# Patient Record
Sex: FEMALE | Race: WHITE | NOT HISPANIC OR LATINO | Employment: UNEMPLOYED | URBAN - NONMETROPOLITAN AREA
[De-identification: names, ages, dates, MRNs, and addresses within clinical notes are randomized per-mention and may not be internally consistent; named-entity substitution may affect disease eponyms.]

---

## 2017-05-15 ENCOUNTER — TRANSCRIBE ORDERS (OUTPATIENT)
Dept: ADMINISTRATIVE | Facility: HOSPITAL | Age: 67
End: 2017-05-15

## 2017-05-15 ENCOUNTER — HOSPITAL ENCOUNTER (OUTPATIENT)
Dept: GENERAL RADIOLOGY | Facility: HOSPITAL | Age: 67
Discharge: HOME | End: 2017-05-15
Attending: INTERNAL MEDICINE | Admitting: INTERNAL MEDICINE

## 2017-05-15 DIAGNOSIS — M25.511 RIGHT SHOULDER PAIN, UNSPECIFIED CHRONICITY: ICD-10-CM

## 2017-05-15 DIAGNOSIS — M25.511 RIGHT SHOULDER PAIN, UNSPECIFIED CHRONICITY: Primary | ICD-10-CM

## 2017-05-15 PROCEDURE — 73030 X-RAY EXAM OF SHOULDER: CPT

## 2017-05-15 PROCEDURE — 73030 X-RAY EXAM OF SHOULDER: CPT | Performed by: RADIOLOGY

## 2017-08-26 ENCOUNTER — HOSPITAL ENCOUNTER (EMERGENCY)
Facility: HOSPITAL | Age: 67
Discharge: HOME | End: 2017-08-26
Attending: EMERGENCY MEDICINE | Admitting: EMERGENCY MEDICINE

## 2017-08-26 ENCOUNTER — APPOINTMENT (OUTPATIENT)
Dept: GENERAL RADIOLOGY | Facility: HOSPITAL | Age: 67
End: 2017-08-26

## 2017-08-26 VITALS
WEIGHT: 155 LBS | TEMPERATURE: 97.6 F | RESPIRATION RATE: 18 BRPM | DIASTOLIC BLOOD PRESSURE: 74 MMHG | HEART RATE: 60 BPM | SYSTOLIC BLOOD PRESSURE: 158 MMHG | OXYGEN SATURATION: 99 % | BODY MASS INDEX: 24.33 KG/M2 | HEIGHT: 67 IN

## 2017-08-26 DIAGNOSIS — S46.911A STRAIN OF UPPER ARM, RIGHT, INITIAL ENCOUNTER: ICD-10-CM

## 2017-08-26 DIAGNOSIS — S16.1XXA CERVICAL STRAIN, INITIAL ENCOUNTER: Primary | ICD-10-CM

## 2017-08-26 PROCEDURE — 73060 X-RAY EXAM OF HUMERUS: CPT

## 2017-08-26 PROCEDURE — 72050 X-RAY EXAM NECK SPINE 4/5VWS: CPT | Performed by: RADIOLOGY

## 2017-08-26 PROCEDURE — 73060 X-RAY EXAM OF HUMERUS: CPT | Performed by: RADIOLOGY

## 2017-08-26 PROCEDURE — 72050 X-RAY EXAM NECK SPINE 4/5VWS: CPT

## 2017-08-26 PROCEDURE — 99284 EMERGENCY DEPT VISIT MOD MDM: CPT

## 2017-08-26 RX ORDER — TRAMADOL HYDROCHLORIDE 50 MG/1
50 TABLET ORAL ONCE
Status: COMPLETED | OUTPATIENT
Start: 2017-08-26 | End: 2017-08-26

## 2017-08-26 RX ORDER — BISACODYL 10 MG
10 SUPPOSITORY, RECTAL RECTAL ONCE
Status: COMPLETED | OUTPATIENT
Start: 2017-08-26 | End: 2017-08-26

## 2017-08-26 RX ADMIN — BISACODYL 10 MG: 10 SUPPOSITORY RECTAL at 02:54

## 2017-08-26 RX ADMIN — TRAMADOL HYDROCHLORIDE 50 MG: 50 TABLET, COATED ORAL at 02:54

## 2017-08-26 NOTE — ED PROVIDER NOTES
Subjective   Patient is a 66 y.o. female presenting with fall.   History provided by:  Patient  Fall   Mechanism of injury: fall    Injury location:  Head/neck and shoulder/arm  Shoulder/arm injury location:  L upper arm  Incident location:  Home  Fall:     Fall occurred:  From a bed    Entrapped after fall: no    Associated symptoms: neck pain    Associated symptoms: no abdominal pain and no chest pain        Review of Systems   Constitutional: Negative.  Negative for fever.   HENT: Negative.    Respiratory: Negative.    Cardiovascular: Negative.  Negative for chest pain.   Gastrointestinal: Negative.  Negative for abdominal pain.   Endocrine: Negative.    Genitourinary: Negative.  Negative for dysuria.   Musculoskeletal: Positive for neck pain.        Pain right upper arm   Skin: Negative.    Neurological: Negative.    Psychiatric/Behavioral: Negative.    All other systems reviewed and are negative.      Past Medical History:   Diagnosis Date   • Bladder prolapse, female, acquired    • COPD (chronic obstructive pulmonary disease)    • Coronary artery disease    • Heart attack    • Hypertension        No Known Allergies    Past Surgical History:   Procedure Laterality Date   • CARDIAC SURGERY     • KNEE ARTHROSCOPY         History reviewed. No pertinent family history.    Social History     Social History   • Marital status:      Spouse name: N/A   • Number of children: N/A   • Years of education: N/A     Social History Main Topics   • Smoking status: Current Some Day Smoker     Packs/day: 1.00     Years: 25.00   • Smokeless tobacco: None      Comment: smoked since 11 ys old   • Alcohol use No   • Drug use: No   • Sexual activity: Not Asked     Other Topics Concern   • None     Social History Narrative           Objective   Physical Exam   Constitutional: She is oriented to person, place, and time. She appears well-developed and well-nourished. No distress.   HENT:   Head: Normocephalic and atraumatic.    Right Ear: External ear normal.   Left Ear: External ear normal.   Nose: Nose normal.   Eyes: Conjunctivae and EOM are normal. Pupils are equal, round, and reactive to light.   Neck: Normal range of motion. Neck supple. No JVD present. No tracheal deviation present.   Mild diffuse tenderness, right upper arm tender   Cardiovascular: Normal rate, regular rhythm and normal heart sounds.    No murmur heard.  Pulmonary/Chest: Effort normal and breath sounds normal. No respiratory distress. She has no wheezes.   Abdominal: Soft. Bowel sounds are normal. There is no tenderness.   Musculoskeletal: Normal range of motion. She exhibits no edema or deformity.   Neurological: She is alert and oriented to person, place, and time. No cranial nerve deficit.   Skin: Skin is warm and dry. No rash noted. She is not diaphoretic. No erythema. No pallor.   Psychiatric: She has a normal mood and affect. Her behavior is normal. Thought content normal.   Nursing note and vitals reviewed.      Procedures         ED Course  ED Course                  MDM    Final diagnoses:   Cervical strain, initial encounter   Strain of upper arm, right, initial encounter            Mino Haro MD  08/26/17 5024

## 2017-09-23 ENCOUNTER — HOSPITAL ENCOUNTER (EMERGENCY)
Facility: HOSPITAL | Age: 67
Discharge: HOME | End: 2017-09-23
Attending: EMERGENCY MEDICINE | Admitting: NURSE PRACTITIONER

## 2017-09-23 VITALS
RESPIRATION RATE: 18 BRPM | OXYGEN SATURATION: 95 % | TEMPERATURE: 97.6 F | WEIGHT: 147 LBS | SYSTOLIC BLOOD PRESSURE: 160 MMHG | BODY MASS INDEX: 23.63 KG/M2 | HEART RATE: 58 BPM | HEIGHT: 66 IN | DIASTOLIC BLOOD PRESSURE: 86 MMHG

## 2017-09-23 DIAGNOSIS — N81.4 UTERINE PROLAPSE: Primary | ICD-10-CM

## 2017-09-23 PROCEDURE — 99283 EMERGENCY DEPT VISIT LOW MDM: CPT

## 2017-09-26 ENCOUNTER — APPOINTMENT (OUTPATIENT)
Dept: GENERAL RADIOLOGY | Facility: HOSPITAL | Age: 67
End: 2017-09-26

## 2017-09-26 ENCOUNTER — HOSPITAL ENCOUNTER (EMERGENCY)
Facility: HOSPITAL | Age: 67
Discharge: HOME | End: 2017-09-26
Attending: FAMILY MEDICINE | Admitting: FAMILY MEDICINE

## 2017-09-26 VITALS
BODY MASS INDEX: 19.29 KG/M2 | HEIGHT: 66 IN | DIASTOLIC BLOOD PRESSURE: 81 MMHG | WEIGHT: 120 LBS | RESPIRATION RATE: 18 BRPM | OXYGEN SATURATION: 99 % | HEART RATE: 73 BPM | TEMPERATURE: 98.5 F | SYSTOLIC BLOOD PRESSURE: 100 MMHG

## 2017-09-26 DIAGNOSIS — R07.9 CHEST PAIN IN ADULT: Primary | ICD-10-CM

## 2017-09-26 DIAGNOSIS — N39.0 URINARY TRACT INFECTION, SITE UNSPECIFIED: ICD-10-CM

## 2017-09-26 LAB
ALBUMIN SERPL-MCNC: 3.7 G/DL (ref 3.4–4.8)
ALBUMIN/GLOB SERPL: 1.5 G/DL (ref 1.5–2.5)
ALP SERPL-CCNC: 103 U/L (ref 35–104)
ALT SERPL W P-5'-P-CCNC: 12 U/L (ref 10–36)
ANION GAP SERPL CALCULATED.3IONS-SCNC: 4.6 MMOL/L (ref 3.6–11.2)
AST SERPL-CCNC: 20 U/L (ref 10–30)
BACTERIA UR QL AUTO: ABNORMAL /HPF
BASOPHILS # BLD AUTO: 0.01 10*3/MM3 (ref 0–0.3)
BASOPHILS NFR BLD AUTO: 0.1 % (ref 0–2)
BILIRUB SERPL-MCNC: 0.6 MG/DL (ref 0.2–1.8)
BILIRUB UR QL STRIP: NEGATIVE
BNP SERPL-MCNC: 233 PG/ML (ref 0–100)
BUN BLD-MCNC: 24 MG/DL (ref 7–21)
BUN/CREAT SERPL: 18.8 (ref 7–25)
CALCIUM SPEC-SCNC: 9.3 MG/DL (ref 7.7–10)
CHLORIDE SERPL-SCNC: 106 MMOL/L (ref 99–112)
CLARITY UR: ABNORMAL
CO2 SERPL-SCNC: 29.4 MMOL/L (ref 24.3–31.9)
COLOR UR: YELLOW
CREAT BLD-MCNC: 1.28 MG/DL (ref 0.43–1.29)
CRP SERPL-MCNC: 23.17 MG/DL (ref 0–0.99)
DEPRECATED RDW RBC AUTO: 47.7 FL (ref 37–54)
EOSINOPHIL # BLD AUTO: 0.02 10*3/MM3 (ref 0–0.7)
EOSINOPHIL NFR BLD AUTO: 0.2 % (ref 0–7)
ERYTHROCYTE [DISTWIDTH] IN BLOOD BY AUTOMATED COUNT: 13.9 % (ref 11.5–14.5)
ERYTHROCYTE [SEDIMENTATION RATE] IN BLOOD: 18 MM/HR (ref 0–30)
GFR SERPL CREATININE-BSD FRML MDRD: 41 ML/MIN/1.73
GLOBULIN UR ELPH-MCNC: 2.4 GM/DL
GLUCOSE BLD-MCNC: 106 MG/DL (ref 70–110)
GLUCOSE UR STRIP-MCNC: NEGATIVE MG/DL
HCT VFR BLD AUTO: 40 % (ref 37–47)
HGB BLD-MCNC: 13.2 G/DL (ref 12–16)
HGB UR QL STRIP.AUTO: ABNORMAL
HYALINE CASTS UR QL AUTO: ABNORMAL /LPF
IMM GRANULOCYTES # BLD: 0.03 10*3/MM3 (ref 0–0.03)
IMM GRANULOCYTES NFR BLD: 0.3 % (ref 0–0.5)
KETONES UR QL STRIP: NEGATIVE
LEUKOCYTE ESTERASE UR QL STRIP.AUTO: ABNORMAL
LYMPHOCYTES # BLD AUTO: 0.71 10*3/MM3 (ref 1–3)
LYMPHOCYTES NFR BLD AUTO: 6.6 % (ref 16–46)
MCH RBC QN AUTO: 31 PG (ref 27–33)
MCHC RBC AUTO-ENTMCNC: 33 G/DL (ref 33–37)
MCV RBC AUTO: 93.9 FL (ref 80–94)
MONOCYTES # BLD AUTO: 0.41 10*3/MM3 (ref 0.1–0.9)
MONOCYTES NFR BLD AUTO: 3.8 % (ref 0–12)
NEUTROPHILS # BLD AUTO: 9.55 10*3/MM3 (ref 1.4–6.5)
NEUTROPHILS NFR BLD AUTO: 89 % (ref 40–75)
NITRITE UR QL STRIP: POSITIVE
OSMOLALITY SERPL CALC.SUM OF ELEC: 283.9 MOSM/KG (ref 273–305)
PH UR STRIP.AUTO: 5.5 [PH] (ref 5–8)
PLATELET # BLD AUTO: 132 10*3/MM3 (ref 130–400)
PMV BLD AUTO: 11.7 FL (ref 6–10)
POTASSIUM BLD-SCNC: 3.4 MMOL/L (ref 3.5–5.3)
PROT SERPL-MCNC: 6.1 G/DL (ref 6–8)
PROT UR QL STRIP: ABNORMAL
RBC # BLD AUTO: 4.26 10*6/MM3 (ref 4.2–5.4)
RBC # UR: ABNORMAL /HPF
REF LAB TEST METHOD: ABNORMAL
SODIUM BLD-SCNC: 140 MMOL/L (ref 135–153)
SP GR UR STRIP: 1.01 (ref 1–1.03)
SQUAMOUS #/AREA URNS HPF: ABNORMAL /HPF
TRANS CELLS #/AREA URNS HPF: ABNORMAL /HPF
TROPONIN I SERPL-MCNC: 0.02 NG/ML
TROPONIN I SERPL-MCNC: 0.02 NG/ML
UROBILINOGEN UR QL STRIP: ABNORMAL
WBC NRBC COR # BLD: 10.73 10*3/MM3 (ref 4.5–12.5)
WBC UR QL AUTO: ABNORMAL /HPF

## 2017-09-26 PROCEDURE — 87086 URINE CULTURE/COLONY COUNT: CPT | Performed by: FAMILY MEDICINE

## 2017-09-26 PROCEDURE — 86140 C-REACTIVE PROTEIN: CPT | Performed by: FAMILY MEDICINE

## 2017-09-26 PROCEDURE — 87077 CULTURE AEROBIC IDENTIFY: CPT | Performed by: FAMILY MEDICINE

## 2017-09-26 PROCEDURE — 85652 RBC SED RATE AUTOMATED: CPT | Performed by: FAMILY MEDICINE

## 2017-09-26 PROCEDURE — 96365 THER/PROPH/DIAG IV INF INIT: CPT

## 2017-09-26 PROCEDURE — 36415 COLL VENOUS BLD VENIPUNCTURE: CPT

## 2017-09-26 PROCEDURE — 93005 ELECTROCARDIOGRAM TRACING: CPT | Performed by: EMERGENCY MEDICINE

## 2017-09-26 PROCEDURE — 81001 URINALYSIS AUTO W/SCOPE: CPT | Performed by: FAMILY MEDICINE

## 2017-09-26 PROCEDURE — 73030 X-RAY EXAM OF SHOULDER: CPT | Performed by: RADIOLOGY

## 2017-09-26 PROCEDURE — 99284 EMERGENCY DEPT VISIT MOD MDM: CPT

## 2017-09-26 PROCEDURE — 85025 COMPLETE CBC W/AUTO DIFF WBC: CPT | Performed by: FAMILY MEDICINE

## 2017-09-26 PROCEDURE — 87186 SC STD MICRODIL/AGAR DIL: CPT | Performed by: FAMILY MEDICINE

## 2017-09-26 PROCEDURE — 80053 COMPREHEN METABOLIC PANEL: CPT | Performed by: FAMILY MEDICINE

## 2017-09-26 PROCEDURE — 73030 X-RAY EXAM OF SHOULDER: CPT

## 2017-09-26 PROCEDURE — 83880 ASSAY OF NATRIURETIC PEPTIDE: CPT | Performed by: FAMILY MEDICINE

## 2017-09-26 PROCEDURE — 71010 XR CHEST 1 VW: CPT | Performed by: RADIOLOGY

## 2017-09-26 PROCEDURE — 71010 HC CHEST PA OR AP: CPT

## 2017-09-26 PROCEDURE — 84484 ASSAY OF TROPONIN QUANT: CPT | Performed by: FAMILY MEDICINE

## 2017-09-26 PROCEDURE — P9612 CATHETERIZE FOR URINE SPEC: HCPCS

## 2017-09-26 PROCEDURE — 25010000002 CEFTRIAXONE: Performed by: FAMILY MEDICINE

## 2017-09-26 PROCEDURE — 93010 ELECTROCARDIOGRAM REPORT: CPT | Performed by: INTERNAL MEDICINE

## 2017-09-26 RX ORDER — NITROFURANTOIN 25; 75 MG/1; MG/1
100 CAPSULE ORAL 2 TIMES DAILY
Qty: 14 CAPSULE | Refills: 0 | Status: SHIPPED | OUTPATIENT
Start: 2017-09-26 | End: 2017-10-03

## 2017-09-26 RX ADMIN — CEFTRIAXONE 1 G: 1 INJECTION, POWDER, FOR SOLUTION INTRAMUSCULAR; INTRAVENOUS at 15:50

## 2017-09-27 ENCOUNTER — TRANSCRIBE ORDERS (OUTPATIENT)
Dept: ADMINISTRATIVE | Facility: HOSPITAL | Age: 67
End: 2017-09-27

## 2017-09-27 DIAGNOSIS — R07.9 CHEST PAIN, UNSPECIFIED: Primary | ICD-10-CM

## 2017-09-28 LAB
BACTERIA SPEC AEROBE CULT: ABNORMAL
BACTERIA SPEC AEROBE CULT: ABNORMAL

## 2017-10-29 ENCOUNTER — PREP FOR SURGERY (OUTPATIENT)
Dept: OTHER | Facility: HOSPITAL | Age: 67
End: 2017-10-29

## 2017-10-29 DIAGNOSIS — N81.10 VAGINAL PROLAPSE: Primary | ICD-10-CM

## 2017-10-29 DIAGNOSIS — F17.210 SMOKING GREATER THAN 20 PACK YEARS: ICD-10-CM

## 2017-10-29 DIAGNOSIS — I25.10 ATHEROSCLEROSIS OF NATIVE CORONARY ARTERY OF NATIVE HEART, ANGINA PRESENCE UNSPECIFIED: ICD-10-CM

## 2017-10-29 DIAGNOSIS — N81.3 COMPLETE UTERINE PROLAPSE: ICD-10-CM

## 2017-10-29 RX ORDER — SODIUM CHLORIDE 0.9 % (FLUSH) 0.9 %
1-10 SYRINGE (ML) INJECTION AS NEEDED
Status: CANCELLED | OUTPATIENT
Start: 2017-11-08

## 2017-10-30 ENCOUNTER — APPOINTMENT (OUTPATIENT)
Dept: PREADMISSION TESTING | Facility: HOSPITAL | Age: 67
End: 2017-10-30

## 2017-11-05 ENCOUNTER — PREP FOR SURGERY (OUTPATIENT)
Dept: OTHER | Facility: HOSPITAL | Age: 67
End: 2017-11-05

## 2017-11-07 ENCOUNTER — APPOINTMENT (OUTPATIENT)
Dept: PREADMISSION TESTING | Facility: HOSPITAL | Age: 67
End: 2017-11-07

## 2017-11-07 ENCOUNTER — HOSPITAL ENCOUNTER (OUTPATIENT)
Dept: GENERAL RADIOLOGY | Facility: HOSPITAL | Age: 67
Discharge: HOME | End: 2017-11-07
Admitting: PHYSICIAN ASSISTANT

## 2017-11-07 DIAGNOSIS — N81.3 COMPLETE UTERINE PROLAPSE: ICD-10-CM

## 2017-11-07 DIAGNOSIS — F17.210 SMOKING GREATER THAN 20 PACK YEARS: ICD-10-CM

## 2017-11-07 DIAGNOSIS — I25.10 ATHEROSCLEROSIS OF NATIVE CORONARY ARTERY OF NATIVE HEART, ANGINA PRESENCE UNSPECIFIED: ICD-10-CM

## 2017-11-07 DIAGNOSIS — N81.10 VAGINAL PROLAPSE: ICD-10-CM

## 2017-11-07 LAB
ABO GROUP BLD: NORMAL
ANION GAP SERPL CALCULATED.3IONS-SCNC: 4.9 MMOL/L (ref 3.6–11.2)
BASOPHILS # BLD AUTO: 0.01 10*3/MM3 (ref 0–0.3)
BASOPHILS NFR BLD AUTO: 0.1 % (ref 0–2)
BLD GP AB SCN SERPL QL: NEGATIVE
BUN BLD-MCNC: 13 MG/DL (ref 7–21)
BUN/CREAT SERPL: 15.1 (ref 7–25)
CALCIUM SPEC-SCNC: 9.3 MG/DL (ref 7.7–10)
CHLORIDE SERPL-SCNC: 106 MMOL/L (ref 99–112)
CO2 SERPL-SCNC: 30.1 MMOL/L (ref 24.3–31.9)
CREAT BLD-MCNC: 0.86 MG/DL (ref 0.43–1.29)
DEPRECATED RDW RBC AUTO: 49 FL (ref 37–54)
EOSINOPHIL # BLD AUTO: 0.07 10*3/MM3 (ref 0–0.7)
EOSINOPHIL NFR BLD AUTO: 0.7 % (ref 0–7)
ERYTHROCYTE [DISTWIDTH] IN BLOOD BY AUTOMATED COUNT: 14.4 % (ref 11.5–14.5)
GFR SERPL CREATININE-BSD FRML MDRD: 66 ML/MIN/1.73
GLUCOSE BLD-MCNC: 113 MG/DL (ref 70–110)
HCT VFR BLD AUTO: 40.4 % (ref 37–47)
HGB BLD-MCNC: 13 G/DL (ref 12–16)
IMM GRANULOCYTES # BLD: 0.03 10*3/MM3 (ref 0–0.03)
IMM GRANULOCYTES NFR BLD: 0.3 % (ref 0–0.5)
LYMPHOCYTES # BLD AUTO: 1.09 10*3/MM3 (ref 1–3)
LYMPHOCYTES NFR BLD AUTO: 11.3 % (ref 16–46)
MCH RBC QN AUTO: 29.9 PG (ref 27–33)
MCHC RBC AUTO-ENTMCNC: 32.2 G/DL (ref 33–37)
MCV RBC AUTO: 92.9 FL (ref 80–94)
MONOCYTES # BLD AUTO: 0.63 10*3/MM3 (ref 0.1–0.9)
MONOCYTES NFR BLD AUTO: 6.5 % (ref 0–12)
NEUTROPHILS # BLD AUTO: 7.84 10*3/MM3 (ref 1.4–6.5)
NEUTROPHILS NFR BLD AUTO: 81.1 % (ref 40–75)
OSMOLALITY SERPL CALC.SUM OF ELEC: 282.2 MOSM/KG (ref 273–305)
PLATELET # BLD AUTO: 165 10*3/MM3 (ref 130–400)
PMV BLD AUTO: 11.7 FL (ref 6–10)
POTASSIUM BLD-SCNC: 3.4 MMOL/L (ref 3.5–5.3)
RBC # BLD AUTO: 4.35 10*6/MM3 (ref 4.2–5.4)
RH BLD: POSITIVE
SODIUM BLD-SCNC: 141 MMOL/L (ref 135–153)
WBC NRBC COR # BLD: 9.67 10*3/MM3 (ref 4.5–12.5)

## 2017-11-07 PROCEDURE — 71020 XR CHEST 2 VW: CPT | Performed by: RADIOLOGY

## 2017-11-07 RX ORDER — LEVOTHYROXINE SODIUM 0.05 MG/1
50 TABLET ORAL DAILY
COMMUNITY
End: 2022-03-08

## 2017-11-07 RX ORDER — SIMVASTATIN 40 MG
40 TABLET ORAL DAILY
COMMUNITY
End: 2022-03-08

## 2017-11-07 RX ORDER — METOPROLOL SUCCINATE 25 MG/1
25 TABLET, EXTENDED RELEASE ORAL DAILY
COMMUNITY
End: 2022-03-08

## 2017-11-07 RX ORDER — LISINOPRIL 40 MG/1
40 TABLET ORAL DAILY
COMMUNITY
End: 2022-03-11 | Stop reason: HOSPADM

## 2017-11-07 RX ORDER — PANTOPRAZOLE SODIUM 20 MG/1
20 TABLET, DELAYED RELEASE ORAL 2 TIMES DAILY
COMMUNITY
End: 2022-03-08

## 2017-11-07 NOTE — DISCHARGE INSTRUCTIONS
TAKE the following medications the morning of surgery:  All heart or blood pressure medications    Please discontinue all blood thinners and anticoagulants (except aspirin) prior to surgery as per your surgeon and cardiologist instructions.  Aspirin may be continued up to the day prior to surgery.    HOLD all diabetic medications the morning of surgery as order by physician.    Please follow instructions on use of prep cloths provided by nurse. Return instruction sheet with stickers attached to pre-op nurse on day of surgery.    Arrival time for surgery on 11/8/2017 is 0830 am    General Instructions:  • Do NOT eat or drink after midnight 11/7/2017 which includes water, mints, or gum.  • You may brush your teeth. Dental appliances that are removable must be taken out day of surgery.  • Do NOT smoke, chew tobacco, or drink alcohol within 24 hours prior to surgery.  • Bring medications in original bottles, any inhalers and if applicable your C-PAP/BI-PAP machine  • Bring any papers given to you in the doctor’s office  • Wear clean, comfortable clothes and socks  • Do NOT wear contact lenses or make-up or dark nail polish.  Bring a case for your glasses if applicable.  • Bring crutches or walker if applicable  • Leave all other valuables and jewelry at home  • If you were given a blood bank armband, continue to wear it until discharged.    Preventing a Surgical Site Infection:  • Shower on the morning of surgery using a fresh bar of anti-bacterial soap (such as Dial) and clean washcloth.  Dry with a clean towel and dress in clean clothing.  • For 2 to 3 days before surgery, avoid shaving with a razor near where you will have surgery because the razor can irritate skin and make it easier to develop an infection.  Ask your surgeon if you will be receiving antibiotics prior to surgery.  • Make sure you, your family, and all healthcare providers clean their hands with soap and water or an alcohol-based hand   before caring for you or your wound.  • If at all possible, quit smoking as many days before surgery as you can.    Day of Surgery:  Upon arrival, a pre-op nurse and anesthesiologist will review your health history, obtain vital signs, and answer questions you may have.  The only belongings needed at this time will be your home medications and if applicable you C-PAP/BI-PAP machine.  If you are staying overnight, your family can leave the rest of your belongings in the car and bring them to your room later.  A pre-op nurse will start an IV and you may receive medication in preparation for surgery.  Due to patient privacy and limited space, only one member of your family will be able to accompany you in the pre-op area.  While you are in surgery your family should notify the waiting room  if they leave the waiting room area and provide a contact number.  Please be aware that surgery does come with discomfort.  We want to make every effort to control your discomfort so please discuss any uncontrolled symptoms with your nurse.  Your doctor will most likely have prescribed pain medications.  If you are going home after surgery you will receive individualized written care instructions before being discharged.  A responsible adult must drive you to and from the hospital on the day of surgery and stay with you for 24 hours.  If you are staying overnight following surgery, you will be transported to your hospital room following the recovery period.

## 2017-11-08 ENCOUNTER — HOSPITAL ENCOUNTER (INPATIENT)
Facility: HOSPITAL | Age: 67
LOS: 1 days | Discharge: HOME | End: 2017-11-09
Attending: OBSTETRICS & GYNECOLOGY | Admitting: OBSTETRICS & GYNECOLOGY

## 2017-11-08 ENCOUNTER — ANESTHESIA EVENT (OUTPATIENT)
Dept: PERIOP | Facility: HOSPITAL | Age: 67
End: 2017-11-08

## 2017-11-08 ENCOUNTER — ANESTHESIA (OUTPATIENT)
Dept: PERIOP | Facility: HOSPITAL | Age: 67
End: 2017-11-08

## 2017-11-08 DIAGNOSIS — R10.9 ABDOMINAL PAIN: ICD-10-CM

## 2017-11-08 PROBLEM — N81.3 UTEROVAGINAL PROLAPSE, COMPLETE: Status: ACTIVE | Noted: 2017-11-08

## 2017-11-08 PROCEDURE — 25010000002 CEFOXITIN PER 1 G: Performed by: OBSTETRICS & GYNECOLOGY

## 2017-11-08 PROCEDURE — L0172 CERV COL SR FOAM 2PC PRE OTS: HCPCS | Performed by: OBSTETRICS & GYNECOLOGY

## 2017-11-08 PROCEDURE — 25010000002 DEXAMETHASONE PER 1 MG: Performed by: NURSE ANESTHETIST, CERTIFIED REGISTERED

## 2017-11-08 PROCEDURE — 0JQC3ZZ REPAIR PELVIC REGION SUBCUTANEOUS TISSUE AND FASCIA, PERCUTANEOUS APPROACH: ICD-10-PCS | Performed by: OBSTETRICS & GYNECOLOGY

## 2017-11-08 PROCEDURE — 25010000002 KETOROLAC TROMETHAMINE PER 15 MG: Performed by: OBSTETRICS & GYNECOLOGY

## 2017-11-08 PROCEDURE — 0UQF3ZZ REPAIR CUL-DE-SAC, PERCUTANEOUS APPROACH: ICD-10-PCS | Performed by: OBSTETRICS & GYNECOLOGY

## 2017-11-08 PROCEDURE — 25010000002 MIDAZOLAM PER 1 MG: Performed by: NURSE ANESTHETIST, CERTIFIED REGISTERED

## 2017-11-08 PROCEDURE — 25010000002 ONDANSETRON PER 1 MG: Performed by: NURSE ANESTHETIST, CERTIFIED REGISTERED

## 2017-11-08 PROCEDURE — 25010000002 NEOSTIGMINE 10 MG/10ML SOLUTION: Performed by: NURSE ANESTHETIST, CERTIFIED REGISTERED

## 2017-11-08 PROCEDURE — 0USG7ZZ REPOSITION VAGINA, VIA NATURAL OR ARTIFICIAL OPENING: ICD-10-PCS | Performed by: OBSTETRICS & GYNECOLOGY

## 2017-11-08 PROCEDURE — 0TJB8ZZ INSPECTION OF BLADDER, VIA NATURAL OR ARTIFICIAL OPENING ENDOSCOPIC: ICD-10-PCS | Performed by: OBSTETRICS & GYNECOLOGY

## 2017-11-08 PROCEDURE — 0UT97ZZ RESECTION OF UTERUS, VIA NATURAL OR ARTIFICIAL OPENING: ICD-10-PCS | Performed by: OBSTETRICS & GYNECOLOGY

## 2017-11-08 PROCEDURE — 88307 TISSUE EXAM BY PATHOLOGIST: CPT | Performed by: OBSTETRICS & GYNECOLOGY

## 2017-11-08 PROCEDURE — 94799 UNLISTED PULMONARY SVC/PX: CPT

## 2017-11-08 PROCEDURE — 25010000002 ENOXAPARIN PER 10 MG: Performed by: OBSTETRICS & GYNECOLOGY

## 2017-11-08 PROCEDURE — 25010000002 FENTANYL CITRATE (PF) 100 MCG/2ML SOLUTION: Performed by: NURSE ANESTHETIST, CERTIFIED REGISTERED

## 2017-11-08 PROCEDURE — 25010000002 PROPOFOL 10 MG/ML EMULSION: Performed by: NURSE ANESTHETIST, CERTIFIED REGISTERED

## 2017-11-08 RX ORDER — SODIUM CHLORIDE 0.9 % (FLUSH) 0.9 %
1-10 SYRINGE (ML) INJECTION AS NEEDED
Status: DISCONTINUED | OUTPATIENT
Start: 2017-11-08 | End: 2017-11-08 | Stop reason: HOSPADM

## 2017-11-08 RX ORDER — IBUPROFEN 800 MG/1
800 TABLET ORAL 3 TIMES DAILY
Status: DISCONTINUED | OUTPATIENT
Start: 2017-11-08 | End: 2017-11-09 | Stop reason: HOSPADM

## 2017-11-08 RX ORDER — ONDANSETRON 4 MG/1
4 TABLET, FILM COATED ORAL EVERY 6 HOURS PRN
Status: DISCONTINUED | OUTPATIENT
Start: 2017-11-08 | End: 2017-11-09 | Stop reason: HOSPADM

## 2017-11-08 RX ORDER — NEOSTIGMINE METHYLSULFATE 1 MG/ML
INJECTION, SOLUTION INTRAVENOUS AS NEEDED
Status: DISCONTINUED | OUTPATIENT
Start: 2017-11-08 | End: 2017-11-08 | Stop reason: SURG

## 2017-11-08 RX ORDER — FENTANYL CITRATE 50 UG/ML
INJECTION, SOLUTION INTRAMUSCULAR; INTRAVENOUS AS NEEDED
Status: DISCONTINUED | OUTPATIENT
Start: 2017-11-08 | End: 2017-11-08 | Stop reason: SURG

## 2017-11-08 RX ORDER — ACETAMINOPHEN 325 MG/1
650 TABLET ORAL EVERY 4 HOURS PRN
Status: DISCONTINUED | OUTPATIENT
Start: 2017-11-08 | End: 2017-11-09 | Stop reason: HOSPADM

## 2017-11-08 RX ORDER — BISACODYL 10 MG
10 SUPPOSITORY, RECTAL RECTAL DAILY PRN
Status: DISCONTINUED | OUTPATIENT
Start: 2017-11-08 | End: 2017-11-09 | Stop reason: HOSPADM

## 2017-11-08 RX ORDER — LEVOTHYROXINE SODIUM 0.05 MG/1
50 TABLET ORAL
Status: DISCONTINUED | OUTPATIENT
Start: 2017-11-09 | End: 2017-11-09 | Stop reason: HOSPADM

## 2017-11-08 RX ORDER — LANOLIN 100 %
OINTMENT (GRAM) TOPICAL
Status: CANCELLED | OUTPATIENT
Start: 2017-11-08

## 2017-11-08 RX ORDER — ONDANSETRON 4 MG/1
4 TABLET, ORALLY DISINTEGRATING ORAL EVERY 6 HOURS PRN
Status: DISCONTINUED | OUTPATIENT
Start: 2017-11-08 | End: 2017-11-09 | Stop reason: HOSPADM

## 2017-11-08 RX ORDER — MIDAZOLAM HYDROCHLORIDE 1 MG/ML
INJECTION INTRAMUSCULAR; INTRAVENOUS AS NEEDED
Status: DISCONTINUED | OUTPATIENT
Start: 2017-11-08 | End: 2017-11-08 | Stop reason: SURG

## 2017-11-08 RX ORDER — METOPROLOL SUCCINATE 25 MG/1
12.5 TABLET, EXTENDED RELEASE ORAL DAILY
Status: DISCONTINUED | OUTPATIENT
Start: 2017-11-09 | End: 2017-11-09 | Stop reason: HOSPADM

## 2017-11-08 RX ORDER — ONDANSETRON 2 MG/ML
4 INJECTION INTRAMUSCULAR; INTRAVENOUS ONCE AS NEEDED
Status: DISCONTINUED | OUTPATIENT
Start: 2017-11-08 | End: 2017-11-08 | Stop reason: HOSPADM

## 2017-11-08 RX ORDER — LIDOCAINE HYDROCHLORIDE AND EPINEPHRINE 10; 10 MG/ML; UG/ML
INJECTION, SOLUTION INFILTRATION; PERINEURAL AS NEEDED
Status: DISCONTINUED | OUTPATIENT
Start: 2017-11-08 | End: 2017-11-08 | Stop reason: HOSPADM

## 2017-11-08 RX ORDER — MEPERIDINE HYDROCHLORIDE 25 MG/ML
12.5 INJECTION INTRAMUSCULAR; INTRAVENOUS; SUBCUTANEOUS
Status: DISCONTINUED | OUTPATIENT
Start: 2017-11-08 | End: 2017-11-08 | Stop reason: HOSPADM

## 2017-11-08 RX ORDER — BACLOFEN 10 MG/1
20 TABLET ORAL NIGHTLY
Status: DISCONTINUED | OUTPATIENT
Start: 2017-11-08 | End: 2017-11-09 | Stop reason: HOSPADM

## 2017-11-08 RX ORDER — DEXAMETHASONE SODIUM PHOSPHATE 10 MG/ML
INJECTION INTRAMUSCULAR; INTRAVENOUS AS NEEDED
Status: DISCONTINUED | OUTPATIENT
Start: 2017-11-08 | End: 2017-11-08 | Stop reason: SURG

## 2017-11-08 RX ORDER — ROCURONIUM BROMIDE 10 MG/ML
INJECTION, SOLUTION INTRAVENOUS AS NEEDED
Status: DISCONTINUED | OUTPATIENT
Start: 2017-11-08 | End: 2017-11-08 | Stop reason: SURG

## 2017-11-08 RX ORDER — GLYCOPYRROLATE 0.2 MG/ML
INJECTION INTRAMUSCULAR; INTRAVENOUS AS NEEDED
Status: DISCONTINUED | OUTPATIENT
Start: 2017-11-08 | End: 2017-11-08 | Stop reason: SURG

## 2017-11-08 RX ORDER — LISINOPRIL 10 MG/1
40 TABLET ORAL DAILY
Status: DISCONTINUED | OUTPATIENT
Start: 2017-11-09 | End: 2017-11-09 | Stop reason: HOSPADM

## 2017-11-08 RX ORDER — OXYCODONE HYDROCHLORIDE AND ACETAMINOPHEN 5; 325 MG/1; MG/1
1 TABLET ORAL ONCE AS NEEDED
Status: DISCONTINUED | OUTPATIENT
Start: 2017-11-08 | End: 2017-11-08 | Stop reason: HOSPADM

## 2017-11-08 RX ORDER — KETOROLAC TROMETHAMINE 30 MG/ML
15 INJECTION, SOLUTION INTRAMUSCULAR; INTRAVENOUS EVERY 6 HOURS PRN
Status: DISCONTINUED | OUTPATIENT
Start: 2017-11-08 | End: 2017-11-09 | Stop reason: HOSPADM

## 2017-11-08 RX ORDER — IPRATROPIUM BROMIDE AND ALBUTEROL SULFATE 2.5; .5 MG/3ML; MG/3ML
3 SOLUTION RESPIRATORY (INHALATION) ONCE AS NEEDED
Status: DISCONTINUED | OUTPATIENT
Start: 2017-11-08 | End: 2017-11-08 | Stop reason: HOSPADM

## 2017-11-08 RX ORDER — LIDOCAINE HYDROCHLORIDE 20 MG/ML
INJECTION, SOLUTION INFILTRATION; PERINEURAL AS NEEDED
Status: DISCONTINUED | OUTPATIENT
Start: 2017-11-08 | End: 2017-11-08 | Stop reason: SURG

## 2017-11-08 RX ORDER — MAGNESIUM HYDROXIDE 1200 MG/15ML
LIQUID ORAL AS NEEDED
Status: DISCONTINUED | OUTPATIENT
Start: 2017-11-08 | End: 2017-11-08 | Stop reason: HOSPADM

## 2017-11-08 RX ORDER — SODIUM CHLORIDE, SODIUM LACTATE, POTASSIUM CHLORIDE, CALCIUM CHLORIDE 600; 310; 30; 20 MG/100ML; MG/100ML; MG/100ML; MG/100ML
125 INJECTION, SOLUTION INTRAVENOUS CONTINUOUS
Status: DISCONTINUED | OUTPATIENT
Start: 2017-11-08 | End: 2017-11-08 | Stop reason: HOSPADM

## 2017-11-08 RX ORDER — METOCLOPRAMIDE 10 MG/1
10 TABLET ORAL ONCE
Status: DISCONTINUED | OUTPATIENT
Start: 2017-11-08 | End: 2017-11-09 | Stop reason: HOSPADM

## 2017-11-08 RX ORDER — ONDANSETRON 2 MG/ML
4 INJECTION INTRAMUSCULAR; INTRAVENOUS EVERY 6 HOURS PRN
Status: DISCONTINUED | OUTPATIENT
Start: 2017-11-08 | End: 2017-11-09 | Stop reason: HOSPADM

## 2017-11-08 RX ORDER — PANTOPRAZOLE SODIUM 40 MG/1
40 TABLET, DELAYED RELEASE ORAL
Status: DISCONTINUED | OUTPATIENT
Start: 2017-11-08 | End: 2017-11-09 | Stop reason: HOSPADM

## 2017-11-08 RX ORDER — HYDROXYZINE 50 MG/1
50 TABLET, FILM COATED ORAL EVERY 6 HOURS PRN
Status: DISCONTINUED | OUTPATIENT
Start: 2017-11-08 | End: 2017-11-09 | Stop reason: HOSPADM

## 2017-11-08 RX ORDER — FENTANYL CITRATE 50 UG/ML
50 INJECTION, SOLUTION INTRAMUSCULAR; INTRAVENOUS
Status: DISCONTINUED | OUTPATIENT
Start: 2017-11-08 | End: 2017-11-08 | Stop reason: HOSPADM

## 2017-11-08 RX ORDER — SODIUM CHLORIDE 9 MG/ML
INJECTION, SOLUTION INTRAVENOUS AS NEEDED
Status: DISCONTINUED | OUTPATIENT
Start: 2017-11-08 | End: 2017-11-08 | Stop reason: HOSPADM

## 2017-11-08 RX ORDER — PROPOFOL 10 MG/ML
VIAL (ML) INTRAVENOUS AS NEEDED
Status: DISCONTINUED | OUTPATIENT
Start: 2017-11-08 | End: 2017-11-08 | Stop reason: SURG

## 2017-11-08 RX ORDER — SODIUM CHLORIDE, SODIUM LACTATE, POTASSIUM CHLORIDE, CALCIUM CHLORIDE 600; 310; 30; 20 MG/100ML; MG/100ML; MG/100ML; MG/100ML
125 INJECTION, SOLUTION INTRAVENOUS CONTINUOUS
Status: DISCONTINUED | OUTPATIENT
Start: 2017-11-08 | End: 2017-11-09 | Stop reason: HOSPADM

## 2017-11-08 RX ORDER — DOCUSATE SODIUM 100 MG/1
100 CAPSULE, LIQUID FILLED ORAL DAILY
Status: DISCONTINUED | OUTPATIENT
Start: 2017-11-08 | End: 2017-11-09 | Stop reason: HOSPADM

## 2017-11-08 RX ORDER — ALLOPURINOL 300 MG/1
300 TABLET ORAL NIGHTLY
Status: DISCONTINUED | OUTPATIENT
Start: 2017-11-08 | End: 2017-11-09 | Stop reason: HOSPADM

## 2017-11-08 RX ORDER — ATORVASTATIN CALCIUM 20 MG/1
20 TABLET, FILM COATED ORAL DAILY
Status: DISCONTINUED | OUTPATIENT
Start: 2017-11-09 | End: 2017-11-09 | Stop reason: HOSPADM

## 2017-11-08 RX ORDER — SIMETHICONE 80 MG
80 TABLET,CHEWABLE ORAL 4 TIMES DAILY
Status: DISCONTINUED | OUTPATIENT
Start: 2017-11-08 | End: 2017-11-09 | Stop reason: HOSPADM

## 2017-11-08 RX ORDER — ONDANSETRON 2 MG/ML
INJECTION INTRAMUSCULAR; INTRAVENOUS AS NEEDED
Status: DISCONTINUED | OUTPATIENT
Start: 2017-11-08 | End: 2017-11-08 | Stop reason: SURG

## 2017-11-08 RX ORDER — HYDROCODONE BITARTRATE AND ACETAMINOPHEN 7.5; 325 MG/1; MG/1
1 TABLET ORAL EVERY 4 HOURS PRN
Status: DISCONTINUED | OUTPATIENT
Start: 2017-11-08 | End: 2017-11-09 | Stop reason: HOSPADM

## 2017-11-08 RX ADMIN — ROCURONIUM BROMIDE 30 MG: 10 INJECTION INTRAVENOUS at 09:59

## 2017-11-08 RX ADMIN — DEXAMETHASONE SODIUM PHOSPHATE 4 MG: 10 INJECTION INTRAMUSCULAR; INTRAVENOUS at 09:59

## 2017-11-08 RX ADMIN — LIDOCAINE HYDROCHLORIDE 60 MG: 20 INJECTION, SOLUTION INFILTRATION; PERINEURAL at 09:59

## 2017-11-08 RX ADMIN — CEFOXITIN 2 G: 1 INJECTION, POWDER, FOR SOLUTION INTRAVENOUS at 09:52

## 2017-11-08 RX ADMIN — ENOXAPARIN SODIUM 40 MG: 40 INJECTION SUBCUTANEOUS at 14:15

## 2017-11-08 RX ADMIN — SODIUM CHLORIDE, POTASSIUM CHLORIDE, SODIUM LACTATE AND CALCIUM CHLORIDE: 600; 310; 30; 20 INJECTION, SOLUTION INTRAVENOUS at 10:20

## 2017-11-08 RX ADMIN — SODIUM CHLORIDE, POTASSIUM CHLORIDE, SODIUM LACTATE AND CALCIUM CHLORIDE: 600; 310; 30; 20 INJECTION, SOLUTION INTRAVENOUS at 11:13

## 2017-11-08 RX ADMIN — FENTANYL CITRATE 50 MCG: 50 INJECTION INTRAMUSCULAR; INTRAVENOUS at 09:59

## 2017-11-08 RX ADMIN — FLUORESCEIN SODIUM 100 MG: 100 INJECTION INTRAVENOUS at 10:45

## 2017-11-08 RX ADMIN — NEOSTIGMINE METHYLSULFATE 3 MG: 1 INJECTION, SOLUTION INTRAVENOUS at 11:10

## 2017-11-08 RX ADMIN — IBUPROFEN 800 MG: 800 TABLET ORAL at 21:02

## 2017-11-08 RX ADMIN — ALLOPURINOL 300 MG: 300 TABLET ORAL at 21:02

## 2017-11-08 RX ADMIN — MIDAZOLAM HYDROCHLORIDE 2 MG: 1 INJECTION, SOLUTION INTRAMUSCULAR; INTRAVENOUS at 09:52

## 2017-11-08 RX ADMIN — SODIUM CHLORIDE, POTASSIUM CHLORIDE, SODIUM LACTATE AND CALCIUM CHLORIDE 125 ML/HR: 600; 310; 30; 20 INJECTION, SOLUTION INTRAVENOUS at 08:57

## 2017-11-08 RX ADMIN — HYDROCODONE BITARTRATE AND ACETAMINOPHEN 1 TABLET: 7.5; 325 TABLET ORAL at 18:50

## 2017-11-08 RX ADMIN — SIMETHICONE CHEW TAB 80 MG 80 MG: 80 TABLET ORAL at 17:08

## 2017-11-08 RX ADMIN — GLYCOPYRROLATE 0.4 MG: 0.2 INJECTION, SOLUTION INTRAMUSCULAR; INTRAVENOUS at 11:10

## 2017-11-08 RX ADMIN — PROPOFOL 100 MG: 10 INJECTION, EMULSION INTRAVENOUS at 09:59

## 2017-11-08 RX ADMIN — FENTANYL CITRATE 50 MCG: 50 INJECTION INTRAMUSCULAR; INTRAVENOUS at 11:25

## 2017-11-08 RX ADMIN — ONDANSETRON 4 MG: 2 INJECTION, SOLUTION INTRAMUSCULAR; INTRAVENOUS at 09:59

## 2017-11-08 RX ADMIN — PANTOPRAZOLE SODIUM 40 MG: 40 TABLET, DELAYED RELEASE ORAL at 17:08

## 2017-11-08 RX ADMIN — FENTANYL CITRATE 100 MCG: 50 INJECTION INTRAMUSCULAR; INTRAVENOUS at 09:52

## 2017-11-08 RX ADMIN — BACLOFEN 20 MG: 10 TABLET ORAL at 21:02

## 2017-11-08 RX ADMIN — KETOROLAC TROMETHAMINE 15 MG: 30 INJECTION, SOLUTION INTRAMUSCULAR; INTRAVENOUS at 14:14

## 2017-11-08 RX ADMIN — FENTANYL CITRATE 50 MCG: 50 INJECTION INTRAMUSCULAR; INTRAVENOUS at 10:14

## 2017-11-08 NOTE — H&P
Patient Care Team:  Bernadette Arevalo MD as PCP - General  Bernadette Arevalo MD as PCP - Family Medicine    Chief complaint complete uterovaginal prolapse    Subjective     Patient is a 66 y.o. female who presents for surgical repair of complete uterovaginal prolapse.  She is present with her daughter-in-law.  Apparently her daughter-in-law takes care of her as she does have some psychiatric history although she does have the capability to make her own decisions.  We discussed the risks and benefits of the surgery in detail.  We discussed the postoperative recovery as well.    Review of Systems   Pertinent items are noted in HPI    History  Past Medical History:   Diagnosis Date   • Arthritis    • Bladder prolapse, female, acquired    • COPD (chronic obstructive pulmonary disease)    • Coronary artery disease    • Disease of thyroid gland    • Frequency of urination    • Heart attack    • Hypertension    • Irregular heart beat      Past Surgical History:   Procedure Laterality Date   • CARDIAC SURGERY      open heart    • HERNIA REPAIR     • OTHER SURGICAL HISTORY      states she had fluid drained no surgery     History reviewed. No pertinent family history.  Social History   Substance Use Topics   • Smoking status: Current Some Day Smoker     Packs/day: 0.50     Years: 25.00   • Smokeless tobacco: Never Used      Comment: smoked since 11 ys old   • Alcohol use No     Prescriptions Prior to Admission   Medication Sig Dispense Refill Last Dose   • allopurinol (ZYLOPRIM) 300 MG tablet Take 300 mg by mouth Every Night.   11/7/2017 at Unknown time   • baclofen (LIORESAL) 20 MG tablet Take 20 mg by mouth Every Night.   11/7/2017 at Unknown time   • diclofenac (VOLTAREN) 1 % gel gel Apply 4 g topically 4 (Four) Times a Day As Needed (applies to knees and right arm).   11/8/2017 at 0744   • levothyroxine (SYNTHROID, LEVOTHROID) 50 MCG tablet Take 50 mcg by mouth Daily.   11/8/2017 at 0744   • lisinopril  (PRINIVIL,ZESTRIL) 40 MG tablet Take 40 mg by mouth Daily.   11/8/2017 at 0744   • magnesium oxide (MAGOX) 400 (241.3 Mg) MG tablet tablet Take 400 mg by mouth Daily.   11/8/2017 at 0744   • metoprolol succinate XL (TOPROL-XL) 25 MG 24 hr tablet Take 12.5 mg by mouth Daily.   11/8/2017 at 0744   • pantoprazole (PROTONIX) 20 MG EC tablet Take 20 mg by mouth 2 (Two) Times a Day.   11/8/2017 at 0744   • simvastatin (ZOCOR) 40 MG tablet Take 40 mg by mouth Daily.   11/8/2017 at 0744     Allergies:  Review of patient's allergies indicates no known allergies.    Objective     Vital Signs  Temp:  [97.9 °F (36.6 °C)] 97.9 °F (36.6 °C)  Heart Rate:  [59] 59  Resp:  [16] 16  BP: (134)/(70) 134/70    Physical Exam:      General Appearance:    Alert, cooperative, in no acute distress   Head:    Normocephalic, without obvious abnormality, atraumatic   Eyes:            Lids and lashes normal, conjunctivae and sclerae normal, no   icterus, no pallor, corneas clear, PERRLA   Ears:    Ears appear intact with no abnormalities noted   Throat:   No oral lesions, no thrush, oral mucosa moist   Neck:   No adenopathy, supple, trachea midline, no thyromegaly, no     carotid bruit, no JVD   Back:     No kyphosis present, no scoliosis present, no skin lesions,       erythema or scars, no tenderness to percussion or                   palpation,   range of motion normal   Lungs:     Clear to auscultation,respirations regular, even and                   unlabored    Heart:    Regular rhythm and normal rate, normal S1 and S2, no            murmur, no gallop, no rub, no click   Breast Exam:    Deferred   Abdomen:     Normal bowel sounds, no masses, no organomegaly, soft        non-tender, non-distended, no guarding, no rebound                 tenderness   Genitalia:    Deferred   Extremities:   Moves all extremities well, no edema, no cyanosis, no              redness   Pulses:   Pulses palpable and equal bilaterally   Skin:   No bleeding,  bruising or rash   Lymph nodes:   No palpable adenopathy   Neurologic:   Cranial nerves 2 - 12 grossly intact, sensation intact, DTR        present and equal bilaterally         Results Review:    I reviewed the patient's new clinical results.  Assessment/Plan   Complete uterovaginal prolapse-plan total vaginal hysterectomy, anterior and posterior vaginal repair, vaginal vault suspension, cystoscopy.  We have discussed risks and benefits in detail we discussed the risk of ureteral injury, injury to bowel bladder, infection and bleeding.        I discussed the patients findings and my recommendations with patient and family.     Karl Nicolas DO  11/08/17  9:03 AM

## 2017-11-08 NOTE — ANESTHESIA PREPROCEDURE EVALUATION
Anesthesia Evaluation     Patient summary reviewed and Nursing notes reviewed   no history of anesthetic complications:  NPO Solid Status: > 8 hours  NPO Liquid Status: > 8 hours     Airway   Mallampati: II  TM distance: >3 FB  Neck ROM: full  no difficulty expected  Dental - normal exam   (+) edentulous    Pulmonary - normal exam   (+) a smoker Current Smoked day of surgery, COPD,   (-) asthma  Cardiovascular - normal exam  Exercise tolerance: good (4-7 METS)    NYHA Classification: II  Patient on routine beta blocker and Beta blocker given within 24 hours of surgery    (+) hypertension, past MI (2009)  >12 months, CAD, dysrhythmias, angina, hyperlipidemia  (-) CHF      Neuro/Psych- negative ROS  (-) seizures, CVA  GI/Hepatic/Renal/Endo    (+)  GERD, hypothyroidism,   (-) liver disease, no renal disease, diabetes    Musculoskeletal     (+) radiculopathy Right upper extremity  (-) back pain, neck pain  Abdominal  - normal exam    Bowel sounds: normal.   Substance History - negative use     OB/GYN negative ob/gyn ROS         Other   (+) arthritis                                     Anesthesia Plan    ASA 3     general     intravenous induction   Anesthetic plan and risks discussed with patient.  Use of blood products discussed with patient  Consented to blood products.

## 2017-11-08 NOTE — PLAN OF CARE
Problem: Patient Care Overview (Adult)  Goal: Plan of Care Review  Outcome: Ongoing (interventions implemented as appropriate)    11/08/17 0839   Coping/Psychosocial Response Interventions   Plan Of Care Reviewed With patient   Patient Care Overview   Progress progress toward functional goals as expected

## 2017-11-08 NOTE — PLAN OF CARE
Problem: Patient Care Overview (Adult)  Goal: Discharge Needs Assessment  Outcome: Ongoing (interventions implemented as appropriate)    11/08/17 8471   Discharge Needs Assessment   Concerns To Be Addressed no discharge needs identified   Readmission Within The Last 30 Days no previous admission in last 30 days

## 2017-11-08 NOTE — OP NOTE
VAGINAL HYSTERECTOMY WITH ANTERIOR AND POSTERIOR VAGINAL REPAIR, VAGINAL VAULT SUSPENSION WITH VAGINAL APPROACH, CYSTOSCOPY  Procedure Note    Brenda Munroe  11/8/2017    Pre-op Diagnosis:   Complete uterovaginal prolapse    Post-op Diagnosis:     same    Procedure(s):  VAGINAL HYSTERECTOMY WITH ANTERIOR, POSTERIOR, AND ENTEROCELE VAGINAL REPAIR  VAGINAL VAULT SUSPENSION   CYSTOSCOPY    Surgeon(s):  Karl Nicolas DO    Anesthesia: Choice        Estimated Blood Loss: 100ml    Specimens:                  Order Name Source Comment Collection Info Order Time   TISSUE PATHOLOGY EXAM Uterus with Cervix  Collected By: Karl Nicolas DO 11/8/2017 11:23 AM         Procedure:   The patient was taken to the operating room after informed written informed consent was obtained.  She was prepped and draped in the usual sterile fashion in the dorsal lithotomy position after general anesthesia was obtained.  We first placed in speculums anteriorly and posteriorly and grasped the cervix on each of the midline using a toothed tenaculum.  We hydrodissected circumferentially around the cervix and then made a circumferential incision around the cervix.  We then dissected anteriorly and posteriorly posteriorly we entered the cul-de-sac sharply and placed a retractor.  We then dissected anteriorly to the point where we got into the anterior cul-de-sac.  Next we took bites on each side using the Lo clamps, we then cut with scissors and suture ligated with 0 Vicryl.  We held the sutures for manipulation later on.  Next using the LigaSure device we took serial bites on each side of the uterus doubly cauterizing each pedicle and then cutting using the LigaSure.  Once we got up to the fundus and the cornua we doubly cauterized it and delivered the uterus.    Next we placed the vaginal vault suspension stitches.  We grasped the vaginal cuff at 4 and 8:00 with Allis clamps.  We placed tension on them.  We then high into the  uterosacral ligament placed stitches of 0 Vicryl once sequentially higher than the other and tagged them for later on.    Next we brought the cystocele defect up between 2 Allis clamps in the midline we hydrodissected with lidocaine with epinephrine.  We made a midline incision and dissected the vaginal epithelium off of the bladder out to the pelvic brim.  Next using 3-0 Vicryl stitches we plicated the cystocele defect in the midline using 3 layers of stitches first 2 layers using 3-0 Vicryl and the last layer using a 2-0 Ethibond.  Once the cystocele defect was reduced we went ahead and placed the uterosacral vault suspension stitches through the new apex of the vagina.  We then trimmed the excess vaginal mucosa and closed the vaginal incision and vaginal cuff using interrupted 2-0 Vicryl stitches.  Next we tied down the vaginal vault suspension stitches elevating the vaginal vault into the hollow of the sacrum.    Next we performed a cystourethroscopy.  We had the anesthetist give fluorescing dye through the IV and both ureters were spilling dye bilaterally and the bladder appeared to be normal.    Next we performed a posterior repair we placed Allis clamps at the introitus posteriorly.  We excised a tomy-shaped wedge of vagina and perineal skin.  We then dissected the vaginal epithelium off of the pelvic fascia.  Next using 0 Vicryl stitches reapproximated the rectocele defect in the midline.  We then closed the vaginal incision using a 0 Vicryl stitch in a running locking fashion and built up the perineum to where it is a at a 90° angle to the vagina.  We then packed the vagina and left the catheter in place.    All sponge lap and needle counts were correct there were no complications with the procedure.  Findings: Complete procidentia    Complications: none    Grafts or Implants: TVT Mesh    Karl Nicolas,      Date: 11/8/2017  Time: 12:25 PM

## 2017-11-08 NOTE — ANESTHESIA POSTPROCEDURE EVALUATION
Patient: Brenda Munroe    Procedure Summary     Date Anesthesia Start Anesthesia Stop Room / Location    11/08/17 0952 1135  [facility] / [facility]       Procedure Diagnosis Surgeon Provider    VAGINAL HYSTERECTOMY WITH ANTERIOR, POSTERIOR, AND ENTEROCELE VAGINAL REPAIR (N/A Uterus); VAGINAL VAULT SUSPENSION  (N/A Uterus); CYSTOSCOPY (N/A Urethra) (n81.3) Karl Nicolas, DO Arron Cook MD          Anesthesia Type: general  Last vitals  BP   164/78 (11/08/17 1136)   Temp   97.7 °F (36.5 °C) (11/08/17 1136)   Pulse   66 (11/08/17 1136)   Resp   14 (11/08/17 1136)     SpO2   96 % (11/08/17 1136)     Post Anesthesia Care and Evaluation    Patient location during evaluation: bedside  Patient participation: complete - patient participated  Level of consciousness: awake and alert  Pain score: 1  Pain management: adequate  Airway patency: patent  Anesthetic complications: No anesthetic complications  PONV Status: none  Cardiovascular status: acceptable  Respiratory status: acceptable  Hydration status: acceptable

## 2017-11-08 NOTE — PLAN OF CARE
Problem: Patient Care Overview (Adult)  Goal: Plan of Care Review  Outcome: Ongoing (interventions implemented as appropriate)  Goal: Adult Individualization and Mutuality  Outcome: Ongoing (interventions implemented as appropriate)    Problem: Hysterectomy (Adult)  Goal: Signs and Symptoms of Listed Potential Problems Will be Absent or Manageable (Hysterectomy)  Outcome: Ongoing (interventions implemented as appropriate)

## 2017-11-08 NOTE — ANESTHESIA PROCEDURE NOTES
Airway  Urgency: elective    Date/Time: 11/8/2017 10:00 AM  Airway not difficult    General Information and Staff    Patient location during procedure: OR  Anesthesiologist: LUL PERKINS  CRNA: GIANCARLO MASON    Indications and Patient Condition  Indications for airway management: airway protection    Preoxygenated: yes  MILS maintained throughout  Mask difficulty assessment: 0 - not attempted    Final Airway Details  Final airway type: endotracheal airway      Successful airway: ETT  Cuffed: yes   Successful intubation technique: direct laryngoscopy  Facilitating devices/methods: intubating stylet  Endotracheal tube insertion site: oral  Blade: Derrick  Blade size: #3  ETT size: 7.0 mm  Cormack-Lehane Classification: grade I - full view of glottis  Placement verified by: chest auscultation, capnometry and palpation of cuff   Cuff volume (mL): 10  Measured from: lips  ETT to lips (cm): 21  Number of attempts at approach: 1    Additional Comments  Dentition and lips same as preop

## 2017-11-08 NOTE — PLAN OF CARE
Problem: Patient Care Overview (Adult)  Goal: Adult Individualization and Mutuality  Outcome: Ongoing (interventions implemented as appropriate)    Problem: Perioperative Period (Adult)  Goal: Signs and Symptoms of Listed Potential Problems Will be Absent or Manageable (Perioperative Period)  Outcome: Ongoing (interventions implemented as appropriate)

## 2017-11-08 NOTE — PROGRESS NOTES
"Case Management/Social Work    Patient Name:  Brenda Munroe  YOB: 1950  MRN: [id number]  Admit Date:  11/8/2017    SS received consult \"per order dr villar/pt lives alone/daughter in law states no one to take care of her when she goes home.\" SS spoke to pt's daughter in law, [place] who states pt cannot return home alone due to not having someone care for her. Pt's daughter in law requests short term rehab at discharge. SS contacted [facility] per Rosmery who states they have a bed available and are agreeable to review pt's information. SS faxed pt's information to be reviewed. SS will continue to follow and assist as needed with discharge planning.         Electronically signed by:  Kristal Knott  11/08/17 4:19 PM  "

## 2017-11-08 NOTE — DISCHARGE PLACEMENT REQUEST
"Alta Munroe (66 y.o. Female)     Date of Birth Social Security Number Address Home Phone MRN    1950  [id number] [facility] [address] [phone] [id number]    Alevism Marital Status          Holiness        Admission Date Admission Type Admitting Provider Attending Provider Department, Room/Bed    17 Elective Karl Nicolas, Karl Howell DO [facility], P248/2S    Discharge Date Discharge Disposition Discharge Destination                      Attending Provider: Karl Nicolas DO     Allergies:  No Known Allergies    Isolation:  None   Infection:  None   Code Status:  FULL    Ht:  66\" (167.6 cm)   Wt:  141 lb (64 kg)    Admission Cmt:  None   Principal Problem:   delivery delivered [O82]                 Active Insurance as of 2017     Primary Coverage     Payor Plan Insurance Group Employer/Plan Group    MEDICARE MEDICARE A & B      Payor Plan Address Payor Plan Phone Number Effective From Effective To    [address] [phone] [address]     [place] [address]       Subscriber Name Subscriber Birth Date Member ID       ALTA MUNROE 1950 [id number]           Secondary Coverage     Payor Plan Insurance Group Employer/Plan Group    [facility] [place] [facility] [place] [place]      Payor Plan Address Payor Plan Phone Number Effective From Effective To    [address] [address]  [address]     [place] [address]       Subscriber Name Subscriber Birth Date Member ID       ALTA MUNROE 1950 [id number]                 Emergency Contacts      (Rel.) Home Phone Work Phone Mobile Phone    Tammy Munroe (Relative) [phone] -- --    Edi Munroe (Son) -- -- [phone]            Emergency Contact Information     Name Relation Home Work Mobile    Tammy Munroe Relative [phone]      Edi Munroe Son   [phone]          Insurance Information                MEDICARE/MEDICARE A & B " Phone: [phone]    Subscriber: LudwigBrenda Subscriber#: [id number]    Group#:  Precert#:         [facility] [place] [place] Phone:     Subscriber: LudwigBrenda Subscriber#: [id number]    Group#:  Precert#:           Treatment Team     Provider Relationship Specialty Contact    Karl Nicolas DO Attending, Surgeon Obstetrics and Gynecology  [phone]    Margaret Dubon RN Registered Nurse --            Problem List           Codes Noted - Resolved       Hospital    Uterovaginal prolapse, complete ICD-10-CM: N81.3  ICD-9-CM: 618.3 2017 - Present    * (Principal) delivery delivered ICD-10-CM: O82  ICD-9-CM: 669.71 2017 - Present       Non-Hospital    Prolapsed uterus ICD-10-CM: N81.4  ICD-9-CM: 618.1 2016 - Present             History & Physical      H&P filed by Twin Ryder MD at 2017  9:23 AM      Scan on 2017 : [id number]  2017 (below)              Electronically signed by Interface, Scans Incoming at 2017  9:23 AM      Karl Nicolas DO at 2017  9:03 AM              Patient Care Team:  Bernadette Arevalo MD as PCP - General  Bernadette Arevalo MD as PCP - Family Medicine    Chief complaint complete uterovaginal prolapse    Subjective     Patient is a 66 y.o. female who presents for surgical repair of complete uterovaginal prolapse.  She is present with her daughter-in-law.  Apparently her daughter-in-law takes care of her as she does have some psychiatric history although she does have the capability to make her own decisions.  We discussed the risks and benefits of the surgery in detail.  We discussed the postoperative recovery as well.    Review of Systems   Pertinent items are noted in HPI    History  Past Medical History:   Diagnosis Date   • Arthritis    • Bladder prolapse, female, acquired    • COPD (chronic obstructive pulmonary disease)    • Coronary artery disease    • Disease of thyroid gland    •  Frequency of urination    • Heart attack    • Hypertension    • Irregular heart beat      Past Surgical History:   Procedure Laterality Date   • CARDIAC SURGERY      open heart    • HERNIA REPAIR     • OTHER SURGICAL HISTORY      states she had fluid drained no surgery     History reviewed. No pertinent family history.  Social History   Substance Use Topics   • Smoking status: Current Some Day Smoker     Packs/day: 0.50     Years: 25.00   • Smokeless tobacco: Never Used      Comment: smoked since 11 ys old   • Alcohol use No     Prescriptions Prior to Admission   Medication Sig Dispense Refill Last Dose   • allopurinol (ZYLOPRIM) 300 MG tablet Take 300 mg by mouth Every Night.   11/7/2017 at Unknown time   • baclofen (LIORESAL) 20 MG tablet Take 20 mg by mouth Every Night.   11/7/2017 at Unknown time   • diclofenac (VOLTAREN) 1 % gel gel Apply 4 g topically 4 (Four) Times a Day As Needed (applies to knees and right arm).   11/8/2017 at 0744   • levothyroxine (SYNTHROID, LEVOTHROID) 50 MCG tablet Take 50 mcg by mouth Daily.   11/8/2017 at 0744   • lisinopril (PRINIVIL,ZESTRIL) 40 MG tablet Take 40 mg by mouth Daily.   11/8/2017 at 0744   • magnesium oxide (MAGOX) 400 (241.3 Mg) MG tablet tablet Take 400 mg by mouth Daily.   11/8/2017 at 0744   • metoprolol succinate XL (TOPROL-XL) 25 MG 24 hr tablet Take 12.5 mg by mouth Daily.   11/8/2017 at 0744   • pantoprazole (PROTONIX) 20 MG EC tablet Take 20 mg by mouth 2 (Two) Times a Day.   11/8/2017 at 0744   • simvastatin (ZOCOR) 40 MG tablet Take 40 mg by mouth Daily.   11/8/2017 at 0744     Allergies:  Review of patient's allergies indicates no known allergies.    Objective     Vital Signs  Temp:  [97.9 °F (36.6 °C)] 97.9 °F (36.6 °C)  Heart Rate:  [59] 59  Resp:  [16] 16  BP: (134)/(70) 134/70    Physical Exam:      General Appearance:    Alert, cooperative, in no acute distress   Head:    Normocephalic, without obvious abnormality, atraumatic   Eyes:            Lids  and lashes normal, conjunctivae and sclerae normal, no   icterus, no pallor, corneas clear, PERRLA   Ears:    Ears appear intact with no abnormalities noted   Throat:   No oral lesions, no thrush, oral mucosa moist   Neck:   No adenopathy, supple, trachea midline, no thyromegaly, no     carotid bruit, no JVD   Back:     No kyphosis present, no scoliosis present, no skin lesions,       erythema or scars, no tenderness to percussion or                   palpation,   range of motion normal   Lungs:     Clear to auscultation,respirations regular, even and                   unlabored    Heart:    Regular rhythm and normal rate, normal S1 and S2, no            murmur, no gallop, no rub, no click   Breast Exam:    Deferred   Abdomen:     Normal bowel sounds, no masses, no organomegaly, soft        non-tender, non-distended, no guarding, no rebound                 tenderness   Genitalia:    Deferred   Extremities:   Moves all extremities well, no edema, no cyanosis, no              redness   Pulses:   Pulses palpable and equal bilaterally   Skin:   No bleeding, bruising or rash   Lymph nodes:   No palpable adenopathy   Neurologic:   Cranial nerves 2 - 12 grossly intact, sensation intact, DTR        present and equal bilaterally         Results Review:    I reviewed the patient's new clinical results.  Assessment/Plan   Complete uterovaginal prolapse-plan total vaginal hysterectomy, anterior and posterior vaginal repair, vaginal vault suspension, cystoscopy.  We have discussed risks and benefits in detail we discussed the risk of ureteral injury, injury to bowel bladder, infection and bleeding.        I discussed the patients findings and my recommendations with patient and family.     Karl Nicolas DO  11/08/17  9:03 AM         Electronically signed by Karl Nicolas DO at 11/8/2017  9:06 AM        Vital Signs (last 24 hours)       11/07 0700  -  11/08 0659 11/08 0700  -  11/08 1613   Most Recent    Temp  (°F)   97.7 -  98.6     98.1 (36.7)    Heart Rate   54 -  66     62    Resp   14 -  22     16    BP   130/76 -  164/78     161/86    SpO2 (%)   94 -  98     98          Intake & Output (last day)       11/07 0701 - 11/08 0700 11/08 0701 - 11/09 0700    P.O.  240    I.V. (mL/kg)  2300 (36)    Total Intake(mL/kg)  2540 (39.7)    Net   +2540              Hospital Medications (active)       Dose Frequency Start End    acetaminophen (TYLENOL) tablet 650 mg 650 mg Every 4 Hours PRN 11/8/2017     Sig - Route: Take 2 tablets by mouth Every 4 (Four) Hours As Needed for Mild Pain . - Oral    allopurinol (ZYLOPRIM) tablet 300 mg 300 mg Nightly 11/8/2017     Sig - Route: Take 1 tablet by mouth Every Night. - Oral    atorvastatin (LIPITOR) tablet 20 mg 20 mg Daily 11/9/2017     Sig - Route: Take 1 tablet by mouth Daily. - Oral    baclofen (LIORESAL) tablet 20 mg 20 mg Nightly 11/8/2017     Sig - Route: Take 2 tablets by mouth Every Night. - Oral    bisacodyl (DULCOLAX) suppository 10 mg 10 mg Daily PRN 11/8/2017     Sig - Route: Insert 1 suppository into the rectum Daily As Needed for Constipation. - Rectal    cefOXitin (MEFOXIN) 2 g in dextrose (D5W) 5 % 100 mL IVPB 2 g 60 Minutes Pre-Op 11/8/2017 11/8/2017    Sig - Route: Infuse 2 g into a venous catheter 60 Minutes Prior to Surgery. - Intravenous    diclofenac (VOLTAREN) 1 % gel 4 g 4 g 4 Times Daily PRN 11/8/2017     Sig - Route: Apply 4 g topically 4 (Four) Times a Day As Needed (applies to knees and right arm). - Topical    docusate sodium (COLACE) capsule 100 mg 100 mg Daily 11/8/2017     Sig - Route: Take 1 capsule by mouth Daily. - Oral    enoxaparin (LOVENOX) syringe 40 mg 40 mg Daily 11/8/2017     Sig - Route: Inject 0.4 mL under the skin Daily. - Subcutaneous    hydrOXYzine (ATARAX) tablet 50 mg 50 mg Every 6 Hours PRN 11/8/2017     Sig - Route: Take 1 tablet by mouth Every 6 (Six) Hours As Needed for Itching. - Oral    ibuprofen (ADVIL,MOTRIN) tablet 800 mg 800 mg 3  "Times Daily 11/8/2017     Sig - Route: Take 1 tablet by mouth 3 (Three) Times a Day. - Oral    ketorolac (TORADOL) injection 15 mg 15 mg Every 6 Hours PRN 11/8/2017 11/9/2017    Sig - Route: Infuse 15 mg into a venous catheter Every 6 (Six) Hours As Needed for Moderate Pain . - Intravenous    lactated ringers infusion 125 mL/hr Continuous 11/8/2017     Sig - Route: Infuse 125 mL/hr into a venous catheter Continuous. - Intravenous    levothyroxine (SYNTHROID, LEVOTHROID) tablet 50 mcg 50 mcg Every Early Morning 11/9/2017     Sig - Route: Take 1 tablet by mouth Every Morning. - Oral    lisinopril (PRINIVIL,ZESTRIL) tablet 40 mg 40 mg Daily 11/9/2017     Sig - Route: Take 4 tablets by mouth Daily. - Oral    magnesium oxide (MAGOX) tablet 400 mg 400 mg Daily 11/9/2017     Sig - Route: Take 1 tablet by mouth Daily. - Oral    metoclopramide (REGLAN) tablet 10 mg 10 mg Once 11/8/2017     Sig - Route: Take 1 tablet by mouth 1 (One) Time. - Oral    metoprolol succinate XL (TOPROL-XL) 24 hr tablet 12.5 mg 12.5 mg Daily 11/9/2017     Sig - Route: Take 0.5 tablets by mouth Daily. - Oral    ondansetron (ZOFRAN) injection 4 mg 4 mg Every 6 Hours PRN 11/8/2017     Sig - Route: Infuse 2 mL into a venous catheter Every 6 (Six) Hours As Needed for Nausea or Vomiting. - Intravenous    Linked Group 1:  \"Or\" Linked Group Details        ondansetron (ZOFRAN) tablet 4 mg 4 mg Every 6 Hours PRN 11/8/2017     Sig - Route: Take 1 tablet by mouth Every 6 (Six) Hours As Needed for Nausea or Vomiting. - Oral    Linked Group 1:  \"Or\" Linked Group Details        ondansetron ODT (ZOFRAN-ODT) disintegrating tablet 4 mg 4 mg Every 6 Hours PRN 11/8/2017     Sig - Route: Take 1 tablet by mouth Every 6 (Six) Hours As Needed for Nausea or Vomiting. - Oral    Linked Group 1:  \"Or\" Linked Group Details        pantoprazole (PROTONIX) EC tablet 40 mg 40 mg 2 Times Daily Before Meals 11/8/2017     Sig - Route: Take 1 tablet by mouth 2 (Two) Times a Day Before " Meals. - Oral    simethicone (MYLICON) chewable tablet 80 mg 80 mg 4 Times Daily 11/8/2017     Sig - Route: Chew 1 tablet 4 (Four) Times a Day. - Oral    cefoxitin (MEFOXIN) 2 g/100 mL 0.9% NS (MBP) (Discontinued) 2 g 60 Minutes Pre-Op 11/8/2017 11/8/2017    Sig - Route: Infuse 100 mL into a venous catheter 60 Minutes Prior to Surgery. - Intravenous    Reason for Discontinue: Formulary change    conjugated estrogens (PREMARIN) vaginal cream (Discontinued)  As Needed 11/8/2017 11/8/2017    Sig: As Needed.    Reason for Discontinue: Patient Discharge    dexamethasone (DECADRON) injection (Discontinued)  As Needed 11/8/2017 11/8/2017    Sig - Route: Infuse  into a venous catheter As Needed. - Intravenous    Reason for Discontinue: Anesthesia Stop    fentaNYL citrate (PF) (SUBLIMAZE) injection 50 mcg (Discontinued) 50 mcg Every 5 Minutes PRN 11/8/2017 11/8/2017    Sig - Route: Infuse 1 mL into a venous catheter Every 5 (Five) Minutes As Needed for Moderate Pain  or Severe Pain . - Intravenous    Reason for Discontinue: Patient Transfer    fentaNYL citrate (PF) (SUBLIMAZE) injection (Discontinued)  As Needed 11/8/2017 11/8/2017    Sig - Route: Infuse  into a venous catheter As Needed for Severe Pain . - Intravenous    Reason for Discontinue: Anesthesia Stop    fluorescein 10 % injection (Discontinued)  As Needed 11/8/2017 11/8/2017    Sig: As Needed.    Reason for Discontinue: Anesthesia Stop    glycopyrrolate (ROBINUL) injection (Discontinued)  As Needed 11/8/2017 11/8/2017    Sig - Route: Infuse  into a venous catheter As Needed for excess secretions. - Intravenous    Reason for Discontinue: Anesthesia Stop    ipratropium-albuterol (DUO-NEB) nebulizer solution 3 mL (Discontinued) 3 mL Once As Needed 11/8/2017 11/8/2017    Sig - Route: Take 3 mL by nebulization 1 (One) Time As Needed for Wheezing or Shortness of Air (bronchospasm). - Nebulization    Reason for Discontinue: Patient Transfer    lactated ringers infusion  (Discontinued) 125 mL/hr Continuous 11/8/2017 11/8/2017    Sig - Route: Infuse 125 mL/hr into a venous catheter Continuous. - Intravenous    Reason for Discontinue: Patient Transfer    lidocaine (XYLOCAINE) 2% injection (Discontinued)  As Needed 11/8/2017 11/8/2017    Sig: As Needed.    Reason for Discontinue: Anesthesia Stop    lidocaine-EPINEPHrine (XYLOCAINE W/EPI) 1 %-1:[id number] injection (Discontinued)  As Needed 11/8/2017 11/8/2017    Sig: As Needed.    Reason for Discontinue: Patient Discharge    meperidine (DEMEROL) injection 12.5 mg (Discontinued) 12.5 mg Every 5 Minutes PRN 11/8/2017 11/8/2017    Sig - Route: Infuse 0.5 mL into a venous catheter Every 5 (Five) Minutes As Needed for Shivering (May repeat x 1). - Intravenous    Reason for Discontinue: Patient Transfer    midazolam (VERSED) injection (Discontinued)  As Needed 11/8/2017 11/8/2017    Sig - Route: Infuse  into a venous catheter As Needed. - Intravenous    Reason for Discontinue: Anesthesia Stop    neostigmine injection (Discontinued)  As Needed 11/8/2017 11/8/2017    Sig - Route: Infuse  into a venous catheter As Needed. - Intravenous    Reason for Discontinue: Anesthesia Stop    ondansetron (ZOFRAN) injection 4 mg (Discontinued) 4 mg Once As Needed 11/8/2017 11/8/2017    Sig - Route: Infuse 2 mL into a venous catheter 1 (One) Time As Needed for Nausea or Vomiting (may repeat times one dose). - Intravenous    Reason for Discontinue: Patient Transfer    ondansetron (ZOFRAN) injection (Discontinued)  As Needed 11/8/2017 11/8/2017    Sig - Route: Infuse  into a venous catheter As Needed for Nausea or Vomiting. - Intravenous    Reason for Discontinue: Anesthesia Stop    oxyCODONE-acetaminophen (PERCOCET) 5-325 MG per tablet 1 tablet (Discontinued) 1 tablet Once As Needed 11/8/2017 11/8/2017    Sig - Route: Take 1 tablet by mouth 1 (One) Time As Needed (pain). - Oral    Reason for Discontinue: Patient Transfer    Propofol (DIPRIVAN) injection  (Discontinued)  As Needed 11/8/2017 11/8/2017    Sig - Route: Infuse  into a venous catheter As Needed. - Intravenous    Reason for Discontinue: Anesthesia Stop    rocuronium (ZEMURON) injection (Discontinued)  As Needed 11/8/2017 11/8/2017    Sig - Route: Infuse  into a venous catheter As Needed. - Intravenous    Reason for Discontinue: Anesthesia Stop    sodium chloride (NS) irrigation solution (Discontinued)  As Needed 11/8/2017 11/8/2017    Sig: As Needed.    Reason for Discontinue: Patient Discharge    sodium chloride 0.9 % flush 1-10 mL (Discontinued) 1-10 mL As Needed 11/8/2017 11/8/2017    Sig - Route: Infuse 1-10 mL into a venous catheter As Needed for Line Care. - Intravenous    Reason for Discontinue: Patient Transfer    sodium chloride 0.9 % flush 1-10 mL (Discontinued) 1-10 mL As Needed 11/8/2017 11/8/2017    Sig - Route: Infuse 1-10 mL into a venous catheter As Needed for Line Care. - Intravenous    Reason for Discontinue: Patient Transfer    sodium chloride 0.9 % solution (Discontinued)  As Needed 11/8/2017 11/8/2017    Sig: As Needed.    Reason for Discontinue: Patient Discharge            Lab Results (last 24 hours)     Procedure Component Value Units Date/Time    Tissue Pathology Exam - Tissue, Uterus with Cervix [[id number]] Collected:  11/08/17 1123    Specimen:  Tissue from Uterus with Cervix Updated:  11/08/17 1300        Imaging Results (last 24 hours)     ** No results found for the last 24 hours. **        ECG/EMG Results (last 24 hours)     ** No results found for the last 24 hours. **           Operative/Procedure Notes (last 24 hours) (Notes from 11/7/2017  4:13 PM through 11/8/2017  4:13 PM)      Karl Nicolas, DO at 11/8/2017 12:25 PM  Version 1 of 1         VAGINAL HYSTERECTOMY WITH ANTERIOR AND POSTERIOR VAGINAL REPAIR, VAGINAL VAULT SUSPENSION WITH VAGINAL APPROACH, CYSTOSCOPY  Procedure Note    Brenda Munroe  11/8/2017    Pre-op Diagnosis:   Complete uterovaginal  prolapse    Post-op Diagnosis:     same    Procedure(s):  VAGINAL HYSTERECTOMY WITH ANTERIOR, POSTERIOR, AND ENTEROCELE VAGINAL REPAIR  VAGINAL VAULT SUSPENSION   CYSTOSCOPY    Surgeon(s):  Karl Nicolas DO    Anesthesia: Choice        Estimated Blood Loss: 100ml    Specimens:                  Order Name Source Comment Collection Info Order Time   TISSUE PATHOLOGY EXAM Uterus with Cervix  Collected By: Karl Nicolas DO 11/8/2017 11:23 AM         Procedure:   The patient was taken to the operating room after informed written informed consent was obtained.  She was prepped and draped in the usual sterile fashion in the dorsal lithotomy position after general anesthesia was obtained.  We first placed in speculums anteriorly and posteriorly and grasped the cervix on each of the midline using a toothed tenaculum.  We hydrodissected circumferentially around the cervix and then made a circumferential incision around the cervix.  We then dissected anteriorly and posteriorly posteriorly we entered the cul-de-sac sharply and placed a retractor.  We then dissected anteriorly to the point where we got into the anterior cul-de-sac.  Next we took bites on each side using the Lo clamps, we then cut with scissors and suture ligated with 0 Vicryl.  We held the sutures for manipulation later on.  Next using the LigaSure device we took serial bites on each side of the uterus doubly cauterizing each pedicle and then cutting using the LigaSure.  Once we got up to the fundus and the cornua we doubly cauterized it and delivered the uterus.    Next we placed the vaginal vault suspension stitches.  We grasped the vaginal cuff at 4 and 8:00 with Allis clamps.  We placed tension on them.  We then high into the uterosacral ligament placed stitches of 0 Vicryl once sequentially higher than the other and tagged them for later on.    Next we brought the cystocele defect up between 2 Allis clamps in the midline we  hydrodissected with lidocaine with epinephrine.  We made a midline incision and dissected the vaginal epithelium off of the bladder out to the pelvic brim.  Next using 3-0 Vicryl stitches we plicated the cystocele defect in the midline using 3 layers of stitches first 2 layers using 3-0 Vicryl and the last layer using a 2-0 Ethibond.  Once the cystocele defect was reduced we went ahead and placed the uterosacral vault suspension stitches through the new apex of the vagina.  We then trimmed the excess vaginal mucosa and closed the vaginal incision and vaginal cuff using interrupted 2-0 Vicryl stitches.  Next we tied down the vaginal vault suspension stitches elevating the vaginal vault into the hollow of the sacrum.    Next we performed a cystourethroscopy.  We had the anesthetist give fluorescing dye through the IV and both ureters were spilling dye bilaterally and the bladder appeared to be normal.    Next we performed a posterior repair we placed Allis clamps at the introitus posteriorly.  We excised a tomy-shaped wedge of vagina and perineal skin.  We then dissected the vaginal epithelium off of the pelvic fascia.  Next using 0 Vicryl stitches reapproximated the rectocele defect in the midline.  We then closed the vaginal incision using a 0 Vicryl stitch in a running locking fashion and built up the perineum to where it is a at a 90° angle to the vagina.  We then packed the vagina and left the catheter in place.    All sponge lap and needle counts were correct there were no complications with the procedure.  Findings: Complete procidentia    Complications: none    Grafts or Implants: TVT Mesh    Karl Nicolas DO     Date: 11/8/2017  Time: 12:25 PM       Electronically signed by Karl Nicolas DO at 11/8/2017 12:30 PM        Physician Progress Notes (last 24 hours) (Notes from 11/7/2017  4:13 PM through 11/8/2017  4:13 PM)     No notes of this type exist for this encounter.        Physical  Therapy Notes (last 24 hours) (Notes from 11/7/2017  4:13 PM through 11/8/2017  4:13 PM)     No notes of this type exist for this encounter.

## 2017-11-09 VITALS
HEIGHT: 66 IN | OXYGEN SATURATION: 95 % | WEIGHT: 141 LBS | HEART RATE: 53 BPM | DIASTOLIC BLOOD PRESSURE: 78 MMHG | RESPIRATION RATE: 18 BRPM | TEMPERATURE: 98.1 F | BODY MASS INDEX: 22.66 KG/M2 | SYSTOLIC BLOOD PRESSURE: 150 MMHG

## 2017-11-09 LAB
BASOPHILS # BLD AUTO: 0 10*3/MM3 (ref 0–0.3)
BASOPHILS NFR BLD AUTO: 0 % (ref 0–2)
DEPRECATED RDW RBC AUTO: 45.8 FL (ref 37–54)
EOSINOPHIL # BLD AUTO: 0.01 10*3/MM3 (ref 0–0.7)
EOSINOPHIL NFR BLD AUTO: 0.1 % (ref 0–7)
ERYTHROCYTE [DISTWIDTH] IN BLOOD BY AUTOMATED COUNT: 14 % (ref 11.5–14.5)
HCT VFR BLD AUTO: 36.3 % (ref 37–47)
HGB BLD-MCNC: 11.7 G/DL (ref 12–16)
IMM GRANULOCYTES # BLD: 0.02 10*3/MM3 (ref 0–0.03)
IMM GRANULOCYTES NFR BLD: 0.2 % (ref 0–0.5)
LYMPHOCYTES # BLD AUTO: 1.24 10*3/MM3 (ref 1–3)
LYMPHOCYTES NFR BLD AUTO: 11.7 % (ref 16–46)
MCH RBC QN AUTO: 30.2 PG (ref 27–33)
MCHC RBC AUTO-ENTMCNC: 32.2 G/DL (ref 33–37)
MCV RBC AUTO: 93.8 FL (ref 80–94)
MONOCYTES # BLD AUTO: 0.54 10*3/MM3 (ref 0.1–0.9)
MONOCYTES NFR BLD AUTO: 5.1 % (ref 0–12)
NEUTROPHILS # BLD AUTO: 8.75 10*3/MM3 (ref 1.4–6.5)
NEUTROPHILS NFR BLD AUTO: 82.9 % (ref 40–75)
PLATELET # BLD AUTO: 154 10*3/MM3 (ref 130–400)
PMV BLD AUTO: 11.8 FL (ref 6–10)
RBC # BLD AUTO: 3.87 10*6/MM3 (ref 4.2–5.4)
WBC NRBC COR # BLD: 10.56 10*3/MM3 (ref 4.5–12.5)

## 2017-11-09 PROCEDURE — 25010000002 ENOXAPARIN PER 10 MG: Performed by: OBSTETRICS & GYNECOLOGY

## 2017-11-09 PROCEDURE — 85025 COMPLETE CBC W/AUTO DIFF WBC: CPT | Performed by: OBSTETRICS & GYNECOLOGY

## 2017-11-09 RX ORDER — DOCUSATE CALCIUM 240 MG
240 CAPSULE ORAL DAILY
Qty: 30 CAPSULE | Refills: 1 | Status: ON HOLD | OUTPATIENT
Start: 2017-11-09 | End: 2018-09-18

## 2017-11-09 RX ORDER — HYDROCODONE BITARTRATE AND ACETAMINOPHEN 5; 325 MG/1; MG/1
1 TABLET ORAL EVERY 6 HOURS PRN
Qty: 25 TABLET | Refills: 0 | Status: ON HOLD | OUTPATIENT
Start: 2017-11-09 | End: 2018-09-18

## 2017-11-09 RX ORDER — IBUPROFEN 600 MG/1
600 TABLET ORAL EVERY 6 HOURS PRN
Qty: 30 TABLET | Refills: 0 | Status: SHIPPED | OUTPATIENT
Start: 2017-11-09 | End: 2017-12-09

## 2017-11-09 RX ADMIN — SIMETHICONE CHEW TAB 80 MG 80 MG: 80 TABLET ORAL at 06:21

## 2017-11-09 RX ADMIN — METOPROLOL SUCCINATE 12.5 MG: 25 TABLET, FILM COATED, EXTENDED RELEASE ORAL at 08:27

## 2017-11-09 RX ADMIN — DOCUSATE SODIUM 100 MG: 100 CAPSULE, LIQUID FILLED ORAL at 08:26

## 2017-11-09 RX ADMIN — ONDANSETRON 4 MG: 4 TABLET, FILM COATED ORAL at 10:26

## 2017-11-09 RX ADMIN — SIMETHICONE CHEW TAB 80 MG 80 MG: 80 TABLET ORAL at 12:11

## 2017-11-09 RX ADMIN — LEVOTHYROXINE SODIUM 50 MCG: 0.05 TABLET ORAL at 06:20

## 2017-11-09 RX ADMIN — HYDROCODONE BITARTRATE AND ACETAMINOPHEN 1 TABLET: 7.5; 325 TABLET ORAL at 06:28

## 2017-11-09 RX ADMIN — IBUPROFEN 800 MG: 800 TABLET ORAL at 14:49

## 2017-11-09 RX ADMIN — ENOXAPARIN SODIUM 40 MG: 40 INJECTION SUBCUTANEOUS at 08:26

## 2017-11-09 RX ADMIN — ATORVASTATIN CALCIUM 20 MG: 20 TABLET, FILM COATED ORAL at 08:27

## 2017-11-09 RX ADMIN — MAGNESIUM GLUCONATE 500 MG ORAL TABLET 400 MG: 500 TABLET ORAL at 08:27

## 2017-11-09 RX ADMIN — PANTOPRAZOLE SODIUM 40 MG: 40 TABLET, DELAYED RELEASE ORAL at 07:48

## 2017-11-09 RX ADMIN — IBUPROFEN 800 MG: 800 TABLET ORAL at 06:20

## 2017-11-09 RX ADMIN — LISINOPRIL 40 MG: 10 TABLET ORAL at 08:27

## 2017-11-09 NOTE — PROGRESS NOTES
"Subjective    The patient has not complaints.  Tolerating oral intake.  Pain is controlled. +ambulation.  Catheter still in.              Objective     /78 (BP Location: Left arm, Patient Position: Lying)  Pulse 53  Temp 98.1 °F (36.7 °C) (Oral)   Resp 18  Ht 66\" (167.6 cm)  Wt 141 lb (64 kg)  SpO2 95%  BMI 22.76 kg/m2  General:  alert, appears stated age and cooperative   Abdomen: soft, bowel sounds active, non-tender   Incision:   healing well, no drainage, no erythema, no hernia, no seroma, no swelling, no dehiscence, incision well approximated         Assessment      {doing well:38190::\"Doing well postoperatively.\"     Plan     1. Continue any current medications. Remove catheter.    2. Patient apparently has no one to help her when she goes home.  I was promised by her daughter-in-law that she would take care of her postoperatively but apparently she is not going to.   is now involved and I guess she will go to a rehabilitation unit after about 3 days admission.  "

## 2017-11-09 NOTE — PLAN OF CARE
Problem: Patient Care Overview (Adult)  Goal: Plan of Care Review  Outcome: Ongoing (interventions implemented as appropriate)    11/09/17 0212   Coping/Psychosocial Response Interventions   Plan Of Care Reviewed With patient   Patient Care Overview   Progress progress toward functional goals as expected       Goal: Adult Individualization and Mutuality  Outcome: Ongoing (interventions implemented as appropriate)  Goal: Discharge Needs Assessment  Outcome: Ongoing (interventions implemented as appropriate)    Problem: Hysterectomy (Adult)  Goal: Signs and Symptoms of Listed Potential Problems Will be Absent or Manageable (Hysterectomy)  Outcome: Ongoing (interventions implemented as appropriate)

## 2017-11-09 NOTE — DISCHARGE SUMMARY
Date of Discharge:  11/9/2017    Discharge Diagnosis:   Complete uterovaginal prolapse    Problem List:  Uterovaginal prolapse    Presenting Problem/History of Present Illness  Uterovaginal prolapse, complete [N81.3]      Hospital Course  Patient is a 66 y.o. female presented with the above diagnosis and underwent elective surgery.  She did well.     Procedures Performed  Procedure(s):  VAGINAL HYSTERECTOMY WITH ANTERIOR, POSTERIOR, AND ENTEROCELE VAGINAL REPAIR  VAGINAL VAULT SUSPENSION   CYSTOSCOPY       Consults:   Consults     No orders found for last 30 day(s).          Pertinent Test Results:    Condition on Discharge: Stable  Vital Signs  Temp:  [97.1 °F (36.2 °C)-99.7 °F (37.6 °C)] 98.1 °F (36.7 °C)  Heart Rate:  [52-75] 53  Resp:  [16-18] 18  BP: (115-156)/(63-81) 150/78    Discharge Disposition  Home or Self Care    Discharge Medications   Brenda Munroe   Home Medication Instructions [id number]    Printed on:11/09/17 5087   Medication Information                      allopurinol (ZYLOPRIM) 300 MG tablet  Take 300 mg by mouth Every Night.             baclofen (LIORESAL) 20 MG tablet  Take 20 mg by mouth Every Night.             diclofenac (VOLTAREN) 1 % gel gel  Apply 4 g topically 4 (Four) Times a Day As Needed (applies to knees and right arm).             docusate calcium (SURFAK) 240 MG capsule  Take 1 capsule by mouth Daily.             HYDROcodone-acetaminophen (NORCO) 5-325 MG per tablet  Take 1 tablet by mouth Every 6 (Six) Hours As Needed for Moderate Pain .             ibuprofen (ADVIL,MOTRIN) 600 MG tablet  Take 1 tablet by mouth Every 6 (Six) Hours As Needed for Mild Pain  for up to 30 days.             levothyroxine (SYNTHROID, LEVOTHROID) 50 MCG tablet  Take 50 mcg by mouth Daily.             lisinopril (PRINIVIL,ZESTRIL) 40 MG tablet  Take 40 mg by mouth Daily.             magnesium oxide (MAGOX) 400 (241.3 Mg) MG tablet tablet  Take 400 mg by mouth Daily.             metoprolol  succinate XL (TOPROL-XL) 25 MG 24 hr tablet  Take 12.5 mg by mouth Daily.             pantoprazole (PROTONIX) 20 MG EC tablet  Take 20 mg by mouth 2 (Two) Times a Day.             simvastatin (ZOCOR) 40 MG tablet  Take 40 mg by mouth Daily.                 Discharge Diet       Activity at Discharge      Follow-up Appointments  No future appointments.        Time: Discharge 15 min

## 2017-11-10 LAB
LAB AP CASE REPORT: NORMAL
Lab: NORMAL
PATH REPORT.FINAL DX SPEC: NORMAL

## 2018-04-26 ENCOUNTER — TRANSCRIBE ORDERS (OUTPATIENT)
Dept: ADMINISTRATIVE | Facility: HOSPITAL | Age: 68
End: 2018-04-26

## 2018-04-26 DIAGNOSIS — Z72.0 TOBACCO ABUSE: Primary | ICD-10-CM

## 2018-05-03 ENCOUNTER — HOSPITAL ENCOUNTER (OUTPATIENT)
Dept: CT IMAGING | Facility: HOSPITAL | Age: 68
Discharge: HOME | End: 2018-05-03
Attending: INTERNAL MEDICINE | Admitting: INTERNAL MEDICINE

## 2018-05-03 DIAGNOSIS — Z72.0 TOBACCO ABUSE: ICD-10-CM

## 2018-05-03 PROCEDURE — 71250 CT THORAX DX C-: CPT

## 2018-05-03 PROCEDURE — 71250 CT THORAX DX C-: CPT | Performed by: RADIOLOGY

## 2018-07-16 ENCOUNTER — HOSPITAL ENCOUNTER (OUTPATIENT)
Dept: BONE DENSITY | Facility: HOSPITAL | Age: 68
Discharge: HOME | End: 2018-07-16
Admitting: INTERNAL MEDICINE

## 2018-07-16 DIAGNOSIS — M81.0 SENILE OSTEOPOROSIS: ICD-10-CM

## 2018-07-16 PROCEDURE — 77080 DXA BONE DENSITY AXIAL: CPT | Performed by: RADIOLOGY

## 2018-07-16 PROCEDURE — 77080 DXA BONE DENSITY AXIAL: CPT

## 2018-09-18 ENCOUNTER — HOSPITAL ENCOUNTER (INPATIENT)
Facility: HOSPITAL | Age: 68
LOS: 10 days | Discharge: HOME | End: 2018-09-28
Attending: PSYCHIATRY & NEUROLOGY | Admitting: PSYCHIATRY & NEUROLOGY

## 2018-09-18 ENCOUNTER — HOSPITAL ENCOUNTER (EMERGENCY)
Facility: HOSPITAL | Age: 68
Discharge: HOME | End: 2018-09-18
Attending: EMERGENCY MEDICINE | Admitting: EMERGENCY MEDICINE

## 2018-09-18 VITALS
HEIGHT: 66 IN | SYSTOLIC BLOOD PRESSURE: 174 MMHG | BODY MASS INDEX: 21.86 KG/M2 | HEART RATE: 64 BPM | TEMPERATURE: 98.1 F | RESPIRATION RATE: 18 BRPM | WEIGHT: 136 LBS | OXYGEN SATURATION: 97 % | DIASTOLIC BLOOD PRESSURE: 86 MMHG

## 2018-09-18 DIAGNOSIS — F29 PSYCHOSIS, UNSPECIFIED PSYCHOSIS TYPE (HCC): Primary | ICD-10-CM

## 2018-09-18 LAB
6-ACETYL MORPHINE: NEGATIVE
ALBUMIN SERPL-MCNC: 4.1 G/DL (ref 3.4–4.8)
ALBUMIN/GLOB SERPL: 1.8 G/DL (ref 1.5–2.5)
ALP SERPL-CCNC: 81 U/L (ref 35–104)
ALT SERPL W P-5'-P-CCNC: 11 U/L (ref 10–36)
AMPHET+METHAMPHET UR QL: NEGATIVE
ANION GAP SERPL CALCULATED.3IONS-SCNC: 3.2 MMOL/L (ref 3.6–11.2)
AST SERPL-CCNC: 17 U/L (ref 10–30)
BACTERIA UR QL AUTO: ABNORMAL /HPF
BARBITURATES UR QL SCN: NEGATIVE
BASOPHILS # BLD AUTO: 0.02 10*3/MM3 (ref 0–0.3)
BASOPHILS NFR BLD AUTO: 0.3 % (ref 0–2)
BENZODIAZ UR QL SCN: NEGATIVE
BILIRUB SERPL-MCNC: 0.3 MG/DL (ref 0.2–1.8)
BILIRUB UR QL STRIP: NEGATIVE
BUN BLD-MCNC: 16 MG/DL (ref 7–21)
BUN/CREAT SERPL: 17.8 (ref 7–25)
BUPRENORPHINE SERPL-MCNC: NEGATIVE NG/ML
CALCIUM SPEC-SCNC: 8.9 MG/DL (ref 7.7–10)
CANNABINOIDS SERPL QL: NEGATIVE
CHLORIDE SERPL-SCNC: 109 MMOL/L (ref 99–112)
CLARITY UR: ABNORMAL
CO2 SERPL-SCNC: 30.8 MMOL/L (ref 24.3–31.9)
COCAINE UR QL: NEGATIVE
COLOR UR: ABNORMAL
CREAT BLD-MCNC: 0.9 MG/DL (ref 0.43–1.29)
DEPRECATED RDW RBC AUTO: 46.2 FL (ref 37–54)
EOSINOPHIL # BLD AUTO: 0.21 10*3/MM3 (ref 0–0.7)
EOSINOPHIL NFR BLD AUTO: 3.2 % (ref 0–7)
ERYTHROCYTE [DISTWIDTH] IN BLOOD BY AUTOMATED COUNT: 13.9 % (ref 11.5–14.5)
ETHANOL BLD-MCNC: <10 MG/DL
ETHANOL UR QL: <0.01 %
GFR SERPL CREATININE-BSD FRML MDRD: 62 ML/MIN/1.73
GLOBULIN UR ELPH-MCNC: 2.3 GM/DL
GLUCOSE BLD-MCNC: 142 MG/DL (ref 70–110)
GLUCOSE UR STRIP-MCNC: NEGATIVE MG/DL
HCT VFR BLD AUTO: 42.6 % (ref 37–47)
HGB BLD-MCNC: 13.8 G/DL (ref 12–16)
HGB UR QL STRIP.AUTO: NEGATIVE
HYALINE CASTS UR QL AUTO: ABNORMAL /LPF
IMM GRANULOCYTES # BLD: 0.01 10*3/MM3 (ref 0–0.03)
IMM GRANULOCYTES NFR BLD: 0.2 % (ref 0–0.5)
KETONES UR QL STRIP: ABNORMAL
LEUKOCYTE ESTERASE UR QL STRIP.AUTO: ABNORMAL
LYMPHOCYTES # BLD AUTO: 1.67 10*3/MM3 (ref 1–3)
LYMPHOCYTES NFR BLD AUTO: 25.7 % (ref 16–46)
MCH RBC QN AUTO: 31 PG (ref 27–33)
MCHC RBC AUTO-ENTMCNC: 32.4 G/DL (ref 33–37)
MCV RBC AUTO: 95.7 FL (ref 80–94)
METHADONE UR QL SCN: NEGATIVE
MONOCYTES # BLD AUTO: 0.48 10*3/MM3 (ref 0.1–0.9)
MONOCYTES NFR BLD AUTO: 7.4 % (ref 0–12)
NEUTROPHILS # BLD AUTO: 4.11 10*3/MM3 (ref 1.4–6.5)
NEUTROPHILS NFR BLD AUTO: 63.2 % (ref 40–75)
NITRITE UR QL STRIP: NEGATIVE
OPIATES UR QL: NEGATIVE
OSMOLALITY SERPL CALC.SUM OF ELEC: 288.6 MOSM/KG (ref 273–305)
OXYCODONE UR QL SCN: NEGATIVE
PCP UR QL SCN: NEGATIVE
PH UR STRIP.AUTO: 6 [PH] (ref 5–8)
PLATELET # BLD AUTO: 156 10*3/MM3 (ref 130–400)
PMV BLD AUTO: 11.4 FL (ref 6–10)
POTASSIUM BLD-SCNC: 3.7 MMOL/L (ref 3.5–5.3)
PROT SERPL-MCNC: 6.4 G/DL (ref 6–8)
PROT UR QL STRIP: ABNORMAL
RBC # BLD AUTO: 4.45 10*6/MM3 (ref 4.2–5.4)
RBC # UR: ABNORMAL /HPF
REF LAB TEST METHOD: ABNORMAL
SODIUM BLD-SCNC: 143 MMOL/L (ref 135–153)
SP GR UR STRIP: 1.02 (ref 1–1.03)
SQUAMOUS #/AREA URNS HPF: ABNORMAL /HPF
UROBILINOGEN UR QL STRIP: ABNORMAL
WBC NRBC COR # BLD: 6.5 10*3/MM3 (ref 4.5–12.5)
WBC UR QL AUTO: ABNORMAL /HPF

## 2018-09-18 PROCEDURE — 80307 DRUG TEST PRSMV CHEM ANLYZR: CPT | Performed by: PHYSICIAN ASSISTANT

## 2018-09-18 PROCEDURE — 85025 COMPLETE CBC W/AUTO DIFF WBC: CPT | Performed by: PHYSICIAN ASSISTANT

## 2018-09-18 PROCEDURE — 99284 EMERGENCY DEPT VISIT MOD MDM: CPT

## 2018-09-18 PROCEDURE — 93005 ELECTROCARDIOGRAM TRACING: CPT | Performed by: PSYCHIATRY & NEUROLOGY

## 2018-09-18 PROCEDURE — 81001 URINALYSIS AUTO W/SCOPE: CPT | Performed by: PHYSICIAN ASSISTANT

## 2018-09-18 PROCEDURE — 80053 COMPREHEN METABOLIC PANEL: CPT | Performed by: PHYSICIAN ASSISTANT

## 2018-09-18 RX ORDER — BENZONATATE 100 MG/1
100 CAPSULE ORAL 3 TIMES DAILY PRN
Status: DISCONTINUED | OUTPATIENT
Start: 2018-09-18 | End: 2018-09-28 | Stop reason: HOSPADM

## 2018-09-18 RX ORDER — HYDROXYZINE 50 MG/1
50 TABLET, FILM COATED ORAL EVERY 6 HOURS PRN
Status: DISCONTINUED | OUTPATIENT
Start: 2018-09-18 | End: 2018-09-28 | Stop reason: HOSPADM

## 2018-09-18 RX ORDER — DOCUSATE CALCIUM 240 MG
240 CAPSULE ORAL NIGHTLY
COMMUNITY
End: 2022-03-08

## 2018-09-18 RX ORDER — CYANOCOBALAMIN 1000 UG/ML
1000 INJECTION, SOLUTION INTRAMUSCULAR; SUBCUTANEOUS
Status: CANCELLED | OUTPATIENT
Start: 2018-10-01

## 2018-09-18 RX ORDER — METOPROLOL SUCCINATE 25 MG/1
25 TABLET, EXTENDED RELEASE ORAL DAILY
Status: DISCONTINUED | OUTPATIENT
Start: 2018-09-19 | End: 2018-09-28 | Stop reason: HOSPADM

## 2018-09-18 RX ORDER — DONEPEZIL HYDROCHLORIDE 5 MG/1
5 TABLET, FILM COATED ORAL DAILY
Status: DISCONTINUED | OUTPATIENT
Start: 2018-09-19 | End: 2018-09-20

## 2018-09-18 RX ORDER — DABIGATRAN ETEXILATE 150 MG/1
150 CAPSULE ORAL 2 TIMES DAILY
Status: DISCONTINUED | OUTPATIENT
Start: 2018-09-18 | End: 2018-09-28 | Stop reason: HOSPADM

## 2018-09-18 RX ORDER — ASPIRIN 81 MG/1
81 TABLET ORAL DAILY
Status: DISCONTINUED | OUTPATIENT
Start: 2018-09-19 | End: 2018-09-28 | Stop reason: HOSPADM

## 2018-09-18 RX ORDER — LISINOPRIL 10 MG/1
40 TABLET ORAL 2 TIMES DAILY
Status: DISCONTINUED | OUTPATIENT
Start: 2018-09-18 | End: 2018-09-28 | Stop reason: HOSPADM

## 2018-09-18 RX ORDER — ECHINACEA PURPUREA EXTRACT 125 MG
2 TABLET ORAL AS NEEDED
Status: DISCONTINUED | OUTPATIENT
Start: 2018-09-18 | End: 2018-09-28 | Stop reason: HOSPADM

## 2018-09-18 RX ORDER — DABIGATRAN ETEXILATE 150 MG/1
150 CAPSULE ORAL 2 TIMES DAILY
COMMUNITY
End: 2022-03-08

## 2018-09-18 RX ORDER — DOCUSATE SODIUM 100 MG/1
100 CAPSULE, LIQUID FILLED ORAL NIGHTLY
Status: DISCONTINUED | OUTPATIENT
Start: 2018-09-18 | End: 2018-09-28 | Stop reason: HOSPADM

## 2018-09-18 RX ORDER — TRAZODONE HYDROCHLORIDE 50 MG/1
50 TABLET ORAL NIGHTLY PRN
Status: DISCONTINUED | OUTPATIENT
Start: 2018-09-18 | End: 2018-09-28 | Stop reason: HOSPADM

## 2018-09-18 RX ORDER — ISOSORBIDE MONONITRATE 60 MG/1
60 TABLET, EXTENDED RELEASE ORAL DAILY
Status: DISCONTINUED | OUTPATIENT
Start: 2018-09-19 | End: 2018-09-28 | Stop reason: HOSPADM

## 2018-09-18 RX ORDER — BENZTROPINE MESYLATE 1 MG/1
1 TABLET ORAL DAILY PRN
Status: DISCONTINUED | OUTPATIENT
Start: 2018-09-18 | End: 2018-09-28 | Stop reason: HOSPADM

## 2018-09-18 RX ORDER — ISOSORBIDE MONONITRATE 60 MG/1
60 TABLET, EXTENDED RELEASE ORAL DAILY
COMMUNITY
End: 2022-03-08

## 2018-09-18 RX ORDER — FAMOTIDINE 20 MG/1
20 TABLET, FILM COATED ORAL 2 TIMES DAILY PRN
Status: DISCONTINUED | OUTPATIENT
Start: 2018-09-18 | End: 2018-09-28 | Stop reason: HOSPADM

## 2018-09-18 RX ORDER — LEVOTHYROXINE SODIUM 0.05 MG/1
50 TABLET ORAL DAILY
Status: DISCONTINUED | OUTPATIENT
Start: 2018-09-19 | End: 2018-09-28 | Stop reason: HOSPADM

## 2018-09-18 RX ORDER — LISINOPRIL 10 MG/1
40 TABLET ORAL ONCE
Status: COMPLETED | OUTPATIENT
Start: 2018-09-18 | End: 2018-09-18

## 2018-09-18 RX ORDER — LOPERAMIDE HYDROCHLORIDE 2 MG/1
2 CAPSULE ORAL 4 TIMES DAILY PRN
Status: DISCONTINUED | OUTPATIENT
Start: 2018-09-18 | End: 2018-09-28 | Stop reason: HOSPADM

## 2018-09-18 RX ORDER — NICOTINE 21 MG/24HR
1 PATCH, TRANSDERMAL 24 HOURS TRANSDERMAL DAILY
Status: DISCONTINUED | OUTPATIENT
Start: 2018-09-19 | End: 2018-09-28 | Stop reason: HOSPADM

## 2018-09-18 RX ORDER — PANTOPRAZOLE SODIUM 40 MG/1
40 TABLET, DELAYED RELEASE ORAL DAILY
Status: DISCONTINUED | OUTPATIENT
Start: 2018-09-19 | End: 2018-09-28 | Stop reason: HOSPADM

## 2018-09-18 RX ORDER — BENZTROPINE MESYLATE 1 MG/ML
0.5 INJECTION INTRAMUSCULAR; INTRAVENOUS DAILY PRN
Status: DISCONTINUED | OUTPATIENT
Start: 2018-09-18 | End: 2018-09-28 | Stop reason: HOSPADM

## 2018-09-18 RX ORDER — ONDANSETRON 4 MG/1
4 TABLET, FILM COATED ORAL EVERY 6 HOURS PRN
Status: DISCONTINUED | OUTPATIENT
Start: 2018-09-18 | End: 2018-09-28 | Stop reason: HOSPADM

## 2018-09-18 RX ORDER — ALUMINA, MAGNESIA, AND SIMETHICONE 2400; 2400; 240 MG/30ML; MG/30ML; MG/30ML
15 SUSPENSION ORAL EVERY 6 HOURS PRN
Status: DISCONTINUED | OUTPATIENT
Start: 2018-09-18 | End: 2018-09-28 | Stop reason: HOSPADM

## 2018-09-18 RX ORDER — ATORVASTATIN CALCIUM 20 MG/1
20 TABLET, FILM COATED ORAL DAILY
Status: DISCONTINUED | OUTPATIENT
Start: 2018-09-19 | End: 2018-09-28 | Stop reason: HOSPADM

## 2018-09-18 RX ORDER — BACLOFEN 10 MG/1
20 TABLET ORAL NIGHTLY
Status: DISCONTINUED | OUTPATIENT
Start: 2018-09-18 | End: 2018-09-28 | Stop reason: HOSPADM

## 2018-09-18 RX ORDER — ASPIRIN 81 MG/1
81 TABLET ORAL DAILY
COMMUNITY
End: 2022-03-08

## 2018-09-18 RX ORDER — DONEPEZIL HYDROCHLORIDE 5 MG/1
5 TABLET, FILM COATED ORAL DAILY
COMMUNITY
End: 2022-03-08

## 2018-09-18 RX ORDER — IBUPROFEN 600 MG/1
600 TABLET ORAL EVERY 6 HOURS PRN
Status: DISCONTINUED | OUTPATIENT
Start: 2018-09-18 | End: 2018-09-28 | Stop reason: HOSPADM

## 2018-09-18 RX ADMIN — LISINOPRIL 40 MG: 10 TABLET ORAL at 22:56

## 2018-09-18 RX ADMIN — DABIGATRAN ETEXILATE MESYLATE 150 MG: 150 CAPSULE ORAL at 22:56

## 2018-09-18 RX ADMIN — BACLOFEN 20 MG: 10 TABLET ORAL at 22:56

## 2018-09-18 RX ADMIN — LISINOPRIL 40 MG: 10 TABLET ORAL at 19:20

## 2018-09-18 RX ADMIN — DOCUSATE SODIUM 100 MG: 100 CAPSULE ORAL at 22:56

## 2018-09-19 PROCEDURE — 99223 1ST HOSP IP/OBS HIGH 75: CPT | Performed by: PSYCHIATRY & NEUROLOGY

## 2018-09-19 RX ORDER — SERTRALINE HYDROCHLORIDE 25 MG/1
25 TABLET, FILM COATED ORAL DAILY
Status: DISCONTINUED | OUTPATIENT
Start: 2018-09-19 | End: 2018-09-28 | Stop reason: HOSPADM

## 2018-09-19 RX ADMIN — DOCUSATE SODIUM 100 MG: 100 CAPSULE ORAL at 20:51

## 2018-09-19 RX ADMIN — DONEPEZIL HYDROCHLORIDE 5 MG: 5 TABLET, FILM COATED ORAL at 08:21

## 2018-09-19 RX ADMIN — ASPIRIN 81 MG: 81 TABLET, DELAYED RELEASE ORAL at 08:21

## 2018-09-19 RX ADMIN — DABIGATRAN ETEXILATE MESYLATE 150 MG: 150 CAPSULE ORAL at 20:51

## 2018-09-19 RX ADMIN — MAGNESIUM GLUCONATE 500 MG ORAL TABLET 400 MG: 500 TABLET ORAL at 08:21

## 2018-09-19 RX ADMIN — PANTOPRAZOLE SODIUM 40 MG: 40 TABLET, DELAYED RELEASE ORAL at 08:21

## 2018-09-19 RX ADMIN — BACLOFEN 20 MG: 10 TABLET ORAL at 20:51

## 2018-09-19 RX ADMIN — METOPROLOL SUCCINATE 25 MG: 25 TABLET, FILM COATED, EXTENDED RELEASE ORAL at 08:21

## 2018-09-19 RX ADMIN — LISINOPRIL 40 MG: 10 TABLET ORAL at 08:20

## 2018-09-19 RX ADMIN — ISOSORBIDE MONONITRATE 60 MG: 60 TABLET, EXTENDED RELEASE ORAL at 08:21

## 2018-09-19 RX ADMIN — LEVOTHYROXINE SODIUM 50 MCG: 50 TABLET ORAL at 08:21

## 2018-09-19 RX ADMIN — ATORVASTATIN CALCIUM 20 MG: 20 TABLET, FILM COATED ORAL at 08:21

## 2018-09-19 RX ADMIN — SERTRALINE HYDROCHLORIDE 25 MG: 25 TABLET ORAL at 11:57

## 2018-09-19 RX ADMIN — DABIGATRAN ETEXILATE MESYLATE 150 MG: 150 CAPSULE ORAL at 08:21

## 2018-09-19 RX ADMIN — LISINOPRIL 40 MG: 10 TABLET ORAL at 20:51

## 2018-09-20 PROBLEM — I48.0 PAROXYSMAL ATRIAL FIBRILLATION: Status: ACTIVE | Noted: 2018-09-20

## 2018-09-20 PROBLEM — E07.9 DISEASE OF THYROID GLAND: Status: ACTIVE | Noted: 2018-09-20

## 2018-09-20 PROBLEM — I25.10 CORONARY ARTERY DISEASE: Status: ACTIVE | Noted: 2018-09-20

## 2018-09-20 PROBLEM — I49.9 IRREGULAR HEART BEAT: Status: ACTIVE | Noted: 2018-09-20

## 2018-09-20 PROBLEM — F33.3 SEVERE RECURRENT MAJOR DEPRESSION WITH PSYCHOTIC FEATURES (HCC): Status: ACTIVE | Noted: 2018-09-20

## 2018-09-20 PROBLEM — F33.2 SEVERE RECURRENT MAJOR DEPRESSION WITHOUT PSYCHOTIC FEATURES: Status: ACTIVE | Noted: 2018-09-20

## 2018-09-20 PROBLEM — M19.90 ARTHRITIS: Status: ACTIVE | Noted: 2018-09-20

## 2018-09-20 PROBLEM — N81.3 UTEROVAGINAL PROLAPSE, COMPLETE: Status: RESOLVED | Noted: 2017-11-08 | Resolved: 2018-09-20

## 2018-09-20 PROBLEM — J44.9 COPD (CHRONIC OBSTRUCTIVE PULMONARY DISEASE) (HCC): Status: ACTIVE | Noted: 2018-09-20

## 2018-09-20 PROBLEM — I10 HYPERTENSION: Status: ACTIVE | Noted: 2018-09-20

## 2018-09-20 PROCEDURE — 99232 SBSQ HOSP IP/OBS MODERATE 35: CPT | Performed by: PSYCHIATRY & NEUROLOGY

## 2018-09-20 RX ORDER — DONEPEZIL HYDROCHLORIDE 5 MG/1
10 TABLET, FILM COATED ORAL DAILY
Status: DISCONTINUED | OUTPATIENT
Start: 2018-09-21 | End: 2018-09-28 | Stop reason: HOSPADM

## 2018-09-20 RX ADMIN — LISINOPRIL 40 MG: 10 TABLET ORAL at 08:21

## 2018-09-20 RX ADMIN — DONEPEZIL HYDROCHLORIDE 5 MG: 5 TABLET, FILM COATED ORAL at 08:21

## 2018-09-20 RX ADMIN — PANTOPRAZOLE SODIUM 40 MG: 40 TABLET, DELAYED RELEASE ORAL at 08:21

## 2018-09-20 RX ADMIN — TRAZODONE HYDROCHLORIDE 50 MG: 50 TABLET ORAL at 20:55

## 2018-09-20 RX ADMIN — ISOSORBIDE MONONITRATE 60 MG: 60 TABLET, EXTENDED RELEASE ORAL at 08:21

## 2018-09-20 RX ADMIN — DABIGATRAN ETEXILATE MESYLATE 150 MG: 150 CAPSULE ORAL at 08:21

## 2018-09-20 RX ADMIN — DABIGATRAN ETEXILATE MESYLATE 150 MG: 150 CAPSULE ORAL at 20:01

## 2018-09-20 RX ADMIN — LEVOTHYROXINE SODIUM 50 MCG: 50 TABLET ORAL at 08:21

## 2018-09-20 RX ADMIN — MAGNESIUM GLUCONATE 500 MG ORAL TABLET 400 MG: 500 TABLET ORAL at 08:21

## 2018-09-20 RX ADMIN — DOCUSATE SODIUM 100 MG: 100 CAPSULE ORAL at 20:01

## 2018-09-20 RX ADMIN — ASPIRIN 81 MG: 81 TABLET, DELAYED RELEASE ORAL at 08:21

## 2018-09-20 RX ADMIN — ATORVASTATIN CALCIUM 20 MG: 20 TABLET, FILM COATED ORAL at 08:21

## 2018-09-20 RX ADMIN — BACLOFEN 20 MG: 10 TABLET ORAL at 20:01

## 2018-09-20 RX ADMIN — LISINOPRIL 40 MG: 10 TABLET ORAL at 20:01

## 2018-09-20 RX ADMIN — SERTRALINE HYDROCHLORIDE 25 MG: 25 TABLET ORAL at 08:21

## 2018-09-20 RX ADMIN — METOPROLOL SUCCINATE 25 MG: 25 TABLET, FILM COATED, EXTENDED RELEASE ORAL at 08:21

## 2018-09-21 PROCEDURE — 99232 SBSQ HOSP IP/OBS MODERATE 35: CPT | Performed by: PSYCHIATRY & NEUROLOGY

## 2018-09-21 RX ADMIN — SERTRALINE HYDROCHLORIDE 25 MG: 25 TABLET ORAL at 09:39

## 2018-09-21 RX ADMIN — ASPIRIN 81 MG: 81 TABLET, DELAYED RELEASE ORAL at 09:39

## 2018-09-21 RX ADMIN — DABIGATRAN ETEXILATE MESYLATE 150 MG: 150 CAPSULE ORAL at 20:51

## 2018-09-21 RX ADMIN — DABIGATRAN ETEXILATE MESYLATE 150 MG: 150 CAPSULE ORAL at 09:38

## 2018-09-21 RX ADMIN — BACLOFEN 20 MG: 10 TABLET ORAL at 20:51

## 2018-09-21 RX ADMIN — DONEPEZIL HYDROCHLORIDE 10 MG: 5 TABLET, FILM COATED ORAL at 09:39

## 2018-09-21 RX ADMIN — DOCUSATE SODIUM 100 MG: 100 CAPSULE ORAL at 20:51

## 2018-09-21 RX ADMIN — METOPROLOL SUCCINATE 25 MG: 25 TABLET, FILM COATED, EXTENDED RELEASE ORAL at 09:38

## 2018-09-21 RX ADMIN — ATORVASTATIN CALCIUM 20 MG: 20 TABLET, FILM COATED ORAL at 09:39

## 2018-09-21 RX ADMIN — LEVOTHYROXINE SODIUM 50 MCG: 50 TABLET ORAL at 09:39

## 2018-09-21 RX ADMIN — LISINOPRIL 40 MG: 10 TABLET ORAL at 20:51

## 2018-09-21 RX ADMIN — MAGNESIUM GLUCONATE 500 MG ORAL TABLET 400 MG: 500 TABLET ORAL at 09:39

## 2018-09-21 RX ADMIN — LISINOPRIL 40 MG: 10 TABLET ORAL at 09:39

## 2018-09-21 RX ADMIN — PANTOPRAZOLE SODIUM 40 MG: 40 TABLET, DELAYED RELEASE ORAL at 09:39

## 2018-09-21 RX ADMIN — ISOSORBIDE MONONITRATE 60 MG: 60 TABLET, EXTENDED RELEASE ORAL at 09:39

## 2018-09-22 PROCEDURE — 99232 SBSQ HOSP IP/OBS MODERATE 35: CPT | Performed by: PSYCHIATRY & NEUROLOGY

## 2018-09-22 RX ADMIN — DOCUSATE SODIUM 100 MG: 100 CAPSULE ORAL at 20:32

## 2018-09-22 RX ADMIN — ATORVASTATIN CALCIUM 20 MG: 20 TABLET, FILM COATED ORAL at 09:30

## 2018-09-22 RX ADMIN — LISINOPRIL 40 MG: 10 TABLET ORAL at 09:29

## 2018-09-22 RX ADMIN — MAGNESIUM GLUCONATE 500 MG ORAL TABLET 400 MG: 500 TABLET ORAL at 09:30

## 2018-09-22 RX ADMIN — DABIGATRAN ETEXILATE MESYLATE 150 MG: 150 CAPSULE ORAL at 09:30

## 2018-09-22 RX ADMIN — LEVOTHYROXINE SODIUM 50 MCG: 50 TABLET ORAL at 09:30

## 2018-09-22 RX ADMIN — BACLOFEN 20 MG: 10 TABLET ORAL at 20:32

## 2018-09-22 RX ADMIN — PANTOPRAZOLE SODIUM 40 MG: 40 TABLET, DELAYED RELEASE ORAL at 09:30

## 2018-09-22 RX ADMIN — DABIGATRAN ETEXILATE MESYLATE 150 MG: 150 CAPSULE ORAL at 20:32

## 2018-09-22 RX ADMIN — METOPROLOL SUCCINATE 25 MG: 25 TABLET, FILM COATED, EXTENDED RELEASE ORAL at 09:30

## 2018-09-22 RX ADMIN — SERTRALINE HYDROCHLORIDE 25 MG: 25 TABLET ORAL at 09:30

## 2018-09-22 RX ADMIN — ISOSORBIDE MONONITRATE 60 MG: 60 TABLET, EXTENDED RELEASE ORAL at 09:30

## 2018-09-22 RX ADMIN — ASPIRIN 81 MG: 81 TABLET, DELAYED RELEASE ORAL at 09:30

## 2018-09-22 RX ADMIN — DONEPEZIL HYDROCHLORIDE 10 MG: 5 TABLET, FILM COATED ORAL at 09:31

## 2018-09-22 RX ADMIN — LISINOPRIL 40 MG: 10 TABLET ORAL at 20:32

## 2018-09-23 RX ADMIN — DABIGATRAN ETEXILATE MESYLATE 150 MG: 150 CAPSULE ORAL at 20:08

## 2018-09-23 RX ADMIN — DABIGATRAN ETEXILATE MESYLATE 150 MG: 150 CAPSULE ORAL at 08:25

## 2018-09-23 RX ADMIN — METOPROLOL SUCCINATE 25 MG: 25 TABLET, FILM COATED, EXTENDED RELEASE ORAL at 08:26

## 2018-09-23 RX ADMIN — MAGNESIUM GLUCONATE 500 MG ORAL TABLET 400 MG: 500 TABLET ORAL at 08:26

## 2018-09-23 RX ADMIN — BACLOFEN 20 MG: 10 TABLET ORAL at 20:08

## 2018-09-23 RX ADMIN — ISOSORBIDE MONONITRATE 60 MG: 60 TABLET, EXTENDED RELEASE ORAL at 08:25

## 2018-09-23 RX ADMIN — ATORVASTATIN CALCIUM 20 MG: 20 TABLET, FILM COATED ORAL at 08:26

## 2018-09-23 RX ADMIN — ASPIRIN 81 MG: 81 TABLET, DELAYED RELEASE ORAL at 08:26

## 2018-09-23 RX ADMIN — LISINOPRIL 40 MG: 10 TABLET ORAL at 08:25

## 2018-09-23 RX ADMIN — PANTOPRAZOLE SODIUM 40 MG: 40 TABLET, DELAYED RELEASE ORAL at 08:25

## 2018-09-23 RX ADMIN — SERTRALINE HYDROCHLORIDE 25 MG: 25 TABLET ORAL at 08:25

## 2018-09-23 RX ADMIN — LEVOTHYROXINE SODIUM 50 MCG: 50 TABLET ORAL at 08:26

## 2018-09-23 RX ADMIN — LISINOPRIL 40 MG: 10 TABLET ORAL at 20:08

## 2018-09-23 RX ADMIN — DONEPEZIL HYDROCHLORIDE 10 MG: 5 TABLET, FILM COATED ORAL at 08:26

## 2018-09-23 RX ADMIN — DOCUSATE SODIUM 100 MG: 100 CAPSULE ORAL at 20:08

## 2018-09-24 PROCEDURE — 99232 SBSQ HOSP IP/OBS MODERATE 35: CPT | Performed by: PSYCHIATRY & NEUROLOGY

## 2018-09-24 RX ADMIN — LEVOTHYROXINE SODIUM 50 MCG: 50 TABLET ORAL at 08:21

## 2018-09-24 RX ADMIN — ASPIRIN 81 MG: 81 TABLET, DELAYED RELEASE ORAL at 08:21

## 2018-09-24 RX ADMIN — ISOSORBIDE MONONITRATE 60 MG: 60 TABLET, EXTENDED RELEASE ORAL at 08:21

## 2018-09-24 RX ADMIN — BACLOFEN 20 MG: 10 TABLET ORAL at 20:36

## 2018-09-24 RX ADMIN — DABIGATRAN ETEXILATE MESYLATE 150 MG: 150 CAPSULE ORAL at 20:36

## 2018-09-24 RX ADMIN — LISINOPRIL 40 MG: 10 TABLET ORAL at 08:21

## 2018-09-24 RX ADMIN — DONEPEZIL HYDROCHLORIDE 10 MG: 5 TABLET, FILM COATED ORAL at 08:21

## 2018-09-24 RX ADMIN — SERTRALINE HYDROCHLORIDE 25 MG: 25 TABLET ORAL at 08:21

## 2018-09-24 RX ADMIN — DOCUSATE SODIUM 100 MG: 100 CAPSULE ORAL at 20:36

## 2018-09-24 RX ADMIN — DABIGATRAN ETEXILATE MESYLATE 150 MG: 150 CAPSULE ORAL at 08:22

## 2018-09-24 RX ADMIN — METOPROLOL SUCCINATE 25 MG: 25 TABLET, FILM COATED, EXTENDED RELEASE ORAL at 08:21

## 2018-09-24 RX ADMIN — PANTOPRAZOLE SODIUM 40 MG: 40 TABLET, DELAYED RELEASE ORAL at 08:21

## 2018-09-24 RX ADMIN — ATORVASTATIN CALCIUM 20 MG: 20 TABLET, FILM COATED ORAL at 08:21

## 2018-09-24 RX ADMIN — MAGNESIUM GLUCONATE 500 MG ORAL TABLET 400 MG: 500 TABLET ORAL at 08:21

## 2018-09-24 RX ADMIN — LISINOPRIL 40 MG: 10 TABLET ORAL at 20:36

## 2018-09-25 PROCEDURE — 99232 SBSQ HOSP IP/OBS MODERATE 35: CPT | Performed by: PSYCHIATRY & NEUROLOGY

## 2018-09-25 RX ADMIN — LISINOPRIL 40 MG: 10 TABLET ORAL at 20:38

## 2018-09-25 RX ADMIN — PANTOPRAZOLE SODIUM 40 MG: 40 TABLET, DELAYED RELEASE ORAL at 08:06

## 2018-09-25 RX ADMIN — DONEPEZIL HYDROCHLORIDE 10 MG: 5 TABLET, FILM COATED ORAL at 08:06

## 2018-09-25 RX ADMIN — DOCUSATE SODIUM 100 MG: 100 CAPSULE ORAL at 20:38

## 2018-09-25 RX ADMIN — DABIGATRAN ETEXILATE MESYLATE 150 MG: 150 CAPSULE ORAL at 20:38

## 2018-09-25 RX ADMIN — MAGNESIUM GLUCONATE 500 MG ORAL TABLET 400 MG: 500 TABLET ORAL at 08:06

## 2018-09-25 RX ADMIN — ASPIRIN 81 MG: 81 TABLET, DELAYED RELEASE ORAL at 08:06

## 2018-09-25 RX ADMIN — ATORVASTATIN CALCIUM 20 MG: 20 TABLET, FILM COATED ORAL at 08:06

## 2018-09-25 RX ADMIN — LEVOTHYROXINE SODIUM 50 MCG: 50 TABLET ORAL at 08:06

## 2018-09-25 RX ADMIN — ISOSORBIDE MONONITRATE 60 MG: 60 TABLET, EXTENDED RELEASE ORAL at 08:06

## 2018-09-25 RX ADMIN — LISINOPRIL 40 MG: 10 TABLET ORAL at 08:06

## 2018-09-25 RX ADMIN — METOPROLOL SUCCINATE 25 MG: 25 TABLET, FILM COATED, EXTENDED RELEASE ORAL at 08:06

## 2018-09-25 RX ADMIN — DABIGATRAN ETEXILATE MESYLATE 150 MG: 150 CAPSULE ORAL at 08:06

## 2018-09-25 RX ADMIN — BACLOFEN 20 MG: 10 TABLET ORAL at 20:38

## 2018-09-25 RX ADMIN — HYDROXYZINE HYDROCHLORIDE 50 MG: 50 TABLET ORAL at 08:08

## 2018-09-25 RX ADMIN — SERTRALINE HYDROCHLORIDE 25 MG: 25 TABLET ORAL at 08:06

## 2018-09-26 PROCEDURE — 99232 SBSQ HOSP IP/OBS MODERATE 35: CPT | Performed by: PSYCHIATRY & NEUROLOGY

## 2018-09-26 RX ORDER — ZINC/PETROLATUM,WHITE
PASTE (GRAM) TOPICAL 3 TIMES DAILY PRN
Status: DISCONTINUED | OUTPATIENT
Start: 2018-09-26 | End: 2018-09-27

## 2018-09-26 RX ADMIN — DONEPEZIL HYDROCHLORIDE 10 MG: 5 TABLET, FILM COATED ORAL at 09:15

## 2018-09-26 RX ADMIN — DOCUSATE SODIUM 100 MG: 100 CAPSULE ORAL at 21:51

## 2018-09-26 RX ADMIN — MAGNESIUM GLUCONATE 500 MG ORAL TABLET 400 MG: 500 TABLET ORAL at 09:15

## 2018-09-26 RX ADMIN — ATORVASTATIN CALCIUM 20 MG: 20 TABLET, FILM COATED ORAL at 09:15

## 2018-09-26 RX ADMIN — SERTRALINE HYDROCHLORIDE 25 MG: 25 TABLET ORAL at 09:16

## 2018-09-26 RX ADMIN — METOPROLOL SUCCINATE 25 MG: 25 TABLET, FILM COATED, EXTENDED RELEASE ORAL at 09:15

## 2018-09-26 RX ADMIN — PANTOPRAZOLE SODIUM 40 MG: 40 TABLET, DELAYED RELEASE ORAL at 09:16

## 2018-09-26 RX ADMIN — LISINOPRIL 40 MG: 10 TABLET ORAL at 20:52

## 2018-09-26 RX ADMIN — HYDROXYZINE HYDROCHLORIDE 50 MG: 50 TABLET ORAL at 02:23

## 2018-09-26 RX ADMIN — LISINOPRIL 40 MG: 10 TABLET ORAL at 09:15

## 2018-09-26 RX ADMIN — ASPIRIN 81 MG: 81 TABLET, DELAYED RELEASE ORAL at 09:15

## 2018-09-26 RX ADMIN — DABIGATRAN ETEXILATE MESYLATE 150 MG: 150 CAPSULE ORAL at 20:52

## 2018-09-26 RX ADMIN — ISOSORBIDE MONONITRATE 60 MG: 60 TABLET, EXTENDED RELEASE ORAL at 09:16

## 2018-09-26 RX ADMIN — BACLOFEN 20 MG: 10 TABLET ORAL at 20:52

## 2018-09-26 RX ADMIN — LEVOTHYROXINE SODIUM 50 MCG: 50 TABLET ORAL at 09:15

## 2018-09-26 RX ADMIN — DABIGATRAN ETEXILATE MESYLATE 150 MG: 150 CAPSULE ORAL at 09:16

## 2018-09-27 PROCEDURE — 99232 SBSQ HOSP IP/OBS MODERATE 35: CPT | Performed by: PSYCHIATRY & NEUROLOGY

## 2018-09-27 RX ADMIN — DABIGATRAN ETEXILATE MESYLATE 150 MG: 150 CAPSULE ORAL at 21:41

## 2018-09-27 RX ADMIN — IBUPROFEN 600 MG: 600 TABLET ORAL at 08:58

## 2018-09-27 RX ADMIN — MUPIROCIN: 20 OINTMENT TOPICAL at 14:59

## 2018-09-27 RX ADMIN — DOCUSATE SODIUM 100 MG: 100 CAPSULE ORAL at 21:41

## 2018-09-27 RX ADMIN — PANTOPRAZOLE SODIUM 40 MG: 40 TABLET, DELAYED RELEASE ORAL at 08:55

## 2018-09-27 RX ADMIN — MAGNESIUM GLUCONATE 500 MG ORAL TABLET 400 MG: 500 TABLET ORAL at 08:54

## 2018-09-27 RX ADMIN — LISINOPRIL 40 MG: 10 TABLET ORAL at 21:41

## 2018-09-27 RX ADMIN — METOPROLOL SUCCINATE 25 MG: 25 TABLET, FILM COATED, EXTENDED RELEASE ORAL at 08:53

## 2018-09-27 RX ADMIN — BACLOFEN 20 MG: 10 TABLET ORAL at 21:41

## 2018-09-27 RX ADMIN — ISOSORBIDE MONONITRATE 60 MG: 60 TABLET, EXTENDED RELEASE ORAL at 08:55

## 2018-09-27 RX ADMIN — MUPIROCIN: 20 OINTMENT TOPICAL at 21:48

## 2018-09-27 RX ADMIN — DABIGATRAN ETEXILATE MESYLATE 150 MG: 150 CAPSULE ORAL at 08:53

## 2018-09-27 RX ADMIN — LEVOTHYROXINE SODIUM 50 MCG: 50 TABLET ORAL at 08:54

## 2018-09-27 RX ADMIN — ATORVASTATIN CALCIUM 20 MG: 20 TABLET, FILM COATED ORAL at 08:54

## 2018-09-27 RX ADMIN — LISINOPRIL 40 MG: 10 TABLET ORAL at 08:54

## 2018-09-27 RX ADMIN — SERTRALINE HYDROCHLORIDE 25 MG: 25 TABLET ORAL at 08:55

## 2018-09-27 RX ADMIN — DONEPEZIL HYDROCHLORIDE 10 MG: 5 TABLET, FILM COATED ORAL at 08:54

## 2018-09-27 RX ADMIN — ASPIRIN 81 MG: 81 TABLET, DELAYED RELEASE ORAL at 08:54

## 2018-09-27 RX ADMIN — HYDROXYZINE HYDROCHLORIDE 50 MG: 50 TABLET ORAL at 19:28

## 2018-09-28 VITALS
OXYGEN SATURATION: 96 % | DIASTOLIC BLOOD PRESSURE: 65 MMHG | BODY MASS INDEX: 21.95 KG/M2 | HEART RATE: 65 BPM | RESPIRATION RATE: 18 BRPM | TEMPERATURE: 98.2 F | SYSTOLIC BLOOD PRESSURE: 128 MMHG | WEIGHT: 136.02 LBS

## 2018-09-28 PROCEDURE — 99238 HOSP IP/OBS DSCHRG MGMT 30/<: CPT | Performed by: PSYCHIATRY & NEUROLOGY

## 2018-09-28 RX ORDER — SERTRALINE HYDROCHLORIDE 25 MG/1
25 TABLET, FILM COATED ORAL DAILY
Qty: 30 TABLET | Refills: 0 | Status: SHIPPED | OUTPATIENT
Start: 2018-09-29 | End: 2022-03-08

## 2018-09-28 RX ADMIN — METOPROLOL SUCCINATE 25 MG: 25 TABLET, FILM COATED, EXTENDED RELEASE ORAL at 08:55

## 2018-09-28 RX ADMIN — SERTRALINE HYDROCHLORIDE 25 MG: 25 TABLET ORAL at 08:55

## 2018-09-28 RX ADMIN — ISOSORBIDE MONONITRATE 60 MG: 60 TABLET, EXTENDED RELEASE ORAL at 08:56

## 2018-09-28 RX ADMIN — DONEPEZIL HYDROCHLORIDE 10 MG: 5 TABLET, FILM COATED ORAL at 08:56

## 2018-09-28 RX ADMIN — NICOTINE 1 PATCH: 21 PATCH TRANSDERMAL at 08:55

## 2018-09-28 RX ADMIN — PANTOPRAZOLE SODIUM 40 MG: 40 TABLET, DELAYED RELEASE ORAL at 08:55

## 2018-09-28 RX ADMIN — ASPIRIN 81 MG: 81 TABLET, DELAYED RELEASE ORAL at 08:58

## 2018-09-28 RX ADMIN — MAGNESIUM GLUCONATE 500 MG ORAL TABLET 400 MG: 500 TABLET ORAL at 08:55

## 2018-09-28 RX ADMIN — DABIGATRAN ETEXILATE MESYLATE 150 MG: 150 CAPSULE ORAL at 08:56

## 2018-09-28 RX ADMIN — LEVOTHYROXINE SODIUM 50 MCG: 50 TABLET ORAL at 08:56

## 2018-09-28 RX ADMIN — MUPIROCIN: 20 OINTMENT TOPICAL at 08:58

## 2018-09-28 RX ADMIN — ATORVASTATIN CALCIUM 20 MG: 20 TABLET, FILM COATED ORAL at 08:56

## 2018-09-28 RX ADMIN — LISINOPRIL 40 MG: 10 TABLET ORAL at 08:55

## 2019-02-22 ENCOUNTER — HOSPITAL ENCOUNTER (OUTPATIENT)
Dept: MAMMOGRAPHY | Facility: HOSPITAL | Age: 69
Discharge: HOME | End: 2019-02-22
Admitting: INTERNAL MEDICINE

## 2019-02-22 DIAGNOSIS — Z12.39 SCREENING BREAST EXAMINATION: ICD-10-CM

## 2019-02-22 PROCEDURE — 77063 BREAST TOMOSYNTHESIS BI: CPT

## 2019-02-22 PROCEDURE — 77067 SCR MAMMO BI INCL CAD: CPT

## 2019-02-22 PROCEDURE — 77063 BREAST TOMOSYNTHESIS BI: CPT | Performed by: RADIOLOGY

## 2019-02-22 PROCEDURE — 77067 SCR MAMMO BI INCL CAD: CPT | Performed by: RADIOLOGY

## 2019-02-28 ENCOUNTER — TRANSCRIBE ORDERS (OUTPATIENT)
Dept: ADMINISTRATIVE | Facility: HOSPITAL | Age: 69
End: 2019-02-28

## 2019-02-28 DIAGNOSIS — R09.89 ABNORMAL CHEST SOUNDS: Primary | ICD-10-CM

## 2019-03-22 ENCOUNTER — APPOINTMENT (OUTPATIENT)
Dept: MAMMOGRAPHY | Facility: HOSPITAL | Age: 69
End: 2019-03-22

## 2019-04-01 ENCOUNTER — HOSPITAL ENCOUNTER (OUTPATIENT)
Dept: CARDIOLOGY | Facility: HOSPITAL | Age: 69
Discharge: HOME | End: 2019-04-01
Admitting: INTERNAL MEDICINE

## 2019-04-01 DIAGNOSIS — R09.89 ABNORMAL CHEST SOUNDS: ICD-10-CM

## 2019-04-01 PROCEDURE — 93880 EXTRACRANIAL BILAT STUDY: CPT | Performed by: RADIOLOGY

## 2019-04-01 PROCEDURE — 93880 EXTRACRANIAL BILAT STUDY: CPT

## 2019-05-16 ENCOUNTER — TRANSCRIBE ORDERS (OUTPATIENT)
Dept: ADMINISTRATIVE | Facility: HOSPITAL | Age: 69
End: 2019-05-16

## 2019-05-16 DIAGNOSIS — Z87.891 PERSONAL HISTORY OF TOBACCO USE, PRESENTING HAZARDS TO HEALTH: Primary | ICD-10-CM

## 2019-06-19 ENCOUNTER — OFFICE VISIT (OUTPATIENT)
Dept: CARDIAC SURGERY | Facility: CLINIC | Age: 69
End: 2019-06-19

## 2019-06-19 VITALS
BODY MASS INDEX: 22.98 KG/M2 | HEIGHT: 66 IN | DIASTOLIC BLOOD PRESSURE: 110 MMHG | SYSTOLIC BLOOD PRESSURE: 160 MMHG | OXYGEN SATURATION: 95 % | HEART RATE: 58 BPM | WEIGHT: 143 LBS

## 2019-06-19 DIAGNOSIS — G89.29 CHRONIC INTRACTABLE HEADACHE, UNSPECIFIED HEADACHE TYPE: Primary | ICD-10-CM

## 2019-06-19 DIAGNOSIS — R51.9 CHRONIC INTRACTABLE HEADACHE, UNSPECIFIED HEADACHE TYPE: Primary | ICD-10-CM

## 2019-06-19 DIAGNOSIS — H53.9 VISION CHANGES: ICD-10-CM

## 2019-06-19 DIAGNOSIS — I65.23 CAROTID STENOSIS, ASYMPTOMATIC, BILATERAL: ICD-10-CM

## 2019-06-19 PROCEDURE — 99203 OFFICE O/P NEW LOW 30 MIN: CPT | Performed by: THORACIC SURGERY (CARDIOTHORACIC VASCULAR SURGERY)

## 2019-06-19 RX ORDER — AMLODIPINE BESYLATE 5 MG/1
TABLET ORAL DAILY
Refills: 4 | COMMUNITY
Start: 2019-05-24 | End: 2022-03-08

## 2019-06-19 RX ORDER — CHOLECALCIFEROL (VITAMIN D3) 1250 MCG
50000 CAPSULE ORAL
COMMUNITY

## 2019-06-19 RX ORDER — ALLOPURINOL 300 MG/1
TABLET ORAL DAILY
Refills: 2 | COMMUNITY
Start: 2019-06-05 | End: 2022-03-08

## 2019-06-19 RX ORDER — FERROUS SULFATE 325(65) MG
TABLET ORAL DAILY
Refills: 2 | COMMUNITY
Start: 2019-04-27 | End: 2022-03-08

## 2019-06-20 ENCOUNTER — HOSPITAL ENCOUNTER (OUTPATIENT)
Dept: CT IMAGING | Facility: HOSPITAL | Age: 69
Discharge: HOME | End: 2019-06-20
Admitting: INTERNAL MEDICINE

## 2019-06-20 DIAGNOSIS — Z87.891 PERSONAL HISTORY OF TOBACCO USE, PRESENTING HAZARDS TO HEALTH: ICD-10-CM

## 2019-06-20 PROCEDURE — G0297 LDCT FOR LUNG CA SCREEN: HCPCS | Performed by: RADIOLOGY

## 2019-06-20 PROCEDURE — G0297 LDCT FOR LUNG CA SCREEN: HCPCS

## 2019-07-03 DIAGNOSIS — H53.9 VISION CHANGES: ICD-10-CM

## 2019-07-03 DIAGNOSIS — R51.9 CHRONIC INTRACTABLE HEADACHE, UNSPECIFIED HEADACHE TYPE: Primary | ICD-10-CM

## 2019-07-03 DIAGNOSIS — G89.29 CHRONIC INTRACTABLE HEADACHE, UNSPECIFIED HEADACHE TYPE: Primary | ICD-10-CM

## 2019-07-15 ENCOUNTER — HOSPITAL ENCOUNTER (OUTPATIENT)
Dept: CT IMAGING | Facility: HOSPITAL | Age: 69
Discharge: HOME | End: 2019-07-15
Admitting: PHYSICIAN ASSISTANT

## 2019-07-15 ENCOUNTER — HOSPITAL ENCOUNTER (OUTPATIENT)
Dept: CT IMAGING | Facility: HOSPITAL | Age: 69
Discharge: HOME | End: 2019-07-15

## 2019-07-15 DIAGNOSIS — G89.29 CHRONIC INTRACTABLE HEADACHE, UNSPECIFIED HEADACHE TYPE: ICD-10-CM

## 2019-07-15 DIAGNOSIS — R51.9 CHRONIC INTRACTABLE HEADACHE, UNSPECIFIED HEADACHE TYPE: ICD-10-CM

## 2019-07-15 DIAGNOSIS — H53.9 VISION CHANGES: ICD-10-CM

## 2019-07-15 LAB — CREAT BLDA-MCNC: 0.7 MG/DL (ref 0.6–1.3)

## 2019-07-15 PROCEDURE — 0 IOVERSOL 68 % SOLUTION: Performed by: THORACIC SURGERY (CARDIOTHORACIC VASCULAR SURGERY)

## 2019-07-15 PROCEDURE — 70450 CT HEAD/BRAIN W/O DYE: CPT

## 2019-07-15 PROCEDURE — 82565 ASSAY OF CREATININE: CPT

## 2019-07-15 PROCEDURE — 70450 CT HEAD/BRAIN W/O DYE: CPT | Performed by: RADIOLOGY

## 2019-07-15 PROCEDURE — 70498 CT ANGIOGRAPHY NECK: CPT

## 2019-07-15 PROCEDURE — 70498 CT ANGIOGRAPHY NECK: CPT | Performed by: RADIOLOGY

## 2019-07-15 RX ADMIN — IOVERSOL 60 ML: 678 INJECTION INTRA-ARTERIAL; INTRAVENOUS at 14:59

## 2019-09-04 ENCOUNTER — OFFICE VISIT (OUTPATIENT)
Dept: CARDIAC SURGERY | Facility: CLINIC | Age: 69
End: 2019-09-04

## 2019-09-04 VITALS
HEIGHT: 66 IN | DIASTOLIC BLOOD PRESSURE: 96 MMHG | HEART RATE: 60 BPM | BODY MASS INDEX: 22.82 KG/M2 | SYSTOLIC BLOOD PRESSURE: 202 MMHG | WEIGHT: 142 LBS

## 2019-09-04 DIAGNOSIS — I65.23 CAROTID STENOSIS, ASYMPTOMATIC, BILATERAL: Primary | ICD-10-CM

## 2019-09-04 PROCEDURE — 99406 BEHAV CHNG SMOKING 3-10 MIN: CPT | Performed by: PHYSICIAN ASSISTANT

## 2019-09-04 PROCEDURE — 99213 OFFICE O/P EST LOW 20 MIN: CPT | Performed by: PHYSICIAN ASSISTANT

## 2019-09-04 RX ORDER — DONEPEZIL HYDROCHLORIDE 10 MG/1
10 TABLET, FILM COATED ORAL 2 TIMES DAILY
Refills: 2 | COMMUNITY
Start: 2019-08-09 | End: 2023-02-24 | Stop reason: HOSPADM

## 2019-09-04 NOTE — PROGRESS NOTES
09/04/2019  Patient Information  Brenda Munroe                                                                                          [address] [facility] [place] [address]   1950  'PCP/Referring Physician'  Bernadette Arevalo MD  [phone]  No ref. provider found    Chief Complaint   Patient presents with   • Follow-up     1 MO FU with CTA Carotids / Head for Headaches and Visual Changes       History of Present Illness:  Patient is a 68 year old  female with history of COPD, active tobacco abuse, psychosis, paroxysmal atrial fibrillation, headaches and carotid artery disease who presents to office for review and discussion of recent CT scan of the head and carotids. The patient was last seen on 6/19/19 and complains of continued headaches, which have dissipated in severity and timing but still occur as well as continued right arm weakness. She denies any dizziness, speech difficulty and visual changes. The patient continues to smoke approximately 1-2 cigarettes per day. She presents to office for review and discussion of recent CT scan of the head and carotids.     Patient Active Problem List   Diagnosis   • Psychosis (CMS/HCC)   • Severe recurrent major depression with psychotic features (CMS/HCC)   • COPD (chronic obstructive pulmonary disease) (CMS/HCC)   • Coronary artery disease   • Disease of thyroid gland   • Arthritis   • Hypertension   • Paroxysmal atrial fibrillation (CMS/HCC)   • Chronic headaches   • Vision changes   • Carotid stenosis, asymptomatic, bilateral     Past Medical History:   Diagnosis Date   • Anxiety    • Arthritis    • Bladder prolapse, female, acquired    • Carotid stenosis    • COPD (chronic obstructive pulmonary disease) (CMS/HCC)    • Coronary artery disease    • Dementia    • Depression    • Disease of thyroid gland    • Frequency of urination    • GERD (gastroesophageal reflux disease)    • Heart attack (CMS/HCC)    • Hyperlipidemia    • Hypertension    •  Irregular heart beat    • Peptic ulcer disease      Past Surgical History:   Procedure Laterality Date   • CARDIAC SURGERY      open heart  in 1999   • CYSTOSCOPY N/A 11/8/2017    Procedure: CYSTOSCOPY;  Surgeon: Karl Nicolas DO;  Location:  [facility] OR;  Service:    • OTHER SURGICAL HISTORY      states she had fluid drained no surgery   • VAGINAL HYSTERECTOMY W/ ANTERIOR AND POSTERIOR VAGINAL REPAIR N/A 11/8/2017    Procedure: VAGINAL HYSTERECTOMY WITH ANTERIOR, POSTERIOR, AND ENTEROCELE VAGINAL REPAIR;  Surgeon: Karl Nicolas DO;  Location:  [facility] OR;  Service:    • VAGINAL VAULT SUSPENSION N/A 11/8/2017    Procedure: VAGINAL VAULT SUSPENSION ;  Surgeon: Karl Nicolas DO;  Location: [facility];  Service:        Current Outpatient Medications:   •  allopurinol (ZYLOPRIM) 300 MG tablet, Daily., Disp: , Rfl: 2  •  amLODIPine (NORVASC) 5 MG tablet, Daily., Disp: , Rfl: 4  •  Cholecalciferol (VITAMIN D3) 48772 units capsule, Take  by mouth Every 7 (Seven) Days., Disp: , Rfl:   •  Cyanocobalamin (B-12) 1000 MCG/ML kit, Inject 1 mL as directed Every 30 (Thirty) Days. Prior to [facility] Admission, Patient was on:  Pt takes at beginning of every month., Disp: , Rfl:   •  dabigatran etexilate (PRADAXA) 150 MG capsu, Take 150 mg by mouth 2 (Two) Times a Day., Disp: , Rfl:   •  docusate calcium (SURFAK) 240 MG capsule, Take 240 mg by mouth Every Night., Disp: , Rfl:   •  donepezil (ARICEPT) 10 MG tablet, Daily., Disp: , Rfl: 2  •  ferrous sulfate 325 (65 FE) MG tablet, Daily., Disp: , Rfl: 2  •  isosorbide mononitrate (IMDUR) 60 MG 24 hr tablet, Take 60 mg by mouth Daily., Disp: , Rfl:   •  levothyroxine (SYNTHROID, LEVOTHROID) 50 MCG tablet, Take 50 mcg by mouth Daily., Disp: , Rfl:   •  lisinopril (PRINIVIL,ZESTRIL) 40 MG tablet, Take 40 mg by mouth 2 (Two) Times a Day., Disp: , Rfl:   •  magnesium oxide (MAGOX) 400 (241.3 Mg) MG tablet tablet, Take 400 mg by mouth Daily., Disp: , Rfl:   •   pantoprazole (PROTONIX) 20 MG EC tablet, Take 20 mg by mouth 2 (Two) Times a Day., Disp: , Rfl:   •  sertraline (ZOLOFT) 25 MG tablet, Take 1 tablet by mouth Daily., Disp: 30 tablet, Rfl: 0  •  simvastatin (ZOCOR) 40 MG tablet, Take 40 mg by mouth Daily., Disp: , Rfl:   •  aspirin 81 MG EC tablet, Take 81 mg by mouth Daily., Disp: , Rfl:   •  baclofen (LIORESAL) 20 MG tablet, Take 20 mg by mouth Every Night., Disp: , Rfl:   •  donepezil (ARICEPT) 5 MG tablet, Take 5 mg by mouth Daily., Disp: , Rfl:   •  metoprolol succinate XL (TOPROL-XL) 25 MG 24 hr tablet, Take 25 mg by mouth Daily., Disp: , Rfl:   •  mupirocin (BACTROBAN) 2 % ointment, Apply  topically to the appropriate area as directed Every 12 (Twelve) Hours., Disp: , Rfl:   No Known Allergies  Social History     Socioeconomic History   • Marital status:      Spouse name: Not on file   • Number of children: 3   • Years of education: Not on file   • Highest education level: Not on file   Occupational History   • Occupation: Housewife   Tobacco Use   • Smoking status: Current Every Day Smoker     Packs/day: 0.50     Years: 55.00     Pack years: 27.50     Types: Cigarettes   • Smokeless tobacco: Never Used   Substance and Sexual Activity   • Alcohol use: No   • Drug use: No   • Sexual activity: No     Comment: denies   Social History Narrative    Lives in [place].      Family History   Problem Relation Age of Onset   • Dementia Sister    • Heart attack Mother    • Tuberculosis Father    • Breast cancer Neg Hx      Review of Systems   Constitution: Negative for chills, fever, malaise/fatigue, night sweats and weight loss.   HENT: Positive for sore throat. Negative for hearing loss and odynophagia.    Eyes: Positive for visual disturbance.   Cardiovascular: Positive for chest pain, claudication, dyspnea on exertion, leg swelling and palpitations. Negative for orthopnea.   Respiratory: Positive for cough. Negative for hemoptysis and shortness of breath.   "  Endocrine: Negative for cold intolerance, heat intolerance, polydipsia, polyphagia and polyuria.   Hematologic/Lymphatic: Bruises/bleeds easily.   Skin: Negative for itching and rash.   Musculoskeletal: Positive for arthritis. Negative for joint pain, joint swelling and myalgias.   Gastrointestinal: Positive for abdominal pain and diarrhea. Negative for constipation, hematemesis, hematochezia, melena, nausea and vomiting.   Genitourinary: Negative for dysuria, frequency and hematuria.   Neurological: Positive for dizziness, headaches and light-headedness. Negative for focal weakness, numbness and seizures.   Psychiatric/Behavioral: Positive for depression. Negative for suicidal ideas. The patient is nervous/anxious.    All other systems reviewed and are negative.    Vitals:    19 1058   BP: (!) 202/96   BP Location: Left arm   Patient Position: Sitting   Pulse: 60   Weight: 64.4 kg (142 lb)   Height: 167.6 cm (66\")      Physical Exam:  Gen - NAD, pleasant, cooperative  CV - Regular rate and rhythm, no murmur gallop or rub  Pulm - Lungs clear to auscultation without wheeze or rhonchi   GI - Soft, normoactive bowel sounds, non-tender  Ext - Without edema   Carotids - Without carotid bruit bilaterally   Neuro - CN II - XII grossly intact, tongue midline, voice normal     Labs/Imagin/15/19 CT scan Head  Stable CT of the brain. A source of the patient's headache  is not demonstrated.     7/15/19 CT scan Carotids  Heavy atherosclerotic calcification is noted involving both  carotid systems. There is plaque at the origin of the left common  carotid artery from the aortic arch with a diameter reduction of 60%.  There is extensive plaque at the carotid bifurcations, the diameter  stenosis in the origin of the internal carotid artery is 73%.     Assessment/Plan:  Patient is a 68 year old  female with history of COPD, active tobacco abuse, psychosis, paroxysmal atrial fibrillation, headaches and carotid " artery disease who presents to office for review and discussion of recent CT scan of the head and carotids. The patient has been doing well since last being seen in office. She has no new or worsening neurofocal issues at this time. I have personally reviewed her recent CT scans and discussed the results with the patient, answering all questions to her satisfaction. I believe at this time the patient is a marginal surgical candidate due to her overall unimpressive velocities on her carotid duplex. I would like for the patient to follow up in 1 year with CDaria Jernigan for continued evaluation.  If the patient has any questions, concerns or acutely worsening symptoms during the interval she may call our office or present to the nearest emergency department immediately.  Of note, I spent 3 to 5 minutes with the patient discussing the importance of tobacco cessation, which she understood, and stated she would try to discontinue use.    Patient Active Problem List   Diagnosis   • Psychosis (CMS/HCC)   • Severe recurrent major depression with psychotic features (CMS/HCC)   • COPD (chronic obstructive pulmonary disease) (CMS/HCC)   • Coronary artery disease   • Disease of thyroid gland   • Arthritis   • Hypertension   • Paroxysmal atrial fibrillation (CMS/HCC)   • Chronic headaches   • Vision changes   • Carotid stenosis, asymptomatic, bilateral

## 2019-09-18 ENCOUNTER — LAB REQUISITION (OUTPATIENT)
Dept: LAB | Facility: HOSPITAL | Age: 69
End: 2019-09-18

## 2019-09-18 DIAGNOSIS — D51.0 VITAMIN B12 DEFICIENCY ANEMIA DUE TO INTRINSIC FACTOR DEFICIENCY: ICD-10-CM

## 2019-09-18 PROCEDURE — 82607 VITAMIN B-12: CPT | Performed by: INTERNAL MEDICINE

## 2019-09-19 LAB — VIT B12 BLD-MCNC: 385 PG/ML (ref 211–946)

## 2019-11-18 ENCOUNTER — APPOINTMENT (OUTPATIENT)
Dept: CT IMAGING | Facility: HOSPITAL | Age: 69
End: 2019-11-18

## 2019-11-18 ENCOUNTER — APPOINTMENT (OUTPATIENT)
Dept: GENERAL RADIOLOGY | Facility: HOSPITAL | Age: 69
End: 2019-11-18

## 2019-11-18 ENCOUNTER — HOSPITAL ENCOUNTER (EMERGENCY)
Facility: HOSPITAL | Age: 69
Discharge: HOME | End: 2019-11-18
Attending: FAMILY MEDICINE | Admitting: FAMILY MEDICINE

## 2019-11-18 VITALS
BODY MASS INDEX: 27.48 KG/M2 | WEIGHT: 140 LBS | SYSTOLIC BLOOD PRESSURE: 136 MMHG | OXYGEN SATURATION: 91 % | TEMPERATURE: 99.3 F | HEART RATE: 64 BPM | RESPIRATION RATE: 18 BRPM | HEIGHT: 60 IN | DIASTOLIC BLOOD PRESSURE: 80 MMHG

## 2019-11-18 DIAGNOSIS — R07.89 ATYPICAL CHEST PAIN: Primary | ICD-10-CM

## 2019-11-18 LAB
ALBUMIN SERPL-MCNC: 3.84 G/DL (ref 3.5–5.2)
ALBUMIN/GLOB SERPL: 1.5 G/DL
ALP SERPL-CCNC: 79 U/L (ref 39–117)
ALT SERPL W P-5'-P-CCNC: 11 U/L (ref 1–33)
ANION GAP SERPL CALCULATED.3IONS-SCNC: 11.7 MMOL/L (ref 5–15)
APTT PPP: 52.3 SECONDS (ref 23.8–36.1)
AST SERPL-CCNC: 15 U/L (ref 1–32)
BASOPHILS # BLD AUTO: 0.02 10*3/MM3 (ref 0–0.2)
BASOPHILS NFR BLD AUTO: 0.4 % (ref 0–1.5)
BILIRUB SERPL-MCNC: 0.2 MG/DL (ref 0.2–1.2)
BUN BLD-MCNC: 14 MG/DL (ref 8–23)
BUN/CREAT SERPL: 20 (ref 7–25)
CALCIUM SPEC-SCNC: 8.8 MG/DL (ref 8.6–10.5)
CHLORIDE SERPL-SCNC: 98 MMOL/L (ref 98–107)
CO2 SERPL-SCNC: 30.3 MMOL/L (ref 22–29)
CREAT BLD-MCNC: 0.7 MG/DL (ref 0.57–1)
CRP SERPL-MCNC: 0.2 MG/DL (ref 0–0.5)
D-LACTATE SERPL-SCNC: 1.3 MMOL/L (ref 0.5–2)
DEPRECATED RDW RBC AUTO: 48.3 FL (ref 37–54)
EOSINOPHIL # BLD AUTO: 0.06 10*3/MM3 (ref 0–0.4)
EOSINOPHIL NFR BLD AUTO: 1.3 % (ref 0.3–6.2)
ERYTHROCYTE [DISTWIDTH] IN BLOOD BY AUTOMATED COUNT: 13.8 % (ref 12.3–15.4)
GFR SERPL CREATININE-BSD FRML MDRD: 83 ML/MIN/1.73
GLOBULIN UR ELPH-MCNC: 2.6 GM/DL
GLUCOSE BLD-MCNC: 86 MG/DL (ref 65–99)
HCT VFR BLD AUTO: 42.1 % (ref 34–46.6)
HGB BLD-MCNC: 13.8 G/DL (ref 12–15.9)
IMM GRANULOCYTES # BLD AUTO: 0.01 10*3/MM3 (ref 0–0.05)
IMM GRANULOCYTES NFR BLD AUTO: 0.2 % (ref 0–0.5)
INR PPP: 1.22 (ref 0.9–1.1)
LYMPHOCYTES # BLD AUTO: 1.22 10*3/MM3 (ref 0.7–3.1)
LYMPHOCYTES NFR BLD AUTO: 27.2 % (ref 19.6–45.3)
MAGNESIUM SERPL-MCNC: 2 MG/DL (ref 1.6–2.4)
MCH RBC QN AUTO: 31.3 PG (ref 26.6–33)
MCHC RBC AUTO-ENTMCNC: 32.8 G/DL (ref 31.5–35.7)
MCV RBC AUTO: 95.5 FL (ref 79–97)
MONOCYTES # BLD AUTO: 0.3 10*3/MM3 (ref 0.1–0.9)
MONOCYTES NFR BLD AUTO: 6.7 % (ref 5–12)
NEUTROPHILS # BLD AUTO: 2.88 10*3/MM3 (ref 1.7–7)
NEUTROPHILS NFR BLD AUTO: 64.2 % (ref 42.7–76)
NRBC BLD AUTO-RTO: 0 /100 WBC (ref 0–0.2)
NT-PROBNP SERPL-MCNC: 556.4 PG/ML (ref 5–900)
PLATELET # BLD AUTO: 175 10*3/MM3 (ref 140–450)
PMV BLD AUTO: 10.1 FL (ref 6–12)
POTASSIUM BLD-SCNC: 3.3 MMOL/L (ref 3.5–5.2)
PROT SERPL-MCNC: 6.4 G/DL (ref 6–8.5)
PROTHROMBIN TIME: 16.1 SECONDS (ref 11–15.4)
RBC # BLD AUTO: 4.41 10*6/MM3 (ref 3.77–5.28)
SODIUM BLD-SCNC: 140 MMOL/L (ref 136–145)
TROPONIN T SERPL-MCNC: <0.01 NG/ML (ref 0–0.03)
TSH SERPL DL<=0.05 MIU/L-ACNC: 4.1 UIU/ML (ref 0.27–4.2)
WBC NRBC COR # BLD: 4.49 10*3/MM3 (ref 3.4–10.8)

## 2019-11-18 PROCEDURE — 85730 THROMBOPLASTIN TIME PARTIAL: CPT | Performed by: FAMILY MEDICINE

## 2019-11-18 PROCEDURE — 83880 ASSAY OF NATRIURETIC PEPTIDE: CPT | Performed by: FAMILY MEDICINE

## 2019-11-18 PROCEDURE — 70450 CT HEAD/BRAIN W/O DYE: CPT | Performed by: RADIOLOGY

## 2019-11-18 PROCEDURE — 71045 X-RAY EXAM CHEST 1 VIEW: CPT | Performed by: RADIOLOGY

## 2019-11-18 PROCEDURE — 84443 ASSAY THYROID STIM HORMONE: CPT | Performed by: FAMILY MEDICINE

## 2019-11-18 PROCEDURE — 93010 ELECTROCARDIOGRAM REPORT: CPT | Performed by: INTERNAL MEDICINE

## 2019-11-18 PROCEDURE — 85610 PROTHROMBIN TIME: CPT | Performed by: FAMILY MEDICINE

## 2019-11-18 PROCEDURE — 86140 C-REACTIVE PROTEIN: CPT | Performed by: FAMILY MEDICINE

## 2019-11-18 PROCEDURE — 85025 COMPLETE CBC W/AUTO DIFF WBC: CPT | Performed by: FAMILY MEDICINE

## 2019-11-18 PROCEDURE — 84484 ASSAY OF TROPONIN QUANT: CPT | Performed by: FAMILY MEDICINE

## 2019-11-18 PROCEDURE — 99285 EMERGENCY DEPT VISIT HI MDM: CPT

## 2019-11-18 PROCEDURE — 93005 ELECTROCARDIOGRAM TRACING: CPT | Performed by: EMERGENCY MEDICINE

## 2019-11-18 PROCEDURE — 80053 COMPREHEN METABOLIC PANEL: CPT | Performed by: FAMILY MEDICINE

## 2019-11-18 PROCEDURE — 71045 X-RAY EXAM CHEST 1 VIEW: CPT

## 2019-11-18 PROCEDURE — 83735 ASSAY OF MAGNESIUM: CPT | Performed by: FAMILY MEDICINE

## 2019-11-18 PROCEDURE — 36415 COLL VENOUS BLD VENIPUNCTURE: CPT

## 2019-11-18 PROCEDURE — 93005 ELECTROCARDIOGRAM TRACING: CPT | Performed by: FAMILY MEDICINE

## 2019-11-18 PROCEDURE — 87040 BLOOD CULTURE FOR BACTERIA: CPT | Performed by: FAMILY MEDICINE

## 2019-11-18 PROCEDURE — 83605 ASSAY OF LACTIC ACID: CPT | Performed by: FAMILY MEDICINE

## 2019-11-18 PROCEDURE — 70450 CT HEAD/BRAIN W/O DYE: CPT

## 2019-11-18 RX ORDER — SODIUM CHLORIDE 0.9 % (FLUSH) 0.9 %
10 SYRINGE (ML) INJECTION AS NEEDED
Status: DISCONTINUED | OUTPATIENT
Start: 2019-11-18 | End: 2019-11-19 | Stop reason: HOSPADM

## 2019-11-18 RX ORDER — ROSUVASTATIN CALCIUM 10 MG/1
10 TABLET, COATED ORAL DAILY
COMMUNITY
End: 2022-03-08

## 2019-11-18 NOTE — ED PROVIDER NOTES
Subjective   Patient is a 5-year-old female who presents the emergency department with multiple complaints including chest pain, shortness of breath and shaking chills.  All the symptoms started just prior to arrival.  The patient states that she was outside walking her little and the symptoms started.  She states that she suddenly began shivering, developed chest pain and states that she has had 2 heart attacks in the past and was very concerned.  She states that she feels like she cannot get a deep breath.  She denies any recent fever, chills, nausea or vomiting.  She does state that her right hand feels somewhat numb and is concerned that she is having a stroke.  She has not had any diaphoresis, and currently her only symptoms are chills.  She denies any sick contacts and states that she felt well up until a few minutes prior to arrival.  She has no additional complaints at this time        History provided by:  Patient   used: No    Chest Pain   Pain location:  Substernal area  Pain quality: aching    Pain radiates to:  Does not radiate  Pain severity:  Moderate  Onset quality:  Sudden  Progression:  Improving  Context: breathing    Relieved by:  Nothing  Worsened by:  Deep breathing, movement and exertion  Associated symptoms: cough, fatigue and shortness of breath    Associated symptoms: no abdominal pain, no back pain, no diaphoresis, no dizziness, no fever, no headache, no nausea, no palpitations and no vomiting        Review of Systems   Constitutional: Positive for chills and fatigue. Negative for activity change, appetite change, diaphoresis, fever and unexpected weight change.   HENT: Negative for congestion, postnasal drip, rhinorrhea, sinus pressure, sinus pain, sneezing and sore throat.    Eyes: Negative for photophobia, pain, discharge, redness and itching.   Respiratory: Positive for cough and shortness of breath. Negative for apnea, choking, chest tightness and stridor.     Cardiovascular: Positive for chest pain. Negative for palpitations and leg swelling.   Gastrointestinal: Negative for abdominal distention, abdominal pain, constipation, diarrhea, nausea and vomiting.   Endocrine: Negative for cold intolerance and heat intolerance.   Genitourinary: Negative for difficulty urinating and flank pain.   Musculoskeletal: Negative for arthralgias and back pain.   Skin: Negative for color change and pallor.   Neurological: Negative for dizziness, facial asymmetry, light-headedness and headaches.   Psychiatric/Behavioral: Negative for agitation, behavioral problems and confusion.       Past Medical History:   Diagnosis Date   • Anxiety    • Arthritis    • Bladder prolapse, female, acquired    • Carotid stenosis    • COPD (chronic obstructive pulmonary disease) (CMS/[facility])    • Coronary artery disease    • Dementia (CMS/[facility])    • Depression    • Disease of thyroid gland    • Frequency of urination    • GERD (gastroesophageal reflux disease)    • Heart attack (CMS/[facility])    • Hyperlipidemia    • Hypertension    • Irregular heart beat    • Peptic ulcer disease        No Known Allergies    Past Surgical History:   Procedure Laterality Date   • CARDIAC SURGERY      open heart  in 1999   • CYSTOSCOPY N/A 11/8/2017    Procedure: CYSTOSCOPY;  Surgeon: Karl Nicolas DO;  Location: [facility] OR;  Service:    • OTHER SURGICAL HISTORY      states she had fluid drained no surgery   • VAGINAL HYSTERECTOMY W/ ANTERIOR AND POSTERIOR VAGINAL REPAIR N/A 11/8/2017    Procedure: VAGINAL HYSTERECTOMY WITH ANTERIOR, POSTERIOR, AND ENTEROCELE VAGINAL REPAIR;  Surgeon: Karl Nicolas DO;  Location: [facility] OR;  Service:    • VAGINAL VAULT SUSPENSION N/A 11/8/2017    Procedure: VAGINAL VAULT SUSPENSION ;  Surgeon: Karl Nicolas DO;  Location: [facility] OR;  Service:        Family History   Problem Relation Age of Onset   • Dementia Sister    • Heart attack Mother    • Tuberculosis Father    •  Breast cancer Neg Hx        Social History     Socioeconomic History   • Marital status:      Spouse name: Not on file   • Number of children: 3   • Years of education: Not on file   • Highest education level: Not on file   Occupational History   • Occupation: Housewife   Tobacco Use   • Smoking status: Current Every Day Smoker     Packs/day: 0.50     Years: 55.00     Pack years: 27.50     Types: Cigarettes   • Smokeless tobacco: Never Used   Substance and Sexual Activity   • Alcohol use: No   • Drug use: No   • Sexual activity: No     Comment: denies   Social History Narrative    Lives in [place].            Objective   Physical Exam   Constitutional: She is oriented to person, place, and time. She appears well-developed and well-nourished.  Non-toxic appearance. She does not appear ill. No distress.   HENT:   Head: Normocephalic and atraumatic.   Eyes: EOM are normal. Pupils are equal, round, and reactive to light.   Neck: Normal range of motion. Neck supple. No hepatojugular reflux and no JVD present. No tracheal deviation present. No thyromegaly present.   Cardiovascular: Normal rate, regular rhythm, intact distal pulses and normal pulses.  No extrasystoles are present. PMI is not displaced. Exam reveals no gallop, no S3, no S4, no distant heart sounds and no friction rub.   No murmur heard.   No systolic murmur is present.   No diastolic murmur is present.  Pulmonary/Chest: Effort normal and breath sounds normal. No accessory muscle usage or stridor. No tachypnea. No respiratory distress. She has no decreased breath sounds. She has no wheezes. She has no rhonchi. She has no rales.   Musculoskeletal:        Right lower leg: Normal. She exhibits no tenderness and no edema.        Left lower leg: Normal. She exhibits no tenderness and no edema.   Lymphadenopathy:     She has no cervical adenopathy.   Neurological: She is alert and oriented to person, place, and time. She is not disoriented. No cranial nerve  deficit.   Skin: Skin is warm and dry. No rash noted. She is not diaphoretic. No pallor.   Psychiatric: Her behavior is normal. Her mood appears anxious. She is not agitated.   Nursing note and vitals reviewed.      Procedures           ED Course  ED Course as of Nov 18 2339   Mon Nov 18, 2019 2158 No change from previous EKG  [EG]      ED Course User Index  [EG] Radha Graves DO                HEART Score (for prediction of 6-week risk of major adverse cardiac event) reviewed and/or performed as part of the patient evaluation and treatment planning process.  The result associated with this review/performance is: 3       MDM  Number of Diagnoses or Management Options  Atypical chest pain: new and requires workup     Amount and/or Complexity of Data Reviewed  Clinical lab tests: ordered and reviewed  Tests in the radiology section of CPT®: ordered and reviewed  Tests in the medicine section of CPT®: ordered and reviewed  Independent visualization of images, tracings, or specimens: yes    Risk of Complications, Morbidity, and/or Mortality  Presenting problems: high  Diagnostic procedures: high  Management options: high    Critical Care  Total time providing critical care: < 30 minutes    Patient Progress  Patient progress: stable      Final diagnoses:   Atypical chest pain              Radha Graves DO  11/18/19 2300       Radha Graves DO  11/18/19 2336

## 2019-11-21 ENCOUNTER — TRANSCRIBE ORDERS (OUTPATIENT)
Dept: ADMINISTRATIVE | Facility: HOSPITAL | Age: 69
End: 2019-11-21

## 2019-11-21 DIAGNOSIS — R07.89 ATYPICAL CHEST PAIN: Primary | ICD-10-CM

## 2019-11-22 ENCOUNTER — HOSPITAL ENCOUNTER (OUTPATIENT)
Dept: NUCLEAR MEDICINE | Facility: HOSPITAL | Age: 69
Discharge: HOME | End: 2019-11-22

## 2019-11-22 ENCOUNTER — HOSPITAL ENCOUNTER (OUTPATIENT)
Dept: NUCLEAR MEDICINE | Facility: HOSPITAL | Age: 69
End: 2019-11-22

## 2019-11-22 ENCOUNTER — HOSPITAL ENCOUNTER (OUTPATIENT)
Dept: CARDIOLOGY | Facility: HOSPITAL | Age: 69
End: 2019-11-22

## 2019-11-22 DIAGNOSIS — R07.89 ATYPICAL CHEST PAIN: ICD-10-CM

## 2019-11-23 LAB
BACTERIA SPEC AEROBE CULT: NORMAL
BACTERIA SPEC AEROBE CULT: NORMAL

## 2020-03-27 ENCOUNTER — APPOINTMENT (OUTPATIENT)
Dept: GENERAL RADIOLOGY | Facility: HOSPITAL | Age: 70
End: 2020-03-27

## 2020-03-27 ENCOUNTER — APPOINTMENT (OUTPATIENT)
Dept: CT IMAGING | Facility: HOSPITAL | Age: 70
End: 2020-03-27

## 2020-03-27 ENCOUNTER — HOSPITAL ENCOUNTER (EMERGENCY)
Facility: HOSPITAL | Age: 70
Discharge: HOME | End: 2020-03-27
Attending: EMERGENCY MEDICINE | Admitting: FAMILY MEDICINE

## 2020-03-27 VITALS
TEMPERATURE: 97.8 F | HEIGHT: 66 IN | BODY MASS INDEX: 20.89 KG/M2 | DIASTOLIC BLOOD PRESSURE: 76 MMHG | WEIGHT: 130 LBS | RESPIRATION RATE: 18 BRPM | HEART RATE: 68 BPM | SYSTOLIC BLOOD PRESSURE: 137 MMHG | OXYGEN SATURATION: 99 %

## 2020-03-27 DIAGNOSIS — J20.6 ACUTE BRONCHITIS DUE TO RHINOVIRUS: Primary | ICD-10-CM

## 2020-03-27 LAB
A-A DO2: 44.6 MMHG (ref 0–300)
ALBUMIN SERPL-MCNC: 3.98 G/DL (ref 3.5–5.2)
ALBUMIN/GLOB SERPL: 1.6 G/DL
ALP SERPL-CCNC: 82 U/L (ref 39–117)
ALT SERPL W P-5'-P-CCNC: 12 U/L (ref 1–33)
ANION GAP SERPL CALCULATED.3IONS-SCNC: 11.1 MMOL/L (ref 5–15)
APTT PPP: 52.3 SECONDS (ref 23.8–36.1)
ARTERIAL PATENCY WRIST A: POSITIVE
AST SERPL-CCNC: 14 U/L (ref 1–32)
ATMOSPHERIC PRESS: 722 MMHG
B PERT DNA SPEC QL NAA+PROBE: NOT DETECTED
BASE EXCESS BLDA CALC-SCNC: 6.4 MMOL/L (ref 0–2)
BASOPHILS # BLD AUTO: 0.03 10*3/MM3 (ref 0–0.2)
BASOPHILS NFR BLD AUTO: 0.3 % (ref 0–1.5)
BDY SITE: ABNORMAL
BILIRUB SERPL-MCNC: 0.3 MG/DL (ref 0.2–1.2)
BODY TEMPERATURE: 0 C
BUN BLD-MCNC: 13 MG/DL (ref 8–23)
BUN/CREAT SERPL: 19.1 (ref 7–25)
C PNEUM DNA NPH QL NAA+NON-PROBE: NOT DETECTED
CALCIUM SPEC-SCNC: 8.9 MG/DL (ref 8.6–10.5)
CHLORIDE SERPL-SCNC: 103 MMOL/L (ref 98–107)
CO2 BLDA-SCNC: 35.4 MMOL/L (ref 22–33)
CO2 SERPL-SCNC: 29.9 MMOL/L (ref 22–29)
COHGB MFR BLD: 1.8 % (ref 0–5)
CREAT BLD-MCNC: 0.68 MG/DL (ref 0.57–1)
D-LACTATE SERPL-SCNC: 0.9 MMOL/L (ref 0.5–2)
DEPRECATED RDW RBC AUTO: 50.9 FL (ref 37–54)
EOSINOPHIL # BLD AUTO: 0.02 10*3/MM3 (ref 0–0.4)
EOSINOPHIL NFR BLD AUTO: 0.2 % (ref 0.3–6.2)
ERYTHROCYTE [DISTWIDTH] IN BLOOD BY AUTOMATED COUNT: 14.1 % (ref 12.3–15.4)
FLUAV H1 2009 PAND RNA NPH QL NAA+PROBE: NOT DETECTED
FLUAV H1 HA GENE NPH QL NAA+PROBE: NOT DETECTED
FLUAV H3 RNA NPH QL NAA+PROBE: NOT DETECTED
FLUAV SUBTYP SPEC NAA+PROBE: NOT DETECTED
FLUBV RNA ISLT QL NAA+PROBE: NOT DETECTED
GAS FLOW AIRWAY: 2 LPM
GFR SERPL CREATININE-BSD FRML MDRD: 85 ML/MIN/1.73
GLOBULIN UR ELPH-MCNC: 2.5 GM/DL
GLUCOSE BLD-MCNC: 130 MG/DL (ref 65–99)
HADV DNA SPEC NAA+PROBE: NOT DETECTED
HCO3 BLDA-SCNC: 33.6 MMOL/L (ref 20–26)
HCOV 229E RNA SPEC QL NAA+PROBE: NOT DETECTED
HCOV HKU1 RNA SPEC QL NAA+PROBE: NOT DETECTED
HCOV NL63 RNA SPEC QL NAA+PROBE: NOT DETECTED
HCOV OC43 RNA SPEC QL NAA+PROBE: NOT DETECTED
HCT VFR BLD AUTO: 44.9 % (ref 34–46.6)
HCT VFR BLD CALC: 45 % (ref 38–51)
HGB BLD-MCNC: 14.3 G/DL (ref 12–15.9)
HGB BLDA-MCNC: 14.7 G/DL (ref 13.5–17.5)
HMPV RNA NPH QL NAA+NON-PROBE: NOT DETECTED
HOROWITZ INDEX BLD+IHG-RTO: 28 %
HPIV1 RNA SPEC QL NAA+PROBE: NOT DETECTED
HPIV2 RNA SPEC QL NAA+PROBE: NOT DETECTED
HPIV3 RNA NPH QL NAA+PROBE: NOT DETECTED
HPIV4 P GENE NPH QL NAA+PROBE: NOT DETECTED
IMM GRANULOCYTES # BLD AUTO: 0.02 10*3/MM3 (ref 0–0.05)
IMM GRANULOCYTES NFR BLD AUTO: 0.2 % (ref 0–0.5)
INR PPP: 1.32 (ref 0.9–1.1)
LYMPHOCYTES # BLD AUTO: 1.05 10*3/MM3 (ref 0.7–3.1)
LYMPHOCYTES NFR BLD AUTO: 11.7 % (ref 19.6–45.3)
Lab: ABNORMAL
M PNEUMO IGG SER IA-ACNC: NOT DETECTED
MCH RBC QN AUTO: 31.2 PG (ref 26.6–33)
MCHC RBC AUTO-ENTMCNC: 31.8 G/DL (ref 31.5–35.7)
MCV RBC AUTO: 97.8 FL (ref 79–97)
METHGB BLD QL: 0.3 % (ref 0–3)
MODALITY: ABNORMAL
MONOCYTES # BLD AUTO: 0.55 10*3/MM3 (ref 0.1–0.9)
MONOCYTES NFR BLD AUTO: 6.1 % (ref 5–12)
NEUTROPHILS # BLD AUTO: 7.3 10*3/MM3 (ref 1.7–7)
NEUTROPHILS NFR BLD AUTO: 81.5 % (ref 42.7–76)
NOTE: ABNORMAL
NOTIFIED BY: ABNORMAL
NOTIFIED WHO: ABNORMAL
NRBC BLD AUTO-RTO: 0 /100 WBC (ref 0–0.2)
NT-PROBNP SERPL-MCNC: 517.8 PG/ML (ref 5–900)
OXYHGB MFR BLDV: 93.7 % (ref 94–99)
PCO2 BLDA: 58.1 MM HG (ref 35–45)
PCO2 TEMP ADJ BLD: ABNORMAL MM[HG]
PH BLDA: 7.37 PH UNITS (ref 7.35–7.45)
PH, TEMP CORRECTED: ABNORMAL
PLATELET # BLD AUTO: 189 10*3/MM3 (ref 140–450)
PMV BLD AUTO: 10.7 FL (ref 6–12)
PO2 BLDA: 79.6 MM HG (ref 83–108)
PO2 TEMP ADJ BLD: ABNORMAL MM[HG]
POTASSIUM BLD-SCNC: 3.5 MMOL/L (ref 3.5–5.2)
PROT SERPL-MCNC: 6.5 G/DL (ref 6–8.5)
PROTHROMBIN TIME: 17.1 SECONDS (ref 11–15.4)
RBC # BLD AUTO: 4.59 10*6/MM3 (ref 3.77–5.28)
RHINOVIRUS RNA SPEC NAA+PROBE: DETECTED
RSV RNA NPH QL NAA+NON-PROBE: NOT DETECTED
SAO2 % BLDCOA: 95.7 % (ref 94–99)
SODIUM BLD-SCNC: 144 MMOL/L (ref 136–145)
TROPONIN T SERPL-MCNC: <0.01 NG/ML (ref 0–0.03)
VENTILATOR MODE: ABNORMAL
WBC NRBC COR # BLD: 8.97 10*3/MM3 (ref 3.4–10.8)

## 2020-03-27 PROCEDURE — 36600 WITHDRAWAL OF ARTERIAL BLOOD: CPT

## 2020-03-27 PROCEDURE — 93005 ELECTROCARDIOGRAM TRACING: CPT | Performed by: EMERGENCY MEDICINE

## 2020-03-27 PROCEDURE — 82375 ASSAY CARBOXYHB QUANT: CPT

## 2020-03-27 PROCEDURE — 80053 COMPREHEN METABOLIC PANEL: CPT | Performed by: EMERGENCY MEDICINE

## 2020-03-27 PROCEDURE — 85730 THROMBOPLASTIN TIME PARTIAL: CPT | Performed by: EMERGENCY MEDICINE

## 2020-03-27 PROCEDURE — 96374 THER/PROPH/DIAG INJ IV PUSH: CPT

## 2020-03-27 PROCEDURE — 25010000002 METHYLPREDNISOLONE PER 125 MG: Performed by: EMERGENCY MEDICINE

## 2020-03-27 PROCEDURE — 85025 COMPLETE CBC W/AUTO DIFF WBC: CPT | Performed by: EMERGENCY MEDICINE

## 2020-03-27 PROCEDURE — 94799 UNLISTED PULMONARY SVC/PX: CPT

## 2020-03-27 PROCEDURE — 83880 ASSAY OF NATRIURETIC PEPTIDE: CPT | Performed by: EMERGENCY MEDICINE

## 2020-03-27 PROCEDURE — 71250 CT THORAX DX C-: CPT

## 2020-03-27 PROCEDURE — 71045 X-RAY EXAM CHEST 1 VIEW: CPT

## 2020-03-27 PROCEDURE — 93010 ELECTROCARDIOGRAM REPORT: CPT | Performed by: INTERNAL MEDICINE

## 2020-03-27 PROCEDURE — 82805 BLOOD GASES W/O2 SATURATION: CPT

## 2020-03-27 PROCEDURE — 83605 ASSAY OF LACTIC ACID: CPT | Performed by: EMERGENCY MEDICINE

## 2020-03-27 PROCEDURE — 0099U HC BIOFIRE FILMARRAY RESP PANEL 1: CPT | Performed by: EMERGENCY MEDICINE

## 2020-03-27 PROCEDURE — 83050 HGB METHEMOGLOBIN QUAN: CPT

## 2020-03-27 PROCEDURE — 87040 BLOOD CULTURE FOR BACTERIA: CPT | Performed by: EMERGENCY MEDICINE

## 2020-03-27 PROCEDURE — 71250 CT THORAX DX C-: CPT | Performed by: RADIOLOGY

## 2020-03-27 PROCEDURE — 94640 AIRWAY INHALATION TREATMENT: CPT

## 2020-03-27 PROCEDURE — 84484 ASSAY OF TROPONIN QUANT: CPT | Performed by: EMERGENCY MEDICINE

## 2020-03-27 PROCEDURE — 99285 EMERGENCY DEPT VISIT HI MDM: CPT

## 2020-03-27 PROCEDURE — 85610 PROTHROMBIN TIME: CPT | Performed by: EMERGENCY MEDICINE

## 2020-03-27 RX ORDER — IPRATROPIUM BROMIDE AND ALBUTEROL SULFATE 2.5; .5 MG/3ML; MG/3ML
3 SOLUTION RESPIRATORY (INHALATION) ONCE
Status: COMPLETED | OUTPATIENT
Start: 2020-03-27 | End: 2020-03-27

## 2020-03-27 RX ORDER — METHYLPREDNISOLONE 4 MG/1
TABLET ORAL
Qty: 21 EACH | Refills: 0 | Status: SHIPPED | OUTPATIENT
Start: 2020-03-27 | End: 2022-03-08

## 2020-03-27 RX ORDER — SODIUM CHLORIDE 0.9 % (FLUSH) 0.9 %
10 SYRINGE (ML) INJECTION AS NEEDED
Status: DISCONTINUED | OUTPATIENT
Start: 2020-03-27 | End: 2020-03-27 | Stop reason: HOSPADM

## 2020-03-27 RX ORDER — METHYLPREDNISOLONE SODIUM SUCCINATE 125 MG/2ML
125 INJECTION, POWDER, LYOPHILIZED, FOR SOLUTION INTRAMUSCULAR; INTRAVENOUS ONCE
Status: COMPLETED | OUTPATIENT
Start: 2020-03-27 | End: 2020-03-27

## 2020-03-27 RX ORDER — DOXYCYCLINE 100 MG/1
100 CAPSULE ORAL 2 TIMES DAILY
Qty: 20 CAPSULE | Refills: 0 | Status: SHIPPED | OUTPATIENT
Start: 2020-03-27 | End: 2022-03-08

## 2020-03-27 RX ADMIN — IPRATROPIUM BROMIDE AND ALBUTEROL SULFATE 3 ML: .5; 3 SOLUTION RESPIRATORY (INHALATION) at 07:36

## 2020-03-27 RX ADMIN — METHYLPREDNISOLONE SODIUM SUCCINATE 125 MG: 125 INJECTION, POWDER, FOR SOLUTION INTRAMUSCULAR; INTRAVENOUS at 03:39

## 2020-03-27 RX ADMIN — IPRATROPIUM BROMIDE AND ALBUTEROL SULFATE 3 ML: .5; 3 SOLUTION RESPIRATORY (INHALATION) at 04:15

## 2020-04-01 LAB
BACTERIA SPEC AEROBE CULT: NORMAL
BACTERIA SPEC AEROBE CULT: NORMAL

## 2020-10-03 ENCOUNTER — NURSE TRIAGE (OUTPATIENT)
Dept: CALL CENTER | Facility: HOSPITAL | Age: 70
End: 2020-10-03

## 2020-10-03 NOTE — TELEPHONE ENCOUNTER
"Had a varicose vein to burst last night, wanting to stop Pradaxia, suggest to continue until speak with provider.  Has hematoma on leg, suggested to try ice to decrease the swelling    Reason for Disposition  • Caller has medication question only, adult not sick, and triager answers question  • Caller has medication question, adult has minor symptoms, caller declines triage, AND triager answers question    Additional Information  • Negative: Drug overdose and triager unable to answer question  • Negative: Caller requesting information unrelated to medicine  • Negative: Caller requesting a prescription for Strep throat and has a positive culture result  • Negative: Rash while taking a medication or within 3 days of stopping it  • Negative: Immunization reaction suspected  • Negative: [1] Asthma and [2] having symptoms of asthma (cough, wheezing, etc.)  • Negative: [1] Influenza symptoms AND [2] anti-viral med prescription request, such as Tamiflu  • Negative: [1] Symptom of illness (e.g., headache, abdominal pain, earache, vomiting) AND [2] more than mild  • Negative: MORE THAN A DOUBLE DOSE of a prescription or over-the-counter (OTC) drug  • Negative: [1] DOUBLE DOSE (an extra dose or lesser amount) of over-the-counter (OTC) drug AND [2] any symptoms (e.g., dizziness, nausea, pain, sleepiness)  • Negative: [1] DOUBLE DOSE (an extra dose or lesser amount) of prescription drug AND [2] any symptoms (e.g., dizziness, nausea, pain, sleepiness)  • Negative: Took another person's prescription drug  • Negative: [1] DOUBLE DOSE (an extra dose or lesser amount) of prescription drug AND [2] NO symptoms (Exception: a double dose of antibiotics)  • Negative: Diabetes drug error or overdose (e.g., took wrong type of insulin or took extra dose)  • Negative: [1] Request for URGENT new prescription or refill of \"essential\" medication (i.e., likelihood of harm to patient if not taken) AND [2] triager unable to fill per unit policy  • " "Negative: [1] Prescription not at pharmacy AND [2] was prescribed by PCP recently  • Negative: [1] Pharmacy calling with prescription questions AND [2] triager unable to answer question  • Negative: [1] Caller has URGENT medication question about med that PCP or specialist prescribed AND [2] triager unable to answer question  • Negative: [1] Caller has NON-URGENT medication question about med that PCP prescribed AND [2] triager unable to answer question  • Negative: [1] Caller requesting a NON-URGENT new prescription or refill AND [2] triager unable to refill per unit policy  • Negative: [1] Caller has medication question about med not prescribed by PCP AND [2] triager unable to answer question (e.g., compatibility with other med, storage)  • Negative: Caller requesting a CONTROLLED substance prescription refill (e.g., narcotics, ADHD medicines)  • Negative: Caller wants to use a complementary or alternative medicine  • Negative: [1] Prescription prescribed recently is not at pharmacy AND [2] triager has access to patient's EMR AND [3] prescription is recorded in the EMR  • Negative: [1] DOUBLE DOSE (an extra dose or lesser amount) of over-the-counter (OTC) drug AND [2] NO symptoms  • Negative: [1] DOUBLE DOSE (an extra dose or lesser amount) of antibiotic drug AND [2] NO symptoms    Answer Assessment - Initial Assessment Questions  1.   NAME of MEDICATION: \"What medicine are you calling about?\"      Blood thinner  2.   QUESTION: \"What is your question?\"      Wanting to know if she should take luis medication  3.   PRESCRIBING HCP: \"Who prescribed it?\" Reason: if prescribed by specialist, call should be referred to that group.      na  4. SYMPTOMS: \"Do you have any symptoms?\"      none  5. SEVERITY: If symptoms are present, ask \"Are they mild, moderate or severe?\"      none  6.  PREGNANCY:  \"Is there any chance that you are pregnant?\" \"When was your last menstrual period?\"      no    Protocols used: MEDICATION " QUESTION CALL-ADULT-AH

## 2020-11-25 ENCOUNTER — TELEPHONE (OUTPATIENT)
Dept: CARDIAC SURGERY | Facility: CLINIC | Age: 70
End: 2020-11-25

## 2020-12-22 ENCOUNTER — TELEPHONE (OUTPATIENT)
Dept: CARDIAC SURGERY | Facility: CLINIC | Age: 70
End: 2020-12-22

## 2021-01-29 ENCOUNTER — TRANSCRIBE ORDERS (OUTPATIENT)
Dept: ADMINISTRATIVE | Facility: HOSPITAL | Age: 71
End: 2021-01-29

## 2021-01-29 ENCOUNTER — HOSPITAL ENCOUNTER (OUTPATIENT)
Dept: GENERAL RADIOLOGY | Facility: HOSPITAL | Age: 71
Discharge: HOME | End: 2021-01-29

## 2021-01-29 ENCOUNTER — LAB (OUTPATIENT)
Dept: LAB | Facility: HOSPITAL | Age: 71
End: 2021-01-29

## 2021-01-29 DIAGNOSIS — R06.02 SHORTNESS OF BREATH: Primary | ICD-10-CM

## 2021-01-29 DIAGNOSIS — Z20.828 EXPOSURE TO SARS-ASSOCIATED CORONAVIRUS: Primary | ICD-10-CM

## 2021-01-29 DIAGNOSIS — Z20.828 EXPOSURE TO SARS-ASSOCIATED CORONAVIRUS: ICD-10-CM

## 2021-01-29 DIAGNOSIS — R06.02 SHORTNESS OF BREATH: ICD-10-CM

## 2021-01-29 LAB
FLUAV RNA RESP QL NAA+PROBE: NOT DETECTED
FLUBV RNA RESP QL NAA+PROBE: NOT DETECTED
SARS-COV-2 RNA RESP QL NAA+PROBE: NOT DETECTED

## 2021-01-29 PROCEDURE — 71046 X-RAY EXAM CHEST 2 VIEWS: CPT

## 2021-01-29 PROCEDURE — 71046 X-RAY EXAM CHEST 2 VIEWS: CPT | Performed by: RADIOLOGY

## 2021-01-29 PROCEDURE — C9803 HOPD COVID-19 SPEC COLLECT: HCPCS

## 2021-01-29 PROCEDURE — 87636 SARSCOV2 & INF A&B AMP PRB: CPT

## 2021-02-01 ENCOUNTER — TRANSCRIBE ORDERS (OUTPATIENT)
Dept: ADMINISTRATIVE | Facility: HOSPITAL | Age: 71
End: 2021-02-01

## 2021-02-01 DIAGNOSIS — I65.29 OCCLUSION OF CAROTID ARTERY, UNSPECIFIED LATERALITY: ICD-10-CM

## 2021-02-01 DIAGNOSIS — R55 SYNCOPE AND COLLAPSE: Primary | ICD-10-CM

## 2021-02-10 ENCOUNTER — HOSPITAL ENCOUNTER (OUTPATIENT)
Dept: CARDIOLOGY | Facility: HOSPITAL | Age: 71
Discharge: HOME | End: 2021-02-10
Admitting: INTERNAL MEDICINE

## 2021-02-10 DIAGNOSIS — R55 SYNCOPE AND COLLAPSE: ICD-10-CM

## 2021-02-10 PROCEDURE — 93880 EXTRACRANIAL BILAT STUDY: CPT | Performed by: RADIOLOGY

## 2021-02-10 PROCEDURE — 93880 EXTRACRANIAL BILAT STUDY: CPT

## 2021-02-26 ENCOUNTER — APPOINTMENT (OUTPATIENT)
Dept: CT IMAGING | Facility: HOSPITAL | Age: 71
End: 2021-02-26

## 2021-02-26 ENCOUNTER — HOSPITAL ENCOUNTER (EMERGENCY)
Facility: HOSPITAL | Age: 71
Discharge: ANOTHER HOSPITAL | End: 2021-02-27
Attending: EMERGENCY MEDICINE | Admitting: EMERGENCY MEDICINE

## 2021-02-26 ENCOUNTER — APPOINTMENT (OUTPATIENT)
Dept: GENERAL RADIOLOGY | Facility: HOSPITAL | Age: 71
End: 2021-02-26

## 2021-02-26 VITALS
DIASTOLIC BLOOD PRESSURE: 77 MMHG | BODY MASS INDEX: 22.5 KG/M2 | RESPIRATION RATE: 18 BRPM | HEIGHT: 66 IN | WEIGHT: 140 LBS | HEART RATE: 56 BPM | OXYGEN SATURATION: 100 % | TEMPERATURE: 97.8 F | SYSTOLIC BLOOD PRESSURE: 161 MMHG

## 2021-02-26 DIAGNOSIS — S12.01XA CLOSED JEFFERSON FRACTURE, INITIAL ENCOUNTER (HCC): Primary | ICD-10-CM

## 2021-02-26 LAB
ALBUMIN SERPL-MCNC: 4.27 G/DL (ref 3.5–5.2)
ALBUMIN/GLOB SERPL: 1.6 G/DL
ALP SERPL-CCNC: 84 U/L (ref 39–117)
ALT SERPL W P-5'-P-CCNC: 13 U/L (ref 1–33)
ANION GAP SERPL CALCULATED.3IONS-SCNC: 10 MMOL/L (ref 5–15)
AST SERPL-CCNC: 20 U/L (ref 1–32)
BASOPHILS # BLD AUTO: 0.03 10*3/MM3 (ref 0–0.2)
BASOPHILS NFR BLD AUTO: 0.3 % (ref 0–1.5)
BILIRUB SERPL-MCNC: 0.3 MG/DL (ref 0–1.2)
BUN SERPL-MCNC: 18 MG/DL (ref 8–23)
BUN/CREAT SERPL: 22.2 (ref 7–25)
CALCIUM SPEC-SCNC: 9.5 MG/DL (ref 8.6–10.5)
CHLORIDE SERPL-SCNC: 102 MMOL/L (ref 98–107)
CK SERPL-CCNC: 119 U/L (ref 20–180)
CO2 SERPL-SCNC: 28 MMOL/L (ref 22–29)
CREAT SERPL-MCNC: 0.81 MG/DL (ref 0.57–1)
DEPRECATED RDW RBC AUTO: 49.5 FL (ref 37–54)
EOSINOPHIL # BLD AUTO: 0.04 10*3/MM3 (ref 0–0.4)
EOSINOPHIL NFR BLD AUTO: 0.5 % (ref 0.3–6.2)
ERYTHROCYTE [DISTWIDTH] IN BLOOD BY AUTOMATED COUNT: 13.9 % (ref 12.3–15.4)
GFR SERPL CREATININE-BSD FRML MDRD: 70 ML/MIN/1.73
GLOBULIN UR ELPH-MCNC: 2.6 GM/DL
GLUCOSE SERPL-MCNC: 103 MG/DL (ref 65–99)
HCT VFR BLD AUTO: 48.1 % (ref 34–46.6)
HGB BLD-MCNC: 15.3 G/DL (ref 12–15.9)
IMM GRANULOCYTES # BLD AUTO: 0.03 10*3/MM3 (ref 0–0.05)
IMM GRANULOCYTES NFR BLD AUTO: 0.3 % (ref 0–0.5)
INR PPP: 1.16 (ref 0.9–1.1)
LYMPHOCYTES # BLD AUTO: 1.24 10*3/MM3 (ref 0.7–3.1)
LYMPHOCYTES NFR BLD AUTO: 14.1 % (ref 19.6–45.3)
MCH RBC QN AUTO: 30.5 PG (ref 26.6–33)
MCHC RBC AUTO-ENTMCNC: 31.8 G/DL (ref 31.5–35.7)
MCV RBC AUTO: 96 FL (ref 79–97)
MONOCYTES # BLD AUTO: 0.55 10*3/MM3 (ref 0.1–0.9)
MONOCYTES NFR BLD AUTO: 6.3 % (ref 5–12)
MYOGLOBIN SERPL-MCNC: 239.9 NG/ML (ref 25–58)
NEUTROPHILS NFR BLD AUTO: 6.89 10*3/MM3 (ref 1.7–7)
NEUTROPHILS NFR BLD AUTO: 78.5 % (ref 42.7–76)
NRBC BLD AUTO-RTO: 0 /100 WBC (ref 0–0.2)
PLATELET # BLD AUTO: 162 10*3/MM3 (ref 140–450)
PMV BLD AUTO: 11.5 FL (ref 6–12)
POTASSIUM SERPL-SCNC: 4.2 MMOL/L (ref 3.5–5.2)
PROT SERPL-MCNC: 6.9 G/DL (ref 6–8.5)
PROTHROMBIN TIME: 14.7 SECONDS (ref 11.9–14.1)
RBC # BLD AUTO: 5.01 10*6/MM3 (ref 3.77–5.28)
SODIUM SERPL-SCNC: 140 MMOL/L (ref 136–145)
WBC # BLD AUTO: 8.78 10*3/MM3 (ref 3.4–10.8)

## 2021-02-26 PROCEDURE — 82550 ASSAY OF CK (CPK): CPT | Performed by: EMERGENCY MEDICINE

## 2021-02-26 PROCEDURE — 85025 COMPLETE CBC W/AUTO DIFF WBC: CPT | Performed by: EMERGENCY MEDICINE

## 2021-02-26 PROCEDURE — 96375 TX/PRO/DX INJ NEW DRUG ADDON: CPT

## 2021-02-26 PROCEDURE — 85610 PROTHROMBIN TIME: CPT | Performed by: EMERGENCY MEDICINE

## 2021-02-26 PROCEDURE — 99283 EMERGENCY DEPT VISIT LOW MDM: CPT

## 2021-02-26 PROCEDURE — 80053 COMPREHEN METABOLIC PANEL: CPT | Performed by: EMERGENCY MEDICINE

## 2021-02-26 PROCEDURE — 25010000002 HYDROMORPHONE 1 MG/ML SOLUTION: Performed by: EMERGENCY MEDICINE

## 2021-02-26 PROCEDURE — 25010000002 ONDANSETRON PER 1 MG: Performed by: EMERGENCY MEDICINE

## 2021-02-26 PROCEDURE — 70450 CT HEAD/BRAIN W/O DYE: CPT

## 2021-02-26 PROCEDURE — 83874 ASSAY OF MYOGLOBIN: CPT | Performed by: EMERGENCY MEDICINE

## 2021-02-26 PROCEDURE — 73030 X-RAY EXAM OF SHOULDER: CPT

## 2021-02-26 PROCEDURE — 96374 THER/PROPH/DIAG INJ IV PUSH: CPT

## 2021-02-26 PROCEDURE — 99284 EMERGENCY DEPT VISIT MOD MDM: CPT

## 2021-02-26 PROCEDURE — 72125 CT NECK SPINE W/O DYE: CPT

## 2021-02-26 RX ORDER — SODIUM CHLORIDE 0.9 % (FLUSH) 0.9 %
10 SYRINGE (ML) INJECTION AS NEEDED
Status: DISCONTINUED | OUTPATIENT
Start: 2021-02-26 | End: 2021-02-27 | Stop reason: HOSPADM

## 2021-02-26 RX ORDER — ONDANSETRON 2 MG/ML
4 INJECTION INTRAMUSCULAR; INTRAVENOUS ONCE
Status: COMPLETED | OUTPATIENT
Start: 2021-02-26 | End: 2021-02-26

## 2021-02-26 RX ADMIN — SODIUM CHLORIDE 1000 ML: 9 INJECTION, SOLUTION INTRAVENOUS at 23:11

## 2021-02-26 RX ADMIN — ONDANSETRON 4 MG: 2 INJECTION INTRAMUSCULAR; INTRAVENOUS at 23:31

## 2021-02-26 RX ADMIN — HYDROMORPHONE HYDROCHLORIDE 1 MG: 1 INJECTION, SOLUTION INTRAMUSCULAR; INTRAVENOUS; SUBCUTANEOUS at 23:31

## 2021-03-12 ENCOUNTER — LAB REQUISITION (OUTPATIENT)
Dept: LAB | Facility: HOSPITAL | Age: 71
End: 2021-03-12

## 2021-03-12 DIAGNOSIS — I10 ESSENTIAL (PRIMARY) HYPERTENSION: ICD-10-CM

## 2021-03-12 LAB
ANION GAP SERPL CALCULATED.3IONS-SCNC: 8.7 MMOL/L (ref 5–15)
BUN SERPL-MCNC: 14 MG/DL (ref 8–23)
BUN/CREAT SERPL: 19.4 (ref 7–25)
CALCIUM SPEC-SCNC: 9.5 MG/DL (ref 8.6–10.5)
CHLORIDE SERPL-SCNC: 103 MMOL/L (ref 98–107)
CO2 SERPL-SCNC: 31.3 MMOL/L (ref 22–29)
CREAT SERPL-MCNC: 0.72 MG/DL (ref 0.57–1)
GFR SERPL CREATININE-BSD FRML MDRD: 80 ML/MIN/1.73
GLUCOSE SERPL-MCNC: 147 MG/DL (ref 65–99)
NT-PROBNP SERPL-MCNC: 258.9 PG/ML (ref 0–900)
POTASSIUM SERPL-SCNC: 4 MMOL/L (ref 3.5–5.2)
SODIUM SERPL-SCNC: 143 MMOL/L (ref 136–145)

## 2021-03-12 PROCEDURE — 83880 ASSAY OF NATRIURETIC PEPTIDE: CPT | Performed by: INTERNAL MEDICINE

## 2021-03-12 PROCEDURE — 80048 BASIC METABOLIC PNL TOTAL CA: CPT | Performed by: INTERNAL MEDICINE

## 2021-03-25 ENCOUNTER — APPOINTMENT (OUTPATIENT)
Dept: GENERAL RADIOLOGY | Facility: HOSPITAL | Age: 71
End: 2021-03-25

## 2021-03-25 ENCOUNTER — HOSPITAL ENCOUNTER (EMERGENCY)
Facility: HOSPITAL | Age: 71
Discharge: NURSING, REHAB OR LONG-TERM CARE | End: 2021-03-25
Attending: EMERGENCY MEDICINE | Admitting: EMERGENCY MEDICINE

## 2021-03-25 VITALS
RESPIRATION RATE: 16 BRPM | OXYGEN SATURATION: 94 % | TEMPERATURE: 97.2 F | HEART RATE: 62 BPM | WEIGHT: 150 LBS | DIASTOLIC BLOOD PRESSURE: 80 MMHG | BODY MASS INDEX: 24.11 KG/M2 | HEIGHT: 66 IN | SYSTOLIC BLOOD PRESSURE: 153 MMHG

## 2021-03-25 DIAGNOSIS — T50.901A MEDICATION ADMINISTERED IN ERROR, ACCIDENTAL OR UNINTENTIONAL, INITIAL ENCOUNTER: Primary | ICD-10-CM

## 2021-03-25 LAB
6-ACETYL MORPHINE: NEGATIVE
ALBUMIN SERPL-MCNC: 3.61 G/DL (ref 3.5–5.2)
ALBUMIN/GLOB SERPL: 1.5 G/DL
ALP SERPL-CCNC: 71 U/L (ref 39–117)
ALT SERPL W P-5'-P-CCNC: 11 U/L (ref 1–33)
AMPHET+METHAMPHET UR QL: NEGATIVE
ANION GAP SERPL CALCULATED.3IONS-SCNC: 7.2 MMOL/L (ref 5–15)
APAP SERPL-MCNC: <5 MCG/ML (ref 0–30)
AST SERPL-CCNC: 14 U/L (ref 1–32)
BACTERIA UR QL AUTO: ABNORMAL /HPF
BARBITURATES UR QL SCN: POSITIVE
BASOPHILS # BLD AUTO: 0.01 10*3/MM3 (ref 0–0.2)
BASOPHILS NFR BLD AUTO: 0.2 % (ref 0–1.5)
BENZODIAZ UR QL SCN: NEGATIVE
BILIRUB SERPL-MCNC: 0.3 MG/DL (ref 0–1.2)
BILIRUB UR QL STRIP: NEGATIVE
BUN SERPL-MCNC: 11 MG/DL (ref 8–23)
BUN/CREAT SERPL: 14.5 (ref 7–25)
BUPRENORPHINE SERPL-MCNC: NEGATIVE NG/ML
CALCIUM SPEC-SCNC: 9.1 MG/DL (ref 8.6–10.5)
CANNABINOIDS SERPL QL: NEGATIVE
CHLORIDE SERPL-SCNC: 106 MMOL/L (ref 98–107)
CK SERPL-CCNC: 34 U/L (ref 20–180)
CLARITY UR: CLEAR
CO2 SERPL-SCNC: 30.8 MMOL/L (ref 22–29)
COCAINE UR QL: NEGATIVE
COLOR UR: YELLOW
CREAT SERPL-MCNC: 0.76 MG/DL (ref 0.57–1)
CRP SERPL-MCNC: 1.39 MG/DL (ref 0–0.5)
DEPRECATED RDW RBC AUTO: 48.1 FL (ref 37–54)
EOSINOPHIL # BLD AUTO: 0.15 10*3/MM3 (ref 0–0.4)
EOSINOPHIL NFR BLD AUTO: 2.9 % (ref 0.3–6.2)
ERYTHROCYTE [DISTWIDTH] IN BLOOD BY AUTOMATED COUNT: 13.5 % (ref 12.3–15.4)
ERYTHROCYTE [SEDIMENTATION RATE] IN BLOOD: 9 MM/HR (ref 0–30)
ETHANOL BLD-MCNC: <10 MG/DL (ref 0–10)
ETHANOL UR QL: <0.01 %
GFR SERPL CREATININE-BSD FRML MDRD: 75 ML/MIN/1.73
GLOBULIN UR ELPH-MCNC: 2.4 GM/DL
GLUCOSE SERPL-MCNC: 103 MG/DL (ref 65–99)
GLUCOSE UR STRIP-MCNC: NEGATIVE MG/DL
HCT VFR BLD AUTO: 42.6 % (ref 34–46.6)
HGB BLD-MCNC: 13.4 G/DL (ref 12–15.9)
HGB UR QL STRIP.AUTO: ABNORMAL
HYALINE CASTS UR QL AUTO: ABNORMAL /LPF
IMM GRANULOCYTES # BLD AUTO: 0.01 10*3/MM3 (ref 0–0.05)
IMM GRANULOCYTES NFR BLD AUTO: 0.2 % (ref 0–0.5)
KETONES UR QL STRIP: NEGATIVE
LEUKOCYTE ESTERASE UR QL STRIP.AUTO: NEGATIVE
LYMPHOCYTES # BLD AUTO: 1.06 10*3/MM3 (ref 0.7–3.1)
LYMPHOCYTES NFR BLD AUTO: 20.2 % (ref 19.6–45.3)
MAGNESIUM SERPL-MCNC: 2.1 MG/DL (ref 1.6–2.4)
MCH RBC QN AUTO: 30.1 PG (ref 26.6–33)
MCHC RBC AUTO-ENTMCNC: 31.5 G/DL (ref 31.5–35.7)
MCV RBC AUTO: 95.7 FL (ref 79–97)
METHADONE UR QL SCN: NEGATIVE
MONOCYTES # BLD AUTO: 0.36 10*3/MM3 (ref 0.1–0.9)
MONOCYTES NFR BLD AUTO: 6.9 % (ref 5–12)
NEUTROPHILS NFR BLD AUTO: 3.66 10*3/MM3 (ref 1.7–7)
NEUTROPHILS NFR BLD AUTO: 69.6 % (ref 42.7–76)
NITRITE UR QL STRIP: POSITIVE
NRBC BLD AUTO-RTO: 0 /100 WBC (ref 0–0.2)
NT-PROBNP SERPL-MCNC: 223.2 PG/ML (ref 0–900)
OPIATES UR QL: NEGATIVE
OXYCODONE UR QL SCN: NEGATIVE
PCP UR QL SCN: NEGATIVE
PH UR STRIP.AUTO: 6 [PH] (ref 5–8)
PLATELET # BLD AUTO: 175 10*3/MM3 (ref 140–450)
PMV BLD AUTO: 10.4 FL (ref 6–12)
POTASSIUM SERPL-SCNC: 3.6 MMOL/L (ref 3.5–5.2)
PROT SERPL-MCNC: 6 G/DL (ref 6–8.5)
PROT UR QL STRIP: NEGATIVE
QT INTERVAL: 456 MS
QTC INTERVAL: 466 MS
RBC # BLD AUTO: 4.45 10*6/MM3 (ref 3.77–5.28)
RBC # UR: ABNORMAL /HPF
REF LAB TEST METHOD: ABNORMAL
SALICYLATES SERPL-MCNC: 0.6 MG/DL
SODIUM SERPL-SCNC: 144 MMOL/L (ref 136–145)
SP GR UR STRIP: 1.02 (ref 1–1.03)
SQUAMOUS #/AREA URNS HPF: ABNORMAL /HPF
T4 FREE SERPL-MCNC: 1.59 NG/DL (ref 0.93–1.7)
TROPONIN T SERPL-MCNC: <0.01 NG/ML (ref 0–0.03)
TSH SERPL DL<=0.05 MIU/L-ACNC: 1.46 UIU/ML (ref 0.27–4.2)
UROBILINOGEN UR QL STRIP: ABNORMAL
WBC # BLD AUTO: 5.25 10*3/MM3 (ref 3.4–10.8)
WBC UR QL AUTO: ABNORMAL /HPF

## 2021-03-25 PROCEDURE — 80179 DRUG ASSAY SALICYLATE: CPT | Performed by: EMERGENCY MEDICINE

## 2021-03-25 PROCEDURE — 80307 DRUG TEST PRSMV CHEM ANLYZR: CPT | Performed by: EMERGENCY MEDICINE

## 2021-03-25 PROCEDURE — 71045 X-RAY EXAM CHEST 1 VIEW: CPT

## 2021-03-25 PROCEDURE — 85652 RBC SED RATE AUTOMATED: CPT | Performed by: EMERGENCY MEDICINE

## 2021-03-25 PROCEDURE — 84443 ASSAY THYROID STIM HORMONE: CPT | Performed by: EMERGENCY MEDICINE

## 2021-03-25 PROCEDURE — 93010 ELECTROCARDIOGRAM REPORT: CPT | Performed by: INTERNAL MEDICINE

## 2021-03-25 PROCEDURE — 84484 ASSAY OF TROPONIN QUANT: CPT | Performed by: EMERGENCY MEDICINE

## 2021-03-25 PROCEDURE — 84439 ASSAY OF FREE THYROXINE: CPT | Performed by: EMERGENCY MEDICINE

## 2021-03-25 PROCEDURE — 82550 ASSAY OF CK (CPK): CPT | Performed by: EMERGENCY MEDICINE

## 2021-03-25 PROCEDURE — 86140 C-REACTIVE PROTEIN: CPT | Performed by: EMERGENCY MEDICINE

## 2021-03-25 PROCEDURE — 83880 ASSAY OF NATRIURETIC PEPTIDE: CPT | Performed by: EMERGENCY MEDICINE

## 2021-03-25 PROCEDURE — 99283 EMERGENCY DEPT VISIT LOW MDM: CPT

## 2021-03-25 PROCEDURE — 82077 ASSAY SPEC XCP UR&BREATH IA: CPT | Performed by: EMERGENCY MEDICINE

## 2021-03-25 PROCEDURE — 80143 DRUG ASSAY ACETAMINOPHEN: CPT | Performed by: EMERGENCY MEDICINE

## 2021-03-25 PROCEDURE — 83735 ASSAY OF MAGNESIUM: CPT | Performed by: EMERGENCY MEDICINE

## 2021-03-25 PROCEDURE — 71045 X-RAY EXAM CHEST 1 VIEW: CPT | Performed by: RADIOLOGY

## 2021-03-25 PROCEDURE — 93005 ELECTROCARDIOGRAM TRACING: CPT | Performed by: EMERGENCY MEDICINE

## 2021-03-25 PROCEDURE — 85025 COMPLETE CBC W/AUTO DIFF WBC: CPT | Performed by: EMERGENCY MEDICINE

## 2021-03-25 PROCEDURE — 81001 URINALYSIS AUTO W/SCOPE: CPT | Performed by: EMERGENCY MEDICINE

## 2021-03-25 PROCEDURE — 80053 COMPREHEN METABOLIC PANEL: CPT | Performed by: EMERGENCY MEDICINE

## 2021-03-25 RX ORDER — SODIUM CHLORIDE 0.9 % (FLUSH) 0.9 %
10 SYRINGE (ML) INJECTION AS NEEDED
Status: DISCONTINUED | OUTPATIENT
Start: 2021-03-25 | End: 2021-03-25 | Stop reason: HOSPADM

## 2021-03-25 RX ORDER — SERTRALINE HYDROCHLORIDE 100 MG/1
100 TABLET, FILM COATED ORAL DAILY
COMMUNITY

## 2021-03-25 RX ORDER — BUSPIRONE HYDROCHLORIDE 10 MG/1
10 TABLET ORAL 3 TIMES DAILY
COMMUNITY
End: 2022-03-08

## 2021-03-25 RX ORDER — ACETAMINOPHEN 500 MG
500 TABLET ORAL EVERY 4 HOURS PRN
COMMUNITY

## 2021-03-25 RX ORDER — FUROSEMIDE 20 MG/1
20 TABLET ORAL 2 TIMES DAILY
COMMUNITY
End: 2022-03-08

## 2021-03-25 RX ORDER — ATORVASTATIN CALCIUM 20 MG/1
20 TABLET, FILM COATED ORAL DAILY
COMMUNITY
End: 2022-03-08

## 2021-03-25 RX ADMIN — SODIUM CHLORIDE 500 ML: 9 INJECTION, SOLUTION INTRAVENOUS at 09:42

## 2021-04-07 ENCOUNTER — OFFICE VISIT (OUTPATIENT)
Dept: CARDIAC SURGERY | Facility: CLINIC | Age: 71
End: 2021-04-07

## 2021-04-07 VITALS
HEIGHT: 66 IN | SYSTOLIC BLOOD PRESSURE: 150 MMHG | HEART RATE: 57 BPM | OXYGEN SATURATION: 97 % | TEMPERATURE: 97.3 F | DIASTOLIC BLOOD PRESSURE: 90 MMHG | BODY MASS INDEX: 21.86 KG/M2 | WEIGHT: 136 LBS

## 2021-04-07 DIAGNOSIS — I65.23 CAROTID STENOSIS, ASYMPTOMATIC, BILATERAL: Primary | ICD-10-CM

## 2021-04-07 PROCEDURE — 99213 OFFICE O/P EST LOW 20 MIN: CPT | Performed by: THORACIC SURGERY (CARDIOTHORACIC VASCULAR SURGERY)

## 2021-04-07 RX ORDER — BUSPIRONE HYDROCHLORIDE 15 MG/1
15 TABLET ORAL 3 TIMES DAILY
COMMUNITY
Start: 2021-02-01 | End: 2023-02-24 | Stop reason: HOSPADM

## 2021-04-07 NOTE — PROGRESS NOTES
04/07/2021  Patient Information  Brenda Munroe                                                                                          [address] [place] [address]   1950  'PCP/Referring Physician'  Bernadette Arevalo MD  [phone]  No ref. provider found    Chief Complaint   Patient presents with   • Follow-up     Former Patient Ref back by Dr. Bernadette Roth for Left Carotid Stenosis       History of Present Illness: 70-year-old  female with a history of hypertension, hyperlipidemia, coronary artery disease status post CABG (1999), dementia, COPD and active tobacco abuse who presents after a recent fall with known carotid artery stenosis.  The patient had 2 falls back on 2/27/2021 and 3/25/2021.  Her second fall, the patient tripped over her cat, lost her balance and fell backwards landing her head and neck in a door frame.  The patient had a short loss of consciousness and was found to have a hangman's fracture that prompted transfer to [facility].  She remains in a brace for this fracture and has not followed up with the trauma service.  The patient has occasional dizziness when standing quickly.  She denies any additional episodes of loss of consciousness other than her fall.  She does have occasional right blurry vision is associated with a cat scratch to her right eye.  She denies any amaurosis fugax, speaking difficulties or focal weakness.      Patient Active Problem List   Diagnosis   • Psychosis (CMS/HCC)   • Severe recurrent major depression with psychotic features (CMS/HCC)   • COPD (chronic obstructive pulmonary disease) (CMS/HCC)   • Coronary artery disease   • Disease of thyroid gland   • Arthritis   • Hypertension   • Paroxysmal atrial fibrillation (CMS/HCC)   • Chronic headaches   • Vision changes   • Carotid stenosis, asymptomatic, bilateral     Past Medical History:   Diagnosis Date   • Anxiety    • Arthritis    • Bladder prolapse, female, acquired    •  Carotid stenosis    • COPD (chronic obstructive pulmonary disease) (CMS/[facility])    • Coronary artery disease    • Dementia (CMS/[facility])    • Depression    • Disease of thyroid gland    • Frequency of urination    • GERD (gastroesophageal reflux disease)    • Heart attack (CMS/[facility])    • Hyperlipidemia    • Hypertension    • Irregular heart beat    • Peptic ulcer disease      Past Surgical History:   Procedure Laterality Date   • CARDIAC SURGERY      open heart  in 1999   • CYSTOSCOPY N/A 11/8/2017    Procedure: CYSTOSCOPY;  Surgeon: Karl Nicolas DO;  Location:  [facility];  Service:    • OTHER SURGICAL HISTORY      states she had fluid drained no surgery   • VAGINAL HYSTERECTOMY W/ ANTERIOR AND POSTERIOR VAGINAL REPAIR N/A 11/8/2017    Procedure: VAGINAL HYSTERECTOMY WITH ANTERIOR, POSTERIOR, AND ENTEROCELE VAGINAL REPAIR;  Surgeon: Karl Nicolas DO;  Location:  [facility];  Service:    • VAGINAL VAULT SUSPENSION N/A 11/8/2017    Procedure: VAGINAL VAULT SUSPENSION ;  Surgeon: Karl Nicolas DO;  Location:  [facility];  Service:        Current Outpatient Medications:   •  acetaminophen (TYLENOL) 500 MG tablet, Take 500 mg by mouth Every 6 (Six) Hours As Needed for Mild Pain ., Disp: , Rfl:   •  amLODIPine (NORVASC) 5 MG tablet, Daily., Disp: , Rfl: 4  •  apixaban (ELIQUIS) 5 MG tablet tablet, Take 5 mg by mouth 2 (Two) Times a Day., Disp: , Rfl:   •  atorvastatin (LIPITOR) 20 MG tablet, Take 20 mg by mouth Daily., Disp: , Rfl:   •  busPIRone (BUSPAR) 15 MG tablet, 2 (two) times a day., Disp: , Rfl:   •  Cholecalciferol (VITAMIN D3) 27611 units capsule, Take  by mouth Every 7 (Seven) Days., Disp: , Rfl:   •  donepezil (ARICEPT) 10 MG tablet, Daily., Disp: , Rfl: 2  •  levothyroxine (SYNTHROID, LEVOTHROID) 50 MCG tablet, Take 50 mcg by mouth Daily., Disp: , Rfl:   •  lisinopril (PRINIVIL,ZESTRIL) 40 MG tablet, Take 40 mg by mouth Daily., Disp: , Rfl:   •  SENNA PO, Take  by mouth 2 (two) times a day.,  Disp: , Rfl:   •  sertraline (ZOLOFT) 100 MG tablet, Take 100 mg by mouth Daily., Disp: , Rfl:   •  allopurinol (ZYLOPRIM) 300 MG tablet, Daily., Disp: , Rfl: 2  •  aspirin 81 MG EC tablet, Take 81 mg by mouth Daily., Disp: , Rfl:   •  baclofen (LIORESAL) 20 MG tablet, Take 20 mg by mouth Every Night., Disp: , Rfl:   •  busPIRone (BUSPAR) 10 MG tablet, Take 7.5 mg by mouth 3 (Three) Times a Day., Disp: , Rfl:   •  Cyanocobalamin (B-12) 1000 MCG/ML kit, Inject 1 mL as directed Every 30 (Thirty) Days. Prior to [facility] Admission, Patient was on:  Pt takes at beginning of every month., Disp: , Rfl:   •  dabigatran etexilate (PRADAXA) 150 MG capsu, Take 150 mg by mouth 2 (Two) Times a Day., Disp: , Rfl:   •  docusate calcium (SURFAK) 240 MG capsule, Take 240 mg by mouth Every Night., Disp: , Rfl:   •  donepezil (ARICEPT) 5 MG tablet, Take 5 mg by mouth Daily., Disp: , Rfl:   •  doxycycline (MONODOX) 100 MG capsule, Take 1 capsule by mouth 2 (Two) Times a Day., Disp: 20 capsule, Rfl: 0  •  ferrous sulfate 325 (65 FE) MG tablet, Daily., Disp: , Rfl: 2  •  furosemide (LASIX) 20 MG tablet, Take 20 mg by mouth 2 (Two) Times a Day., Disp: , Rfl:   •  isosorbide mononitrate (IMDUR) 60 MG 24 hr tablet, Take 60 mg by mouth Daily., Disp: , Rfl:   •  magnesium oxide (MAGOX) 400 (241.3 Mg) MG tablet tablet, Take 400 mg by mouth Daily., Disp: , Rfl:   •  methylPREDNISolone (MEDROL, SORIN,) 4 MG tablet, Take as directed on package instructions., Disp: 21 each, Rfl: 0  •  metoprolol succinate XL (TOPROL-XL) 25 MG 24 hr tablet, Take 25 mg by mouth Daily., Disp: , Rfl:   •  mupirocin (BACTROBAN) 2 % ointment, Apply  topically to the appropriate area as directed Every 12 (Twelve) Hours., Disp: , Rfl:   •  pantoprazole (PROTONIX) 20 MG EC tablet, Take 20 mg by mouth 2 (Two) Times a Day., Disp: , Rfl:   •  rosuvastatin (CRESTOR) 10 MG tablet, Take 10 mg by mouth Daily., Disp: , Rfl:   •  sertraline (ZOLOFT) 25 MG tablet, Take 1 tablet by  mouth Daily., Disp: 30 tablet, Rfl: 0  •  simvastatin (ZOCOR) 40 MG tablet, Take 40 mg by mouth Daily., Disp: , Rfl:   Allergies   Allergen Reactions   • Latex Other (See Comments)     ?     Social History     Socioeconomic History   • Marital status:      Spouse name: Not on file   • Number of children: 3   • Years of education: Not on file   • Highest education level: Not on file   Tobacco Use   • Smoking status: Current Every Day Smoker     Packs/day: 0.50     Years: 55.00     Pack years: 27.50     Types: Cigarettes   • Smokeless tobacco: Never Used   Substance and Sexual Activity   • Alcohol use: No   • Drug use: No   • Sexual activity: Never     Comment: denies     Family History   Problem Relation Age of Onset   • Dementia Sister    • Heart attack Mother    • Tuberculosis Father    • Breast cancer Neg Hx      Review of Systems   Constitutional: Positive for chills, fever, malaise/fatigue and weight loss. Negative for night sweats.   HENT: Negative for hearing loss, odynophagia and sore throat.    Cardiovascular: Positive for chest pain, dyspnea on exertion, leg swelling and palpitations. Negative for orthopnea.   Respiratory: Positive for cough, shortness of breath and wheezing. Negative for hemoptysis.    Endocrine: Negative for cold intolerance, heat intolerance, polydipsia, polyphagia and polyuria.   Hematologic/Lymphatic: Bruises/bleeds easily.   Skin: Negative for itching and rash.   Musculoskeletal: Positive for arthritis, back pain and joint pain. Negative for joint swelling and myalgias.   Gastrointestinal: Positive for abdominal pain. Negative for constipation, diarrhea, hematemesis, hematochezia, melena, nausea and vomiting.   Genitourinary: Negative for dysuria, frequency and hematuria.   Neurological: Negative for focal weakness, headaches, numbness and seizures.   Psychiatric/Behavioral: Positive for depression. Negative for suicidal ideas. The patient is nervous/anxious.    All other  "systems reviewed and are negative.    Vitals:    04/07/21 1239   BP: 150/90   BP Location: Left arm   Patient Position: Sitting   Pulse: 57   Temp: 97.3 °F (36.3 °C)   SpO2: 97%   Weight: 61.7 kg (136 lb)   Height: 167.6 cm (66\")      Physical Exam  Vitals reviewed.   Constitutional:       General: She is not in acute distress.     Appearance: She is well-developed. She is not diaphoretic.      Comments:  female who appears stated age and is present with a care provider from the nursing home.  She is present in a wheelchair and has a neck brace in place.   HENT:      Head: Normocephalic and atraumatic.   Eyes:      General: No scleral icterus.     Conjunctiva/sclera: Conjunctivae normal.   Neck:      Vascular: No JVD.      Trachea: No tracheal deviation.      Comments: Carotids not evaluated due to neck brace in place.  Cardiovascular:      Rate and Rhythm: Normal rate and regular rhythm.      Heart sounds: Normal heart sounds. No murmur heard.   No friction rub. No gallop.    Pulmonary:      Effort: Pulmonary effort is normal. No respiratory distress.      Breath sounds: Normal breath sounds. No wheezing or rales.   Abdominal:      General: There is no distension.      Palpations: Abdomen is soft. There is no mass.      Tenderness: There is no abdominal tenderness. There is no guarding or rebound.   Musculoskeletal:         General: Normal range of motion.      Cervical back: Neck supple.   Skin:     General: Skin is warm and dry.      Findings: No erythema or rash.   Neurological:      Mental Status: She is alert and oriented to person, place, and time.   Psychiatric:         Behavior: Behavior normal.         Thought Content: Thought content normal.         Judgment: Judgment normal.         The ROS, past medical history, surgical history, family history, social history and vitals were reviewed by myself and corrected as needed.      Labs/Imaging:  -Carotid duplex performed 2/10/2021, personally " reviewed, demonstrates 70 to 99% left internal carotid artery stenosis and 50 to 69% right internal carotid artery stenosis.  The peak systolic velocity of the left internal carotid artery is 314 cm/s ICA to CCA ratio of 5.3 and 201 cm/s on the right with ICA to CCA ratio 2.4.    Assessment/Plan:  70-year-old  female with a history of hypertension, hyperlipidemia, coronary artery disease status post CABG (1999), dementia, COPD and active tobacco abuse who presents after a recent fall with known carotid artery stenosis.  A CTA of the carotids will need to be obtained to better evaluate her anatomy and also evaluate her vertebral artery after her cervical fracture.  This study may have been performed at [facility] when she was transferred for her trauma, but these medical records are not available.  We will contact  to see if this study has already been performed.  She will need to follow-up with the trauma clinic at [facility] to clear her from the brace prior to any consideration for carotid artery surgery.  I will have the patient return to clinic in 1 month to discuss the results of her CT scan.      Patient Active Problem List   Diagnosis   • Psychosis (CMS/HCC)   • Severe recurrent major depression with psychotic features (CMS/HCC)   • COPD (chronic obstructive pulmonary disease) (CMS/HCC)   • Coronary artery disease   • Disease of thyroid gland   • Arthritis   • Hypertension   • Paroxysmal atrial fibrillation (CMS/HCC)   • Chronic headaches   • Vision changes   • Carotid stenosis, asymptomatic, bilateral

## 2021-05-13 ENCOUNTER — HOSPITAL ENCOUNTER (OUTPATIENT)
Dept: CT IMAGING | Facility: HOSPITAL | Age: 71
Discharge: HOME | End: 2021-05-13
Admitting: THORACIC SURGERY (CARDIOTHORACIC VASCULAR SURGERY)

## 2021-05-13 PROCEDURE — 70498 CT ANGIOGRAPHY NECK: CPT

## 2021-05-13 PROCEDURE — 70498 CT ANGIOGRAPHY NECK: CPT | Performed by: RADIOLOGY

## 2021-05-13 PROCEDURE — 0 IOPAMIDOL PER 1 ML: Performed by: THORACIC SURGERY (CARDIOTHORACIC VASCULAR SURGERY)

## 2021-05-13 PROCEDURE — 82565 ASSAY OF CREATININE: CPT

## 2021-05-13 RX ADMIN — IOPAMIDOL 100 ML: 755 INJECTION, SOLUTION INTRAVENOUS at 10:38

## 2021-05-25 LAB — CREAT BLDA-MCNC: 0.9 MG/DL (ref 0.6–1.3)

## 2021-07-15 ENCOUNTER — TELEPHONE (OUTPATIENT)
Dept: CARDIAC SURGERY | Facility: CLINIC | Age: 71
End: 2021-07-15

## 2021-08-11 ENCOUNTER — TRANSCRIBE ORDERS (OUTPATIENT)
Dept: ADMINISTRATIVE | Facility: HOSPITAL | Age: 71
End: 2021-08-11

## 2021-08-11 DIAGNOSIS — R13.10 PROBLEMS WITH SWALLOWING AND MASTICATION: Primary | ICD-10-CM

## 2021-08-12 ENCOUNTER — TRANSCRIBE ORDERS (OUTPATIENT)
Dept: ADMINISTRATIVE | Facility: HOSPITAL | Age: 71
End: 2021-08-12

## 2021-08-12 DIAGNOSIS — R13.10 PROBLEMS WITH SWALLOWING AND MASTICATION: Primary | ICD-10-CM

## 2021-08-18 ENCOUNTER — APPOINTMENT (OUTPATIENT)
Dept: CT IMAGING | Facility: HOSPITAL | Age: 71
End: 2021-08-18

## 2021-08-20 ENCOUNTER — APPOINTMENT (OUTPATIENT)
Dept: CT IMAGING | Facility: HOSPITAL | Age: 71
End: 2021-08-20

## 2021-08-20 ENCOUNTER — HOSPITAL ENCOUNTER (OUTPATIENT)
Dept: GENERAL RADIOLOGY | Facility: HOSPITAL | Age: 71
End: 2021-08-20

## 2021-09-02 ENCOUNTER — HOSPITAL ENCOUNTER (OUTPATIENT)
Dept: GENERAL RADIOLOGY | Facility: HOSPITAL | Age: 71
Discharge: HOME | End: 2021-09-02

## 2021-09-02 ENCOUNTER — TRANSCRIBE ORDERS (OUTPATIENT)
Dept: OTHER | Facility: OTHER | Age: 71
End: 2021-09-02

## 2021-09-02 ENCOUNTER — HOSPITAL ENCOUNTER (OUTPATIENT)
Dept: CT IMAGING | Facility: HOSPITAL | Age: 71
Discharge: HOME | End: 2021-09-02

## 2021-09-02 DIAGNOSIS — R13.10 PROBLEMS WITH SWALLOWING AND MASTICATION: ICD-10-CM

## 2021-09-02 DIAGNOSIS — R13.10 DYSPHAGIA, UNSPECIFIED TYPE: Primary | ICD-10-CM

## 2021-09-02 DIAGNOSIS — R13.10 DYSPHAGIA, UNSPECIFIED TYPE: ICD-10-CM

## 2021-09-02 PROCEDURE — 70490 CT SOFT TISSUE NECK W/O DYE: CPT

## 2021-09-02 PROCEDURE — 70490 CT SOFT TISSUE NECK W/O DYE: CPT | Performed by: RADIOLOGY

## 2021-09-02 PROCEDURE — 74230 X-RAY XM SWLNG FUNCJ C+: CPT | Performed by: RADIOLOGY

## 2021-09-02 PROCEDURE — 74230 X-RAY XM SWLNG FUNCJ C+: CPT

## 2021-09-02 PROCEDURE — 92611 MOTION FLUOROSCOPY/SWALLOW: CPT

## 2021-09-02 NOTE — MBS/VFSS/FEES
Outpatient - Speech Language Pathology   Swallow Initial Evaluation [facility]   MODIFIED BARIUM SWALLOW STUDY     Patient Name: Brenda Munroe  : 1950  MRN: [id number]  Today's Date: 2021           Admit Date: 2021    Brenda Munroe  presents to the radiology suite this am from her current skilled nursing facility residence to participate in an instrumental MBS to assess safety/efficacy of swallowing fnx, determine safest/least restrictive diet.  She reports difficulty w/ swallowing solids primarily.  She states her neck is very sore and swollen in the area of the lymph nodes.  She reports no difficulty w/ po medications, but does state that she swallows them w/ ice cream and that the cold temperature seems to help.  She is accompanied by a staff member from her current skilled nursing facility.    No recent chest xray available for review    Pt is observed on room air w/o complications.     Risks and benefits of the procedure are explained w/ verbalizing understanding/agreement to participate.     Pt is positioned upright and centered in a chair to accept multiple po presentations of solid cracker, puree, honey thick, nectar thick, and thin liquids via spoon, cup and straw, along w/ whole placebo pill in puree. Pt is able to self feed and does so across this evaluation.     All views are from the lateral plane.     Facial/oral structures are symmetrical upon observation w/o lingual deviation upon protrusion. Oral mucosa are moist, pink and clean. Secretions are clear, thin and well controlled. OROM/ROSE MARIE is wfl to imitate oral postures. Gag is not assessed. Volitional cough is adequate in intensity, clear in quality, nonproductive. Vocal quality is adequate in intensity, clear in quality w/ intelligible speech. Pt is a/a and cooperative to particpate.  Pt is oriented to person, place, and time, follows simple directives, and participates in simple conversational exchanges.     Upon po presentations,  adequate bolus anticipation w/ good labial seal for bolus clearance via spoon bowl, cup rim stability and suction via straw.  Bolus formation, manipulation, and control are wfl w/ rotary mastication pattern. A-p transit is timely w/o oral residue. Tongue base retraction and linguavelar seal are slightly weak w/ premature spillage to the valleculae. No laryngeal penetration or aspiration is evidenced before the swallow.     Pharyngeal swallow is timely w/ adequate hyolaryngeal elevation and epiglottic inversion. Pharyngeal contraction is generally weak w/o significant residue. No laryngeal penetration or aspiration evidenced during or after the swallow.     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  Bolus is not impeded.  Motility is wfl w/o retrograde flow noted.                  Visit Dx:     ICD-10-CM ICD-9-CM   1. Dysphagia, unspecified type  R13.10 787.20     Patient Active Problem List   Diagnosis   • Psychosis (CMS/HCC)   • Severe recurrent major depression with psychotic features (CMS/HCC)   • COPD (chronic obstructive pulmonary disease) (CMS/HCC)   • Coronary artery disease   • Disease of thyroid gland   • Arthritis   • Hypertension   • Paroxysmal atrial fibrillation (CMS/HCC)   • Chronic headaches   • Vision changes   • Carotid stenosis, asymptomatic, bilateral     Past Medical History:   Diagnosis Date   • Anxiety    • Arthritis    • Bladder prolapse, female, acquired    • Carotid stenosis    • COPD (chronic obstructive pulmonary disease) (CMS/HCC)    • Coronary artery disease    • Dementia (CMS/HCC)    • Depression    • Disease of thyroid gland    • Frequency of urination    • GERD (gastroesophageal reflux disease)    • Heart attack (CMS/HCC)    • Hyperlipidemia    • Hypertension    • Irregular heart beat    • Peptic ulcer disease      Past Surgical History:   Procedure Laterality Date   • CARDIAC SURGERY      open heart  in 1999    • CYSTOSCOPY N/A 11/8/2017    Procedure: CYSTOSCOPY;  Surgeon: Karl Nicolas DO;  Location:  [facility];  Service:    • OTHER SURGICAL HISTORY      states she had fluid drained no surgery   • VAGINAL HYSTERECTOMY W/ ANTERIOR AND POSTERIOR VAGINAL REPAIR N/A 11/8/2017    Procedure: VAGINAL HYSTERECTOMY WITH ANTERIOR, POSTERIOR, AND ENTEROCELE VAGINAL REPAIR;  Surgeon: Karl Nicolas DO;  Location:  [facility];  Service:    • VAGINAL VAULT SUSPENSION N/A 11/8/2017    Procedure: VAGINAL VAULT SUSPENSION ;  Surgeon: Karl Nicolas DO;  Location:  [facility];  Service:        IMPRESSION:  Pt presents w/wfl oropharyngeal swallow w/o laryngeal penetration or aspiration across this evaluation. She is noted w/ appearance concerning for mild cricopharyngeal bar at approximately the level of C4, but bolus is not impeded or delayed.        SLP Recommendation and Plan       Recommendations:   1. Regular consistency diet, thin liquids.   2. Meds whole in puree/thins.   3. Diony precautions.   4. Oral care protocol.     No further SLP f/u warranted at this time.     D/w pt results and recommendations w/ verbal understanding and agreement.     Thank you for allowing me to participate in the care of your patient-  Juliana Bang M.S., CCC-SLP                 EDUCATION  The patient has been educated in the following areas:   Dysphagia (Swallowing Impairment).              Time Calculation:       Therapy Charges for Today     Code Description Service Date Service Provider Modifiers Qty    01609081747 HC ST MOTION FLUORO EVAL SWALLOW 8 9/2/2021 Juliana Bang MS, CFY-SLP GN 1               Juliana Bang MS, JAYME-SLP  9/2/2021

## 2021-09-03 ENCOUNTER — APPOINTMENT (OUTPATIENT)
Dept: GENERAL RADIOLOGY | Facility: HOSPITAL | Age: 71
End: 2021-09-03

## 2021-11-11 ENCOUNTER — OFFICE VISIT (OUTPATIENT)
Dept: GASTROENTEROLOGY | Facility: CLINIC | Age: 71
End: 2021-11-11

## 2021-11-11 VITALS
HEART RATE: 51 BPM | WEIGHT: 160 LBS | DIASTOLIC BLOOD PRESSURE: 69 MMHG | HEIGHT: 66 IN | SYSTOLIC BLOOD PRESSURE: 131 MMHG | BODY MASS INDEX: 25.71 KG/M2

## 2021-11-11 DIAGNOSIS — R13.19 ESOPHAGEAL DYSPHAGIA: Primary | ICD-10-CM

## 2021-11-11 PROCEDURE — 99204 OFFICE O/P NEW MOD 45 MIN: CPT | Performed by: INTERNAL MEDICINE
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
## 2021-11-16 ENCOUNTER — TRANSCRIBE ORDERS (OUTPATIENT)
Dept: LAB | Facility: HOSPITAL | Age: 71
End: 2021-11-16

## 2021-11-16 DIAGNOSIS — Z01.818 OTHER SPECIFIED PRE-OPERATIVE EXAMINATION: Primary | ICD-10-CM

## 2021-11-19 ENCOUNTER — HOSPITAL ENCOUNTER (OUTPATIENT)
Dept: GENERAL RADIOLOGY | Facility: HOSPITAL | Age: 71
Discharge: HOME | End: 2021-11-19
Admitting: INTERNAL MEDICINE

## 2021-11-19 DIAGNOSIS — R13.19 ESOPHAGEAL DYSPHAGIA: ICD-10-CM

## 2021-11-19 PROCEDURE — 74220 X-RAY XM ESOPHAGUS 1CNTRST: CPT

## 2021-11-19 PROCEDURE — 74220 X-RAY XM ESOPHAGUS 1CNTRST: CPT | Performed by: RADIOLOGY

## 2021-11-23 NOTE — PROGRESS NOTES
Your recent esophagram revealed use tertiary contractions of the esophagus resulting in esophageal dysmotility.  There is reflux and a small hiatal hernia.  There was question of a focal posterior extrinsic mass which on previous CT scan looks like a prominent cricopharyngeal bar.  We will discuss findings at your next office visit.

## 2021-12-10 ENCOUNTER — TRANSCRIBE ORDERS (OUTPATIENT)
Dept: OTHER | Facility: OTHER | Age: 71
End: 2021-12-10

## 2021-12-10 DIAGNOSIS — R93.3 ABNORMAL ESOPHAGRAM: Primary | ICD-10-CM

## 2021-12-21 ENCOUNTER — HOSPITAL ENCOUNTER (OUTPATIENT)
Dept: CT IMAGING | Facility: HOSPITAL | Age: 71
Discharge: HOME | End: 2021-12-21
Admitting: INTERNAL MEDICINE

## 2021-12-21 DIAGNOSIS — R93.3 ABNORMAL ESOPHAGRAM: ICD-10-CM

## 2021-12-21 PROCEDURE — 70490 CT SOFT TISSUE NECK W/O DYE: CPT

## 2021-12-21 PROCEDURE — 70490 CT SOFT TISSUE NECK W/O DYE: CPT | Performed by: RADIOLOGY

## 2022-01-06 ENCOUNTER — OFFICE VISIT (OUTPATIENT)
Dept: GASTROENTEROLOGY | Facility: CLINIC | Age: 72
End: 2022-01-06

## 2022-01-06 VITALS
BODY MASS INDEX: 25.39 KG/M2 | WEIGHT: 158 LBS | HEIGHT: 66 IN | DIASTOLIC BLOOD PRESSURE: 55 MMHG | HEART RATE: 53 BPM | SYSTOLIC BLOOD PRESSURE: 89 MMHG

## 2022-01-06 DIAGNOSIS — R13.19 ESOPHAGEAL DYSPHAGIA: Primary | ICD-10-CM

## 2022-01-06 PROCEDURE — 99213 OFFICE O/P EST LOW 20 MIN: CPT | Performed by: INTERNAL MEDICINE
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
## 2022-02-11 ENCOUNTER — LAB REQUISITION (OUTPATIENT)
Dept: LAB | Facility: HOSPITAL | Age: 72
End: 2022-02-11

## 2022-02-11 DIAGNOSIS — N39.0 URINARY TRACT INFECTION, SITE NOT SPECIFIED: ICD-10-CM

## 2022-02-11 LAB
BILIRUB UR QL STRIP: NEGATIVE
CLARITY UR: ABNORMAL
COLOR UR: ABNORMAL
GLUCOSE UR STRIP-MCNC: NEGATIVE MG/DL
HGB UR QL STRIP.AUTO: NEGATIVE
KETONES UR QL STRIP: ABNORMAL
LEUKOCYTE ESTERASE UR QL STRIP.AUTO: NEGATIVE
NITRITE UR QL STRIP: NEGATIVE
PH UR STRIP.AUTO: 5.5 [PH] (ref 5–8)
PROT UR QL STRIP: NEGATIVE
SP GR UR STRIP: 1.03 (ref 1–1.03)
UROBILINOGEN UR QL STRIP: ABNORMAL

## 2022-02-11 PROCEDURE — 81003 URINALYSIS AUTO W/O SCOPE: CPT | Performed by: INTERNAL MEDICINE

## 2022-03-03 ENCOUNTER — OFFICE VISIT (OUTPATIENT)
Dept: GASTROENTEROLOGY | Facility: CLINIC | Age: 72
End: 2022-03-03

## 2022-03-03 VITALS
HEART RATE: 45 BPM | OXYGEN SATURATION: 92 % | HEIGHT: 66 IN | SYSTOLIC BLOOD PRESSURE: 130 MMHG | WEIGHT: 158 LBS | BODY MASS INDEX: 25.39 KG/M2 | DIASTOLIC BLOOD PRESSURE: 70 MMHG

## 2022-03-03 DIAGNOSIS — R13.19 ESOPHAGEAL DYSPHAGIA: Primary | ICD-10-CM

## 2022-03-03 PROCEDURE — 99213 OFFICE O/P EST LOW 20 MIN: CPT | Performed by: INTERNAL MEDICINE

## 2022-03-03 NOTE — PROGRESS NOTES
Subjective   Brenda Munroe is a 71 y.o. female who presents to the office today as a follow up appointment regarding Abdominal Pain and Nausea      History of Present Illness:  The patient presents for follow up dysphagia.  She suffers from dementia and has issues communicating her symptoms.  She talks about extraneous subjects not related to her difficulty swallowing.  It is difficult for her to focus on describing what happens when she swallows.  The patient cannot recall what she ate for dinner last night or what she ate for breakfast this morning.  She does not seem to have lost any weight.  She does not complain of difficulty swallowing.  She does talk about eating snacks between meals but I am not sure if she is actually doing this or not due to her dementia.  A recent upper GI series did reveal diffuse tertiary contractions of the esophagus and reflux.  The patient did have laryngeal penetration of fluid but no aspiration.  She had a modified barium swallow which showed that she had a mild cricopharyngeal bar that did not impede the bolus of food or liquid.  It was recommended that she maintain a regular diet with thin liquids.      Review of Systems:  Review of Systems   Constitutional: Negative.    HENT: Positive for sore throat and trouble swallowing.    Eyes: Negative.    Respiratory: Positive for chest tightness.    Cardiovascular: Positive for chest pain.   Gastrointestinal: Positive for anal bleeding and blood in stool. Negative for abdominal distention, abdominal pain, constipation, diarrhea, nausea, rectal pain and vomiting.   Endocrine: Negative.    Genitourinary: Negative for difficulty urinating.   Musculoskeletal: Positive for back pain and neck pain.   Allergic/Immunologic: Negative for environmental allergies and food allergies.   Neurological: Positive for headaches. Negative for dizziness.   Hematological: Does not bruise/bleed easily.   Psychiatric/Behavioral: Positive for sleep disturbance.        Past Medical History:  Past Medical History:   Diagnosis Date   • Anxiety    • Arthritis    • Bladder prolapse, female, acquired    • Carotid stenosis    • COPD (chronic obstructive pulmonary disease) ([facility])    • Coronary artery disease    • Dementia ([facility])    • Depression    • Disease of thyroid gland    • Frequency of urination    • GERD (gastroesophageal reflux disease)    • Heart attack ([facility])    • Hyperlipidemia    • Hypertension    • Irregular heart beat    • Peptic ulcer disease        Past Surgical History:  Past Surgical History:   Procedure Laterality Date   • CARDIAC SURGERY      open heart  in 1999   • CYSTOSCOPY N/A 11/8/2017    Procedure: CYSTOSCOPY;  Surgeon: Karl Nicolas DO;  Location: [facility];  Service:    • OTHER SURGICAL HISTORY      states she had fluid drained no surgery   • VAGINAL HYSTERECTOMY W/ ANTERIOR AND POSTERIOR VAGINAL REPAIR N/A 11/8/2017    Procedure: VAGINAL HYSTERECTOMY WITH ANTERIOR, POSTERIOR, AND ENTEROCELE VAGINAL REPAIR;  Surgeon: Karl Nicolas DO;  Location: [facility] OR;  Service:    • VAGINAL VAULT SUSPENSION N/A 11/8/2017    Procedure: VAGINAL VAULT SUSPENSION ;  Surgeon: Karl Nicolas DO;  Location: [facility];  Service:        Family History:  Family History   Problem Relation Age of Onset   • Dementia Sister    • Heart attack Mother    • Tuberculosis Father    • Breast cancer Neg Hx        Social History:  Social History     Socioeconomic History   • Marital status:    • Number of children: 3   Tobacco Use   • Smoking status: Current Every Day Smoker     Packs/day: 0.50     Years: 55.00     Pack years: 27.50     Types: Cigarettes   • Smokeless tobacco: Never Used   Substance and Sexual Activity   • Alcohol use: No   • Drug use: No   • Sexual activity: Never     Comment: denies       Current Medication List:    Current Outpatient Medications:   •  acetaminophen (TYLENOL) 500 MG tablet, Take 500 mg by mouth Every 6 (Six) Hours As Needed  for Mild Pain ., Disp: , Rfl:   •  allopurinol (ZYLOPRIM) 300 MG tablet, Daily., Disp: , Rfl: 2  •  amLODIPine (NORVASC) 5 MG tablet, Daily., Disp: , Rfl: 4  •  apixaban (ELIQUIS) 5 MG tablet tablet, Take 5 mg by mouth 2 (Two) Times a Day., Disp: , Rfl:   •  aspirin 81 MG EC tablet, Take 81 mg by mouth Daily., Disp: , Rfl:   •  atorvastatin (LIPITOR) 20 MG tablet, Take 20 mg by mouth Daily., Disp: , Rfl:   •  baclofen (LIORESAL) 20 MG tablet, Take 20 mg by mouth Every Night., Disp: , Rfl:   •  busPIRone (BUSPAR) 10 MG tablet, Take 7.5 mg by mouth 3 (Three) Times a Day., Disp: , Rfl:   •  busPIRone (BUSPAR) 15 MG tablet, 2 (two) times a day., Disp: , Rfl:   •  Cholecalciferol (VITAMIN D3) 97641 units capsule, Take  by mouth Every 7 (Seven) Days., Disp: , Rfl:   •  Cyanocobalamin (B-12) 1000 MCG/ML kit, Inject 1 mL as directed Every 30 (Thirty) Days. Prior to [facility] Admission, Patient was on:  Pt takes at beginning of every month., Disp: , Rfl:   •  dabigatran etexilate (PRADAXA) 150 MG capsu, Take 150 mg by mouth 2 (Two) Times a Day., Disp: , Rfl:   •  docusate calcium (SURFAK) 240 MG capsule, Take 240 mg by mouth Every Night., Disp: , Rfl:   •  donepezil (ARICEPT) 10 MG tablet, Daily., Disp: , Rfl: 2  •  donepezil (ARICEPT) 5 MG tablet, Take 5 mg by mouth Daily., Disp: , Rfl:   •  doxycycline (MONODOX) 100 MG capsule, Take 1 capsule by mouth 2 (Two) Times a Day., Disp: 20 capsule, Rfl: 0  •  ferrous sulfate 325 (65 FE) MG tablet, Daily., Disp: , Rfl: 2  •  furosemide (LASIX) 20 MG tablet, Take 20 mg by mouth 2 (Two) Times a Day., Disp: , Rfl:   •  isosorbide mononitrate (IMDUR) 60 MG 24 hr tablet, Take 60 mg by mouth Daily., Disp: , Rfl:   •  levothyroxine (SYNTHROID, LEVOTHROID) 50 MCG tablet, Take 50 mcg by mouth Daily., Disp: , Rfl:   •  lisinopril (PRINIVIL,ZESTRIL) 40 MG tablet, Take 40 mg by mouth Daily., Disp: , Rfl:   •  magnesium oxide (MAGOX) 400 (241.3 Mg) MG tablet tablet, Take 400 mg by mouth Daily.,  "Disp: , Rfl:   •  methylPREDNISolone (MEDROL, SORIN,) 4 MG tablet, Take as directed on package instructions., Disp: 21 each, Rfl: 0  •  metoprolol succinate XL (TOPROL-XL) 25 MG 24 hr tablet, Take 25 mg by mouth Daily., Disp: , Rfl:   •  mupirocin (BACTROBAN) 2 % ointment, Apply  topically to the appropriate area as directed Every 12 (Twelve) Hours., Disp: , Rfl:   •  pantoprazole (PROTONIX) 20 MG EC tablet, Take 20 mg by mouth 2 (Two) Times a Day., Disp: , Rfl:   •  rosuvastatin (CRESTOR) 10 MG tablet, Take 10 mg by mouth Daily., Disp: , Rfl:   •  SENNA PO, Take  by mouth 2 (two) times a day., Disp: , Rfl:   •  sertraline (ZOLOFT) 100 MG tablet, Take 100 mg by mouth Daily., Disp: , Rfl:   •  sertraline (ZOLOFT) 25 MG tablet, Take 1 tablet by mouth Daily., Disp: 30 tablet, Rfl: 0  •  simvastatin (ZOCOR) 40 MG tablet, Take 40 mg by mouth Daily., Disp: , Rfl:     Allergies:   Latex    Vitals:  /70   Pulse (!) 45   Ht 167.6 cm (66\")   Wt 71.7 kg (158 lb)   SpO2 92%   BMI 25.50 kg/m²     Physical Exam:  Physical Exam  Constitutional:       Appearance: She is normal weight.   HENT:      Head: Normocephalic and atraumatic.      Nose: Nose normal. No congestion or rhinorrhea.   Eyes:      General: No scleral icterus.     Extraocular Movements: Extraocular movements intact.      Conjunctiva/sclera: Conjunctivae normal.      Pupils: Pupils are equal, round, and reactive to light.   Cardiovascular:      Rate and Rhythm: Normal rate and regular rhythm.      Pulses: Normal pulses.      Heart sounds: Normal heart sounds.   Pulmonary:      Effort: Pulmonary effort is normal.      Breath sounds: Normal breath sounds.   Abdominal:      General: Abdomen is flat. Bowel sounds are normal. There is no distension.      Palpations: Abdomen is soft. There is no shifting dullness, fluid wave, hepatomegaly, splenomegaly, mass or pulsatile mass.      Tenderness: There is no abdominal tenderness. There is no guarding or rebound.      " Hernia: No hernia is present.   Musculoskeletal:         General: No swelling or tenderness.      Cervical back: Normal range of motion and neck supple.   Skin:     General: Skin is warm and dry.      Coloration: Skin is not jaundiced.   Neurological:      General: No focal deficit present.      Mental Status: She is alert and oriented to person, place, and time.   Psychiatric:         Mood and Affect: Mood normal.         Behavior: Behavior normal.         Results Review:  Lab Results:   Lab Requisition on 02/11/2022   Component Date Value Ref Range Status   • Color, UA 02/11/2022 Dark Yellow* Yellow, Straw Final   • Appearance, UA 02/11/2022 Cloudy* Clear Final   • pH, UA 02/11/2022 5.5  5.0 - 8.0 Final   • Specific Gravity, UA 02/11/2022 1.029  1.005 - 1.030 Final   • Glucose, UA 02/11/2022 Negative  Negative Final   • Ketones, UA 02/11/2022 Trace* Negative Final   • Bilirubin, UA 02/11/2022 Negative  Negative Final   • Blood, UA 02/11/2022 Negative  Negative Final   • Protein, UA 02/11/2022 Negative  Negative Final   • Leuk Esterase, UA 02/11/2022 Negative  Negative Final   • Nitrite, UA 02/11/2022 Negative  Negative Final   • Urobilinogen, UA 02/11/2022 1.0 E.U./dL  0.2 - 1.0 E.U./dL Final       Assessment/Plan     Visit Diagnoses:    ICD-10-CM ICD-9-CM   1. Esophageal dysphagia  R13.19 787.29       Plan:  The patient seems to be doing well weight wise.  I do not have actual weights but she does not appear to have lost any weight.  I am going to ask the nursing home to send recent weights and to perform a 3-day calorie count.  She has had a modified barium swallow which revealed that she is able to eat a regular diet with thin liquids.  I will release her from my care if her weights are stable and she is eating sufficient calories.  She will follow-up in 8 weeks.          MEDS ORDERED DURING VISIT:  No orders of the defined types were placed in this encounter.      Return in about 8 weeks (around  4/28/2022).             This document has been electronically signed by Florencia Willett MD   March 3, 2022 12:22 EST      Part of this note may be an electronic transcription/translation of spoken language to printed text using the Dragon Dictation System.

## 2022-03-08 ENCOUNTER — APPOINTMENT (OUTPATIENT)
Dept: GENERAL RADIOLOGY | Facility: HOSPITAL | Age: 72
End: 2022-03-08

## 2022-03-08 ENCOUNTER — HOSPITAL ENCOUNTER (INPATIENT)
Facility: HOSPITAL | Age: 72
LOS: 3 days | Discharge: NURSING, REHAB OR LONG-TERM CARE | End: 2022-03-11
Attending: STUDENT IN AN ORGANIZED HEALTH CARE EDUCATION/TRAINING PROGRAM | Admitting: INTERNAL MEDICINE

## 2022-03-08 ENCOUNTER — APPOINTMENT (OUTPATIENT)
Dept: CT IMAGING | Facility: HOSPITAL | Age: 72
End: 2022-03-08

## 2022-03-08 DIAGNOSIS — R00.1 BRADYCARDIA, SINUS: Primary | ICD-10-CM

## 2022-03-08 LAB
ALBUMIN SERPL-MCNC: 3.8 G/DL (ref 3.5–5.2)
ALBUMIN/GLOB SERPL: 1.7 G/DL
ALP SERPL-CCNC: 66 U/L (ref 39–117)
ALT SERPL W P-5'-P-CCNC: 10 U/L (ref 1–33)
AMPHET+METHAMPHET UR QL: NEGATIVE
AMPHETAMINES UR QL: NEGATIVE
ANION GAP SERPL CALCULATED.3IONS-SCNC: 7.7 MMOL/L (ref 5–15)
AST SERPL-CCNC: 14 U/L (ref 1–32)
BARBITURATES UR QL SCN: NEGATIVE
BASOPHILS # BLD AUTO: 0.01 10*3/MM3 (ref 0–0.2)
BASOPHILS NFR BLD AUTO: 0.2 % (ref 0–1.5)
BENZODIAZ UR QL SCN: NEGATIVE
BILIRUB SERPL-MCNC: 0.3 MG/DL (ref 0–1.2)
BILIRUB UR QL STRIP: NEGATIVE
BUN SERPL-MCNC: 14 MG/DL (ref 8–23)
BUN/CREAT SERPL: 17.7 (ref 7–25)
BUPRENORPHINE SERPL-MCNC: NEGATIVE NG/ML
CALCIUM SPEC-SCNC: 8.9 MG/DL (ref 8.6–10.5)
CANNABINOIDS SERPL QL: NEGATIVE
CHLORIDE SERPL-SCNC: 106 MMOL/L (ref 98–107)
CLARITY UR: CLEAR
CO2 SERPL-SCNC: 28.3 MMOL/L (ref 22–29)
COCAINE UR QL: NEGATIVE
COLOR UR: YELLOW
CREAT SERPL-MCNC: 0.79 MG/DL (ref 0.57–1)
CRP SERPL-MCNC: <0.3 MG/DL (ref 0–0.5)
D-LACTATE SERPL-SCNC: 1.6 MMOL/L (ref 0.5–2)
DEPRECATED RDW RBC AUTO: 47.5 FL (ref 37–54)
EGFRCR SERPLBLD CKD-EPI 2021: 79.1 ML/MIN/1.73
EOSINOPHIL # BLD AUTO: 0.05 10*3/MM3 (ref 0–0.4)
EOSINOPHIL NFR BLD AUTO: 0.8 % (ref 0.3–6.2)
ERYTHROCYTE [DISTWIDTH] IN BLOOD BY AUTOMATED COUNT: 13.9 % (ref 12.3–15.4)
ETHANOL BLD-MCNC: <10 MG/DL (ref 0–10)
ETHANOL UR QL: <0.01 %
FLUAV RNA RESP QL NAA+PROBE: NOT DETECTED
FLUBV RNA RESP QL NAA+PROBE: NOT DETECTED
GLOBULIN UR ELPH-MCNC: 2.3 GM/DL
GLUCOSE SERPL-MCNC: 163 MG/DL (ref 65–99)
GLUCOSE UR STRIP-MCNC: NEGATIVE MG/DL
HCT VFR BLD AUTO: 39.4 % (ref 34–46.6)
HGB BLD-MCNC: 12.5 G/DL (ref 12–15.9)
HGB UR QL STRIP.AUTO: NEGATIVE
HOLD SPECIMEN: NORMAL
HOLD SPECIMEN: NORMAL
IMM GRANULOCYTES # BLD AUTO: 0.03 10*3/MM3 (ref 0–0.05)
IMM GRANULOCYTES NFR BLD AUTO: 0.5 % (ref 0–0.5)
KETONES UR QL STRIP: NEGATIVE
LEUKOCYTE ESTERASE UR QL STRIP.AUTO: NEGATIVE
LYMPHOCYTES # BLD AUTO: 1.03 10*3/MM3 (ref 0.7–3.1)
LYMPHOCYTES NFR BLD AUTO: 16.2 % (ref 19.6–45.3)
MAGNESIUM SERPL-MCNC: 1.9 MG/DL (ref 1.6–2.4)
MCH RBC QN AUTO: 29.7 PG (ref 26.6–33)
MCHC RBC AUTO-ENTMCNC: 31.7 G/DL (ref 31.5–35.7)
MCV RBC AUTO: 93.6 FL (ref 79–97)
METHADONE UR QL SCN: NEGATIVE
MONOCYTES # BLD AUTO: 0.3 10*3/MM3 (ref 0.1–0.9)
MONOCYTES NFR BLD AUTO: 4.7 % (ref 5–12)
NEUTROPHILS NFR BLD AUTO: 4.95 10*3/MM3 (ref 1.7–7)
NEUTROPHILS NFR BLD AUTO: 77.6 % (ref 42.7–76)
NITRITE UR QL STRIP: NEGATIVE
NRBC BLD AUTO-RTO: 0 /100 WBC (ref 0–0.2)
NT-PROBNP SERPL-MCNC: 339.6 PG/ML (ref 0–900)
OPIATES UR QL: NEGATIVE
OXYCODONE UR QL SCN: NEGATIVE
PCP UR QL SCN: NEGATIVE
PH UR STRIP.AUTO: 7 [PH] (ref 5–8)
PLATELET # BLD AUTO: 140 10*3/MM3 (ref 140–450)
PMV BLD AUTO: 10.9 FL (ref 6–12)
POTASSIUM SERPL-SCNC: 3.7 MMOL/L (ref 3.5–5.2)
PROPOXYPH UR QL: NEGATIVE
PROT SERPL-MCNC: 6.1 G/DL (ref 6–8.5)
PROT UR QL STRIP: NEGATIVE
RBC # BLD AUTO: 4.21 10*6/MM3 (ref 3.77–5.28)
SARS-COV-2 RNA RESP QL NAA+PROBE: NOT DETECTED
SODIUM SERPL-SCNC: 142 MMOL/L (ref 136–145)
SP GR UR STRIP: 1.02 (ref 1–1.03)
TRICYCLICS UR QL SCN: NEGATIVE
TROPONIN T SERPL-MCNC: <0.01 NG/ML (ref 0–0.03)
TSH SERPL DL<=0.05 MIU/L-ACNC: 1.57 UIU/ML (ref 0.27–4.2)
TSH SERPL DL<=0.05 MIU/L-ACNC: 1.61 UIU/ML (ref 0.27–4.2)
UROBILINOGEN UR QL STRIP: NORMAL
WBC NRBC COR # BLD: 6.37 10*3/MM3 (ref 3.4–10.8)
WHOLE BLOOD HOLD SPECIMEN: NORMAL
WHOLE BLOOD HOLD SPECIMEN: NORMAL

## 2022-03-08 PROCEDURE — 84443 ASSAY THYROID STIM HORMONE: CPT

## 2022-03-08 PROCEDURE — 71045 X-RAY EXAM CHEST 1 VIEW: CPT

## 2022-03-08 PROCEDURE — 25010000002 HEPARIN (PORCINE) PER 1000 UNITS: Performed by: INTERNAL MEDICINE

## 2022-03-08 PROCEDURE — 80306 DRUG TEST PRSMV INSTRMNT: CPT | Performed by: PHYSICIAN ASSISTANT

## 2022-03-08 PROCEDURE — 81003 URINALYSIS AUTO W/O SCOPE: CPT | Performed by: PHYSICIAN ASSISTANT

## 2022-03-08 PROCEDURE — 86140 C-REACTIVE PROTEIN: CPT | Performed by: PHYSICIAN ASSISTANT

## 2022-03-08 PROCEDURE — 83605 ASSAY OF LACTIC ACID: CPT | Performed by: PHYSICIAN ASSISTANT

## 2022-03-08 PROCEDURE — 84443 ASSAY THYROID STIM HORMONE: CPT | Performed by: INTERNAL MEDICINE

## 2022-03-08 PROCEDURE — 87636 SARSCOV2 & INF A&B AMP PRB: CPT | Performed by: PHYSICIAN ASSISTANT

## 2022-03-08 PROCEDURE — 82077 ASSAY SPEC XCP UR&BREATH IA: CPT | Performed by: PHYSICIAN ASSISTANT

## 2022-03-08 PROCEDURE — 71045 X-RAY EXAM CHEST 1 VIEW: CPT | Performed by: RADIOLOGY

## 2022-03-08 PROCEDURE — 85025 COMPLETE CBC W/AUTO DIFF WBC: CPT | Performed by: PHYSICIAN ASSISTANT

## 2022-03-08 PROCEDURE — 83735 ASSAY OF MAGNESIUM: CPT | Performed by: PHYSICIAN ASSISTANT

## 2022-03-08 PROCEDURE — 80053 COMPREHEN METABOLIC PANEL: CPT | Performed by: PHYSICIAN ASSISTANT

## 2022-03-08 PROCEDURE — 83880 ASSAY OF NATRIURETIC PEPTIDE: CPT | Performed by: PHYSICIAN ASSISTANT

## 2022-03-08 PROCEDURE — 99284 EMERGENCY DEPT VISIT MOD MDM: CPT

## 2022-03-08 PROCEDURE — 84484 ASSAY OF TROPONIN QUANT: CPT | Performed by: PHYSICIAN ASSISTANT

## 2022-03-08 PROCEDURE — 93010 ELECTROCARDIOGRAM REPORT: CPT | Performed by: INTERNAL MEDICINE

## 2022-03-08 PROCEDURE — 36415 COLL VENOUS BLD VENIPUNCTURE: CPT

## 2022-03-08 PROCEDURE — 99285 EMERGENCY DEPT VISIT HI MDM: CPT

## 2022-03-08 PROCEDURE — 93005 ELECTROCARDIOGRAM TRACING: CPT | Performed by: STUDENT IN AN ORGANIZED HEALTH CARE EDUCATION/TRAINING PROGRAM

## 2022-03-08 PROCEDURE — 99223 1ST HOSP IP/OBS HIGH 75: CPT | Performed by: INTERNAL MEDICINE

## 2022-03-08 PROCEDURE — 87040 BLOOD CULTURE FOR BACTERIA: CPT | Performed by: PHYSICIAN ASSISTANT

## 2022-03-08 PROCEDURE — 70450 CT HEAD/BRAIN W/O DYE: CPT

## 2022-03-08 PROCEDURE — 70450 CT HEAD/BRAIN W/O DYE: CPT | Performed by: RADIOLOGY

## 2022-03-08 RX ORDER — AMLODIPINE BESYLATE 10 MG/1
10 TABLET ORAL DAILY
COMMUNITY

## 2022-03-08 RX ORDER — SODIUM CHLORIDE 0.9 % (FLUSH) 0.9 %
10 SYRINGE (ML) INJECTION AS NEEDED
Status: DISCONTINUED | OUTPATIENT
Start: 2022-03-08 | End: 2022-03-11 | Stop reason: HOSPADM

## 2022-03-08 RX ORDER — SENNA PLUS 8.6 MG/1
1 TABLET ORAL DAILY PRN
COMMUNITY

## 2022-03-08 RX ORDER — HEPARIN SODIUM 5000 [USP'U]/ML
5000 INJECTION, SOLUTION INTRAVENOUS; SUBCUTANEOUS EVERY 8 HOURS SCHEDULED
Status: DISCONTINUED | OUTPATIENT
Start: 2022-03-08 | End: 2022-03-11 | Stop reason: HOSPADM

## 2022-03-08 RX ORDER — NEBIVOLOL 10 MG/1
10 TABLET ORAL DAILY
Status: CANCELLED | OUTPATIENT
Start: 2022-03-08

## 2022-03-08 RX ORDER — NEBIVOLOL 10 MG/1
10 TABLET ORAL DAILY
COMMUNITY
End: 2022-03-11 | Stop reason: HOSPADM

## 2022-03-08 RX ORDER — GLYCOPYRROLATE 0.2 MG/ML
0.4 INJECTION INTRAMUSCULAR; INTRAVENOUS ONCE
Status: COMPLETED | OUTPATIENT
Start: 2022-03-08 | End: 2022-03-08

## 2022-03-08 RX ORDER — ROSUVASTATIN CALCIUM 20 MG/1
40 TABLET, COATED ORAL DAILY
Status: CANCELLED | OUTPATIENT
Start: 2022-03-08

## 2022-03-08 RX ORDER — LEVOTHYROXINE SODIUM 0.07 MG/1
75 TABLET ORAL DAILY
COMMUNITY

## 2022-03-08 RX ORDER — DONEPEZIL HYDROCHLORIDE 5 MG/1
10 TABLET, FILM COATED ORAL DAILY
Status: CANCELLED | OUTPATIENT
Start: 2022-03-08

## 2022-03-08 RX ORDER — LEVOTHYROXINE SODIUM 0.07 MG/1
75 TABLET ORAL DAILY
Status: CANCELLED | OUTPATIENT
Start: 2022-03-08

## 2022-03-08 RX ORDER — SODIUM CHLORIDE 0.9 % (FLUSH) 0.9 %
10 SYRINGE (ML) INJECTION EVERY 12 HOURS SCHEDULED
Status: DISCONTINUED | OUTPATIENT
Start: 2022-03-08 | End: 2022-03-11 | Stop reason: HOSPADM

## 2022-03-08 RX ORDER — CLONIDINE HYDROCHLORIDE 0.1 MG/1
0.1 TABLET ORAL AS NEEDED
Status: CANCELLED | OUTPATIENT
Start: 2022-03-08

## 2022-03-08 RX ORDER — TROLAMINE SALICYLATE 10 G/100G
1 CREAM TOPICAL 2 TIMES DAILY PRN
Status: CANCELLED | OUTPATIENT
Start: 2022-03-08

## 2022-03-08 RX ORDER — IPRATROPIUM BROMIDE AND ALBUTEROL SULFATE 2.5; .5 MG/3ML; MG/3ML
3 SOLUTION RESPIRATORY (INHALATION)
Status: CANCELLED | OUTPATIENT
Start: 2022-03-08

## 2022-03-08 RX ORDER — ROSUVASTATIN CALCIUM 20 MG/1
40 TABLET, COATED ORAL DAILY
Status: DISCONTINUED | OUTPATIENT
Start: 2022-03-08 | End: 2022-03-11 | Stop reason: HOSPADM

## 2022-03-08 RX ORDER — MEMANTINE HYDROCHLORIDE 10 MG/1
10 TABLET ORAL 2 TIMES DAILY
COMMUNITY
End: 2023-02-24 | Stop reason: HOSPADM

## 2022-03-08 RX ORDER — TROLAMINE SALICYLATE 10 G/100G
1 CREAM TOPICAL 2 TIMES DAILY PRN
Status: ON HOLD | COMMUNITY
End: 2023-02-18

## 2022-03-08 RX ORDER — DONEPEZIL HYDROCHLORIDE 5 MG/1
10 TABLET, FILM COATED ORAL DAILY
Status: DISCONTINUED | OUTPATIENT
Start: 2022-03-08 | End: 2022-03-11 | Stop reason: HOSPADM

## 2022-03-08 RX ORDER — MEMANTINE HYDROCHLORIDE 10 MG/1
10 TABLET ORAL EVERY 12 HOURS SCHEDULED
Status: DISCONTINUED | OUTPATIENT
Start: 2022-03-08 | End: 2022-03-11 | Stop reason: HOSPADM

## 2022-03-08 RX ORDER — CLONIDINE HYDROCHLORIDE 0.1 MG/1
0.1 TABLET ORAL AS NEEDED
COMMUNITY
End: 2022-03-11 | Stop reason: HOSPADM

## 2022-03-08 RX ORDER — BACLOFEN 10 MG/1
5 TABLET ORAL 2 TIMES DAILY
Status: CANCELLED | OUTPATIENT
Start: 2022-03-08

## 2022-03-08 RX ORDER — ACETAMINOPHEN 500 MG
500 TABLET ORAL EVERY 4 HOURS PRN
Status: CANCELLED | OUTPATIENT
Start: 2022-03-08

## 2022-03-08 RX ORDER — HEPARIN SODIUM 5000 [USP'U]/ML
5000 INJECTION, SOLUTION INTRAVENOUS; SUBCUTANEOUS EVERY 8 HOURS SCHEDULED
Status: DISCONTINUED | OUTPATIENT
Start: 2022-03-08 | End: 2022-03-08

## 2022-03-08 RX ORDER — CHOLECALCIFEROL (VITAMIN D3) 1250 MCG
50000 CAPSULE ORAL
Status: DISCONTINUED | OUTPATIENT
Start: 2022-03-08 | End: 2022-03-11 | Stop reason: HOSPADM

## 2022-03-08 RX ORDER — ROSUVASTATIN CALCIUM 40 MG/1
40 TABLET, COATED ORAL DAILY
COMMUNITY

## 2022-03-08 RX ORDER — MEMANTINE HYDROCHLORIDE 10 MG/1
10 TABLET ORAL 2 TIMES DAILY
Status: CANCELLED | OUTPATIENT
Start: 2022-03-08

## 2022-03-08 RX ORDER — LISINOPRIL 10 MG/1
40 TABLET ORAL DAILY
Status: CANCELLED | OUTPATIENT
Start: 2022-03-08

## 2022-03-08 RX ORDER — SENNA PLUS 8.6 MG/1
2 TABLET ORAL 2 TIMES DAILY
Status: CANCELLED | OUTPATIENT
Start: 2022-03-08

## 2022-03-08 RX ORDER — NITROGLYCERIN 0.4 MG/1
0.4 TABLET SUBLINGUAL
Status: DISCONTINUED | OUTPATIENT
Start: 2022-03-08 | End: 2022-03-11 | Stop reason: HOSPADM

## 2022-03-08 RX ORDER — AMLODIPINE BESYLATE 5 MG/1
10 TABLET ORAL DAILY
Status: CANCELLED | OUTPATIENT
Start: 2022-03-08

## 2022-03-08 RX ORDER — LIDOCAINE 4 G/G
1 PATCH TOPICAL DAILY
COMMUNITY
End: 2023-01-17

## 2022-03-08 RX ORDER — SENNA PLUS 8.6 MG/1
2 TABLET ORAL 2 TIMES DAILY
Status: DISCONTINUED | OUTPATIENT
Start: 2022-03-08 | End: 2022-03-11 | Stop reason: HOSPADM

## 2022-03-08 RX ORDER — ATROPINE SULFATE 0.4 MG/ML
0.4 AMPUL (ML) INJECTION ONCE AS NEEDED
Status: DISCONTINUED | OUTPATIENT
Start: 2022-03-08 | End: 2022-03-11 | Stop reason: HOSPADM

## 2022-03-08 RX ORDER — LEVOTHYROXINE SODIUM 0.07 MG/1
75 TABLET ORAL DAILY
Status: DISCONTINUED | OUTPATIENT
Start: 2022-03-08 | End: 2022-03-11 | Stop reason: HOSPADM

## 2022-03-08 RX ORDER — CHOLECALCIFEROL (VITAMIN D3) 1250 MCG
50000 CAPSULE ORAL
Status: CANCELLED | OUTPATIENT
Start: 2022-03-11

## 2022-03-08 RX ADMIN — GLYCOPYRROLATE 0.4 MG: 0.2 INJECTION INTRAMUSCULAR; INTRAVENOUS at 14:01

## 2022-03-08 RX ADMIN — Medication 10 ML: at 20:11

## 2022-03-08 RX ADMIN — DONEPEZIL HYDROCHLORIDE 10 MG: 5 TABLET, FILM COATED ORAL at 20:11

## 2022-03-08 RX ADMIN — MEMANTINE HYDROCHLORIDE 10 MG: 10 TABLET, FILM COATED ORAL at 20:11

## 2022-03-08 RX ADMIN — SERTRALINE 100 MG: 50 TABLET, FILM COATED ORAL at 20:11

## 2022-03-08 RX ADMIN — ROSUVASTATIN CALCIUM 40 MG: 20 TABLET, FILM COATED ORAL at 20:10

## 2022-03-08 RX ADMIN — GLYCOPYRROLATE 0.4 MG: 0.2 INJECTION INTRAMUSCULAR; INTRAVENOUS at 12:00

## 2022-03-08 RX ADMIN — OFLOXACIN 50000 UNITS: 300 TABLET, COATED ORAL at 21:35

## 2022-03-08 RX ADMIN — LEVOTHYROXINE SODIUM 75 MCG: 0.07 TABLET ORAL at 20:11

## 2022-03-08 RX ADMIN — HEPARIN SODIUM 5000 UNITS: 5000 INJECTION INTRAVENOUS; SUBCUTANEOUS at 21:35

## 2022-03-08 NOTE — PLAN OF CARE
Goal Outcome Evaluation:   Pt welcomed and greeted by PCU staff upon admission from the ED. Patient bradycardic at this time, other vital signs stable at this time. Patient oriented at this time. Patient resting in bed as of now, bed in low position, wheels locked, bed alarm set. Will continue to monitor at this time - 7am to 7pm.
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
## 2022-03-08 NOTE — ED NOTES
Patient refused covid swab. 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     Jr Karl Nicolas, PCT  03/08/22 1138
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
## 2022-03-09 LAB
ANION GAP SERPL CALCULATED.3IONS-SCNC: 8.5 MMOL/L (ref 5–15)
BASOPHILS # BLD AUTO: 0.02 10*3/MM3 (ref 0–0.2)
BASOPHILS NFR BLD AUTO: 0.3 % (ref 0–1.5)
BUN SERPL-MCNC: 15 MG/DL (ref 8–23)
BUN/CREAT SERPL: 19 (ref 7–25)
CALCIUM SPEC-SCNC: 8.8 MG/DL (ref 8.6–10.5)
CHLORIDE SERPL-SCNC: 105 MMOL/L (ref 98–107)
CO2 SERPL-SCNC: 25.5 MMOL/L (ref 22–29)
CREAT SERPL-MCNC: 0.79 MG/DL (ref 0.57–1)
DEPRECATED RDW RBC AUTO: 48 FL (ref 37–54)
EGFRCR SERPLBLD CKD-EPI 2021: 79.1 ML/MIN/1.73
EOSINOPHIL # BLD AUTO: 0.06 10*3/MM3 (ref 0–0.4)
EOSINOPHIL NFR BLD AUTO: 0.9 % (ref 0.3–6.2)
ERYTHROCYTE [DISTWIDTH] IN BLOOD BY AUTOMATED COUNT: 13.7 % (ref 12.3–15.4)
GLUCOSE SERPL-MCNC: 104 MG/DL (ref 65–99)
HCT VFR BLD AUTO: 41 % (ref 34–46.6)
HGB BLD-MCNC: 12.9 G/DL (ref 12–15.9)
IMM GRANULOCYTES # BLD AUTO: 0.02 10*3/MM3 (ref 0–0.05)
IMM GRANULOCYTES NFR BLD AUTO: 0.3 % (ref 0–0.5)
LYMPHOCYTES # BLD AUTO: 1.37 10*3/MM3 (ref 0.7–3.1)
LYMPHOCYTES NFR BLD AUTO: 19.9 % (ref 19.6–45.3)
MAGNESIUM SERPL-MCNC: 1.8 MG/DL (ref 1.6–2.4)
MCH RBC QN AUTO: 29.7 PG (ref 26.6–33)
MCHC RBC AUTO-ENTMCNC: 31.5 G/DL (ref 31.5–35.7)
MCV RBC AUTO: 94.5 FL (ref 79–97)
MONOCYTES # BLD AUTO: 0.45 10*3/MM3 (ref 0.1–0.9)
MONOCYTES NFR BLD AUTO: 6.6 % (ref 5–12)
NEUTROPHILS NFR BLD AUTO: 4.95 10*3/MM3 (ref 1.7–7)
NEUTROPHILS NFR BLD AUTO: 72 % (ref 42.7–76)
NRBC BLD AUTO-RTO: 0 /100 WBC (ref 0–0.2)
PLATELET # BLD AUTO: 146 10*3/MM3 (ref 140–450)
PMV BLD AUTO: 11.8 FL (ref 6–12)
POTASSIUM SERPL-SCNC: 3.8 MMOL/L (ref 3.5–5.2)
QT INTERVAL: 502 MS
QTC INTERVAL: 484 MS
RBC # BLD AUTO: 4.34 10*6/MM3 (ref 3.77–5.28)
SODIUM SERPL-SCNC: 139 MMOL/L (ref 136–145)
WBC NRBC COR # BLD: 6.87 10*3/MM3 (ref 3.4–10.8)

## 2022-03-09 PROCEDURE — 94799 UNLISTED PULMONARY SVC/PX: CPT

## 2022-03-09 PROCEDURE — 83735 ASSAY OF MAGNESIUM: CPT | Performed by: INTERNAL MEDICINE

## 2022-03-09 PROCEDURE — 99222 1ST HOSP IP/OBS MODERATE 55: CPT | Performed by: PHYSICIAN ASSISTANT

## 2022-03-09 PROCEDURE — 25010000002 HEPARIN (PORCINE) PER 1000 UNITS: Performed by: INTERNAL MEDICINE

## 2022-03-09 PROCEDURE — 85025 COMPLETE CBC W/AUTO DIFF WBC: CPT | Performed by: INTERNAL MEDICINE

## 2022-03-09 PROCEDURE — 80048 BASIC METABOLIC PNL TOTAL CA: CPT | Performed by: INTERNAL MEDICINE

## 2022-03-09 PROCEDURE — 99232 SBSQ HOSP IP/OBS MODERATE 35: CPT | Performed by: INTERNAL MEDICINE

## 2022-03-09 PROCEDURE — 92610 EVALUATE SWALLOWING FUNCTION: CPT

## 2022-03-09 RX ORDER — ASPIRIN 81 MG/1
81 TABLET ORAL DAILY
Status: DISCONTINUED | OUTPATIENT
Start: 2022-03-09 | End: 2022-03-11 | Stop reason: HOSPADM

## 2022-03-09 RX ADMIN — Medication 10 ML: at 08:09

## 2022-03-09 RX ADMIN — LEVOTHYROXINE SODIUM 75 MCG: 0.07 TABLET ORAL at 08:09

## 2022-03-09 RX ADMIN — SENNOSIDES 2 TABLET: 8.6 TABLET, COATED ORAL at 08:09

## 2022-03-09 RX ADMIN — MEMANTINE HYDROCHLORIDE 10 MG: 10 TABLET, FILM COATED ORAL at 08:09

## 2022-03-09 RX ADMIN — MEMANTINE HYDROCHLORIDE 10 MG: 10 TABLET, FILM COATED ORAL at 20:38

## 2022-03-09 RX ADMIN — ROSUVASTATIN CALCIUM 40 MG: 20 TABLET, FILM COATED ORAL at 08:09

## 2022-03-09 RX ADMIN — HEPARIN SODIUM 5000 UNITS: 5000 INJECTION INTRAVENOUS; SUBCUTANEOUS at 15:10

## 2022-03-09 RX ADMIN — HEPARIN SODIUM 5000 UNITS: 5000 INJECTION INTRAVENOUS; SUBCUTANEOUS at 05:23

## 2022-03-09 RX ADMIN — HEPARIN SODIUM 5000 UNITS: 5000 INJECTION INTRAVENOUS; SUBCUTANEOUS at 20:41

## 2022-03-09 RX ADMIN — ASPIRIN 81 MG: 81 TABLET, COATED ORAL at 10:55

## 2022-03-09 RX ADMIN — DONEPEZIL HYDROCHLORIDE 10 MG: 5 TABLET, FILM COATED ORAL at 08:09

## 2022-03-09 RX ADMIN — SERTRALINE 100 MG: 50 TABLET, FILM COATED ORAL at 08:09

## 2022-03-09 RX ADMIN — Medication 10 ML: at 20:38

## 2022-03-09 NOTE — THERAPY EVALUATION
Acute Care - Speech Language Pathology   Swallow Initial Evaluation [facility]   CLINICAL DYSPHAGIA ASSESSMENT     Patient Name: Brenda Munroe  : 1950  MRN: [id number]  Today's Date: 3/9/2022             Admit Date: 3/8/2022    Brenda Munroe  is seen at bedside this this am on PCU  to assess safety/efficacy of swallowing fnx, determine safest/least restrictive diet tolerance.     Ms. Munroe presented to [facility] ED from local SNF 2/2 changes in mental status with increased agitation. She presented with bradycardia. Per chest xray,  Cardiomegaly noted. She was admitted for further evaluation and treatment.     PMH significant for psychosis, recurrent depression, COPD, CAD, thyroid disease, arthritis, HTN, atrial fibrillation, carotid stenosis.     Ms. Munroe is familiar to SLP from previous outpatient MBS on 21. She presented without laryngeal penetration or aspiration with WFL oropharyngeal swallow. She was recommended least restrictive regular consistency po diet, thin liquids.     She is referred today to rule out dysphagia.     Social History     Socioeconomic History   • Marital status:    • Number of children: 3   Tobacco Use   • Smoking status: Current Every Day Smoker     Packs/day: 0.50     Years: 55.00     Pack years: 27.50     Types: Cigarettes   • Smokeless tobacco: Never Used   Substance and Sexual Activity   • Alcohol use: No   • Drug use: No   • Sexual activity: Never     Comment: denies      Imaging:   XR Chest, 1 View     EXAM DATE:    3/8/2022 11:30 AM     CLINICAL HISTORY:    cough; AMS     TECHNIQUE:    Frontal view of the chest.     COMPARISON:    2021     FINDINGS:    LUNGS:  Coarsened interstitial markings noted throughout the lungs.    PLEURAL SPACE:  Questionable tiny bilateral pleural effusions.  No  pneumothorax.    HEART:  Cardiomegaly again noted.  Coronary artery bypass graft  (CABG).    MEDIASTINUM:  Unremarkable.    BONES/JOINTS:  Median sternotomy.      IMPRESSION:  1.  Cardiomegaly again noted.  2.  Questionable tiny bilateral pleural effusions.     This report was finalized on 3/8/2022 11:53 AM by Dr. Danilo Beltrán MD.     Labs:  03/09/22 0209  Basic Metabolic Panel   Collected: 03/09/22 0056  Final result  Specimen: Blood    Glucose 104 High  mg/dL CO2 25.5 mmol/L   BUN 15 mg/dL Calcium 8.8 mg/dL   Creatinine 0.79 mg/dL BUN/Creatinine Ratio 19.0   Sodium 139 mmol/L Anion Gap 8.5 mmol/L   Potassium 3.8 mmol/L eGFR 79.1 mL/min/1.73    Chloride 105 mmol/L            03/09/22 0209  Magnesium   Collected: 03/09/22 0056  Final result  Specimen: Blood    Magnesium 1.8 mg/dL            03/09/22 0144  CBC Auto Differential   Collected: 03/09/22 0056  Final result  Specimen: Blood    WBC 6.87 10*3/mm3 Lymphocyte % 19.9 %   RBC 4.34 10*6/mm3 Monocyte % 6.6 %   Hemoglobin 12.9 g/dL Eosinophil % 0.9 %   Hematocrit 41.0 % Basophil % 0.3 %   MCV 94.5 fL Immature Grans % 0.3 %   MCH 29.7 pg Neutrophils, Absolute 4.95 10*3/mm3   MCHC 31.5 g/dL Lymphocytes, Absolute 1.37 10*3/mm3   RDW 13.7 % Monocytes, Absolute 0.45 10*3/mm3   RDW-SD 48.0 fl Eosinophils, Absolute 0.06 10*3/mm3   MPV 11.8 fL Basophils, Absolute 0.02 10*3/mm3   Platelets 146 10*3/mm3 Immature Grans, Absolute 0.02 10*3/mm3   Neutrophil % 72.0 % nRBC 0.0 /100 WBC        Diet Orders (active) (From admission, onward)     Start     Ordered    03/08/22 1752  Diet Regular  Diet Effective Now         03/08/22 1751              She is positioned upright and centered in bed to accept multiple po presentations of ice chips, solid cracker, puree, and thin liquids via spoon, cup and straw.  She is able to self provide. She is noted with lunch tray at bedside with approximately 50% intake. She denies any odynophagia. RN is present upon my entry to report good acceptance of oral meds whole earlier today.      Facial/oral structures are symmetrical upon observation. Lingual protrusion reveals no deviation. Oral mucosa are  moist, pink, and clean. Secretions are clear, thin, and well controlled. OROM/ROSE MARIE is wfl to imitate oral postures. Gag is not assessed. Volitional cough is intact w/ adequate  intensity, clear in quality, non-productive. Voice is adequate in intensity, clear in quality w/ intelligible speech. She is awake and alert, interactive, pleasantly confused. She does follow simple directives.     Upon po presentations, adequate bolus anticipation and acceptance w/ good labial seal for bolus clearance via spoon bowl, cup rim stability and suction via straw. Bolus formation, manipulation and control are wfl w/ rotary mastication pattern. A-p transit is timely w/o oral residue. No overt s/s aspiration before the swallow.     Pharyngeal swallow is timely w/ adequate hyolaryngeal elevation per palpation. No overt s/s aspiration evidenced across this evaluation. No silent aspiration suspected. She denies odynophagia.    Visit Dx:     ICD-10-CM ICD-9-CM   1. Bradycardia, sinus  R00.1 427.89     Patient Active Problem List   Diagnosis   • Psychosis ([facility])   • Severe recurrent major depression with psychotic features ([facility])   • COPD (chronic obstructive pulmonary disease) ([facility])   • Coronary artery disease   • Disease of thyroid gland   • Arthritis   • Hypertension   • Paroxysmal atrial fibrillation ([facility])   • Chronic headaches   • Vision changes   • Carotid stenosis, asymptomatic, bilateral   • Bradycardia, sinus     Past Medical History:   Diagnosis Date   • Anxiety    • Arthritis    • Bladder prolapse, female, acquired    • Carotid stenosis    • COPD (chronic obstructive pulmonary disease) ([facility])    • Coronary artery disease    • Dementia ([facility])    • Depression    • Disease of thyroid gland    • Frequency of urination    • GERD (gastroesophageal reflux disease)    • Heart attack ([facility])    • Hyperlipidemia    • Hypertension    • Irregular heart beat    • Peptic ulcer disease      Past Surgical History:   Procedure Laterality Date   •  CARDIAC SURGERY      open heart  in 1999   • CYSTOSCOPY N/A 11/8/2017    Procedure: CYSTOSCOPY;  Surgeon: Karl Nicolas DO;  Location:  [facility] OR;  Service:    • OTHER SURGICAL HISTORY      states she had fluid drained no surgery   • VAGINAL HYSTERECTOMY W/ ANTERIOR AND POSTERIOR VAGINAL REPAIR N/A 11/8/2017    Procedure: VAGINAL HYSTERECTOMY WITH ANTERIOR, POSTERIOR, AND ENTEROCELE VAGINAL REPAIR;  Surgeon: Karl Nicolas DO;  Location:  [facility] OR;  Service:    • VAGINAL VAULT SUSPENSION N/A 11/8/2017    Procedure: VAGINAL VAULT SUSPENSION ;  Surgeon: Karl Nicolas DO;  Location:  [facility] OR;  Service:      EDUCATION  The patient has been educated in the following areas:   Dysphagia (Swallowing Impairment) Oral Care/Hydration.     Impression: Ms. Munroe presents w/ wfl oropharyngeal swallow w/o s/s aspiration.No s/s indicative of silent aspiration.  No odynophagia reported.      She is felt to most benefit from continued least restrictive regular consistency po diet, thin liquids. Medications whole.     SLP Recommendation and Plan    1. Continue least restrictive regular consistency po diet, thin liquids.    2. Medications whole in puree/thins.Single pill presentations only, please.    3. Upright and centered for all po intake  4. YANETH precautions.  5. Oral care protocol.  No further formal SLP f/u warranted at this time.    D/w Ms. Munroe results and recommendations w/ verbal agreement.    D/w RN results and recommendations w/ verbal agreement.    Thank you for allowing me to participate in the care of your patient-  Stefany Pinedo M.S., CCC/SLP                                                                                           Time Calculation:       Therapy Charges for Today     Code Description Service Date Service Provider Modifiers Qty    [id number]  ST EVAL ORAL PHARYNG SWALLOW 4 3/9/2022 Stefany Pinedo, MS CCC-SLP GN 1               Stefany Pinedo MS  CCC-SLP  3/9/2022

## 2022-03-09 NOTE — CONSULTS
Consults  Date of Admit: 3/8/2022  Date of Consult: 03/09/22  Provider, No Known  Brenda Munroe  1950  Consulting Physician: Dr. Vane MD    Cardiology consultation    Reason for consultation: bradycardia  Assessment:  1. Sinus bradycardia with PVCs rate ranging between 35 to 40 bpm  2. Carotid artery disease with bilateral 90% stenosis in proximal ICAs  3. Dementia      Recommendations:  1. Since patient was on Bystolic prior to arrival would like to monitor patient for at least couple days to see if bradycardia improves.  She currently is stable however is bedbound and has dementia so unreliable historian.  2. I will start aspirin 81 mg given patient's advanced carotid artery disease as well as history of CAD status post CABG.        History of Present Illness    Subjective     Chief Complaint   Patient presents with   • Altered Mental Status       Brenda Munroe is a 71 y.o. female with past medical history significant for CAD S/P CABG in 1999, essential hypertension, dyslipidemia, COPD with active tobacco abuse. She also has PAD with severe carotid artery disease with 90% stenosis bilaterally in the proximal ICAs.  Patient was brought to the emergency room by nursing staff when she had change in her mental status.  Upon evaluation in the emergency room she was found to be bradycardic and was admitted for further evaluation.  Cardiology was consulted for evaluation for possible pacemaker placement.  Upon arrival med review did reveal that she has been taking Bystolic prior to her arrival here this has since been held however still is bradycardic some.  However she is relatively asymptomatic.  She does still seem confused she is aware of place and time however she believes she was in [facility] and was unable to tell me who the president of [place] is.  She does deny any syncope, near syncope, dizziness, or lightheadedness.  Nursing staff reports that she has been stable however she  has been bedbound and is not gotten up out of the bed while she has been here.  It does seem she was found to have severe carotid artery stenosis and has not followed up since.             Past Medical History:   Diagnosis Date   • Anxiety    • Arthritis    • Bladder prolapse, female, acquired    • Carotid stenosis    • COPD (chronic obstructive pulmonary disease) ([facility])    • Coronary artery disease    • Dementia ([facility])    • Depression    • Disease of thyroid gland    • Frequency of urination    • GERD (gastroesophageal reflux disease)    • Heart attack ([facility])    • Hyperlipidemia    • Hypertension    • Irregular heart beat    • Peptic ulcer disease      Past Surgical History:   Procedure Laterality Date   • CARDIAC SURGERY      open heart  in 1999   • CYSTOSCOPY N/A 11/8/2017    Procedure: CYSTOSCOPY;  Surgeon: Karl Nicolas DO;  Location: [facility] OR;  Service:    • OTHER SURGICAL HISTORY      states she had fluid drained no surgery   • VAGINAL HYSTERECTOMY W/ ANTERIOR AND POSTERIOR VAGINAL REPAIR N/A 11/8/2017    Procedure: VAGINAL HYSTERECTOMY WITH ANTERIOR, POSTERIOR, AND ENTEROCELE VAGINAL REPAIR;  Surgeon: Karl Nicolas DO;  Location: [facility] OR;  Service:    • VAGINAL VAULT SUSPENSION N/A 11/8/2017    Procedure: VAGINAL VAULT SUSPENSION ;  Surgeon: Karl Nicolas DO;  Location: [facility] OR;  Service:      Family History   Problem Relation Age of Onset   • Dementia Sister    • Heart attack Mother    • Tuberculosis Father    • Breast cancer Neg Hx      Social History     Tobacco Use   • Smoking status: Current Every Day Smoker     Packs/day: 0.50     Years: 55.00     Pack years: 27.50     Types: Cigarettes   • Smokeless tobacco: Never Used   Substance Use Topics   • Alcohol use: No   • Drug use: No     Medications Prior to Admission   Medication Sig Dispense Refill Last Dose   • amLODIPine (NORVASC) 10 MG tablet Take 10 mg by mouth Daily.   Past Week at Unknown time   • apixaban (ELIQUIS) 5 MG  tablet tablet Take 5 mg by mouth 2 (Two) Times a Day.   Past Week at Unknown time   • baclofen (LIORESAL) 10 MG tablet Take 5 mg by mouth 2 (Two) Times a Day. Indications: Muscle Spasticity   Past Week at Unknown time   • busPIRone (BUSPAR) 15 MG tablet Take 15 mg by mouth 3 (Three) Times a Day.   Past Week at Unknown time   • donepezil (ARICEPT) 10 MG tablet Take 10 mg by mouth Daily.  2 Past Week at Unknown time   • levothyroxine (SYNTHROID, LEVOTHROID) 75 MCG tablet Take 75 mcg by mouth Daily.   Past Week at Unknown time   • Lidocaine 4 % patch Apply 1 patch topically Daily. Prior to [facility] Admission, Patient was on:  left shoulder   Past Week at Unknown time   • lisinopril (PRINIVIL,ZESTRIL) 40 MG tablet Take 40 mg by mouth Daily.   Past Week at Unknown time   • memantine (NAMENDA) 10 MG tablet Take 10 mg by mouth 2 (Two) Times a Day.   Past Week at Unknown time   • nebivolol (BYSTOLIC) 10 MG tablet Take 10 mg by mouth Daily.   Past Week at Unknown time   • rosuvastatin (CRESTOR) 40 MG tablet Take 40 mg by mouth Daily.   Past Week at Unknown time   • senna (SENOKOT) 8.6 MG tablet Take 2 tablets by mouth 2 (Two) Times a Day.   Past Week at Unknown time   • sertraline (ZOLOFT) 100 MG tablet Take 100 mg by mouth Daily.   Past Week at Unknown time   • umeclidinium-vilanterol (Anoro Ellipta) 62.5-25 MCG/INH aerosol powder  inhaler Inhale 1 puff Daily.   Past Week at Unknown time   • acetaminophen (TYLENOL) 500 MG tablet Take 500 mg by mouth Every 4 (Four) Hours As Needed for Mild Pain .   Unknown at Unknown time   • Cholecalciferol (VITAMIN D3) 49658 units capsule Take 50,000 Units by mouth Every 7 (Seven) Days.   3/4/2022   • cloNIDine (CATAPRES) 0.1 MG tablet Take 0.1 mg by mouth As Needed for High Blood Pressure. Prior to [facility] Admission, Patient was on:  if blood pressure greater than 150/90   Unknown at Unknown time   • trolamine salicylate (ASPERCREME) 10 % cream Apply 1 application topically to the  appropriate area as directed 2 (Two) Times a Day As Needed for Muscle / Joint Pain. Prior to [facility] Admission, Patient was on:  bilateral hands   Unknown at Unknown time     Allergies:  Latex      Current Facility-Administered Medications:   •  atropine injection 0.4 mg, 0.4 mg, Intravenous, Once PRN, Sarahi John DO  •  donepezil (ARICEPT) tablet 10 mg, 10 mg, Oral, Daily, Ryan, Maggi, APRN, 10 mg at 03/09/22 0809  •  heparin (porcine) 5000 UNIT/ML injection 5,000 Units, 5,000 Units, Subcutaneous, Q8H, Efren Dunham MD, 5,000 Units at 03/09/22 0523  •  levothyroxine (SYNTHROID, LEVOTHROID) tablet 75 mcg, 75 mcg, Oral, Daily, Ryan, Maggi, APRN, 75 mcg at 03/09/22 0809  •  memantine (NAMENDA) tablet 10 mg, 10 mg, Oral, Q12H, Ryan, Maggi, APRN, 10 mg at 03/09/22 0809  •  nitroglycerin (NITROSTAT) SL tablet 0.4 mg, 0.4 mg, Sublingual, Q5 Min PRN, Efren Dunham MD  •  rosuvastatin (CRESTOR) tablet 40 mg, 40 mg, Oral, Daily, Ryan, Maggi, APRN, 40 mg at 03/09/22 0809  •  senna (SENOKOT) tablet 2 tablet, 2 tablet, Oral, BID, Ryan, Maggi, APRN, 2 tablet at 03/09/22 0809  •  sertraline (ZOLOFT) tablet 100 mg, 100 mg, Oral, Daily, Ryan, Maggi, APRN, 100 mg at 03/09/22 0809  •  sodium chloride 0.9 % flush 10 mL, 10 mL, Intravenous, PRN, Maximiliano Sharif DO  •  [COMPLETED] Insert peripheral IV, , , Once **AND** sodium chloride 0.9 % flush 10 mL, 10 mL, Intravenous, PRN, Ayaka Cruz PA  •  sodium chloride 0.9 % flush 10 mL, 10 mL, Intravenous, Q12H, Efren Dunham MD, 10 mL at 03/09/22 0809  •  sodium chloride 0.9 % flush 10 mL, 10 mL, Intravenous, PRN, Efren Dunham MD  •  Vitamin D3 50,000 Units, 50,000 Units, Oral, Q7 Days, Maggi Davis APRN, 50,000 Units at 03/08/22 2133    Review of Systems   Unable to perform ROS: Dementia         Objective      Vital Signs  Temp:  [97.9 °F (36.6 °C)-99 °F (37.2 °C)] 99 °F (37.2 °C)  Heart Rate:  [38-57] 42  Resp:  [12-46] 19  BP:  ()/(56-93) 137/92  Body mass index is 26.3 kg/m².  No intake or output data in the 24 hours ending 03/09/22 0923    Physical Exam  Constitutional:       Appearance: Normal appearance.   HENT:      Head: Normocephalic and atraumatic.      Mouth/Throat:      Mouth: Mucous membranes are moist.   Eyes:      Pupils: Pupils are equal, round, and reactive to light.   Cardiovascular:      Rate and Rhythm: Regular rhythm. Bradycardia present.      Comments: Patient did have PVCs on auscultation  Pulmonary:      Effort: Pulmonary effort is normal.      Breath sounds: Normal breath sounds.   Abdominal:      General: Abdomen is flat.      Palpations: Abdomen is soft.   Musculoskeletal:         General: No swelling or tenderness.   Skin:     Capillary Refill: Capillary refill takes less than 2 seconds.   Neurological:      General: No focal deficit present.      Mental Status: She is alert.      Comments: Patient aware of person and time but unaware of place nor who the  is   Psychiatric:      Comments: Demented           Results Review:   I reviewed the patient's new clinical results.  Results from last 7 days   Lab Units 03/08/22  1111   TROPONIN T ng/mL <0.010     Results from last 7 days   Lab Units 03/09/22  0056 03/08/22  1130   WBC 10*3/mm3 6.87 6.37   HEMOGLOBIN g/dL 12.9 12.5   PLATELETS 10*3/mm3 146 140     Results from last 7 days   Lab Units 03/09/22  0056 03/08/22  1130   SODIUM mmol/L 139 142   POTASSIUM mmol/L 3.8 3.7   CHLORIDE mmol/L 105 106   CO2 mmol/L 25.5 28.3   BUN mg/dL 15 14   CREATININE mg/dL 0.79 0.79   CALCIUM mg/dL 8.8 8.9   GLUCOSE mg/dL 104* 163*   ALT (SGPT) U/L  --  10   AST (SGOT) U/L  --  14     Lab Results   Component Value Date    INR 1.16 (H) 02/26/2021    INR 1.32 (H) 03/27/2020    INR 1.22 (H) 11/18/2019    INR 0.96 08/04/2015     Lab Results   Component Value Date    MG 1.8 03/09/2022    MG 1.9 03/08/2022    MG 2.1 03/25/2021     Lab Results   Component  Value Date    TSH 1.610 03/08/2022    CHLPL CANCELED 10/09/2015    TRIG CANCELED 10/09/2015    HDL CANCELED 10/09/2015    LDL CANCELED 10/09/2015    LDL CANCELED 10/09/2015    LDL CANCELED 10/09/2015      Lab Results   Component Value Date    .0 (H) 09/26/2017       EKG:     Imaging Results (Last 72 Hours)     Procedure Component Value Units Date/Time    CT Head Without Contrast [[id number]] Collected: 03/08/22 1345     Updated: 03/08/22 1350    Narrative:      EXAM:    CT Head Without Intravenous Contrast     EXAM DATE:    3/8/2022 12:46 PM     CLINICAL HISTORY:    confusion; AMS     TECHNIQUE:    Axial computed tomography images of the head/brain without intravenous  contrast.  Sagittal and coronal reformatted images were created and  reviewed.  This CT exam was performed using one or more of the following  dose reduction techniques:  automated exposure control, adjustment of  the mA and/or kV according to patient size, and/or use of iterative  reconstruction technique.     COMPARISON:    No relevant prior studies available.     FINDINGS:    BRAIN:  Areas of decreased attenuation in the deep cerebral white  matter are consistent with small vessel ischemic/degenerative changes.   No hemorrhage.    VENTRICLES:  Unremarkable.  No ventriculomegaly.    BONES/JOINTS:  Unremarkable.  No acute fracture.    SOFT TISSUES:  Unremarkable.    VASCULATURE:  Intracranial ICA calcifications noted.    SINUSES:  Unremarkable as visualized.  No acute sinusitis.    MASTOID AIR CELLS:  Unremarkable as visualized.  No mastoid effusion.       Impression:        Small vessel ischemic/degenerative changes.     This report was finalized on 3/8/2022 1:48 PM by Dr. Danilo Beltrán MD.       XR Chest 1 View [[id number]] Collected: 03/08/22 1153     Updated: 03/08/22 1203    Narrative:      EXAM:    XR Chest, 1 View     EXAM DATE:    3/8/2022 11:30 AM     CLINICAL HISTORY:    cough; AMS     TECHNIQUE:    Frontal view of the chest.      COMPARISON:    03/25/2021     FINDINGS:    LUNGS:  Coarsened interstitial markings noted throughout the lungs.    PLEURAL SPACE:  Questionable tiny bilateral pleural effusions.  No  pneumothorax.    HEART:  Cardiomegaly again noted.  Coronary artery bypass graft  (CABG).    MEDIASTINUM:  Unremarkable.    BONES/JOINTS:  Median sternotomy.       Impression:      1.  Cardiomegaly again noted.  2.  Questionable tiny bilateral pleural effusions.     This report was finalized on 3/8/2022 11:53 AM by Dr. Danilo Beltrán MD.              Thank you very much for asking us to be involved in this patient's care.  We will follow along with you.    Biju Crystal PA-C, I discussed the case and plan of care of with Dr. Cifuentes who reviewed, examined, and agreed with plan.    03/09/22  09:23 EST    Electronically signed by Biju Crystal PA-C, 03/09/22, 9:23 AM EST.

## 2022-03-09 NOTE — CASE MANAGEMENT/SOCIAL WORK
Discharge Planning Assessment   Ozzy     Patient Name: Brenda Munroe  MRN: [id number]  Today's Date: 3/9/2022    Admit Date: 3/8/2022     Discharge Needs Assessment     Row Name 03/09/22 0951       Living Environment    People in Home facility resident    Current Living Arrangements extended care facility    Able to Return to Prior Arrangements yes       Resource/Environmental Concerns    Resource/Environmental Concerns none       Transition Planning    Patient/Family Anticipates Transition to long-term care facility    Patient/Family Anticipated Services at Transition skilled nursing    Transportation Anticipated health plan transportation       Discharge Needs Assessment    Equipment Currently Used at Home walker, rolling;wheelchair    Concerns to be Addressed no discharge needs identified    Anticipated Changes Related to Illness none    Equipment Needed After Discharge none    Outpatient/Agency/Support Group Needs skilled nursing facility    Discharge Facility/Level of Care Needs nursing facility, skilled               Discharge Plan     Row Name 03/09/22 0951       Plan    Plan Pt is a resident at [facility]. Per Lauren at [facility], pt has a 30 day bed hold upon admission, to be contacted at discharge. SS to follow and assist.    Patient/Family in Agreement with Plan yes              Continued Care and Services - Admitted Since 3/8/2022     Destination     Service Provider Request Status Selected Services Address Phone Fax Patient Preferred    [facility]  Accepted N/A [address] [place] [address] [phone] --               Demographic Summary     Row Name 03/09/22 0951       General Information    Referral Source physician    Reason for Consult --  patient from SNF              TEJINDER Khan

## 2022-03-09 NOTE — PLAN OF CARE
Goal Outcome Evaluation:  Plan of Care Reviewed With: patient        Progress: no change  Outcome Evaluation: Patient remains alert but disoriented to place and time. RA. Remains bradycardic, 35-50 this shift. Patient with no complaints at this time, resting with call light in reach.

## 2022-03-09 NOTE — PLAN OF CARE
Goal Outcome Evaluation:              Outcome Evaluation: Pt a &o x4 with confusion.  Pt would have moments of not knowing where she was and poly ortega being president.  She misael also have momens of auditory hallucinations.  She would hear and see dogs outside her window.  Pt HR ranged from 34-50 throughout the night.  RA.  Skin tear noted to BERNABE Andrews in place. No complaints at this time.  Call light in reach with bed low, locked, and alarmed. Will conitnue to monitor 7p-7a.

## 2022-03-09 NOTE — PROGRESS NOTES
[facility] HOSPITALIST PROGRESS NOTE     Patient Identification:  Name:  Brenda Munroe  Age:  71 y.o.  Sex:  female  :  1950  MRN:  [id number]  Visit Number:  [id number]  ROOM: [place] [address]     Primary Care Provider:  Bernadette Arevalo MD    Length of stay in inpatient status:  1    Subjective     Chief Compliant:    Chief Complaint   Patient presents with   • Altered Mental Status       History of Presenting Illness:    Patient denies any new complaints. Son supportive bedside. All questions answered. Bradycardia down to upper 30's last night per report but quickly improved back to 40's. Did not require any of the PRN atropine.     ROS:  Otherwise 10 point ROS negative other than documented above in HPI.     Objective     Current Hospital Meds:aspirin, 81 mg, Oral, Daily  donepezil, 10 mg, Oral, Daily  heparin (porcine), 5,000 Units, Subcutaneous, Q8H  levothyroxine, 75 mcg, Oral, Daily  memantine, 10 mg, Oral, Q12H  rosuvastatin, 40 mg, Oral, Daily  senna, 2 tablet, Oral, BID  sertraline, 100 mg, Oral, Daily  sodium chloride, 10 mL, Intravenous, Q12H  Vitamin D3, 50,000 Units, Oral, Q7 Days         Current Antimicrobial Therapy:  Anti-Infectives (From admission, onward)    None        Current Diuretic Therapy:  Diuretics (From admission, onward)    None        ----------------------------------------------------------------------------------------------------------------------  Vital Signs:  Temp:  [97.9 °F (36.6 °C)-99 °F (37.2 °C)] 98.8 °F (37.1 °C)  Heart Rate:  [38-57] 42  Resp:  [12-46] 19  BP: ()/(56-93) 137/92  SpO2:  [92 %-98 %] 93 %  on   ;   Device (Oxygen Therapy): room air  Body mass index is 26.3 kg/m².    Wt Readings from Last 3 Encounters:   22 73.9 kg (162 lb 14.7 oz)   22 71.7 kg (158 lb)   22 71.7 kg (158 lb)     Intake & Output (last 3 days)        0701   0700  0701   0700  0701   0700  0701  03/10 0700    P.O.     240    Total Intake(mL/kg)    240 (3.2)    Net    +240                Diet Regular  ----------------------------------------------------------------------------------------------------------------------  Physical exam:  Constitutional:  Well-developed and well-nourished.  No respiratory distress.      HENT:  Head:  Normocephalic and atraumatic.  Mouth:  Moist mucous membranes.    Eyes:  Conjunctivae and EOM are normal. No scleral icterus.    Neck:  Neck supple.  No JVD present.    Cardiovascular:  Normal rate, regular rhythm and normal heart sounds with no murmur.  Pulmonary/Chest:  No respiratory distress, no wheezes, no crackles, with normal breath sounds and good air movement.  Abdominal:  Soft.  Bowel sounds are normal.  No distension and no tenderness.   Musculoskeletal:  No edema, no tenderness, and no deformity.  No red or swollen joints anywhere.    Neurological:  Alert and oriented to person, place, and time.  No cranial nerve deficit.  No tongue deviation.  No facial droop.  No slurred speech.   Skin:  Skin is warm and dry. No rash noted. No pallor.   Peripheral vascular:  Pulses in all 4 extremities with no clubbing, no cyanosis, no edema.  ----------------------------------------------------------------------------------------------------------------------  Tele:    ----------------------------------------------------------------------------------------------------------------------  Results from last 7 days   Lab Units 03/09/22  0056 03/08/22  1130 03/08/22  1111   CRP mg/dL  --   --  <0.30   LACTATE mmol/L  --  1.6  --    WBC 10*3/mm3 6.87 6.37  --    HEMOGLOBIN g/dL 12.9 12.5  --    HEMATOCRIT % 41.0 39.4  --    MCV fL 94.5 93.6  --    MCHC g/dL 31.5 31.7  --    PLATELETS 10*3/mm3 146 140  --          Results from last 7 days   Lab Units 03/09/22  0056 03/08/22  1130 03/08/22  1111   SODIUM mmol/L 139 142  --    POTASSIUM mmol/L 3.8 3.7  --    MAGNESIUM mg/dL 1.8  --  1.9   CHLORIDE mmol/L 105 106  --     CO2 mmol/L 25.5 28.3  --    BUN mg/dL 15 14  --    CREATININE mg/dL 0.79 0.79  --    CALCIUM mg/dL 8.8 8.9  --    GLUCOSE mg/dL 104* 163*  --    ALBUMIN g/dL  --  3.80  --    BILIRUBIN mg/dL  --  0.3  --    ALK PHOS U/L  --  66  --    AST (SGOT) U/L  --  14  --    ALT (SGPT) U/L  --  10  --    Estimated Creatinine Clearance: 64.4 mL/min (by C-G formula based on SCr of 0.79 mg/dL).  No results found for: AMMONIA  Results from last 7 days   Lab Units 03/08/22  1111   TROPONIN T ng/mL <0.010     Results from last 7 days   Lab Units 03/08/22  1111   PROBNP pg/mL 339.6         No results found for: HGBA1C, POCGLU  Lab Results   Component Value Date    TSH 1.610 03/08/2022    FREET4 1.59 03/25/2021     No results found for: PREGTESTUR, PREGSERUM, HCG, HCGQUANT  Pain Management Panel     Pain Management Panel Latest Ref Rng & Units 3/8/2022 3/25/2021    AMPHETAMINES SCREEN, URINE Negative Negative Negative    BARBITURATES SCREEN Negative Negative Positive(A)    BENZODIAZEPINE SCREEN, URINE Negative Negative Negative    BUPRENORPHINEUR Negative Negative Negative    COCAINE SCREEN, URINE Negative Negative Negative    METHADONE SCREEN, URINE Negative Negative Negative    METHAMPHETAMINEUR Negative Negative -        Brief Urine Lab Results  (Last result in the past 365 days)      Color   Clarity   Blood   Leuk Est   Nitrite   Protein   CREAT   Urine HCG        03/08/22 1325 Yellow   Clear   Negative   Negative   Negative   Negative               No results found for: BLOODCX  No results found for: URINECX  No results found for: WOUNDCX  No results found for: STOOLCX  No results found for: RESPCX  No results found for: AFBCX  Results from last 7 days   Lab Units 03/08/22  1130 03/08/22  1111   LACTATE mmol/L 1.6  --    CRP mg/dL  --  <0.30       I have personally looked at the labs and they are summarized  above.  ----------------------------------------------------------------------------------------------------------------------  Detailed radiology reports for the last 24 hours:    Imaging Results (Last 24 Hours)     Procedure Component Value Units Date/Time    CT Head Without Contrast [[id number]] Collected: 03/08/22 1345     Updated: 03/08/22 1350    Narrative:      EXAM:    CT Head Without Intravenous Contrast     EXAM DATE:    3/8/2022 12:46 PM     CLINICAL HISTORY:    confusion; AMS     TECHNIQUE:    Axial computed tomography images of the head/brain without intravenous  contrast.  Sagittal and coronal reformatted images were created and  reviewed.  This CT exam was performed using one or more of the following  dose reduction techniques:  automated exposure control, adjustment of  the mA and/or kV according to patient size, and/or use of iterative  reconstruction technique.     COMPARISON:    No relevant prior studies available.     FINDINGS:    BRAIN:  Areas of decreased attenuation in the deep cerebral white  matter are consistent with small vessel ischemic/degenerative changes.   No hemorrhage.    VENTRICLES:  Unremarkable.  No ventriculomegaly.    BONES/JOINTS:  Unremarkable.  No acute fracture.    SOFT TISSUES:  Unremarkable.    VASCULATURE:  Intracranial ICA calcifications noted.    SINUSES:  Unremarkable as visualized.  No acute sinusitis.    MASTOID AIR CELLS:  Unremarkable as visualized.  No mastoid effusion.       Impression:        Small vessel ischemic/degenerative changes.     This report was finalized on 3/8/2022 1:48 PM by Dr. Danilo Beltrán MD.       XR Chest 1 View [[id number]] Collected: 03/08/22 1153     Updated: 03/08/22 1203    Narrative:      EXAM:    XR Chest, 1 View     EXAM DATE:    3/8/2022 11:30 AM     CLINICAL HISTORY:    cough; AMS     TECHNIQUE:    Frontal view of the chest.     COMPARISON:    03/25/2021     FINDINGS:    LUNGS:  Coarsened interstitial markings noted throughout the  lungs.    PLEURAL SPACE:  Questionable tiny bilateral pleural effusions.  No  pneumothorax.    HEART:  Cardiomegaly again noted.  Coronary artery bypass graft  (CABG).    MEDIASTINUM:  Unremarkable.    BONES/JOINTS:  Median sternotomy.       Impression:      1.  Cardiomegaly again noted.  2.  Questionable tiny bilateral pleural effusions.     This report was finalized on 3/8/2022 11:53 AM by Dr. Danilo Beltrán MD.           Assessment & Plan    #Marked sinus bradycardia   - Required 2 doses of glycopyrrolate in ED. Has PRN atropine ordered but has not required any since admission.   - Hold home bystolic and monitor    - Cardiology consulted. Appreciate recs.       #Hx of pAfib    - In sinus rythem    - Holding home bystolic as above.    - In case pacemaker is needed will hold home apixiban and do subQ heparin. Bridging not needed for afib.        #HTN   - Hold all AV sami blockade as above. Restart other home regimen as needed.        #Hypothyroidism    - TSH wnl, continue home regimen for now.        #Dementia w/ behavioral disturbance    - Supportive care and continue home regimen        #CAD   - Continue home regimen        #Significant carotid artery stenosis    - On carotid US in 4/21. Does not appear she followed up after neurosurgery evaluation for C2 hangman's fracture which Dr. Brooks recommended first.    - No syncope episodes. Will defer to outpatient vascular surgery evaluation.    - Cardiology consulted. Started ASA 81 mg daily.       F: PO   E: Replace as needed    N: Cardiac        Code status: Full        Dispo: Admit to PCU for close cardiac monitoring. Pending improvement in bradycardia. Has bed hold at [facility] and rehab.           VTE Prophylaxis:   Mechanical Order History:     None      Pharmalogical Order History:      Ordered     Dose Route Frequency Stop    03/08/22 4950  heparin (porcine) 5000 UNIT/ML injection 5,000 Units  Status:  Discontinued         5,000 Units SC Every 8 Hours  Scheduled 03/08/22 1757 03/08/22 1829  heparin (porcine) 5000 UNIT/ML injection 5,000 Units         5,000 Units SC Every 8 Hours Scheduled --    03/08/22 1757  apixaban (ELIQUIS) tablet 5 mg  Status:  Discontinued         5 mg PO Every 12 Hours Scheduled 03/08/22 1829           Efren Dunham MD  [facility]  03/09/22  10:25 EST

## 2022-03-10 LAB
ANION GAP SERPL CALCULATED.3IONS-SCNC: 11.2 MMOL/L (ref 5–15)
BASOPHILS # BLD AUTO: 0.02 10*3/MM3 (ref 0–0.2)
BASOPHILS NFR BLD AUTO: 0.4 % (ref 0–1.5)
BUN SERPL-MCNC: 12 MG/DL (ref 8–23)
BUN/CREAT SERPL: 16 (ref 7–25)
CALCIUM SPEC-SCNC: 8.8 MG/DL (ref 8.6–10.5)
CHLORIDE SERPL-SCNC: 104 MMOL/L (ref 98–107)
CO2 SERPL-SCNC: 24.8 MMOL/L (ref 22–29)
CREAT SERPL-MCNC: 0.75 MG/DL (ref 0.57–1)
DEPRECATED RDW RBC AUTO: 46.3 FL (ref 37–54)
EGFRCR SERPLBLD CKD-EPI 2021: 84.2 ML/MIN/1.73
EOSINOPHIL # BLD AUTO: 0.05 10*3/MM3 (ref 0–0.4)
EOSINOPHIL NFR BLD AUTO: 1 % (ref 0.3–6.2)
ERYTHROCYTE [DISTWIDTH] IN BLOOD BY AUTOMATED COUNT: 13.7 % (ref 12.3–15.4)
GLUCOSE SERPL-MCNC: 88 MG/DL (ref 65–99)
HCT VFR BLD AUTO: 41.1 % (ref 34–46.6)
HGB BLD-MCNC: 13.2 G/DL (ref 12–15.9)
IMM GRANULOCYTES # BLD AUTO: 0.02 10*3/MM3 (ref 0–0.05)
IMM GRANULOCYTES NFR BLD AUTO: 0.4 % (ref 0–0.5)
LYMPHOCYTES # BLD AUTO: 1.4 10*3/MM3 (ref 0.7–3.1)
LYMPHOCYTES NFR BLD AUTO: 27.8 % (ref 19.6–45.3)
MAGNESIUM SERPL-MCNC: 1.7 MG/DL (ref 1.6–2.4)
MCH RBC QN AUTO: 29.7 PG (ref 26.6–33)
MCHC RBC AUTO-ENTMCNC: 32.1 G/DL (ref 31.5–35.7)
MCV RBC AUTO: 92.4 FL (ref 79–97)
MONOCYTES # BLD AUTO: 0.38 10*3/MM3 (ref 0.1–0.9)
MONOCYTES NFR BLD AUTO: 7.6 % (ref 5–12)
NEUTROPHILS NFR BLD AUTO: 3.16 10*3/MM3 (ref 1.7–7)
NEUTROPHILS NFR BLD AUTO: 62.8 % (ref 42.7–76)
NRBC BLD AUTO-RTO: 0 /100 WBC (ref 0–0.2)
PLATELET # BLD AUTO: 135 10*3/MM3 (ref 140–450)
PMV BLD AUTO: 11.5 FL (ref 6–12)
POTASSIUM SERPL-SCNC: 3.9 MMOL/L (ref 3.5–5.2)
QT INTERVAL: 496 MS
QTC INTERVAL: 443 MS
RBC # BLD AUTO: 4.45 10*6/MM3 (ref 3.77–5.28)
SODIUM SERPL-SCNC: 140 MMOL/L (ref 136–145)
WBC NRBC COR # BLD: 5.03 10*3/MM3 (ref 3.4–10.8)

## 2022-03-10 PROCEDURE — 99232 SBSQ HOSP IP/OBS MODERATE 35: CPT | Performed by: INTERNAL MEDICINE

## 2022-03-10 PROCEDURE — 80048 BASIC METABOLIC PNL TOTAL CA: CPT | Performed by: INTERNAL MEDICINE

## 2022-03-10 PROCEDURE — 93005 ELECTROCARDIOGRAM TRACING: CPT | Performed by: INTERNAL MEDICINE

## 2022-03-10 PROCEDURE — 85025 COMPLETE CBC W/AUTO DIFF WBC: CPT | Performed by: INTERNAL MEDICINE

## 2022-03-10 PROCEDURE — 99232 SBSQ HOSP IP/OBS MODERATE 35: CPT | Performed by: PHYSICIAN ASSISTANT

## 2022-03-10 PROCEDURE — 83735 ASSAY OF MAGNESIUM: CPT | Performed by: INTERNAL MEDICINE

## 2022-03-10 PROCEDURE — 25010000002 HEPARIN (PORCINE) PER 1000 UNITS: Performed by: INTERNAL MEDICINE

## 2022-03-10 PROCEDURE — 93010 ELECTROCARDIOGRAM REPORT: CPT | Performed by: INTERNAL MEDICINE

## 2022-03-10 RX ORDER — HYDROXYZINE HYDROCHLORIDE 25 MG/1
25 TABLET, FILM COATED ORAL ONCE
Status: COMPLETED | OUTPATIENT
Start: 2022-03-10 | End: 2022-03-10

## 2022-03-10 RX ADMIN — MEMANTINE HYDROCHLORIDE 10 MG: 10 TABLET, FILM COATED ORAL at 08:31

## 2022-03-10 RX ADMIN — Medication 10 ML: at 21:00

## 2022-03-10 RX ADMIN — SERTRALINE 100 MG: 50 TABLET, FILM COATED ORAL at 08:31

## 2022-03-10 RX ADMIN — ASPIRIN 81 MG: 81 TABLET, COATED ORAL at 08:31

## 2022-03-10 RX ADMIN — HEPARIN SODIUM 5000 UNITS: 5000 INJECTION INTRAVENOUS; SUBCUTANEOUS at 05:32

## 2022-03-10 RX ADMIN — HYDROXYZINE HYDROCHLORIDE 25 MG: 25 TABLET ORAL at 12:20

## 2022-03-10 RX ADMIN — MEMANTINE HYDROCHLORIDE 10 MG: 10 TABLET, FILM COATED ORAL at 20:59

## 2022-03-10 RX ADMIN — ROSUVASTATIN CALCIUM 40 MG: 20 TABLET, FILM COATED ORAL at 08:31

## 2022-03-10 RX ADMIN — LEVOTHYROXINE SODIUM 75 MCG: 0.07 TABLET ORAL at 08:31

## 2022-03-10 RX ADMIN — HEPARIN SODIUM 5000 UNITS: 5000 INJECTION INTRAVENOUS; SUBCUTANEOUS at 23:20

## 2022-03-10 RX ADMIN — DONEPEZIL HYDROCHLORIDE 10 MG: 5 TABLET, FILM COATED ORAL at 08:31

## 2022-03-10 RX ADMIN — Medication 10 ML: at 08:31

## 2022-03-10 NOTE — PLAN OF CARE
Goal Outcome Evaluation:  Plan of Care Reviewed With: patient        Progress: improving  Outcome Evaluation: Patient remains alert with intermittent confusion. RA. Patient anxious and frustrated this shift. PRN medication given. Nicotine gum ordered per provider as patient was educated that she cant leave the floor to smoke. HR improving, 40-60s. Patient resting at this time with call light in reach.

## 2022-03-10 NOTE — PLAN OF CARE
Goal Outcome Evaluation:              Outcome Evaluation: Pt a&o x4 with intermittent confusion. RA. HR continues to run in the 40's.  No complaints at this time. Call light in reach with bed low, locked, and alarmed. Will continue to monitor 7p-7a.

## 2022-03-10 NOTE — PROGRESS NOTES
LOS: 2 days     Name: Brenda Munroe  Age/Sex: 71 y.o. female  :  1950        PCP: Bernadette Arevalo MD    Active Problems:    Bradycardia, sinus    Following for: bradycardia    Telemetry Monitoring: sinus rhythm rate of 52 bpm while in the room tele has rate as low as 39 while she was sleeping    Interval history: she refused to speak with us today, nursing staff tells me she has been more confused today. However, no symptoms concerning from her bradycardia. No syncope, near syncope, or reported dizziness. She has been bed bound though.     Subjective     ROS    Vital Signs  Vitals:    03/10/22 0702 03/10/22 0800 03/10/22 0902 03/10/22 1200   BP: 161/85  (!) 133/105    Pulse: (!) 39  52    Resp:   22    Temp:  98.1 °F (36.7 °C)  98 °F (36.7 °C)   TempSrc:  Oral  Oral   SpO2: 96%  96%    Weight:       Height:         Body mass index is 26.17 kg/m².      Intake/Output Summary (Last 24 hours) at 3/10/2022 1239  Last data filed at 3/10/2022 0822  Gross per 24 hour   Intake 310 ml   Output --   Net 310 ml       Constitutional:       General: Not in acute distress.     Appearance: Healthy appearance. Well-developed and not in distress. Not diaphoretic.   Eyes:      Conjunctiva/sclera: Conjunctivae normal.      Pupils: Pupils are equal, round, and reactive to light.   HENT:      Head: Normocephalic and atraumatic.   Neck:      Vascular: No carotid bruit or JVD.   Pulmonary:      Effort: Pulmonary effort is normal. No respiratory distress.      Breath sounds: Normal breath sounds.   Cardiovascular:      Bradycardia present. Regular rhythm.   Edema:     Peripheral edema absent.   Skin:     General: Skin is cool.   Neurological:      Mental Status: Alert and oriented to person, place, and time.      Comments: Did not want to speak with us. She seems confused and she thinks that she has only been here for 4 hours and doesn't realize she has been here for the last day.          Results Review:     Results from  last 7 days   Lab Units 03/10/22  0727 03/09/22  0056 03/08/22  1130   WBC 10*3/mm3 5.03 6.87 6.37   HEMOGLOBIN g/dL 13.2 12.9 12.5   PLATELETS 10*3/mm3 135* 146 140     Results from last 7 days   Lab Units 03/10/22  0727 03/09/22  0056 03/08/22  1130   SODIUM mmol/L 140 139 142   POTASSIUM mmol/L 3.9 3.8 3.7   CHLORIDE mmol/L 104 105 106   CO2 mmol/L 24.8 25.5 28.3   BUN mg/dL 12 15 14   CREATININE mg/dL 0.75 0.79 0.79   CALCIUM mg/dL 8.8 8.8 8.9   GLUCOSE mg/dL 88 104* 163*   ALT (SGPT) U/L  --   --  10   AST (SGOT) U/L  --   --  14     Results from last 7 days   Lab Units 03/08/22  1111   TROPONIN T ng/mL <0.010             I reviewed the patient's new clinical results.  I reviewed the patient's new imaging results and agree with the interpretation.  I personally viewed and interpreted the patient's EKG/Telemetry data    Medication Review:   aspirin, 81 mg, Oral, Daily  donepezil, 10 mg, Oral, Daily  heparin (porcine), 5,000 Units, Subcutaneous, Q8H  levothyroxine, 75 mcg, Oral, Daily  memantine, 10 mg, Oral, Q12H  rosuvastatin, 40 mg, Oral, Daily  senna, 2 tablet, Oral, BID  sertraline, 100 mg, Oral, Daily  sodium chloride, 10 mL, Intravenous, Q12H  Vitamin D3, 50,000 Units, Oral, Q7 Days           Assessment:  1. Sinus bradycardia with PVCs   2. Carotid artery disease with bilateral 90% stenosis in proximal ICAs  3. Dementia      Recommendations:  1. Patient still bradycardic but does seem improving she is around the 50 bpm range with a few short epsiodes in the upper 30s while asleep. Will continue to monitor for now to see if she continues to improve by holding her home bystolic. Per nursing staff she is asymptomatic but has been bed bound. If she develops significant symptoms can definitely consider PPM but at this time is not needed. Will need home holter monitor when discharged.     I discussed the patients findings and my recommendations with patient and family    Biju Crystal PA-C, I discussed the case  and plan of care of with Dr. Cifuentes who reviewed, examined, and agreed with plan.   Electronically signed by Biju Crystal PA-C, 03/10/22, 12:39 PM EST.   03/10/22  12:39 EST

## 2022-03-10 NOTE — PROGRESS NOTES
[facility] HOSPITALIST PROGRESS NOTE     Patient Identification:  Name:  Brenda Munroe  Age:  71 y.o.  Sex:  female  :  1950  MRN:  [id number]  Visit Number:  [id number]  ROOM: [place] [address]     Primary Care Provider:  Bernadette Arevalo MD    Length of stay in inpatient status:  2    Subjective     Chief Compliant:    Chief Complaint   Patient presents with   • Altered Mental Status       History of Presenting Illness:    Patient denies any new complaints. Is hopeful to get out of the hospital soon. Did not require any PRN atropine overnight.  No family bedside.     ROS:  Otherwise 10 point ROS negative other than documented above in HPI.     Objective     Current Hospital Meds:aspirin, 81 mg, Oral, Daily  donepezil, 10 mg, Oral, Daily  heparin (porcine), 5,000 Units, Subcutaneous, Q8H  levothyroxine, 75 mcg, Oral, Daily  memantine, 10 mg, Oral, Q12H  rosuvastatin, 40 mg, Oral, Daily  senna, 2 tablet, Oral, BID  sertraline, 100 mg, Oral, Daily  sodium chloride, 10 mL, Intravenous, Q12H  Vitamin D3, 50,000 Units, Oral, Q7 Days         Current Antimicrobial Therapy:  Anti-Infectives (From admission, onward)    None        Current Diuretic Therapy:  Diuretics (From admission, onward)    None        ----------------------------------------------------------------------------------------------------------------------  Vital Signs:  Temp:  [98 °F (36.7 °C)-99.1 °F (37.3 °C)] 98.1 °F (36.7 °C)  Heart Rate:  [37-52] 52  Resp:  [22] 22  BP: (108-161)/() 133/105  SpO2:  [89 %-98 %] 96 %  on   ;   Device (Oxygen Therapy): room air  Body mass index is 26.17 kg/m².    Wt Readings from Last 3 Encounters:   03/10/22 73.5 kg (162 lb 1.8 oz)   22 71.7 kg (158 lb)   22 71.7 kg (158 lb)     Intake & Output (last 3 days)        0701   0700  0701   07 0701  03/10 0700 03/10 0701   0700    P.O.   580 210    Total Intake(mL/kg)   580 (7.9) 210 (2.9)    Net   +580 +210             Urine Unmeasured Occurrence   1 x     Stool Unmeasured Occurrence    1 x        Diet Regular  ----------------------------------------------------------------------------------------------------------------------  Physical exam:  Constitutional:  Well-developed and well-nourished.  No respiratory distress.      HENT:  Head:  Normocephalic and atraumatic.  Mouth:  Moist mucous membranes.    Eyes:  Conjunctivae and EOM are normal. No scleral icterus.    Neck:  Neck supple.  No JVD present.    Cardiovascular:  Normal rate, regular rhythm and normal heart sounds with no murmur.  Pulmonary/Chest:  No respiratory distress, no wheezes, no crackles, with normal breath sounds and good air movement.  Abdominal:  Soft.  Bowel sounds are normal.  No distension and no tenderness.   Musculoskeletal:  No edema, no tenderness, and no deformity.  No red or swollen joints anywhere.    Neurological:  Alert and oriented to person, place, and time.  No cranial nerve deficit.  No tongue deviation.  No facial droop.  No slurred speech.   Skin:  Skin is warm and dry. No rash noted. No pallor.   Peripheral vascular:  Pulses in all 4 extremities with no clubbing, no cyanosis, no edema.  ----------------------------------------------------------------------------------------------------------------------  Tele:    ----------------------------------------------------------------------------------------------------------------------  Results from last 7 days   Lab Units 03/10/22  0727 03/09/22  0056 03/08/22  1130 03/08/22  1111   CRP mg/dL  --   --   --  <0.30   LACTATE mmol/L  --   --  1.6  --    WBC 10*3/mm3 5.03 6.87 6.37  --    HEMOGLOBIN g/dL 13.2 12.9 12.5  --    HEMATOCRIT % 41.1 41.0 39.4  --    MCV fL 92.4 94.5 93.6  --    MCHC g/dL 32.1 31.5 31.7  --    PLATELETS 10*3/mm3 135* 146 140  --          Results from last 7 days   Lab Units 03/10/22  0727 03/09/22  0056 03/08/22  1130 03/08/22  1111   SODIUM mmol/L 140 139 142   --    POTASSIUM mmol/L 3.9 3.8 3.7  --    MAGNESIUM mg/dL 1.7 1.8  --  1.9   CHLORIDE mmol/L 104 105 106  --    CO2 mmol/L 24.8 25.5 28.3  --    BUN mg/dL 12 15 14  --    CREATININE mg/dL 0.75 0.79 0.79  --    CALCIUM mg/dL 8.8 8.8 8.9  --    GLUCOSE mg/dL 88 104* 163*  --    ALBUMIN g/dL  --   --  3.80  --    BILIRUBIN mg/dL  --   --  0.3  --    ALK PHOS U/L  --   --  66  --    AST (SGOT) U/L  --   --  14  --    ALT (SGPT) U/L  --   --  10  --    Estimated Creatinine Clearance: 64.3 mL/min (by C-G formula based on SCr of 0.75 mg/dL).  No results found for: AMMONIA  Results from last 7 days   Lab Units 03/08/22  1111   TROPONIN T ng/mL <0.010     Results from last 7 days   Lab Units 03/08/22  1111   PROBNP pg/mL 339.6         No results found for: HGBA1C, POCGLU  Lab Results   Component Value Date    TSH 1.610 03/08/2022    FREET4 1.59 03/25/2021     No results found for: PREGTESTUR, PREGSERUM, HCG, HCGQUANT  Pain Management Panel     Pain Management Panel Latest Ref Rng & Units 3/8/2022 3/25/2021    AMPHETAMINES SCREEN, URINE Negative Negative Negative    BARBITURATES SCREEN Negative Negative Positive(A)    BENZODIAZEPINE SCREEN, URINE Negative Negative Negative    BUPRENORPHINEUR Negative Negative Negative    COCAINE SCREEN, URINE Negative Negative Negative    METHADONE SCREEN, URINE Negative Negative Negative    METHAMPHETAMINEUR Negative Negative -        Brief Urine Lab Results  (Last result in the past 365 days)      Color   Clarity   Blood   Leuk Est   Nitrite   Protein   CREAT   Urine HCG        03/08/22 1325 Yellow   Clear   Negative   Negative   Negative   Negative               Blood Culture   Date Value Ref Range Status   03/08/2022 No growth at 24 hours  Preliminary   03/08/2022 No growth at 24 hours  Preliminary     No results found for: URINECX  No results found for: WOUNDCX  No results found for: STOOLCX  No results found for: RESPCX  No results found for: AFBCX  Results from last 7 days   Lab Units  03/08/22  1130 03/08/22  1111   LACTATE mmol/L 1.6  --    CRP mg/dL  --  <0.30       I have personally looked at the labs and they are summarized above.  ----------------------------------------------------------------------------------------------------------------------  Detailed radiology reports for the last 24 hours:    Imaging Results (Last 24 Hours)     ** No results found for the last 24 hours. **        Assessment & Plan    #Marked sinus bradycardia   - Required 2 doses of glycopyrrolate in ED. Has PRN atropine ordered but has not required any since admission.   - Hold home bystolic and monitor    - HR overall improved but still dipped down to 39 a couple of times overnight. Still appears to be sinus rhythm.   - Cardiology consulted. Appreciate recs.       #Hx of pAfib    - In sinus rythem    - Holding home bystolic as above.    - In case pacemaker is needed will hold home apixiban and do subQ heparin. Bridging not needed for afib.        #HTN   - Hold all AV sami blockade as above. Restart other home regimen as needed.        #Hypothyroidism    - TSH wnl, continue home regimen for now.        #Dementia w/ behavioral disturbance    - Supportive care and continue home regimen        #CAD   - Continue home regimen        #Significant carotid artery stenosis    - On carotid US in 4/21. Does not appear she followed up after neurosurgery evaluation for C2 hangman's fracture which Dr. Brooks recommended first.    - No syncope episodes. Will defer to outpatient vascular surgery evaluation.    - Cardiology consulted. Started ASA 81 mg daily.       F: PO   E: Replace as needed    N: Cardiac        Code status: Full        Dispo: Admit to PCU for close cardiac monitoring. Pending improvement in bradycardia. Has bed hold at [facility] and rehab.       VTE Prophylaxis:   Mechanical Order History:     None      Pharmalogical Order History:      Ordered     Dose Route Frequency Stop    03/08/22 1751  heparin  (porcine) 5000 UNIT/ML injection 5,000 Units  Status:  Discontinued         5,000 Units SC Every 8 Hours Scheduled 03/08/22 1757    03/08/22 1829  heparin (porcine) 5000 UNIT/ML injection 5,000 Units         5,000 Units SC Every 8 Hours Scheduled --    03/08/22 1757  apixaban (ELIQUIS) tablet 5 mg  Status:  Discontinued         5 mg PO Every 12 Hours Scheduled 03/08/22 1829                Efren Dunham MD  [facility]  03/10/22  10:55 EST

## 2022-03-11 ENCOUNTER — APPOINTMENT (OUTPATIENT)
Dept: RESPIRATORY THERAPY | Facility: HOSPITAL | Age: 72
End: 2022-03-11

## 2022-03-11 VITALS
RESPIRATION RATE: 24 BRPM | SYSTOLIC BLOOD PRESSURE: 121 MMHG | OXYGEN SATURATION: 97 % | TEMPERATURE: 97.4 F | BODY MASS INDEX: 25.31 KG/M2 | WEIGHT: 157.5 LBS | HEART RATE: 47 BPM | HEIGHT: 66 IN | DIASTOLIC BLOOD PRESSURE: 63 MMHG

## 2022-03-11 LAB
ALBUMIN SERPL-MCNC: 3.7 G/DL (ref 3.5–5.2)
ALBUMIN/GLOB SERPL: 1.5 G/DL
ALP SERPL-CCNC: 66 U/L (ref 39–117)
ALT SERPL W P-5'-P-CCNC: 12 U/L (ref 1–33)
ANION GAP SERPL CALCULATED.3IONS-SCNC: 11.2 MMOL/L (ref 5–15)
AST SERPL-CCNC: 18 U/L (ref 1–32)
BASOPHILS # BLD AUTO: 0.02 10*3/MM3 (ref 0–0.2)
BASOPHILS NFR BLD AUTO: 0.4 % (ref 0–1.5)
BILIRUB SERPL-MCNC: 0.2 MG/DL (ref 0–1.2)
BUN SERPL-MCNC: 18 MG/DL (ref 8–23)
BUN/CREAT SERPL: 19.8 (ref 7–25)
CALCIUM SPEC-SCNC: 9.2 MG/DL (ref 8.6–10.5)
CHLORIDE SERPL-SCNC: 106 MMOL/L (ref 98–107)
CO2 SERPL-SCNC: 24.8 MMOL/L (ref 22–29)
CREAT SERPL-MCNC: 0.91 MG/DL (ref 0.57–1)
DEPRECATED RDW RBC AUTO: 46.7 FL (ref 37–54)
EGFRCR SERPLBLD CKD-EPI 2021: 66.8 ML/MIN/1.73
EOSINOPHIL # BLD AUTO: 0.06 10*3/MM3 (ref 0–0.4)
EOSINOPHIL NFR BLD AUTO: 1.1 % (ref 0.3–6.2)
ERYTHROCYTE [DISTWIDTH] IN BLOOD BY AUTOMATED COUNT: 13.7 % (ref 12.3–15.4)
GLOBULIN UR ELPH-MCNC: 2.5 GM/DL
GLUCOSE SERPL-MCNC: 96 MG/DL (ref 65–99)
HCT VFR BLD AUTO: 40.4 % (ref 34–46.6)
HGB BLD-MCNC: 13.2 G/DL (ref 12–15.9)
IMM GRANULOCYTES # BLD AUTO: 0.02 10*3/MM3 (ref 0–0.05)
IMM GRANULOCYTES NFR BLD AUTO: 0.4 % (ref 0–0.5)
LYMPHOCYTES # BLD AUTO: 1.46 10*3/MM3 (ref 0.7–3.1)
LYMPHOCYTES NFR BLD AUTO: 26 % (ref 19.6–45.3)
MCH RBC QN AUTO: 30.2 PG (ref 26.6–33)
MCHC RBC AUTO-ENTMCNC: 32.7 G/DL (ref 31.5–35.7)
MCV RBC AUTO: 92.4 FL (ref 79–97)
MONOCYTES # BLD AUTO: 0.38 10*3/MM3 (ref 0.1–0.9)
MONOCYTES NFR BLD AUTO: 6.8 % (ref 5–12)
NEUTROPHILS NFR BLD AUTO: 3.68 10*3/MM3 (ref 1.7–7)
NEUTROPHILS NFR BLD AUTO: 65.3 % (ref 42.7–76)
NRBC BLD AUTO-RTO: 0 /100 WBC (ref 0–0.2)
PLATELET # BLD AUTO: 131 10*3/MM3 (ref 140–450)
PMV BLD AUTO: 11.5 FL (ref 6–12)
POTASSIUM SERPL-SCNC: 3.9 MMOL/L (ref 3.5–5.2)
PROT SERPL-MCNC: 6.2 G/DL (ref 6–8.5)
RBC # BLD AUTO: 4.37 10*6/MM3 (ref 3.77–5.28)
SODIUM SERPL-SCNC: 142 MMOL/L (ref 136–145)
WBC NRBC COR # BLD: 5.62 10*3/MM3 (ref 3.4–10.8)

## 2022-03-11 PROCEDURE — 99239 HOSP IP/OBS DSCHRG MGMT >30: CPT | Performed by: INTERNAL MEDICINE

## 2022-03-11 PROCEDURE — 25010000002 HEPARIN (PORCINE) PER 1000 UNITS: Performed by: INTERNAL MEDICINE

## 2022-03-11 PROCEDURE — 93225 XTRNL ECG REC<48 HRS REC: CPT

## 2022-03-11 PROCEDURE — 80053 COMPREHEN METABOLIC PANEL: CPT | Performed by: INTERNAL MEDICINE

## 2022-03-11 PROCEDURE — 85025 COMPLETE CBC W/AUTO DIFF WBC: CPT | Performed by: INTERNAL MEDICINE

## 2022-03-11 PROCEDURE — 93226 XTRNL ECG REC<48 HR SCAN A/R: CPT

## 2022-03-11 RX ORDER — ASPIRIN 81 MG/1
81 TABLET ORAL DAILY
Qty: 30 TABLET | Refills: 0 | Status: SHIPPED | OUTPATIENT
Start: 2022-03-12 | End: 2022-04-11

## 2022-03-11 RX ADMIN — DONEPEZIL HYDROCHLORIDE 10 MG: 5 TABLET, FILM COATED ORAL at 08:28

## 2022-03-11 RX ADMIN — ASPIRIN 81 MG: 81 TABLET, COATED ORAL at 08:28

## 2022-03-11 RX ADMIN — HEPARIN SODIUM 5000 UNITS: 5000 INJECTION INTRAVENOUS; SUBCUTANEOUS at 05:54

## 2022-03-11 RX ADMIN — ROSUVASTATIN CALCIUM 40 MG: 20 TABLET, FILM COATED ORAL at 08:28

## 2022-03-11 RX ADMIN — Medication 10 ML: at 08:29

## 2022-03-11 RX ADMIN — LEVOTHYROXINE SODIUM 75 MCG: 0.07 TABLET ORAL at 08:28

## 2022-03-11 RX ADMIN — MEMANTINE HYDROCHLORIDE 10 MG: 10 TABLET, FILM COATED ORAL at 08:28

## 2022-03-11 RX ADMIN — SERTRALINE 100 MG: 50 TABLET, FILM COATED ORAL at 08:28

## 2022-03-11 NOTE — DISCHARGE SUMMARY
[facility] HOSPITALISTS DISCHARGE SUMMARY    Patient Identification:  Name:  Brenda Munroe  Age:  71 y.o.  Sex:  female  :  1950  MRN:  [id number]  Visit Number:  [id number]    Date of Admission: 3/8/2022  Date of Discharge:  3/11/2022    PCP: Bernadette Arevalo MD    DISCHARGE DIAGNOSIS  #Marked sinus bradycardia   #Hx of pAfib    #HTN   #Hypothyroidism    #Dementia w/ behavioral disturbance    #CAD   #Significant carotid artery stenosis      CONSULTS   Cardiology     PROCEDURES PERFORMED  None     HOSPITAL COURSE  Patient is a 71 y.o. female presented on 3/8 to [facility] with reports of agitation per nursing facility.  Please see the admitting history and physical for further details.      Ms. Munroe is our 70 yo F with hx hypertension, dementia, hypothyroidism, GERD, COPD, CAD, carotid stenosis, and dysphagia presents with reported chief complaint of confusion and agitation. Patient does not recall this and is a poor historian. She is easily confused but oriented on my exam. Patient found to be bradycardic in the ED down the 30's requiring two doses of IV robinul. Cardiology was consulted by ED provider and they recommended admission for at least observation. Patient with dementia with fluctuations of mental status that has not changed while inpatient. Patient with history of afib but due to the possibility of a pacemaker her apixiban was held on admission.   Patient with significant LOI on US done last year. Appears she did not follow-up after [facility] visit like Dr. Brooks recommended. No syncope. Will have her f/u as outpatient. Patient's heart rate has improved with no recurrent need for atropine or other meds. Lowest HR overnight has been 43. Patient did have episode of pSVT. Discussed with cardiology and they are agreeable to discharge back to nursing home with Holter monitor. Patient has not required the lisinopril or clonidine while inpatient. Will have patient f/u with PCP,  her cardiologist Dr. Culp, and Dr. Brooks.       VITAL SIGNS:  Temp:  [97.4 °F (36.3 °C)-98.6 °F (37 °C)] 97.4 °F (36.3 °C)  Heart Rate:  [40-71] 51  Resp:  [12-25] 15  BP: ()/() 130/80  SpO2:  [92 %-99 %] 97 %  on  Flow (L/min):  [2] 2;   Device (Oxygen Therapy): room air    Body mass index is 25.42 kg/m².  Wt Readings from Last 3 Encounters:   03/11/22 71.4 kg (157 lb 8 oz)   03/03/22 71.7 kg (158 lb)   01/06/22 71.7 kg (158 lb)       PHYSICAL EXAM:  Constitutional:  Well-developed and well-nourished.  No respiratory distress.      HENT:  Head:  Normocephalic and atraumatic.  Mouth:  Moist mucous membranes.    Eyes:  Conjunctivae and EOM are normal.  Pupils are equal, round, and reactive to light.  No scleral icterus.    Cardiovascular:  Normal rate, regular rhythm and normal heart sounds with no murmur.  Pulmonary/Chest:  No respiratory distress, no wheezes, no crackles, with normal breath sounds and good air movement.  Abdominal:  Soft.  Bowel sounds are normal.  No distension and no tenderness.   Musculoskeletal:  No edema, no tenderness, and no deformity.  No red or swollen joints anywhere.    Neurological:  Alert and oriented to person, place, and time.  No gross neurological deficit.   Skin:  Skin is warm and dry. No rash noted. No pallor.   Peripheral vascular:  Strong pulses in all 4 extremities with no clubbing, no cyanosis, no edema.    DISCHARGE DISPOSITION   Stable    DISCHARGE MEDICATIONS:     Discharge Medications      New Medications      Instructions Start Date   aspirin 81 MG EC tablet   81 mg, Oral, Daily   Start Date: March 12, 2022        Continue These Medications      Instructions Start Date   acetaminophen 500 MG tablet  Commonly known as: TYLENOL   500 mg, Oral, Every 4 Hours PRN      amLODIPine 10 MG tablet  Commonly known as: NORVASC   10 mg, Oral, Daily      Anoro Ellipta 62.5-25 MCG/INH aerosol powder  inhaler  Generic drug: umeclidinium-vilanterol   1 puff, Inhalation,  Daily - RT      apixaban 5 MG tablet tablet  Commonly known as: ELIQUIS   5 mg, Oral, 2 Times Daily      baclofen 10 MG tablet  Commonly known as: LIORESAL   5 mg, Oral, 2 Times Daily      busPIRone 15 MG tablet  Commonly known as: BUSPAR   15 mg, Oral, 3 Times Daily      donepezil 10 MG tablet  Commonly known as: ARICEPT   10 mg, Oral, Daily      levothyroxine 75 MCG tablet  Commonly known as: SYNTHROID, LEVOTHROID   75 mcg, Oral, Daily      Lidocaine 4 % patch   1 patch, Apply externally, Daily, Prior to [facility] Admission, Patient was on:  left shoulder      memantine 10 MG tablet  Commonly known as: NAMENDA   10 mg, Oral, 2 Times Daily      rosuvastatin 40 MG tablet  Commonly known as: CRESTOR   40 mg, Oral, Daily      senna 8.6 MG tablet  Commonly known as: SENOKOT   2 tablets, Oral, 2 Times Daily      sertraline 100 MG tablet  Commonly known as: ZOLOFT   100 mg, Oral, Daily      trolamine salicylate 10 % cream  Commonly known as: ASPERCREME   1 application, Topical, 2 Times Daily PRN, Prior to [facility] Admission, Patient was on:  bilateral hands      Vitamin D3 1.25 MG (04023 UT) capsule   50,000 Units, Oral, Every 7 Days         Stop These Medications    cloNIDine 0.1 MG tablet  Commonly known as: CATAPRES     lisinopril 40 MG tablet  Commonly known as: PRINIVIL,ZESTRIL     nebivolol 10 MG tablet  Commonly known as: BYSTOLIC               Follow-up Information     Bernadette Arevalo MD Follow up in 1 week(s).    Specialty: Geriatric Medicine  Contact information:  [address]  [id number]  [facility] [address]  [phone]             Soni Culp MD Follow up in 1 week(s).    Specialty: Cardiology  Contact information:  [address]  [address]  [facility] [id number] [phone]             Jose Brooks MD Follow up in 1 month(s).    Specialties: Cardiothoracic Surgery, Cardiology  Why: F/U severe LOI  Contact information:  [address]  [address]  [facility] [id number] [phone]                           TEST  RESULTS PENDING AT DISCHARGE  Pending Labs     Order Current Status    Blood Culture - Blood, Arm, Left Preliminary result    Blood Culture - Blood, Arm, Right Preliminary result           CODE STATUS  Code Status and Medical Interventions:   Ordered at: 03/08/22 1829     Code Status (Patient has no pulse and is not breathing):    CPR (Attempt to Resuscitate)     Medical Interventions (Patient has pulse or is breathing):    Full Support       Efren Dunham MD  [place] [facility]  03/11/22  09:31 EST    Please note that this discharge summary required more than 30 minutes to complete.

## 2022-03-11 NOTE — DISCHARGE PLACEMENT REQUEST
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 g/dL      BUN/Creatinine Ratio 19.8     Anion Gap 11.2 mmol/L      eGFR 66.8 mL/min/1.73      Comment: National Kidney Foundation and American Society of Nephrology (ASN) Task Force recommended calculation based on the Chronic Kidney Disease Epidemiology Collaboration (CKD-EPI) equation refit without adjustment for race.       Narrative:      GFR Normal >60  Chronic Kidney Disease <60  Kidney Failure <15      CBC & Differential [[id number]]  (Abnormal) Collected: 03/11/22 0038    Specimen: Blood Updated: 03/11/22 0147    Narrative:      The following orders were created for panel order CBC & Differential.  Procedure                               Abnormality         Status                     ---------                               -----------         ------                     CBC Auto Differential[[id number]]        Abnormal            Final result               Scan Slide[[id number]]                                                                     Please view results for these tests on the individual orders.    CBC Auto Differential [[id number]]  (Abnormal) Collected: 03/11/22 0038    Specimen: Blood Updated: 03/11/22 0147     WBC 5.62 10*3/mm3      RBC 4.37 10*6/mm3      Hemoglobin 13.2 g/dL      Hematocrit 40.4 %      MCV 92.4 fL      MCH 30.2 pg      MCHC 32.7 g/dL      RDW 13.7 %      RDW-SD 46.7 fl      MPV 11.5 fL      Platelets 131 10*3/mm3      Neutrophil % 65.3 %      Lymphocyte % 26.0 %      Monocyte % 6.8 %      Eosinophil % 1.1 %      Basophil % 0.4 %      Immature Grans % 0.4 %      Neutrophils, Absolute 3.68 10*3/mm3      Lymphocytes, Absolute 1.46 10*3/mm3      Monocytes, Absolute 0.38 10*3/mm3      Eosinophils, Absolute 0.06 10*3/mm3      Basophils, Absolute 0.02 10*3/mm3      Immature Grans, Absolute 0.02 10*3/mm3      nRBC 0.0 /100 WBC     Blood Culture - Blood, Arm, Right [[id number]]  (Normal) Collected: 03/08/22 1130    Specimen: Blood from Arm, Right Updated: 03/10/22 1147     Blood Culture No growth at 2 days    Blood Culture - Blood, Arm, Left [[id number]]  (Normal) Collected: 03/08/22 1130    Specimen: Blood from Arm, Left Updated: 03/10/22 1146     Blood Culture No growth at 2 days        Orders (last 24 hrs)      Start     Ordered    03/12/22 0000  aspirin 81 MG EC tablet  Daily         03/11/22 0852    03/11/22 0851  Discharge patient  Once         03/11/22 0852    03/11/22 0848  Holter Monitor - 48 Hour  Once         03/11/22 0847    03/11/22 0600  CBC & Differential  Morning Draw         03/10/22 1057    03/11/22 0600  Comprehensive Metabolic Panel  Morning Draw         03/10/22 1057    03/11/22 0600  CBC Auto Differential  PROCEDURE ONCE         03/10/22 2205    03/11/22 0145  Scan Slide  Once,   Status:  Canceled         03/11/22 0144    03/10/22 1309  nicotine polacrilex (NICORETTE) gum 2 mg  Every 1 Hour PRN         03/10/22 1309    03/10/22 1300  hydrOXYzine (ATARAX) tablet 25 mg  Once         03/10/22 1212  "   03/09/22 1100  aspirin EC tablet 81 mg  Daily         03/09/22 0949    03/08/22 2200  heparin (porcine) 5000 UNIT/ML injection 5,000 Units  Every 8 Hours Scheduled         03/08/22 1829    03/08/22 2100  sodium chloride 0.9 % flush 10 mL  Every 12 Hours Scheduled         03/08/22 1751 03/08/22 2100  memantine (NAMENDA) tablet 10 mg  Every 12 Hours Scheduled         03/08/22 1757 03/08/22 2100  senna (SENOKOT) tablet 2 tablet  2 Times Daily         03/08/22 1757    03/08/22 2000  Vital Signs  Every 4 Hours       03/08/22 1751 03/08/22 1951  atropine injection 0.4 mg  Once As Needed         03/08/22 1952 03/08/22 1900  Vitamin D3 50,000 Units  Every 7 Days         03/08/22 1757 03/08/22 1900  donepezil (ARICEPT) tablet 10 mg  Daily         03/08/22 1757    03/08/22 1900  levothyroxine (SYNTHROID, LEVOTHROID) tablet 75 mcg  Daily         03/08/22 1757    03/08/22 1900  rosuvastatin (CRESTOR) tablet 40 mg  Daily         03/08/22 1757    03/08/22 1900  sertraline (ZOLOFT) tablet 100 mg  Daily         03/08/22 1757    03/08/22 1752  Intake & Output  Every Shift       03/08/22 1751 03/08/22 1751  sodium chloride 0.9 % flush 10 mL  As Needed         03/08/22 1751 03/08/22 1751  nitroglycerin (NITROSTAT) SL tablet 0.4 mg  Every 5 Minutes PRN         03/08/22 1751    03/08/22 1116  sodium chloride 0.9 % flush 10 mL  As Needed        \"And\" Linked Group Details    03/08/22 1117    03/08/22 1109  sodium chloride 0.9 % flush 10 mL  As Needed         03/08/22 1109    Unscheduled  Telemetry - Pulse Oximetry  Continuous PRN      Comments: If Patient Develops Unresponsiveness, Acute Dyspnea, Cyanosis or Suspected Hypoxemia Start Continuous Pulse Ox Monitoring, Apply Oxygen & Notify Provider    03/08/22 1751    Unscheduled  Oxygen Therapy- Nasal Cannula; Titrate for SPO2: 90% - 95%  Continuous PRN      Comments: If Patient Develops Unresponsiveness, Acute Dyspnea, Cyanosis or Suspected Hypoxemia Start " Continuous Pulse Ox Monitoring, Apply Oxygen & Notify Provider    03/08/22 1751    Unscheduled  ECG 12 Lead  As Needed      Comments: Nurse to Release if Patient Expericences Acute Chest Pain or Dysrhythmias    03/08/22 1751    Unscheduled  Potassium  As Needed      Comments: For Ventricular Arrhythmias      03/08/22 1751    Unscheduled  Magnesium  As Needed      Comments: For Ventricular Arrhythmias      03/08/22 1751    Unscheduled  Troponin  As Needed      Comments: For Chest Pain      03/08/22 1751    Unscheduled  Blood Gas, Arterial -With Co-Ox Panel: Yes  As Needed      Comments: Per O2 PolicyNotify Physician      03/08/22 1751    --  amLODIPine (NORVASC) 10 MG tablet  Daily         03/08/22 1613    --  umeclidinium-vilanterol (Anoro Ellipta) 62.5-25 MCG/INH aerosol powder  inhaler  Daily - RT         03/08/22 1613    --  Lidocaine 4 % patch  Daily         03/08/22 1613    --  levothyroxine (SYNTHROID, LEVOTHROID) 75 MCG tablet  Daily         03/08/22 1614    --  nebivolol (BYSTOLIC) 10 MG tablet  Daily         03/08/22 1614    --  rosuvastatin (CRESTOR) 40 MG tablet  Daily         03/08/22 1615    --  memantine (NAMENDA) 10 MG tablet  2 Times Daily         03/08/22 1619    --  senna (SENOKOT) 8.6 MG tablet  2 Times Daily         03/08/22 1619    --  trolamine salicylate (ASPERCREME) 10 % cream  2 Times Daily PRN         03/08/22 1619    --  cloNIDine (CATAPRES) 0.1 MG tablet  As Needed         03/08/22 1619    --  SCANNED - TELEMETRY           03/08/22 0000    --  SCANNED - TELEMETRY           03/08/22 0000    --  SCANNED - TELEMETRY           03/08/22 0000    --  SCANNED - TELEMETRY           03/08/22 0000    --  SCANNED - TELEMETRY           03/08/22 0000    --  SCANNED - TELEMETRY           03/08/22 0000    --  SCANNED - TELEMETRY           03/08/22 0000    --  SCANNED - TELEMETRY           03/08/22 0000    --  SCANNED - TELEMETRY           03/08/22 0000    --  SCANNED - TELEMETRY           03/08/22 0000     --  SCANNED - TELEMETRY           22 0000                Operative/Procedure Notes (last 24 hours)  Notes from 03/10/22 0957 through 22 0957   No notes of this type exist for this encounter.            Physician Progress Notes (last 24 hours)      Biju Crystal PA-C at 03/10/22 1239             LOS: 2 days     Name: Brenda Munroe  Age/Sex: 71 y.o. female  :  1950        PCP: Bernadette Arevalo MD    Active Problems:    Bradycardia, sinus    Following for: bradycardia    Telemetry Monitoring: sinus rhythm rate of 52 bpm while in the room tele has rate as low as 39 while she was sleeping    Interval history: she refused to speak with us today, nursing staff tells me she has been more confused today. However, no symptoms concerning from her bradycardia. No syncope, near syncope, or reported dizziness. She has been bed bound though.     Subjective     ROS    Vital Signs  Vitals:    03/10/22 0702 03/10/22 0800 03/10/22 0902 03/10/22 1200   BP: 161/85  (!) 133/105    Pulse: (!) 39  52    Resp:   22    Temp:  98.1 °F (36.7 °C)  98 °F (36.7 °C)   TempSrc:  Oral  Oral   SpO2: 96%  96%    Weight:       Height:         Body mass index is 26.17 kg/m².      Intake/Output Summary (Last 24 hours) at 3/10/2022 1239  Last data filed at 3/10/2022 0822  Gross per 24 hour   Intake 310 ml   Output --   Net 310 ml       Constitutional:       General: Not in acute distress.     Appearance: Healthy appearance. Well-developed and not in distress. Not diaphoretic.   Eyes:      Conjunctiva/sclera: Conjunctivae normal.      Pupils: Pupils are equal, round, and reactive to light.   HENT:      Head: Normocephalic and atraumatic.   Neck:      Vascular: No carotid bruit or JVD.   Pulmonary:      Effort: Pulmonary effort is normal. No respiratory distress.      Breath sounds: Normal breath sounds.   Cardiovascular:      Bradycardia present. Regular rhythm.   Edema:     Peripheral edema absent.   Skin:     General: Skin  is cool.   Neurological:      Mental Status: Alert and oriented to person, place, and time.      Comments: Did not want to speak with us. She seems confused and she thinks that she has only been here for 4 hours and doesn't realize she has been here for the last day.          Results Review:     Results from last 7 days   Lab Units 03/10/22  0727 03/09/22  0056 03/08/22  1130   WBC 10*3/mm3 5.03 6.87 6.37   HEMOGLOBIN g/dL 13.2 12.9 12.5   PLATELETS 10*3/mm3 135* 146 140     Results from last 7 days   Lab Units 03/10/22  0727 03/09/22  0056 03/08/22  1130   SODIUM mmol/L 140 139 142   POTASSIUM mmol/L 3.9 3.8 3.7   CHLORIDE mmol/L 104 105 106   CO2 mmol/L 24.8 25.5 28.3   BUN mg/dL 12 15 14   CREATININE mg/dL 0.75 0.79 0.79   CALCIUM mg/dL 8.8 8.8 8.9   GLUCOSE mg/dL 88 104* 163*   ALT (SGPT) U/L  --   --  10   AST (SGOT) U/L  --   --  14     Results from last 7 days   Lab Units 03/08/22  1111   TROPONIN T ng/mL <0.010             I reviewed the patient's new clinical results.  I reviewed the patient's new imaging results and agree with the interpretation.  I personally viewed and interpreted the patient's EKG/Telemetry data    Medication Review:   aspirin, 81 mg, Oral, Daily  donepezil, 10 mg, Oral, Daily  heparin (porcine), 5,000 Units, Subcutaneous, Q8H  levothyroxine, 75 mcg, Oral, Daily  memantine, 10 mg, Oral, Q12H  rosuvastatin, 40 mg, Oral, Daily  senna, 2 tablet, Oral, BID  sertraline, 100 mg, Oral, Daily  sodium chloride, 10 mL, Intravenous, Q12H  Vitamin D3, 50,000 Units, Oral, Q7 Days           Assessment:  1. Sinus bradycardia with PVCs   2. Carotid artery disease with bilateral 90% stenosis in proximal ICAs  3. Dementia      Recommendations:  1. Patient still bradycardic but does seem improving she is around the 50 bpm range with a few short epsiodes in the upper 30s while asleep. Will continue to monitor for now to see if she continues to improve by holding her home bystolic. Per nursing staff she is  asymptomatic but has been bed bound. If she develops significant symptoms can definitely consider PPM but at this time is not needed. Will need home holter monitor when discharged.     I discussed the patients findings and my recommendations with patient and family    Biju Crystal PA-C, I discussed the case and plan of care of with Dr. Cifuentes who reviewed, examined, and agreed with plan.   Electronically signed by Biju Crystal PA-C, 03/10/22, 12:39 PM EST.   03/10/22  12:39 EST      Electronically signed by Biju Crystal PA-C at 03/10/22 1243     Efren Dunham MD at 03/10/22 1055              [facility] HOSPITALIST PROGRESS NOTE     Patient Identification:  Name:  Brenda Munroe  Age:  71 y.o.  Sex:  female  :  1950  MRN:  [id number]  Visit Number:  [id number]  ROOM: [place] [address]     Primary Care Provider:  Bernadette Arevalo MD    Length of stay in inpatient status:  2    Subjective     Chief Compliant:    Chief Complaint   Patient presents with   • Altered Mental Status       History of Presenting Illness:    Patient denies any new complaints. Is hopeful to get out of the hospital soon. Did not require any PRN atropine overnight.  No family bedside.     ROS:  Otherwise 10 point ROS negative other than documented above in HPI.     Objective     Current Hospital Meds:aspirin, 81 mg, Oral, Daily  donepezil, 10 mg, Oral, Daily  heparin (porcine), 5,000 Units, Subcutaneous, Q8H  levothyroxine, 75 mcg, Oral, Daily  memantine, 10 mg, Oral, Q12H  rosuvastatin, 40 mg, Oral, Daily  senna, 2 tablet, Oral, BID  sertraline, 100 mg, Oral, Daily  sodium chloride, 10 mL, Intravenous, Q12H  Vitamin D3, 50,000 Units, Oral, Q7 Days         Current Antimicrobial Therapy:  Anti-Infectives (From admission, onward)    None        Current Diuretic Therapy:  Diuretics (From admission, onward)    None         ----------------------------------------------------------------------------------------------------------------------  Vital Signs:  Temp:  [98 °F (36.7 °C)-99.1 °F (37.3 °C)] 98.1 °F (36.7 °C)  Heart Rate:  [37-52] 52  Resp:  [22] 22  BP: (108-161)/() 133/105  SpO2:  [89 %-98 %] 96 %  on   ;   Device (Oxygen Therapy): room air  Body mass index is 26.17 kg/m².    Wt Readings from Last 3 Encounters:   03/10/22 73.5 kg (162 lb 1.8 oz)   03/03/22 71.7 kg (158 lb)   01/06/22 71.7 kg (158 lb)     Intake & Output (last 3 days)       03/07 0701 03/08 0700 03/08 0701  03/09 0700 03/09 0701  03/10 0700 03/10 0701 03/11 0700    P.O.   580 210    Total Intake(mL/kg)   580 (7.9) 210 (2.9)    Net   +580 +210            Urine Unmeasured Occurrence   1 x     Stool Unmeasured Occurrence    1 x        Diet Regular  ----------------------------------------------------------------------------------------------------------------------  Physical exam:  Constitutional:  Well-developed and well-nourished.  No respiratory distress.      HENT:  Head:  Normocephalic and atraumatic.  Mouth:  Moist mucous membranes.    Eyes:  Conjunctivae and EOM are normal. No scleral icterus.    Neck:  Neck supple.  No JVD present.    Cardiovascular:  Normal rate, regular rhythm and normal heart sounds with no murmur.  Pulmonary/Chest:  No respiratory distress, no wheezes, no crackles, with normal breath sounds and good air movement.  Abdominal:  Soft.  Bowel sounds are normal.  No distension and no tenderness.   Musculoskeletal:  No edema, no tenderness, and no deformity.  No red or swollen joints anywhere.    Neurological:  Alert and oriented to person, place, and time.  No cranial nerve deficit.  No tongue deviation.  No facial droop.  No slurred speech.   Skin:  Skin is warm and dry. No rash noted. No pallor.   Peripheral vascular:  Pulses in all 4 extremities with no clubbing, no cyanosis, no  edema.  ----------------------------------------------------------------------------------------------------------------------  Tele:    ----------------------------------------------------------------------------------------------------------------------  Results from last 7 days   Lab Units 03/10/22  0727 03/09/22  0056 03/08/22  1130 03/08/22  1111   CRP mg/dL  --   --   --  <0.30   LACTATE mmol/L  --   --  1.6  --    WBC 10*3/mm3 5.03 6.87 6.37  --    HEMOGLOBIN g/dL 13.2 12.9 12.5  --    HEMATOCRIT % 41.1 41.0 39.4  --    MCV fL 92.4 94.5 93.6  --    MCHC g/dL 32.1 31.5 31.7  --    PLATELETS 10*3/mm3 135* 146 140  --          Results from last 7 days   Lab Units 03/10/22  0727 03/09/22  0056 03/08/22  1130 03/08/22  1111   SODIUM mmol/L 140 139 142  --    POTASSIUM mmol/L 3.9 3.8 3.7  --    MAGNESIUM mg/dL 1.7 1.8  --  1.9   CHLORIDE mmol/L 104 105 106  --    CO2 mmol/L 24.8 25.5 28.3  --    BUN mg/dL 12 15 14  --    CREATININE mg/dL 0.75 0.79 0.79  --    CALCIUM mg/dL 8.8 8.8 8.9  --    GLUCOSE mg/dL 88 104* 163*  --    ALBUMIN g/dL  --   --  3.80  --    BILIRUBIN mg/dL  --   --  0.3  --    ALK PHOS U/L  --   --  66  --    AST (SGOT) U/L  --   --  14  --    ALT (SGPT) U/L  --   --  10  --    Estimated Creatinine Clearance: 64.3 mL/min (by C-G formula based on SCr of 0.75 mg/dL).  No results found for: AMMONIA  Results from last 7 days   Lab Units 03/08/22  1111   TROPONIN T ng/mL <0.010     Results from last 7 days   Lab Units 03/08/22  1111   PROBNP pg/mL 339.6         No results found for: HGBA1C, POCGLU  Lab Results   Component Value Date    TSH 1.610 03/08/2022    FREET4 1.59 03/25/2021     No results found for: PREGTESTUR, PREGSERUM, HCG, HCGQUANT  Pain Management Panel     Pain Management Panel Latest Ref Rng & Units 3/8/2022 3/25/2021    AMPHETAMINES SCREEN, URINE Negative Negative Negative    BARBITURATES SCREEN Negative Negative Positive(A)    BENZODIAZEPINE SCREEN, URINE Negative Negative Negative     BUPRENORPHINEUR Negative Negative Negative    COCAINE SCREEN, URINE Negative Negative Negative    METHADONE SCREEN, URINE Negative Negative Negative    METHAMPHETAMINEUR Negative Negative -        Brief Urine Lab Results  (Last result in the past 365 days)      Color   Clarity   Blood   Leuk Est   Nitrite   Protein   CREAT   Urine HCG        03/08/22 1325 Yellow   Clear   Negative   Negative   Negative   Negative               Blood Culture   Date Value Ref Range Status   03/08/2022 No growth at 24 hours  Preliminary   03/08/2022 No growth at 24 hours  Preliminary     No results found for: URINECX  No results found for: WOUNDCX  No results found for: STOOLCX  No results found for: RESPCX  No results found for: AFBCX  Results from last 7 days   Lab Units 03/08/22  1130 03/08/22  1111   LACTATE mmol/L 1.6  --    CRP mg/dL  --  <0.30       I have personally looked at the labs and they are summarized above.  ----------------------------------------------------------------------------------------------------------------------  Detailed radiology reports for the last 24 hours:    Imaging Results (Last 24 Hours)     ** No results found for the last 24 hours. **        Assessment & Plan    #Marked sinus bradycardia   - Required 2 doses of glycopyrrolate in ED. Has PRN atropine ordered but has not required any since admission.   - Hold home bystolic and monitor    - HR overall improved but still dipped down to 39 a couple of times overnight. Still appears to be sinus rhythm.   - Cardiology consulted. Appreciate recs.       #Hx of pAfib    - In sinus rythem    - Holding home bystolic as above.    - In case pacemaker is needed will hold home apixiban and do subQ heparin. Bridging not needed for afib.        #HTN   - Hold all AV sami blockade as above. Restart other home regimen as needed.        #Hypothyroidism    - TSH wnl, continue home regimen for now.        #Dementia w/ behavioral disturbance    - Supportive care and  continue home regimen        #CAD   - Continue home regimen        #Significant carotid artery stenosis    - On carotid US in 4/21. Does not appear she followed up after neurosurgery evaluation for C2 hangman's fracture which Dr. Brooks recommended first.    - No syncope episodes. Will defer to outpatient vascular surgery evaluation.    - Cardiology consulted. Started ASA 81 mg daily.       F: PO   E: Replace as needed    N: Cardiac        Code status: Full        Dispo: Admit to PCU for close cardiac monitoring. Pending improvement in bradycardia. Has bed hold at [facility] and rehab.       VTE Prophylaxis:   Mechanical Order History:     None      Pharmalogical Order History:      Ordered     Dose Route Frequency Stop    03/08/22 1751  heparin (porcine) 5000 UNIT/ML injection 5,000 Units  Status:  Discontinued         5,000 Units SC Every 8 Hours Scheduled 03/08/22 1757 03/08/22 1829  heparin (porcine) 5000 UNIT/ML injection 5,000 Units         5,000 Units SC Every 8 Hours Scheduled --    03/08/22 1757  apixaban (ELIQUIS) tablet 5 mg  Status:  Discontinued         5 mg PO Every 12 Hours Scheduled 03/08/22 1829                Efren Dunham MD  [facility]  03/10/22  10:55 EST    Electronically signed by Efren Dunham MD at 03/10/22 1056       Consult Notes (last 24 hours)  Notes from 03/10/22 0957 through 03/11/22 0957   No notes of this type exist for this encounter.         Physical Therapy Notes (last 24 hours)  Notes from 03/10/22 0958 through 03/11/22 0958   No notes exist for this encounter.         Occupational Therapy Notes (last 24 hours)  Notes from 03/10/22 0958 through 03/11/22 0958   No notes exist for this encounter.         Speech Language Pathology Notes (last 24 hours)  Notes from 03/10/22 0958 through 03/11/22 0958   No notes exist for this encounter.         ADL Documentation (last day)     Date/Time Transferring Toileting Bathing Dressing Eating Communication  Swallowing    03/11/22 0815 0 - independent 0 - independent 2 - assistive person 2 - assistive person 0 - independent 0 - understands/communicates without difficulty 0 - swallows foods/liquids without difficulty    03/11/22 0215 0 - independent 0 - independent 2 - assistive person 2 - assistive person 0 - independent 0 - understands/communicates without difficulty 0 - swallows foods/liquids without difficulty    03/10/22 2015 0 - independent 0 - independent 2 - assistive person 2 - assistive person 0 - independent 0 - understands/communicates without difficulty 0 - swallows foods/liquids without difficulty    03/10/22 1415 0 - independent 0 - independent 2 - assistive person 2 - assistive person 0 - independent 0 - understands/communicates without difficulty 0 - swallows foods/liquids without difficulty    03/10/22 0815 0 - independent 0 - independent 2 - assistive person 2 - assistive person 0 - independent 0 - understands/communicates without difficulty 0 - swallows foods/liquids without difficulty    03/10/22 0200 0 - independent 0 - independent 2 - assistive person 2 - assistive person 0 - independent 0 - understands/communicates without difficulty 0 - swallows foods/liquids without difficulty              After Visit Summary    Brenda Munroe  MRN: [id number]    Icon Date   3/8/2022 - 3/11/2022   Icon Location   [facility] PROGRESS CARE     Icon Checklist header      Your Next Steps      Icon do   Do     Icon Checkbox     these medications from [facility] [place]. - [place], [place] - [address] [place] [address] [phone] [facility] [phone] FX  1 aspirin      Icon Location       [place] 2 Follow Up 11:15 AM   Florencia Willett MD  [facility] GASTROENTEROLOGY & UROLOGY   [address]   [facility] [address] [phone]   Please bring any related outside labs/imaging on a disc. If insurance has changed, please bring updated information.       [facility] Sign-Up    Send  "messages to your doctor, view your test results, renew your prescriptions, schedule appointments, and more.     Go to [email or web address], click \"Sign Up Now\", and enter your personal activation code: [email or web address]. Activation code expires 4/2/2022.      After Visit Summary    Instructions     Icon medication changes this visit             Your medications have changed    Icon medications to start taking   START taking:   aspirin   Start taking on: March 12, 2022      Icon medications to stop taking   STOP taking:   cloNIDine 0.1 MG tablet (CATAPRES)      lisinopril 40 MG tablet (PRINIVIL,ZESTRIL)      nebivolol 10 MG tablet (BYSTOLIC)       Review your updated medication list below.    Your Next Steps  Icon do   Do  Icon Location   Go        COVID-19 Vaccination Information  Why Get Vaccinated?  Building defenses against COVID-19 is a team effort, and you are a pérez part of that team. Getting the COVID-19 vaccine adds one more layer of protection for you, your coworkers, and family. Here are ways you can build people’s confidence in the COVID-19 vaccines in your community and at home.     · Get vaccinated and enroll in the v[employer]safe text messaging program to help CDC monitor vaccine safety.   · Tell others why you are getting vaccinated and encourage them to get vaccinated. Share your success story.    · Learn how to have conversations about COVID-19 vaccine with coworkers, family, and friends.  · https://www.cdc.gov/coronavirus/2019-ncov/vaccines/index.html     How do I schedule an appointment for a vaccine?  https://www.vaccines.gov/ helps you find locations that carry COVID-19 vaccines and their contact information. Because every location handles appointments differently, you will need to schedule your appointment directly with the location you choose.          If you have any questions about your recovery, please call the [facility] Nurse Call Center at [phone]. A " registered nurse is available 24 hours a day 7 days a week to assist you.   If you have any COVID-19 related questions, please call [phone].                            What's Next    What's Next          Follow up with Bernadette Arevalo MD in 1 week(s) [address]   [address]   [facility] [address]  [phone]          Follow up with Soni Culp MD in 1 week(s) [address]   [address]   [facility] [id number] [phone]          Follow up with Jose Brooks MD in 1 month(s)  F/U severe LOI [address]   [address]   [facility] [address]  [phone]    Follow Up with Florencia Willett MD   11:15 AM   Please bring any related outside labs/imaging on a disc. If insurance has changed, please bring updated information. [facility]  [address] [facility]. [address]   [facility] [address] [phone]             Your Allergies  Date Reviewed: 3/8/2022  Your Allergies   Allergen Reactions   Latex Other (See Comments)   ?         Patient Belongings Returned      Document Return of Belongings Flowsheet    Were the patient bedside belongings sent home? --   Medications Retrieved from Pharmacy & Sent Home --   Belongings Sent to Safe --   Belongings sent with: --   Belongings Retrieved from Security & Sent Home --           MyChart Signup    [facility] [employer] allows you to send messages to your doctor, view your test results, renew your prescriptions, schedule appointments, and more. To sign up, go to [employer] and click on the Sign Up Now link in the New User? box. Enter your [employer] Activation Code exactly as it appears below along with the last four digits of your Social Security Number and your Date of Birth () to complete the sign-up process. If you do not sign up before the expiration date, you must request a new code.     [employer] Activation Code: [email or web address]  Expires: 2022 10:49 AM     If you have  questions, you can email [email or web address] or call [phone] to talk to our [facility] staff. Remember, [facility] is NOT to be used for urgent needs. For medical emergencies, dial 911.       Medication List    Medication List     Morning Afternoon Evening Bedtime As Needed    acetaminophen 500 MG tablet  Commonly known as: TYLENOL  Take 500 mg by mouth Every 4 (Four) Hours As Needed for Mild Pain .         amLODIPine 10 MG tablet  Commonly known as: NORVASC  Take 10 mg by mouth Daily.         Anoro Ellipta 62.5-25 MCG/INH aerosol powder  inhaler  Inhale 1 puff Daily.  Generic drug: umeclidinium-vilanterol         apixaban 5 MG tablet tablet  Commonly known as: ELIQUIS  Take 5 mg by mouth 2 (Two) Times a Day.        Icon medications to start taking   aspirin 81 MG EC tablet  Start taking on: March 12, 2022  Take 1 tablet by mouth Daily for 30 days.  Last time this was given: 81 mg on March 11, 2022  8:28 AM         baclofen 10 MG tablet  Commonly known as: LIORESAL  Take 5 mg by mouth 2 (Two) Times a Day. Indications: Muscle Spasticity  For: Muscle Spasticity         busPIRone 15 MG tablet  Commonly known as: BUSPAR  Take 15 mg by mouth 3 (Three) Times a Day.         donepezil 10 MG tablet  Commonly known as: ARICEPT  Take 10 mg by mouth Daily.  Last time this was given: 10 mg on March 11, 2022  8:28 AM         levothyroxine 75 MCG tablet  Commonly known as: SYNTHROID, LEVOTHROID  Take 75 mcg by mouth Daily.  Last time this was given: 75 mcg on March 11, 2022  8:28 AM         Lidocaine 4 % patch  Apply 1 patch topically Daily. Prior to [facility] [place] [place] [facility] Admission, Patient was on: left shoulder         memantine 10 MG tablet  Commonly known as: NAMENDA  Take 10 mg by mouth 2 (Two) Times a Day.  Last time this was given: 10 mg on March 11, 2022  8:28 AM         rosuvastatin 40 MG tablet  Commonly known as: CRESTOR  Take 40 mg by mouth Daily.  Last time this was given: 40 mg on March 11, 2022  8:28 AM         senna 8.6  MG tablet  Commonly known as: SENOKOT  Take 2 tablets by mouth 2 (Two) Times a Day.  Last time this was given: 2 tablets on March 9, 2022  8:09 AM         sertraline 100 MG tablet  Commonly known as: ZOLOFT  Take 100 mg by mouth Daily.  Last time this was given: 100 mg on March 11, 2022  8:28 AM         trolamine salicylate 10 % cream  Commonly known as: ASPERCREME  Apply 1 application topically to the appropriate area as directed 2 (Two) Times a Day As Needed for Muscle / Joint Pain. Prior to [facility] Admission, Patient was on: bilateral hands         Vitamin D3 1.25 MG (74991 UT) capsule  Take 50,000 Units by mouth Every 7 (Seven) Days.  Last time this was given: 50,000 Units on March 8, 2022  9:35 PM            Where to  your medications     Icon medication  information                     these medications at [facility]. - [place], [place] - [address] [place] [address] - [phone] [place] [phone] FX     aspirin    Address: [address] [place] [address]   Phone: [phone]           Always carry an updated list of your medications with you. If there is an emergency, a responder can quickly see what medications you are taking. Take this paperwork with you the next time you see your health care provider.          MyChart Sign-Up      Instructions    Please take medications as prescribed. Please go to follow-up appointments as recommended. Please seek medical attention if you have dizziness, syncope, chest pain, shortness of breath.      Please continue social distancing and isolation efforts as recommended by CDC and [facility] to help prevent spread of COVID-19.      Additional Instructions    Click to add instructions                  Opioid Resource    If you or someone you know needs information on substance abuse, please visit   https://www.findhelpnowky.org/ for listings of facilities and resources across [place].    Stroke Symptoms    · Call 911 or have someone take you to the  Emergency Department if you have any of the following:  · Sudden numbness or weakness of your face, arm or leg especially on one side of the body  · Sudden confusion, difficulty speaking or trouble understanding   · Changes in your vision or loss of sight in one eye  · Sudden severe headache with no known cause  · Sudden dizziness, trouble walking, loss of balance or coordination     It is important to seek emergency care right away if you have further stroke symptoms. If you get emergency help quickly, the powerful clot-dissolving medicines can reduce the disabilities caused by a stroke.      For more information:  American Stroke Association  5-543-9-STROKE  www.strokeassociation.org    Smoking Cessation    IF YOU SMOKE OR USE TOBACCO PLEASE READ THE FOLLOWING:  Why is smoking bad for me?  Smoking increases the risk of heart disease, lung disease, vascular disease, stroke, and cancer. If you smoke, STOP!     For more information:  Quit Now [place]  1-800-QUIT-NOW  https://[place].YesmaillogOneShift.org/en-US/    Suicidal Feelings    If you feel like life is too tough and are thinking of suicide or injuring yourself, get help right away!  · Call 911  · Call a suicide hotline to speak to a counselor. 0-659-089-TALK or 5-992-AVTTJSL       Patient Experience    Thank you for choosing [facility]. You may receive a survey following your visit. Please take a moment to share what went well, where we need improvement, and which staff members deserve recognition. We value your input.         ? ?                YOU ARE THE MOST IMPORTANT FACTOR IN YOUR RECOVERY.      Follow all instructions carefully.      I have reviewed my discharge instructions with my nurse, including the following information, if applicable:                 Information about my illness and diagnosis              Follow up appointments (including lab draws)              Wound Care              Equipment Needs              Medications (new and continuing)  along with side effects              Preventative information such as vaccines and smoking cessations              Diet              Pain              I know when to contact my Doctor's office or seek emergency care        I want my nurse to describe the side effects of my medications: YES NO   If the answer is no, I understand the side effects of my medications: YES NO   My nurse described the side effects of my medications in a way that I could understand: YES NO   I have taken my personal belongings and my own medications with me at discharge: YES NO      I have received this information and my questions have been answered. I have discussed any concerns I see with this plan with the nurse or physician. I understand these instructions.     Signature of Patient or Responsible Person: _____________________________________     Date: _________________  Time: __________________     Signature of Healthcare Provider: _______________________________________  Date: _________________  Time: __________________        Discharge Summary      Efren Dunham MD at 22 0840              [facility] HOSPITALISTS DISCHARGE SUMMARY    Patient Identification:  Name:  Brenda Munroe  Age:  71 y.o.  Sex:  female  :  1950  MRN:  [id number]  Visit Number:  [id number]    Date of Admission: 3/8/2022  Date of Discharge:  3/11/2022    PCP: Bernadette Arevalo MD    DISCHARGE DIAGNOSIS  #Marked sinus bradycardia   #Hx of pAfib    #HTN   #Hypothyroidism    #Dementia w/ behavioral disturbance    #CAD   #Significant carotid artery stenosis      CONSULTS   Cardiology     PROCEDURES PERFORMED  None     HOSPITAL COURSE  Patient is a 71 y.o. female presented on 3/8 to [facility] with reports of agitation per nursing facility.  Please see the admitting history and physical for further details.      Ms. Munroe is our 70 yo F with hx hypertension, dementia, hypothyroidism, GERD, COPD, CAD, carotid stenosis, and  dysphagia presents with reported chief complaint of confusion and agitation. Patient does not recall this and is a poor historian. She is easily confused but oriented on my exam. Patient found to be bradycardic in the ED down the 30's requiring two doses of IV robinul. Cardiology was consulted by ED provider and they recommended admission for at least observation. Patient with dementia with fluctuations of mental status that has not changed while inpatient. Patient with history of afib but due to the possibility of a pacemaker her apixiban was held on admission.   Patient with significant LOI on US done last year. Appears she did not follow-up after [facility] visit like Dr. Brooks recommended. No syncope. Will have her f/u as outpatient. Patient's heart rate has improved with no recurrent need for atropine or other meds. Lowest HR overnight has been 43. Patient did have episode of pSVT. Discussed with cardiology and they are agreeable to discharge back to nursing home with Holter monitor. Patient has not required the lisinopril or clonidine while inpatient. Will have patient f/u with PCP, her cardiologist Dr. Culp, and Dr. Brooks.       VITAL SIGNS:  Temp:  [97.4 °F (36.3 °C)-98.6 °F (37 °C)] 97.4 °F (36.3 °C)  Heart Rate:  [40-71] 51  Resp:  [12-25] 15  BP: ()/() 130/80  SpO2:  [92 %-99 %] 97 %  on  Flow (L/min):  [2] 2;   Device (Oxygen Therapy): room air    Body mass index is 25.42 kg/m².  Wt Readings from Last 3 Encounters:   03/11/22 71.4 kg (157 lb 8 oz)   03/03/22 71.7 kg (158 lb)   01/06/22 71.7 kg (158 lb)       PHYSICAL EXAM:  Constitutional:  Well-developed and well-nourished.  No respiratory distress.      HENT:  Head:  Normocephalic and atraumatic.  Mouth:  Moist mucous membranes.    Eyes:  Conjunctivae and EOM are normal.  Pupils are equal, round, and reactive to light.  No scleral icterus.    Cardiovascular:  Normal rate, regular rhythm and normal heart sounds with no murmur.  Pulmonary/Chest:   No respiratory distress, no wheezes, no crackles, with normal breath sounds and good air movement.  Abdominal:  Soft.  Bowel sounds are normal.  No distension and no tenderness.   Musculoskeletal:  No edema, no tenderness, and no deformity.  No red or swollen joints anywhere.    Neurological:  Alert and oriented to person, place, and time.  No gross neurological deficit.   Skin:  Skin is warm and dry. No rash noted. No pallor.   Peripheral vascular:  Strong pulses in all 4 extremities with no clubbing, no cyanosis, no edema.    DISCHARGE DISPOSITION   Stable    DISCHARGE MEDICATIONS:     Discharge Medications      New Medications      Instructions Start Date   aspirin 81 MG EC tablet   81 mg, Oral, Daily   Start Date: March 12, 2022        Continue These Medications      Instructions Start Date   acetaminophen 500 MG tablet  Commonly known as: TYLENOL   500 mg, Oral, Every 4 Hours PRN      amLODIPine 10 MG tablet  Commonly known as: NORVASC   10 mg, Oral, Daily      Anoro Ellipta 62.5-25 MCG/INH aerosol powder  inhaler  Generic drug: umeclidinium-vilanterol   1 puff, Inhalation, Daily - RT      apixaban 5 MG tablet tablet  Commonly known as: ELIQUIS   5 mg, Oral, 2 Times Daily      baclofen 10 MG tablet  Commonly known as: LIORESAL   5 mg, Oral, 2 Times Daily      busPIRone 15 MG tablet  Commonly known as: BUSPAR   15 mg, Oral, 3 Times Daily      donepezil 10 MG tablet  Commonly known as: ARICEPT   10 mg, Oral, Daily      levothyroxine 75 MCG tablet  Commonly known as: SYNTHROID, LEVOTHROID   75 mcg, Oral, Daily      Lidocaine 4 % patch   1 patch, Apply externally, Daily, Prior to [facility] [place] [place] [facility] Admission, Patient was on:  left shoulder      memantine 10 MG tablet  Commonly known as: NAMENDA   10 mg, Oral, 2 Times Daily      rosuvastatin 40 MG tablet  Commonly known as: CRESTOR   40 mg, Oral, Daily      senna 8.6 MG tablet  Commonly known as: SENOKOT   2 tablets, Oral, 2 Times Daily      sertraline 100 MG tablet  Commonly  known as: ZOLOFT   100 mg, Oral, Daily      trolamine salicylate 10 % cream  Commonly known as: ASPERCREME   1 application, Topical, 2 Times Daily PRN, Prior to [facility] Admission, Patient was on:  bilateral hands      Vitamin D3 1.25 MG (77950 UT) capsule   50,000 Units, Oral, Every 7 Days         Stop These Medications    cloNIDine 0.1 MG tablet  Commonly known as: CATAPRES     lisinopril 40 MG tablet  Commonly known as: PRINIVIL,ZESTRIL     nebivolol 10 MG tablet  Commonly known as: BYSTOLIC               Follow-up Information     Bernadette Arevalo MD Follow up in 1 week(s).    Specialty: Geriatric Medicine  Contact information:  [address]  [address]  [place] [place] [address]  [phone]             Soni Culp MD Follow up in 1 week(s).    Specialty: Cardiology  Contact information:  [address] [facility]  [address]  [facility] [address]  [phone]             Jose Brooks MD Follow up in 1 month(s).    Specialties: Cardiothoracic Surgery, Cardiology  Why: F/U severe LOI  Contact information:  [address]  [address]  [place] [address]  [phone]                          TEST  RESULTS PENDING AT DISCHARGE  Pending Labs     Order Current Status    Blood Culture - Blood, Arm, Left Preliminary result    Blood Culture - Blood, Arm, Right Preliminary result           CODE STATUS  Code Status and Medical Interventions:   Ordered at: 03/08/22 1829     Code Status (Patient has no pulse and is not breathing):    CPR (Attempt to Resuscitate)     Medical Interventions (Patient has pulse or is breathing):    Full Support       Efren Dunham MD  [facility]  03/11/22  09:31 EST    Please note that this discharge summary required more than 30 minutes to complete.      Electronically signed by Efren Dunham MD at 03/11/22 0932       Discharge Order (From admission, onward)     Start     Ordered    03/11/22 0851  Discharge patient  Once        Expected Discharge Date: 03/11/22    Expected Discharge  Time: Morning    Discharge Disposition: Skilled Nursing Facility (DC - External)    Physician of Record for Attribution - Please select from Treatment Team: SALAS CHAVEZ [[id number]]    Review needed by CMO to determine Physician of Record: No       Question Answer Comment   Physician of Record for Attribution - Please select from Treatment Team SALAS CHAVEZ    Review needed by CMO to determine Physician of Record No        03/11/22 0852

## 2022-03-11 NOTE — PLAN OF CARE
Problem: Adult Inpatient Plan of Care  Goal: Plan of Care Review  Outcome: Ongoing, Progressing  Goal: Patient-Specific Goal (Individualized)  Outcome: Ongoing, Progressing  Goal: Absence of Hospital-Acquired Illness or Injury  Outcome: Ongoing, Progressing  Intervention: Identify and Manage Fall Risk  Recent Flowsheet Documentation  Taken 3/11/2022 0102 by Monalisa Lock RN  Safety Promotion/Fall Prevention:   activity supervised   assistive device/personal items within reach   clutter free environment maintained   fall prevention program maintained   nonskid shoes/slippers when out of bed   room organization consistent   safety round/check completed  Taken 3/10/2022 2302 by Monalisa Lock RN  Safety Promotion/Fall Prevention:   activity supervised   assistive device/personal items within reach   clutter free environment maintained   fall prevention program maintained   nonskid shoes/slippers when out of bed   room organization consistent   safety round/check completed  Taken 3/10/2022 2102 by Monalisa Lock RN  Safety Promotion/Fall Prevention:   activity supervised   assistive device/personal items within reach   clutter free environment maintained   fall prevention program maintained   nonskid shoes/slippers when out of bed   room organization consistent   safety round/check completed  Taken 3/10/2022 2015 by Monalisa Lock RN  Safety Promotion/Fall Prevention:   activity supervised   assistive device/personal items within reach   clutter free environment maintained   fall prevention program maintained   muscle strengthening facilitated   nonskid shoes/slippers when out of bed   room organization consistent   safety round/check completed  Taken 3/10/2022 1902 by Monalisa Lock RN  Safety Promotion/Fall Prevention:   activity supervised   assistive device/personal items within reach   clutter free environment maintained   fall prevention program maintained   nonskid shoes/slippers when out of bed   room  organization consistent   safety round/check completed  Intervention: Prevent Skin Injury  Recent Flowsheet Documentation  Taken 3/11/2022 0102 by Monalisa Lock RN  Body Position: position changed independently  Taken 3/10/2022 2302 by Monalisa Lock RN  Body Position: position changed independently  Taken 3/10/2022 2102 by Monalisa Lock RN  Body Position: position changed independently  Skin Protection: adhesive use limited  Taken 3/10/2022 2015 by Monalisa Lock RN  Body Position: position changed independently  Skin Protection: adhesive use limited  Taken 3/10/2022 1902 by Monalisa Lock RN  Body Position: position changed independently  Intervention: Prevent and Manage VTE (Venous Thromboembolism) Risk  Recent Flowsheet Documentation  Taken 3/11/2022 0102 by Monalisa Lock RN  Activity Management: activity adjusted per tolerance  Taken 3/10/2022 2302 by Monalisa Lock RN  Activity Management: activity adjusted per tolerance  Taken 3/10/2022 2102 by Monalsia Lock RN  Activity Management: activity adjusted per tolerance  Taken 3/10/2022 2015 by Monalisa Lock RN  Activity Management: activity adjusted per tolerance  VTE Prevention/Management: bleeding risk factor(s) identified  Range of Motion: active ROM (range of motion) encouraged  Taken 3/10/2022 1902 by Monalisa Lock RN  Activity Management: activity adjusted per tolerance  Intervention: Prevent Infection  Recent Flowsheet Documentation  Taken 3/11/2022 0102 by Monalisa oLck RN  Infection Prevention:   rest/sleep promoted   single patient room provided  Taken 3/10/2022 2302 by Monalisa Lock RN  Infection Prevention:   rest/sleep promoted   single patient room provided  Taken 3/10/2022 2200 by Monalisa Lock RN  Infection Prevention:   rest/sleep promoted   single patient room provided  Taken 3/10/2022 2102 by Monalisa Lock RN  Infection Prevention:   rest/sleep promoted   single patient room provided  Taken 3/10/2022 2015 by Monalisa Lock  RN  Infection Prevention: rest/sleep promoted  Taken 3/10/2022 1902 by Monalisa Lock RN  Infection Prevention:   rest/sleep promoted   single patient room provided  Goal: Optimal Comfort and Wellbeing  Outcome: Ongoing, Progressing  Intervention: Provide Person-Centered Care  Recent Flowsheet Documentation  Taken 3/10/2022 2015 by Monalisa Lock RN  Trust Relationship/Rapport:   care explained   choices provided   emotional support provided   empathic listening provided   questions answered   questions encouraged   reassurance provided   thoughts/feelings acknowledged  Goal: Readiness for Transition of Care  Outcome: Ongoing, Progressing     Problem: Fall Injury Risk  Goal: Absence of Fall and Fall-Related Injury  Outcome: Ongoing, Progressing  Intervention: Identify and Manage Contributors  Recent Flowsheet Documentation  Taken 3/11/2022 0102 by Monalisa Lock RN  Medication Review/Management: medications reviewed  Taken 3/10/2022 2302 by Monalisa Lock RN  Medication Review/Management: medications reviewed  Taken 3/10/2022 2200 by Monalisa Lock RN  Medication Review/Management: medications reviewed  Taken 3/10/2022 2015 by Monalisa Lock RN  Medication Review/Management: medications reviewed  Taken 3/10/2022 1902 by Monalisa Lock RN  Medication Review/Management: medications reviewed  Intervention: Promote Injury-Free Environment  Recent Flowsheet Documentation  Taken 3/11/2022 0102 by Monalisa Lock RN  Safety Promotion/Fall Prevention:   activity supervised   assistive device/personal items within reach   clutter free environment maintained   fall prevention program maintained   nonskid shoes/slippers when out of bed   room organization consistent   safety round/check completed  Taken 3/10/2022 2302 by Monalisa Lock RN  Safety Promotion/Fall Prevention:   activity supervised   assistive device/personal items within reach   clutter free environment maintained   fall prevention program maintained   nonskid  shoes/slippers when out of bed   room organization consistent   safety round/check completed  Taken 3/10/2022 2102 by Monalisa Lock RN  Safety Promotion/Fall Prevention:   activity supervised   assistive device/personal items within reach   clutter free environment maintained   fall prevention program maintained   nonskid shoes/slippers when out of bed   room organization consistent   safety round/check completed  Taken 3/10/2022 2015 by Monalisa Lokc RN  Safety Promotion/Fall Prevention:   activity supervised   assistive device/personal items within reach   clutter free environment maintained   fall prevention program maintained   muscle strengthening facilitated   nonskid shoes/slippers when out of bed   room organization consistent   safety round/check completed  Taken 3/10/2022 1902 by Monalisa Lock RN  Safety Promotion/Fall Prevention:   activity supervised   assistive device/personal items within reach   clutter free environment maintained   fall prevention program maintained   nonskid shoes/slippers when out of bed   room organization consistent   safety round/check completed     Problem: Heart Failure Comorbidity  Goal: Maintenance of Heart Failure Symptom Control  Outcome: Ongoing, Progressing  Intervention: Maintain Heart Failure-Management  Recent Flowsheet Documentation  Taken 3/11/2022 0102 by Monalisa Lock RN  Medication Review/Management: medications reviewed  Taken 3/10/2022 2302 by Monalisa Lock RN  Medication Review/Management: medications reviewed  Taken 3/10/2022 2200 by Monalisa Lock RN  Medication Review/Management: medications reviewed  Taken 3/10/2022 2015 by Monalisa Lock RN  Medication Review/Management: medications reviewed  Taken 3/10/2022 1902 by Moanlisa Lock RN  Medication Review/Management: medications reviewed     Problem: Skin Injury Risk Increased  Goal: Skin Health and Integrity  Outcome: Ongoing, Progressing  Intervention: Promote and Optimize Oral Intake  Recent  Flowsheet Documentation  Taken 3/10/2022 2015 by Monalisa Lock RN  Oral Nutrition Promotion: rest periods promoted  Intervention: Optimize Skin Protection  Recent Flowsheet Documentation  Taken 3/11/2022 0102 by Monalisa Lock RN  Head of Bed (HOB) Positioning: HOB elevated  Taken 3/10/2022 2302 by Monalisa Lock RN  Head of Bed (HOB) Positioning: HOB at 30-45 degrees  Taken 3/10/2022 2102 by Monalisa Lock RN  Head of Bed (HOB) Positioning: HOB elevated  Skin Protection: adhesive use limited  Taken 3/10/2022 2015 by Monalisa Lock RN  Pressure Reduction Techniques: frequent weight shift encouraged  Head of Bed (HOB) Positioning: HOB elevated  Pressure Reduction Devices: positioning supports utilized  Skin Protection: adhesive use limited  Taken 3/10/2022 1902 by Monalisa Lock RN  Head of Bed (HOB) Positioning: HOB at 30-45 degrees   Goal Outcome Evaluation:

## 2022-03-11 NOTE — CASE MANAGEMENT/SOCIAL WORK
Discharge Planning Assessment   [place]     Patient Name: Brenda Munroe  MRN: [id number]  Today's Date: 3/11/2022    Admit Date: 3/8/2022       Discharge Plan     Row Name 03/11/22 1007       Plan    Final Discharge Disposition Code 03 - skilled nursing facility (SNF)    Final Note Pt to be [facility] on this date.   notified [place] per Vandana  of pt discharge and faxed pt information to [phone].              Continued Care and Services - Admitted Since 3/8/2022     Destination     Service Provider Request Status Selected Services Address Phone Fax Patient Preferred    [facility]  Accepted N/A [address] [place] [address] [phone] --            Lorin Panchal, MUNAW

## 2022-03-11 NOTE — DISCHARGE INSTRUCTIONS
Please take medications as prescribed. Please go to follow-up appointments as recommended. Please seek medical attention if you have dizziness, syncope, chest pain, shortness of breath.     Please continue social distancing and isolation efforts as recommended by CDC and [facility] to help prevent spread of COVID-19.

## 2022-03-12 ENCOUNTER — APPOINTMENT (OUTPATIENT)
Dept: GENERAL RADIOLOGY | Facility: HOSPITAL | Age: 72
End: 2022-03-12

## 2022-03-12 ENCOUNTER — HOSPITAL ENCOUNTER (EMERGENCY)
Facility: HOSPITAL | Age: 72
Discharge: NURSING, REHAB OR LONG-TERM CARE | End: 2022-03-12
Attending: FAMILY MEDICINE | Admitting: FAMILY MEDICINE

## 2022-03-12 VITALS
TEMPERATURE: 98 F | DIASTOLIC BLOOD PRESSURE: 77 MMHG | RESPIRATION RATE: 16 BRPM | HEIGHT: 66 IN | WEIGHT: 161.6 LBS | BODY MASS INDEX: 25.97 KG/M2 | SYSTOLIC BLOOD PRESSURE: 154 MMHG | HEART RATE: 58 BPM | OXYGEN SATURATION: 96 %

## 2022-03-12 DIAGNOSIS — R07.9 CHEST PAIN, UNSPECIFIED TYPE: Primary | ICD-10-CM

## 2022-03-12 LAB
ALBUMIN SERPL-MCNC: 3.64 G/DL (ref 3.5–5.2)
ALBUMIN/GLOB SERPL: 1.4 G/DL
ALP SERPL-CCNC: 74 U/L (ref 39–117)
ALT SERPL W P-5'-P-CCNC: 17 U/L (ref 1–33)
ANION GAP SERPL CALCULATED.3IONS-SCNC: 10.3 MMOL/L (ref 5–15)
AST SERPL-CCNC: 21 U/L (ref 1–32)
BASOPHILS # BLD AUTO: 0.01 10*3/MM3 (ref 0–0.2)
BASOPHILS NFR BLD AUTO: 0.2 % (ref 0–1.5)
BILIRUB SERPL-MCNC: 0.2 MG/DL (ref 0–1.2)
BUN SERPL-MCNC: 18 MG/DL (ref 8–23)
BUN/CREAT SERPL: 20.2 (ref 7–25)
CALCIUM SPEC-SCNC: 8.9 MG/DL (ref 8.6–10.5)
CHLORIDE SERPL-SCNC: 105 MMOL/L (ref 98–107)
CO2 SERPL-SCNC: 26.7 MMOL/L (ref 22–29)
CREAT SERPL-MCNC: 0.89 MG/DL (ref 0.57–1)
DEPRECATED RDW RBC AUTO: 47.1 FL (ref 37–54)
EGFRCR SERPLBLD CKD-EPI 2021: 68.6 ML/MIN/1.73
EOSINOPHIL # BLD AUTO: 0.07 10*3/MM3 (ref 0–0.4)
EOSINOPHIL NFR BLD AUTO: 1.1 % (ref 0.3–6.2)
ERYTHROCYTE [DISTWIDTH] IN BLOOD BY AUTOMATED COUNT: 13.9 % (ref 12.3–15.4)
FLUAV RNA RESP QL NAA+PROBE: NOT DETECTED
FLUBV RNA RESP QL NAA+PROBE: NOT DETECTED
GLOBULIN UR ELPH-MCNC: 2.6 GM/DL
GLUCOSE SERPL-MCNC: 203 MG/DL (ref 65–99)
HCT VFR BLD AUTO: 39.2 % (ref 34–46.6)
HGB BLD-MCNC: 12.7 G/DL (ref 12–15.9)
HOLD SPECIMEN: NORMAL
HOLD SPECIMEN: NORMAL
IMM GRANULOCYTES # BLD AUTO: 0.01 10*3/MM3 (ref 0–0.05)
IMM GRANULOCYTES NFR BLD AUTO: 0.2 % (ref 0–0.5)
INR PPP: 0.97 (ref 0.9–1.1)
LIPASE SERPL-CCNC: 32 U/L (ref 13–60)
LYMPHOCYTES # BLD AUTO: 0.96 10*3/MM3 (ref 0.7–3.1)
LYMPHOCYTES NFR BLD AUTO: 15.3 % (ref 19.6–45.3)
MCH RBC QN AUTO: 29.9 PG (ref 26.6–33)
MCHC RBC AUTO-ENTMCNC: 32.4 G/DL (ref 31.5–35.7)
MCV RBC AUTO: 92.2 FL (ref 79–97)
MONOCYTES # BLD AUTO: 0.35 10*3/MM3 (ref 0.1–0.9)
MONOCYTES NFR BLD AUTO: 5.6 % (ref 5–12)
NEUTROPHILS NFR BLD AUTO: 4.89 10*3/MM3 (ref 1.7–7)
NEUTROPHILS NFR BLD AUTO: 77.6 % (ref 42.7–76)
NRBC BLD AUTO-RTO: 0 /100 WBC (ref 0–0.2)
NT-PROBNP SERPL-MCNC: 289.6 PG/ML (ref 0–900)
PLATELET # BLD AUTO: 155 10*3/MM3 (ref 140–450)
PMV BLD AUTO: 11.3 FL (ref 6–12)
POTASSIUM SERPL-SCNC: 3.9 MMOL/L (ref 3.5–5.2)
PROT SERPL-MCNC: 6.2 G/DL (ref 6–8.5)
PROTHROMBIN TIME: 13.3 SECONDS (ref 12.8–14.5)
QT INTERVAL: 422 MS
QT INTERVAL: 482 MS
QTC INTERVAL: 422 MS
QTC INTERVAL: 465 MS
RBC # BLD AUTO: 4.25 10*6/MM3 (ref 3.77–5.28)
SARS-COV-2 RNA RESP QL NAA+PROBE: NOT DETECTED
SODIUM SERPL-SCNC: 142 MMOL/L (ref 136–145)
TROPONIN T SERPL-MCNC: <0.01 NG/ML (ref 0–0.03)
TROPONIN T SERPL-MCNC: <0.01 NG/ML (ref 0–0.03)
WBC NRBC COR # BLD: 6.29 10*3/MM3 (ref 3.4–10.8)
WHOLE BLOOD HOLD SPECIMEN: NORMAL
WHOLE BLOOD HOLD SPECIMEN: NORMAL

## 2022-03-12 PROCEDURE — 71045 X-RAY EXAM CHEST 1 VIEW: CPT

## 2022-03-12 PROCEDURE — 83880 ASSAY OF NATRIURETIC PEPTIDE: CPT | Performed by: FAMILY MEDICINE

## 2022-03-12 PROCEDURE — 93005 ELECTROCARDIOGRAM TRACING: CPT | Performed by: FAMILY MEDICINE

## 2022-03-12 PROCEDURE — 84484 ASSAY OF TROPONIN QUANT: CPT | Performed by: FAMILY MEDICINE

## 2022-03-12 PROCEDURE — 36415 COLL VENOUS BLD VENIPUNCTURE: CPT

## 2022-03-12 PROCEDURE — 83690 ASSAY OF LIPASE: CPT | Performed by: FAMILY MEDICINE

## 2022-03-12 PROCEDURE — 87636 SARSCOV2 & INF A&B AMP PRB: CPT | Performed by: FAMILY MEDICINE

## 2022-03-12 PROCEDURE — 85610 PROTHROMBIN TIME: CPT | Performed by: FAMILY MEDICINE

## 2022-03-12 PROCEDURE — 99284 EMERGENCY DEPT VISIT MOD MDM: CPT

## 2022-03-12 PROCEDURE — 85025 COMPLETE CBC W/AUTO DIFF WBC: CPT | Performed by: FAMILY MEDICINE

## 2022-03-12 PROCEDURE — 80053 COMPREHEN METABOLIC PANEL: CPT | Performed by: FAMILY MEDICINE

## 2022-03-12 RX ORDER — ASPIRIN 81 MG/1
324 TABLET, CHEWABLE ORAL ONCE
Status: COMPLETED | OUTPATIENT
Start: 2022-03-12 | End: 2022-03-12

## 2022-03-12 RX ORDER — SODIUM CHLORIDE 0.9 % (FLUSH) 0.9 %
10 SYRINGE (ML) INJECTION AS NEEDED
Status: DISCONTINUED | OUTPATIENT
Start: 2022-03-12 | End: 2022-03-12 | Stop reason: HOSPADM

## 2022-03-12 RX ADMIN — ASPIRIN 324 MG: 81 TABLET, CHEWABLE ORAL at 14:23

## 2022-03-12 NOTE — ED PROVIDER NOTES
Subjective   71-year-old female with history of atrial fibrillation on chronic anticoagulation Eliquis, COPD coronary disease, Alzheimer's dementia with fluctuations in mentation recently mid to the hospital for bradycardia discharges today presents the emergency room complaints of chest pain.  Patient reports she was sitting in a wheelchair she started having chest discomfort she describes as a heavy feeling she denies radiation of symptoms.  She states she felt short of breath with nauseated.  Patient was given 1 nitroglycerin that resolved her pain from a 7 out of 10 to a 0 out of 10.  Patient denies pain at this time.  She is currently wearing a Holter.      Chest Pain  Pain location:  Substernal area  Pain quality comment:  Heavy  Pain radiates to:  Does not radiate  Pain severity:  Moderate  Progression:  Resolved  Chronicity:  New  Relieved by:  Nitroglycerin  Worsened by:  Nothing  Associated symptoms: nausea and shortness of breath    Associated symptoms: no abdominal pain, no anxiety, no fever, no palpitations and no vomiting    Risk factors: coronary artery disease, high cholesterol and hypertension        Review of Systems   Constitutional: Negative for fever.   Respiratory: Positive for shortness of breath.    Cardiovascular: Positive for chest pain. Negative for palpitations.   Gastrointestinal: Positive for nausea. Negative for abdominal pain and vomiting.   All other systems reviewed and are negative.      Past Medical History:   Diagnosis Date   • Anxiety    • Arthritis    • Bladder prolapse, female, acquired    • Carotid stenosis    • COPD (chronic obstructive pulmonary disease) (HCC)    • Coronary artery disease    • Dementia (HCC)    • Depression    • Disease of thyroid gland    • Frequency of urination    • GERD (gastroesophageal reflux disease)    • Heart attack (HCC)    • Hyperlipidemia    • Hypertension    • Irregular heart beat    • Peptic ulcer disease        Allergies   Allergen Reactions    • Latex Other (See Comments)     ?       Past Surgical History:   Procedure Laterality Date   • CARDIAC SURGERY      open heart  in 1999   • CYSTOSCOPY N/A 11/8/2017    Procedure: CYSTOSCOPY;  Surgeon: Karl Nicolas DO;  Location:  [facility];  Service:    • OTHER SURGICAL HISTORY      states she had fluid drained no surgery   • VAGINAL HYSTERECTOMY W/ ANTERIOR AND POSTERIOR VAGINAL REPAIR N/A 11/8/2017    Procedure: VAGINAL HYSTERECTOMY WITH ANTERIOR, POSTERIOR, AND ENTEROCELE VAGINAL REPAIR;  Surgeon: Karl Nicolas DO;  Location:  [facility];  Service:    • VAGINAL VAULT SUSPENSION N/A 11/8/2017    Procedure: VAGINAL VAULT SUSPENSION ;  Surgeon: Karl Nicolas DO;  Location:  [facility];  Service:        Family History   Problem Relation Age of Onset   • Dementia Sister    • Heart attack Mother    • Tuberculosis Father    • Breast cancer Neg Hx        Social History     Socioeconomic History   • Marital status:    • Number of children: 3   Tobacco Use   • Smoking status: Current Every Day Smoker     Packs/day: 0.50     Years: 55.00     Pack years: 27.50     Types: Cigarettes   • Smokeless tobacco: Never Used   Substance and Sexual Activity   • Alcohol use: No   • Drug use: No   • Sexual activity: Never     Comment: denies           Objective   Physical Exam  Vitals and nursing note reviewed.   Constitutional:       Comments: Chronically ill-appearing.  Nontoxic appearing.   HENT:      Head: Normocephalic and atraumatic.      Comments: Moist mucous membranes.  Clear oropharynx.  Eyes:      Extraocular Movements: Extraocular movements intact.      Pupils: Pupils are equal, round, and reactive to light.   Neck:      Vascular: No JVD.   Cardiovascular:      Rate and Rhythm: Rhythm irregular.      Pulses:           Radial pulses are 2+ on the right side and 2+ on the left side.      Heart sounds: No murmur heard.  Pulmonary:      Comments: No rhonchi's rales or wheezes no accessory muscle  use.  Abdominal:      General: Bowel sounds are normal.      Palpations: Abdomen is soft.      Comments: Nontender normoactive bowel sounds no guarding rigidity   Musculoskeletal:      Cervical back: Neck supple.      Right lower leg: No edema.      Left lower leg: No edema.   Skin:     General: Skin is warm and dry.   Neurological:      Mental Status: She is alert.      Comments: Patient's answers questions appropriately she is awake alert cranial nerves II to XII grossly intact.   Psychiatric:         Mood and Affect: Mood normal.         Procedures           ED Course  ED Course as of 03/12/22 1750   Sat Mar 12, 2022   1427 Normal sinus rhythm ventricular at 60   right bundle branch block no ST elevation. [BB]   1428 EKG similar to EKG from 3/10/2022. [BB]   1444 Given patient recently with chest pain will have Holter interrogated. [BB]   1455 CBC is unremarkable [BB]   1516 Patient CMP slightly elevated glucose of 203 patient's coags unremarkable.  Lipase unremarkable troponin negative x1 set.  BNP unremarkable [BB]   1517 Chest x-ray is unremarkable. [BB]   1517 Patient resting in room currently.  Have attempted to have Holter interrogated but per report Holter must be sent off for interrogation. [BB]   1546 XR Chest 1 View    Result Date: 3/12/2022  Cardiomegaly with mild prominence of the bronchovascular interstitium. No obvious change from prior exam of 3/8/2022. Signer Name: Bull Baum MD  Signed: 3/12/2022 3:38 PM  Work[facility]  [facility]     [BB]   1557 Patient continues to remain chest pain free. Have contacted hospitalist awaiting callback [BB]   1616 Have spoken to Dr. Billings with hospitalist regarding patient's case he states he will review the chart and contact back. [BB]   1629 Patient currently being assessed by Dr. Billings [BB]   1723 Patient evaluated by Dr. Billings who feels after speaking to Dr. Patterson with cardiology that if  patient's repeat troponin is unremarkable and she can be discharged with outpatient follow-up.  Patient continues remain asymptomatic. [BB]   1728 Patient with 2 sets of cardiac enzymes returned negative.  Her previous discussion Dr. Billings patient be discharged back to nursing home with outpatient follow-up. [BB]   1728 Will order outpatient stress test.  Results to be sent to Dr. Patterson. [BB]   1750 Repeat EKG shows atrial fibrillation controlled ventricular response ventricular rate 56 right bundle branch block.  No ST elevation [BB]      ED Course User Index  [BB] Genaro Benjamin MD                                                 [facility]    Final diagnoses:   Chest pain, unspecified type       ED Disposition  ED Disposition     ED Disposition   Discharge    Condition   Stable    Comment   --             Morro Patterson MD  [address] [place] [address]  [phone]    In 2 days           Medication List      No changes were made to your prescriptions during this visit.          Genaro Benjamin MD  03/12/22 3365

## 2022-03-12 NOTE — PROGRESS NOTES
Discussed with Dr. Benjamin regarding patient's chest pain.  Patient is 71 with advanced dementia, does have coronary artery disease and known stenosis and recently admitted for bradycardia and PVST.  I saw and evaluated the patient at bedside who noted she did have some chest pain earlier today around 1 PM when she was trying to smoke a cigarette.  That was as much history as I can get from her as was difficult to keep her on topic.  EKG noted T wave inversions in V1 through V3 essentially the same as prior EKG on 3/10.  She is in no acute distress, does not chest pain.  Troponins negative.  I called and discussed with on-call cardiology, Dr. Patterson, who agreed and recommended if second troponin was negative, patient can be discharged with outpatient follow-up for eventual stress test and cardiology evaluation.  I tried to call patient's relative and son Edi on the chart, but was unsuccessful.  If second troponin is negative, recommend discharge back to nursing facility with outpatient cardiology follow-up.

## 2022-03-12 NOTE — ED NOTES
Discharge instructions reviewed with JOSE Shannon at [facility], patient instructed to return to ED if symptoms worsen or if any new problems arise. RN verbalizes understanding of discharge instructions, patient transported out of ED on stretcher with [facility]. No acute distress noted.

## 2022-03-13 LAB
BACTERIA SPEC AEROBE CULT: NORMAL
BACTERIA SPEC AEROBE CULT: NORMAL

## 2022-03-14 ENCOUNTER — HOSPITAL ENCOUNTER (EMERGENCY)
Facility: HOSPITAL | Age: 72
Discharge: NURSING, REHAB OR LONG-TERM CARE | End: 2022-03-14
Attending: STUDENT IN AN ORGANIZED HEALTH CARE EDUCATION/TRAINING PROGRAM | Admitting: STUDENT IN AN ORGANIZED HEALTH CARE EDUCATION/TRAINING PROGRAM

## 2022-03-14 VITALS
OXYGEN SATURATION: 99 % | RESPIRATION RATE: 18 BRPM | DIASTOLIC BLOOD PRESSURE: 77 MMHG | HEART RATE: 60 BPM | SYSTOLIC BLOOD PRESSURE: 132 MMHG | TEMPERATURE: 98 F | WEIGHT: 161.6 LBS | BODY MASS INDEX: 25.97 KG/M2 | HEIGHT: 66 IN

## 2022-03-14 DIAGNOSIS — Z00.00 NORMAL PHYSICAL EXAM: Primary | ICD-10-CM

## 2022-03-14 DIAGNOSIS — F03.90 DEMENTIA WITHOUT BEHAVIORAL DISTURBANCE, UNSPECIFIED DEMENTIA TYPE: ICD-10-CM

## 2022-03-14 LAB
ALBUMIN SERPL-MCNC: 4.04 G/DL (ref 3.5–5.2)
ALBUMIN/GLOB SERPL: 1.6 G/DL
ALP SERPL-CCNC: 67 U/L (ref 39–117)
ALT SERPL W P-5'-P-CCNC: 17 U/L (ref 1–33)
AMPHET+METHAMPHET UR QL: NEGATIVE
AMPHETAMINES UR QL: NEGATIVE
ANION GAP SERPL CALCULATED.3IONS-SCNC: 8.8 MMOL/L (ref 5–15)
AST SERPL-CCNC: 21 U/L (ref 1–32)
BACTERIA UR QL AUTO: ABNORMAL /HPF
BARBITURATES UR QL SCN: NEGATIVE
BASOPHILS # BLD AUTO: 0.03 10*3/MM3 (ref 0–0.2)
BASOPHILS NFR BLD AUTO: 0.5 % (ref 0–1.5)
BENZODIAZ UR QL SCN: NEGATIVE
BILIRUB SERPL-MCNC: 0.3 MG/DL (ref 0–1.2)
BILIRUB UR QL STRIP: NEGATIVE
BUN SERPL-MCNC: 12 MG/DL (ref 8–23)
BUN/CREAT SERPL: 16.7 (ref 7–25)
BUPRENORPHINE SERPL-MCNC: NEGATIVE NG/ML
CALCIUM SPEC-SCNC: 9.4 MG/DL (ref 8.6–10.5)
CANNABINOIDS SERPL QL: NEGATIVE
CHLORIDE SERPL-SCNC: 103 MMOL/L (ref 98–107)
CLARITY UR: CLEAR
CO2 SERPL-SCNC: 29.2 MMOL/L (ref 22–29)
COCAINE UR QL: NEGATIVE
COLOR UR: YELLOW
CREAT SERPL-MCNC: 0.72 MG/DL (ref 0.57–1)
DEPRECATED RDW RBC AUTO: 47.2 FL (ref 37–54)
EGFRCR SERPLBLD CKD-EPI 2021: 88.4 ML/MIN/1.73
EOSINOPHIL # BLD AUTO: 0.08 10*3/MM3 (ref 0–0.4)
EOSINOPHIL NFR BLD AUTO: 1.3 % (ref 0.3–6.2)
ERYTHROCYTE [DISTWIDTH] IN BLOOD BY AUTOMATED COUNT: 14 % (ref 12.3–15.4)
ETHANOL BLD-MCNC: <10 MG/DL (ref 0–10)
ETHANOL UR QL: <0.01 %
FLUAV RNA RESP QL NAA+PROBE: NOT DETECTED
FLUBV RNA RESP QL NAA+PROBE: NOT DETECTED
GLOBULIN UR ELPH-MCNC: 2.5 GM/DL
GLUCOSE SERPL-MCNC: 97 MG/DL (ref 65–99)
GLUCOSE UR STRIP-MCNC: NEGATIVE MG/DL
HCT VFR BLD AUTO: 40.4 % (ref 34–46.6)
HGB BLD-MCNC: 13 G/DL (ref 12–15.9)
HGB UR QL STRIP.AUTO: ABNORMAL
HYALINE CASTS UR QL AUTO: ABNORMAL /LPF
IMM GRANULOCYTES # BLD AUTO: 0.02 10*3/MM3 (ref 0–0.05)
IMM GRANULOCYTES NFR BLD AUTO: 0.3 % (ref 0–0.5)
KETONES UR QL STRIP: NEGATIVE
LEUKOCYTE ESTERASE UR QL STRIP.AUTO: NEGATIVE
LYMPHOCYTES # BLD AUTO: 1.08 10*3/MM3 (ref 0.7–3.1)
LYMPHOCYTES NFR BLD AUTO: 18 % (ref 19.6–45.3)
MCH RBC QN AUTO: 29.7 PG (ref 26.6–33)
MCHC RBC AUTO-ENTMCNC: 32.2 G/DL (ref 31.5–35.7)
MCV RBC AUTO: 92.2 FL (ref 79–97)
METHADONE UR QL SCN: NEGATIVE
MONOCYTES # BLD AUTO: 0.45 10*3/MM3 (ref 0.1–0.9)
MONOCYTES NFR BLD AUTO: 7.5 % (ref 5–12)
NEUTROPHILS NFR BLD AUTO: 4.34 10*3/MM3 (ref 1.7–7)
NEUTROPHILS NFR BLD AUTO: 72.4 % (ref 42.7–76)
NITRITE UR QL STRIP: NEGATIVE
NRBC BLD AUTO-RTO: 0 /100 WBC (ref 0–0.2)
OPIATES UR QL: NEGATIVE
OXYCODONE UR QL SCN: NEGATIVE
PCP UR QL SCN: NEGATIVE
PH UR STRIP.AUTO: 7 [PH] (ref 5–8)
PLATELET # BLD AUTO: 152 10*3/MM3 (ref 140–450)
PMV BLD AUTO: 11 FL (ref 6–12)
POTASSIUM SERPL-SCNC: 3.7 MMOL/L (ref 3.5–5.2)
PROPOXYPH UR QL: NEGATIVE
PROT SERPL-MCNC: 6.5 G/DL (ref 6–8.5)
PROT UR QL STRIP: ABNORMAL
RBC # BLD AUTO: 4.38 10*6/MM3 (ref 3.77–5.28)
RBC # UR STRIP: ABNORMAL /HPF
REF LAB TEST METHOD: ABNORMAL
SARS-COV-2 RNA RESP QL NAA+PROBE: NOT DETECTED
SODIUM SERPL-SCNC: 141 MMOL/L (ref 136–145)
SP GR UR STRIP: 1.02 (ref 1–1.03)
SQUAMOUS #/AREA URNS HPF: ABNORMAL /HPF
TRICYCLICS UR QL SCN: NEGATIVE
UROBILINOGEN UR QL STRIP: ABNORMAL
WBC # UR STRIP: ABNORMAL /HPF
WBC NRBC COR # BLD: 6 10*3/MM3 (ref 3.4–10.8)

## 2022-03-14 PROCEDURE — 81001 URINALYSIS AUTO W/SCOPE: CPT | Performed by: NURSE PRACTITIONER

## 2022-03-14 PROCEDURE — 82077 ASSAY SPEC XCP UR&BREATH IA: CPT | Performed by: NURSE PRACTITIONER

## 2022-03-14 PROCEDURE — 85025 COMPLETE CBC W/AUTO DIFF WBC: CPT | Performed by: NURSE PRACTITIONER

## 2022-03-14 PROCEDURE — 36415 COLL VENOUS BLD VENIPUNCTURE: CPT

## 2022-03-14 PROCEDURE — 99285 EMERGENCY DEPT VISIT HI MDM: CPT

## 2022-03-14 PROCEDURE — 80053 COMPREHEN METABOLIC PANEL: CPT | Performed by: NURSE PRACTITIONER

## 2022-03-14 PROCEDURE — 87636 SARSCOV2 & INF A&B AMP PRB: CPT | Performed by: NURSE PRACTITIONER

## 2022-03-14 PROCEDURE — 80306 DRUG TEST PRSMV INSTRMNT: CPT | Performed by: NURSE PRACTITIONER

## 2022-03-14 NOTE — ED NOTES
Called [facility]. [facility] form is faxed. They said all there trucks are out on a run right now but they will send the call out and will get here as soon as they can.
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
## 2022-03-14 NOTE — ED PROVIDER NOTES
Subjective     Mental Health Problem  Presenting symptoms comment:  Patient was sent nursing home due to threats of harming self per report by nursing staff. Patient denies any complaints to me   Degree of incapacity (severity):  Mild  Onset quality:  Unable to specify  Timing:  Unable to specify  Progression:  Unchanged  Chronicity:  Recurrent  Context comment:  Patient is a nursing home resident with some history of dementia  Treatment compliance:  Unable to specify  Relieved by:  Nothing  Worsened by:  Nothing  Ineffective treatments:  None tried  Associated symptoms: no abdominal pain, no anhedonia, no appetite change, no chest pain, no decreased need for sleep, no feelings of worthlessness, no insomnia, no irritability and no poor judgment    Risk factors comment:  Elderly      Review of Systems   Constitutional: Negative.  Negative for appetite change and irritability.   HENT: Negative.    Eyes: Negative.    Respiratory: Negative.    Cardiovascular: Negative.  Negative for chest pain.   Gastrointestinal: Negative.  Negative for abdominal pain.   Endocrine: Negative.    Genitourinary: Negative.    Musculoskeletal: Negative.    Skin: Negative.    Allergic/Immunologic: Negative.    Neurological: Negative.    Hematological: Negative.    Psychiatric/Behavioral: Negative.  The patient does not have insomnia.        Past Medical History:   Diagnosis Date   • Anxiety    • Arthritis    • Bladder prolapse, female, acquired    • Carotid stenosis    • COPD (chronic obstructive pulmonary disease) ([facility])    • Coronary artery disease    • Dementia ([facility])    • Depression    • Disease of thyroid gland    • Frequency of urination    • GERD (gastroesophageal reflux disease)    • Heart attack ([facility])    • Hyperlipidemia    • Hypertension    • Irregular heart beat    • Peptic ulcer disease        Allergies   Allergen Reactions   • Latex Other (See Comments)     ?       Past Surgical History:   Procedure Laterality Date   • CARDIAC  SURGERY      open heart  in 1999   • CYSTOSCOPY N/A 11/8/2017    Procedure: CYSTOSCOPY;  Surgeon: Karl Nicolas DO;  Location: [facility] OR;  Service:    • OTHER SURGICAL HISTORY      states she had fluid drained no surgery   • VAGINAL HYSTERECTOMY W/ ANTERIOR AND POSTERIOR VAGINAL REPAIR N/A 11/8/2017    Procedure: VAGINAL HYSTERECTOMY WITH ANTERIOR, POSTERIOR, AND ENTEROCELE VAGINAL REPAIR;  Surgeon: Karl Nicolas DO;  Location: [facility] OR;  Service:    • VAGINAL VAULT SUSPENSION N/A 11/8/2017    Procedure: VAGINAL VAULT SUSPENSION ;  Surgeon: Karl Nicolas DO;  Location: [facility] OR;  Service:        Family History   Problem Relation Age of Onset   • Dementia Sister    • Heart attack Mother    • Tuberculosis Father    • Breast cancer Neg Hx        Social History     Socioeconomic History   • Marital status:    • Number of children: 3   Tobacco Use   • Smoking status: Current Every Day Smoker     Packs/day: 0.50     Years: 55.00     Pack years: 27.50     Types: Cigarettes   • Smokeless tobacco: Never Used   Substance and Sexual Activity   • Alcohol use: No   • Drug use: No   • Sexual activity: Never     Comment: denies           Objective   Physical Exam  Vitals and nursing note reviewed.   Constitutional:       Appearance: She is well-developed.   HENT:      Head: Normocephalic.      Right Ear: External ear normal.      Left Ear: External ear normal.   Eyes:      Conjunctiva/sclera: Conjunctivae normal.      Pupils: Pupils are equal, round, and reactive to light.   Cardiovascular:      Rate and Rhythm: Normal rate and regular rhythm.      Heart sounds: Normal heart sounds.   Pulmonary:      Effort: Pulmonary effort is normal.      Breath sounds: Normal breath sounds.   Abdominal:      General: Bowel sounds are normal.      Palpations: Abdomen is soft.   Musculoskeletal:         General: Normal range of motion.      Cervical back: Normal range of motion and neck supple.   Skin:      General: Skin is warm and dry.      Capillary Refill: Capillary refill takes less than 2 seconds.   Neurological:      Mental Status: She is alert and oriented to person, place, and time.   Psychiatric:         Behavior: Behavior normal.         Thought Content: Thought content normal.         Procedures           ED Course  ED Course as of 03/16/22 1717   Mon Mar 14, 2022   1142 Patient medically clear and stable for psych evaluation   [REYNALDO]   1404 Intake nurse Radha spoke with Dr. Dorman who advised that the patient can be discharged back to nursing home. [REYNALDO]      ED Course User Index  [REYNALDO] Ezio Jade, APRN                                                 [facility]    Final diagnoses:   Normal physical exam   Dementia without behavioral disturbance, unspecified dementia type (HCC)       ED Disposition  ED Disposition     ED Disposition   Discharge    Condition   Stable    Comment   --             Bernadette Arevalo MD  [address] [place] [address]  [id number]  [facility] [address] [phone]    Schedule an appointment as soon as possible for a visit   For further evaluation         Medication List      No changes were made to your prescriptions during this visit.          Ezio Jade, APRN  03/16/22 9105

## 2022-03-14 NOTE — NURSING NOTE
Spoke with Dr. Juan, discussed assessment and labs, new orders to discharge the patient back to the nursing home at this time, pt is alert and oriented X 4and adamantly denies SI/HI/AVH, also adamantly denies any intentional self harm today while at the nursing home.  [id number]

## 2022-03-14 NOTE — NURSING NOTE
Spoke to Jessica the nurse taking care of the patient at [facility], states the patient was told she had to go to isolation for 14 days due to her room mate testing pos for Covid 19.  States the patient began scratching at her arms, and voiced to her to wanted to hurt herself.  The patient adamantly denies any intentional self harm, states she keeps her nails polished and clean, and would not mess up her manicure, states she has been on blood thinners her whole life and would know that she would not kill herself via a scratch on her arm.  The nurse states the patient lies, and hides items in her room. States the patient wanted a pity party because she didn't want to go to isolation and voiced she was missing her grandchildren. States the patient is non-ambulatory and is wheelchair bound. Also states she is unaware of any previous SI attempts or psychiatric hx.  I made the nurse aware of the patient's orientation and she too agreed the patient is oriented at baseline.    58.96

## 2022-03-15 ENCOUNTER — TRANSCRIBE ORDERS (OUTPATIENT)
Dept: ADMINISTRATIVE | Facility: HOSPITAL | Age: 72
End: 2022-03-15

## 2022-03-15 DIAGNOSIS — R07.9 CHEST PAIN, UNSPECIFIED TYPE: Primary | ICD-10-CM

## 2022-03-17 ENCOUNTER — HOSPITAL ENCOUNTER (OUTPATIENT)
Dept: CARDIOLOGY | Facility: HOSPITAL | Age: 72
Discharge: HOME | End: 2022-03-17

## 2022-03-17 ENCOUNTER — HOSPITAL ENCOUNTER (OUTPATIENT)
Dept: NUCLEAR MEDICINE | Facility: HOSPITAL | Age: 72
Discharge: HOME | End: 2022-03-17

## 2022-03-17 DIAGNOSIS — R07.9 CHEST PAIN, UNSPECIFIED TYPE: ICD-10-CM

## 2022-03-17 PROCEDURE — A9500 TC99M SESTAMIBI: HCPCS | Performed by: INTERNAL MEDICINE

## 2022-03-17 PROCEDURE — 25010000002 REGADENOSON 0.4 MG/5ML SOLUTION: Performed by: STUDENT IN AN ORGANIZED HEALTH CARE EDUCATION/TRAINING PROGRAM

## 2022-03-17 PROCEDURE — 78452 HT MUSCLE IMAGE SPECT MULT: CPT

## 2022-03-17 PROCEDURE — 0 TECHNETIUM SESTAMIBI: Performed by: INTERNAL MEDICINE

## 2022-03-17 PROCEDURE — 93017 CV STRESS TEST TRACING ONLY: CPT

## 2022-03-17 RX ADMIN — TECHNETIUM TC 99M SESTAMIBI 1 DOSE: 1 INJECTION INTRAVENOUS at 08:35

## 2022-03-17 RX ADMIN — REGADENOSON 0.4 MG: 0.08 INJECTION, SOLUTION INTRAVENOUS at 09:50

## 2022-03-17 RX ADMIN — TECHNETIUM TC 99M SESTAMIBI 1 DOSE: 1 INJECTION INTRAVENOUS at 09:51

## 2022-03-24 LAB
BH CV NUCLEAR PRIOR STUDY: 3
BH CV REST NUCLEAR ISOTOPE DOSE: 10.4 MCI
BH CV STRESS BP STAGE 1: NORMAL
BH CV STRESS BP STAGE 2: NORMAL
BH CV STRESS COMMENTS STAGE 1: NORMAL
BH CV STRESS COMMENTS STAGE 2: NORMAL
BH CV STRESS DOSE REGADENOSON STAGE 1: 0.4
BH CV STRESS DURATION MIN STAGE 1: 0
BH CV STRESS DURATION MIN STAGE 2: 4
BH CV STRESS DURATION SEC STAGE 1: 10
BH CV STRESS DURATION SEC STAGE 2: 0
BH CV STRESS HR STAGE 1: 79
BH CV STRESS HR STAGE 2: 72
BH CV STRESS NUCLEAR ISOTOPE DOSE: 30.2 MCI
BH CV STRESS PROTOCOL 1: NORMAL
BH CV STRESS RECOVERY BP: NORMAL MMHG
BH CV STRESS RECOVERY HR: 72 BPM
BH CV STRESS STAGE 1: 1
BH CV STRESS STAGE 2: 2
MAXIMAL PREDICTED HEART RATE: 147 BPM
PERCENT MAX PREDICTED HR: 53.74 %
STRESS BASELINE BP: NORMAL MMHG
STRESS BASELINE HR: 56 BPM
STRESS PERCENT HR: 63 %
STRESS POST PEAK BP: NORMAL MMHG
STRESS POST PEAK HR: 79 BPM
STRESS TARGET HR: 125 BPM

## 2022-04-20 ENCOUNTER — OFFICE VISIT (OUTPATIENT)
Dept: CARDIAC SURGERY | Facility: CLINIC | Age: 72
End: 2022-04-20

## 2022-04-20 VITALS
SYSTOLIC BLOOD PRESSURE: 131 MMHG | OXYGEN SATURATION: 90 % | WEIGHT: 165 LBS | BODY MASS INDEX: 26.52 KG/M2 | HEART RATE: 60 BPM | DIASTOLIC BLOOD PRESSURE: 63 MMHG | HEIGHT: 66 IN

## 2022-04-20 DIAGNOSIS — I65.23 CAROTID STENOSIS, ASYMPTOMATIC, BILATERAL: Primary | ICD-10-CM

## 2022-04-20 PROCEDURE — 99213 OFFICE O/P EST LOW 20 MIN: CPT | Performed by: NURSE PRACTITIONER

## 2022-04-20 NOTE — PROGRESS NOTES
"     [facility] Cardiothoracic Surgery Office Follow Up Note     Date of Encounter: 2022     Name: Brenda Munroe  : 1950     Referred By: No ref. provider found  PCP: Bernadette Arevalo MD    Chief Complaint:    Chief Complaint   Patient presents with   • Carotid Artery Disease     Follow up for carotid stenosis.       Subjective      History of Present Illness:    Brenda Munroe is a 71 y.o. female current smoker, with history of HTN, HLD on statin therapy, CAD s/p CABG (), dementia, COPD, and carotid artery stenosis.  Patient was last seen in clinic by Dr. Brooks 2021 after 2 falls in which she suffered a hangman's fracture that was treated at [facility].  At that time, patient was still in a cervical brace and Dr. Brooks recommended CTA carotids and clearance from [facility] trauma service before undergoing carotid artery surgery.  Patient was lost to follow-up for over a year and presents today for follow-up.  Patient has had multiple recent ED visits/hospitalizations for chest pain with plans for outpatient stress test and behavioral issues, confusion/agitation.  Patient is a poor historian complicated by her dementia.  Pt denies hx of TIA or CVA since last visit.  She reports right-sided weakness over the last 2 to 3 months but suggest this is related to an incident she had at her nursing home, \"the tray incident in the lunchroom\".  Patient would not elaborate.  Patient has been compliant with ASA/Eliquis therapy.  Her blood pressure is well controlled today.    Review of Systems:  Review of Systems   Constitutional: Positive for diaphoresis and malaise/fatigue. Negative for chills, decreased appetite, fever, night sweats and weight loss.   HENT: Positive for congestion. Negative for hoarse voice, sore throat and stridor.    Cardiovascular: Positive for chest pain, dyspnea on exertion and leg swelling. Negative for claudication, irregular heartbeat, near-syncope, orthopnea, palpitations, " paroxysmal nocturnal dyspnea and syncope.   Respiratory: Positive for cough. Negative for hemoptysis, shortness of breath, sleep disturbances due to breathing, snoring, sputum production and wheezing.    Hematologic/Lymphatic: Negative for adenopathy and bleeding problem. Bruises/bleeds easily.   Skin: Negative for color change, itching, poor wound healing and rash.   Musculoskeletal: Positive for back pain and joint pain. Negative for arthritis, falls and muscle weakness.   Gastrointestinal: Positive for nausea. Negative for abdominal pain, anorexia, constipation, diarrhea, hematochezia, melena and vomiting.   Neurological: Positive for headaches and loss of balance. Negative for difficulty with concentration, disturbances in coordination, dizziness, numbness, seizures, vertigo and weakness.   Psychiatric/Behavioral: Positive for depression. Negative for altered mental status, memory loss and substance abuse. The patient does not have insomnia and is not nervous/anxious.    Allergic/Immunologic: Negative.  Negative for persistent infections.       I have reviewed the following portions of the patient's history: allergies, current medications, past family history, past medical history, past social history, past surgical history, problem list and ROS and confirm it's accurate.    Allergies:  Allergies   Allergen Reactions   • Latex Other (See Comments)     ?       Medications:      Current Outpatient Medications:   •  acetaminophen (TYLENOL) 500 MG tablet, Take 500 mg by mouth Every 4 (Four) Hours As Needed for Mild Pain ., Disp: , Rfl:   •  amLODIPine (NORVASC) 10 MG tablet, Take 10 mg by mouth Daily., Disp: , Rfl:   •  apixaban (ELIQUIS) 5 MG tablet tablet, Take 5 mg by mouth 2 (Two) Times a Day., Disp: , Rfl:   •  baclofen (LIORESAL) 10 MG tablet, Take 5 mg by mouth 2 (Two) Times a Day. Indications: Muscle Spasticity, Disp: , Rfl:   •  busPIRone (BUSPAR) 15 MG tablet, Take 15 mg by mouth 3 (Three) Times a Day.,  Disp: , Rfl:   •  Cholecalciferol (VITAMIN D3) 95305 units capsule, Take 50,000 Units by mouth Every 7 (Seven) Days., Disp: , Rfl:   •  donepezil (ARICEPT) 10 MG tablet, Take 10 mg by mouth Daily., Disp: , Rfl: 2  •  levothyroxine (SYNTHROID, LEVOTHROID) 75 MCG tablet, Take 75 mcg by mouth Daily., Disp: , Rfl:   •  Lidocaine 4 % patch, Apply 1 patch topically Daily. Prior to [facility], Patient was on:  left shoulder, Disp: , Rfl:   •  memantine (NAMENDA) 10 MG tablet, Take 10 mg by mouth 2 (Two) Times a Day., Disp: , Rfl:   •  rosuvastatin (CRESTOR) 40 MG tablet, Take 40 mg by mouth Daily., Disp: , Rfl:   •  senna (SENOKOT) 8.6 MG tablet, Take 2 tablets by mouth 2 (Two) Times a Day., Disp: , Rfl:   •  sertraline (ZOLOFT) 100 MG tablet, Take 100 mg by mouth Daily., Disp: , Rfl:   •  trolamine salicylate (ASPERCREME) 10 % cream, Apply 1 application topically to the appropriate area as directed 2 (Two) Times a Day As Needed for Muscle / Joint Pain. Prior to [facility] Admission, Patient was on:  bilateral hands, Disp: , Rfl:   •  umeclidinium-vilanterol (Anoro Ellipta) 62.5-25 MCG/INH aerosol powder  inhaler, Inhale 1 puff Daily., Disp: , Rfl:     History:   Past Medical History:   Diagnosis Date   • Anxiety    • Arthritis    • Bladder prolapse, female, acquired    • Carotid stenosis    • COPD (chronic obstructive pulmonary disease) (HCC)    • Coronary artery disease    • Dementia (HCC)    • Depression    • Disease of thyroid gland    • Frequency of urination    • GERD (gastroesophageal reflux disease)    • Heart attack (HCC)    • Hyperlipidemia    • Hypertension    • Irregular heart beat    • Peptic ulcer disease        Past Surgical History:   Procedure Laterality Date   • CARDIAC SURGERY      open heart  in 1999   • CYSTOSCOPY N/A 11/8/2017    Procedure: CYSTOSCOPY;  Surgeon: Karl Nicolas DO;  Location: [facility];  Service:    • OTHER SURGICAL HISTORY      states she had fluid drained no surgery   •  "VAGINAL HYSTERECTOMY W/ ANTERIOR AND POSTERIOR VAGINAL REPAIR N/A 11/8/2017    Procedure: VAGINAL HYSTERECTOMY WITH ANTERIOR, POSTERIOR, AND ENTEROCELE VAGINAL REPAIR;  Surgeon: Karl Nicolas DO;  Location: [facility] OR;  Service:    • VAGINAL VAULT SUSPENSION N/A 11/8/2017    Procedure: VAGINAL VAULT SUSPENSION ;  Surgeon: Karl Nicolas DO;  Location: [facility] OR;  Service:        Social History     Socioeconomic History   • Marital status:    • Number of children: 3   Tobacco Use   • Smoking status: Current Every Day Smoker     Packs/day: 0.50     Years: 55.00     Pack years: 27.50     Types: Cigarettes   • Smokeless tobacco: Never Used   Substance and Sexual Activity   • Alcohol use: No   • Drug use: No   • Sexual activity: Never     Comment: denies        Family History   Problem Relation Age of Onset   • Dementia Sister    • Heart attack Mother    • Tuberculosis Father    • Breast cancer Neg Hx        Objective     Physical Exam:  Vitals:    04/20/22 1344   BP: 131/63   BP Location: Left arm   Patient Position: Sitting   Pulse: 60   SpO2: 90%   Weight: 74.8 kg (165 lb)   Height: 167.6 cm (66\")      Body mass index is 26.63 kg/m².    Physical Exam  Vitals and nursing note reviewed.   Constitutional:       Appearance: Normal appearance. She is well-developed.   HENT:      Head: Normocephalic and atraumatic.   Eyes:      Pupils: Pupils are equal, round, and reactive to light.   Neck:      Vascular: No carotid bruit.   Cardiovascular:      Rate and Rhythm: Normal rate and regular rhythm.      Pulses: Normal pulses.           Carotid pulses are on the right side with bruit.     Heart sounds: S1 normal and S2 normal. Murmur heard.   Pulmonary:      Effort: Pulmonary effort is normal.      Breath sounds: Normal breath sounds.   Abdominal:      Palpations: Abdomen is soft.   Musculoskeletal:         General: No swelling.      Cervical back: Neck supple.      Right lower leg: No edema.      Left " lower leg: No edema.   Skin:     General: Skin is warm and dry.      Capillary Refill: Capillary refill takes less than 2 seconds.      Findings: No bruising.   Neurological:      General: No focal deficit present.      Mental Status: She is alert and oriented to person, place, and time. Mental status is at baseline.      GCS: GCS eye subscore is 4. GCS verbal subscore is 5. GCS motor subscore is 6.      Motor: Motor function is intact.      Coordination: Coordination is intact.      Gait: Gait is intact.   Psychiatric:         Mood and Affect: Mood normal.         Speech: Speech normal.         Behavior: Behavior normal. Behavior is cooperative.         Cognition and Memory: Cognition normal.         Imaging/Labs:    No radiology results for the last 30 days.     Stress test with myocardial perfusion 1 day  Result date 3/17/2022:  · Findings consistent with a normal ECG stress test.  · Myocardial perfusion imaging study showed a small sized severe reversible ischemia in the mid lateral and inferolateral segments..  · Left ventricular ejection fraction is hyperdynamic (Calculated EF > 70%). .  · Normal LV cavity size. Normal LV wall motion noted.  · Impressions are consistent with an intermediate risk study.      CTA carotids 5/13/2021:  IMPRESSION:  1.  Short segment high-grade stenosis right and left proximal ICAs. No  occlusion. Approximately 90% and do not appear significantly changed  from 2019 exam.  2.  Severe plaque noted bilaterally.  3.  Moderate beam segment stenosis proximal left subclavian artery with  penetrating ulcer or focal dissection. No aneurysm.  4.  Moderate to advanced plaque both carotid systems but no additional  segments of hemodynamically significant stenosis or occlusion.  5.  Other nonacute/incidental findings described above.    Assessment / Plan      Assessment / Plan:  Diagnoses and all orders for this visit:    1. Carotid stenosis, asymptomatic, bilateral (Primary)       1.   "Bilateral carotid artery stenosis followed by Dr. Brooks: Patient denies any vision changes but does report transient right-sided weakness from a \"incident\" that she will not elaborate on.  On physical exam, she has equal BUE  strength but refused to lift or move her RLE (related to pain).  Based on this, I am unsure that she has right-sided weakness specifically.  She is a poor historian complicated by her dementia.  She reports her POA is her son who was not present for visit.  She has not had a CTA carotid image since May 2021, which at that time showed 90% right and left proximal ICA stenosis.  Patient has undergone stress test with cardiology which was unremarkable and findings consistent with intermediate risk study.  Due to long period of time between patient's last imaging, we will repeat CTA carotids at this time for comparison and review imaging with Dr. Brooks for recommendations for surgical intervention if appropriate.  Educated patient and staff present for visit on S/S of stroke and when to report to ER/contact our office.    Patient Education:  The BE FAST acronym helps you remember the signs and symptoms of a stroke: Balance loss, Eyesight loss, Facial dropping, Arm weakness, Speech difficulty, and Time to call 911    Follow Up:   Return pending, for F/u with imaging.   Or sooner for any further concerns or worsening sign and symptoms. If unable to reach us in the office please dial 911 or go to the nearest emergency department.      Lashay MITCHELL  [facility] Cardiothoracic Surgery    Time Spent: I spent 20 minutes caring for Brenda on this date of service. This time includes time spent by me in the following activities: preparing for the visit, reviewing tests, obtaining and/or reviewing a separately obtained history, performing a medically appropriate examination and/or evaluation, counseling and educating the patient/family/caregiver, ordering medications, tests, or procedures, " documenting information in the medical record, independently interpreting results and communicating that information with the patient/family/caregiver and care coordination.

## 2022-05-06 DIAGNOSIS — R42 DIZZINESS: Primary | ICD-10-CM

## 2022-05-11 DIAGNOSIS — R42 DIZZINESS: Primary | ICD-10-CM

## 2022-06-02 ENCOUNTER — OFFICE VISIT (OUTPATIENT)
Dept: GASTROENTEROLOGY | Facility: CLINIC | Age: 72
End: 2022-06-02

## 2022-06-02 VITALS
HEART RATE: 54 BPM | OXYGEN SATURATION: 98 % | HEIGHT: 66 IN | WEIGHT: 165 LBS | DIASTOLIC BLOOD PRESSURE: 75 MMHG | BODY MASS INDEX: 26.52 KG/M2 | SYSTOLIC BLOOD PRESSURE: 144 MMHG

## 2022-06-02 DIAGNOSIS — R13.19 ESOPHAGEAL DYSPHAGIA: Primary | ICD-10-CM

## 2022-06-02 PROCEDURE — 99213 OFFICE O/P EST LOW 20 MIN: CPT | Performed by: INTERNAL MEDICINE

## 2022-06-02 RX ORDER — METOPROLOL SUCCINATE 25 MG/1
12.5 TABLET, EXTENDED RELEASE ORAL DAILY
COMMUNITY
Start: 2022-06-01

## 2022-06-02 RX ORDER — LAMOTRIGINE 25 MG/1
100 TABLET ORAL
Status: ON HOLD | COMMUNITY
Start: 2022-05-12 | End: 2023-02-18

## 2022-06-02 RX ORDER — OLANZAPINE 2.5 MG/1
TABLET ORAL
COMMUNITY
Start: 2022-05-13 | End: 2023-01-17

## 2022-06-02 RX ORDER — ISOSORBIDE MONONITRATE 30 MG/1
90 TABLET, EXTENDED RELEASE ORAL DAILY
COMMUNITY
Start: 2022-05-04

## 2022-06-02 NOTE — PROGRESS NOTES
Subjective   Brenda Munroe is a 71 y.o. female who presents to the office today as a follow up appointment regarding ESOPHAFEAL DYSPHAGIA       History of Present Illness:  The patient presents for follow up dysphagia.  She suffers from dementia and has issues communicating her symptoms.  She tells me she just doesn't like the food at the care center.  She talks about extraneous subjects not related to her difficulty swallowing.  She confabulates and has trouble focusing on the issue at hand. She eats about 50-75% of each meal per nursing staff.  Her weights were as follows:  5/11 167.6, 5/18 168.4 and on 5/25 she weighted 172 lbs.,A recent upper GI series did reveal diffuse tertiary contractions of the esophagus and reflux.  The patient did have laryngeal penetration of fluid but no aspiration.  She had a modified barium swallow which showed that she had a mild cricopharyngeal bar that did not impede the bolus of food or liquid.  It was recommended that she maintain a regular diet with thin liquids.      Review of Systems:  Review of Systems   Constitutional: Negative.    HENT: Positive for sore throat and trouble swallowing.    Eyes: Negative.    Respiratory: Positive for chest tightness.    Cardiovascular: Positive for chest pain.   Gastrointestinal: Positive for anal bleeding and blood in stool. Negative for abdominal distention, abdominal pain, constipation, diarrhea, nausea, rectal pain and vomiting.   Endocrine: Negative.    Genitourinary: Negative for difficulty urinating.   Musculoskeletal: Positive for back pain and neck pain.   Allergic/Immunologic: Negative for environmental allergies and food allergies.   Neurological: Positive for headaches. Negative for dizziness.   Hematological: Does not bruise/bleed easily.   Psychiatric/Behavioral: Positive for sleep disturbance.       Past Medical History:  Past Medical History:   Diagnosis Date   • Anxiety    • Arthritis    • Bladder prolapse, female, acquired     • Carotid stenosis    • COPD (chronic obstructive pulmonary disease) ([facility]ly [facility])    • Coronary artery disease    • Dementia (Formerly [facility])    • Depression    • Disease of thyroid gland    • Frequency of urination    • GERD (gastroesophageal reflux disease)    • Heart attack (Formerly [facility])    • Hyperlipidemia    • Hypertension    • Irregular heart beat    • Peptic ulcer disease        Past Surgical History:  Past Surgical History:   Procedure Laterality Date   • CARDIAC SURGERY      open heart  in 1999   • CYSTOSCOPY N/A 11/8/2017    Procedure: CYSTOSCOPY;  Surgeon: Karl Nicolas DO;  Location: [facility] OR;  Service:    • OTHER SURGICAL HISTORY      states she had fluid drained no surgery   • VAGINAL HYSTERECTOMY W/ ANTERIOR AND POSTERIOR VAGINAL REPAIR N/A 11/8/2017    Procedure: VAGINAL HYSTERECTOMY WITH ANTERIOR, POSTERIOR, AND ENTEROCELE VAGINAL REPAIR;  Surgeon: Karl Nicolas DO;  Location: [facility] OR;  Service:    • VAGINAL VAULT SUSPENSION N/A 11/8/2017    Procedure: VAGINAL VAULT SUSPENSION ;  Surgeon: Karl Nicolas DO;  Location: [facility] OR;  Service:        Family History:  Family History   Problem Relation Age of Onset   • Dementia Sister    • Heart attack Mother    • Tuberculosis Father    • Breast cancer Neg Hx        Social History:  Social History     Socioeconomic History   • Marital status:    • Number of children: 3   Tobacco Use   • Smoking status: Current Every Day Smoker     Packs/day: 0.50     Years: 55.00     Pack years: 27.50     Types: Cigarettes   • Smokeless tobacco: Never Used   Substance and Sexual Activity   • Alcohol use: No   • Drug use: No   • Sexual activity: Never     Comment: denies       Current Medication List:    Current Outpatient Medications:   •  acetaminophen (TYLENOL) 500 MG tablet, Take 500 mg by mouth Every 4 (Four) Hours As Needed for Mild Pain ., Disp: , Rfl:   •  amLODIPine (NORVASC) 10 MG tablet, Take 10 mg by mouth Daily., Disp: , Rfl:   •  apixaban (ELIQUIS)  "5 MG tablet tablet, Take 5 mg by mouth 2 (Two) Times a Day., Disp: , Rfl:   •  baclofen (LIORESAL) 10 MG tablet, Take 5 mg by mouth 2 (Two) Times a Day. Indications: Muscle Spasticity, Disp: , Rfl:   •  busPIRone (BUSPAR) 15 MG tablet, Take 15 mg by mouth 3 (Three) Times a Day., Disp: , Rfl:   •  Cholecalciferol (VITAMIN D3) 57357 units capsule, Take 50,000 Units by mouth Every 7 (Seven) Days., Disp: , Rfl:   •  donepezil (ARICEPT) 10 MG tablet, Take 10 mg by mouth Daily., Disp: , Rfl: 2  •  isosorbide mononitrate (IMDUR) 30 MG 24 hr tablet, , Disp: , Rfl:   •  lamoTRIgine (LaMICtal) 25 MG tablet, , Disp: , Rfl:   •  levothyroxine (SYNTHROID, LEVOTHROID) 75 MCG tablet, Take 75 mcg by mouth Daily., Disp: , Rfl:   •  Lidocaine 4 % patch, Apply 1 patch topically Daily. Prior to [facility] Admission, Patient was on:  left shoulder, Disp: , Rfl:   •  memantine (NAMENDA) 10 MG tablet, Take 10 mg by mouth 2 (Two) Times a Day., Disp: , Rfl:   •  metoprolol succinate XL (TOPROL-XL) 25 MG 24 hr tablet, , Disp: , Rfl:   •  OLANZapine (zyPREXA) 2.5 MG tablet, , Disp: , Rfl:   •  rosuvastatin (CRESTOR) 40 MG tablet, Take 40 mg by mouth Daily., Disp: , Rfl:   •  senna (SENOKOT) 8.6 MG tablet, Take 2 tablets by mouth 2 (Two) Times a Day., Disp: , Rfl:   •  sertraline (ZOLOFT) 100 MG tablet, Take 100 mg by mouth Daily., Disp: , Rfl:   •  Trelegy Ellipta 100-62.5-25 MCG/INH inhaler, , Disp: , Rfl:   •  trolamine salicylate (ASPERCREME) 10 % cream, Apply 1 application topically to the appropriate area as directed 2 (Two) Times a Day As Needed for Muscle / Joint Pain. Prior to [facility] Admission, Patient was on:  bilateral hands, Disp: , Rfl:     Allergies:   Latex    Vitals:  /75 (BP Location: Left arm, Patient Position: Sitting, Cuff Size: Adult)   Pulse 54   Ht 167.6 cm (65.98\")   Wt 74.8 kg (165 lb)   SpO2 98%   BMI 26.64 kg/m²     Physical Exam:  Physical Exam  Constitutional:       Appearance: She is normal weight. "   HENT:      Head: Normocephalic and atraumatic.      Nose: Nose normal. No congestion or rhinorrhea.   Eyes:      General: No scleral icterus.     Extraocular Movements: Extraocular movements intact.      Conjunctiva/sclera: Conjunctivae normal.      Pupils: Pupils are equal, round, and reactive to light.   Cardiovascular:      Rate and Rhythm: Normal rate and regular rhythm.      Pulses: Normal pulses.      Heart sounds: Normal heart sounds.   Pulmonary:      Effort: Pulmonary effort is normal.      Breath sounds: Normal breath sounds.   Abdominal:      General: Abdomen is flat. Bowel sounds are normal. There is no distension.      Palpations: Abdomen is soft. There is no shifting dullness, fluid wave, hepatomegaly, splenomegaly, mass or pulsatile mass.      Tenderness: There is no abdominal tenderness. There is no guarding or rebound.      Hernia: No hernia is present.   Musculoskeletal:         General: No swelling or tenderness.      Cervical back: Normal range of motion and neck supple.      Comments: Patient is in wheelchair   Skin:     General: Skin is warm and dry.      Coloration: Skin is not jaundiced.   Neurological:      General: No focal deficit present.      Mental Status: She is alert and oriented to person, place, and time.   Psychiatric:         Mood and Affect: Mood normal.         Behavior: Behavior normal.         Results Review:  Lab Results:   No visits with results within 1 Month(s) from this visit.   Latest known visit with results is:   Hospital Outpatient Visit on 03/17/2022   Component Date Value Ref Range Status   • Target HR (85%) 03/17/2022 125  bpm Final   • Max. Pred. HR (100%) 03/17/2022 147  bpm Final   • Nuclear Prior Study 03/17/2022 3   Final   •  CV REST NUCLEAR ISOTOPE DOSE 03/17/2022 10.4  mCi Final   •  CV STRESS NUCLEAR ISOTOPE DOSE 03/17/2022 30.2  mCi Final   •  CV STRESS PROTOCOL 1 03/17/2022 Pharmacologic   Final   • Stage 1 03/17/2022 1   Final   • HR Stage 1  03/17/2022 79   Final   • BP Stage 1 03/17/2022 202/97   Final   • Duration Min Stage 1 03/17/2022 0   Final   • Duration Sec Stage 1 03/17/2022 10   Final   • Stress Dose Regadenoson Stage 1 03/17/2022 0.4   Final   • Stress Comments Stage 1 03/17/2022 10 sec bolus injection   Final   • Stage 2 03/17/2022 2   Final   • HR Stage 2 03/17/2022 72   Final   • BP Stage 2 03/17/2022 170/87   Final   • Duration Min Stage 2 03/17/2022 4   Final   • Duration Sec Stage 2 03/17/2022 0   Final   • Stress Comments Stage 2 03/17/2022 recovery   Final   • Baseline HR 03/17/2022 56  bpm Final   • Baseline BP 03/17/2022 163/90  mmHg Final   • Peak HR 03/17/2022 79  bpm Final   • Percent Max Pred HR 03/17/2022 53.74  % Final   • Percent Target HR 03/17/2022 63  % Final   • Peak BP 03/17/2022 202/97  mmHg Final   • Recovery HR 03/17/2022 72  bpm Final   • Recovery BP 03/17/2022 170/87  mmHg Final       Assessment & Plan     Visit Diagnoses:    ICD-10-CM ICD-9-CM   1. Esophageal dysphagia  R13.19 787.29       Plan:    The patient has been eating well and has been gaining weight.  She does not appear to have difficulty swallowing during meals per nursing report.  I will sign off for now and have her follow-up as needed.      MEDS ORDERED DURING VISIT:  No orders of the defined types were placed in this encounter.      Return if symptoms worsen or fail to improve.             This document has been electronically signed by Florencia Willett MD   June 2, 2022 20:02 EDT      Part of this note may be an electronic transcription/translation of spoken language to printed text using the Dragon Dictation System.

## 2022-06-07 ENCOUNTER — APPOINTMENT (OUTPATIENT)
Dept: CT IMAGING | Facility: HOSPITAL | Age: 72
End: 2022-06-07

## 2022-06-13 ENCOUNTER — TELEPHONE (OUTPATIENT)
Dept: CARDIAC SURGERY | Facility: CLINIC | Age: 72
End: 2022-06-13

## 2022-07-20 ENCOUNTER — APPOINTMENT (OUTPATIENT)
Dept: CT IMAGING | Facility: HOSPITAL | Age: 72
End: 2022-07-20

## 2022-08-22 ENCOUNTER — HOSPITAL ENCOUNTER (OUTPATIENT)
Dept: CT IMAGING | Facility: HOSPITAL | Age: 72
Discharge: HOME | End: 2022-08-22
Admitting: NURSE PRACTITIONER

## 2022-08-22 DIAGNOSIS — R42 DIZZINESS: ICD-10-CM

## 2022-08-22 LAB — CREAT BLDA-MCNC: 1.1 MG/DL (ref 0.6–1.3)

## 2022-08-22 PROCEDURE — 70498 CT ANGIOGRAPHY NECK: CPT

## 2022-08-22 PROCEDURE — 0 IOPAMIDOL PER 1 ML: Performed by: NURSE PRACTITIONER

## 2022-08-22 PROCEDURE — 70498 CT ANGIOGRAPHY NECK: CPT | Performed by: RADIOLOGY

## 2022-08-22 PROCEDURE — 82565 ASSAY OF CREATININE: CPT

## 2022-08-22 RX ADMIN — IOPAMIDOL 88 ML: 755 INJECTION, SOLUTION INTRAVENOUS at 15:32

## 2022-08-26 ENCOUNTER — TELEPHONE (OUTPATIENT)
Dept: CARDIAC SURGERY | Facility: CLINIC | Age: 72
End: 2022-08-26

## 2022-12-15 ENCOUNTER — OFFICE VISIT (OUTPATIENT)
Dept: CARDIAC SURGERY | Facility: CLINIC | Age: 72
End: 2022-12-15

## 2022-12-15 VITALS
BODY MASS INDEX: 27.8 KG/M2 | HEART RATE: 58 BPM | TEMPERATURE: 97.1 F | DIASTOLIC BLOOD PRESSURE: 60 MMHG | HEIGHT: 66 IN | SYSTOLIC BLOOD PRESSURE: 110 MMHG | WEIGHT: 173 LBS

## 2022-12-15 DIAGNOSIS — I65.23 CAROTID STENOSIS, ASYMPTOMATIC, BILATERAL: Primary | ICD-10-CM

## 2022-12-15 PROCEDURE — 99214 OFFICE O/P EST MOD 30 MIN: CPT | Performed by: NURSE PRACTITIONER

## 2022-12-15 RX ORDER — ASPIRIN 81 MG/1
81 TABLET ORAL DAILY
COMMUNITY
End: 2023-02-24 | Stop reason: HOSPADM

## 2022-12-15 RX ORDER — LISINOPRIL 20 MG/1
20 TABLET ORAL DAILY
COMMUNITY
Start: 2022-12-10 | End: 2023-02-24 | Stop reason: HOSPADM

## 2022-12-15 NOTE — PROGRESS NOTES
[facility] Cardiothoracic Surgery Office Follow Up Note     Date of Encounter: 12/15/2022     Name: Brenda Munroe  : 1950     Referred By: No ref. provider found  PCP: Bernadette Arevalo MD    Chief Complaint:    Chief Complaint   Patient presents with   • Carotid Artery Disease     Follow-up with results from CTA carotids        Subjective      History of Present Illness:   Brenda Munroe is a 71 y.o. female current smoker, with history of HTN, HLD on statin therapy, CAD s/p CABG (), dementia, COPD, and carotid artery stenosis. Patient has been followed by Dr. Brooks for LOI since 2019.  Patient was last seen in clinic by Lashay MITCHELL 22 for a one year review CTA carotid 21. @ that time the CTA scan noted unchanged 90% bilateral ICA stenosis.  Patient had been lost to follow-up for over a year.  Updated imaging was requested and complete @ [facility].  She wishes to review the results of this study.      Patient's caregiver today states she has experienced no recent injuries, ER visits, hospital admissions, or new neurological symptoms.  Patient is a poor historian complicated by her chronic dementia.  She lives in a skilled care facility.  Patient's son, POA, is not in attendance today.  Pt denies hx of TIA or CVA since last visit.  Per medication record, patient has been compliant with ASA/Eliquis therapy.  Her blood pressure is well controlled today.  Patient also continues to smoke.      Review of Systems:  Review of Systems   Constitutional: Negative for chills, decreased appetite, diaphoresis, fever, malaise/fatigue, night sweats, weight gain and weight loss.   HENT: Negative for hoarse voice.    Eyes: Negative for blurred vision, double vision and visual disturbance.   Cardiovascular: Positive for chest pain. Negative for claudication, dyspnea on exertion, irregular heartbeat, leg swelling, near-syncope, orthopnea, palpitations, paroxysmal nocturnal  dyspnea and syncope.   Respiratory: Negative for cough, hemoptysis, shortness of breath, sputum production and wheezing.    Hematologic/Lymphatic: Negative for adenopathy and bleeding problem. Does not bruise/bleed easily.   Skin: Negative for color change, nail changes, poor wound healing and rash.   Musculoskeletal: Negative for back pain, falls and muscle cramps.   Gastrointestinal: Negative for abdominal pain, dysphagia and heartburn.   Genitourinary: Negative for flank pain.   Neurological: Negative for brief paralysis, disturbances in coordination, dizziness, focal weakness, headaches, light-headedness, loss of balance, numbness, paresthesias, sensory change, vertigo and weakness.   Psychiatric/Behavioral: Negative for depression and suicidal ideas. The patient is not nervous/anxious.    Allergic/Immunologic: Negative for persistent infections.       I have reviewed the following portions of the patient's history: allergies, current medications, past medical history, past social history, past surgical history, problem list and ROS and confirm it's accurate.    Allergies:  Allergies   Allergen Reactions   • Latex Other (See Comments)     ?       Medications:      Current Outpatient Medications:   •  acetaminophen (TYLENOL) 500 MG tablet, Take 500 mg by mouth Every 4 (Four) Hours As Needed for Mild Pain ., Disp: , Rfl:   •  amLODIPine (NORVASC) 10 MG tablet, Take 10 mg by mouth Daily., Disp: , Rfl:   •  apixaban (ELIQUIS) 5 MG tablet tablet, Take 5 mg by mouth 2 (Two) Times a Day., Disp: , Rfl:   •  aspirin 81 MG EC tablet, Take 81 mg by mouth Daily., Disp: , Rfl:   •  baclofen (LIORESAL) 10 MG tablet, Take 5 mg by mouth 2 (Two) Times a Day. Indications: Muscle Spasticity, Disp: , Rfl:   •  busPIRone (BUSPAR) 15 MG tablet, Take 15 mg by mouth 3 (Three) Times a Day., Disp: , Rfl:   •  Cholecalciferol (VITAMIN D3) 35694 units capsule, Take 50,000 Units by mouth Every 7 (Seven) Days., Disp: , Rfl:   •  donepezil  (ARICEPT) 10 MG tablet, Take 10 mg by mouth Daily., Disp: , Rfl: 2  •  isosorbide mononitrate (IMDUR) 30 MG 24 hr tablet, , Disp: , Rfl:   •  lamoTRIgine (LaMICtal) 25 MG tablet, , Disp: , Rfl:   •  levothyroxine (SYNTHROID, LEVOTHROID) 75 MCG tablet, Take 75 mcg by mouth Daily., Disp: , Rfl:   •  Lidocaine 4 % patch, Apply 1 patch topically Daily. Prior to [facility] Admission, Patient was on:  left shoulder, Disp: , Rfl:   •  lisinopril (PRINIVIL,ZESTRIL) 20 MG tablet, , Disp: , Rfl:   •  memantine (NAMENDA) 10 MG tablet, Take 10 mg by mouth 2 (Two) Times a Day., Disp: , Rfl:   •  metoprolol succinate XL (TOPROL-XL) 25 MG 24 hr tablet, , Disp: , Rfl:   •  rosuvastatin (CRESTOR) 40 MG tablet, Take 40 mg by mouth Daily., Disp: , Rfl:   •  senna (SENOKOT) 8.6 MG tablet, Take 2 tablets by mouth 2 (Two) Times a Day., Disp: , Rfl:   •  sertraline (ZOLOFT) 100 MG tablet, Take 100 mg by mouth Daily., Disp: , Rfl:   •  Trelegy Ellipta 100-62.5-25 MCG/INH inhaler, , Disp: , Rfl:   •  trolamine salicylate (ASPERCREME) 10 % cream, Apply 1 application topically to the appropriate area as directed 2 (Two) Times a Day As Needed for Muscle / Joint Pain. Prior to [facility] Admission, Patient was on:  bilateral hands, Disp: , Rfl:   •  OLANZapine (zyPREXA) 2.5 MG tablet, , Disp: , Rfl:     History:   Past Medical History:   Diagnosis Date   • Anxiety    • Arthritis    • Bladder prolapse, female, acquired    • Carotid stenosis    • COPD (chronic obstructive pulmonary disease) (HCC)    • Coronary artery disease    • Dementia (HCC)    • Depression    • Disease of thyroid gland    • Frequency of urination    • GERD (gastroesophageal reflux disease)    • Heart attack (HCC)    • Hyperlipidemia    • Hypertension    • Irregular heart beat    • Peptic ulcer disease        Past Surgical History:   Procedure Laterality Date   • CARDIAC SURGERY      open heart  in 1999   • CYSTOSCOPY N/A 11/8/2017    Procedure: CYSTOSCOPY;  Surgeon: Karl Johnson  "DO Fidencio;  Location: [facility] OR;  Service:    • OTHER SURGICAL HISTORY      states she had fluid drained no surgery   • VAGINAL HYSTERECTOMY W/ ANTERIOR AND POSTERIOR VAGINAL REPAIR N/A 11/8/2017    Procedure: VAGINAL HYSTERECTOMY WITH ANTERIOR, POSTERIOR, AND ENTEROCELE VAGINAL REPAIR;  Surgeon: Karl Nicolas DO;  Location: [facility] OR;  Service:    • VAGINAL VAULT SUSPENSION N/A 11/8/2017    Procedure: VAGINAL VAULT SUSPENSION ;  Surgeon: Karl Nicolas DO;  Location: [facility] OR;  Service:        Social History     Socioeconomic History   • Marital status:    • Number of children: 3   Tobacco Use   • Smoking status: Every Day     Packs/day: 0.50     Years: 55.00     Pack years: 27.50     Types: Cigarettes   • Smokeless tobacco: Never   Vaping Use   • Vaping Use: Never used   Substance and Sexual Activity   • Alcohol use: No   • Drug use: No   • Sexual activity: Never     Comment: denies        Family History   Problem Relation Age of Onset   • Dementia Sister    • Heart attack Mother    • Tuberculosis Father    • Breast cancer Neg Hx        Objective   Physical Exam:  Vitals:    12/15/22 1250 12/15/22 1258   BP: 120/70 110/60   BP Location: Left arm Right arm   Patient Position: Sitting Sitting   Pulse: 58    Temp: 97.1 °F (36.2 °C)    Weight: 78.5 kg (173 lb)    Height: 167.6 cm (65.98\")       Body mass index is 27.94 kg/m².    Physical Exam  Vitals reviewed.   Constitutional:       General: She is not in acute distress.     Appearance: She is not ill-appearing or toxic-appearing.      Comments: Seated in wheelchair   HENT:      Head: Normocephalic and atraumatic.   Eyes:      Conjunctiva/sclera: Conjunctivae normal.      Pupils: Pupils are equal, round, and reactive to light.   Neck:      Thyroid: No thyromegaly.      Vascular: No carotid bruit.   Cardiovascular:      Rate and Rhythm: Regular rhythm. Bradycardia present.      Pulses:           Dorsalis pedis pulses are 1+ on the right side " and 1+ on the left side.        Posterior tibial pulses are 1+ on the right side and 1+ on the left side.      Heart sounds: S1 normal and S2 normal.   Pulmonary:      Effort: Pulmonary effort is normal. No respiratory distress.      Breath sounds: Normal breath sounds.   Musculoskeletal:      Cervical back: Neck supple.      Right lower leg: No edema.      Left lower leg: No edema.   Skin:     General: Skin is warm and dry.      Capillary Refill: Capillary refill takes less than 2 seconds.   Neurological:      General: No focal deficit present.      Mental Status: She is alert. Mental status is at baseline.      Comments: Poor short-term recall consistent with known dementia.  No focal deficits   Psychiatric:         Mood and Affect: Mood normal.         Imaging/Labs:  8/22/2022 CTA Carotid  COMPARISON:  05/13/2021 Personally reviewed.  Agree with radiology interpretation.     1.  Severe bilateral ICA calcific plaque.  2.  Short segment moderate to high-grade stenosis of the right and left  proximal ICA not significant change from previous examination.  3.  Moderate stenosis again noted of the left proximal subclavian  artery.   This report was finalized on 8/22/2022 3:44 PM by Dr. Danilo Beltrán MD.     Stress test with myocardial perfusion 1 day  Result date 3/17/2022:  • Findings consistent with a normal ECG stress test.  • Myocardial perfusion imaging study showed a small sized severe reversible ischemia in the mid lateral and inferolateral segments..  • Left ventricular ejection fraction is hyperdynamic (Calculated EF > 70%). .  • Normal LV cavity size. Normal LV wall motion noted.  • Impressions are consistent with an intermediate risk study.     CTA carotids 5/13/2021:  IMPRESSION:  1.  Short segment high-grade stenosis right and left proximal ICAs. No  occlusion. Approximately 90% and do not appear significantly changed  from 2019 exam.  2.  Severe plaque noted bilaterally.  3.  Moderate beam segment  stenosis proximal left subclavian artery with  penetrating ulcer or focal dissection. No aneurysm.  4.  Moderate to advanced plaque both carotid systems but no additional  segments of hemodynamically significant stenosis or occlusion.  5.  Other nonacute/incidental findings described above.      Assessment / Plan      Assessment / Plan:  1. Carotid stenosis, asymptomatic, bilateral (Primary)  - 71 y.o. female current smoker, with history of HTN, HLD on statin therapy, CAD s/p CABG (1999), dementia, COPD, and carotid artery stenosis.   - Evaluated in clinic by Dr. Brooks April 2021 after 2 falls in which she suffered a hangman's fracture that was treated at [facility].  At that time, patient was still in a cervical brace and Dr. Brooks recommended CTA carotids and clearance from [facility] trauma service before undergoing carotid artery surgery.    - Patient was lost to follow-up for over a year and reestablished with CT surgery clinic 4/2022  -  Patient presents today for 6-month follow-up and review of updated CTA imaging  -  Patient is a poor historian complicated by her dementia.  Pt denies hx of TIA or CVA since last visit.   - Patient has been compliant with ASA/Eliquis therapy.    - Blood pressure is well controlled today  - CTA report as noted above indicates bilateral internal carotid stenosis stable at 90%. The carotid disease has been this level since initial evaluation 2019  - Patient unable to make decision for or against surgery due to dementia  - Patient requests I call and discussed with her son/ POA  - We will review CTA findings with Dr. Brooks and make further recommendations to be communicated with patient's family        Patient Education: Report any neurological symptoms promptly or call 911.  FAST: Face drooping, Arm weakness, Speech changes, Time to get help      Follow Up:   Return after discussion w/ son regarding whether or not to proceed with CEA.   Or sooner for any further concerns or worsening  sign and symptoms. If unable to reach us in the office please dial 911 or go to the nearest emergency department.      PAULA Chavez  [facility] Cardiothoracic Surgery    Time Spent: I spent 34 minutes caring for Brenda on this date of service. This time includes time spent by me in the following activities: preparing for the visit, reviewing tests, obtaining and/or reviewing a separately obtained history, performing a medically appropriate examination and/or evaluation, counseling and educating the patient/family/caregiver, ordering medications, tests, or procedures, referring and communicating with other health care professionals, documenting information in the medical record, independently interpreting results and communicating that information with the patient/family/caregiver and care coordination.

## 2022-12-21 ENCOUNTER — TELEPHONE (OUTPATIENT)
Dept: CARDIAC SURGERY | Facility: CLINIC | Age: 72
End: 2022-12-21

## 2022-12-21 NOTE — TELEPHONE ENCOUNTER
Telephone follow-up with patient's son, Edi Munroe regarding carotid stenosis intervention:     Spoke with Mr. Munroe today regarding recent visit in CT surgery clinic with his mother, Brenda.  As noted in clinic, she has bilateral internal carotid artery stenosis 90%.  This was documented in recent CTA carotid artery scan performed at [facility] 8/22/2022.  Since patient's last visit with Dr. Brooks April 2021, she has recovered from a fall and vertebral fracture.  Discussed w/ son the carotid endarterectomy procedure in detail, including risks associated with the procedure: Stroke, death, MI, infection, paresthesias, vascular injury, nerve injury.  Discussed Dr. Brooks would plan to do the procedures 6 to 8 weeks apart, provided there were no complications from the initial surgical intervention.  Discussed the preadmission testing arrangements and that he would need to be present for that visit in order to sign consent forms and receive instructions.  Discussed the procedure would entail general anesthesia and at least overnight monitoring with potential discharge on postop day 1 or 2, depending on her progress.    Mr. Jimenez verbalized understanding of the procedure as well as the risk associated and on behalf of his mother he is agreeable to proceed in January 2023.    Will discuss in detail with Dr. Brooks for final approval and scheduling.    Unique MITCHELL

## 2023-01-17 ENCOUNTER — OFFICE VISIT (OUTPATIENT)
Dept: CARDIAC SURGERY | Facility: CLINIC | Age: 73
End: 2023-01-17
Payer: MEDICARE

## 2023-01-17 VITALS
SYSTOLIC BLOOD PRESSURE: 116 MMHG | WEIGHT: 173 LBS | HEIGHT: 66 IN | HEART RATE: 56 BPM | BODY MASS INDEX: 27.8 KG/M2 | TEMPERATURE: 97.1 F | OXYGEN SATURATION: 93 % | DIASTOLIC BLOOD PRESSURE: 80 MMHG

## 2023-01-17 DIAGNOSIS — I25.10 CORONARY ARTERY DISEASE INVOLVING NATIVE CORONARY ARTERY OF NATIVE HEART WITHOUT ANGINA PECTORIS: ICD-10-CM

## 2023-01-17 DIAGNOSIS — I65.23 CAROTID STENOSIS, ASYMPTOMATIC, BILATERAL: Primary | ICD-10-CM

## 2023-01-17 PROCEDURE — 99215 OFFICE O/P EST HI 40 MIN: CPT | Performed by: NURSE PRACTITIONER

## 2023-01-17 RX ORDER — NITROGLYCERIN 0.4 MG/1
0.4 TABLET SUBLINGUAL
COMMUNITY

## 2023-01-17 NOTE — PROGRESS NOTES
[facility] Cardiothoracic Surgery Office Follow Up Note     Date of Encounter: 2023     Name: Brenda Munroe  : 1950     Referred By: No ref. provider found  PCP: Bernadette Arevalo MD    Chief Complaint:    Chief Complaint   Patient presents with   • Carotid Artery Disease     Follow-up to discuss surgery.        Subjective      History of Present Illness:  Brenda Munroe is a 72 y.o. female current smoker, with history of HTN, HLD on statin therapy, CAD s/p CABG (), dementia, COPD, and carotid artery stenosis. Patient has been followed by Dr. Brooks for LOI since 2019.  Patient was last seen in clinic 12/15/22 to review updated CTA carotid study requested . This study demonstrated persistent severe bilateral internal carotid artery calcific plaque with short segment moderate to high-grade stenosis of the right and left proximal internal carotid arteries.  No significant change from previous exam.  Moderate stenosis of left proximal subclavian artery.        Patient's son did not accompany her at prior visit.  However, he is here today with her to discuss treatment plan.  Patient is a poor historian complicated by her chronic dementia.  Ms. Munroe lives in a skilled care facility.  Pt denies hx of TIA or CVA since last visit.  Per medication record, patient has been compliant with ASA/Eliquis therapy.  Her blood pressure is well controlled today.  Patient  continues to smoke.      Review of Systems:  Review of Systems   Constitutional: Positive for night sweats. Negative for chills, decreased appetite, diaphoresis, fever, malaise/fatigue, weight gain and weight loss.   HENT: Negative for hoarse voice.    Eyes: Negative for blurred vision, double vision and visual disturbance.   Cardiovascular: Positive for chest pain, dyspnea on exertion and leg swelling. Negative for claudication, irregular heartbeat, near-syncope, orthopnea, palpitations, paroxysmal nocturnal dyspnea and  syncope.   Respiratory: Positive for cough, shortness of breath and wheezing. Negative for hemoptysis and sputum production.    Hematologic/Lymphatic: Negative for adenopathy and bleeding problem. Does not bruise/bleed easily.   Skin: Negative for color change, nail changes, poor wound healing and rash.   Musculoskeletal: Negative for back pain, falls and muscle cramps.        Right arm pain      Gastrointestinal: Negative for abdominal pain, dysphagia and heartburn.   Genitourinary: Negative for flank pain.   Neurological: Positive for loss of balance. Negative for brief paralysis, disturbances in coordination, dizziness, focal weakness, headaches, light-headedness, numbness, paresthesias, sensory change, vertigo and weakness.   Psychiatric/Behavioral: Negative for depression and suicidal ideas. The patient is not nervous/anxious.    Allergic/Immunologic: Negative for persistent infections.       I have reviewed the following portions of the patient's history: allergies, current medications, past medical history, past social history, past surgical history, problem list and ROS and confirm it's accurate.    Allergies:  No Known Allergies    Medications:      Current Outpatient Medications:   •  acetaminophen (TYLENOL) 500 MG tablet, Take 500 mg by mouth Every 4 (Four) Hours As Needed for Mild Pain ., Disp: , Rfl:   •  amLODIPine (NORVASC) 10 MG tablet, Take 10 mg by mouth Daily., Disp: , Rfl:   •  apixaban (ELIQUIS) 5 MG tablet tablet, Take 5 mg by mouth 2 (Two) Times a Day., Disp: , Rfl:   •  aspirin 81 MG EC tablet, Take 81 mg by mouth Daily., Disp: , Rfl:   •  baclofen (LIORESAL) 10 MG tablet, Take 5 mg by mouth 2 (Two) Times a Day. Indications: Muscle Spasticity, Disp: , Rfl:   •  busPIRone (BUSPAR) 15 MG tablet, Take 15 mg by mouth 3 (Three) Times a Day., Disp: , Rfl:   •  Cholecalciferol (VITAMIN D3) 32060 units capsule, Take 50,000 Units by mouth Every 7 (Seven) Days., Disp: , Rfl:   •  donepezil (ARICEPT) 10  MG tablet, Take 10 mg by mouth Daily., Disp: , Rfl: 2  •  isosorbide mononitrate (IMDUR) 30 MG 24 hr tablet, , Disp: , Rfl:   •  lamoTRIgine (LaMICtal) 25 MG tablet, 100 mg., Disp: , Rfl:   •  levothyroxine (SYNTHROID, LEVOTHROID) 75 MCG tablet, Take 75 mcg by mouth Daily., Disp: , Rfl:   •  lisinopril (PRINIVIL,ZESTRIL) 20 MG tablet, , Disp: , Rfl:   •  memantine (NAMENDA) 10 MG tablet, Take 10 mg by mouth 2 (Two) Times a Day., Disp: , Rfl:   •  metoprolol succinate XL (TOPROL-XL) 25 MG 24 hr tablet, , Disp: , Rfl:   •  nitroglycerin (NITROSTAT) 0.4 MG SL tablet, Place 0.4 mg under the tongue., Disp: , Rfl:   •  rosuvastatin (CRESTOR) 40 MG tablet, Take 40 mg by mouth Daily., Disp: , Rfl:   •  senna (SENOKOT) 8.6 MG tablet, Take 2 tablets by mouth 2 (Two) Times a Day., Disp: , Rfl:   •  sertraline (ZOLOFT) 100 MG tablet, Take 100 mg by mouth Daily., Disp: , Rfl:   •  Trelegy Ellipta 100-62.5-25 MCG/INH inhaler, , Disp: , Rfl:   •  trolamine salicylate (ASPERCREME) 10 % cream, Apply 1 application topically to the appropriate area as directed 2 (Two) Times a Day As Needed for Muscle / Joint Pain. Prior to [facility] Admission, Patient was on:  bilateral hands, Disp: , Rfl:     History:   Past Medical History:   Diagnosis Date   • Anxiety    • Arthritis    • Bladder prolapse, female, acquired    • Carotid stenosis    • COPD (chronic obstructive pulmonary disease) (HCC)    • Coronary artery disease    • Dementia (HCC)    • Depression    • Disease of thyroid gland    • Frequency of urination    • GERD (gastroesophageal reflux disease)    • Heart attack (HCC)    • Hyperlipidemia    • Hypertension    • Irregular heart beat    • Peptic ulcer disease        Past Surgical History:   Procedure Laterality Date   • CARDIAC SURGERY      open heart  in 1999   • CYSTOSCOPY N/A 11/8/2017    Procedure: CYSTOSCOPY;  Surgeon: Karl Nicolas DO;  Location: [facility];  Service:    • OTHER SURGICAL HISTORY      states she had fluid  "drained no surgery   • VAGINAL HYSTERECTOMY W/ ANTERIOR AND POSTERIOR VAGINAL REPAIR N/A 11/8/2017    Procedure: VAGINAL HYSTERECTOMY WITH ANTERIOR, POSTERIOR, AND ENTEROCELE VAGINAL REPAIR;  Surgeon: Karl Nicolas DO;  Location: [facility] OR;  Service:    • VAGINAL VAULT SUSPENSION N/A 11/8/2017    Procedure: VAGINAL VAULT SUSPENSION ;  Surgeon: Karl Nicolas DO;  Location: [facility] OR;  Service:        Social History     Socioeconomic History   • Marital status:    • Number of children: 3   Tobacco Use   • Smoking status: Every Day     Packs/day: 0.50     Years: 55.00     Pack years: 27.50     Types: Cigarettes   • Smokeless tobacco: Never   Vaping Use   • Vaping Use: Never used   Substance and Sexual Activity   • Alcohol use: No   • Drug use: No   • Sexual activity: Never     Comment: denies        Family History   Problem Relation Age of Onset   • Dementia Sister    • Heart attack Mother    • Tuberculosis Father    • Breast cancer Neg Hx        Objective     Physical Exam:  Vitals:    01/17/23 1433 01/17/23 1436 01/17/23 1437   BP: 122/76 116/80    BP Location: Right arm Left arm    Patient Position: Sitting Sitting    Pulse: 56     Temp: 97.1 °F (36.2 °C)     SpO2: 91%  93%   Weight: 78.5 kg (173 lb)     Height: 167.6 cm (65.98\")        Body mass index is 27.94 kg/m².    Physical Exam  Vitals reviewed.   Constitutional:       Comments: Seated in WC   HENT:      Head: Normocephalic and atraumatic.   Eyes:      General: Lids are normal.      Conjunctiva/sclera: Conjunctivae normal.   Cardiovascular:      Rate and Rhythm: Regular rhythm. Bradycardia present.   Pulmonary:      Effort: Pulmonary effort is normal.   Musculoskeletal:      Cervical back: Neck supple.   Skin:     General: Skin is warm and dry.      Capillary Refill: Capillary refill takes less than 2 seconds.   Neurological:      General: No focal deficit present.      Mental Status: She is alert.      Comments: Oriented to person, " place and situation.  Poor short term memory consistent w/ prior encounters   Psychiatric:         Attention and Perception: Attention normal.         Mood and Affect: Mood normal.         Speech: Speech normal.         Behavior: Behavior is cooperative.         Imaging/Labs:  8/22/2022 CTA Carotid  COMPARISON:  05/13/2021 Personally reviewed.  Agree with radiology interpretation.   1.  Severe bilateral ICA calcific plaque.  2.  Short segment moderate to high-grade stenosis of the right and left  proximal ICA not significant change from previous examination.  3.  Moderate stenosis again noted of the left proximal subclavian  artery.   This report was finalized on 8/22/2022 3:44 PM by Dr. Danilo Beltrán MD.     Stress test with myocardial perfusion 1 day  Result date 3/17/2022:  • Findings consistent with a normal ECG stress test.  • Myocardial perfusion imaging study showed a small sized severe reversible ischemia in the mid lateral and inferolateral segments..  • Left ventricular ejection fraction is hyperdynamic (Calculated EF > 70%). .  • Normal LV cavity size. Normal LV wall motion noted.  • Impressions are consistent with an intermediate risk study.     CTA carotids 5/13/2021:  IMPRESSION:  1.  Short segment high-grade stenosis right and left proximal ICAs. No  occlusion. Approximately 90% and do not appear significantly changed  from 2019 exam.  2.  Severe plaque noted bilaterally.  3.  Moderate beam segment stenosis proximal left subclavian artery with  penetrating ulcer or focal dissection. No aneurysm.  4.  Moderate to advanced plaque both carotid systems but no additional  segments of hemodynamically significant stenosis or occlusion.  5.  Other nonacute/incidental findings described above.      Assessment / Plan      Assessment / Plan:  1. Carotid stenosis, asymptomatic, bilateral (Primary)  - 72 y.o. female current smoker, with history of HTN, HLD on statin therapy, CAD s/p CABG (1999), dementia, COPD,  and carotid artery stenosis. Patient has been followed by Dr. Brooks for LOI since 6/19/2019.    - Patient returns to clinic today w/ son to discuss potential CEA.    - CTA carotid study 8/22/22 demonstrated persistent severe bilateral internal carotid artery calcific plaque with short segment moderate to high-grade stenosis of the right and left proximal internal carotid arteries.  No significant change from previous exam.  Moderate stenosis of left proximal subclavian artery.    - Medical therapy currently includes apixaban, statin, aspirin  - Dr. Brooks discussed risks related w/ CEA including death, MI, stroke, infection, worsened dementia, worsened functional status, bleeding, nerve injury.    - Patient/ son/ family will discuss pro's and con's  - Procedure  will contact son or daughter in law in 2-3 days for decision  - Per Dr. Brooks, if patient/family decide to proceed with surgery, will need cardiac clearance before scheduling  - clinic follow up post op if decision is made to proceed    2. CAD s/p CABG 1999  - Will need cardiac clearance should she decide to proceed w/ CEA      Patient Education: Risks of surgery were discussed with the patient/son including: bleeding, infection, blood clots, loss of limb, kidney damage, stroke, heart attack, or death.  Patient understands risks and will decide w/ family input by the end of this week.          Follow Up:   Return in about 8 weeks (around 3/14/2023) for post op CEA if proceeds w/ surgery.   Or sooner for any further concerns or worsening sign and symptoms. If unable to reach us in the office please dial 911 or go to the nearest emergency department.      PAULA Chavez  [facility] Cardiothoracic Surgery    Time Spent: I spent 36 minutes caring for Brenda on this date of service. This time includes time spent by me in the following activities: preparing for the visit, reviewing tests, obtaining and/or reviewing a separately obtained history,  performing a medically appropriate examination and/or evaluation, counseling and educating the patient/family/caregiver, ordering medications, tests, or procedures, referring and communicating with other health care professionals, documenting information in the medical record, independently interpreting results and communicating that information with the patient/family/caregiver and care coordination.

## 2023-01-24 ENCOUNTER — TELEPHONE (OUTPATIENT)
Dept: CARDIAC SURGERY | Facility: CLINIC | Age: 73
End: 2023-01-24
Payer: MEDICARE

## 2023-01-24 NOTE — TELEPHONE ENCOUNTER
S/w pt she stated that she is wanting to hold off on her L CEA procedure with Dr. Brooks at this time and will contact our office when ready to schedule.

## 2023-02-18 ENCOUNTER — APPOINTMENT (OUTPATIENT)
Dept: CT IMAGING | Facility: HOSPITAL | Age: 73
DRG: 280 | End: 2023-02-18
Payer: MEDICARE

## 2023-02-18 ENCOUNTER — APPOINTMENT (OUTPATIENT)
Dept: NUCLEAR MEDICINE | Facility: HOSPITAL | Age: 73
DRG: 280 | End: 2023-02-18
Payer: MEDICARE

## 2023-02-18 ENCOUNTER — HOSPITAL ENCOUNTER (INPATIENT)
Facility: HOSPITAL | Age: 73
LOS: 6 days | Discharge: NURSING, REHAB OR LONG-TERM CARE | DRG: 280 | End: 2023-02-24
Attending: STUDENT IN AN ORGANIZED HEALTH CARE EDUCATION/TRAINING PROGRAM | Admitting: INTERNAL MEDICINE
Payer: MEDICARE

## 2023-02-18 ENCOUNTER — APPOINTMENT (OUTPATIENT)
Dept: GENERAL RADIOLOGY | Facility: HOSPITAL | Age: 73
DRG: 280 | End: 2023-02-18
Payer: MEDICARE

## 2023-02-18 ENCOUNTER — APPOINTMENT (OUTPATIENT)
Dept: CARDIOLOGY | Facility: HOSPITAL | Age: 73
DRG: 280 | End: 2023-02-18
Payer: MEDICARE

## 2023-02-18 DIAGNOSIS — N17.9 AKI (ACUTE KIDNEY INJURY): ICD-10-CM

## 2023-02-18 DIAGNOSIS — I21.4 NSTEMI (NON-ST ELEVATED MYOCARDIAL INFARCTION): ICD-10-CM

## 2023-02-18 DIAGNOSIS — J96.01 ACUTE RESPIRATORY FAILURE WITH HYPOXIA AND HYPERCAPNIA: Primary | ICD-10-CM

## 2023-02-18 DIAGNOSIS — J96.02 ACUTE RESPIRATORY FAILURE WITH HYPOXIA AND HYPERCAPNIA: Primary | ICD-10-CM

## 2023-02-18 LAB
A-A DO2: 187 MMHG (ref 0–300)
A-A DO2: 215.4 MMHG (ref 0–300)
A-A DO2: 37 MMHG (ref 0–300)
ABO GROUP BLD: NORMAL
ALBUMIN SERPL-MCNC: 4 G/DL (ref 3.5–5.2)
ALBUMIN SERPL-MCNC: 4 G/DL (ref 3.5–5.2)
ALBUMIN/GLOB SERPL: 1.4 G/DL
ALBUMIN/GLOB SERPL: 1.4 G/DL
ALP SERPL-CCNC: 62 U/L (ref 39–117)
ALP SERPL-CCNC: 66 U/L (ref 39–117)
ALT SERPL W P-5'-P-CCNC: 16 U/L (ref 1–33)
ALT SERPL W P-5'-P-CCNC: 18 U/L (ref 1–33)
ANION GAP SERPL CALCULATED.3IONS-SCNC: 10 MMOL/L (ref 5–15)
ANION GAP SERPL CALCULATED.3IONS-SCNC: 10.5 MMOL/L (ref 5–15)
APTT PPP: 33.1 SECONDS (ref 26.5–34.5)
APTT PPP: 96 SECONDS (ref 26.5–34.5)
ARTERIAL PATENCY WRIST A: ABNORMAL
ARTERIAL PATENCY WRIST A: ABNORMAL
ARTERIAL PATENCY WRIST A: POSITIVE
AST SERPL-CCNC: 30 U/L (ref 1–32)
AST SERPL-CCNC: 54 U/L (ref 1–32)
ATMOSPHERIC PRESS: 734 MMHG
ATMOSPHERIC PRESS: 738 MMHG
ATMOSPHERIC PRESS: 739 MMHG
BASE EXCESS BLDA CALC-SCNC: -1.9 MMOL/L (ref 0–2)
BASE EXCESS BLDA CALC-SCNC: 1.7 MMOL/L (ref 0–2)
BASE EXCESS BLDA CALC-SCNC: 2.5 MMOL/L (ref 0–2)
BASOPHILS # BLD AUTO: 0.02 10*3/MM3 (ref 0–0.2)
BASOPHILS NFR BLD AUTO: 0.3 % (ref 0–1.5)
BDY SITE: ABNORMAL
BILIRUB SERPL-MCNC: 0.2 MG/DL (ref 0–1.2)
BILIRUB SERPL-MCNC: 0.2 MG/DL (ref 0–1.2)
BLD GP AB SCN SERPL QL: NEGATIVE
BODY TEMPERATURE: 0 C
BUN SERPL-MCNC: 21 MG/DL (ref 8–23)
BUN SERPL-MCNC: 28 MG/DL (ref 8–23)
BUN/CREAT SERPL: 12.4 (ref 7–25)
BUN/CREAT SERPL: 20.1 (ref 7–25)
CA-I SERPL ISE-MCNC: 1.22 MMOL/L (ref 1.12–1.32)
CALCIUM SPEC-SCNC: 8.8 MG/DL (ref 8.6–10.5)
CALCIUM SPEC-SCNC: 8.9 MG/DL (ref 8.6–10.5)
CHLORIDE SERPL-SCNC: 101 MMOL/L (ref 98–107)
CHLORIDE SERPL-SCNC: 102 MMOL/L (ref 98–107)
CO2 BLDA-SCNC: 26.5 MMOL/L (ref 22–33)
CO2 BLDA-SCNC: 31 MMOL/L (ref 22–33)
CO2 BLDA-SCNC: 31.9 MMOL/L (ref 22–33)
CO2 SERPL-SCNC: 27.5 MMOL/L (ref 22–29)
CO2 SERPL-SCNC: 28 MMOL/L (ref 22–29)
COHGB MFR BLD: 1.3 % (ref 0–5)
COHGB MFR BLD: 1.7 % (ref 0–5)
COHGB MFR BLD: 2.1 % (ref 0–5)
CREAT SERPL-MCNC: 1.39 MG/DL (ref 0.57–1)
CREAT SERPL-MCNC: 1.69 MG/DL (ref 0.57–1)
CRP SERPL-MCNC: 0.52 MG/DL (ref 0–0.5)
D-LACTATE SERPL-SCNC: 1.4 MMOL/L (ref 0.5–2)
D-LACTATE SERPL-SCNC: 1.6 MMOL/L (ref 0.5–2)
DEPRECATED RDW RBC AUTO: 55 FL (ref 37–54)
DEPRECATED RDW RBC AUTO: 56.4 FL (ref 37–54)
EGFRCR SERPLBLD CKD-EPI 2021: 31.6 ML/MIN/1.73
EGFRCR SERPLBLD CKD-EPI 2021: 39.9 ML/MIN/1.73
EOSINOPHIL # BLD AUTO: 0.01 10*3/MM3 (ref 0–0.4)
EOSINOPHIL NFR BLD AUTO: 0.2 % (ref 0.3–6.2)
EPAP: 10
EPAP: 10
ERYTHROCYTE [DISTWIDTH] IN BLOOD BY AUTOMATED COUNT: 16.8 % (ref 12.3–15.4)
ERYTHROCYTE [DISTWIDTH] IN BLOOD BY AUTOMATED COUNT: 16.9 % (ref 12.3–15.4)
FLUAV RNA RESP QL NAA+PROBE: NOT DETECTED
FLUBV RNA RESP QL NAA+PROBE: NOT DETECTED
GEN 5 2HR TROPONIN T REFLEX: 384 NG/L
GLOBULIN UR ELPH-MCNC: 2.8 GM/DL
GLOBULIN UR ELPH-MCNC: 2.8 GM/DL
GLUCOSE BLDC GLUCOMTR-MCNC: 154 MG/DL (ref 70–130)
GLUCOSE BLDC GLUCOMTR-MCNC: 183 MG/DL (ref 70–130)
GLUCOSE SERPL-MCNC: 141 MG/DL (ref 65–99)
GLUCOSE SERPL-MCNC: 149 MG/DL (ref 65–99)
HBA1C MFR BLD: 6.1 % (ref 4.8–5.6)
HCO3 BLDA-SCNC: 24.9 MMOL/L (ref 20–26)
HCO3 BLDA-SCNC: 29.2 MMOL/L (ref 20–26)
HCO3 BLDA-SCNC: 30.1 MMOL/L (ref 20–26)
HCT VFR BLD AUTO: 41.3 % (ref 34–46.6)
HCT VFR BLD AUTO: 41.5 % (ref 34–46.6)
HCT VFR BLD AUTO: 44.8 % (ref 34–46.6)
HCT VFR BLD CALC: 35.7 % (ref 38–51)
HCT VFR BLD CALC: 37.2 % (ref 38–51)
HCT VFR BLD CALC: 37.5 % (ref 38–51)
HGB BLD-MCNC: 12.2 G/DL (ref 12–15.9)
HGB BLD-MCNC: 12.4 G/DL (ref 12–15.9)
HGB BLD-MCNC: 12.9 G/DL (ref 12–15.9)
HGB BLDA-MCNC: 11.6 G/DL (ref 13.5–17.5)
HGB BLDA-MCNC: 12.2 G/DL (ref 13.5–17.5)
HGB BLDA-MCNC: 12.2 G/DL (ref 13.5–17.5)
HOLD SPECIMEN: NORMAL
HOLD SPECIMEN: NORMAL
IMM GRANULOCYTES # BLD AUTO: 0.05 10*3/MM3 (ref 0–0.05)
IMM GRANULOCYTES NFR BLD AUTO: 0.8 % (ref 0–0.5)
INHALED O2 CONCENTRATION: 21 %
INHALED O2 CONCENTRATION: 50 %
INHALED O2 CONCENTRATION: 50 %
INR PPP: 1.16 (ref 0.9–1.1)
IPAP: 20
IPAP: 24
LYMPHOCYTES # BLD AUTO: 0.82 10*3/MM3 (ref 0.7–3.1)
LYMPHOCYTES NFR BLD AUTO: 12.4 % (ref 19.6–45.3)
Lab: ABNORMAL
MAGNESIUM SERPL-MCNC: 2.1 MG/DL (ref 1.6–2.4)
MCH RBC QN AUTO: 26.8 PG (ref 26.6–33)
MCH RBC QN AUTO: 27 PG (ref 26.6–33)
MCHC RBC AUTO-ENTMCNC: 29.4 G/DL (ref 31.5–35.7)
MCHC RBC AUTO-ENTMCNC: 30 G/DL (ref 31.5–35.7)
MCV RBC AUTO: 89.8 FL (ref 79–97)
MCV RBC AUTO: 91.2 FL (ref 79–97)
METHGB BLD QL: 0.2 % (ref 0–3)
METHGB BLD QL: 0.2 % (ref 0–3)
METHGB BLD QL: 0.4 % (ref 0–3)
MODALITY: ABNORMAL
MONOCYTES # BLD AUTO: 0.5 10*3/MM3 (ref 0.1–0.9)
MONOCYTES NFR BLD AUTO: 7.6 % (ref 5–12)
NEUTROPHILS NFR BLD AUTO: 5.22 10*3/MM3 (ref 1.7–7)
NEUTROPHILS NFR BLD AUTO: 78.7 % (ref 42.7–76)
NOTE: ABNORMAL
NOTIFIED BY: ABNORMAL
NOTIFIED WHO: ABNORMAL
NRBC BLD AUTO-RTO: 0 /100 WBC (ref 0–0.2)
NT-PROBNP SERPL-MCNC: 2515 PG/ML (ref 0–900)
OXYHGB MFR BLDV: 69.5 % (ref 94–99)
OXYHGB MFR BLDV: 89.4 % (ref 94–99)
OXYHGB MFR BLDV: 96 % (ref 94–99)
PCO2 BLDA: 50.5 MM HG (ref 35–45)
PCO2 BLDA: 59 MM HG (ref 35–45)
PCO2 BLDA: 60.1 MM HG (ref 35–45)
PCO2 TEMP ADJ BLD: ABNORMAL MM[HG]
PH BLDA: 7.3 PH UNITS (ref 7.35–7.45)
PH BLDA: 7.3 PH UNITS (ref 7.35–7.45)
PH BLDA: 7.31 PH UNITS (ref 7.35–7.45)
PH, TEMP CORRECTED: ABNORMAL
PLATELET # BLD AUTO: 161 10*3/MM3 (ref 140–450)
PLATELET # BLD AUTO: 201 10*3/MM3 (ref 140–450)
PMV BLD AUTO: 11.1 FL (ref 6–12)
PMV BLD AUTO: 11.1 FL (ref 6–12)
PO2 BLDA: 102 MM HG (ref 83–108)
PO2 BLDA: 40.4 MM HG (ref 83–108)
PO2 BLDA: 66.2 MM HG (ref 83–108)
PO2 TEMP ADJ BLD: ABNORMAL MM[HG]
POTASSIUM SERPL-SCNC: 4.8 MMOL/L (ref 3.5–5.2)
POTASSIUM SERPL-SCNC: 5.2 MMOL/L (ref 3.5–5.2)
PROCALCITONIN SERPL-MCNC: 0.11 NG/ML (ref 0–0.25)
PROT SERPL-MCNC: 6.8 G/DL (ref 6–8.5)
PROT SERPL-MCNC: 6.8 G/DL (ref 6–8.5)
PROTHROMBIN TIME: 15.1 SECONDS (ref 12.1–14.7)
QT INTERVAL: 400 MS
QTC INTERVAL: 452 MS
RBC # BLD AUTO: 4.55 10*6/MM3 (ref 3.77–5.28)
RBC # BLD AUTO: 4.6 10*6/MM3 (ref 3.77–5.28)
RH BLD: POSITIVE
SAO2 % BLDCOA: 71.1 % (ref 94–99)
SAO2 % BLDCOA: 91.1 % (ref 94–99)
SAO2 % BLDCOA: 97.7 % (ref 94–99)
SARS-COV-2 RNA RESP QL NAA+PROBE: NOT DETECTED
SET MECH RESP RATE: 20
SET MECH RESP RATE: 20
SODIUM SERPL-SCNC: 139 MMOL/L (ref 136–145)
SODIUM SERPL-SCNC: 140 MMOL/L (ref 136–145)
T&S EXPIRATION DATE: NORMAL
TROPONIN T DELTA: 124 NG/L
TROPONIN T SERPL HS-MCNC: 260 NG/L
VENTILATOR MODE: ABNORMAL
WBC NRBC COR # BLD: 4.88 10*3/MM3 (ref 3.4–10.8)
WBC NRBC COR # BLD: 6.62 10*3/MM3 (ref 3.4–10.8)
WHOLE BLOOD HOLD COAG: NORMAL
WHOLE BLOOD HOLD SPECIMEN: NORMAL

## 2023-02-18 PROCEDURE — 94799 UNLISTED PULMONARY SVC/PX: CPT

## 2023-02-18 PROCEDURE — 84145 PROCALCITONIN (PCT): CPT | Performed by: STUDENT IN AN ORGANIZED HEALTH CARE EDUCATION/TRAINING PROGRAM

## 2023-02-18 PROCEDURE — 25010000002 AZITHROMYCIN PER 500 MG: Performed by: STUDENT IN AN ORGANIZED HEALTH CARE EDUCATION/TRAINING PROGRAM

## 2023-02-18 PROCEDURE — 63710000001 INSULIN ASPART PER 5 UNITS: Performed by: INTERNAL MEDICINE

## 2023-02-18 PROCEDURE — 71250 CT THORAX DX C-: CPT

## 2023-02-18 PROCEDURE — 83605 ASSAY OF LACTIC ACID: CPT | Performed by: INTERNAL MEDICINE

## 2023-02-18 PROCEDURE — 93005 ELECTROCARDIOGRAM TRACING: CPT | Performed by: STUDENT IN AN ORGANIZED HEALTH CARE EDUCATION/TRAINING PROGRAM

## 2023-02-18 PROCEDURE — 86850 RBC ANTIBODY SCREEN: CPT | Performed by: STUDENT IN AN ORGANIZED HEALTH CARE EDUCATION/TRAINING PROGRAM

## 2023-02-18 PROCEDURE — 86901 BLOOD TYPING SEROLOGIC RH(D): CPT | Performed by: STUDENT IN AN ORGANIZED HEALTH CARE EDUCATION/TRAINING PROGRAM

## 2023-02-18 PROCEDURE — 86140 C-REACTIVE PROTEIN: CPT | Performed by: STUDENT IN AN ORGANIZED HEALTH CARE EDUCATION/TRAINING PROGRAM

## 2023-02-18 PROCEDURE — 78580 LUNG PERFUSION IMAGING: CPT | Performed by: RADIOLOGY

## 2023-02-18 PROCEDURE — 25010000002 HEPARIN (PORCINE) PER 1000 UNITS: Performed by: STUDENT IN AN ORGANIZED HEALTH CARE EDUCATION/TRAINING PROGRAM

## 2023-02-18 PROCEDURE — 86900 BLOOD TYPING SEROLOGIC ABO: CPT | Performed by: STUDENT IN AN ORGANIZED HEALTH CARE EDUCATION/TRAINING PROGRAM

## 2023-02-18 PROCEDURE — 36600 WITHDRAWAL OF ARTERIAL BLOOD: CPT

## 2023-02-18 PROCEDURE — 25010000002 LORAZEPAM PER 2 MG: Performed by: INTERNAL MEDICINE

## 2023-02-18 PROCEDURE — 25010000002 CEFEPIME PER 500 MG: Performed by: STUDENT IN AN ORGANIZED HEALTH CARE EDUCATION/TRAINING PROGRAM

## 2023-02-18 PROCEDURE — 82962 GLUCOSE BLOOD TEST: CPT

## 2023-02-18 PROCEDURE — 70450 CT HEAD/BRAIN W/O DYE: CPT

## 2023-02-18 PROCEDURE — 85610 PROTHROMBIN TIME: CPT | Performed by: STUDENT IN AN ORGANIZED HEALTH CARE EDUCATION/TRAINING PROGRAM

## 2023-02-18 PROCEDURE — 82805 BLOOD GASES W/O2 SATURATION: CPT

## 2023-02-18 PROCEDURE — 83050 HGB METHEMOGLOBIN QUAN: CPT

## 2023-02-18 PROCEDURE — 82375 ASSAY CARBOXYHB QUANT: CPT

## 2023-02-18 PROCEDURE — 99223 1ST HOSP IP/OBS HIGH 75: CPT

## 2023-02-18 PROCEDURE — 80053 COMPREHEN METABOLIC PANEL: CPT | Performed by: INTERNAL MEDICINE

## 2023-02-18 PROCEDURE — 85025 COMPLETE CBC W/AUTO DIFF WBC: CPT | Performed by: STUDENT IN AN ORGANIZED HEALTH CARE EDUCATION/TRAINING PROGRAM

## 2023-02-18 PROCEDURE — 99285 EMERGENCY DEPT VISIT HI MDM: CPT

## 2023-02-18 PROCEDURE — 85014 HEMATOCRIT: CPT | Performed by: STUDENT IN AN ORGANIZED HEALTH CARE EDUCATION/TRAINING PROGRAM

## 2023-02-18 PROCEDURE — 80053 COMPREHEN METABOLIC PANEL: CPT | Performed by: STUDENT IN AN ORGANIZED HEALTH CARE EDUCATION/TRAINING PROGRAM

## 2023-02-18 PROCEDURE — 85730 THROMBOPLASTIN TIME PARTIAL: CPT | Performed by: STUDENT IN AN ORGANIZED HEALTH CARE EDUCATION/TRAINING PROGRAM

## 2023-02-18 PROCEDURE — 83880 ASSAY OF NATRIURETIC PEPTIDE: CPT | Performed by: STUDENT IN AN ORGANIZED HEALTH CARE EDUCATION/TRAINING PROGRAM

## 2023-02-18 PROCEDURE — 74176 CT ABD & PELVIS W/O CONTRAST: CPT | Performed by: RADIOLOGY

## 2023-02-18 PROCEDURE — 71250 CT THORAX DX C-: CPT | Performed by: RADIOLOGY

## 2023-02-18 PROCEDURE — 82330 ASSAY OF CALCIUM: CPT | Performed by: STUDENT IN AN ORGANIZED HEALTH CARE EDUCATION/TRAINING PROGRAM

## 2023-02-18 PROCEDURE — 85018 HEMOGLOBIN: CPT | Performed by: STUDENT IN AN ORGANIZED HEALTH CARE EDUCATION/TRAINING PROGRAM

## 2023-02-18 PROCEDURE — 83036 HEMOGLOBIN GLYCOSYLATED A1C: CPT | Performed by: INTERNAL MEDICINE

## 2023-02-18 PROCEDURE — 84484 ASSAY OF TROPONIN QUANT: CPT | Performed by: STUDENT IN AN ORGANIZED HEALTH CARE EDUCATION/TRAINING PROGRAM

## 2023-02-18 PROCEDURE — 36415 COLL VENOUS BLD VENIPUNCTURE: CPT

## 2023-02-18 PROCEDURE — 93306 TTE W/DOPPLER COMPLETE: CPT

## 2023-02-18 PROCEDURE — 83605 ASSAY OF LACTIC ACID: CPT | Performed by: STUDENT IN AN ORGANIZED HEALTH CARE EDUCATION/TRAINING PROGRAM

## 2023-02-18 PROCEDURE — 25010000002 HEPARIN (PORCINE) 25000-0.45 UT/250ML-% SOLUTION: Performed by: STUDENT IN AN ORGANIZED HEALTH CARE EDUCATION/TRAINING PROGRAM

## 2023-02-18 PROCEDURE — 87040 BLOOD CULTURE FOR BACTERIA: CPT | Performed by: STUDENT IN AN ORGANIZED HEALTH CARE EDUCATION/TRAINING PROGRAM

## 2023-02-18 PROCEDURE — 70450 CT HEAD/BRAIN W/O DYE: CPT | Performed by: RADIOLOGY

## 2023-02-18 PROCEDURE — 94640 AIRWAY INHALATION TREATMENT: CPT

## 2023-02-18 PROCEDURE — 85027 COMPLETE CBC AUTOMATED: CPT | Performed by: INTERNAL MEDICINE

## 2023-02-18 PROCEDURE — 94761 N-INVAS EAR/PLS OXIMETRY MLT: CPT

## 2023-02-18 PROCEDURE — 71045 X-RAY EXAM CHEST 1 VIEW: CPT

## 2023-02-18 PROCEDURE — A9540 TC99M MAA: HCPCS | Performed by: STUDENT IN AN ORGANIZED HEALTH CARE EDUCATION/TRAINING PROGRAM

## 2023-02-18 PROCEDURE — 71045 X-RAY EXAM CHEST 1 VIEW: CPT | Performed by: RADIOLOGY

## 2023-02-18 PROCEDURE — 87636 SARSCOV2 & INF A&B AMP PRB: CPT | Performed by: STUDENT IN AN ORGANIZED HEALTH CARE EDUCATION/TRAINING PROGRAM

## 2023-02-18 PROCEDURE — 25010000002 METHYLPREDNISOLONE PER 125 MG: Performed by: STUDENT IN AN ORGANIZED HEALTH CARE EDUCATION/TRAINING PROGRAM

## 2023-02-18 PROCEDURE — 74176 CT ABD & PELVIS W/O CONTRAST: CPT

## 2023-02-18 PROCEDURE — 94660 CPAP INITIATION&MGMT: CPT

## 2023-02-18 PROCEDURE — 0 TECHNETIUM ALBUMIN AGGREGATED: Performed by: STUDENT IN AN ORGANIZED HEALTH CARE EDUCATION/TRAINING PROGRAM

## 2023-02-18 PROCEDURE — 83735 ASSAY OF MAGNESIUM: CPT | Performed by: STUDENT IN AN ORGANIZED HEALTH CARE EDUCATION/TRAINING PROGRAM

## 2023-02-18 PROCEDURE — 78580 LUNG PERFUSION IMAGING: CPT

## 2023-02-18 RX ORDER — LAMOTRIGINE 25 MG/1
25 TABLET ORAL NIGHTLY
COMMUNITY

## 2023-02-18 RX ORDER — NITROGLYCERIN 0.4 MG/1
0.4 TABLET SUBLINGUAL
Status: CANCELLED | OUTPATIENT
Start: 2023-02-18

## 2023-02-18 RX ORDER — SODIUM CHLORIDE 0.9 % (FLUSH) 0.9 %
10 SYRINGE (ML) INJECTION EVERY 12 HOURS SCHEDULED
Status: DISCONTINUED | OUTPATIENT
Start: 2023-02-18 | End: 2023-02-24 | Stop reason: HOSPADM

## 2023-02-18 RX ORDER — CLOPIDOGREL BISULFATE 75 MG/1
75 TABLET ORAL DAILY
Status: DISCONTINUED | OUTPATIENT
Start: 2023-02-18 | End: 2023-02-24 | Stop reason: HOSPADM

## 2023-02-18 RX ORDER — NITROGLYCERIN 0.4 MG/1
0.4 TABLET SUBLINGUAL
Status: DISCONTINUED | OUTPATIENT
Start: 2023-02-18 | End: 2023-02-24 | Stop reason: HOSPADM

## 2023-02-18 RX ORDER — LAMOTRIGINE 100 MG/1
100 TABLET ORAL DAILY
Status: CANCELLED | OUTPATIENT
Start: 2023-02-18

## 2023-02-18 RX ORDER — METHYLPREDNISOLONE SODIUM SUCCINATE 125 MG/2ML
125 INJECTION, POWDER, LYOPHILIZED, FOR SOLUTION INTRAMUSCULAR; INTRAVENOUS ONCE
Status: COMPLETED | OUTPATIENT
Start: 2023-02-18 | End: 2023-02-18

## 2023-02-18 RX ORDER — DONEPEZIL HYDROCHLORIDE 5 MG/1
10 TABLET, FILM COATED ORAL NIGHTLY
Status: DISCONTINUED | OUTPATIENT
Start: 2023-02-18 | End: 2023-02-24 | Stop reason: HOSPADM

## 2023-02-18 RX ORDER — IPRATROPIUM BROMIDE AND ALBUTEROL SULFATE 2.5; .5 MG/3ML; MG/3ML
3 SOLUTION RESPIRATORY (INHALATION)
Status: COMPLETED | OUTPATIENT
Start: 2023-02-18 | End: 2023-02-18

## 2023-02-18 RX ORDER — MAGNESIUM SULFATE HEPTAHYDRATE 40 MG/ML
2 INJECTION, SOLUTION INTRAVENOUS AS NEEDED
Status: DISCONTINUED | OUTPATIENT
Start: 2023-02-18 | End: 2023-02-24 | Stop reason: HOSPADM

## 2023-02-18 RX ORDER — ROSUVASTATIN CALCIUM 20 MG/1
40 TABLET, COATED ORAL DAILY
Status: DISCONTINUED | OUTPATIENT
Start: 2023-02-18 | End: 2023-02-24 | Stop reason: HOSPADM

## 2023-02-18 RX ORDER — ASPIRIN 81 MG/1
81 TABLET ORAL DAILY
Status: CANCELLED | OUTPATIENT
Start: 2023-02-18

## 2023-02-18 RX ORDER — CHOLECALCIFEROL (VITAMIN D3) 1250 MCG
50000 CAPSULE ORAL
Status: DISCONTINUED | OUTPATIENT
Start: 2023-02-20 | End: 2023-02-24 | Stop reason: HOSPADM

## 2023-02-18 RX ORDER — LEVOTHYROXINE SODIUM 0.07 MG/1
75 TABLET ORAL DAILY
Status: DISCONTINUED | OUTPATIENT
Start: 2023-02-18 | End: 2023-02-24 | Stop reason: HOSPADM

## 2023-02-18 RX ORDER — HEPARIN SODIUM 5000 [USP'U]/ML
60 INJECTION, SOLUTION INTRAVENOUS; SUBCUTANEOUS AS NEEDED
Status: DISCONTINUED | OUTPATIENT
Start: 2023-02-18 | End: 2023-02-24 | Stop reason: HOSPADM

## 2023-02-18 RX ORDER — ASPIRIN 81 MG/1
81 TABLET ORAL DAILY
Status: DISCONTINUED | OUTPATIENT
Start: 2023-02-19 | End: 2023-02-22

## 2023-02-18 RX ORDER — DONEPEZIL HYDROCHLORIDE 5 MG/1
10 TABLET, FILM COATED ORAL 2 TIMES DAILY
Status: CANCELLED | OUTPATIENT
Start: 2023-02-18

## 2023-02-18 RX ORDER — LORAZEPAM 2 MG/ML
0.5 INJECTION INTRAMUSCULAR ONCE
Status: COMPLETED | OUTPATIENT
Start: 2023-02-18 | End: 2023-02-18

## 2023-02-18 RX ORDER — LAMOTRIGINE 100 MG/1
100 TABLET ORAL DAILY
Status: DISCONTINUED | OUTPATIENT
Start: 2023-02-19 | End: 2023-02-24 | Stop reason: HOSPADM

## 2023-02-18 RX ORDER — SENNA PLUS 8.6 MG/1
1 TABLET ORAL DAILY PRN
Status: DISCONTINUED | OUTPATIENT
Start: 2023-02-18 | End: 2023-02-24 | Stop reason: HOSPADM

## 2023-02-18 RX ORDER — SODIUM CHLORIDE 0.9 % (FLUSH) 0.9 %
10 SYRINGE (ML) INJECTION AS NEEDED
Status: DISCONTINUED | OUTPATIENT
Start: 2023-02-18 | End: 2023-02-24 | Stop reason: HOSPADM

## 2023-02-18 RX ORDER — IPRATROPIUM BROMIDE AND ALBUTEROL SULFATE 2.5; .5 MG/3ML; MG/3ML
3 SOLUTION RESPIRATORY (INHALATION) EVERY 6 HOURS PRN
Status: DISCONTINUED | OUTPATIENT
Start: 2023-02-18 | End: 2023-02-24 | Stop reason: HOSPADM

## 2023-02-18 RX ORDER — LISINOPRIL 10 MG/1
20 TABLET ORAL DAILY
Status: CANCELLED | OUTPATIENT
Start: 2023-02-18

## 2023-02-18 RX ORDER — AMLODIPINE BESYLATE 10 MG/1
10 TABLET ORAL DAILY
Status: CANCELLED | OUTPATIENT
Start: 2023-02-18

## 2023-02-18 RX ORDER — CHOLECALCIFEROL (VITAMIN D3) 1250 MCG
50000 CAPSULE ORAL
Status: CANCELLED | OUTPATIENT
Start: 2023-02-18

## 2023-02-18 RX ORDER — INSULIN ASPART 100 [IU]/ML
0-7 INJECTION, SOLUTION INTRAVENOUS; SUBCUTANEOUS
Status: DISCONTINUED | OUTPATIENT
Start: 2023-02-18 | End: 2023-02-24 | Stop reason: HOSPADM

## 2023-02-18 RX ORDER — CLOPIDOGREL BISULFATE 75 MG/1
300 TABLET ORAL ONCE
Status: DISCONTINUED | OUTPATIENT
Start: 2023-02-18 | End: 2023-02-18

## 2023-02-18 RX ORDER — SODIUM CHLORIDE 9 MG/ML
40 INJECTION, SOLUTION INTRAVENOUS AS NEEDED
Status: DISCONTINUED | OUTPATIENT
Start: 2023-02-18 | End: 2023-02-24 | Stop reason: HOSPADM

## 2023-02-18 RX ORDER — AMLODIPINE BESYLATE 10 MG/1
10 TABLET ORAL DAILY
Status: DISCONTINUED | OUTPATIENT
Start: 2023-02-18 | End: 2023-02-21

## 2023-02-18 RX ORDER — BACLOFEN 10 MG/1
5 TABLET ORAL 2 TIMES DAILY
Status: DISCONTINUED | OUTPATIENT
Start: 2023-02-18 | End: 2023-02-24 | Stop reason: HOSPADM

## 2023-02-18 RX ORDER — HEPARIN SODIUM 5000 [USP'U]/ML
30 INJECTION, SOLUTION INTRAVENOUS; SUBCUTANEOUS AS NEEDED
Status: DISCONTINUED | OUTPATIENT
Start: 2023-02-18 | End: 2023-02-24 | Stop reason: HOSPADM

## 2023-02-18 RX ORDER — MEMANTINE HYDROCHLORIDE 10 MG/1
10 TABLET ORAL 2 TIMES DAILY
Status: CANCELLED | OUTPATIENT
Start: 2023-02-18

## 2023-02-18 RX ORDER — BACLOFEN 10 MG/1
5 TABLET ORAL 2 TIMES DAILY
Status: CANCELLED | OUTPATIENT
Start: 2023-02-18

## 2023-02-18 RX ORDER — ROSUVASTATIN CALCIUM 20 MG/1
40 TABLET, COATED ORAL DAILY
Status: CANCELLED | OUTPATIENT
Start: 2023-02-18

## 2023-02-18 RX ORDER — MAGNESIUM SULFATE HEPTAHYDRATE 40 MG/ML
4 INJECTION, SOLUTION INTRAVENOUS AS NEEDED
Status: DISCONTINUED | OUTPATIENT
Start: 2023-02-18 | End: 2023-02-24 | Stop reason: HOSPADM

## 2023-02-18 RX ORDER — HEPARIN SODIUM 5000 [USP'U]/ML
60 INJECTION, SOLUTION INTRAVENOUS; SUBCUTANEOUS ONCE
Status: COMPLETED | OUTPATIENT
Start: 2023-02-18 | End: 2023-02-18

## 2023-02-18 RX ORDER — METOPROLOL SUCCINATE 25 MG/1
12.5 TABLET, EXTENDED RELEASE ORAL DAILY
Status: CANCELLED | OUTPATIENT
Start: 2023-02-18

## 2023-02-18 RX ORDER — LAMOTRIGINE 100 MG/1
25 TABLET ORAL NIGHTLY
Status: CANCELLED | OUTPATIENT
Start: 2023-02-18

## 2023-02-18 RX ORDER — LAMOTRIGINE 100 MG/1
100 TABLET ORAL DAILY
COMMUNITY

## 2023-02-18 RX ORDER — DEXTROSE MONOHYDRATE 25 G/50ML
25 INJECTION, SOLUTION INTRAVENOUS
Status: DISCONTINUED | OUTPATIENT
Start: 2023-02-18 | End: 2023-02-24 | Stop reason: HOSPADM

## 2023-02-18 RX ORDER — ASPIRIN 81 MG/1
324 TABLET, CHEWABLE ORAL ONCE
Status: COMPLETED | OUTPATIENT
Start: 2023-02-18 | End: 2023-02-18

## 2023-02-18 RX ORDER — HEPARIN SODIUM 10000 [USP'U]/100ML
12 INJECTION, SOLUTION INTRAVENOUS
Status: DISCONTINUED | OUTPATIENT
Start: 2023-02-18 | End: 2023-02-21

## 2023-02-18 RX ORDER — ACETAMINOPHEN 500 MG
500 TABLET ORAL EVERY 4 HOURS PRN
Status: CANCELLED | OUTPATIENT
Start: 2023-02-18

## 2023-02-18 RX ORDER — LAMOTRIGINE 100 MG/1
25 TABLET ORAL NIGHTLY
Status: DISCONTINUED | OUTPATIENT
Start: 2023-02-18 | End: 2023-02-24 | Stop reason: HOSPADM

## 2023-02-18 RX ORDER — IPRATROPIUM BROMIDE AND ALBUTEROL SULFATE 2.5; .5 MG/3ML; MG/3ML
3 SOLUTION RESPIRATORY (INHALATION) EVERY 6 HOURS PRN
COMMUNITY

## 2023-02-18 RX ORDER — ACETAMINOPHEN 500 MG
500 TABLET ORAL EVERY 4 HOURS PRN
Status: DISCONTINUED | OUTPATIENT
Start: 2023-02-18 | End: 2023-02-24 | Stop reason: HOSPADM

## 2023-02-18 RX ORDER — POTASSIUM CHLORIDE 7.45 MG/ML
10 INJECTION INTRAVENOUS
Status: DISCONTINUED | OUTPATIENT
Start: 2023-02-18 | End: 2023-02-24 | Stop reason: HOSPADM

## 2023-02-18 RX ORDER — NICOTINE POLACRILEX 4 MG
15 LOZENGE BUCCAL
Status: DISCONTINUED | OUTPATIENT
Start: 2023-02-18 | End: 2023-02-24 | Stop reason: HOSPADM

## 2023-02-18 RX ORDER — METOPROLOL SUCCINATE 25 MG/1
12.5 TABLET, EXTENDED RELEASE ORAL DAILY
Status: DISCONTINUED | OUTPATIENT
Start: 2023-02-18 | End: 2023-02-24 | Stop reason: HOSPADM

## 2023-02-18 RX ORDER — BUDESONIDE AND FORMOTEROL FUMARATE DIHYDRATE 160; 4.5 UG/1; UG/1
2 AEROSOL RESPIRATORY (INHALATION)
Status: DISCONTINUED | OUTPATIENT
Start: 2023-02-18 | End: 2023-02-24 | Stop reason: HOSPADM

## 2023-02-18 RX ORDER — MEMANTINE HYDROCHLORIDE 5 MG/1
5 TABLET ORAL EVERY 12 HOURS SCHEDULED
Status: DISCONTINUED | OUTPATIENT
Start: 2023-02-18 | End: 2023-02-24 | Stop reason: HOSPADM

## 2023-02-18 RX ORDER — SENNA PLUS 8.6 MG/1
1 TABLET ORAL DAILY PRN
Status: CANCELLED | OUTPATIENT
Start: 2023-02-18

## 2023-02-18 RX ORDER — LEVOTHYROXINE SODIUM 0.07 MG/1
75 TABLET ORAL DAILY
Status: CANCELLED | OUTPATIENT
Start: 2023-02-18

## 2023-02-18 RX ADMIN — IPRATROPIUM BROMIDE AND ALBUTEROL SULFATE 3 ML: 2.5; .5 SOLUTION RESPIRATORY (INHALATION) at 12:12

## 2023-02-18 RX ADMIN — BUDESONIDE AND FORMOTEROL FUMARATE DIHYDRATE 2 PUFF: 160; 4.5 AEROSOL RESPIRATORY (INHALATION) at 22:38

## 2023-02-18 RX ADMIN — HEPARIN SODIUM 4400 UNITS: 5000 INJECTION, SOLUTION INTRAVENOUS; SUBCUTANEOUS at 15:18

## 2023-02-18 RX ADMIN — IPRATROPIUM BROMIDE AND ALBUTEROL SULFATE 3 ML: 2.5; .5 SOLUTION RESPIRATORY (INHALATION) at 22:38

## 2023-02-18 RX ADMIN — CEFEPIME 2 G: 2 INJECTION, POWDER, FOR SOLUTION INTRAVENOUS at 12:31

## 2023-02-18 RX ADMIN — AZITHROMYCIN MONOHYDRATE 500 MG: 500 INJECTION, POWDER, LYOPHILIZED, FOR SOLUTION INTRAVENOUS at 15:16

## 2023-02-18 RX ADMIN — METHYLPREDNISOLONE SODIUM SUCCINATE 125 MG: 125 INJECTION, POWDER, FOR SOLUTION INTRAMUSCULAR; INTRAVENOUS at 12:31

## 2023-02-18 RX ADMIN — LORAZEPAM 0.5 MG: 2 INJECTION INTRAMUSCULAR; INTRAVENOUS at 17:50

## 2023-02-18 RX ADMIN — ASPIRIN 324 MG: 81 TABLET, CHEWABLE ORAL at 12:32

## 2023-02-18 RX ADMIN — INSULIN ASPART 2 UNITS: 100 INJECTION, SOLUTION INTRAVENOUS; SUBCUTANEOUS at 18:34

## 2023-02-18 RX ADMIN — HEPARIN SODIUM 12 UNITS/KG/HR: 10000 INJECTION, SOLUTION INTRAVENOUS at 15:19

## 2023-02-18 RX ADMIN — KIT FOR THE PREPARATION OF TECHNETIUM TC 99M ALBUMIN AGGREGATED 1 DOSE: 2.5 INJECTION, POWDER, FOR SOLUTION INTRAVENOUS at 13:05

## 2023-02-18 RX ADMIN — CLOPIDOGREL BISULFATE 75 MG: 75 TABLET, FILM COATED ORAL at 15:17

## 2023-02-18 RX ADMIN — IPRATROPIUM BROMIDE AND ALBUTEROL SULFATE 3 ML: 2.5; .5 SOLUTION RESPIRATORY (INHALATION) at 12:04

## 2023-02-18 RX ADMIN — Medication 10 ML: at 22:44

## 2023-02-18 RX ADMIN — IPRATROPIUM BROMIDE AND ALBUTEROL SULFATE 3 ML: 2.5; .5 SOLUTION RESPIRATORY (INHALATION) at 11:51

## 2023-02-18 RX ADMIN — SODIUM CHLORIDE, POTASSIUM CHLORIDE, SODIUM LACTATE AND CALCIUM CHLORIDE 1000 ML: 600; 310; 30; 20 INJECTION, SOLUTION INTRAVENOUS at 10:36

## 2023-02-19 LAB
A-A DO2: 121.5 MMHG (ref 0–300)
A-A DO2: 186.3 MMHG (ref 0–300)
ALBUMIN SERPL-MCNC: 3.5 G/DL (ref 3.5–5.2)
ALBUMIN/GLOB SERPL: 1.5 G/DL
ALP SERPL-CCNC: 53 U/L (ref 39–117)
ALT SERPL W P-5'-P-CCNC: 15 U/L (ref 1–33)
AMPHET+METHAMPHET UR QL: NEGATIVE
AMPHETAMINES UR QL: NEGATIVE
ANION GAP SERPL CALCULATED.3IONS-SCNC: 11.1 MMOL/L (ref 5–15)
APTT PPP: 48.8 SECONDS (ref 26.5–34.5)
APTT PPP: 63.7 SECONDS (ref 26.5–34.5)
APTT PPP: 66.2 SECONDS (ref 26.5–34.5)
ARTERIAL PATENCY WRIST A: POSITIVE
ARTERIAL PATENCY WRIST A: POSITIVE
AST SERPL-CCNC: 47 U/L (ref 1–32)
ATMOSPHERIC PRESS: 726 MMHG
ATMOSPHERIC PRESS: 732 MMHG
BACTERIA UR QL AUTO: ABNORMAL /HPF
BARBITURATES UR QL SCN: NEGATIVE
BASE EXCESS BLDA CALC-SCNC: 4.2 MMOL/L (ref 0–2)
BASE EXCESS BLDA CALC-SCNC: 4.5 MMOL/L (ref 0–2)
BDY SITE: ABNORMAL
BDY SITE: ABNORMAL
BENZODIAZ UR QL SCN: POSITIVE
BH CV ECHO MEAS - ACS: 1.6 CM
BH CV ECHO MEAS - AO MAX PG: 6.2 MMHG
BH CV ECHO MEAS - AO MEAN PG: 3 MMHG
BH CV ECHO MEAS - AO ROOT DIAM: 2.9 CM
BH CV ECHO MEAS - AO V2 MAX: 124 CM/SEC
BH CV ECHO MEAS - AO V2 VTI: 25.7 CM
BH CV ECHO MEAS - EDV(CUBED): 55.3 ML
BH CV ECHO MEAS - EDV(MOD-SP4): 48.6 ML
BH CV ECHO MEAS - EF(MOD-SP4): 62.1 %
BH CV ECHO MEAS - ESV(CUBED): 19.5 ML
BH CV ECHO MEAS - ESV(MOD-SP4): 18.4 ML
BH CV ECHO MEAS - FS: 29.4 %
BH CV ECHO MEAS - IVS/LVPW: 1.2 CM
BH CV ECHO MEAS - IVSD: 1.49 CM
BH CV ECHO MEAS - LA DIMENSION: 2.8 CM
BH CV ECHO MEAS - LAT PEAK E' VEL: 5.8 CM/SEC
BH CV ECHO MEAS - LV DIASTOLIC VOL/BSA (35-75): 26.6 CM2
BH CV ECHO MEAS - LV MASS(C)D: 188 GRAMS
BH CV ECHO MEAS - LV SYSTOLIC VOL/BSA (12-30): 10.1 CM2
BH CV ECHO MEAS - LVIDD: 3.8 CM
BH CV ECHO MEAS - LVIDS: 2.7 CM
BH CV ECHO MEAS - LVOT AREA: 2.8 CM2
BH CV ECHO MEAS - LVOT DIAM: 1.9 CM
BH CV ECHO MEAS - LVPWD: 1.24 CM
BH CV ECHO MEAS - MED PEAK E' VEL: 4.8 CM/SEC
BH CV ECHO MEAS - MV A MAX VEL: 91 CM/SEC
BH CV ECHO MEAS - MV E MAX VEL: 73.6 CM/SEC
BH CV ECHO MEAS - MV E/A: 0.81
BH CV ECHO MEAS - PA ACC TIME: 0.13 SEC
BH CV ECHO MEAS - PA PR(ACCEL): 20.5 MMHG
BH CV ECHO MEAS - RAP SYSTOLE: 10 MMHG
BH CV ECHO MEAS - RVSP: 35 MMHG
BH CV ECHO MEAS - SI(MOD-SP4): 16.5 ML/M2
BH CV ECHO MEAS - SV(MOD-SP4): 30.2 ML
BH CV ECHO MEAS - TAPSE (>1.6): 2.04 CM
BH CV ECHO MEAS - TR MAX PG: 25 MMHG
BH CV ECHO MEAS - TR MAX VEL: 250 CM/SEC
BH CV ECHO MEASUREMENTS AVERAGE E/E' RATIO: 13.89
BILIRUB SERPL-MCNC: <0.2 MG/DL (ref 0–1.2)
BILIRUB UR QL STRIP: NEGATIVE
BODY TEMPERATURE: 0 C
BODY TEMPERATURE: 0 C
BUN SERPL-MCNC: 30 MG/DL (ref 8–23)
BUN/CREAT SERPL: 25.9 (ref 7–25)
BUPRENORPHINE SERPL-MCNC: NEGATIVE NG/ML
CALCIUM SPEC-SCNC: 8.8 MG/DL (ref 8.6–10.5)
CANNABINOIDS SERPL QL: NEGATIVE
CHLORIDE SERPL-SCNC: 104 MMOL/L (ref 98–107)
CLARITY UR: ABNORMAL
CO2 BLDA-SCNC: 32.7 MMOL/L (ref 22–33)
CO2 BLDA-SCNC: 32.8 MMOL/L (ref 22–33)
CO2 SERPL-SCNC: 25.9 MMOL/L (ref 22–29)
COCAINE UR QL: NEGATIVE
COD CRY URNS QL: ABNORMAL /HPF
COHGB MFR BLD: 1.2 % (ref 0–5)
COHGB MFR BLD: 1.4 % (ref 0–5)
COLOR UR: YELLOW
CREAT SERPL-MCNC: 1.16 MG/DL (ref 0.57–1)
DEPRECATED RDW RBC AUTO: 54.9 FL (ref 37–54)
EGFRCR SERPLBLD CKD-EPI 2021: 49.6 ML/MIN/1.73
ERYTHROCYTE [DISTWIDTH] IN BLOOD BY AUTOMATED COUNT: 16.6 % (ref 12.3–15.4)
GAS FLOW AIRWAY: 4 LPM
GAS FLOW AIRWAY: 6 LPM
GLOBULIN UR ELPH-MCNC: 2.4 GM/DL
GLUCOSE BLDC GLUCOMTR-MCNC: 101 MG/DL (ref 70–130)
GLUCOSE BLDC GLUCOMTR-MCNC: 113 MG/DL (ref 70–130)
GLUCOSE BLDC GLUCOMTR-MCNC: 122 MG/DL (ref 70–130)
GLUCOSE SERPL-MCNC: 141 MG/DL (ref 65–99)
GLUCOSE UR STRIP-MCNC: NEGATIVE MG/DL
HCO3 BLDA-SCNC: 31 MMOL/L (ref 20–26)
HCO3 BLDA-SCNC: 31.1 MMOL/L (ref 20–26)
HCT VFR BLD AUTO: 36.8 % (ref 34–46.6)
HCT VFR BLD CALC: 34.4 % (ref 38–51)
HCT VFR BLD CALC: 34.6 % (ref 38–51)
HGB BLD-MCNC: 11 G/DL (ref 12–15.9)
HGB BLDA-MCNC: 11.2 G/DL (ref 13.5–17.5)
HGB BLDA-MCNC: 11.3 G/DL (ref 13.5–17.5)
HGB UR QL STRIP.AUTO: NEGATIVE
HYALINE CASTS UR QL AUTO: ABNORMAL /LPF
INHALED O2 CONCENTRATION: 36 %
INHALED O2 CONCENTRATION: 44 %
KETONES UR QL STRIP: ABNORMAL
LEUKOCYTE ESTERASE UR QL STRIP.AUTO: NEGATIVE
Lab: ABNORMAL
MAXIMAL PREDICTED HEART RATE: 146 BPM
MCH RBC QN AUTO: 27 PG (ref 26.6–33)
MCHC RBC AUTO-ENTMCNC: 29.9 G/DL (ref 31.5–35.7)
MCV RBC AUTO: 90.4 FL (ref 79–97)
METHADONE UR QL SCN: NEGATIVE
METHGB BLD QL: 0.3 % (ref 0–3)
METHGB BLD QL: 0.5 % (ref 0–3)
MODALITY: ABNORMAL
MODALITY: ABNORMAL
NITRITE UR QL STRIP: NEGATIVE
NOTE: ABNORMAL
NOTE: ABNORMAL
NOTIFIED BY: ABNORMAL
NOTIFIED WHO: ABNORMAL
OPIATES UR QL: NEGATIVE
OXYCODONE UR QL SCN: NEGATIVE
OXYHGB MFR BLDV: 83.4 % (ref 94–99)
OXYHGB MFR BLDV: 89.5 % (ref 94–99)
PCO2 BLDA: 55.1 MM HG (ref 35–45)
PCO2 BLDA: 56.2 MM HG (ref 35–45)
PCO2 TEMP ADJ BLD: ABNORMAL MM[HG]
PCO2 TEMP ADJ BLD: ABNORMAL MM[HG]
PCP UR QL SCN: NEGATIVE
PH BLDA: 7.35 PH UNITS (ref 7.35–7.45)
PH BLDA: 7.36 PH UNITS (ref 7.35–7.45)
PH UR STRIP.AUTO: 5.5 [PH] (ref 5–8)
PH, TEMP CORRECTED: ABNORMAL
PH, TEMP CORRECTED: ABNORMAL
PLATELET # BLD AUTO: 140 10*3/MM3 (ref 140–450)
PMV BLD AUTO: 10.7 FL (ref 6–12)
PO2 BLDA: 52.2 MM HG (ref 83–108)
PO2 BLDA: 63.1 MM HG (ref 83–108)
PO2 TEMP ADJ BLD: ABNORMAL MM[HG]
PO2 TEMP ADJ BLD: ABNORMAL MM[HG]
POTASSIUM SERPL-SCNC: 4.3 MMOL/L (ref 3.5–5.2)
PROPOXYPH UR QL: NEGATIVE
PROT SERPL-MCNC: 5.9 G/DL (ref 6–8.5)
PROT UR QL STRIP: ABNORMAL
RBC # BLD AUTO: 4.07 10*6/MM3 (ref 3.77–5.28)
RBC # UR STRIP: ABNORMAL /HPF
REF LAB TEST METHOD: ABNORMAL
SAO2 % BLDCOA: 85 % (ref 94–99)
SAO2 % BLDCOA: 90.9 % (ref 94–99)
SODIUM SERPL-SCNC: 141 MMOL/L (ref 136–145)
SP GR UR STRIP: >=1.03 (ref 1–1.03)
SQUAMOUS #/AREA URNS HPF: ABNORMAL /HPF
STRESS TARGET HR: 124 BPM
TRICYCLICS UR QL SCN: NEGATIVE
TROPONIN T SERPL HS-MCNC: 586 NG/L
UROBILINOGEN UR QL STRIP: ABNORMAL
VENTILATOR MODE: ABNORMAL
VENTILATOR MODE: ABNORMAL
WBC # UR STRIP: ABNORMAL /HPF
WBC NRBC COR # BLD: 4.96 10*3/MM3 (ref 3.4–10.8)

## 2023-02-19 PROCEDURE — 84484 ASSAY OF TROPONIN QUANT: CPT | Performed by: INTERNAL MEDICINE

## 2023-02-19 PROCEDURE — 80306 DRUG TEST PRSMV INSTRMNT: CPT | Performed by: INTERNAL MEDICINE

## 2023-02-19 PROCEDURE — 94799 UNLISTED PULMONARY SVC/PX: CPT

## 2023-02-19 PROCEDURE — 82375 ASSAY CARBOXYHB QUANT: CPT

## 2023-02-19 PROCEDURE — 25010000002 HEPARIN (PORCINE) PER 1000 UNITS: Performed by: INTERNAL MEDICINE

## 2023-02-19 PROCEDURE — 85730 THROMBOPLASTIN TIME PARTIAL: CPT | Performed by: INTERNAL MEDICINE

## 2023-02-19 PROCEDURE — 36600 WITHDRAWAL OF ARTERIAL BLOOD: CPT

## 2023-02-19 PROCEDURE — 80053 COMPREHEN METABOLIC PANEL: CPT | Performed by: INTERNAL MEDICINE

## 2023-02-19 PROCEDURE — 25010000002 ZIPRASIDONE MESYLATE PER 10 MG: Performed by: HOSPITALIST

## 2023-02-19 PROCEDURE — 82570 ASSAY OF URINE CREATININE: CPT | Performed by: INTERNAL MEDICINE

## 2023-02-19 PROCEDURE — 85027 COMPLETE CBC AUTOMATED: CPT | Performed by: INTERNAL MEDICINE

## 2023-02-19 PROCEDURE — 82962 GLUCOSE BLOOD TEST: CPT

## 2023-02-19 PROCEDURE — 94761 N-INVAS EAR/PLS OXIMETRY MLT: CPT

## 2023-02-19 PROCEDURE — 84156 ASSAY OF PROTEIN URINE: CPT | Performed by: INTERNAL MEDICINE

## 2023-02-19 PROCEDURE — 94660 CPAP INITIATION&MGMT: CPT

## 2023-02-19 PROCEDURE — 99222 1ST HOSP IP/OBS MODERATE 55: CPT | Performed by: INTERNAL MEDICINE

## 2023-02-19 PROCEDURE — 83050 HGB METHEMOGLOBIN QUAN: CPT

## 2023-02-19 PROCEDURE — 81001 URINALYSIS AUTO W/SCOPE: CPT | Performed by: INTERNAL MEDICINE

## 2023-02-19 PROCEDURE — 99233 SBSQ HOSP IP/OBS HIGH 50: CPT | Performed by: INTERNAL MEDICINE

## 2023-02-19 PROCEDURE — 82805 BLOOD GASES W/O2 SATURATION: CPT

## 2023-02-19 RX ORDER — TROLAMINE SALICYLATE 10 G/100G
1 CREAM TOPICAL AS NEEDED
Status: DISCONTINUED | OUTPATIENT
Start: 2023-02-19 | End: 2023-02-24 | Stop reason: HOSPADM

## 2023-02-19 RX ADMIN — HEPARIN SODIUM 2200 UNITS: 5000 INJECTION, SOLUTION INTRAVENOUS; SUBCUTANEOUS at 13:04

## 2023-02-19 RX ADMIN — IPRATROPIUM BROMIDE AND ALBUTEROL SULFATE 3 ML: 2.5; .5 SOLUTION RESPIRATORY (INHALATION) at 20:15

## 2023-02-19 RX ADMIN — LEVOTHYROXINE SODIUM 75 MCG: 0.07 TABLET ORAL at 08:37

## 2023-02-19 RX ADMIN — ZIPRASIDONE MESYLATE 10 MG: 20 INJECTION, POWDER, LYOPHILIZED, FOR SOLUTION INTRAMUSCULAR at 22:58

## 2023-02-19 RX ADMIN — ISOSORBIDE MONONITRATE 90 MG: 30 TABLET, EXTENDED RELEASE ORAL at 08:36

## 2023-02-19 RX ADMIN — CLOPIDOGREL BISULFATE 75 MG: 75 TABLET, FILM COATED ORAL at 08:58

## 2023-02-19 RX ADMIN — METOPROLOL SUCCINATE 12.5 MG: 25 TABLET, EXTENDED RELEASE ORAL at 08:36

## 2023-02-19 RX ADMIN — ASPIRIN 81 MG: 81 TABLET, COATED ORAL at 08:35

## 2023-02-19 RX ADMIN — IPRATROPIUM BROMIDE AND ALBUTEROL SULFATE 3 ML: 2.5; .5 SOLUTION RESPIRATORY (INHALATION) at 07:48

## 2023-02-19 RX ADMIN — AMLODIPINE BESYLATE 10 MG: 10 TABLET ORAL at 08:37

## 2023-02-19 RX ADMIN — Medication 10 ML: at 09:01

## 2023-02-19 RX ADMIN — SERTRALINE 100 MG: 50 TABLET, FILM COATED ORAL at 08:35

## 2023-02-19 RX ADMIN — LAMOTRIGINE 100 MG: 100 TABLET ORAL at 08:37

## 2023-02-19 RX ADMIN — ZIPRASIDONE MESYLATE 10 MG: 20 INJECTION, POWDER, LYOPHILIZED, FOR SOLUTION INTRAMUSCULAR at 03:07

## 2023-02-19 RX ADMIN — BACLOFEN 5 MG: 10 TABLET ORAL at 08:36

## 2023-02-19 RX ADMIN — BUSPIRONE HYDROCHLORIDE 15 MG: 10 TABLET ORAL at 08:35

## 2023-02-19 RX ADMIN — BUDESONIDE AND FORMOTEROL FUMARATE DIHYDRATE 2 PUFF: 160; 4.5 AEROSOL RESPIRATORY (INHALATION) at 18:21

## 2023-02-19 RX ADMIN — MEMANTINE HYDROCHLORIDE 5 MG: 5 TABLET, FILM COATED ORAL at 08:36

## 2023-02-19 RX ADMIN — ROSUVASTATIN CALCIUM 40 MG: 20 TABLET, FILM COATED ORAL at 08:36

## 2023-02-20 ENCOUNTER — APPOINTMENT (OUTPATIENT)
Dept: GENERAL RADIOLOGY | Facility: HOSPITAL | Age: 73
DRG: 280 | End: 2023-02-20
Payer: MEDICARE

## 2023-02-20 LAB
A-A DO2: 192.6 MMHG (ref 0–300)
ALBUMIN SERPL-MCNC: 3.4 G/DL (ref 3.5–5.2)
ALBUMIN/GLOB SERPL: 1.4 G/DL
ALP SERPL-CCNC: 50 U/L (ref 39–117)
ALT SERPL W P-5'-P-CCNC: 21 U/L (ref 1–33)
ANION GAP SERPL CALCULATED.3IONS-SCNC: 9.4 MMOL/L (ref 5–15)
APTT PPP: 55 SECONDS (ref 26.5–34.5)
APTT PPP: 56.3 SECONDS (ref 26.5–34.5)
APTT PPP: 69.2 SECONDS (ref 26.5–34.5)
ARTERIAL PATENCY WRIST A: POSITIVE
AST SERPL-CCNC: 42 U/L (ref 1–32)
ATMOSPHERIC PRESS: 724 MMHG
BASE EXCESS BLDA CALC-SCNC: 5.4 MMOL/L (ref 0–2)
BDY SITE: ABNORMAL
BILIRUB SERPL-MCNC: 0.2 MG/DL (ref 0–1.2)
BODY TEMPERATURE: 0 C
BUN SERPL-MCNC: 33 MG/DL (ref 8–23)
BUN/CREAT SERPL: 33.3 (ref 7–25)
CALCIUM SPEC-SCNC: 8.7 MG/DL (ref 8.6–10.5)
CHLORIDE SERPL-SCNC: 104 MMOL/L (ref 98–107)
CO2 BLDA-SCNC: 33.6 MMOL/L (ref 22–33)
CO2 SERPL-SCNC: 28.6 MMOL/L (ref 22–29)
COHGB MFR BLD: 1.1 % (ref 0–5)
CREAT SERPL-MCNC: 0.99 MG/DL (ref 0.57–1)
CREAT UR-MCNC: 222.8 MG/DL
D-LACTATE SERPL-SCNC: 1.6 MMOL/L (ref 0.5–2)
DEPRECATED RDW RBC AUTO: 55.9 FL (ref 37–54)
EGFRCR SERPLBLD CKD-EPI 2021: 60 ML/MIN/1.73
EPAP: 10
ERYTHROCYTE [DISTWIDTH] IN BLOOD BY AUTOMATED COUNT: 17 % (ref 12.3–15.4)
GLOBULIN UR ELPH-MCNC: 2.4 GM/DL
GLUCOSE BLDC GLUCOMTR-MCNC: 104 MG/DL (ref 70–130)
GLUCOSE BLDC GLUCOMTR-MCNC: 152 MG/DL (ref 70–130)
GLUCOSE BLDC GLUCOMTR-MCNC: 235 MG/DL (ref 70–130)
GLUCOSE BLDC GLUCOMTR-MCNC: 97 MG/DL (ref 70–130)
GLUCOSE SERPL-MCNC: 100 MG/DL (ref 65–99)
HCO3 BLDA-SCNC: 31.9 MMOL/L (ref 20–26)
HCT VFR BLD AUTO: 33.9 % (ref 34–46.6)
HCT VFR BLD CALC: 33.1 % (ref 38–51)
HGB BLD-MCNC: 10.2 G/DL (ref 12–15.9)
HGB BLDA-MCNC: 10.8 G/DL (ref 13.5–17.5)
INHALED O2 CONCENTRATION: 50 %
IPAP: 24
Lab: ABNORMAL
MCH RBC QN AUTO: 27.3 PG (ref 26.6–33)
MCHC RBC AUTO-ENTMCNC: 30.1 G/DL (ref 31.5–35.7)
MCV RBC AUTO: 90.9 FL (ref 79–97)
METHGB BLD QL: 0.4 % (ref 0–3)
MODALITY: ABNORMAL
NOTE: ABNORMAL
OXYHGB MFR BLDV: 95.2 % (ref 94–99)
PCO2 BLDA: 55.4 MM HG (ref 35–45)
PCO2 TEMP ADJ BLD: ABNORMAL MM[HG]
PH BLDA: 7.37 PH UNITS (ref 7.35–7.45)
PH, TEMP CORRECTED: ABNORMAL
PLATELET # BLD AUTO: 146 10*3/MM3 (ref 140–450)
PMV BLD AUTO: 11 FL (ref 6–12)
PO2 BLDA: 86.2 MM HG (ref 83–108)
PO2 TEMP ADJ BLD: ABNORMAL MM[HG]
POTASSIUM SERPL-SCNC: 4.1 MMOL/L (ref 3.5–5.2)
PROT ?TM UR-MCNC: 31.4 MG/DL
PROT SERPL-MCNC: 5.8 G/DL (ref 6–8.5)
PROT/CREAT UR: 140.9 MG/G CREA (ref 0–200)
RBC # BLD AUTO: 3.73 10*6/MM3 (ref 3.77–5.28)
SAO2 % BLDCOA: 96.7 % (ref 94–99)
SET MECH RESP RATE: 20
SODIUM SERPL-SCNC: 142 MMOL/L (ref 136–145)
VENTILATOR MODE: ABNORMAL
WBC NRBC COR # BLD: 8.23 10*3/MM3 (ref 3.4–10.8)

## 2023-02-20 PROCEDURE — 82805 BLOOD GASES W/O2 SATURATION: CPT

## 2023-02-20 PROCEDURE — 80053 COMPREHEN METABOLIC PANEL: CPT | Performed by: INTERNAL MEDICINE

## 2023-02-20 PROCEDURE — 63710000001 INSULIN ASPART PER 5 UNITS: Performed by: INTERNAL MEDICINE

## 2023-02-20 PROCEDURE — 71045 X-RAY EXAM CHEST 1 VIEW: CPT

## 2023-02-20 PROCEDURE — 94761 N-INVAS EAR/PLS OXIMETRY MLT: CPT

## 2023-02-20 PROCEDURE — 87150 DNA/RNA AMPLIFIED PROBE: CPT | Performed by: INTERNAL MEDICINE

## 2023-02-20 PROCEDURE — 36600 WITHDRAWAL OF ARTERIAL BLOOD: CPT

## 2023-02-20 PROCEDURE — 25010000002 METHYLPREDNISOLONE PER 40 MG: Performed by: INTERNAL MEDICINE

## 2023-02-20 PROCEDURE — 71045 X-RAY EXAM CHEST 1 VIEW: CPT | Performed by: RADIOLOGY

## 2023-02-20 PROCEDURE — 94799 UNLISTED PULMONARY SVC/PX: CPT

## 2023-02-20 PROCEDURE — 94660 CPAP INITIATION&MGMT: CPT

## 2023-02-20 PROCEDURE — 85730 THROMBOPLASTIN TIME PARTIAL: CPT | Performed by: INTERNAL MEDICINE

## 2023-02-20 PROCEDURE — 83050 HGB METHEMOGLOBIN QUAN: CPT

## 2023-02-20 PROCEDURE — 87147 CULTURE TYPE IMMUNOLOGIC: CPT | Performed by: INTERNAL MEDICINE

## 2023-02-20 PROCEDURE — 25010000002 ONDANSETRON PER 1 MG: Performed by: INTERNAL MEDICINE

## 2023-02-20 PROCEDURE — 85730 THROMBOPLASTIN TIME PARTIAL: CPT | Performed by: HOSPITALIST

## 2023-02-20 PROCEDURE — 82375 ASSAY CARBOXYHB QUANT: CPT

## 2023-02-20 PROCEDURE — 25010000002 HEPARIN (PORCINE) PER 1000 UNITS: Performed by: INTERNAL MEDICINE

## 2023-02-20 PROCEDURE — 87040 BLOOD CULTURE FOR BACTERIA: CPT | Performed by: INTERNAL MEDICINE

## 2023-02-20 PROCEDURE — 82962 GLUCOSE BLOOD TEST: CPT

## 2023-02-20 PROCEDURE — 25010000002 HEPARIN (PORCINE) 25000-0.45 UT/250ML-% SOLUTION: Performed by: INTERNAL MEDICINE

## 2023-02-20 PROCEDURE — 83605 ASSAY OF LACTIC ACID: CPT | Performed by: INTERNAL MEDICINE

## 2023-02-20 PROCEDURE — 99233 SBSQ HOSP IP/OBS HIGH 50: CPT | Performed by: INTERNAL MEDICINE

## 2023-02-20 PROCEDURE — 85027 COMPLETE CBC AUTOMATED: CPT | Performed by: INTERNAL MEDICINE

## 2023-02-20 RX ORDER — ACETYLCYSTEINE 200 MG/ML
3 SOLUTION ORAL; RESPIRATORY (INHALATION)
Status: DISCONTINUED | OUTPATIENT
Start: 2023-02-20 | End: 2023-02-24 | Stop reason: HOSPADM

## 2023-02-20 RX ORDER — METHYLPREDNISOLONE SODIUM SUCCINATE 40 MG/ML
20 INJECTION, POWDER, LYOPHILIZED, FOR SOLUTION INTRAMUSCULAR; INTRAVENOUS
Status: COMPLETED | OUTPATIENT
Start: 2023-02-20 | End: 2023-02-24

## 2023-02-20 RX ORDER — ONDANSETRON 2 MG/ML
4 INJECTION INTRAMUSCULAR; INTRAVENOUS EVERY 8 HOURS PRN
Status: DISCONTINUED | OUTPATIENT
Start: 2023-02-20 | End: 2023-02-24 | Stop reason: HOSPADM

## 2023-02-20 RX ADMIN — DOXYCYCLINE 100 MG: 100 INJECTION, POWDER, LYOPHILIZED, FOR SOLUTION INTRAVENOUS at 11:35

## 2023-02-20 RX ADMIN — BACLOFEN 5 MG: 10 TABLET ORAL at 20:15

## 2023-02-20 RX ADMIN — DOXYCYCLINE 100 MG: 100 INJECTION, POWDER, LYOPHILIZED, FOR SOLUTION INTRAVENOUS at 23:57

## 2023-02-20 RX ADMIN — METHYLPREDNISOLONE SODIUM SUCCINATE 20 MG: 40 INJECTION, POWDER, FOR SOLUTION INTRAMUSCULAR; INTRAVENOUS at 11:35

## 2023-02-20 RX ADMIN — DONEPEZIL HYDROCHLORIDE 10 MG: 5 TABLET, FILM COATED ORAL at 20:15

## 2023-02-20 RX ADMIN — LAMOTRIGINE 25 MG: 100 TABLET ORAL at 20:15

## 2023-02-20 RX ADMIN — Medication 10 ML: at 08:31

## 2023-02-20 RX ADMIN — ONDANSETRON 4 MG: 2 INJECTION INTRAMUSCULAR; INTRAVENOUS at 15:16

## 2023-02-20 RX ADMIN — ACETYLCYSTEINE 3 ML: 200 INHALANT RESPIRATORY (INHALATION) at 12:45

## 2023-02-20 RX ADMIN — IPRATROPIUM BROMIDE AND ALBUTEROL SULFATE 3 ML: 2.5; .5 SOLUTION RESPIRATORY (INHALATION) at 18:40

## 2023-02-20 RX ADMIN — Medication 10 ML: at 20:16

## 2023-02-20 RX ADMIN — ACETYLCYSTEINE 3 ML: 200 INHALANT RESPIRATORY (INHALATION) at 18:40

## 2023-02-20 RX ADMIN — BUDESONIDE AND FORMOTEROL FUMARATE DIHYDRATE 2 PUFF: 160; 4.5 AEROSOL RESPIRATORY (INHALATION) at 08:34

## 2023-02-20 RX ADMIN — HEPARIN SODIUM 2200 UNITS: 5000 INJECTION, SOLUTION INTRAVENOUS; SUBCUTANEOUS at 00:59

## 2023-02-20 RX ADMIN — IPRATROPIUM BROMIDE AND ALBUTEROL SULFATE 3 ML: 2.5; .5 SOLUTION RESPIRATORY (INHALATION) at 12:46

## 2023-02-20 RX ADMIN — HEPARIN SODIUM 14 UNITS/KG/HR: 10000 INJECTION, SOLUTION INTRAVENOUS at 00:59

## 2023-02-20 RX ADMIN — INSULIN ASPART 3 UNITS: 100 INJECTION, SOLUTION INTRAVENOUS; SUBCUTANEOUS at 17:35

## 2023-02-20 RX ADMIN — IPRATROPIUM BROMIDE AND ALBUTEROL SULFATE 3 ML: 2.5; .5 SOLUTION RESPIRATORY (INHALATION) at 06:21

## 2023-02-20 RX ADMIN — ACETAMINOPHEN 500 MG: 500 TABLET ORAL at 17:34

## 2023-02-20 RX ADMIN — MEMANTINE HYDROCHLORIDE 5 MG: 5 TABLET, FILM COATED ORAL at 20:15

## 2023-02-20 RX ADMIN — BUSPIRONE HYDROCHLORIDE 15 MG: 10 TABLET ORAL at 20:15

## 2023-02-20 RX ADMIN — INSULIN ASPART 2 UNITS: 100 INJECTION, SOLUTION INTRAVENOUS; SUBCUTANEOUS at 20:19

## 2023-02-20 RX ADMIN — BUDESONIDE AND FORMOTEROL FUMARATE DIHYDRATE 2 PUFF: 160; 4.5 AEROSOL RESPIRATORY (INHALATION) at 18:40

## 2023-02-20 RX ADMIN — Medication 10 ML: at 01:00

## 2023-02-21 PROBLEM — I21.4 NSTEMI (NON-ST ELEVATED MYOCARDIAL INFARCTION): Status: ACTIVE | Noted: 2023-02-18

## 2023-02-21 LAB
APTT PPP: 52.2 SECONDS (ref 26.5–34.5)
APTT PPP: 61.7 SECONDS (ref 26.5–34.5)
BACTERIA BLD CULT: ABNORMAL
CK MB SERPL-CCNC: 6.07 NG/ML
GEN 5 2HR TROPONIN T REFLEX: 1020 NG/L
GLUCOSE BLDC GLUCOMTR-MCNC: 102 MG/DL (ref 70–130)
GLUCOSE BLDC GLUCOMTR-MCNC: 120 MG/DL (ref 70–130)
GLUCOSE BLDC GLUCOMTR-MCNC: 137 MG/DL (ref 70–130)
GLUCOSE BLDC GLUCOMTR-MCNC: 139 MG/DL (ref 70–130)
TROPONIN T DELTA: 47 NG/L
TROPONIN T SERPL HS-MCNC: 973 NG/L

## 2023-02-21 PROCEDURE — 94799 UNLISTED PULMONARY SVC/PX: CPT

## 2023-02-21 PROCEDURE — 93459 L HRT ART/GRFT ANGIO: CPT | Performed by: INTERNAL MEDICINE

## 2023-02-21 PROCEDURE — 82962 GLUCOSE BLOOD TEST: CPT

## 2023-02-21 PROCEDURE — 84484 ASSAY OF TROPONIN QUANT: CPT | Performed by: INTERNAL MEDICINE

## 2023-02-21 PROCEDURE — 85730 THROMBOPLASTIN TIME PARTIAL: CPT | Performed by: INTERNAL MEDICINE

## 2023-02-21 PROCEDURE — C1894 INTRO/SHEATH, NON-LASER: HCPCS | Performed by: INTERNAL MEDICINE

## 2023-02-21 PROCEDURE — 25010000002 HEPARIN (PORCINE) PER 1000 UNITS: Performed by: INTERNAL MEDICINE

## 2023-02-21 PROCEDURE — 94660 CPAP INITIATION&MGMT: CPT

## 2023-02-21 PROCEDURE — 25010000002 MIDAZOLAM PER 1 MG: Performed by: INTERNAL MEDICINE

## 2023-02-21 PROCEDURE — 4A023N7 MEASUREMENT OF CARDIAC SAMPLING AND PRESSURE, LEFT HEART, PERCUTANEOUS APPROACH: ICD-10-PCS | Performed by: INTERNAL MEDICINE

## 2023-02-21 PROCEDURE — 25010000002 ENOXAPARIN PER 10 MG: Performed by: INTERNAL MEDICINE

## 2023-02-21 PROCEDURE — 82553 CREATINE MB FRACTION: CPT | Performed by: INTERNAL MEDICINE

## 2023-02-21 PROCEDURE — 99233 SBSQ HOSP IP/OBS HIGH 50: CPT | Performed by: INTERNAL MEDICINE

## 2023-02-21 PROCEDURE — 25010000002 HEPARIN (PORCINE) 25000-0.45 UT/250ML-% SOLUTION: Performed by: INTERNAL MEDICINE

## 2023-02-21 PROCEDURE — 25010000002 METHYLPREDNISOLONE PER 40 MG: Performed by: INTERNAL MEDICINE

## 2023-02-21 PROCEDURE — C1769 GUIDE WIRE: HCPCS | Performed by: INTERNAL MEDICINE

## 2023-02-21 PROCEDURE — B2111ZZ FLUOROSCOPY OF MULTIPLE CORONARY ARTERIES USING LOW OSMOLAR CONTRAST: ICD-10-PCS | Performed by: INTERNAL MEDICINE

## 2023-02-21 PROCEDURE — 94761 N-INVAS EAR/PLS OXIMETRY MLT: CPT

## 2023-02-21 PROCEDURE — 25010000002 FENTANYL CITRATE (PF) 50 MCG/ML SOLUTION: Performed by: INTERNAL MEDICINE

## 2023-02-21 PROCEDURE — B2131ZZ FLUOROSCOPY OF MULTIPLE CORONARY ARTERY BYPASS GRAFTS USING LOW OSMOLAR CONTRAST: ICD-10-PCS | Performed by: INTERNAL MEDICINE

## 2023-02-21 PROCEDURE — 0 IOPAMIDOL PER 1 ML: Performed by: INTERNAL MEDICINE

## 2023-02-21 RX ORDER — LIDOCAINE HYDROCHLORIDE 20 MG/ML
INJECTION, SOLUTION INFILTRATION; PERINEURAL
Status: DISCONTINUED | OUTPATIENT
Start: 2023-02-21 | End: 2023-02-21 | Stop reason: HOSPADM

## 2023-02-21 RX ORDER — AMLODIPINE BESYLATE 10 MG/1
10 TABLET ORAL DAILY
Status: DISCONTINUED | OUTPATIENT
Start: 2023-02-21 | End: 2023-02-24 | Stop reason: HOSPADM

## 2023-02-21 RX ORDER — MIDAZOLAM HYDROCHLORIDE 1 MG/ML
INJECTION INTRAMUSCULAR; INTRAVENOUS
Status: DISCONTINUED | OUTPATIENT
Start: 2023-02-21 | End: 2023-02-21 | Stop reason: HOSPADM

## 2023-02-21 RX ORDER — SODIUM CHLORIDE 9 MG/ML
100 INJECTION, SOLUTION INTRAVENOUS CONTINUOUS
Status: ACTIVE | OUTPATIENT
Start: 2023-02-21 | End: 2023-02-21

## 2023-02-21 RX ORDER — SODIUM CHLORIDE 9 MG/ML
INJECTION, SOLUTION INTRAVENOUS
Status: COMPLETED | OUTPATIENT
Start: 2023-02-21 | End: 2023-02-21

## 2023-02-21 RX ORDER — ENOXAPARIN SODIUM 100 MG/ML
40 INJECTION SUBCUTANEOUS NIGHTLY
Status: DISCONTINUED | OUTPATIENT
Start: 2023-02-21 | End: 2023-02-22

## 2023-02-21 RX ORDER — FENTANYL CITRATE 50 UG/ML
INJECTION, SOLUTION INTRAMUSCULAR; INTRAVENOUS
Status: DISCONTINUED | OUTPATIENT
Start: 2023-02-21 | End: 2023-02-21 | Stop reason: HOSPADM

## 2023-02-21 RX ADMIN — LEVOTHYROXINE SODIUM 75 MCG: 0.07 TABLET ORAL at 09:40

## 2023-02-21 RX ADMIN — DOXYCYCLINE 100 MG: 100 INJECTION, POWDER, LYOPHILIZED, FOR SOLUTION INTRAVENOUS at 23:31

## 2023-02-21 RX ADMIN — IPRATROPIUM BROMIDE AND ALBUTEROL SULFATE 3 ML: 2.5; .5 SOLUTION RESPIRATORY (INHALATION) at 19:06

## 2023-02-21 RX ADMIN — LAMOTRIGINE 100 MG: 100 TABLET ORAL at 09:40

## 2023-02-21 RX ADMIN — ISOSORBIDE MONONITRATE 90 MG: 30 TABLET, EXTENDED RELEASE ORAL at 05:32

## 2023-02-21 RX ADMIN — BACLOFEN 5 MG: 10 TABLET ORAL at 20:51

## 2023-02-21 RX ADMIN — SODIUM CHLORIDE 100 ML/HR: 9 INJECTION, SOLUTION INTRAVENOUS at 15:42

## 2023-02-21 RX ADMIN — HEPARIN SODIUM 2200 UNITS: 5000 INJECTION, SOLUTION INTRAVENOUS; SUBCUTANEOUS at 01:53

## 2023-02-21 RX ADMIN — AMLODIPINE BESYLATE 10 MG: 10 TABLET ORAL at 05:32

## 2023-02-21 RX ADMIN — BUSPIRONE HYDROCHLORIDE 15 MG: 10 TABLET ORAL at 09:42

## 2023-02-21 RX ADMIN — Medication 10 ML: at 09:43

## 2023-02-21 RX ADMIN — ACETYLCYSTEINE 3 ML: 200 INHALANT RESPIRATORY (INHALATION) at 06:15

## 2023-02-21 RX ADMIN — IPRATROPIUM BROMIDE AND ALBUTEROL SULFATE 3 ML: 2.5; .5 SOLUTION RESPIRATORY (INHALATION) at 06:15

## 2023-02-21 RX ADMIN — LAMOTRIGINE 25 MG: 100 TABLET ORAL at 20:51

## 2023-02-21 RX ADMIN — ROSUVASTATIN CALCIUM 40 MG: 20 TABLET, FILM COATED ORAL at 09:41

## 2023-02-21 RX ADMIN — ACETYLCYSTEINE 3 ML: 200 INHALANT RESPIRATORY (INHALATION) at 19:06

## 2023-02-21 RX ADMIN — SERTRALINE 100 MG: 50 TABLET, FILM COATED ORAL at 09:41

## 2023-02-21 RX ADMIN — DOXYCYCLINE 100 MG: 100 INJECTION, POWDER, LYOPHILIZED, FOR SOLUTION INTRAVENOUS at 11:19

## 2023-02-21 RX ADMIN — BACLOFEN 5 MG: 10 TABLET ORAL at 09:41

## 2023-02-21 RX ADMIN — METOPROLOL SUCCINATE 12.5 MG: 25 TABLET, EXTENDED RELEASE ORAL at 09:40

## 2023-02-21 RX ADMIN — BUSPIRONE HYDROCHLORIDE 15 MG: 10 TABLET ORAL at 20:51

## 2023-02-21 RX ADMIN — BUDESONIDE AND FORMOTEROL FUMARATE DIHYDRATE 2 PUFF: 160; 4.5 AEROSOL RESPIRATORY (INHALATION) at 06:15

## 2023-02-21 RX ADMIN — METHYLPREDNISOLONE SODIUM SUCCINATE 20 MG: 40 INJECTION, POWDER, FOR SOLUTION INTRAMUSCULAR; INTRAVENOUS at 09:40

## 2023-02-21 RX ADMIN — DONEPEZIL HYDROCHLORIDE 10 MG: 5 TABLET, FILM COATED ORAL at 20:51

## 2023-02-21 RX ADMIN — ENOXAPARIN SODIUM 40 MG: 40 INJECTION SUBCUTANEOUS at 20:52

## 2023-02-21 RX ADMIN — CLOPIDOGREL BISULFATE 75 MG: 75 TABLET, FILM COATED ORAL at 09:42

## 2023-02-21 RX ADMIN — Medication 10 ML: at 20:52

## 2023-02-21 RX ADMIN — ASPIRIN 81 MG: 81 TABLET, COATED ORAL at 09:41

## 2023-02-21 RX ADMIN — MEMANTINE HYDROCHLORIDE 5 MG: 5 TABLET, FILM COATED ORAL at 20:51

## 2023-02-21 RX ADMIN — MEMANTINE HYDROCHLORIDE 5 MG: 5 TABLET, FILM COATED ORAL at 09:41

## 2023-02-21 RX ADMIN — BUDESONIDE AND FORMOTEROL FUMARATE DIHYDRATE 2 PUFF: 160; 4.5 AEROSOL RESPIRATORY (INHALATION) at 19:06

## 2023-02-21 RX ADMIN — HEPARIN SODIUM 16 UNITS/KG/HR: 10000 INJECTION, SOLUTION INTRAVENOUS at 01:54

## 2023-02-22 LAB
ANION GAP SERPL CALCULATED.3IONS-SCNC: 5.4 MMOL/L (ref 5–15)
BACTERIA SPEC AEROBE CULT: ABNORMAL
BASOPHILS # BLD AUTO: 0.01 10*3/MM3 (ref 0–0.2)
BASOPHILS NFR BLD AUTO: 0.2 % (ref 0–1.5)
BUN SERPL-MCNC: 15 MG/DL (ref 8–23)
BUN/CREAT SERPL: 22.7 (ref 7–25)
CALCIUM SPEC-SCNC: 8.9 MG/DL (ref 8.6–10.5)
CHLORIDE SERPL-SCNC: 105 MMOL/L (ref 98–107)
CO2 SERPL-SCNC: 31.6 MMOL/L (ref 22–29)
CREAT SERPL-MCNC: 0.66 MG/DL (ref 0.57–1)
DEPRECATED RDW RBC AUTO: 55.1 FL (ref 37–54)
EGFRCR SERPLBLD CKD-EPI 2021: 92.2 ML/MIN/1.73
EOSINOPHIL # BLD AUTO: 0 10*3/MM3 (ref 0–0.4)
EOSINOPHIL NFR BLD AUTO: 0 % (ref 0.3–6.2)
ERYTHROCYTE [DISTWIDTH] IN BLOOD BY AUTOMATED COUNT: 16.4 % (ref 12.3–15.4)
GLUCOSE BLDC GLUCOMTR-MCNC: 120 MG/DL (ref 70–130)
GLUCOSE BLDC GLUCOMTR-MCNC: 125 MG/DL (ref 70–130)
GLUCOSE BLDC GLUCOMTR-MCNC: 130 MG/DL (ref 70–130)
GLUCOSE BLDC GLUCOMTR-MCNC: 90 MG/DL (ref 70–130)
GLUCOSE SERPL-MCNC: 108 MG/DL (ref 65–99)
GRAM STN SPEC: ABNORMAL
HCT VFR BLD AUTO: 34 % (ref 34–46.6)
HGB BLD-MCNC: 10.1 G/DL (ref 12–15.9)
IMM GRANULOCYTES # BLD AUTO: 0.09 10*3/MM3 (ref 0–0.05)
IMM GRANULOCYTES NFR BLD AUTO: 1.6 % (ref 0–0.5)
ISOLATED FROM: ABNORMAL
LYMPHOCYTES # BLD AUTO: 0.99 10*3/MM3 (ref 0.7–3.1)
LYMPHOCYTES NFR BLD AUTO: 17.4 % (ref 19.6–45.3)
MCH RBC QN AUTO: 27.1 PG (ref 26.6–33)
MCHC RBC AUTO-ENTMCNC: 29.7 G/DL (ref 31.5–35.7)
MCV RBC AUTO: 91.2 FL (ref 79–97)
MONOCYTES # BLD AUTO: 0.5 10*3/MM3 (ref 0.1–0.9)
MONOCYTES NFR BLD AUTO: 8.8 % (ref 5–12)
NEUTROPHILS NFR BLD AUTO: 4.09 10*3/MM3 (ref 1.7–7)
NEUTROPHILS NFR BLD AUTO: 72 % (ref 42.7–76)
NRBC BLD AUTO-RTO: 0 /100 WBC (ref 0–0.2)
PLATELET # BLD AUTO: 159 10*3/MM3 (ref 140–450)
PMV BLD AUTO: 10.6 FL (ref 6–12)
POTASSIUM SERPL-SCNC: 4.1 MMOL/L (ref 3.5–5.2)
RBC # BLD AUTO: 3.73 10*6/MM3 (ref 3.77–5.28)
SODIUM SERPL-SCNC: 142 MMOL/L (ref 136–145)
WBC NRBC COR # BLD: 5.68 10*3/MM3 (ref 3.4–10.8)

## 2023-02-22 PROCEDURE — 25010000002 METHYLPREDNISOLONE PER 40 MG: Performed by: INTERNAL MEDICINE

## 2023-02-22 PROCEDURE — 94799 UNLISTED PULMONARY SVC/PX: CPT

## 2023-02-22 PROCEDURE — 85025 COMPLETE CBC W/AUTO DIFF WBC: CPT | Performed by: INTERNAL MEDICINE

## 2023-02-22 PROCEDURE — 82962 GLUCOSE BLOOD TEST: CPT

## 2023-02-22 PROCEDURE — 80048 BASIC METABOLIC PNL TOTAL CA: CPT | Performed by: INTERNAL MEDICINE

## 2023-02-22 PROCEDURE — 94761 N-INVAS EAR/PLS OXIMETRY MLT: CPT

## 2023-02-22 PROCEDURE — 99232 SBSQ HOSP IP/OBS MODERATE 35: CPT | Performed by: INTERNAL MEDICINE

## 2023-02-22 RX ADMIN — SERTRALINE 100 MG: 50 TABLET, FILM COATED ORAL at 08:31

## 2023-02-22 RX ADMIN — Medication 10 ML: at 20:31

## 2023-02-22 RX ADMIN — LEVOTHYROXINE SODIUM 75 MCG: 0.07 TABLET ORAL at 08:30

## 2023-02-22 RX ADMIN — ISOSORBIDE MONONITRATE 90 MG: 30 TABLET, EXTENDED RELEASE ORAL at 08:29

## 2023-02-22 RX ADMIN — LAMOTRIGINE 25 MG: 100 TABLET ORAL at 20:32

## 2023-02-22 RX ADMIN — BUSPIRONE HYDROCHLORIDE 15 MG: 10 TABLET ORAL at 08:31

## 2023-02-22 RX ADMIN — BACLOFEN 5 MG: 10 TABLET ORAL at 08:31

## 2023-02-22 RX ADMIN — PHENOL 1 SPRAY: 1.4 SPRAY ORAL at 16:17

## 2023-02-22 RX ADMIN — LAMOTRIGINE 100 MG: 100 TABLET ORAL at 08:30

## 2023-02-22 RX ADMIN — DOXYCYCLINE 100 MG: 100 INJECTION, POWDER, LYOPHILIZED, FOR SOLUTION INTRAVENOUS at 10:34

## 2023-02-22 RX ADMIN — Medication 10 ML: at 08:33

## 2023-02-22 RX ADMIN — APIXABAN 5 MG: 5 TABLET, FILM COATED ORAL at 10:34

## 2023-02-22 RX ADMIN — IPRATROPIUM BROMIDE AND ALBUTEROL SULFATE 3 ML: 2.5; .5 SOLUTION RESPIRATORY (INHALATION) at 18:33

## 2023-02-22 RX ADMIN — BUDESONIDE AND FORMOTEROL FUMARATE DIHYDRATE 2 PUFF: 160; 4.5 AEROSOL RESPIRATORY (INHALATION) at 06:22

## 2023-02-22 RX ADMIN — BACLOFEN 5 MG: 10 TABLET ORAL at 20:31

## 2023-02-22 RX ADMIN — DONEPEZIL HYDROCHLORIDE 10 MG: 5 TABLET, FILM COATED ORAL at 20:32

## 2023-02-22 RX ADMIN — BUDESONIDE AND FORMOTEROL FUMARATE DIHYDRATE 2 PUFF: 160; 4.5 AEROSOL RESPIRATORY (INHALATION) at 18:33

## 2023-02-22 RX ADMIN — DOXYCYCLINE 100 MG: 100 INJECTION, POWDER, LYOPHILIZED, FOR SOLUTION INTRAVENOUS at 22:19

## 2023-02-22 RX ADMIN — METOPROLOL SUCCINATE 12.5 MG: 25 TABLET, EXTENDED RELEASE ORAL at 08:31

## 2023-02-22 RX ADMIN — BUSPIRONE HYDROCHLORIDE 15 MG: 10 TABLET ORAL at 20:31

## 2023-02-22 RX ADMIN — CLOPIDOGREL BISULFATE 75 MG: 75 TABLET, FILM COATED ORAL at 08:29

## 2023-02-22 RX ADMIN — ACETAMINOPHEN 500 MG: 500 TABLET ORAL at 17:38

## 2023-02-22 RX ADMIN — ACETYLCYSTEINE 3 ML: 200 INHALANT RESPIRATORY (INHALATION) at 18:33

## 2023-02-22 RX ADMIN — METHYLPREDNISOLONE SODIUM SUCCINATE 20 MG: 40 INJECTION, POWDER, FOR SOLUTION INTRAMUSCULAR; INTRAVENOUS at 08:34

## 2023-02-22 RX ADMIN — APIXABAN 5 MG: 5 TABLET, FILM COATED ORAL at 20:32

## 2023-02-22 RX ADMIN — ASPIRIN 81 MG: 81 TABLET, COATED ORAL at 08:32

## 2023-02-22 RX ADMIN — ROSUVASTATIN CALCIUM 40 MG: 20 TABLET, FILM COATED ORAL at 08:31

## 2023-02-22 RX ADMIN — MEMANTINE HYDROCHLORIDE 5 MG: 5 TABLET, FILM COATED ORAL at 20:31

## 2023-02-22 RX ADMIN — MEMANTINE HYDROCHLORIDE 5 MG: 5 TABLET, FILM COATED ORAL at 08:31

## 2023-02-22 RX ADMIN — AMLODIPINE BESYLATE 10 MG: 10 TABLET ORAL at 08:32

## 2023-02-23 LAB
ANION GAP SERPL CALCULATED.3IONS-SCNC: 6.3 MMOL/L (ref 5–15)
BACTERIA SPEC AEROBE CULT: NORMAL
BACTERIA SPEC AEROBE CULT: NORMAL
BASOPHILS # BLD AUTO: 0.02 10*3/MM3 (ref 0–0.2)
BASOPHILS NFR BLD AUTO: 0.3 % (ref 0–1.5)
BUN SERPL-MCNC: 13 MG/DL (ref 8–23)
BUN/CREAT SERPL: 20.3 (ref 7–25)
CALCIUM SPEC-SCNC: 9.4 MG/DL (ref 8.6–10.5)
CHLORIDE SERPL-SCNC: 105 MMOL/L (ref 98–107)
CO2 SERPL-SCNC: 31.7 MMOL/L (ref 22–29)
CREAT SERPL-MCNC: 0.64 MG/DL (ref 0.57–1)
DEPRECATED RDW RBC AUTO: 53 FL (ref 37–54)
EGFRCR SERPLBLD CKD-EPI 2021: 92.9 ML/MIN/1.73
EOSINOPHIL # BLD AUTO: 0.01 10*3/MM3 (ref 0–0.4)
EOSINOPHIL NFR BLD AUTO: 0.2 % (ref 0.3–6.2)
ERYTHROCYTE [DISTWIDTH] IN BLOOD BY AUTOMATED COUNT: 16 % (ref 12.3–15.4)
GLUCOSE BLDC GLUCOMTR-MCNC: 103 MG/DL (ref 70–130)
GLUCOSE BLDC GLUCOMTR-MCNC: 143 MG/DL (ref 70–130)
GLUCOSE BLDC GLUCOMTR-MCNC: 167 MG/DL (ref 70–130)
GLUCOSE BLDC GLUCOMTR-MCNC: 187 MG/DL (ref 70–130)
GLUCOSE BLDC GLUCOMTR-MCNC: 92 MG/DL (ref 70–130)
GLUCOSE SERPL-MCNC: 90 MG/DL (ref 65–99)
HCT VFR BLD AUTO: 34.6 % (ref 34–46.6)
HGB BLD-MCNC: 10.3 G/DL (ref 12–15.9)
IMM GRANULOCYTES # BLD AUTO: 0.18 10*3/MM3 (ref 0–0.05)
IMM GRANULOCYTES NFR BLD AUTO: 2.9 % (ref 0–0.5)
LYMPHOCYTES # BLD AUTO: 1.26 10*3/MM3 (ref 0.7–3.1)
LYMPHOCYTES NFR BLD AUTO: 20.5 % (ref 19.6–45.3)
MCH RBC QN AUTO: 27.2 PG (ref 26.6–33)
MCHC RBC AUTO-ENTMCNC: 29.8 G/DL (ref 31.5–35.7)
MCV RBC AUTO: 91.3 FL (ref 79–97)
MONOCYTES # BLD AUTO: 0.4 10*3/MM3 (ref 0.1–0.9)
MONOCYTES NFR BLD AUTO: 6.5 % (ref 5–12)
NEUTROPHILS NFR BLD AUTO: 4.27 10*3/MM3 (ref 1.7–7)
NEUTROPHILS NFR BLD AUTO: 69.6 % (ref 42.7–76)
NRBC BLD AUTO-RTO: 0 /100 WBC (ref 0–0.2)
PLATELET # BLD AUTO: 146 10*3/MM3 (ref 140–450)
PMV BLD AUTO: 10.4 FL (ref 6–12)
POTASSIUM SERPL-SCNC: 3.9 MMOL/L (ref 3.5–5.2)
RBC # BLD AUTO: 3.79 10*6/MM3 (ref 3.77–5.28)
SODIUM SERPL-SCNC: 143 MMOL/L (ref 136–145)
WBC NRBC COR # BLD: 6.14 10*3/MM3 (ref 3.4–10.8)

## 2023-02-23 PROCEDURE — 63710000001 INSULIN ASPART PER 5 UNITS: Performed by: INTERNAL MEDICINE

## 2023-02-23 PROCEDURE — 85025 COMPLETE CBC W/AUTO DIFF WBC: CPT | Performed by: INTERNAL MEDICINE

## 2023-02-23 PROCEDURE — 94664 DEMO&/EVAL PT USE INHALER: CPT

## 2023-02-23 PROCEDURE — 94761 N-INVAS EAR/PLS OXIMETRY MLT: CPT

## 2023-02-23 PROCEDURE — 82962 GLUCOSE BLOOD TEST: CPT

## 2023-02-23 PROCEDURE — 25010000002 METHYLPREDNISOLONE PER 40 MG: Performed by: INTERNAL MEDICINE

## 2023-02-23 PROCEDURE — 99232 SBSQ HOSP IP/OBS MODERATE 35: CPT | Performed by: INTERNAL MEDICINE

## 2023-02-23 PROCEDURE — 80048 BASIC METABOLIC PNL TOTAL CA: CPT | Performed by: INTERNAL MEDICINE

## 2023-02-23 PROCEDURE — 94799 UNLISTED PULMONARY SVC/PX: CPT

## 2023-02-23 RX ORDER — LISINOPRIL 10 MG/1
10 TABLET ORAL
Status: DISCONTINUED | OUTPATIENT
Start: 2023-02-23 | End: 2023-02-24 | Stop reason: HOSPADM

## 2023-02-23 RX ADMIN — BUDESONIDE AND FORMOTEROL FUMARATE DIHYDRATE 2 PUFF: 160; 4.5 AEROSOL RESPIRATORY (INHALATION) at 06:28

## 2023-02-23 RX ADMIN — DOXYCYCLINE 100 MG: 100 INJECTION, POWDER, LYOPHILIZED, FOR SOLUTION INTRAVENOUS at 23:16

## 2023-02-23 RX ADMIN — MEMANTINE HYDROCHLORIDE 5 MG: 5 TABLET, FILM COATED ORAL at 08:42

## 2023-02-23 RX ADMIN — BACLOFEN 5 MG: 10 TABLET ORAL at 08:43

## 2023-02-23 RX ADMIN — INSULIN ASPART 2 UNITS: 100 INJECTION, SOLUTION INTRAVENOUS; SUBCUTANEOUS at 23:16

## 2023-02-23 RX ADMIN — DOXYCYCLINE 100 MG: 100 INJECTION, POWDER, LYOPHILIZED, FOR SOLUTION INTRAVENOUS at 11:40

## 2023-02-23 RX ADMIN — ISOSORBIDE MONONITRATE 90 MG: 30 TABLET, EXTENDED RELEASE ORAL at 08:43

## 2023-02-23 RX ADMIN — METOPROLOL SUCCINATE 12.5 MG: 25 TABLET, EXTENDED RELEASE ORAL at 08:42

## 2023-02-23 RX ADMIN — LAMOTRIGINE 100 MG: 100 TABLET ORAL at 08:44

## 2023-02-23 RX ADMIN — BUDESONIDE AND FORMOTEROL FUMARATE DIHYDRATE 2 PUFF: 160; 4.5 AEROSOL RESPIRATORY (INHALATION) at 18:28

## 2023-02-23 RX ADMIN — MEMANTINE HYDROCHLORIDE 5 MG: 5 TABLET, FILM COATED ORAL at 23:16

## 2023-02-23 RX ADMIN — SERTRALINE 100 MG: 50 TABLET, FILM COATED ORAL at 08:43

## 2023-02-23 RX ADMIN — BUSPIRONE HYDROCHLORIDE 15 MG: 10 TABLET ORAL at 23:15

## 2023-02-23 RX ADMIN — LISINOPRIL 10 MG: 10 TABLET ORAL at 08:56

## 2023-02-23 RX ADMIN — Medication 10 ML: at 08:45

## 2023-02-23 RX ADMIN — METHYLPREDNISOLONE SODIUM SUCCINATE 20 MG: 40 INJECTION, POWDER, FOR SOLUTION INTRAMUSCULAR; INTRAVENOUS at 08:41

## 2023-02-23 RX ADMIN — Medication 10 ML: at 23:45

## 2023-02-23 RX ADMIN — DONEPEZIL HYDROCHLORIDE 10 MG: 5 TABLET, FILM COATED ORAL at 23:15

## 2023-02-23 RX ADMIN — APIXABAN 5 MG: 5 TABLET, FILM COATED ORAL at 23:15

## 2023-02-23 RX ADMIN — CLOPIDOGREL BISULFATE 75 MG: 75 TABLET, FILM COATED ORAL at 08:42

## 2023-02-23 RX ADMIN — ROSUVASTATIN CALCIUM 40 MG: 20 TABLET, FILM COATED ORAL at 08:44

## 2023-02-23 RX ADMIN — INSULIN ASPART 2 UNITS: 100 INJECTION, SOLUTION INTRAVENOUS; SUBCUTANEOUS at 18:26

## 2023-02-23 RX ADMIN — LEVOTHYROXINE SODIUM 75 MCG: 0.07 TABLET ORAL at 08:44

## 2023-02-23 RX ADMIN — APIXABAN 5 MG: 5 TABLET, FILM COATED ORAL at 08:45

## 2023-02-23 RX ADMIN — LAMOTRIGINE 25 MG: 100 TABLET ORAL at 23:16

## 2023-02-23 RX ADMIN — BUSPIRONE HYDROCHLORIDE 15 MG: 10 TABLET ORAL at 08:44

## 2023-02-23 RX ADMIN — BACLOFEN 5 MG: 10 TABLET ORAL at 23:15

## 2023-02-23 RX ADMIN — AMLODIPINE BESYLATE 10 MG: 10 TABLET ORAL at 08:44

## 2023-02-24 VITALS
WEIGHT: 170.6 LBS | RESPIRATION RATE: 18 BRPM | SYSTOLIC BLOOD PRESSURE: 114 MMHG | HEART RATE: 72 BPM | HEIGHT: 66 IN | BODY MASS INDEX: 27.42 KG/M2 | OXYGEN SATURATION: 97 % | TEMPERATURE: 98.3 F | DIASTOLIC BLOOD PRESSURE: 62 MMHG

## 2023-02-24 LAB
ANION GAP SERPL CALCULATED.3IONS-SCNC: 7.1 MMOL/L (ref 5–15)
BASOPHILS # BLD AUTO: 0.04 10*3/MM3 (ref 0–0.2)
BASOPHILS NFR BLD AUTO: 0.5 % (ref 0–1.5)
BUN SERPL-MCNC: 21 MG/DL (ref 8–23)
BUN/CREAT SERPL: 25.9 (ref 7–25)
CALCIUM SPEC-SCNC: 9.5 MG/DL (ref 8.6–10.5)
CHLORIDE SERPL-SCNC: 100 MMOL/L (ref 98–107)
CO2 SERPL-SCNC: 35.9 MMOL/L (ref 22–29)
CREAT SERPL-MCNC: 0.81 MG/DL (ref 0.57–1)
DEPRECATED RDW RBC AUTO: 50 FL (ref 37–54)
EGFRCR SERPLBLD CKD-EPI 2021: 76.3 ML/MIN/1.73
EOSINOPHIL # BLD AUTO: 0.05 10*3/MM3 (ref 0–0.4)
EOSINOPHIL NFR BLD AUTO: 0.6 % (ref 0.3–6.2)
ERYTHROCYTE [DISTWIDTH] IN BLOOD BY AUTOMATED COUNT: 15.8 % (ref 12.3–15.4)
GLUCOSE BLDC GLUCOMTR-MCNC: 124 MG/DL (ref 70–130)
GLUCOSE BLDC GLUCOMTR-MCNC: 147 MG/DL (ref 70–130)
GLUCOSE BLDC GLUCOMTR-MCNC: 161 MG/DL (ref 70–130)
GLUCOSE BLDC GLUCOMTR-MCNC: 92 MG/DL (ref 70–130)
GLUCOSE SERPL-MCNC: 66 MG/DL (ref 65–99)
HCT VFR BLD AUTO: 37.6 % (ref 34–46.6)
HGB BLD-MCNC: 11.4 G/DL (ref 12–15.9)
IMM GRANULOCYTES # BLD AUTO: 0.24 10*3/MM3 (ref 0–0.05)
IMM GRANULOCYTES NFR BLD AUTO: 3 % (ref 0–0.5)
LYMPHOCYTES # BLD AUTO: 1.66 10*3/MM3 (ref 0.7–3.1)
LYMPHOCYTES NFR BLD AUTO: 20.5 % (ref 19.6–45.3)
MCH RBC QN AUTO: 26.5 PG (ref 26.6–33)
MCHC RBC AUTO-ENTMCNC: 30.3 G/DL (ref 31.5–35.7)
MCV RBC AUTO: 87.2 FL (ref 79–97)
MONOCYTES # BLD AUTO: 0.54 10*3/MM3 (ref 0.1–0.9)
MONOCYTES NFR BLD AUTO: 6.7 % (ref 5–12)
NEUTROPHILS NFR BLD AUTO: 5.57 10*3/MM3 (ref 1.7–7)
NEUTROPHILS NFR BLD AUTO: 68.7 % (ref 42.7–76)
NRBC BLD AUTO-RTO: 0 /100 WBC (ref 0–0.2)
PLATELET # BLD AUTO: 171 10*3/MM3 (ref 140–450)
PMV BLD AUTO: 10.9 FL (ref 6–12)
POTASSIUM SERPL-SCNC: 3.7 MMOL/L (ref 3.5–5.2)
RBC # BLD AUTO: 4.31 10*6/MM3 (ref 3.77–5.28)
SODIUM SERPL-SCNC: 143 MMOL/L (ref 136–145)
WBC NRBC COR # BLD: 8.1 10*3/MM3 (ref 3.4–10.8)

## 2023-02-24 PROCEDURE — 80048 BASIC METABOLIC PNL TOTAL CA: CPT | Performed by: INTERNAL MEDICINE

## 2023-02-24 PROCEDURE — 94799 UNLISTED PULMONARY SVC/PX: CPT

## 2023-02-24 PROCEDURE — 85025 COMPLETE CBC W/AUTO DIFF WBC: CPT | Performed by: INTERNAL MEDICINE

## 2023-02-24 PROCEDURE — 94761 N-INVAS EAR/PLS OXIMETRY MLT: CPT

## 2023-02-24 PROCEDURE — 25010000002 METHYLPREDNISOLONE PER 40 MG: Performed by: INTERNAL MEDICINE

## 2023-02-24 PROCEDURE — 82962 GLUCOSE BLOOD TEST: CPT

## 2023-02-24 PROCEDURE — 63710000001 INSULIN ASPART PER 5 UNITS: Performed by: INTERNAL MEDICINE

## 2023-02-24 PROCEDURE — 99239 HOSP IP/OBS DSCHRG MGMT >30: CPT | Performed by: INTERNAL MEDICINE

## 2023-02-24 RX ORDER — DONEPEZIL HYDROCHLORIDE 10 MG/1
10 TABLET, FILM COATED ORAL NIGHTLY
Start: 2023-02-24

## 2023-02-24 RX ORDER — TROLAMINE SALICYLATE 10 G/100G
1 CREAM TOPICAL AS NEEDED
Start: 2023-02-24

## 2023-02-24 RX ORDER — INSULIN LISPRO 100 [IU]/ML
0-7 INJECTION, SOLUTION INTRAVENOUS; SUBCUTANEOUS
Refills: 12
Start: 2023-02-24

## 2023-02-24 RX ORDER — ACETYLCYSTEINE 200 MG/ML
3 SOLUTION ORAL; RESPIRATORY (INHALATION) 2 TIMES DAILY PRN
Start: 2023-02-24

## 2023-02-24 RX ORDER — MEMANTINE HYDROCHLORIDE 5 MG/1
5 TABLET ORAL EVERY 12 HOURS SCHEDULED
Start: 2023-02-24

## 2023-02-24 RX ORDER — BUSPIRONE HYDROCHLORIDE 15 MG/1
15 TABLET ORAL EVERY 12 HOURS SCHEDULED
Start: 2023-02-24

## 2023-02-24 RX ORDER — CLOPIDOGREL BISULFATE 75 MG/1
75 TABLET ORAL DAILY
Qty: 30 TABLET
Start: 2023-02-25

## 2023-02-24 RX ORDER — LISINOPRIL 10 MG/1
10 TABLET ORAL
Start: 2023-02-25

## 2023-02-24 RX ORDER — DOXYCYCLINE HYCLATE 100 MG/1
100 CAPSULE ORAL 2 TIMES DAILY
Start: 2023-02-24 | End: 2023-02-27

## 2023-02-24 RX ORDER — GUAIFENESIN 200 MG/1
400 TABLET ORAL EVERY 4 HOURS PRN
Start: 2023-02-24

## 2023-02-24 RX ADMIN — ISOSORBIDE MONONITRATE 90 MG: 30 TABLET, EXTENDED RELEASE ORAL at 08:38

## 2023-02-24 RX ADMIN — Medication 10 ML: at 08:42

## 2023-02-24 RX ADMIN — METOPROLOL SUCCINATE 12.5 MG: 25 TABLET, EXTENDED RELEASE ORAL at 08:39

## 2023-02-24 RX ADMIN — CLOPIDOGREL BISULFATE 75 MG: 75 TABLET, FILM COATED ORAL at 08:39

## 2023-02-24 RX ADMIN — SERTRALINE 100 MG: 50 TABLET, FILM COATED ORAL at 08:38

## 2023-02-24 RX ADMIN — LAMOTRIGINE 100 MG: 100 TABLET ORAL at 08:38

## 2023-02-24 RX ADMIN — AMLODIPINE BESYLATE 10 MG: 10 TABLET ORAL at 08:39

## 2023-02-24 RX ADMIN — BUDESONIDE AND FORMOTEROL FUMARATE DIHYDRATE 2 PUFF: 160; 4.5 AEROSOL RESPIRATORY (INHALATION) at 08:08

## 2023-02-24 RX ADMIN — LEVOTHYROXINE SODIUM 75 MCG: 0.07 TABLET ORAL at 08:36

## 2023-02-24 RX ADMIN — MEMANTINE HYDROCHLORIDE 5 MG: 5 TABLET, FILM COATED ORAL at 08:38

## 2023-02-24 RX ADMIN — APIXABAN 5 MG: 5 TABLET, FILM COATED ORAL at 08:38

## 2023-02-24 RX ADMIN — METHYLPREDNISOLONE SODIUM SUCCINATE 20 MG: 40 INJECTION, POWDER, FOR SOLUTION INTRAMUSCULAR; INTRAVENOUS at 08:33

## 2023-02-24 RX ADMIN — BUDESONIDE AND FORMOTEROL FUMARATE DIHYDRATE 2 PUFF: 160; 4.5 AEROSOL RESPIRATORY (INHALATION) at 18:34

## 2023-02-24 RX ADMIN — INSULIN ASPART 2 UNITS: 100 INJECTION, SOLUTION INTRAVENOUS; SUBCUTANEOUS at 13:23

## 2023-02-24 RX ADMIN — BACLOFEN 5 MG: 10 TABLET ORAL at 08:39

## 2023-02-24 RX ADMIN — LISINOPRIL 10 MG: 10 TABLET ORAL at 08:33

## 2023-02-24 RX ADMIN — BUSPIRONE HYDROCHLORIDE 15 MG: 10 TABLET ORAL at 08:33

## 2023-02-24 RX ADMIN — ROSUVASTATIN CALCIUM 40 MG: 20 TABLET, FILM COATED ORAL at 08:33

## 2023-02-24 RX ADMIN — DOXYCYCLINE 100 MG: 100 INJECTION, POWDER, LYOPHILIZED, FOR SOLUTION INTRAVENOUS at 13:24

## 2023-02-25 LAB — BACTERIA SPEC AEROBE CULT: NORMAL

## 2023-09-17 ENCOUNTER — APPOINTMENT (OUTPATIENT)
Dept: CT IMAGING | Facility: HOSPITAL | Age: 73
End: 2023-09-17
Payer: MEDICARE

## 2023-09-17 ENCOUNTER — APPOINTMENT (OUTPATIENT)
Dept: GENERAL RADIOLOGY | Facility: HOSPITAL | Age: 73
End: 2023-09-17
Payer: MEDICARE

## 2023-09-17 ENCOUNTER — HOSPITAL ENCOUNTER (EMERGENCY)
Facility: HOSPITAL | Age: 73
Discharge: NURSING, REHAB OR LONG-TERM CARE | End: 2023-09-17
Attending: FAMILY MEDICINE | Admitting: FAMILY MEDICINE
Payer: MEDICARE

## 2023-09-17 VITALS
OXYGEN SATURATION: 91 % | RESPIRATION RATE: 16 BRPM | BODY MASS INDEX: 27.32 KG/M2 | HEIGHT: 66 IN | SYSTOLIC BLOOD PRESSURE: 165 MMHG | WEIGHT: 170 LBS | HEART RATE: 48 BPM | TEMPERATURE: 98.1 F | DIASTOLIC BLOOD PRESSURE: 96 MMHG

## 2023-09-17 DIAGNOSIS — I10 PRIMARY HYPERTENSION: ICD-10-CM

## 2023-09-17 DIAGNOSIS — G51.0 BELL'S PALSY: Primary | ICD-10-CM

## 2023-09-17 LAB
ABO GROUP BLD: NORMAL
ALBUMIN SERPL-MCNC: 4.4 G/DL (ref 3.5–5.2)
ALBUMIN/GLOB SERPL: 1.8 G/DL
ALP SERPL-CCNC: 84 U/L (ref 39–117)
ALT SERPL W P-5'-P-CCNC: 10 U/L (ref 1–33)
ANION GAP SERPL CALCULATED.3IONS-SCNC: 7.8 MMOL/L (ref 5–15)
ANISOCYTOSIS BLD QL: NORMAL
APTT PPP: 32.9 SECONDS (ref 26.5–34.5)
AST SERPL-CCNC: 14 U/L (ref 1–32)
BASOPHILS # BLD AUTO: 0.02 10*3/MM3 (ref 0–0.2)
BASOPHILS NFR BLD AUTO: 0.4 % (ref 0–1.5)
BILIRUB SERPL-MCNC: 0.2 MG/DL (ref 0–1.2)
BLD GP AB SCN SERPL QL: NEGATIVE
BUN SERPL-MCNC: 14 MG/DL (ref 8–23)
BUN/CREAT SERPL: 16.3 (ref 7–25)
CALCIUM SPEC-SCNC: 9.6 MG/DL (ref 8.6–10.5)
CHLORIDE SERPL-SCNC: 104 MMOL/L (ref 98–107)
CO2 SERPL-SCNC: 32.2 MMOL/L (ref 22–29)
CREAT BLDA-MCNC: 0.9 MG/DL (ref 0.6–1.3)
CREAT SERPL-MCNC: 0.86 MG/DL (ref 0.57–1)
DEPRECATED RDW RBC AUTO: 48.5 FL (ref 37–54)
EGFRCR SERPLBLD CKD-EPI 2021: 71 ML/MIN/1.73
EOSINOPHIL # BLD AUTO: 0.16 10*3/MM3 (ref 0–0.4)
EOSINOPHIL NFR BLD AUTO: 3.2 % (ref 0.3–6.2)
ERYTHROCYTE [DISTWIDTH] IN BLOOD BY AUTOMATED COUNT: 15.2 % (ref 12.3–15.4)
GLOBULIN UR ELPH-MCNC: 2.4 GM/DL
GLUCOSE BLDC GLUCOMTR-MCNC: 99 MG/DL (ref 70–130)
GLUCOSE SERPL-MCNC: 98 MG/DL (ref 65–99)
HCT VFR BLD AUTO: 38 % (ref 34–46.6)
HGB BLD-MCNC: 10.7 G/DL (ref 12–15.9)
HOLD SPECIMEN: NORMAL
HOLD SPECIMEN: NORMAL
HYPOCHROMIA BLD QL: NORMAL
IMM GRANULOCYTES # BLD AUTO: 0.02 10*3/MM3 (ref 0–0.05)
IMM GRANULOCYTES NFR BLD AUTO: 0.4 % (ref 0–0.5)
INR PPP: 1.04 (ref 0.9–1.1)
LYMPHOCYTES # BLD AUTO: 1.06 10*3/MM3 (ref 0.7–3.1)
LYMPHOCYTES NFR BLD AUTO: 21.3 % (ref 19.6–45.3)
MCH RBC QN AUTO: 24.2 PG (ref 26.6–33)
MCHC RBC AUTO-ENTMCNC: 28.2 G/DL (ref 31.5–35.7)
MCV RBC AUTO: 86 FL (ref 79–97)
MONOCYTES # BLD AUTO: 0.45 10*3/MM3 (ref 0.1–0.9)
MONOCYTES NFR BLD AUTO: 9 % (ref 5–12)
NEUTROPHILS NFR BLD AUTO: 3.27 10*3/MM3 (ref 1.7–7)
NEUTROPHILS NFR BLD AUTO: 65.7 % (ref 42.7–76)
NRBC BLD AUTO-RTO: 0 /100 WBC (ref 0–0.2)
PLATELET # BLD AUTO: 184 10*3/MM3 (ref 140–450)
PMV BLD AUTO: 11.1 FL (ref 6–12)
POTASSIUM SERPL-SCNC: 4 MMOL/L (ref 3.5–5.2)
PROT SERPL-MCNC: 6.8 G/DL (ref 6–8.5)
PROTHROMBIN TIME: 14.1 SECONDS (ref 12.1–14.7)
QT INTERVAL: 498 MS
QTC INTERVAL: 444 MS
RBC # BLD AUTO: 4.42 10*6/MM3 (ref 3.77–5.28)
RH BLD: POSITIVE
SMALL PLATELETS BLD QL SMEAR: ADEQUATE
SODIUM SERPL-SCNC: 144 MMOL/L (ref 136–145)
T&S EXPIRATION DATE: NORMAL
TROPONIN T SERPL HS-MCNC: 25 NG/L
WBC NRBC COR # BLD: 4.98 10*3/MM3 (ref 3.4–10.8)
WHOLE BLOOD HOLD COAG: NORMAL
WHOLE BLOOD HOLD SPECIMEN: NORMAL

## 2023-09-17 PROCEDURE — 99285 EMERGENCY DEPT VISIT HI MDM: CPT

## 2023-09-17 PROCEDURE — 0042T HC CT CEREBRAL PERFUSION W/WO CONTRAST: CPT

## 2023-09-17 PROCEDURE — 70498 CT ANGIOGRAPHY NECK: CPT

## 2023-09-17 PROCEDURE — 84484 ASSAY OF TROPONIN QUANT: CPT | Performed by: FAMILY MEDICINE

## 2023-09-17 PROCEDURE — 85025 COMPLETE CBC W/AUTO DIFF WBC: CPT | Performed by: FAMILY MEDICINE

## 2023-09-17 PROCEDURE — 86850 RBC ANTIBODY SCREEN: CPT | Performed by: FAMILY MEDICINE

## 2023-09-17 PROCEDURE — 99204 OFFICE O/P NEW MOD 45 MIN: CPT | Performed by: STUDENT IN AN ORGANIZED HEALTH CARE EDUCATION/TRAINING PROGRAM

## 2023-09-17 PROCEDURE — 82948 REAGENT STRIP/BLOOD GLUCOSE: CPT

## 2023-09-17 PROCEDURE — 70450 CT HEAD/BRAIN W/O DYE: CPT

## 2023-09-17 PROCEDURE — 71045 X-RAY EXAM CHEST 1 VIEW: CPT

## 2023-09-17 PROCEDURE — 70496 CT ANGIOGRAPHY HEAD: CPT

## 2023-09-17 PROCEDURE — 85610 PROTHROMBIN TIME: CPT | Performed by: FAMILY MEDICINE

## 2023-09-17 PROCEDURE — 82565 ASSAY OF CREATININE: CPT

## 2023-09-17 PROCEDURE — 80053 COMPREHEN METABOLIC PANEL: CPT | Performed by: FAMILY MEDICINE

## 2023-09-17 PROCEDURE — 93005 ELECTROCARDIOGRAM TRACING: CPT | Performed by: FAMILY MEDICINE

## 2023-09-17 PROCEDURE — 25510000001 IOPAMIDOL PER 1 ML: Performed by: FAMILY MEDICINE

## 2023-09-17 PROCEDURE — 85730 THROMBOPLASTIN TIME PARTIAL: CPT | Performed by: FAMILY MEDICINE

## 2023-09-17 PROCEDURE — 36415 COLL VENOUS BLD VENIPUNCTURE: CPT

## 2023-09-17 PROCEDURE — 86900 BLOOD TYPING SEROLOGIC ABO: CPT | Performed by: FAMILY MEDICINE

## 2023-09-17 PROCEDURE — 85007 BL SMEAR W/DIFF WBC COUNT: CPT | Performed by: FAMILY MEDICINE

## 2023-09-17 PROCEDURE — 86901 BLOOD TYPING SEROLOGIC RH(D): CPT | Performed by: FAMILY MEDICINE

## 2023-09-17 RX ORDER — SODIUM CHLORIDE 0.9 % (FLUSH) 0.9 %
10 SYRINGE (ML) INJECTION AS NEEDED
Status: DISCONTINUED | OUTPATIENT
Start: 2023-09-17 | End: 2023-09-17 | Stop reason: HOSPADM

## 2023-09-17 RX ORDER — DEXTRAN 70 AND HYPROMELLOSE 2910 1; 3 MG/ML; MG/ML
1 SOLUTION/ DROPS OPHTHALMIC
Qty: 30 ML | Refills: 0 | Status: SHIPPED | OUTPATIENT
Start: 2023-09-17

## 2023-09-17 RX ORDER — PANTOPRAZOLE SODIUM 40 MG/1
40 TABLET, DELAYED RELEASE ORAL DAILY
Qty: 10 TABLET | Refills: 0 | Status: SHIPPED | OUTPATIENT
Start: 2023-09-17

## 2023-09-17 RX ORDER — PREDNISONE 20 MG/1
20 TABLET ORAL DAILY
Qty: 5 TABLET | Refills: 0 | Status: SHIPPED | OUTPATIENT
Start: 2023-09-17

## 2023-09-17 RX ADMIN — IOPAMIDOL 130 ML: 755 INJECTION, SOLUTION INTRAVENOUS at 18:40

## 2023-09-17 NOTE — CONSULTS
[facility]   Teleneurology Note    Patient Name: Brenda Munroe  : 1950  MRN: [id number]  Primary Care Physician: Bernadette Arevalo MD  Referring Site: [place]  Location of Neurologist: [facility]    Subjective   Teleneurology Initial Data     Arrival Date Telestroke Site: 23 Arrival Time Telestroke Site:    Neurologist Evaluation Date: 23 Neurologist Evaluation Time:    Date Last Known Well: 23 Time Last Known Well: 1300     History     Chief Complaint: left facial droop.  HPI: 72 year old female woke up with symptoms of left facial droop.  No weakness numbness speech difficulty.    Stroke Risk Factors/ Pertinent Data     Stroke risk factors: none  Anticoagulants prior to arrival: apixaban (Eliquis)  Antiplatelets prior to arrival: not applicable  Statins prior to arrival: not applicable     Scoring Scales     Pre-Stroke Modified Vienna Scale: 4 - Moderately severe disability.  Unable to walk without assistance, and unable to attend to own bodily needs without assistance.Blanca Coma Scale  Best Eye Response: Spontaneous  Best Verbal Response: Oriented  Best Motor Response: Follows commands  Blanca Coma Scale Score: 15Intracerebral Hemmorhage (ICH) Score  Blanca Coma Score: 13-15  Age>=80: no     NIH Stroke Scale     NIHSS Performed Date: 23 NIHSS Performed Time:    Interval: baseline  1a. Level of Consciousness: 0-->Alert, keenly responsive  1b. LOC Questions: 0-->Answers both questions correctly  1c. LOC Commands: 0-->Performs both tasks correctly  2. Best Gaze: 0-->Normal  3. Visual: 0-->No visual loss  4. Facial Palsy: 1-->Minor paralysis (flattened nasolabial fold, asymmetry on smiling)  5a. Motor Arm, Left: 0-->No drift, limb holds 90 (or 45) degrees for full 10 secs  5b. Motor Arm, Right: 0-->No drift, limb holds 90 (or 45) degrees for full 10 secs  6a. Motor Leg, Left: 0-->No drift, leg holds 30 degree position for full 5 secs  6b. Motor Leg, Right: 0-->No  drift, leg holds 30 degree position for full 5 secs  7. Limb Ataxia: 0-->Absent  8. Sensory: 0-->Normal, no sensory loss  9. Best Language: 0-->No aphasia, normal  10. Dysarthria: 0-->Normal  11. Extinction and Inattention (formerly Neglect): 0-->No abnormality  Total (NIH Stroke Scale): 1       Exam     Exam performed with the help of support staff from the referring site  Neurological Exam  Left facial droop-lower motor neuron type  Result Review    Results          Personal review of CNS imaging:(Official report by radiologist pending)  Imaging  CT Imaging Review: CT Imaging reviewed, NO acute infarct/ hemorrhage seen  CTA Imaging Review: CTA Imaging reviewed, NO large vessel occlusion or severe stenosis seen  CT Perfusion Review: CT perfusion reviewed, NORMAL    Thrombolytic   Thrombolytics: not applicable     Assessment & Plan   Assessment/ Plan     Assessment:  Idiopathic left facial nerve palsy   -Conservative management/supportive therapy.  -Evaluate for any inner ear infection.  -Resume home dose of Eliquis.      Disposition     Disposition: The patient will remain at the referring institution for further evaluation and management    Medical Decision Making  Medical Data Reviewed: Data reviewed including: clinical labs, radiology and/or medical tests, Independent review of CNS images    Nick BROWN MD, saw the patient on 09/17/23 at 1932 for an initial in-patient or emergency room telememedicine face to face consult using interactive technology for  . The location of the patient was [place]. I was located at [place].    I have proceeded with this evaluation at the request of the referring practitioner as it is felt to be an emergency setting and no appropriate specialist is available to perform this evaluation. The originating hospital has reported that this is the correct patient and has obtained consent from the patient/surrogate to perform this telemedicine evaluation(including obtaining  history, performing examination and reviewing data provided by the patient an/or originating site of care provider)    I have introduced myself to the patient, provided my credentials, disclosed my location, and determined that, based on review of the patient's chart and discussion with the patient's primary team, telemedicine via a HIPAA compliant, real-time, face-to-face two-way, interactive audio and video platform is an appropriate and effective means of providing the service.    The patient/surrogate has a right to refuse this evaluation as they have been explained risks including potential loss of confidentiality, benefits, alternatives, and the potential need for subsequent face-to-face care. In this evaluation, we will be providing recommendations only.  The ultimate decision to follow or not to follow these recommendations will be left to the bedside treating/requesting practitioner.    The patient/surrogate has been notified that other healthcare professionals including technical person may be involved in this A/V evaluation.  All laws concerning confidentiality and patient access to medical records and copies of medical records apply to telemedicine.  The patient/surrogate has received the originating site's Health Notice of Privacy Practices.    Nick Gregory MD

## 2023-09-17 NOTE — ED NOTES
Per JOSE Mora at [facility], Conjunctivitis started yesterday in left eye and the family just now noticed the facial droop at 2:00 pm today.

## 2023-09-17 NOTE — ED PROVIDER NOTES
Subjective   History of Present Illness  72-year-old female with a history of COPD coronary disease dementia presents to the emergency room with complaints of left facial droop.  Per nursing home patient has had a left facial droop for the past week however per patient's family conveyed to EMS patient was noted to have left facial droop that began today.  Patient denies any symptoms other than having of left facial droop.  She denies chest pain shortness of breath nausea vomiting fever chills.  She denies weakness or numbness.  She denies headache.    Neurologic Problem  The patient's primary symptoms include focal weakness. This is a new problem. The current episode started today. Pertinent negatives include no abdominal pain, confusion, dizziness, fatigue, fever, headaches, neck pain, palpitations or shortness of breath.     Review of Systems   Constitutional:  Negative for fatigue and fever.   Respiratory:  Negative for shortness of breath.    Cardiovascular:  Negative for palpitations.   Gastrointestinal:  Negative for abdominal pain.   Musculoskeletal:  Negative for neck pain.   Neurological:  Positive for focal weakness. Negative for dizziness and headaches.   Psychiatric/Behavioral:  Negative for confusion.    All other systems reviewed and are negative.    Past Medical History:   Diagnosis Date    Anxiety     Arthritis     Bladder prolapse, female, acquired     Carotid stenosis     COPD (chronic obstructive pulmonary disease)     Coronary artery disease     Dementia     Depression     Disease of thyroid gland     Frequency of urination     GERD (gastroesophageal reflux disease)     Heart attack     Hyperlipidemia     Hypertension     Irregular heart beat     Peptic ulcer disease        No Known Allergies    Past Surgical History:   Procedure Laterality Date    CARDIAC CATHETERIZATION N/A 2/21/2023    Procedure: Left Heart Cath;  Surgeon: Tab Cifuentes MD;  Location: [facility] INVASIVE  LOCATION;  Service: Cardiology;  Laterality: N/A;    CARDIAC SURGERY      open heart  in 1999    CYSTOSCOPY N/A 11/8/2017    Procedure: CYSTOSCOPY;  Surgeon: Karl Nicolas DO;  Location:  [facility] OR;  Service:     OTHER SURGICAL HISTORY      states she had fluid drained no surgery    VAGINAL HYSTERECTOMY W/ ANTERIOR AND POSTERIOR VAGINAL REPAIR N/A 11/8/2017    Procedure: VAGINAL HYSTERECTOMY WITH ANTERIOR, POSTERIOR, AND ENTEROCELE VAGINAL REPAIR;  Surgeon: Karl Nicolas DO;  Location:  [facility] OR;  Service:     VAGINAL VAULT SUSPENSION N/A 11/8/2017    Procedure: VAGINAL VAULT SUSPENSION ;  Surgeon: Karl Nicolas DO;  Location:  [facility] OR;  Service:        Family History   Problem Relation Age of Onset    Dementia Sister     Heart attack Mother     Tuberculosis Father     Breast cancer Neg Hx        Social History     Socioeconomic History    Marital status:     Number of children: 3   Tobacco Use    Smoking status: Every Day     Packs/day: 0.50     Years: 55.00     Pack years: 27.50     Types: Cigarettes    Smokeless tobacco: Never   Vaping Use    Vaping Use: Never used   Substance and Sexual Activity    Alcohol use: No    Drug use: No    Sexual activity: Never     Comment: denies           Objective   Physical Exam  Vitals and nursing note reviewed.   Constitutional:       Appearance: She is not ill-appearing.   HENT:      Head: Normocephalic and atraumatic.      Mouth/Throat:      Mouth: Mucous membranes are moist.   Eyes:      Extraocular Movements: Extraocular movements intact.      Pupils: Pupils are equal, round, and reactive to light.   Neck:      Vascular: No carotid bruit.   Cardiovascular:      Rate and Rhythm: Regular rhythm. Bradycardia present.      Heart sounds: No murmur heard.  Pulmonary:      Effort: Pulmonary effort is normal.      Breath sounds: Normal breath sounds.      Comments: No rhonchi's rales or wheezes no accessory muscle use.  Abdominal:      General:  Bowel sounds are normal.      Palpations: Abdomen is soft.      Tenderness: There is no abdominal tenderness. There is no guarding.   Musculoskeletal:      Cervical back: Neck supple.      Right lower leg: No edema.      Left lower leg: No edema.   Skin:     General: Skin is warm and dry.   Neurological:      Mental Status: She is alert and oriented to person, place, and time.      Sensory: No sensory deficit.      Comments: Patient with left facial droop with forehead involvement.  No pronator drift no lower limb drift normal finger-nose normal heel shin normal speech no proximal receptive aphasia.  Patient able to identify common objects.   Psychiatric:         Mood and Affect: Mood normal.       Procedures           ED Course  ED Course as of 09/22/23 0721   Sun Sep 17, 2023   1839 Patient accucheck 99.  Patient had left facial droop appears to be reflective of a Bell's palsy.  CT scan of head without contrast unremarkable.  Have spoken to Dr. Gregory with neurology who states he will see patient in consultation. [BB]   1847 EKG sinus bradycardia ventricular rate 58 NM interval 216 first-degree block right bundle branch block QTc 444 no ST elevation [BB]   1900 Patient troponin 25. [BB]   1944 Patient evaluated by neurology Dr. Gregory he feels issues are related to Bell's palsy.  Patient to be placed on artificial eyedrops, and prednisone Patient to follow up with PC [BB]      ED Course User Index  [BB] Genaro Benjamin MD                                           Medical Decision Making  Problems Addressed:  Bell's palsy: complicated acute illness or injury  Primary hypertension: complicated acute illness or injury    Amount and/or Complexity of Data Reviewed  Labs: ordered.  Radiology: ordered.  ECG/medicine tests: ordered.    Risk  OTC drugs.  Prescription drug management.        Final diagnoses:   Bell's palsy   Primary hypertension       ED Disposition  ED Disposition       ED Disposition   Discharge     Condition   Stable    Comment   --               Bernadette Arevalo MD  [address]  [facility] [address]  [phone]    In 1 day           Medication List        New Prescriptions      Lubricating Tears Eye Drops 0.1-0.3 % solution  Generic drug: Dextran 70-Hypromellose  Apply 1 drop to eye(s) as directed by provider Every 2 (Two) Hours As Needed (dry eyes).     pantoprazole 40 MG EC tablet  Commonly known as: PROTONIX  Take 1 tablet by mouth Daily.     predniSONE 20 MG tablet  Commonly known as: DELTASONE  Take 1 tablet by mouth Daily.               Where to Get Your Medications        These medications were sent to [facility] - [place] - [address] - [phone]  - [phone]   [address] [place] [address] [place] [address]      Phone: [phone]   Lubricating Tears Eye Drops 0.1-0.3 % solution  pantoprazole 40 MG EC tablet  predniSONE 20 MG tablet            Genaro Benjamin MD  09/22/23 8520

## 2023-12-06 ENCOUNTER — CONSULT (OUTPATIENT)
Dept: ONCOLOGY | Facility: CLINIC | Age: 73
End: 2023-12-06
Payer: MEDICARE

## 2023-12-06 ENCOUNTER — LAB (OUTPATIENT)
Dept: ONCOLOGY | Facility: CLINIC | Age: 73
End: 2023-12-06
Payer: MEDICARE

## 2023-12-06 VITALS
TEMPERATURE: 97.1 F | OXYGEN SATURATION: 90 % | SYSTOLIC BLOOD PRESSURE: 116 MMHG | DIASTOLIC BLOOD PRESSURE: 63 MMHG | BODY MASS INDEX: 27.44 KG/M2 | RESPIRATION RATE: 18 BRPM | HEART RATE: 83 BPM | HEIGHT: 66 IN

## 2023-12-06 DIAGNOSIS — D64.9 NORMOCYTIC ANEMIA: Primary | ICD-10-CM

## 2023-12-06 DIAGNOSIS — E07.9 DISEASE OF THYROID GLAND: Primary | ICD-10-CM

## 2023-12-06 DIAGNOSIS — D50.9 IRON DEFICIENCY ANEMIA, UNSPECIFIED IRON DEFICIENCY ANEMIA TYPE: ICD-10-CM

## 2023-12-06 LAB
ALBUMIN SERPL-MCNC: 4.2 G/DL (ref 3.5–5.2)
ALBUMIN/GLOB SERPL: 1.9 G/DL
ALP SERPL-CCNC: 73 U/L (ref 39–117)
ALT SERPL W P-5'-P-CCNC: 10 U/L (ref 1–33)
ANION GAP SERPL CALCULATED.3IONS-SCNC: 5.1 MMOL/L (ref 5–15)
ANISOCYTOSIS BLD QL: NORMAL
AST SERPL-CCNC: 16 U/L (ref 1–32)
BASOPHILS # BLD AUTO: 0.02 10*3/MM3 (ref 0–0.2)
BASOPHILS NFR BLD AUTO: 0.4 % (ref 0–1.5)
BILIRUB SERPL-MCNC: 0.2 MG/DL (ref 0–1.2)
BUN SERPL-MCNC: 13 MG/DL (ref 8–23)
BUN/CREAT SERPL: 14.3 (ref 7–25)
CALCIUM SPEC-SCNC: 8.7 MG/DL (ref 8.6–10.5)
CHLORIDE SERPL-SCNC: 101 MMOL/L (ref 98–107)
CO2 SERPL-SCNC: 33.9 MMOL/L (ref 22–29)
CREAT SERPL-MCNC: 0.91 MG/DL (ref 0.57–1)
CRP SERPL-MCNC: <0.3 MG/DL (ref 0–0.5)
DACRYOCYTES BLD QL SMEAR: NORMAL
DEPRECATED RDW RBC AUTO: 49.7 FL (ref 37–54)
EGFRCR SERPLBLD CKD-EPI 2021: 66.3 ML/MIN/1.73
EOSINOPHIL # BLD AUTO: 0.11 10*3/MM3 (ref 0–0.4)
EOSINOPHIL NFR BLD AUTO: 2.4 % (ref 0.3–6.2)
ERYTHROCYTE [DISTWIDTH] IN BLOOD BY AUTOMATED COUNT: 16.6 % (ref 12.3–15.4)
ERYTHROCYTE [SEDIMENTATION RATE] IN BLOOD: 2 MM/HR (ref 0–30)
FERRITIN SERPL-MCNC: 10.32 NG/ML (ref 13–150)
GLOBULIN UR ELPH-MCNC: 2.2 GM/DL
GLUCOSE SERPL-MCNC: 138 MG/DL (ref 65–99)
HCT VFR BLD AUTO: 31.2 % (ref 34–46.6)
HGB BLD-MCNC: 8.8 G/DL (ref 12–15.9)
HYPOCHROMIA BLD QL: NORMAL
IMM GRANULOCYTES # BLD AUTO: 0.02 10*3/MM3 (ref 0–0.05)
IMM GRANULOCYTES NFR BLD AUTO: 0.4 % (ref 0–0.5)
IRON 24H UR-MRATE: 21 MCG/DL (ref 37–145)
IRON SATN MFR SERPL: 4 % (ref 20–50)
LDH SERPL-CCNC: 203 U/L (ref 135–214)
LYMPHOCYTES # BLD AUTO: 0.8 10*3/MM3 (ref 0.7–3.1)
LYMPHOCYTES NFR BLD AUTO: 17.2 % (ref 19.6–45.3)
MCH RBC QN AUTO: 23.5 PG (ref 26.6–33)
MCHC RBC AUTO-ENTMCNC: 28.2 G/DL (ref 31.5–35.7)
MCV RBC AUTO: 83.2 FL (ref 79–97)
MONOCYTES # BLD AUTO: 0.28 10*3/MM3 (ref 0.1–0.9)
MONOCYTES NFR BLD AUTO: 6 % (ref 5–12)
NEUTROPHILS NFR BLD AUTO: 3.42 10*3/MM3 (ref 1.7–7)
NEUTROPHILS NFR BLD AUTO: 73.6 % (ref 42.7–76)
NRBC BLD AUTO-RTO: 0 /100 WBC (ref 0–0.2)
OVALOCYTES BLD QL SMEAR: NORMAL
PLAT MORPH BLD: NORMAL
PLATELET # BLD AUTO: 177 10*3/MM3 (ref 140–450)
PMV BLD AUTO: 11.5 FL (ref 6–12)
POTASSIUM SERPL-SCNC: 4.2 MMOL/L (ref 3.5–5.2)
PROT SERPL-MCNC: 6.4 G/DL (ref 6–8.5)
RBC # BLD AUTO: 3.75 10*6/MM3 (ref 3.77–5.28)
RETICS # AUTO: 0.05 10*6/MM3 (ref 0.02–0.13)
RETICS/RBC NFR AUTO: 1.46 % (ref 0.7–1.9)
SODIUM SERPL-SCNC: 140 MMOL/L (ref 136–145)
TIBC SERPL-MCNC: 514 MCG/DL (ref 298–536)
TRANSFERRIN SERPL-MCNC: 345 MG/DL (ref 200–360)
WBC NRBC COR # BLD AUTO: 4.65 10*3/MM3 (ref 3.4–10.8)

## 2023-12-06 PROCEDURE — 85007 BL SMEAR W/DIFF WBC COUNT: CPT | Performed by: NURSE PRACTITIONER

## 2023-12-06 PROCEDURE — 85025 COMPLETE CBC W/AUTO DIFF WBC: CPT | Performed by: NURSE PRACTITIONER

## 2023-12-06 PROCEDURE — 82607 VITAMIN B-12: CPT | Performed by: NURSE PRACTITIONER

## 2023-12-06 PROCEDURE — 85652 RBC SED RATE AUTOMATED: CPT | Performed by: NURSE PRACTITIONER

## 2023-12-06 PROCEDURE — 80053 COMPREHEN METABOLIC PANEL: CPT | Performed by: NURSE PRACTITIONER

## 2023-12-06 PROCEDURE — 83540 ASSAY OF IRON: CPT | Performed by: NURSE PRACTITIONER

## 2023-12-06 PROCEDURE — 83010 ASSAY OF HAPTOGLOBIN QUANT: CPT | Performed by: NURSE PRACTITIONER

## 2023-12-06 PROCEDURE — 86140 C-REACTIVE PROTEIN: CPT | Performed by: NURSE PRACTITIONER

## 2023-12-06 PROCEDURE — 84466 ASSAY OF TRANSFERRIN: CPT | Performed by: NURSE PRACTITIONER

## 2023-12-06 PROCEDURE — 85045 AUTOMATED RETICULOCYTE COUNT: CPT | Performed by: NURSE PRACTITIONER

## 2023-12-06 PROCEDURE — 82746 ASSAY OF FOLIC ACID SERUM: CPT | Performed by: NURSE PRACTITIONER

## 2023-12-06 PROCEDURE — 83615 LACTATE (LD) (LDH) ENZYME: CPT | Performed by: NURSE PRACTITIONER

## 2023-12-06 PROCEDURE — 82728 ASSAY OF FERRITIN: CPT | Performed by: NURSE PRACTITIONER

## 2023-12-06 RX ORDER — FERROUS SULFATE 325(65) MG
325 TABLET ORAL 2 TIMES DAILY
Qty: 60 TABLET | Refills: 5 | Status: SHIPPED | OUTPATIENT
Start: 2023-12-06

## 2023-12-06 NOTE — PROGRESS NOTES
DATE OF CONSULTATION:  12/6/2023    REASON FOR REFERRAL: Normocytic Anemia    REFERRING PHYSICIAN:  Bernadette Arevalo MD    CHIEF COMPLAINT:  Fatigue        HISTORY OF PRESENT ILLNESS:   Brenda Munroe is a very pleasant 72 y.o. female who is being seen today at the request of Bernadette Arevalo MD for evaluation and treatment of normocytic anemia. She is accompanied today by an aide that is not able to provide any history. Ms. Munroe is a very poor historian. She is a NH resident at [place]. She reports that she follows with Dr. Arevalo with lab testing. She reports that she was only recently aware of the above issue. Previous available CBCs were reviewed and patient has had normocytic anemia with Hg ranging 10.1-11.4 since at least February 2023. Prior to February H/H was within the normal range. She denies any obvious blood loss from any source. She has never had EGD/colonoscopy. She is not sure if she has ever required blood transfusion or not. She does not take oral iron that she is aware of. She does take Plavix daily and Eliquis BID. She reports chronic fatigue but denies any worsening. She has baseline shortness of breath on exertion. Denies any chest pain or dizziness. She denies any other specific complaints at this time.    PAST MEDICAL HISTORY:  Past Medical History:   Diagnosis Date    Anxiety     Arthritis     Bladder prolapse, female, acquired     Carotid stenosis     COPD (chronic obstructive pulmonary disease)     Coronary artery disease     Dementia     Depression     Disease of thyroid gland     Frequency of urination     GERD (gastroesophageal reflux disease)     Heart attack     Hyperlipidemia     Hypertension     Irregular heart beat     Peptic ulcer disease        PAST SURGICAL HISTORY:  Past Surgical History:   Procedure Laterality Date    CARDIAC CATHETERIZATION N/A 2/21/2023    Procedure: Left Heart Cath;  Surgeon: Tab Cifuentes MD;  Location: [facility] CATH INVASIVE LOCATION;   Service: Cardiology;  Laterality: N/A;    CARDIAC SURGERY      open heart  in 1999    CYSTOSCOPY N/A 11/8/2017    Procedure: CYSTOSCOPY;  Surgeon: Karl Nicolas DO;  Location: [facility] OR;  Service:     OTHER SURGICAL HISTORY      states she had fluid drained no surgery    VAGINAL HYSTERECTOMY W/ ANTERIOR AND POSTERIOR VAGINAL REPAIR N/A 11/8/2017    Procedure: VAGINAL HYSTERECTOMY WITH ANTERIOR, POSTERIOR, AND ENTEROCELE VAGINAL REPAIR;  Surgeon: Karl Nicolas DO;  Location: [facility] OR;  Service:     VAGINAL VAULT SUSPENSION N/A 11/8/2017    Procedure: VAGINAL VAULT SUSPENSION ;  Surgeon: Karl Nicolas DO;  Location: [facility] OR;  Service:        FAMILY HISTORY:  Family History   Problem Relation Age of Onset    Dementia Sister     Heart attack Mother     Tuberculosis Father     Breast cancer Neg Hx        SOCIAL HISTORY:  Social History     Socioeconomic History    Marital status:     Number of children: 3   Tobacco Use    Smoking status: Every Day     Packs/day: 0.50     Years: 55.00     Additional pack years: 0.00     Total pack years: 27.50     Types: Cigarettes    Smokeless tobacco: Never   Vaping Use    Vaping Use: Never used   Substance and Sexual Activity    Alcohol use: No    Drug use: No    Sexual activity: Never     Comment: denies              MEDICATIONS:  The current medication list was reviewed in the EMR    Current Outpatient Medications:     acetaminophen (TYLENOL) 500 MG tablet, Take 1 tablet by mouth Every 4 (Four) Hours As Needed for Mild Pain., Disp: , Rfl:     amLODIPine (NORVASC) 10 MG tablet, Take 1 tablet by mouth Daily., Disp: , Rfl:     apixaban (ELIQUIS) 5 MG tablet tablet, Take 1 tablet by mouth 2 (Two) Times a Day., Disp: , Rfl:     baclofen (LIORESAL) 10 MG tablet, Take 0.5 tablets by mouth 2 (Two) Times a Day., Disp: , Rfl:     busPIRone (BUSPAR) 15 MG tablet, Take 1 tablet by mouth Every 12 (Twelve) Hours., Disp: , Rfl:     Cholecalciferol (VITAMIN D3)  75942 units capsule, Take 1 capsule by mouth Every 7 (Seven) Days., Disp: , Rfl:     clopidogrel (PLAVIX) 75 MG tablet, Take 1 tablet by mouth Daily., Disp: 30 tablet, Rfl:     Dextran 70-Hypromellose (Lubricating Tears Eye Drops) 0.1-0.3 % solution, Apply 1 drop to eye(s) as directed by provider Every 2 (Two) Hours As Needed (dry eyes)., Disp: 30 mL, Rfl: 0    donepezil (ARICEPT) 10 MG tablet, Take 1 tablet by mouth Every Night., Disp: , Rfl:     guaiFENesin 200 MG tablet, Take 2 tablets by mouth Every 4 (Four) Hours As Needed for Congestion., Disp: , Rfl:     Insulin Lispro (HumaLOG) 100 UNIT/ML injection, Inject 0-7 Units under the skin into the appropriate area as directed 3 (Three) Times a Day Before Meals., Disp: , Rfl: 12    isosorbide mononitrate (IMDUR) 30 MG 24 hr tablet, Take 3 tablets by mouth Daily., Disp: , Rfl:     lamoTRIgine (LaMICtal) 100 MG tablet, Take 1 tablet by mouth Daily., Disp: , Rfl:     lamoTRIgine (LaMICtal) 25 MG tablet, Take 1 tablet by mouth Every Night., Disp: , Rfl:     levothyroxine (SYNTHROID, LEVOTHROID) 75 MCG tablet, Take 1 tablet by mouth Daily., Disp: , Rfl:     lisinopril (PRINIVIL,ZESTRIL) 10 MG tablet, Take 1 tablet by mouth Daily., Disp: , Rfl:     memantine (NAMENDA) 5 MG tablet, Take 1 tablet by mouth Every 12 (Twelve) Hours., Disp: , Rfl:     metoprolol succinate XL (TOPROL-XL) 25 MG 24 hr tablet, Take 0.5 tablets by mouth Daily., Disp: , Rfl:     nitroglycerin (NITROSTAT) 0.4 MG SL tablet, Place 1 tablet under the tongue Every 5 (Five) Minutes As Needed., Disp: , Rfl:     pantoprazole (PROTONIX) 40 MG EC tablet, Take 1 tablet by mouth Daily., Disp: 10 tablet, Rfl: 0    rosuvastatin (CRESTOR) 40 MG tablet, Take 1 tablet by mouth Daily., Disp: , Rfl:     senna (SENOKOT) 8.6 MG tablet, Take 1 tablet by mouth Daily As Needed for Constipation., Disp: , Rfl:     sertraline (ZOLOFT) 100 MG tablet, Take 1 tablet by mouth Daily., Disp: , Rfl:     Karina Correa 100-62.5-25  "MCG/INH inhaler, Inhale 1 puff Daily., Disp: , Rfl:     trolamine salicylate (ASPERCREME) 10 % cream, Apply 1 application topically to the appropriate area as directed As Needed for Muscle / Joint Pain., Disp: , Rfl:     acetylcysteine (MUCOMYST) 20 % nebulizer solution, Take 3 mL by nebulization 2 (Two) Times a Day As Needed (Secretions/Chest Congestion). (Patient not taking: Reported on 12/6/2023), Disp: , Rfl:     ipratropium-albuterol (DUO-NEB) 0.5-2.5 mg/3 ml nebulizer, Take 3 mL by nebulization Every 6 (Six) Hours As Needed for Wheezing. (Patient not taking: Reported on 12/6/2023), Disp: , Rfl:     phenol (CHLORASEPTIC) 1.4 % liquid liquid, Apply 1 spray to the mouth or throat Every 2 (Two) Hours As Needed (sore throat). (Patient not taking: Reported on 12/6/2023), Disp: , Rfl:     predniSONE (DELTASONE) 20 MG tablet, Take 1 tablet by mouth Daily. (Patient not taking: Reported on 12/6/2023), Disp: 5 tablet, Rfl: 0    ALLERGIES:  No Known Allergies      REVIEW OF SYSTEMS:    A comprehensive 14 point review of systems was performed.  Significant findings as mentioned above.  All other systems reviewed and are negative.        Physical Exam   Vital Signs: /63   Pulse 83   Temp 97.1 °F (36.2 °C) (Temporal)   Resp 18   Ht 167.6 cm (66\")   SpO2 90%   BMI 27.44 kg/m²      ECOG score: 0   General: Well developed, well nourished, alert and oriented x 3, in no acute distress.   Head: ATNC   Eyes: PERRL, No evidence of conjunctivitis.   Nose: No nasal discharge.   Mouth: Oral mucosal membranes moist. No oral ulceration or hemorrhages.   Neck: Neck supple. No thyromegaly. No JVD.   Lungs: Clear in all fields to A&P without rales, rhonchi or wheezing.   Heart: S1, S2. Regular rate and rhythm. No murmurs, rubs, or gallops.   Abdomen: Soft. Bowel sounds are normoactive. Nontender with palpation.  Extremities: No cyanosis or edema. Peripheral pulses palpable and equal bilaterally.   Integumentary: No rash, sores, " erythema or nodules. No blistering, bruising, or dry skin.   Hem/Lymph Nodes: No palpable cervical, submandibular, supraclavicular, axillary  lymphadenopathy noted. No petechiae, purpura or ecchymosis noted.   Neurologic: Grossly non-focal exam    Pain Score:  Pain Score    12/06/23 1321   PainSc:   7   PainLoc: Generalized       PHQ-Score Total:  PHQ-9 Total Score: 0       PATHOLOGY:        ENDOSCOPY:        IMAGING:         RECENT LABS:  Lab Results   Component Value Date    WBC 4.98 09/17/2023    HGB 10.7 (L) 09/17/2023    HCT 38.0 09/17/2023    MCV 86.0 09/17/2023    RDW 15.2 09/17/2023     09/17/2023    NEUTRORELPCT 65.7 09/17/2023    LYMPHORELPCT 21.3 09/17/2023    MONORELPCT 9.0 09/17/2023    EOSRELPCT 3.2 09/17/2023    BASORELPCT 0.4 09/17/2023    NEUTROABS 3.27 09/17/2023    LYMPHSABS 1.06 09/17/2023       Lab Results   Component Value Date     09/17/2023    K 4.0 09/17/2023    CO2 32.2 (H) 09/17/2023     09/17/2023    BUN 14 09/17/2023    CREATININE 0.86 09/17/2023    EGFRIFNONA 75 03/25/2021    EGFRIFAFRI  09/24/2016      Comment:      <15 Indicative of kidney failure.    GLUCOSE 98 09/17/2023    CALCIUM 9.6 09/17/2023    ALKPHOS 84 09/17/2023    AST 14 09/17/2023    ALT 10 09/17/2023    BILITOT 0.2 09/17/2023    ALBUMIN 4.4 09/17/2023    PROTEINTOT 6.8 09/17/2023    MG 2.1 02/18/2023    PHOS 3.3 08/04/2015             ASSESSMENT & PLAN:  Brenda Munroe is a very pleasant 72 y.o. female with    1. Normocytic Anemia  2. CLEMENTINE  - Ms. Munroe is a very poor historian. She is a NH resident at [place].   - Previous available CBCs were reviewed and patient has had normocytic anemia with Hg ranging 10.1-11.4 since at least February 2023. Prior to February H/H was within the normal range.   - She denies any obvious blood loss from any source. She has never had EGD/colonoscopy. She is not sure if she has ever required blood transfusion or not. She does not take oral iron that she is aware of. She  does take Plavix daily and Eliquis BID.  - Obtained repeat CBC today which showed Hg 8.8 which has significantly dropped. Iron panel and Ferritin are most consistent with CLEMENTINE. Therefore, will start Ferrous Sulfate 325 mg BID (RX provided today).   - Also obtained additional labs today to further evaluate including CMP, PBS, B12, Folate, Retic, LDH, Haptoglobin, CRP and ESR which are pending.  - Will follow up in 6 weeks with repeat CBC, Iron panel and Ferritin. In the meantime, will follow up with above pending labwork.  - Would recommend referral to GI for possible EGD/colonoscopy but given patient's dementia not sure that she would be an appropriate candidate. There are no family present with patient today to have this discussion.   - Plavix and Eliquis could also likely be causing oozing of blood. May consider follow up with cardiology to determine if both medications are warranted at this time.       The patient was in agreement with the plan and all questions were answered to her satisfaction.     Thank you so much for allowing us to participate in the care of Brenda Munroe . Please do not hesitate to contact us with any questions or concerns.       ACO / JERI/Other  Quality measures  -  Brenda Munroe received 2023 flu vaccine.  -  Brenda Munroe reports a pain score of 7.  Given her pain assessment as noted, treatment options were discussed and the following options were decided upon as a follow-up plan to address the patient's pain: continuation of current treatment plan for pain.  -  Current outpatient and discharge medications have been reconciled for the patient.  Reviewed by: PAULA Castañeda      I spent 45 minutes caring for Brenda on this date of service. This time includes time spent by me in the following activities: preparing for the visit, reviewing tests, obtaining and/or reviewing a separately obtained history, performing a medically appropriate examination and/or evaluation, counseling and  educating the patient/family/caregiver, ordering medications, tests, or procedures, documenting information in the medical record, independently interpreting results and communicating that information with the patient/family/caregiver, and care coordination.                     Electronically Signed by: PAULA Rendon , December 6, 2023 13:28 EST           Electronically Signed by: PAULA Rendon , December 6, 2023 13:28 EST       CC:   MD Mickey Guillen Maria C., MD

## 2023-12-06 NOTE — PROGRESS NOTES
Venipuncture Blood Specimen Collection  Venipuncture performed in left arm by Antwan Miguel MA with good hemostasis. Patient tolerated the procedure well without complications.   12/06/23   Antwan Miguel MA

## 2023-12-07 LAB
FOLATE SERPL-MCNC: 4.68 NG/ML (ref 4.78–24.2)
HAPTOGLOB SERPL-MCNC: 112 MG/DL (ref 30–200)
REF LAB TEST METHOD: NORMAL
VIT B12 BLD-MCNC: 187 PG/ML (ref 211–946)

## 2023-12-07 RX ORDER — FOLIC ACID 1 MG/1
1 TABLET ORAL DAILY
Qty: 30 TABLET | Refills: 5 | Status: SHIPPED | OUTPATIENT
Start: 2023-12-07

## 2023-12-27 ENCOUNTER — APPOINTMENT (OUTPATIENT)
Dept: CT IMAGING | Facility: HOSPITAL | Age: 73
End: 2023-12-27
Payer: MEDICARE

## 2023-12-27 ENCOUNTER — HOSPITAL ENCOUNTER (EMERGENCY)
Facility: HOSPITAL | Age: 73
Discharge: HOME | End: 2023-12-27
Attending: STUDENT IN AN ORGANIZED HEALTH CARE EDUCATION/TRAINING PROGRAM | Admitting: STUDENT IN AN ORGANIZED HEALTH CARE EDUCATION/TRAINING PROGRAM
Payer: MEDICARE

## 2023-12-27 ENCOUNTER — APPOINTMENT (OUTPATIENT)
Dept: GENERAL RADIOLOGY | Facility: HOSPITAL | Age: 73
End: 2023-12-27
Payer: MEDICARE

## 2023-12-27 VITALS
TEMPERATURE: 97.9 F | HEART RATE: 50 BPM | BODY MASS INDEX: 31.89 KG/M2 | WEIGHT: 180 LBS | DIASTOLIC BLOOD PRESSURE: 54 MMHG | HEIGHT: 63 IN | RESPIRATION RATE: 16 BRPM | OXYGEN SATURATION: 98 % | SYSTOLIC BLOOD PRESSURE: 126 MMHG

## 2023-12-27 DIAGNOSIS — J44.1 COPD WITH ACUTE EXACERBATION: ICD-10-CM

## 2023-12-27 DIAGNOSIS — R06.89 HYPERCAPNIA: Primary | ICD-10-CM

## 2023-12-27 LAB
A-A DO2: 21.1 MMHG (ref 0–300)
ALBUMIN SERPL-MCNC: 3.8 G/DL (ref 3.5–5.2)
ALBUMIN/GLOB SERPL: 2 G/DL
ALP SERPL-CCNC: 65 U/L (ref 39–117)
ALT SERPL W P-5'-P-CCNC: 8 U/L (ref 1–33)
AMPHET+METHAMPHET UR QL: NEGATIVE
AMPHETAMINES UR QL: NEGATIVE
ANION GAP SERPL CALCULATED.3IONS-SCNC: 4.4 MMOL/L (ref 5–15)
ANISOCYTOSIS BLD QL: NORMAL
ARTERIAL PATENCY WRIST A: ABNORMAL
AST SERPL-CCNC: 13 U/L (ref 1–32)
ATMOSPHERIC PRESS: 725 MMHG
BARBITURATES UR QL SCN: NEGATIVE
BASE EXCESS BLDA CALC-SCNC: 9.7 MMOL/L (ref 0–2)
BASOPHILS # BLD AUTO: 0.03 10*3/MM3 (ref 0–0.2)
BASOPHILS NFR BLD AUTO: 0.7 % (ref 0–1.5)
BDY SITE: ABNORMAL
BENZODIAZ UR QL SCN: NEGATIVE
BILIRUB SERPL-MCNC: 0.2 MG/DL (ref 0–1.2)
BILIRUB UR QL STRIP: NEGATIVE
BUN SERPL-MCNC: 11 MG/DL (ref 8–23)
BUN/CREAT SERPL: 12.5 (ref 7–25)
BUPRENORPHINE SERPL-MCNC: NEGATIVE NG/ML
CALCIUM SPEC-SCNC: 9 MG/DL (ref 8.6–10.5)
CANNABINOIDS SERPL QL: NEGATIVE
CHLORIDE SERPL-SCNC: 101 MMOL/L (ref 98–107)
CLARITY UR: CLEAR
CO2 BLDA-SCNC: 40.8 MMOL/L (ref 22–33)
CO2 SERPL-SCNC: 39.6 MMOL/L (ref 22–29)
COCAINE UR QL: NEGATIVE
COHGB MFR BLD: 2 % (ref 0–5)
COLOR UR: YELLOW
CREAT SERPL-MCNC: 0.88 MG/DL (ref 0.57–1)
DEPRECATED RDW RBC AUTO: 80.7 FL (ref 37–54)
EGFRCR SERPLBLD CKD-EPI 2021: 68.6 ML/MIN/1.73
EOSINOPHIL # BLD AUTO: 0.17 10*3/MM3 (ref 0–0.4)
EOSINOPHIL NFR BLD AUTO: 3.8 % (ref 0.3–6.2)
ERYTHROCYTE [DISTWIDTH] IN BLOOD BY AUTOMATED COUNT: 23.7 % (ref 12.3–15.4)
ETHANOL BLD-MCNC: <10 MG/DL (ref 0–10)
ETHANOL UR QL: <0.01 %
FENTANYL UR-MCNC: NEGATIVE NG/ML
GAS FLOW AIRWAY: 3 LPM
GLOBULIN UR ELPH-MCNC: 1.9 GM/DL
GLUCOSE SERPL-MCNC: 118 MG/DL (ref 65–99)
GLUCOSE UR STRIP-MCNC: NEGATIVE MG/DL
HCO3 BLDA-SCNC: 38.4 MMOL/L (ref 20–26)
HCT VFR BLD AUTO: 37.6 % (ref 34–46.6)
HCT VFR BLD CALC: 31.5 % (ref 38–51)
HGB BLD-MCNC: 10.2 G/DL (ref 12–15.9)
HGB BLDA-MCNC: 10.3 G/DL (ref 13.5–17.5)
HGB UR QL STRIP.AUTO: NEGATIVE
HOLD SPECIMEN: NORMAL
HOLD SPECIMEN: NORMAL
HYPOCHROMIA BLD QL: NORMAL
IMM GRANULOCYTES # BLD AUTO: 0.03 10*3/MM3 (ref 0–0.05)
IMM GRANULOCYTES NFR BLD AUTO: 0.7 % (ref 0–0.5)
INHALED O2 CONCENTRATION: 32 %
KETONES UR QL STRIP: NEGATIVE
LEUKOCYTE ESTERASE UR QL STRIP.AUTO: NEGATIVE
LYMPHOCYTES # BLD AUTO: 0.72 10*3/MM3 (ref 0.7–3.1)
LYMPHOCYTES NFR BLD AUTO: 15.9 % (ref 19.6–45.3)
Lab: ABNORMAL
Lab: ABNORMAL
MAGNESIUM SERPL-MCNC: 2.1 MG/DL (ref 1.6–2.4)
MCH RBC QN AUTO: 25.8 PG (ref 26.6–33)
MCHC RBC AUTO-ENTMCNC: 27.1 G/DL (ref 31.5–35.7)
MCV RBC AUTO: 95.2 FL (ref 79–97)
METHADONE UR QL SCN: NEGATIVE
METHGB BLD QL: 0.5 % (ref 0–3)
MODALITY: ABNORMAL
MONOCYTES # BLD AUTO: 0.34 10*3/MM3 (ref 0.1–0.9)
MONOCYTES NFR BLD AUTO: 7.5 % (ref 5–12)
NEUTROPHILS NFR BLD AUTO: 3.24 10*3/MM3 (ref 1.7–7)
NEUTROPHILS NFR BLD AUTO: 71.4 % (ref 42.7–76)
NITRITE UR QL STRIP: NEGATIVE
NOTIFIED BY: ABNORMAL
NOTIFIED WHO: ABNORMAL
NRBC BLD AUTO-RTO: 0 /100 WBC (ref 0–0.2)
OPIATES UR QL: NEGATIVE
OVALOCYTES BLD QL SMEAR: NORMAL
OXYCODONE UR QL SCN: NEGATIVE
OXYHGB MFR BLDV: 95.8 % (ref 94–99)
PCO2 BLDA: 78.1 MM HG (ref 35–45)
PCO2 TEMP ADJ BLD: ABNORMAL MM[HG]
PCP UR QL SCN: NEGATIVE
PH BLDA: 7.3 PH UNITS (ref 7.35–7.45)
PH UR STRIP.AUTO: 5.5 [PH] (ref 5–8)
PH, TEMP CORRECTED: ABNORMAL
PLAT MORPH BLD: NORMAL
PLATELET # BLD AUTO: 138 10*3/MM3 (ref 140–450)
PMV BLD AUTO: 10.7 FL (ref 6–12)
PO2 BLDA: 109 MM HG (ref 83–108)
PO2 TEMP ADJ BLD: ABNORMAL MM[HG]
POIKILOCYTOSIS BLD QL SMEAR: NORMAL
POTASSIUM SERPL-SCNC: 4.5 MMOL/L (ref 3.5–5.2)
PROT SERPL-MCNC: 5.7 G/DL (ref 6–8.5)
PROT UR QL STRIP: NEGATIVE
RBC # BLD AUTO: 3.95 10*6/MM3 (ref 3.77–5.28)
SAO2 % BLDCOA: 98.3 % (ref 94–99)
SODIUM SERPL-SCNC: 145 MMOL/L (ref 136–145)
SP GR UR STRIP: 1.02 (ref 1–1.03)
TRICYCLICS UR QL SCN: NEGATIVE
UROBILINOGEN UR QL STRIP: NORMAL
VENTILATOR MODE: ABNORMAL
WBC NRBC COR # BLD AUTO: 4.53 10*3/MM3 (ref 3.4–10.8)
WHOLE BLOOD HOLD COAG: NORMAL
WHOLE BLOOD HOLD SPECIMEN: NORMAL

## 2023-12-27 PROCEDURE — 82375 ASSAY CARBOXYHB QUANT: CPT

## 2023-12-27 PROCEDURE — 80307 DRUG TEST PRSMV CHEM ANLYZR: CPT | Performed by: STUDENT IN AN ORGANIZED HEALTH CARE EDUCATION/TRAINING PROGRAM

## 2023-12-27 PROCEDURE — 70450 CT HEAD/BRAIN W/O DYE: CPT | Performed by: RADIOLOGY

## 2023-12-27 PROCEDURE — 81003 URINALYSIS AUTO W/O SCOPE: CPT | Performed by: STUDENT IN AN ORGANIZED HEALTH CARE EDUCATION/TRAINING PROGRAM

## 2023-12-27 PROCEDURE — 80053 COMPREHEN METABOLIC PANEL: CPT | Performed by: STUDENT IN AN ORGANIZED HEALTH CARE EDUCATION/TRAINING PROGRAM

## 2023-12-27 PROCEDURE — 85007 BL SMEAR W/DIFF WBC COUNT: CPT | Performed by: STUDENT IN AN ORGANIZED HEALTH CARE EDUCATION/TRAINING PROGRAM

## 2023-12-27 PROCEDURE — 25810000003 SODIUM CHLORIDE 0.9 % SOLUTION: Performed by: PHYSICIAN ASSISTANT

## 2023-12-27 PROCEDURE — 36600 WITHDRAWAL OF ARTERIAL BLOOD: CPT

## 2023-12-27 PROCEDURE — 71045 X-RAY EXAM CHEST 1 VIEW: CPT | Performed by: RADIOLOGY

## 2023-12-27 PROCEDURE — 71045 X-RAY EXAM CHEST 1 VIEW: CPT

## 2023-12-27 PROCEDURE — 83735 ASSAY OF MAGNESIUM: CPT | Performed by: STUDENT IN AN ORGANIZED HEALTH CARE EDUCATION/TRAINING PROGRAM

## 2023-12-27 PROCEDURE — 25010000002 METHYLPREDNISOLONE PER 125 MG: Performed by: PHYSICIAN ASSISTANT

## 2023-12-27 PROCEDURE — 96374 THER/PROPH/DIAG INJ IV PUSH: CPT

## 2023-12-27 PROCEDURE — 83050 HGB METHEMOGLOBIN QUAN: CPT

## 2023-12-27 PROCEDURE — 94799 UNLISTED PULMONARY SVC/PX: CPT

## 2023-12-27 PROCEDURE — 70450 CT HEAD/BRAIN W/O DYE: CPT

## 2023-12-27 PROCEDURE — 82077 ASSAY SPEC XCP UR&BREATH IA: CPT | Performed by: STUDENT IN AN ORGANIZED HEALTH CARE EDUCATION/TRAINING PROGRAM

## 2023-12-27 PROCEDURE — 82805 BLOOD GASES W/O2 SATURATION: CPT

## 2023-12-27 PROCEDURE — 85025 COMPLETE CBC W/AUTO DIFF WBC: CPT | Performed by: STUDENT IN AN ORGANIZED HEALTH CARE EDUCATION/TRAINING PROGRAM

## 2023-12-27 PROCEDURE — 99285 EMERGENCY DEPT VISIT HI MDM: CPT

## 2023-12-27 RX ORDER — METHYLPREDNISOLONE SODIUM SUCCINATE 125 MG/2ML
80 INJECTION, POWDER, LYOPHILIZED, FOR SOLUTION INTRAMUSCULAR; INTRAVENOUS ONCE
Status: COMPLETED | OUTPATIENT
Start: 2023-12-27 | End: 2023-12-27

## 2023-12-27 RX ORDER — SODIUM CHLORIDE 0.9 % (FLUSH) 0.9 %
10 SYRINGE (ML) INJECTION AS NEEDED
Status: DISCONTINUED | OUTPATIENT
Start: 2023-12-27 | End: 2023-12-27 | Stop reason: HOSPADM

## 2023-12-27 RX ADMIN — METHYLPREDNISOLONE SODIUM SUCCINATE 80 MG: 125 INJECTION, POWDER, FOR SOLUTION INTRAMUSCULAR; INTRAVENOUS at 12:47

## 2023-12-27 RX ADMIN — SODIUM CHLORIDE 1000 ML: 9 INJECTION, SOLUTION INTRAVENOUS at 10:07

## 2023-12-27 NOTE — ED NOTES
Per H[facility] Johnson Pt to be discharged back to the NH with O2 at 2L NC, due to ABG results on 3L NC, report called to Pt primary RN Jane at [facility] with verbalized understanding.

## 2023-12-27 NOTE — NURSING NOTE
Pt to Intake. Denies SI or HI or AVH. Patient denies any SI and states that she is just tired. Per EMS they reported alterned mental status decline and when they went in the room she was hitting herself and tied the oxygen tubing around her neck. Patient states I was just tired and wanted to sleep. Denies wanting to harm self. ER lead nurse made aware. Pt to be moved to ED4 for medical.

## 2023-12-27 NOTE — ED PROVIDER NOTES
Subjective   History of Present Illness  73-year-old female who presents to the emergency department chief complaint altered mental status.  Patient has history of COPD, dementia, CAD, hyperlipidemia, hypertension.  Patient has had disorientation and behavioral changes.  Initial complaint from nursing home was patient tried to commit suicide with wrapping cord of her oxygen around her neck.    History provided by:  Patient   used: No    Altered Mental Status  Presenting symptoms: behavior changes, confusion, disorientation and unresponsiveness    Severity:  Moderate  Most recent episode:  2 days ago  Episode history:  Single  Duration:  2 minutes  Timing:  Intermittent  Progression:  Worsening  Chronicity:  New  Context: not dementia, not drug use, not head injury, not nursing home resident, not recent change in medication and not recent infection    Associated symptoms: normal movement, no bladder incontinence, no difficulty breathing, no fever, no hallucinations, no headaches, no light-headedness, no nausea, no rash, no seizures, no slurred speech, no suicidal behavior and no visual change        Review of Systems   Constitutional: Negative.  Negative for chills, diaphoresis and fever.   Eyes: Negative.  Negative for pain and itching.   Respiratory: Negative.  Negative for choking and chest tightness.    Cardiovascular: Negative.  Negative for chest pain and leg swelling.   Gastrointestinal: Negative.  Negative for anal bleeding, blood in stool, constipation, diarrhea and nausea.   Endocrine: Negative.  Negative for heat intolerance, polydipsia and polyphagia.   Genitourinary: Negative.  Negative for bladder incontinence, enuresis, flank pain, frequency and hematuria.   Musculoskeletal: Negative.  Negative for back pain, gait problem, joint swelling and myalgias.   Skin: Negative.  Negative for pallor, rash and wound.   Neurological:  Negative for seizures, light-headedness and headaches.    Psychiatric/Behavioral:  Positive for confusion. Negative for behavioral problems and hallucinations.    All other systems reviewed and are negative.      Past Medical History:   Diagnosis Date    Anxiety     Arthritis     Bell's palsy     Bladder prolapse, female, acquired     Carotid stenosis     COPD (chronic obstructive pulmonary disease)     COPD (chronic obstructive pulmonary disease)     Coronary artery disease     Dementia     Dementia     Depression     Difficulty walking     Disease of thyroid gland     Frequency of urination     GERD (gastroesophageal reflux disease)     Heart attack     Hyperlipidemia     Hypertension     Irregular heart beat     Peptic ulcer disease        No Known Allergies    Past Surgical History:   Procedure Laterality Date    CARDIAC CATHETERIZATION N/A 2/21/2023    Procedure: Left Heart Cath;  Surgeon: Tab Cifuentes MD;  Location: [facility] CATH INVASIVE LOCATION;  Service: Cardiology;  Laterality: N/A;    CARDIAC SURGERY      open heart  in 1999    CYSTOSCOPY N/A 11/8/2017    Procedure: CYSTOSCOPY;  Surgeon: Karl Nicolas DO;  Location: [facility] OR;  Service:     OTHER SURGICAL HISTORY      states she had fluid drained no surgery    VAGINAL HYSTERECTOMY W/ ANTERIOR AND POSTERIOR VAGINAL REPAIR N/A 11/8/2017    Procedure: VAGINAL HYSTERECTOMY WITH ANTERIOR, POSTERIOR, AND ENTEROCELE VAGINAL REPAIR;  Surgeon: Karl Nicolas DO;  Location: [facility] OR;  Service:     VAGINAL VAULT SUSPENSION N/A 11/8/2017    Procedure: VAGINAL VAULT SUSPENSION ;  Surgeon: Karl Nicolas DO;  Location: [facility] OR;  Service:        Family History   Problem Relation Age of Onset    Dementia Sister     Heart attack Mother     Tuberculosis Father     Breast cancer Neg Hx        Social History     Socioeconomic History    Marital status:     Number of children: 3   Tobacco Use    Smoking status: Every Day     Packs/day: 0.50     Years: 55.00     Additional pack  years: 0.00     Total pack years: 27.50     Types: Cigarettes    Smokeless tobacco: Never   Vaping Use    Vaping Use: Never used   Substance and Sexual Activity    Alcohol use: No    Drug use: No    Sexual activity: Never     Comment: denies           Objective   Physical Exam  Vitals and nursing note reviewed.   Constitutional:       General: She is not in acute distress.     Appearance: Normal appearance. She is normal weight. She is not ill-appearing or toxic-appearing.   HENT:      Head: Normocephalic and atraumatic.      Right Ear: Tympanic membrane, ear canal and external ear normal. There is no impacted cerumen.      Left Ear: Tympanic membrane, ear canal and external ear normal. There is no impacted cerumen.      Nose: Nose normal. No congestion or rhinorrhea.      Mouth/Throat:      Mouth: Mucous membranes are moist.      Pharynx: Oropharynx is clear. No oropharyngeal exudate or posterior oropharyngeal erythema.   Eyes:      General: No scleral icterus.        Right eye: No discharge.         Left eye: No discharge.      Extraocular Movements: Extraocular movements intact.      Conjunctiva/sclera: Conjunctivae normal.      Pupils: Pupils are equal, round, and reactive to light.   Cardiovascular:      Rate and Rhythm: Normal rate and regular rhythm.      Pulses: Normal pulses.      Heart sounds: Normal heart sounds. No murmur heard.     No gallop.   Pulmonary:      Effort: Pulmonary effort is normal. No respiratory distress.      Breath sounds: Normal breath sounds. No stridor. No wheezing, rhonchi or rales.   Chest:      Chest wall: No tenderness.   Abdominal:      General: Abdomen is flat. Bowel sounds are normal. There is no distension.      Palpations: Abdomen is soft. There is no mass.      Tenderness: There is no abdominal tenderness. There is no right CVA tenderness, guarding or rebound.      Hernia: No hernia is present.   Musculoskeletal:         General: No swelling, tenderness, deformity or signs  of injury. Normal range of motion.      Cervical back: Normal range of motion and neck supple. No rigidity or tenderness.      Right lower leg: No edema.      Left lower leg: No edema.   Lymphadenopathy:      Cervical: No cervical adenopathy.   Skin:     General: Skin is warm and dry.      Capillary Refill: Capillary refill takes less than 2 seconds.      Coloration: Skin is not jaundiced or pale.      Findings: No bruising, erythema or lesion.   Neurological:      Mental Status: She is alert. Mental status is at baseline. She is disoriented.      Cranial Nerves: No cranial nerve deficit.      Sensory: No sensory deficit.      Motor: No weakness.      Coordination: Coordination normal.   Psychiatric:         Mood and Affect: Mood normal.         Behavior: Behavior normal.         Thought Content: Thought content normal.         Judgment: Judgment normal.         Procedures           ED Course  ED Course as of 12/27/23 1245   Wed Dec 27, 2023   1112 IMPRESSION:     1. No evidence of an acute ischemic event on today's study  2. There is atrophy and chronic microangiopathic change very similar to  the prior study   []   1112 IMPRESSION:  Trace left effusion      []   1112 Intake has seen and evaluated patient.  []      ED Course User Index  [] Maikel Mcnulty PA-C                                             Medical Decision Making  Problems Addressed:  COPD with acute exacerbation: complicated acute illness or injury  Hypercapnia: complicated acute illness or injury    Amount and/or Complexity of Data Reviewed  Labs: ordered.  Radiology: ordered.    Risk  Prescription drug management.        Final diagnoses:   Hypercapnia   COPD with acute exacerbation       ED Disposition  ED Disposition       ED Disposition   Discharge    Condition   Stable    Comment   --               Bernadette Arevalo MD  [address]  [place] [address] [phone]    Call in 1 day           Medication List      No changes were  made to your prescriptions during this visit.            Maikel Mcnulty PA-C  12/27/23 3718

## 2023-12-27 NOTE — NURSING NOTE
Patient sent by [facility] for a psych evaluation. Per the note from the [facility] they reported they noted increase in confusion with her. Upon entering her room she was hitting herself, and had oxygen tubing tied around her neck and was trying to choke herself. And the patient refused her pills stating she was not taking them because they had bugs in them. And that the devil watches her thru the window.     Upon arrival to the ER the patient is not voicing any self harm. Is calm and pleasant with staff. She denies any current SI HI or AVH. She states I dont know why I am here, I just wanted to sleep and be left alone. Patient placed in medical bed and work up completed. No significant findings per ER provider.

## 2023-12-27 NOTE — NURSING NOTE
Called and spoke to Dr. Hampton via phone. Intake information provided. Instructed that if she is not showing any agitation at this time and is not voicing any SI to let her go back. Sounds like she was having a stress reaction and is not longer displaying any concerning behavior. Instructed that they can have their mental health provider to follow up. Ok to DC and return to [facility].

## 2023-12-27 NOTE — ED NOTES
[facility] arrived at this time report given at bedside. Pt A&OX4, RR even and unlabored, Pt changed from gown back into pajamas and shoes placed back on Pt. Pt being discharged with vitals WNL and tolearting O2 well at 2L NC.

## 2023-12-27 NOTE — ED NOTES
Report received from chad Schwartz RN. Pt cleared by chad for medical evaluation. Pt denies SI, states that she does not know why she is here or what happened.

## 2023-12-28 ENCOUNTER — TRANSCRIBE ORDERS (OUTPATIENT)
Dept: ADMINISTRATIVE | Facility: HOSPITAL | Age: 73
End: 2023-12-28
Payer: MEDICARE

## 2023-12-28 DIAGNOSIS — Z72.0 TOBACCO USER: Primary | ICD-10-CM

## 2023-12-28 DIAGNOSIS — F17.210 NICOTINE DEPENDENCE, CIGARETTES, UNCOMPLICATED: ICD-10-CM

## 2023-12-31 ENCOUNTER — LAB REQUISITION (OUTPATIENT)
Dept: LAB | Facility: HOSPITAL | Age: 73
DRG: 189 | End: 2023-12-31
Payer: MEDICARE

## 2023-12-31 DIAGNOSIS — M62.81 MUSCLE WEAKNESS (GENERALIZED): ICD-10-CM

## 2023-12-31 LAB
BILIRUB UR QL STRIP: NEGATIVE
CLARITY UR: CLEAR
COLOR UR: ABNORMAL
GLUCOSE UR STRIP-MCNC: NEGATIVE MG/DL
HGB UR QL STRIP.AUTO: NEGATIVE
KETONES UR QL STRIP: NEGATIVE
LEUKOCYTE ESTERASE UR QL STRIP.AUTO: NEGATIVE
NITRITE UR QL STRIP: NEGATIVE
PH UR STRIP.AUTO: 5.5 [PH] (ref 5–8)
PROT UR QL STRIP: NEGATIVE
SP GR UR STRIP: 1.02 (ref 1–1.03)
UROBILINOGEN UR QL STRIP: ABNORMAL

## 2023-12-31 PROCEDURE — 81003 URINALYSIS AUTO W/O SCOPE: CPT | Performed by: INTERNAL MEDICINE

## 2024-01-01 ENCOUNTER — HOSPITAL ENCOUNTER (INPATIENT)
Facility: HOSPITAL | Age: 74
LOS: 10 days | Discharge: NURSING, REHAB OR LONG-TERM CARE | DRG: 189 | End: 2024-01-11
Attending: EMERGENCY MEDICINE | Admitting: STUDENT IN AN ORGANIZED HEALTH CARE EDUCATION/TRAINING PROGRAM
Payer: MEDICARE

## 2024-01-01 ENCOUNTER — APPOINTMENT (OUTPATIENT)
Dept: CT IMAGING | Facility: HOSPITAL | Age: 74
DRG: 189 | End: 2024-01-01
Payer: MEDICARE

## 2024-01-01 ENCOUNTER — APPOINTMENT (OUTPATIENT)
Dept: GENERAL RADIOLOGY | Facility: HOSPITAL | Age: 74
DRG: 189 | End: 2024-01-01
Payer: MEDICARE

## 2024-01-01 DIAGNOSIS — J96.22 ACUTE ON CHRONIC RESPIRATORY FAILURE WITH HYPERCAPNIA: Primary | ICD-10-CM

## 2024-01-01 PROBLEM — J96.02 ACUTE HYPERCAPNIC RESPIRATORY FAILURE: Status: ACTIVE | Noted: 2024-01-01

## 2024-01-01 LAB
A-A DO2: 21.1 MMHG (ref 0–300)
A-A DO2: 52.2 MMHG (ref 0–300)
A-A DO2: 55.2 MMHG (ref 0–300)
ALBUMIN SERPL-MCNC: 3.8 G/DL (ref 3.5–5.2)
ALBUMIN/GLOB SERPL: 2.1 G/DL
ALP SERPL-CCNC: 56 U/L (ref 39–117)
ALT SERPL W P-5'-P-CCNC: 8 U/L (ref 1–33)
ANION GAP SERPL CALCULATED.3IONS-SCNC: 2.7 MMOL/L (ref 5–15)
ANISOCYTOSIS BLD QL: NORMAL
ARTERIAL PATENCY WRIST A: ABNORMAL
ARTERIAL PATENCY WRIST A: ABNORMAL
ARTERIAL PATENCY WRIST A: POSITIVE
AST SERPL-CCNC: 11 U/L (ref 1–32)
ATMOSPHERIC PRESS: 731 MMHG
ATMOSPHERIC PRESS: 732 MMHG
ATMOSPHERIC PRESS: 732 MMHG
BASE EXCESS BLDA CALC-SCNC: 14.9 MMOL/L (ref 0–2)
BASE EXCESS BLDA CALC-SCNC: 15.2 MMOL/L (ref 0–2)
BASE EXCESS BLDA CALC-SCNC: 17 MMOL/L (ref 0–2)
BASOPHILS # BLD AUTO: 0.01 10*3/MM3 (ref 0–0.2)
BASOPHILS NFR BLD AUTO: 0.2 % (ref 0–1.5)
BDY SITE: ABNORMAL
BILIRUB SERPL-MCNC: 0.3 MG/DL (ref 0–1.2)
BILIRUB UR QL STRIP: NEGATIVE
BUN SERPL-MCNC: 10 MG/DL (ref 8–23)
BUN/CREAT SERPL: 14.7 (ref 7–25)
CALCIUM SPEC-SCNC: 8.9 MG/DL (ref 8.6–10.5)
CHLORIDE SERPL-SCNC: 96 MMOL/L (ref 98–107)
CHOLEST SERPL-MCNC: 119 MG/DL (ref 0–200)
CLARITY UR: ABNORMAL
CO2 BLDA-SCNC: 43.6 MMOL/L (ref 22–33)
CO2 BLDA-SCNC: 46.3 MMOL/L (ref 22–33)
CO2 BLDA-SCNC: 46.9 MMOL/L (ref 22–33)
CO2 SERPL-SCNC: 44.3 MMOL/L (ref 22–29)
COHGB MFR BLD: 2.2 % (ref 0–5)
COHGB MFR BLD: 2.3 % (ref 0–5)
COHGB MFR BLD: 2.6 % (ref 0–5)
COLOR UR: YELLOW
CREAT SERPL-MCNC: 0.68 MG/DL (ref 0.57–1)
CRP SERPL-MCNC: <0.3 MG/DL (ref 0–0.5)
DACRYOCYTES BLD QL SMEAR: NORMAL
DEPRECATED RDW RBC AUTO: 76.9 FL (ref 37–54)
EGFRCR SERPLBLD CKD-EPI 2021: 91 ML/MIN/1.73
EOSINOPHIL # BLD AUTO: 0.09 10*3/MM3 (ref 0–0.4)
EOSINOPHIL NFR BLD AUTO: 2.2 % (ref 0.3–6.2)
EPAP: 6
EPAP: 6
ERYTHROCYTE [DISTWIDTH] IN BLOOD BY AUTOMATED COUNT: 22.4 % (ref 12.3–15.4)
FLUAV RNA RESP QL NAA+PROBE: NOT DETECTED
FLUBV RNA RESP QL NAA+PROBE: NOT DETECTED
GAS FLOW AIRWAY: 2 LPM
GEN 5 2HR TROPONIN T REFLEX: 23 NG/L
GLOBULIN UR ELPH-MCNC: 1.8 GM/DL
GLUCOSE BLDC GLUCOMTR-MCNC: 103 MG/DL (ref 70–130)
GLUCOSE BLDC GLUCOMTR-MCNC: 134 MG/DL (ref 70–130)
GLUCOSE BLDC GLUCOMTR-MCNC: 137 MG/DL (ref 70–130)
GLUCOSE SERPL-MCNC: 103 MG/DL (ref 65–99)
GLUCOSE UR STRIP-MCNC: NEGATIVE MG/DL
HBA1C MFR BLD: 5.2 % (ref 4.8–5.6)
HCO3 BLDA-SCNC: 41.7 MMOL/L (ref 20–26)
HCO3 BLDA-SCNC: 44.2 MMOL/L (ref 20–26)
HCO3 BLDA-SCNC: 44.3 MMOL/L (ref 20–26)
HCT VFR BLD AUTO: 36.1 % (ref 34–46.6)
HCT VFR BLD CALC: 32.2 % (ref 38–51)
HCT VFR BLD CALC: 32.3 % (ref 38–51)
HCT VFR BLD CALC: 33.5 % (ref 38–51)
HDLC SERPL-MCNC: 59 MG/DL (ref 40–60)
HGB BLD-MCNC: 10.1 G/DL (ref 12–15.9)
HGB BLDA-MCNC: 10.5 G/DL (ref 13.5–17.5)
HGB BLDA-MCNC: 10.5 G/DL (ref 13.5–17.5)
HGB BLDA-MCNC: 10.9 G/DL (ref 13.5–17.5)
HGB UR QL STRIP.AUTO: NEGATIVE
HOLD SPECIMEN: NORMAL
HOLD SPECIMEN: NORMAL
HYPOCHROMIA BLD QL: NORMAL
IMM GRANULOCYTES # BLD AUTO: 0.04 10*3/MM3 (ref 0–0.05)
IMM GRANULOCYTES NFR BLD AUTO: 1 % (ref 0–0.5)
INHALED O2 CONCENTRATION: 28 %
IPAP: 18
IPAP: 18
KETONES UR QL STRIP: NEGATIVE
LDLC SERPL CALC-MCNC: 46 MG/DL (ref 0–100)
LDLC/HDLC SERPL: 0.78 {RATIO}
LEUKOCYTE ESTERASE UR QL STRIP.AUTO: NEGATIVE
LYMPHOCYTES # BLD AUTO: 0.64 10*3/MM3 (ref 0.7–3.1)
LYMPHOCYTES NFR BLD AUTO: 15.6 % (ref 19.6–45.3)
Lab: ABNORMAL
MCH RBC QN AUTO: 26.8 PG (ref 26.6–33)
MCHC RBC AUTO-ENTMCNC: 28 G/DL (ref 31.5–35.7)
MCV RBC AUTO: 95.8 FL (ref 79–97)
METHGB BLD QL: 0.2 % (ref 0–3)
METHGB BLD QL: 0.2 % (ref 0–3)
METHGB BLD QL: <-0.1 % (ref 0–3)
MODALITY: ABNORMAL
MONOCYTES # BLD AUTO: 0.31 10*3/MM3 (ref 0.1–0.9)
MONOCYTES NFR BLD AUTO: 7.5 % (ref 5–12)
NEUTROPHILS NFR BLD AUTO: 3.02 10*3/MM3 (ref 1.7–7)
NEUTROPHILS NFR BLD AUTO: 73.5 % (ref 42.7–76)
NITRITE UR QL STRIP: NEGATIVE
NOTIFIED BY: ABNORMAL
NOTIFIED BY: ABNORMAL
NOTIFIED WHO: ABNORMAL
NOTIFIED WHO: ABNORMAL
NRBC BLD AUTO-RTO: 0 /100 WBC (ref 0–0.2)
OVALOCYTES BLD QL SMEAR: NORMAL
OXYHGB MFR BLDV: 91.2 % (ref 94–99)
OXYHGB MFR BLDV: 92.6 % (ref 94–99)
OXYHGB MFR BLDV: 93 % (ref 94–99)
PCO2 BLDA: 63.1 MM HG (ref 35–45)
PCO2 BLDA: 67.6 MM HG (ref 35–45)
PCO2 BLDA: 84.5 MM HG (ref 35–45)
PCO2 TEMP ADJ BLD: ABNORMAL MM[HG]
PH BLDA: 7.33 PH UNITS (ref 7.35–7.45)
PH BLDA: 7.42 PH UNITS (ref 7.35–7.45)
PH BLDA: 7.43 PH UNITS (ref 7.35–7.45)
PH UR STRIP.AUTO: >=9 [PH] (ref 5–8)
PH, TEMP CORRECTED: ABNORMAL
PLAT MORPH BLD: NORMAL
PLATELET # BLD AUTO: 135 10*3/MM3 (ref 140–450)
PMV BLD AUTO: 10.8 FL (ref 6–12)
PO2 BLDA: 60.9 MM HG (ref 83–108)
PO2 BLDA: 69.2 MM HG (ref 83–108)
PO2 BLDA: 76.2 MM HG (ref 83–108)
PO2 TEMP ADJ BLD: ABNORMAL MM[HG]
POTASSIUM SERPL-SCNC: 4.3 MMOL/L (ref 3.5–5.2)
PROT SERPL-MCNC: 5.6 G/DL (ref 6–8.5)
PROT UR QL STRIP: ABNORMAL
QT INTERVAL: 418 MS
QT INTERVAL: 440 MS
QTC INTERVAL: 427 MS
QTC INTERVAL: 446 MS
RBC # BLD AUTO: 3.77 10*6/MM3 (ref 3.77–5.28)
SAO2 % BLDCOA: 92.8 % (ref 94–99)
SAO2 % BLDCOA: 95.3 % (ref 94–99)
SAO2 % BLDCOA: 95.4 % (ref 94–99)
SARS-COV-2 RNA RESP QL NAA+PROBE: NOT DETECTED
SET MECH RESP RATE: 18
SET MECH RESP RATE: 18
SODIUM SERPL-SCNC: 143 MMOL/L (ref 136–145)
SP GR UR STRIP: 1.02 (ref 1–1.03)
TRIGL SERPL-MCNC: 69 MG/DL (ref 0–150)
TROPONIN T DELTA: -3 NG/L
TROPONIN T SERPL HS-MCNC: 26 NG/L
TSH SERPL DL<=0.05 MIU/L-ACNC: 3.02 UIU/ML (ref 0.27–4.2)
UROBILINOGEN UR QL STRIP: ABNORMAL
VENTILATOR MODE: ABNORMAL
VLDLC SERPL-MCNC: 14 MG/DL (ref 5–40)
WBC NRBC COR # BLD AUTO: 4.11 10*3/MM3 (ref 3.4–10.8)
WHOLE BLOOD HOLD COAG: NORMAL
WHOLE BLOOD HOLD SPECIMEN: NORMAL

## 2024-01-01 PROCEDURE — 25010000002 METHYLPREDNISOLONE PER 40 MG

## 2024-01-01 PROCEDURE — 25010000002 METHYLPREDNISOLONE PER 125 MG: Performed by: PHYSICIAN ASSISTANT

## 2024-01-01 PROCEDURE — 93010 ELECTROCARDIOGRAM REPORT: CPT | Performed by: INTERNAL MEDICINE

## 2024-01-01 PROCEDURE — 94799 UNLISTED PULMONARY SVC/PX: CPT

## 2024-01-01 PROCEDURE — 94660 CPAP INITIATION&MGMT: CPT

## 2024-01-01 PROCEDURE — 70450 CT HEAD/BRAIN W/O DYE: CPT

## 2024-01-01 PROCEDURE — 94640 AIRWAY INHALATION TREATMENT: CPT

## 2024-01-01 PROCEDURE — 82375 ASSAY CARBOXYHB QUANT: CPT

## 2024-01-01 PROCEDURE — 85025 COMPLETE CBC W/AUTO DIFF WBC: CPT | Performed by: PHYSICIAN ASSISTANT

## 2024-01-01 PROCEDURE — 84484 ASSAY OF TROPONIN QUANT: CPT | Performed by: PHYSICIAN ASSISTANT

## 2024-01-01 PROCEDURE — 99223 1ST HOSP IP/OBS HIGH 75: CPT

## 2024-01-01 PROCEDURE — 71045 X-RAY EXAM CHEST 1 VIEW: CPT

## 2024-01-01 PROCEDURE — 85007 BL SMEAR W/DIFF WBC COUNT: CPT | Performed by: PHYSICIAN ASSISTANT

## 2024-01-01 PROCEDURE — 25010000002 ZIPRASIDONE MESYLATE PER 10 MG: Performed by: STUDENT IN AN ORGANIZED HEALTH CARE EDUCATION/TRAINING PROGRAM

## 2024-01-01 PROCEDURE — 25010000002 LORAZEPAM PER 2 MG: Performed by: PHYSICIAN ASSISTANT

## 2024-01-01 PROCEDURE — 84443 ASSAY THYROID STIM HORMONE: CPT | Performed by: PHYSICIAN ASSISTANT

## 2024-01-01 PROCEDURE — 93005 ELECTROCARDIOGRAM TRACING: CPT | Performed by: STUDENT IN AN ORGANIZED HEALTH CARE EDUCATION/TRAINING PROGRAM

## 2024-01-01 PROCEDURE — 82805 BLOOD GASES W/O2 SATURATION: CPT

## 2024-01-01 PROCEDURE — 82948 REAGENT STRIP/BLOOD GLUCOSE: CPT

## 2024-01-01 PROCEDURE — 81003 URINALYSIS AUTO W/O SCOPE: CPT | Performed by: PHYSICIAN ASSISTANT

## 2024-01-01 PROCEDURE — 86140 C-REACTIVE PROTEIN: CPT | Performed by: PHYSICIAN ASSISTANT

## 2024-01-01 PROCEDURE — 94664 DEMO&/EVAL PT USE INHALER: CPT

## 2024-01-01 PROCEDURE — 83050 HGB METHEMOGLOBIN QUAN: CPT

## 2024-01-01 PROCEDURE — 80053 COMPREHEN METABOLIC PANEL: CPT | Performed by: PHYSICIAN ASSISTANT

## 2024-01-01 PROCEDURE — 83036 HEMOGLOBIN GLYCOSYLATED A1C: CPT

## 2024-01-01 PROCEDURE — 80061 LIPID PANEL: CPT

## 2024-01-01 PROCEDURE — 36600 WITHDRAWAL OF ARTERIAL BLOOD: CPT

## 2024-01-01 PROCEDURE — 99285 EMERGENCY DEPT VISIT HI MDM: CPT

## 2024-01-01 PROCEDURE — 93005 ELECTROCARDIOGRAM TRACING: CPT | Performed by: PHYSICIAN ASSISTANT

## 2024-01-01 PROCEDURE — 94761 N-INVAS EAR/PLS OXIMETRY MLT: CPT

## 2024-01-01 PROCEDURE — 87636 SARSCOV2 & INF A&B AMP PRB: CPT | Performed by: PHYSICIAN ASSISTANT

## 2024-01-01 PROCEDURE — 36415 COLL VENOUS BLD VENIPUNCTURE: CPT

## 2024-01-01 RX ORDER — CARBOXYMETHYLCELLULOSE SODIUM 5 MG/ML
2 SOLUTION/ DROPS OPHTHALMIC
Status: DISCONTINUED | OUTPATIENT
Start: 2024-01-01 | End: 2024-01-11 | Stop reason: HOSPADM

## 2024-01-01 RX ORDER — METHYLPREDNISOLONE SODIUM SUCCINATE 125 MG/2ML
80 INJECTION, POWDER, LYOPHILIZED, FOR SOLUTION INTRAMUSCULAR; INTRAVENOUS ONCE
Status: COMPLETED | OUTPATIENT
Start: 2024-01-01 | End: 2024-01-01

## 2024-01-01 RX ORDER — BISACODYL 10 MG
10 SUPPOSITORY, RECTAL RECTAL DAILY PRN
Status: DISCONTINUED | OUTPATIENT
Start: 2024-01-01 | End: 2024-01-11 | Stop reason: HOSPADM

## 2024-01-01 RX ORDER — POLYETHYLENE GLYCOL 3350 17 G/17G
17 POWDER, FOR SOLUTION ORAL DAILY PRN
Status: DISCONTINUED | OUTPATIENT
Start: 2024-01-01 | End: 2024-01-11 | Stop reason: HOSPADM

## 2024-01-01 RX ORDER — LAMOTRIGINE 100 MG/1
25 TABLET ORAL NIGHTLY
Status: DISCONTINUED | OUTPATIENT
Start: 2024-01-01 | End: 2024-01-11 | Stop reason: HOSPADM

## 2024-01-01 RX ORDER — ACETAMINOPHEN 650 MG/1
650 SUPPOSITORY RECTAL EVERY 4 HOURS PRN
Status: DISCONTINUED | OUTPATIENT
Start: 2024-01-01 | End: 2024-01-11 | Stop reason: HOSPADM

## 2024-01-01 RX ORDER — LISINOPRIL 10 MG/1
10 TABLET ORAL
Status: DISCONTINUED | OUTPATIENT
Start: 2024-01-02 | End: 2024-01-11 | Stop reason: HOSPADM

## 2024-01-01 RX ORDER — GUAIFENESIN 200 MG/1
400 TABLET ORAL EVERY 4 HOURS PRN
Status: DISCONTINUED | OUTPATIENT
Start: 2024-01-01 | End: 2024-01-11 | Stop reason: HOSPADM

## 2024-01-01 RX ORDER — PANTOPRAZOLE SODIUM 40 MG/1
40 TABLET, DELAYED RELEASE ORAL DAILY
Status: DISCONTINUED | OUTPATIENT
Start: 2024-01-02 | End: 2024-01-11 | Stop reason: HOSPADM

## 2024-01-01 RX ORDER — AMLODIPINE BESYLATE 10 MG/1
10 TABLET ORAL NIGHTLY
Status: DISCONTINUED | OUTPATIENT
Start: 2024-01-01 | End: 2024-01-06

## 2024-01-01 RX ORDER — ISOSORBIDE MONONITRATE 30 MG/1
90 TABLET, EXTENDED RELEASE ORAL DAILY
Status: DISCONTINUED | OUTPATIENT
Start: 2024-01-02 | End: 2024-01-09

## 2024-01-01 RX ORDER — LORAZEPAM 0.5 MG/1
0.5 TABLET ORAL EVERY 12 HOURS PRN
Status: DISCONTINUED | OUTPATIENT
Start: 2024-01-01 | End: 2024-01-11 | Stop reason: HOSPADM

## 2024-01-01 RX ORDER — LEVOTHYROXINE SODIUM 0.07 MG/1
75 TABLET ORAL DAILY
Status: DISCONTINUED | OUTPATIENT
Start: 2024-01-02 | End: 2024-01-11 | Stop reason: HOSPADM

## 2024-01-01 RX ORDER — AMOXICILLIN 250 MG
2 CAPSULE ORAL 2 TIMES DAILY
Status: DISCONTINUED | OUTPATIENT
Start: 2024-01-01 | End: 2024-01-11 | Stop reason: HOSPADM

## 2024-01-01 RX ORDER — LAMOTRIGINE 100 MG/1
100 TABLET ORAL DAILY
Status: DISCONTINUED | OUTPATIENT
Start: 2024-01-02 | End: 2024-01-11 | Stop reason: HOSPADM

## 2024-01-01 RX ORDER — METHYLPREDNISOLONE SODIUM SUCCINATE 40 MG/ML
40 INJECTION, POWDER, LYOPHILIZED, FOR SOLUTION INTRAMUSCULAR; INTRAVENOUS EVERY 12 HOURS
Status: DISCONTINUED | OUTPATIENT
Start: 2024-01-01 | End: 2024-01-05

## 2024-01-01 RX ORDER — IPRATROPIUM BROMIDE AND ALBUTEROL SULFATE 2.5; .5 MG/3ML; MG/3ML
3 SOLUTION RESPIRATORY (INHALATION)
Status: DISCONTINUED | OUTPATIENT
Start: 2024-01-01 | End: 2024-01-06

## 2024-01-01 RX ORDER — CHOLECALCIFEROL (VITAMIN D3) 1250 MCG
50000 CAPSULE ORAL
Status: DISCONTINUED | OUTPATIENT
Start: 2024-01-05 | End: 2024-01-11 | Stop reason: HOSPADM

## 2024-01-01 RX ORDER — SODIUM CHLORIDE 0.9 % (FLUSH) 0.9 %
10 SYRINGE (ML) INJECTION AS NEEDED
Status: DISCONTINUED | OUTPATIENT
Start: 2024-01-01 | End: 2024-01-11 | Stop reason: HOSPADM

## 2024-01-01 RX ORDER — LORAZEPAM 0.5 MG/1
0.5 TABLET ORAL EVERY 12 HOURS PRN
Status: ON HOLD | COMMUNITY

## 2024-01-01 RX ORDER — SODIUM CHLORIDE 0.9 % (FLUSH) 0.9 %
10 SYRINGE (ML) INJECTION EVERY 12 HOURS SCHEDULED
Status: DISCONTINUED | OUTPATIENT
Start: 2024-01-01 | End: 2024-01-11 | Stop reason: HOSPADM

## 2024-01-01 RX ORDER — IPRATROPIUM BROMIDE AND ALBUTEROL SULFATE 2.5; .5 MG/3ML; MG/3ML
3 SOLUTION RESPIRATORY (INHALATION) ONCE
Status: COMPLETED | OUTPATIENT
Start: 2024-01-01 | End: 2024-01-01

## 2024-01-01 RX ORDER — BISACODYL 5 MG/1
5 TABLET, DELAYED RELEASE ORAL DAILY PRN
Status: DISCONTINUED | OUTPATIENT
Start: 2024-01-01 | End: 2024-01-11 | Stop reason: HOSPADM

## 2024-01-01 RX ORDER — BACLOFEN 10 MG/1
5 TABLET ORAL 2 TIMES DAILY
Status: DISCONTINUED | OUTPATIENT
Start: 2024-01-01 | End: 2024-01-11 | Stop reason: HOSPADM

## 2024-01-01 RX ORDER — SODIUM CHLORIDE 9 MG/ML
40 INJECTION, SOLUTION INTRAVENOUS AS NEEDED
Status: DISCONTINUED | OUTPATIENT
Start: 2024-01-01 | End: 2024-01-11 | Stop reason: HOSPADM

## 2024-01-01 RX ORDER — NITROGLYCERIN 0.4 MG/1
0.4 TABLET SUBLINGUAL
Status: DISCONTINUED | OUTPATIENT
Start: 2024-01-01 | End: 2024-01-11 | Stop reason: HOSPADM

## 2024-01-01 RX ORDER — ROSUVASTATIN CALCIUM 20 MG/1
40 TABLET, COATED ORAL EVERY EVENING
Status: DISCONTINUED | OUTPATIENT
Start: 2024-01-01 | End: 2024-01-11 | Stop reason: HOSPADM

## 2024-01-01 RX ORDER — METOPROLOL SUCCINATE 25 MG/1
12.5 TABLET, EXTENDED RELEASE ORAL DAILY
Status: DISCONTINUED | OUTPATIENT
Start: 2024-01-02 | End: 2024-01-06

## 2024-01-01 RX ORDER — DEXTROSE MONOHYDRATE 25 G/50ML
25 INJECTION, SOLUTION INTRAVENOUS
Status: DISCONTINUED | OUTPATIENT
Start: 2024-01-01 | End: 2024-01-11 | Stop reason: HOSPADM

## 2024-01-01 RX ORDER — ASPIRIN 81 MG/1
324 TABLET, CHEWABLE ORAL ONCE
Status: DISCONTINUED | OUTPATIENT
Start: 2024-01-01 | End: 2024-01-01

## 2024-01-01 RX ORDER — DONEPEZIL HYDROCHLORIDE 5 MG/1
10 TABLET, FILM COATED ORAL NIGHTLY
Status: DISCONTINUED | OUTPATIENT
Start: 2024-01-01 | End: 2024-01-11 | Stop reason: HOSPADM

## 2024-01-01 RX ORDER — MEMANTINE HYDROCHLORIDE 10 MG/1
10 TABLET ORAL 2 TIMES DAILY
Status: ON HOLD | COMMUNITY

## 2024-01-01 RX ORDER — BUDESONIDE AND FORMOTEROL FUMARATE DIHYDRATE 160; 4.5 UG/1; UG/1
2 AEROSOL RESPIRATORY (INHALATION)
Status: DISCONTINUED | OUTPATIENT
Start: 2024-01-01 | End: 2024-01-11 | Stop reason: HOSPADM

## 2024-01-01 RX ORDER — SENNOSIDES A AND B 8.6 MG/1
1 TABLET, FILM COATED ORAL DAILY PRN
Status: DISCONTINUED | OUTPATIENT
Start: 2024-01-01 | End: 2024-01-11 | Stop reason: HOSPADM

## 2024-01-01 RX ORDER — RISPERIDONE 0.25 MG/1
0.5 TABLET ORAL 2 TIMES DAILY
Status: DISCONTINUED | OUTPATIENT
Start: 2024-01-01 | End: 2024-01-11 | Stop reason: HOSPADM

## 2024-01-01 RX ORDER — INSULIN LISPRO 100 [IU]/ML
2-7 INJECTION, SOLUTION INTRAVENOUS; SUBCUTANEOUS
Status: DISCONTINUED | OUTPATIENT
Start: 2024-01-01 | End: 2024-01-11 | Stop reason: HOSPADM

## 2024-01-01 RX ORDER — ACETAMINOPHEN 500 MG
500 TABLET ORAL EVERY 4 HOURS PRN
Status: DISCONTINUED | OUTPATIENT
Start: 2024-01-01 | End: 2024-01-01 | Stop reason: ALTCHOICE

## 2024-01-01 RX ORDER — GLUCAGON 1 MG/ML
1 KIT INJECTION
Status: DISCONTINUED | OUTPATIENT
Start: 2024-01-01 | End: 2024-01-11 | Stop reason: HOSPADM

## 2024-01-01 RX ORDER — MEMANTINE HYDROCHLORIDE 10 MG/1
10 TABLET ORAL EVERY 12 HOURS SCHEDULED
Status: DISCONTINUED | OUTPATIENT
Start: 2024-01-01 | End: 2024-01-11 | Stop reason: HOSPADM

## 2024-01-01 RX ORDER — ACETAMINOPHEN 325 MG/1
650 TABLET ORAL EVERY 6 HOURS PRN
Status: DISCONTINUED | OUTPATIENT
Start: 2024-01-01 | End: 2024-01-11 | Stop reason: HOSPADM

## 2024-01-01 RX ORDER — CLOPIDOGREL BISULFATE 75 MG/1
75 TABLET ORAL DAILY
Status: DISCONTINUED | OUTPATIENT
Start: 2024-01-02 | End: 2024-01-11 | Stop reason: HOSPADM

## 2024-01-01 RX ORDER — RISPERIDONE 0.5 MG/1
0.5 TABLET ORAL 2 TIMES DAILY
Status: ON HOLD | COMMUNITY

## 2024-01-01 RX ORDER — PHENOL 1.4 %
1 AEROSOL, SPRAY (ML) MUCOUS MEMBRANE
Status: ON HOLD | COMMUNITY

## 2024-01-01 RX ORDER — LORAZEPAM 2 MG/ML
1 INJECTION INTRAMUSCULAR ONCE
Status: COMPLETED | OUTPATIENT
Start: 2024-01-01 | End: 2024-01-01

## 2024-01-01 RX ORDER — NICOTINE POLACRILEX 4 MG
15 LOZENGE BUCCAL
Status: DISCONTINUED | OUTPATIENT
Start: 2024-01-01 | End: 2024-01-11 | Stop reason: HOSPADM

## 2024-01-01 RX ORDER — FERROUS SULFATE 325(65) MG
325 TABLET ORAL
Status: DISCONTINUED | OUTPATIENT
Start: 2024-01-02 | End: 2024-01-11 | Stop reason: HOSPADM

## 2024-01-01 RX ADMIN — METHYLPREDNISOLONE SODIUM SUCCINATE 80 MG: 125 INJECTION, POWDER, FOR SOLUTION INTRAMUSCULAR; INTRAVENOUS at 12:04

## 2024-01-01 RX ADMIN — IPRATROPIUM BROMIDE AND ALBUTEROL SULFATE 3 ML: 2.5; .5 SOLUTION RESPIRATORY (INHALATION) at 11:40

## 2024-01-01 RX ADMIN — IPRATROPIUM BROMIDE AND ALBUTEROL SULFATE 3 ML: 2.5; .5 SOLUTION RESPIRATORY (INHALATION) at 19:05

## 2024-01-01 RX ADMIN — LORAZEPAM 1 MG: 2 INJECTION INTRAMUSCULAR; INTRAVENOUS at 13:51

## 2024-01-01 RX ADMIN — Medication 10 ML: at 20:05

## 2024-01-01 RX ADMIN — METHYLPREDNISOLONE SODIUM SUCCINATE 40 MG: 40 INJECTION, POWDER, FOR SOLUTION INTRAMUSCULAR; INTRAVENOUS at 16:32

## 2024-01-01 RX ADMIN — ZIPRASIDONE MESYLATE 20 MG: 20 INJECTION, POWDER, LYOPHILIZED, FOR SOLUTION INTRAMUSCULAR at 17:45

## 2024-01-01 NOTE — ED NOTES
Spoke with Lnady GARCIA at this time [place] [facility] [place]. Pt has a Hx of copd, bipolar depression, afib, htn, and dementia. On Friday patient was at her last known well baseline, Saturday pt started to change behavior and become combative with staff and roommate and needed a sitter. [facility] sent out urine culture sent to [facility] on Saturday, still waiting on results. RN at nursing home gave Lorazepam 0.5 @ 0700 this morning for pt due to increased agitation and combative behavior. Nursing home states VSS prior to arrival. Provider made aware

## 2024-01-01 NOTE — H&P
[facility] Medicine Services  History & Physical    Patient Identification:  Name:  Brenda Munroe  Age:  73 y.o.  Sex:  female  :  1950  MRN:  [id number]   Visit Number:  [id number]  Admit Date: 2024   Primary Care Physician:  Bernadette Arevalo MD    Subjective     Chief complaint: Altered Mental Status    History of presenting illness:      Brenda Munroe is a 73 y.o. female with past medical history significant for COPD, chronic hypoxic and hypercapnic respiratory failure, paroxysmal atrial fibrillation, hypothyroidism, arthritis, essential hypertension, hyperlipidemia, chronic headaches, type II DM, bilateral carotid stenosis without occlusion, depression, history of psychosis, dementia, anxiety, history of Bell's palsy, CAD, physical debility, PUD, and GERD.  Patient presented to [facility] emergency department via EMS from local skilled nursing facility due to altered mental status.  No family members were present at bedside.  Patient was on BiPAP at the time of my exam.  Due to acuity of condition and dementia, patient was unable to provide details.  Information obtained from chart review and ED report.  Patient was lying on ED stretcher at the time of my exam.  Appeared restless.  Moving all extremities equally. Opens eyes to verbal stimuli.  Pupils appear pinpoint and sluggishly reactive/equal. Auscultation of lung sounds revealed expiratory wheezing in the left upper lobe. Decreased air movement. ABG obtained on arrival revealed acidotic pH with hypercapnia (PCO2 84.5). ABG repeated 2 hours after BiPAP placement; revealed improvement. PCO2 now 67.6. pH normalized. Patient will be admitted to the PCU for further monitoring, evaluation, and treatment.     Upon arrival to the ED, vital signs were temperature 98.5, pulse 59, respirations 20, blood pressure 144/75, SpO2 saturation 98% on 2 L nasal cannula.  ABG obtained on arrival noted pH 7.328, pCO2 84.5, pO2  76.2, HCO3 44.3, O2 saturation 95.4% on 2 L nasal cannula.  Patient placed on BiPAP.  Repeat ABG revealed pH 7.424, pCO2 67.6, pO2 60.9, HCO3 44.2, O2 saturation 92.8% on BiPAP.  High-sensitivity troponin T 26 with -3 delta.  CMP with chloride 96, CO2 44.3, anion gap 2.7, glucose 103, total protein 5.6, otherwise unremarkable.  TSH within normal limits.  CRP negative.  CBC with hemoglobin 10.1, platelets 135, RDW 22.4, MCHC 28.0, otherwise unremarkable.  Urinalysis with trace protein, otherwise unremarkable.  COVID-19 and flu A/B swab negative.  Chest x-ray noted mild interstitial coarsening, improved in the interval.  No acute cardiopulmonary process.  CT of the head without contrast noted no acute intracranial hemorrhage, midline shift, or significant mass effect.    Known Emergency Department medications received prior to my evaluation included DuoNeb, 1 mg IV Ativan, 80 mg IV Solu-Medrol. Emergency Department Room location at the time of my evaluation was [facility].  Discussed with admitting physician, Sukh Sanchez DO.    ---------------------------------------------------------------------------------------------------------------------   Review of Systems   Unable to perform ROS: Acuity of condition (& Dementia)     ---------------------------------------------------------------------------------------------------------------------   Past Medical History:   Diagnosis Date    Anxiety     Arthritis     Bell's palsy     Bladder prolapse, female, acquired     Carotid stenosis     COPD (chronic obstructive pulmonary disease)     COPD (chronic obstructive pulmonary disease)     Coronary artery disease     Dementia     Dementia     Depression     Difficulty walking     Disease of thyroid gland     Frequency of urination     GERD (gastroesophageal reflux disease)     Heart attack     Hyperlipidemia     Hypertension     Irregular heart beat     Peptic ulcer disease      Past Surgical History:   Procedure Laterality  Date    CARDIAC CATHETERIZATION N/A 2/21/2023    Procedure: Left Heart Cath;  Surgeon: Tab Cifuentes MD;  Location: [facility] CATH INVASIVE LOCATION;  Service: Cardiology;  Laterality: N/A;    CARDIAC SURGERY      open heart  in 1999    CYSTOSCOPY N/A 11/8/2017    Procedure: CYSTOSCOPY;  Surgeon: Kalr Nicolas DO;  Location: [facility] OR;  Service:     OTHER SURGICAL HISTORY      states she had fluid drained no surgery    VAGINAL HYSTERECTOMY W/ ANTERIOR AND POSTERIOR VAGINAL REPAIR N/A 11/8/2017    Procedure: VAGINAL HYSTERECTOMY WITH ANTERIOR, POSTERIOR, AND ENTEROCELE VAGINAL REPAIR;  Surgeon: Karl Nicolas DO;  Location: [facility] OR;  Service:     VAGINAL VAULT SUSPENSION N/A 11/8/2017    Procedure: VAGINAL VAULT SUSPENSION ;  Surgeon: Karl Nicolas DO;  Location: [facility] OR;  Service:      Family History   Problem Relation Age of Onset    Dementia Sister     Heart attack Mother     Tuberculosis Father     Breast cancer Neg Hx      Social History     Socioeconomic History    Marital status:     Number of children: 3   Tobacco Use    Smoking status: Every Day     Packs/day: 0.50     Years: 55.00     Additional pack years: 0.00     Total pack years: 27.50     Types: Cigarettes    Smokeless tobacco: Never   Vaping Use    Vaping Use: Never used   Substance and Sexual Activity    Alcohol use: No    Drug use: No    Sexual activity: Never     Comment: denies     ---------------------------------------------------------------------------------------------------------------------   Allergies:  Patient has no known allergies.  ---------------------------------------------------------------------------------------------------------------------   Home medications:    Medications below are reported home medications pulling from within the system; at this time, these medications have not been reconciled unless otherwise specified and are in the verification process for further  verifcation as current home medications.  (Not in a hospital admission)      Hospital Scheduled Meds:          Current listed hospital scheduled medications may not yet reflect those currently placed in orders that are signed and held awaiting patient's arrival to floor.   ---------------------------------------------------------------------------------------------------------------------     Objective     Vital Signs:  Temp:  [98.5 °F (36.9 °C)] 98.5 °F (36.9 °C)  Heart Rate:  [52-66] 56  Resp:  [20-25] 23  BP: (134-161)/(68-88) 158/71      01/01/24  1037   Weight: 81.6 kg (180 lb)     Body mass index is 31.89 kg/m².  ---------------------------------------------------------------------------------------------------------------------       Physical Exam  Vitals reviewed.   Constitutional:       General: She is not in acute distress.     Appearance: She is ill-appearing (Chronically). She is not diaphoretic.      Interventions: Face mask in place.      Comments: On BiPAP at the time of my exam.   HENT:      Head: Normocephalic and atraumatic.      Mouth/Throat:      Mouth: Mucous membranes are moist.      Pharynx: Oropharynx is clear.   Eyes:      Extraocular Movements: Extraocular movements intact.      Pupils: Pupils are equal, round, and reactive to light.   Cardiovascular:      Rate and Rhythm: Normal rate and regular rhythm.      Pulses: Normal pulses.           Dorsalis pedis pulses are 1+ on the right side and 1+ on the left side.      Heart sounds: Murmur heard.      Systolic murmur is present with a grade of 3/6.      No friction rub.   Pulmonary:      Effort: Pulmonary effort is normal. No accessory muscle usage, respiratory distress or retractions.      Breath sounds: Decreased air movement present. Decreased breath sounds and wheezing (MIRA) present. No rhonchi or rales.   Abdominal:      General: Bowel sounds are normal. There is distension (Mild, chronic appearing).      Palpations: Abdomen is soft.       Tenderness: There is no abdominal tenderness. There is no guarding.      Hernia: A hernia (Umbilical) is present.   Musculoskeletal:      Cervical back: Neck supple. No rigidity.      Right lower leg: No edema.      Left lower leg: No edema.   Skin:     General: Skin is warm and dry.      Capillary Refill: Capillary refill takes 2 to 3 seconds.      Coloration: Skin is pale.   Neurological:      Mental Status: She is lethargic and disoriented.      Cranial Nerves: No facial asymmetry.      Motor: Weakness (Generalized) present. No tremor or seizure activity.   Psychiatric:         Attention and Perception: She is inattentive.         Mood and Affect: Mood is anxious (Restless).         Behavior: Behavior is hyperactive. Behavior is not aggressive or combative.         Cognition and Memory: Cognition is impaired. Memory is impaired.       ---------------------------------------------------------------------------------------------------------------------  EKG: Pending formal cardiology interpretation.  Per my review appears sinus rhythm in the 60s with occasional PVCs.  Right bundle branch block.  No evidence of acute ischemic changes.    Telemetry: Appearing normal sinus rhythm in the 60s on my exam.  I have personally looked at both the EKG and the telemetry strips.  ---------------------------------------------------------------------------------------------------------------------   Results from last 7 days   Lab Units 01/01/24  1059 12/27/23  0955   CRP mg/dL <0.30  --    WBC 10*3/mm3 4.11 4.53   HEMOGLOBIN g/dL 10.1* 10.2*   HEMATOCRIT % 36.1 37.6   MCV fL 95.8 95.2   MCHC g/dL 28.0* 27.1*   PLATELETS 10*3/mm3 135* 138*     Results from last 7 days   Lab Units 01/01/24  1256 01/01/24  1111 12/27/23  1008   PH, ARTERIAL pH units 7.424 7.328* 7.300*   PO2 ART mm Hg 60.9* 76.2* 109.0*   PCO2, ARTERIAL mm Hg 67.6* 84.5* 78.1*   HCO3 ART mmol/L 44.2* 44.3* 38.4*     Results from last 7 days   Lab Units  "01/01/24  1059 12/27/23  0955   SODIUM mmol/L 143 145   POTASSIUM mmol/L 4.3 4.5   MAGNESIUM mg/dL  --  2.1   CHLORIDE mmol/L 96* 101   CO2 mmol/L 44.3* 39.6*   BUN mg/dL 10 11   CREATININE mg/dL 0.68 0.88   CALCIUM mg/dL 8.9 9.0   GLUCOSE mg/dL 103* 118*   ALBUMIN g/dL 3.8 3.8   BILIRUBIN mg/dL 0.3 0.2   ALK PHOS U/L 56 65   AST (SGOT) U/L 11 13   ALT (SGPT) U/L 8 8   Estimated Creatinine Clearance: 72.3 mL/min (by C-G formula based on SCr of 0.68 mg/dL).  No results found for: \"AMMONIA\"  Results from last 7 days   Lab Units 01/01/24  1304 01/01/24  1059   HSTROP T ng/L 23* 26*         Lab Results   Component Value Date    HGBA1C 6.10 (H) 02/18/2023     Lab Results   Component Value Date    TSH 3.020 01/01/2024    FREET4 1.59 03/25/2021     No results found for: \"PREGTESTUR\", \"PREGSERUM\", \"HCG\", \"HCGQUANT\"  Pain Management Panel  More data exists         Latest Ref Rng & Units 12/27/2023 2/19/2023   Pain Management Panel   Creatinine, Urine mg/dL - 222.8    Amphetamine, Urine Qual Negative Negative  Negative    Barbiturates Screen, Urine Negative Negative  Negative    Benzodiazepine Screen, Urine Negative Negative  Positive    Buprenorphine, Screen, Urine Negative Negative  Negative    Cocaine Screen, Urine Negative Negative  Negative    Fentanyl, Urine Negative Negative  -   Methadone Screen , Urine Negative Negative  Negative    Methamphetamine, Ur Negative Negative  Negative          ---------------------------------------------------------------------------------------------------------------------  Imaging Results (Last 7 Days)       Procedure Component Value Units Date/Time    CT Head Without Contrast [[id number]] Collected: 01/01/24 1117     Updated: 01/01/24 1122    Narrative:      EXAMINATION:Computed tomography(CT)of the head without IV contrast  TECHNIQUE:CT of the head was performed in the supine position without  intravenous contrast according to as low as reasonably achievable dose  protocol. Axial CT " utilized with slice thickness of 5 mm presented in  soft tissue and bone algorithms.  COMPARISON: 12/27/2023     FINDINGS:   Moderate parenchymal volume loss noted, unchanged. Diffuse heterogeneous  periventricular lucency present representing small vessel occlusive  disease, unchanged. Allowing for beam hardening artifact, no new loss of  gray-white differentiation.     No acute intra-or extra-axial hemorrhage or fluid collections are  identified. The ventricles are of normal size,shape,and morphology. The  basilar cisterns are patent. No mass effect or midline shift is seen.  The gray-white matter differentiation is normal. The visualized portions  of the orbits,paranasal sinuses,and mastoids appear normal. No acute  fracture is identified.       Impression:         No acute intracranial hemorrhage,midline shift,or significant mass  effect.        This report was finalized on 1/1/2024 11:20 AM by Magdalene Lux MD.       XR Chest 1 View [[id number]] Collected: 01/01/24 1114     Updated: 01/01/24 1119    Narrative:      Procedure: Upright portable AP view chest.     Comparison: 12/27/2023.     Findings: Chest is well-expanded.  Mild interstitial prominence noted,  decreased in the interval.     Elevation of the right hemidiaphragm noted.  Prominent left pericardial  fat pad.  Moderate thoracic scoliosis, convex to the right.  Cardiac  silhouette unchanged in size.     No confluent opacification.  Median sternotomy wires noted.  Advanced  degenerative change in/or chronic deformity of the right shoulder.   Advanced degenerative change of the left shoulder.     No rib fracture or pneumothorax.       Impression:         Mild interstitial coarsening, improved in the interval.  No acute  cardiopulmonary process identified.        This report was finalized on 1/1/2024 11:17 AM by Magdalene Lux MD.               Last echocardiogram:  Results for orders placed during the hospital encounter of 02/18/23    Adult  Transthoracic Echo Complete W/ Cont if Necessary Per Protocol    Interpretation Summary    Left ventricular systolic function is normal. Left ventricular ejection fraction appears to be 61 - 65%.    Left ventricular wall thickness is consistent with moderate concentric hypertrophy.    Left ventricular diastolic function is consistent with (grade I) impaired relaxation.    The cardiac chamber dimensions are normal.    Moderate tricuspid valve regurgitation is present.    Mild pulmonary hypertension is present.    There is no evidence of pericardial effusion.          I have personally reviewed the above radiology images and read the final radiology report on 01/01/24  ---------------------------------------------------------------------------------------------------------------------  Assessment / Plan     Active Hospital Problems    Diagnosis  POA    **Acute hypercapnic respiratory failure [J96.02]  Yes       ASSESSMENT/PLAN:    ACUTE HOSPITAL PROBLEMS:    -Acute hypercapnic on chronic hypoxic and hypercapnic respiratory failure requiring BiPAP on admission  -Acute COPD exacerbation (POA)  -73-year-old female patient transported to [facility] ED for further evaluation of altered mental status. ABG obtained on arrival revealed acidotic pH with hypercarbia. Placed on BiPAP. Repeat ABG revealed improvement.  -Patient remains lethargic on exam. Awakens to verbal stimuli.  Not responding verbally. Pupils pinpoint, sluggishly reactive, equal. Moving all extremities equally. No acute focal deficits appreciated.  -Received Ativan in the ED due to agitation/restlessness.  -CT of the head without contrast noted no acute intracranial hemorrhage, midline shift, or significant mass effect.  -Utilize bed alarm.  -Maintain fall precautions.  -Continue BiPAP for now. NPO.   -Provide oral care.  -Given 80 mg IV Solu-Medrol in the ED.  Continue 40 mg IV Solu-Medrol twice daily on admission.  -Scheduled DuoNebs.  -Substitution for Trelegy  Ellipta inhaler.  -Continuous cardiac monitoring and pulse oximetry ordered.  -Encourage use of incentive spirometer as able in setting of dementia and AMS.  -Turn, cough, and deep breathing exercises.      OTHER CHRONIC MEDICAL PROBLEMS:    -Paroxysmal atrial fibrillation; chronically anticoagulated with Eliquis; continue Eliquis for stroke prophylaxis once patient is able to tolerate p.o. intake.  EKG obtained on arrival revealed sinus rhythm with frequent PACs.  Review routine rate/rhythm controlling agents once reconciled by pharmacy with plans to continue.  Holding parameters to prevent hypotension and/or bradycardia.  TSH within normal limits.  Monitor and replace electrolytes per protocol.  -Hypothyroidism; TSH within normal limits as noted above.  Resume levothyroxine once dose reconciled by pharmacy.  -Arthritis; supportive care.  -Essential hypertension; BP appearing stable throughout ED course.  Review routine antihypertensive agents once reconciled by pharmacy.  Obtain vital signs per hospital policy.  -Hyperlipidemia; statin.  -Chronic headaches; supportive care. Tylenol available as needed (p.o. or NH) for mild pain/headache.  -Bilateral carotid stenosis without occlusion, Coronary artery disease; continue Plavix and statin. Per current med list, not on aspirin likely due to being on Eliquis.  EKG without evidence of acute ischemic changes on arrival.  Continuous cardiac monitoring ordered.  Obtain hemoglobin A1c and lipid profile for risk stratification.  -Depression, History of psychosis, Dementia, Anxiety; supportive care.  Review routine psychiatric medications once reconciled by pharmacy with plans to continue.  Continue Namenda and Aricept.  Provide reorientation as necessary.  Bed alarm.  Maintain fall precautions.  -History of Bell's palsy  -Physical debility; PT/OT consults.  -PUD and GERD; reflux precautions.  PPI.  -Insulin-dependent type 2 diabetes mellitus; Accu-Cheks before meals and at  bedtime with low-dose sliding scale.  Review routine insulin regimen once reconciled by pharmacy.  Hold any oral antidiabetic agents at this time.  Hypoglycemia protocol in place if necessary.      ----------  -DVT prophylaxis: Eliquis will serve.  -Activity: Bedrest.  Turn every 2 hours.  Elevate heels.  -Expected length of stay:   INPATIENT status due to the need for care which can only be reasonably provided in an hospital setting such as aggressive/expedited ancillary services and/or consultation services, the necessity for IV medications, close physician monitoring and/or the possible need for procedures.  In such, I feel patient's risk for adverse outcomes and need for care warrant INPATIENT evaluation and predict the patient's care encounter to likely last beyond 2 midnights.   -Disposition plans to return to skilled nursing facility on discharge once medically stable.     High risk secondary to acute hypercapnic on chronic hypoxic/hypercapnic respiratory failure requiring BiPAP on admission, COPD exacerbation.    CODE STATUS: Full Code (per review of ACP documentation on file)      PAULA Falk   01/01/24  14:38 EST  Pager #[phone]  ---------------------------------------------------------------------------------------------------------------------

## 2024-01-01 NOTE — ED PROVIDER NOTES
Subjective   History of Present Illness  73 year old female with past medical hx of anxiety, arthritis, Bell's palsy, bladder prolapse, carotid stenosis, oxygen dependent COPD, coronary artery disease, dementia, afib, bipolar disorder, depression, thyroid disease, GERD, hyperlipidemia, hypertension, and peptic ulcer disease [place] [facility] [place].  EMS reports that the [facility] home did not give any other information then altered mental status.  Patient denies any complaints at this time.  Denies any other concerns.    History limited secondary to somnolence and altered mental status.    JOSE Reinoso spoke with the patient's primary nurse Landy at the [facility] and nurse states that patient's last known well was Friday when she was at her baseline.  She states on Saturday patient started to change behaviors and became very agitated and combative with staff and her roommate therefore required a sitter.  Nurse states that they sent out a urine culture on Saturday however they have not received the results of that at this time.  Nurse also reports giving patient Ativan 0.5 mg at 7 AM this morning due to her increased agitation and combative behavior.    History provided by:  Patient and EMS personnel   used: No        Review of Systems   Constitutional: Negative.  Negative for fever.   HENT: Negative.     Respiratory: Negative.     Cardiovascular: Negative.  Negative for chest pain.   Gastrointestinal: Negative.  Negative for abdominal pain.   Endocrine: Negative.    Genitourinary: Negative.  Negative for dysuria.   Skin: Negative.    Neurological: Negative.         AMS    Psychiatric/Behavioral: Negative.     All other systems reviewed and are negative.      Past Medical History:   Diagnosis Date    Anxiety     Arthritis     Bell's palsy     Bladder prolapse, female, acquired     Carotid stenosis     COPD (chronic obstructive pulmonary  disease)     COPD (chronic obstructive pulmonary disease)     Coronary artery disease     Dementia     Dementia     Depression     Difficulty walking     Disease of thyroid gland     Frequency of urination     GERD (gastroesophageal reflux disease)     Heart attack     Hyperlipidemia     Hypertension     Irregular heart beat     Peptic ulcer disease        No Known Allergies    Past Surgical History:   Procedure Laterality Date    CARDIAC CATHETERIZATION N/A 2/21/2023    Procedure: Left Heart Cath;  Surgeon: Tab Cifuentes MD;  Location: [facility] CATH INVASIVE LOCATION;  Service: Cardiology;  Laterality: N/A;    CARDIAC SURGERY      open heart  in 1999    CYSTOSCOPY N/A 11/8/2017    Procedure: CYSTOSCOPY;  Surgeon: Karl Nicolas DO;  Location: [facility] OR;  Service:     OTHER SURGICAL HISTORY      states she had fluid drained no surgery    VAGINAL HYSTERECTOMY W/ ANTERIOR AND POSTERIOR VAGINAL REPAIR N/A 11/8/2017    Procedure: VAGINAL HYSTERECTOMY WITH ANTERIOR, POSTERIOR, AND ENTEROCELE VAGINAL REPAIR;  Surgeon: Karl Nicolas DO;  Location: [facility] OR;  Service:     VAGINAL VAULT SUSPENSION N/A 11/8/2017    Procedure: VAGINAL VAULT SUSPENSION ;  Surgeon: Karl Nicolas DO;  Location: [facility] OR;  Service:        Family History   Problem Relation Age of Onset    Dementia Sister     Heart attack Mother     Tuberculosis Father     Breast cancer Neg Hx        Social History     Socioeconomic History    Marital status:     Number of children: 3   Tobacco Use    Smoking status: Every Day     Packs/day: 0.50     Years: 55.00     Additional pack years: 0.00     Total pack years: 27.50     Types: Cigarettes    Smokeless tobacco: Never   Vaping Use    Vaping Use: Never used   Substance and Sexual Activity    Alcohol use: No    Drug use: No    Sexual activity: Never     Comment: denies           Objective   Physical Exam  Vitals and nursing note reviewed.   Constitutional:        General: She is not in acute distress.     Appearance: She is well-developed. She is not diaphoretic.      Interventions: Nasal cannula in place.      Comments: 2L  Somnolence   HENT:      Head: Normocephalic and atraumatic.      Right Ear: External ear normal.      Left Ear: External ear normal.      Nose: Nose normal.   Eyes:      Extraocular Movements: Extraocular movements intact.      Conjunctiva/sclera: Conjunctivae normal.      Pupils: Pupils are equal, round, and reactive to light.      Right eye: Pupil is sluggish.      Left eye: Pupil is sluggish.      Comments: Pinpoint pupils   Neck:      Vascular: No JVD.      Trachea: No tracheal deviation.   Cardiovascular:      Rate and Rhythm: Normal rate and regular rhythm.      Heart sounds: Normal heart sounds. No murmur heard.  Pulmonary:      Effort: Pulmonary effort is normal. No respiratory distress.      Breath sounds: Normal breath sounds. No wheezing.   Abdominal:      General: Bowel sounds are normal.      Palpations: Abdomen is soft.      Tenderness: There is no abdominal tenderness.   Musculoskeletal:         General: No deformity. Normal range of motion.      Cervical back: Normal range of motion and neck supple.   Skin:     General: Skin is warm and dry.      Coloration: Skin is not pale.      Findings: No erythema or rash.   Neurological:      Mental Status: She is alert.      Cranial Nerves: No cranial nerve deficit.      Comments: Unaware of place, time, person. Knows birth date.   Psychiatric:         Behavior: Behavior normal.         Thought Content: Thought content normal.         Procedures           ED Course  ED Course as of 01/01/24 1833   Mon Jan 01, 2024   1128 pCO2, Arterial(!!): 84.5 [TK]   1130 XR Chest 1 View [TK]   1130 CT Head Without Contrast [TK]   1136 ECG 12 Lead Altered Mental Status  Vent. Rate :  63 BPM     Atrial Rate :  63 BPM     P-R Int : 190 ms          QRS Dur : 132 ms      QT Int : 418 ms       P-R-T Axes :  74 116  70  degrees     QTc Int : 427 ms     Sinus rhythm with occasional premature ventricular complexes  Right bundle branch block  T wave abnormality, consider lateral ischemia  Abnormal ECG  When compared with ECG of 17-SEP-2023 18:44,  premature ventricular complexes are now present  premature atrial complexes are no longer present  Nonspecific T wave abnormality no longer evident in Inferior leads   [ES]   1155 BIPAP settings:   18/6  18  28% [TK]   1335 pCO2, Arterial(!!): 67.6  Improved. Pt more awake, but still very confused and agitated. [TK]   1336 Hospitalist paged [TK]   9498 Spoke with Dr. Montez, he will admit patient to PCU for further evaluation and treatment. [TK]      ED Course User Index  [ES] Mateus Phipps MD  [TK] Oziel Ramos, PEREZ                                   Results for orders placed or performed during the hospital encounter of 01/01/24   COVID-19 and FLU A/B PCR, 1 HR TAT - Swab, Nasopharynx    Specimen: Nasopharynx; Swab   Result Value Ref Range    COVID19 Not Detected Not Detected - Ref. Range    Influenza A PCR Not Detected Not Detected    Influenza B PCR Not Detected Not Detected   Comprehensive Metabolic Panel    Specimen: Arm, Left; Blood   Result Value Ref Range    Glucose 103 (H) 65 - 99 mg/dL    BUN 10 8 - 23 mg/dL    Creatinine 0.68 0.57 - 1.00 mg/dL    Sodium 143 136 - 145 mmol/L    Potassium 4.3 3.5 - 5.2 mmol/L    Chloride 96 (L) 98 - 107 mmol/L    CO2 44.3 (H) 22.0 - 29.0 mmol/L    Calcium 8.9 8.6 - 10.5 mg/dL    Total Protein 5.6 (L) 6.0 - 8.5 g/dL    Albumin 3.8 3.5 - 5.2 g/dL    ALT (SGPT) 8 1 - 33 U/L    AST (SGOT) 11 1 - 32 U/L    Alkaline Phosphatase 56 39 - 117 U/L    Total Bilirubin 0.3 0.0 - 1.2 mg/dL    Globulin 1.8 gm/dL    A/G Ratio 2.1 g/dL    BUN/Creatinine Ratio 14.7 7.0 - 25.0    Anion Gap 2.7 (L) 5.0 - 15.0 mmol/L    eGFR 91.0 >60.0 mL/min/1.73   Urinalysis With Microscopic If Indicated (No Culture) - Urine, Clean Catch    Specimen: Urine, Clean  Catch   Result Value Ref Range    Color, UA Yellow Yellow, Straw    Appearance, UA Cloudy (A) Clear    pH, UA >=9.0 (H) 5.0 - 8.0    Specific Gravity, UA 1.020 1.005 - 1.030    Glucose, UA Negative Negative    Ketones, UA Negative Negative    Bilirubin, UA Negative Negative    Blood, UA Negative Negative    Protein, UA Trace (A) Negative    Leuk Esterase, UA Negative Negative    Nitrite, UA Negative Negative    Urobilinogen, UA 1.0 E.U./dL 0.2 - 1.0 E.U./dL   C-reactive Protein    Specimen: Arm, Left; Blood   Result Value Ref Range    C-Reactive Protein <0.30 0.00 - 0.50 mg/dL   CBC Auto Differential    Specimen: Arm, Left; Blood   Result Value Ref Range    WBC 4.11 3.40 - 10.80 10*3/mm3    RBC 3.77 3.77 - 5.28 10*6/mm3    Hemoglobin 10.1 (L) 12.0 - 15.9 g/dL    Hematocrit 36.1 34.0 - 46.6 %    MCV 95.8 79.0 - 97.0 fL    MCH 26.8 26.6 - 33.0 pg    MCHC 28.0 (L) 31.5 - 35.7 g/dL    RDW 22.4 (H) 12.3 - 15.4 %    RDW-SD 76.9 (H) 37.0 - 54.0 fl    MPV 10.8 6.0 - 12.0 fL    Platelets 135 (L) 140 - 450 10*3/mm3    Neutrophil % 73.5 42.7 - 76.0 %    Lymphocyte % 15.6 (L) 19.6 - 45.3 %    Monocyte % 7.5 5.0 - 12.0 %    Eosinophil % 2.2 0.3 - 6.2 %    Basophil % 0.2 0.0 - 1.5 %    Immature Grans % 1.0 (H) 0.0 - 0.5 %    Neutrophils, Absolute 3.02 1.70 - 7.00 10*3/mm3    Lymphocytes, Absolute 0.64 (L) 0.70 - 3.10 10*3/mm3    Monocytes, Absolute 0.31 0.10 - 0.90 10*3/mm3    Eosinophils, Absolute 0.09 0.00 - 0.40 10*3/mm3    Basophils, Absolute 0.01 0.00 - 0.20 10*3/mm3    Immature Grans, Absolute 0.04 0.00 - 0.05 10*3/mm3    nRBC 0.0 0.0 - 0.2 /100 WBC   High Sensitivity Troponin T    Specimen: Arm, Left; Blood   Result Value Ref Range    HS Troponin T 26 (H) <14 ng/L   TSH    Specimen: Arm, Left; Blood   Result Value Ref Range    TSH 3.020 0.270 - 4.200 uIU/mL   Scan Slide    Specimen: Arm, Left; Blood   Result Value Ref Range    Anisocytosis Large/3+ None Seen    Dacrocytes Slight/1+ None Seen    Hypochromia Mod/2+ None Seen     Ovalocytes Slight/1+ None Seen    Platelet Morphology Normal Normal   Blood Gas, Arterial With Co-Ox    Specimen: Arterial Blood   Result Value Ref Range    Site Left Brachial     Kendall's Test N/A     pH, Arterial 7.328 (L) 7.350 - 7.450 pH units    pCO2, Arterial 84.5 (C) 35.0 - 45.0 mm Hg    pO2, Arterial 76.2 (L) 83.0 - 108.0 mm Hg    HCO3, Arterial 44.3 (H) 20.0 - 26.0 mmol/L    Base Excess, Arterial 15.2 (H) 0.0 - 2.0 mmol/L    O2 Saturation, Arterial 95.4 94.0 - 99.0 %    Hemoglobin, Blood Gas 10.5 (L) 13.5 - 17.5 g/dL    Hematocrit, Blood Gas 32.2 (L) 38.0 - 51.0 %    Oxyhemoglobin 93.0 (L) 94 - 99 %    Methemoglobin 0.20 0.00 - 3.00 %    Carboxyhemoglobin 2.3 0 - 5 %    A-a DO2 21.1 0.0 - 300.0 mmHg    CO2 Content 46.9 (H) 22 - 33 mmol/L    Barometric Pressure for Blood Gas 732 mmHg    Modality Nasal Cannula     FIO2 28 %    Flow Rate 2.0 lpm    Ventilator Mode NA     Notified [facility] LEANDER PAc /RN     Notified By [id number]     Notified Time 01/01/2024 11:18     Collected by [id number]     pH, Temp Corrected      pCO2, Temperature Corrected      pO2, Temperature Corrected     High Sensitivity Troponin T 2Hr    Specimen: Arm, Right; Blood   Result Value Ref Range    HS Troponin T 23 (H) <14 ng/L    Troponin T Delta -3 >=-4 - <+4 ng/L   Blood Gas, Arterial With Co-Ox    Specimen: Arterial Blood   Result Value Ref Range    Site Left Brachial     Kendall's Test N/A     pH, Arterial 7.424 7.350 - 7.450 pH units    pCO2, Arterial 67.6 (C) 35.0 - 45.0 mm Hg    pO2, Arterial 60.9 (L) 83.0 - 108.0 mm Hg    HCO3, Arterial 44.2 (H) 20.0 - 26.0 mmol/L    Base Excess, Arterial 17.0 (H) 0.0 - 2.0 mmol/L    O2 Saturation, Arterial 92.8 (L) 94.0 - 99.0 %    Hemoglobin, Blood Gas 10.5 (L) 13.5 - 17.5 g/dL    Hematocrit, Blood Gas 32.3 (L) 38.0 - 51.0 %    Oxyhemoglobin 91.2 (L) 94 - 99 %    Methemoglobin <-0.10 (L) 0.00 - 3.00 %    Carboxyhemoglobin 2.2 0 - 5 %    A-a DO2 55.2 0.0 - 300.0 mmHg    CO2 Content 46.3 (H) 22 - 33 mmol/L     Barometric Pressure for Blood Gas 731 mmHg    Modality BiPap     FIO2 28 %    Ventilator Mode NA     Set Mech Resp Rate 18.0     IPAP 18     EPAP 6     Notified Who LEANDER PAc/RN     Notified By [id number]     Notified Time 01/01/2024 13:00     Collected by [id number]     pH, Temp Corrected      pCO2, Temperature Corrected      pO2, Temperature Corrected     Hemoglobin A1c    Specimen: Arm, Left; Blood   Result Value Ref Range    Hemoglobin A1C 5.20 4.80 - 5.60 %   Lipid Panel    Specimen: Arm, Right; Blood   Result Value Ref Range    Total Cholesterol 119 0 - 200 mg/dL    Triglycerides 69 0 - 150 mg/dL    HDL Cholesterol 59 40 - 60 mg/dL    LDL Cholesterol  46 0 - 100 mg/dL    VLDL Cholesterol 14 5 - 40 mg/dL    LDL/HDL Ratio 0.78    Blood Gas, Arterial With Co-Ox    Specimen: Arterial Blood   Result Value Ref Range    Site Right Radial     Kendall's Test Positive     pH, Arterial 7.428 7.350 - 7.450 pH units    pCO2, Arterial 63.1 (H) 35.0 - 45.0 mm Hg    pO2, Arterial 69.2 (L) 83.0 - 108.0 mm Hg    HCO3, Arterial 41.7 (H) 20.0 - 26.0 mmol/L    Base Excess, Arterial 14.9 (H) 0.0 - 2.0 mmol/L    O2 Saturation, Arterial 95.3 94.0 - 99.0 %    Hemoglobin, Blood Gas 10.9 (L) 13.5 - 17.5 g/dL    Hematocrit, Blood Gas 33.5 (L) 38.0 - 51.0 %    Oxyhemoglobin 92.6 (L) 94 - 99 %    Methemoglobin 0.20 0.00 - 3.00 %    Carboxyhemoglobin 2.6 0 - 5 %    A-a DO2 52.2 0.0 - 300.0 mmHg    CO2 Content 43.6 (H) 22 - 33 mmol/L    Barometric Pressure for Blood Gas 732 mmHg    Modality BiPap     FIO2 28 %    Ventilator Mode BiPAP     Set [facility] Resp Rate 18.0     IPAP 18     EPAP 6     Collected by [id number]     pH, Temp Corrected      pCO2, Temperature Corrected      pO2, Temperature Corrected     POC Glucose Once    Specimen: Blood   Result Value Ref Range    Glucose 103 70 - 130 mg/dL   POC Glucose Once    Specimen: Blood   Result Value Ref Range    Glucose 134 (H) 70 - 130 mg/dL   ECG 12 Lead Altered Mental Status   Result Value Ref Range     QT Interval 418 ms    QTC Interval 427 ms   ECG 12 Lead Rhythm Change   Result Value Ref Range    QT Interval 440 ms    QTC Interval 446 ms   Green Top (Gel)   Result Value Ref Range    Extra Tube Hold for add-ons.    Lavender Top   Result Value Ref Range    Extra Tube hold for add-on    Gold Top - SST   Result Value Ref Range    Extra Tube Hold for add-ons.    Light Blue Top   Result Value Ref Range    Extra Tube Hold for add-ons.        CT Head Without Contrast   Final Result       No acute intracranial hemorrhage,midline shift,or significant mass   effect.           This report was finalized on 1/1/2024 11:20 AM by Magdalene Lux MD.          XR Chest 1 View   Final Result       Mild interstitial coarsening, improved in the interval.  No acute   cardiopulmonary process identified.           This report was finalized on 1/1/2024 11:17 AM by Magdalene Lux MD.                        Medical Decision Making  73 year old female with past medical hx of anxiety, arthritis, Bell's palsy, bladder prolapse, carotid stenosis, oxygen dependent COPD, coronary artery disease, dementia, afib, bipolar disorder, depression, thyroid disease, GERD, hyperlipidemia, hypertension, and peptic ulcer disease presents to the emergency room from [facility] for altered mental status.  EMS reports that the [facility] home did not give any other information then altered mental status.  Patient denies any complaints at this time.  Denies any other concerns.    History limited secondary to somnolence and altered mental status.    JOSE Reinoso [facility] and nurse states that patient's last known well was Friday when she was at her baseline.  She states on Saturday patient started to change behaviors and became very agitated and combative with staff and her roommate therefore required a sitter.  Nurse states that they sent out a urine culture on Saturday however they have not  received the results of that at this time.  Nurse also reports giving patient Ativan 0.5 mg at 7 AM this morning due to her increased agitation and combative behavior.      Problems Addressed:  Acute on chronic respiratory failure with hypercapnia: complicated acute illness or injury    Amount and/or Complexity of Data Reviewed  Labs: ordered. Decision-making details documented in ED Course.  Radiology: ordered. Decision-making details documented in ED Course.  ECG/medicine tests: ordered. Decision-making details documented in ED Course.    Risk  OTC drugs.  Prescription drug management.  Decision regarding hospitalization.        Final diagnoses:   Acute on chronic respiratory failure with hypercapnia       ED Disposition  ED Disposition       ED Disposition   Decision to Admit    Condition   --    Comment   Level of Care: Progressive Care [20]   Diagnosis: Acute hypercapnic respiratory failure [4168133]   Certification: I Certify That Inpatient Hospital Services Are Medically Necessary For Greater Than 2 Midnights                 No follow-up provider specified.       Medication List        ASK your doctor about these medications      Chloraseptic 1.4 % liquid liquid  Generic drug: phenol  Ask about: Which instructions should I use?     memantine 10 MG tablet  Commonly known as: NAMENDA  Ask about: Which instructions should I use?                 Ozile Ramos PA-C  01/01/24 6205

## 2024-01-01 NOTE — ED NOTES
Called [facility] at this time and updated them on pts POC. Spoke with Landy GARCIA about pt being admitted to the PCU for respiratory failure with hypercapnia

## 2024-01-01 NOTE — ED NOTES
Called pts son Edi at this time to update him on his mother status. Pts son verbalized understanding of treatment plan.    NST note: FHTs: 120s mod variability pos accels. Ovando: irregular    NST reactive.

## 2024-01-01 NOTE — PLAN OF CARE
Problem: Fall Injury Risk  Goal: Absence of Fall and Fall-Related Injury  1/1/2024 1735 by Idania Curtis RN  Outcome: Ongoing, Progressing  1/1/2024 1735 by Idania Curtis RN  Outcome: Ongoing, Progressing  Intervention: Identify and Manage Contributors  Recent Flowsheet Documentation  Taken 1/1/2024 1700 by Idania Curtis RN  Medication Review/Management: medications reviewed  Intervention: Promote Injury-Free Environment  Recent Flowsheet Documentation  Taken 1/1/2024 1700 by Idania Curtis RN  Safety Promotion/Fall Prevention: safety round/check completed     Problem: Skin Injury Risk Increased  Goal: Skin Health and Integrity  1/1/2024 1735 by Idania Curtis RN  Outcome: Ongoing, Progressing  1/1/2024 1735 by Idania Curtis RN  Outcome: Ongoing, Progressing     Problem: Adult Inpatient Plan of Care  Goal: Plan of Care Review  Outcome: Ongoing, Progressing  Flowsheets (Taken 1/1/2024 1735)  Progress: no change  Plan of Care Reviewed With: patient  Outcome Evaluation: Patient welcomed to PCU. Patient is lethargic on bipap at 28% FiO2. Patient arouses to stimulation. Patient is restless at times. Trying to get out of bed. Call light within reach, bed in the lowest position.  Goal: Patient-Specific Goal (Individualized)  Outcome: Ongoing, Progressing  Goal: Absence of Hospital-Acquired Illness or Injury  Outcome: Ongoing, Progressing  Intervention: Identify and Manage Fall Risk  Recent Flowsheet Documentation  Taken 1/1/2024 1700 by Idania Curtis RN  Safety Promotion/Fall Prevention: safety round/check completed  Intervention: Prevent and Manage VTE (Venous Thromboembolism) Risk  Recent Flowsheet Documentation  Taken 1/1/2024 1611 by Idania Curtis RN  VTE Prevention/Management: (eliquis PO) other (see comments)  Goal: Optimal Comfort and Wellbeing  Outcome: Ongoing, Progressing  Intervention: Provide Person-Centered Care  Recent Flowsheet Documentation  Taken 1/1/2024 1611 by Idania Curtis RN  Trust  Relationship/Rapport: care explained  Goal: Readiness for Transition of Care  Outcome: Ongoing, Progressing     Problem: Breathing Pattern Ineffective  Goal: Effective Breathing Pattern  Outcome: Ongoing, Progressing   Goal Outcome Evaluation:  Plan of Care Reviewed With: patient        Progress: no change  Outcome Evaluation: Patient welcomed to PCU. Patient is lethargic on bipap at 28% FiO2. Patient arouses to stimulation. Patient is restless at times. Trying to get out of bed. Call light within reach, bed in the lowest position.

## 2024-01-01 NOTE — ED NOTES
Pt is becoming agitated with the BIPAP at this time and trying to get up and pull it off. Pt is still AMS and not aware of where she is or what her situation is. Provider made aware, orders to be placed at this time.

## 2024-01-01 NOTE — ED NOTES
Pts son Edi is at bedside. He states he is going to leave and come back, his number is [phone]. He would like to be updated on his mothers care if there are any changes. Provider made aware.

## 2024-01-02 ENCOUNTER — APPOINTMENT (OUTPATIENT)
Dept: CT IMAGING | Facility: HOSPITAL | Age: 74
DRG: 189 | End: 2024-01-02
Payer: MEDICARE

## 2024-01-02 LAB
ANION GAP SERPL CALCULATED.3IONS-SCNC: 4.2 MMOL/L (ref 5–15)
BUN SERPL-MCNC: 10 MG/DL (ref 8–23)
BUN/CREAT SERPL: 13.9 (ref 7–25)
CALCIUM SPEC-SCNC: 9.7 MG/DL (ref 8.6–10.5)
CHLORIDE SERPL-SCNC: 97 MMOL/L (ref 98–107)
CO2 SERPL-SCNC: 40.8 MMOL/L (ref 22–29)
CREAT SERPL-MCNC: 0.72 MG/DL (ref 0.57–1)
DEPRECATED RDW RBC AUTO: 74 FL (ref 37–54)
EGFRCR SERPLBLD CKD-EPI 2021: 87.3 ML/MIN/1.73
ERYTHROCYTE [DISTWIDTH] IN BLOOD BY AUTOMATED COUNT: 22.7 % (ref 12.3–15.4)
GLUCOSE BLDC GLUCOMTR-MCNC: 108 MG/DL (ref 70–130)
GLUCOSE BLDC GLUCOMTR-MCNC: 117 MG/DL (ref 70–130)
GLUCOSE BLDC GLUCOMTR-MCNC: 128 MG/DL (ref 70–130)
GLUCOSE BLDC GLUCOMTR-MCNC: 164 MG/DL (ref 70–130)
GLUCOSE SERPL-MCNC: 125 MG/DL (ref 65–99)
HCT VFR BLD AUTO: 39.2 % (ref 34–46.6)
HGB BLD-MCNC: 11.4 G/DL (ref 12–15.9)
MCH RBC QN AUTO: 26.7 PG (ref 26.6–33)
MCHC RBC AUTO-ENTMCNC: 29.1 G/DL (ref 31.5–35.7)
MCV RBC AUTO: 91.8 FL (ref 79–97)
PLATELET # BLD AUTO: 135 10*3/MM3 (ref 140–450)
PMV BLD AUTO: 10.1 FL (ref 6–12)
POTASSIUM SERPL-SCNC: 3.9 MMOL/L (ref 3.5–5.2)
RBC # BLD AUTO: 4.27 10*6/MM3 (ref 3.77–5.28)
SODIUM SERPL-SCNC: 142 MMOL/L (ref 136–145)
WBC NRBC COR # BLD AUTO: 4.08 10*3/MM3 (ref 3.4–10.8)

## 2024-01-02 PROCEDURE — 94660 CPAP INITIATION&MGMT: CPT

## 2024-01-02 PROCEDURE — 71250 CT THORAX DX C-: CPT

## 2024-01-02 PROCEDURE — 94799 UNLISTED PULMONARY SVC/PX: CPT

## 2024-01-02 PROCEDURE — 63710000001 INSULIN LISPRO (HUMAN) PER 5 UNITS

## 2024-01-02 PROCEDURE — 25010000002 ZIPRASIDONE MESYLATE PER 10 MG: Performed by: HOSPITALIST

## 2024-01-02 PROCEDURE — 80048 BASIC METABOLIC PNL TOTAL CA: CPT | Performed by: STUDENT IN AN ORGANIZED HEALTH CARE EDUCATION/TRAINING PROGRAM

## 2024-01-02 PROCEDURE — 71250 CT THORAX DX C-: CPT | Performed by: RADIOLOGY

## 2024-01-02 PROCEDURE — 93010 ELECTROCARDIOGRAM REPORT: CPT | Performed by: INTERNAL MEDICINE

## 2024-01-02 PROCEDURE — 85027 COMPLETE CBC AUTOMATED: CPT | Performed by: STUDENT IN AN ORGANIZED HEALTH CARE EDUCATION/TRAINING PROGRAM

## 2024-01-02 PROCEDURE — 93005 ELECTROCARDIOGRAM TRACING: CPT | Performed by: HOSPITALIST

## 2024-01-02 PROCEDURE — 25810000003 SODIUM CHLORIDE 0.9 % SOLUTION: Performed by: HOSPITALIST

## 2024-01-02 PROCEDURE — 82948 REAGENT STRIP/BLOOD GLUCOSE: CPT

## 2024-01-02 PROCEDURE — 25010000002 HALOPERIDOL LACTATE PER 5 MG: Performed by: HOSPITALIST

## 2024-01-02 PROCEDURE — 25010000002 METHYLPREDNISOLONE PER 40 MG

## 2024-01-02 PROCEDURE — 94761 N-INVAS EAR/PLS OXIMETRY MLT: CPT

## 2024-01-02 PROCEDURE — 99233 SBSQ HOSP IP/OBS HIGH 50: CPT | Performed by: INTERNAL MEDICINE

## 2024-01-02 PROCEDURE — 94664 DEMO&/EVAL PT USE INHALER: CPT

## 2024-01-02 RX ORDER — HYDRALAZINE HYDROCHLORIDE 20 MG/ML
10 INJECTION INTRAMUSCULAR; INTRAVENOUS EVERY 6 HOURS PRN
Status: DISCONTINUED | OUTPATIENT
Start: 2024-01-02 | End: 2024-01-11 | Stop reason: HOSPADM

## 2024-01-02 RX ORDER — MIDAZOLAM HYDROCHLORIDE 1 MG/ML
0.5 INJECTION INTRAMUSCULAR; INTRAVENOUS ONCE
Status: DISCONTINUED | OUTPATIENT
Start: 2024-01-02 | End: 2024-01-02

## 2024-01-02 RX ORDER — HALOPERIDOL 5 MG/ML
2 INJECTION INTRAMUSCULAR ONCE
Status: COMPLETED | OUTPATIENT
Start: 2024-01-02 | End: 2024-01-02

## 2024-01-02 RX ADMIN — TIOTROPIUM BROMIDE INHALATION SPRAY 2 PUFF: 3.12 SPRAY, METERED RESPIRATORY (INHALATION) at 06:25

## 2024-01-02 RX ADMIN — ZIPRASIDONE MESYLATE 10 MG: 20 INJECTION, POWDER, LYOPHILIZED, FOR SOLUTION INTRAMUSCULAR at 03:23

## 2024-01-02 RX ADMIN — BUDESONIDE AND FORMOTEROL FUMARATE DIHYDRATE 2 PUFF: 160; 4.5 AEROSOL RESPIRATORY (INHALATION) at 06:25

## 2024-01-02 RX ADMIN — INSULIN LISPRO 2 UNITS: 100 INJECTION, SOLUTION INTRAVENOUS; SUBCUTANEOUS at 21:44

## 2024-01-02 RX ADMIN — HALOPERIDOL LACTATE 2 MG: 5 INJECTION, SOLUTION INTRAMUSCULAR at 20:35

## 2024-01-02 RX ADMIN — Medication 10 ML: at 08:20

## 2024-01-02 RX ADMIN — IPRATROPIUM BROMIDE AND ALBUTEROL SULFATE 3 ML: 2.5; .5 SOLUTION RESPIRATORY (INHALATION) at 13:12

## 2024-01-02 RX ADMIN — METHYLPREDNISOLONE SODIUM SUCCINATE 40 MG: 40 INJECTION, POWDER, FOR SOLUTION INTRAMUSCULAR; INTRAVENOUS at 17:06

## 2024-01-02 RX ADMIN — IPRATROPIUM BROMIDE AND ALBUTEROL SULFATE 3 ML: 2.5; .5 SOLUTION RESPIRATORY (INHALATION) at 06:25

## 2024-01-02 RX ADMIN — METHYLPREDNISOLONE SODIUM SUCCINATE 40 MG: 40 INJECTION, POWDER, FOR SOLUTION INTRAMUSCULAR; INTRAVENOUS at 05:06

## 2024-01-02 RX ADMIN — DEXMEDETOMIDINE HYDROCHLORIDE 0.2 MCG/KG/HR: 100 INJECTION, SOLUTION INTRAVENOUS at 05:07

## 2024-01-02 NOTE — CASE MANAGEMENT/SOCIAL WORK
Discharge Planning Assessment   Ozzy     Patient Name: Brenda Munroe  MRN: [id number]  Today's Date: 1/2/2024    Admit Date: 1/1/2024     Discharge Plan       Row Name 01/02/24 1430       Plan    Plan SS received discharge planning; from [facility]. [facility] is from [facility]. Per Phoebe who states pt has a 30 day bed hold. SS to follow and assist with discharge planning.             MUNA BowersW

## 2024-01-02 NOTE — PLAN OF CARE
Problem: Fall Injury Risk  Goal: Absence of Fall and Fall-Related Injury  Outcome: Ongoing, Progressing  Intervention: Identify and Manage Contributors  Recent Flowsheet Documentation  Taken 1/2/2024 1300 by Caryn Mazariegos RN  Medication Review/Management: medications reviewed  Taken 1/2/2024 1100 by Caryn Mazariegos RN  Medication Review/Management: medications reviewed  Taken 1/2/2024 0900 by Caryn Mazariegos RN  Medication Review/Management: medications reviewed  Taken 1/2/2024 0700 by Caryn Mazariegos RN  Medication Review/Management: medications reviewed  Intervention: Promote Injury-Free Environment  Recent Flowsheet Documentation  Taken 1/2/2024 1300 by Caryn Mazariegos RN  Safety Promotion/Fall Prevention:   safety round/check completed   activity supervised   assistive device/personal items within reach  Taken 1/2/2024 1100 by Caryn Mazariegos RN  Safety Promotion/Fall Prevention:   safety round/check completed   activity supervised   assistive device/personal items within reach  Taken 1/2/2024 0900 by Caryn Mazariegos RN  Safety Promotion/Fall Prevention:   safety round/check completed   activity supervised   assistive device/personal items within reach  Taken 1/2/2024 0700 by Caryn Mazariegos RN  Safety Promotion/Fall Prevention:   safety round/check completed   activity supervised   assistive device/personal items within reach     Problem: Fall Injury Risk  Goal: Absence of Fall and Fall-Related Injury  Intervention: Promote Injury-Free Environment  Recent Flowsheet Documentation  Taken 1/2/2024 1300 by Caryn Mazariegos RN  Safety Promotion/Fall Prevention:   safety round/check completed   activity supervised   assistive device/personal items within reach  Taken 1/2/2024 1100 by Cayrn Mazariegos RN  Safety Promotion/Fall Prevention:   safety round/check completed   activity supervised   assistive device/personal items within reach  Taken 1/2/2024 0900 by Caryn Mazariegos RN  Safety  Promotion/Fall Prevention:   safety round/check completed   activity supervised   assistive device/personal items within reach  Taken 1/2/2024 0700 by Caryn Mazariegos RN  Safety Promotion/Fall Prevention:   safety round/check completed   activity supervised   assistive device/personal items within reach   Goal Outcome Evaluation:           Progress: no change  Outcome Evaluation: Pt is alert, confused. VSS on Bipap. Strict NPO. Precedex gtt titrated off. Resting comfortably. Sinus bradycardia to NSR with frequent PVCs. No distress noted. Care ongoing.

## 2024-01-02 NOTE — PLAN OF CARE
Goal Outcome Evaluation:  Plan of Care Reviewed With: patient        Progress: no change  Outcome Evaluation: Patient was lethargic, but arousable at the beginning of the shift and tolerating bipap well. Early in the mornign she began ripping off the bipap and monitors. Precedex gtt initiated, pt tolerating well. Call light within reach, bed in lowest position, and bed alarm set. No distress noted at this time

## 2024-01-02 NOTE — PROGRESS NOTES
[facility] HOSPITALIST PROGRESS NOTE     Patient Identification:  Name:  Brenda Munroe  Age:  73 y.o.  Sex:  female  :  1950  MRN:  [id number]  Visit Number:  [id number]  ROOM: [address]     Primary Care Provider:  Bernadette Arevalo MD    Length of stay in inpatient status:  1    Subjective     Chief Compliant:    Chief Complaint   Patient presents with    Altered Mental Status     History of Presenting Illness:    Patient remains ill today, remains on bipap, had to be started on precedex drip due to agitation, was calm and pulling good TV's this AM, following some commands, not really oriented, continued on steroids and nebs, remains NPO while on bibap, awaiting improved mentation and respiratory status, prognosis guarded.   Objective     Current Hospital Meds:  amLODIPine, 10 mg, Oral, Nightly  apixaban, 5 mg, Oral, Q12H  baclofen, 5 mg, Oral, BID  budesonide-formoterol, 2 puff, Inhalation, BID - RT   And  tiotropium bromide monohydrate, 2 puff, Inhalation, Daily - RT  clopidogrel, 75 mg, Oral, Daily  donepezil, 10 mg, Oral, Nightly  ferrous sulfate, 325 mg, Oral, Daily With Breakfast  insulin lispro, 2-7 Units, Subcutaneous, 4x Daily AC & at Bedtime  ipratropium-albuterol, 3 mL, Nebulization, Q6H While Awake - RT  isosorbide mononitrate, 90 mg, Oral, Daily  lamoTRIgine, 100 mg, Oral, Daily  lamoTRIgine, 25 mg, Oral, Nightly  levothyroxine, 75 mcg, Oral, Daily  lisinopril, 10 mg, Oral, Q24H  memantine, 10 mg, Oral, Q12H  methylPREDNISolone sodium succinate, 40 mg, Intravenous, Q12H  metoprolol succinate XL, 12.5 mg, Oral, Daily  pantoprazole, 40 mg, Oral, Daily  risperiDONE, 0.5 mg, Oral, BID  rosuvastatin, 40 mg, Oral, Q PM  senna-docusate sodium, 2 tablet, Oral, BID  sertraline, 100 mg, Oral, Daily  sodium chloride, 10 mL, Intravenous, Q12H  sodium chloride, 10 mL, Intravenous, Q12H  [START ON 2024] Vitamin D3, 50,000 Units, Oral, Q7 Days      dexmedetomidine, 0.2-1.5 mcg/kg/hr, Last  Rate: 0.3 mcg/kg/hr (01/02/24 0815)      ----------------------------------------------------------------------------------------------------------------------  Vital Signs:  Temp:  [97.7 °F (36.5 °C)-99.4 °F (37.4 °C)] 97.7 °F (36.5 °C)  Heart Rate:  [52-80] 73  Resp:  [10-30] 30  BP: (100-188)/() 147/82  SpO2:  [89 %-100 %] 97 %  on  Flow (L/min):  [2] 2;   Device (Oxygen Therapy): NPPV/NIV  Body mass index is 29.69 kg/m².      Intake/Output Summary (Last 24 hours) at 1/2/2024 0934  Last data filed at 1/2/2024 0633  Gross per 24 hour   Intake 4.38 ml   Output 2700 ml   Net -2695.62 ml      ----------------------------------------------------------------------------------------------------------------------  Physical exam:  Constitutional:  Elderly, Mild acute distress.      HENT:  Head:  Normocephalic and atraumatic.  Mouth:  Moist mucous membranes.    Eyes:  Conjunctivae and EOM are normal. No scleral icterus.    Neck:  Neck supple.  No JVD present.    Cardiovascular:  Normal rate, regular rhythm and normal heart sounds with no murmur.  Pulmonary/Chest:  No respiratory distress, no wheezes, no crackles, on Bipap  Abdominal:  Soft. No distension and no tenderness.   Musculoskeletal:  No tenderness, and no deformity.  No red or swollen joints anywhere.    Neurological:  Alert and oriented to person.  No cranial nerve deficit.    Skin:  Skin is warm and dry. No rash noted. No pallor.   Peripheral vascular:  No clubbing, no cyanosis, no edema.  Psychiatric: Appropriate mood and affect  : External catheter in place    Edited by: Clarence Gomez MD at 1/2/2024 0916  ----------------------------------------------------------------------------------------------------------------------  WBC/HGB/HCT/PLT   4.08/11.4/39.2/135 (01/02 0407)  BUN/CREAT/GLUC/ALT/AST/SYD/LIP    10/0.72/125/8/11/--/-- (01/01 1059-01/02 0407)  LYTES - Na/K/Cl/CO2: 142/3.9/97*/40.8* (01/02 0407)     pH/pCO2/pO2/HCO3   7.428/63.1/69.2/41.7  "(01/01 1659)  No results found for: \"URINECX\"  No results found for: \"BLOODCX\"    I have personally looked at the labs and they are summarized above.  ----------------------------------------------------------------------------------------------------------------------  Detailed radiology reports for the last 24 hours:  CT Head Without Contrast    Result Date: 1/1/2024   No acute intracranial hemorrhage,midline shift,or significant mass effect.   This report was finalized on 1/1/2024 11:20 AM by Magdalene Lux MD.      XR Chest 1 View    Result Date: 1/1/2024   Mild interstitial coarsening, improved in the interval.  No acute cardiopulmonary process identified.   This report was finalized on 1/1/2024 11:17 AM by Magdalene Lux MD.     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     #Acute Metabolic Encephalopathy due to Acute Hypercarbic on Chronic Hypoxic/Hypercarbic Respiratory Failure requiring Non-invasive Positive Pressure Ventilation in setting of underlying COPD/Emphysema with acute exacerbation  - Continue short acting beta agonist and short acting muscarinic antagonist; Continue scheduled Duonebs and Albuterol as needed   - Continue IV glucocorticoids with Methylprednisolone 40mg twice daily  - Continue supplemental oxygen to keep 02 sats 88-92%, caution with over oxygenation as not to suppress respiratory drive  - Continue Precedex drip so will tolerate Bipap   - Check CT Chest without contrast  - Admitted to PCU, monitor on telemetry, continuous pulse ox, continue 02 and wean as able    #Hypertension/Hyperlipidemia/Coronary " Artery Disease status post CABG   #Diastolic Dysfunction/Elevated proBNP, without overt signs and symptoms Congestive Heart Failure   #Paroxysmal Atrial Fibrillation, on home anticoagulation   #Severe Bilateral Carotid Artery Stenosis   #Mod TR, Mild Pulmonary Hypertension   - Continue Statin, Plavix, Eliquis  - Continue beta blocker, Imdur, ACEI   - Admitted to PCU for continued monitoring, strict I/O's, trend heart rate and blood pressure     #Pre-Diabetes  - Hgb A1c = 5.2% last checked; No home oral agents, continue FSBG and SSI, add long acting as indicated.      #Hypothyroid  - Continue home Levothyroxine     #Gastroesophageal Reflux Disease/History Peptic Ulcer Disease   - Continue home PPI     #Severe Recurrent Depression with History Psychotic Features  - Continue home regimen     #Dementia with Behavioral Disturbances  - Continue home medications, supportive care     #Tobacco Dependence  - Recommended cessation, nicotine replacement therapy as needed    F: NPO for now  E: Monitor & Replace as needed   N: NPO Diet NPO Type: Strict NPO  PPx: Eliquis   Code Status (Patient has no pulse and is not breathing): CPR   Medical Interventions (Patient has pulse or is breathing): Full Support    Dispo: Pending workup and clinical improvement    *This patient is considered high risk secondary to Acute COPD Exacerbation, Acute Metabolic Encephalopathy, Hypercarbic respiratory failure, requiring Non-invasive Positive Pressure Ventilation .    Edited by: Clarence Gomez MD at 1/2/2024 4946  [facility]

## 2024-01-03 ENCOUNTER — APPOINTMENT (OUTPATIENT)
Dept: CARDIOLOGY | Facility: HOSPITAL | Age: 74
DRG: 189 | End: 2024-01-03
Payer: MEDICARE

## 2024-01-03 LAB
A-A DO2: 41 MMHG (ref 0–300)
ALBUMIN SERPL-MCNC: 4.2 G/DL (ref 3.5–5.2)
ALBUMIN/GLOB SERPL: 1.8 G/DL
ALP SERPL-CCNC: 59 U/L (ref 39–117)
ALT SERPL W P-5'-P-CCNC: 10 U/L (ref 1–33)
ANION GAP SERPL CALCULATED.3IONS-SCNC: 8.7 MMOL/L (ref 5–15)
ANISOCYTOSIS BLD QL: NORMAL
ARTERIAL PATENCY WRIST A: POSITIVE
AST SERPL-CCNC: 20 U/L (ref 1–32)
ATMOSPHERIC PRESS: 729 MMHG
BASE EXCESS BLDA CALC-SCNC: 11.1 MMOL/L (ref 0–2)
BASOPHILS # BLD AUTO: 0.01 10*3/MM3 (ref 0–0.2)
BASOPHILS NFR BLD AUTO: 0.1 % (ref 0–1.5)
BDY SITE: ABNORMAL
BH CV ECHO MEAS - EDV(CUBED): 82.3 ML
BH CV ECHO MEAS - EDV(MOD-SP4): 35.7 ML
BH CV ECHO MEAS - EF(MOD-SP4): 70.6 %
BH CV ECHO MEAS - ESV(CUBED): 9.3 ML
BH CV ECHO MEAS - ESV(MOD-SP4): 10.5 ML
BH CV ECHO MEAS - FS: 51.7 %
BH CV ECHO MEAS - IVS/LVPW: 1.05 CM
BH CV ECHO MEAS - IVSD: 1 CM
BH CV ECHO MEAS - LV DIASTOLIC VOL/BSA (35-75): 20.8 CM2
BH CV ECHO MEAS - LV MASS(C)D: 140.2 GRAMS
BH CV ECHO MEAS - LV SYSTOLIC VOL/BSA (12-30): 6.1 CM2
BH CV ECHO MEAS - LVIDD: 4.4 CM
BH CV ECHO MEAS - LVIDS: 2.1 CM
BH CV ECHO MEAS - LVPWD: 0.95 CM
BH CV ECHO MEAS - SI(MOD-SP4): 14.7 ML/M2
BH CV ECHO MEAS - SV(MOD-SP4): 25.2 ML
BILIRUB SERPL-MCNC: 0.4 MG/DL (ref 0–1.2)
BUN SERPL-MCNC: 18 MG/DL (ref 8–23)
BUN/CREAT SERPL: 25.7 (ref 7–25)
CALCIUM SPEC-SCNC: 9.6 MG/DL (ref 8.6–10.5)
CHLORIDE SERPL-SCNC: 98 MMOL/L (ref 98–107)
CO2 BLDA-SCNC: 40.7 MMOL/L (ref 22–33)
CO2 SERPL-SCNC: 33.3 MMOL/L (ref 22–29)
COHGB MFR BLD: 2.1 % (ref 0–5)
CREAT SERPL-MCNC: 0.7 MG/DL (ref 0.57–1)
CRP SERPL-MCNC: <0.3 MG/DL (ref 0–0.5)
DEPRECATED RDW RBC AUTO: 77.2 FL (ref 37–54)
EGFRCR SERPLBLD CKD-EPI 2021: 90.3 ML/MIN/1.73
EOSINOPHIL # BLD AUTO: 0 10*3/MM3 (ref 0–0.4)
EOSINOPHIL NFR BLD AUTO: 0 % (ref 0.3–6.2)
ERYTHROCYTE [DISTWIDTH] IN BLOOD BY AUTOMATED COUNT: 23.4 % (ref 12.3–15.4)
GAS FLOW AIRWAY: 2 LPM
GLOBULIN UR ELPH-MCNC: 2.4 GM/DL
GLUCOSE BLDC GLUCOMTR-MCNC: 104 MG/DL (ref 70–130)
GLUCOSE BLDC GLUCOMTR-MCNC: 116 MG/DL (ref 70–130)
GLUCOSE BLDC GLUCOMTR-MCNC: 132 MG/DL (ref 70–130)
GLUCOSE BLDC GLUCOMTR-MCNC: 87 MG/DL (ref 70–130)
GLUCOSE SERPL-MCNC: 98 MG/DL (ref 65–99)
HCO3 BLDA-SCNC: 38.7 MMOL/L (ref 20–26)
HCT VFR BLD AUTO: 46.4 % (ref 34–46.6)
HCT VFR BLD CALC: 39.4 % (ref 38–51)
HGB BLD-MCNC: 13.4 G/DL (ref 12–15.9)
HGB BLDA-MCNC: 12.9 G/DL (ref 13.5–17.5)
HYPOCHROMIA BLD QL: NORMAL
IMM GRANULOCYTES # BLD AUTO: 0.06 10*3/MM3 (ref 0–0.05)
IMM GRANULOCYTES NFR BLD AUTO: 0.8 % (ref 0–0.5)
INHALED O2 CONCENTRATION: 28 %
LYMPHOCYTES # BLD AUTO: 0.57 10*3/MM3 (ref 0.7–3.1)
LYMPHOCYTES NFR BLD AUTO: 7.4 % (ref 19.6–45.3)
Lab: ABNORMAL
MCH RBC QN AUTO: 26.8 PG (ref 26.6–33)
MCHC RBC AUTO-ENTMCNC: 28.9 G/DL (ref 31.5–35.7)
MCV RBC AUTO: 92.8 FL (ref 79–97)
METHGB BLD QL: 0 % (ref 0–3)
MODALITY: ABNORMAL
MONOCYTES # BLD AUTO: 0.22 10*3/MM3 (ref 0.1–0.9)
MONOCYTES NFR BLD AUTO: 2.8 % (ref 5–12)
MRSA DNA SPEC QL NAA+PROBE: ABNORMAL
NEUTROPHILS NFR BLD AUTO: 6.86 10*3/MM3 (ref 1.7–7)
NEUTROPHILS NFR BLD AUTO: 88.9 % (ref 42.7–76)
NRBC BLD AUTO-RTO: 0 /100 WBC (ref 0–0.2)
OVALOCYTES BLD QL SMEAR: NORMAL
OXYHGB MFR BLDV: 93.5 % (ref 94–99)
PCO2 BLDA: 64.9 MM HG (ref 35–45)
PCO2 TEMP ADJ BLD: ABNORMAL MM[HG]
PH BLDA: 7.38 PH UNITS (ref 7.35–7.45)
PH, TEMP CORRECTED: ABNORMAL
PLAT MORPH BLD: NORMAL
PLATELET # BLD AUTO: 154 10*3/MM3 (ref 140–450)
PMV BLD AUTO: 10.3 FL (ref 6–12)
PO2 BLDA: 77.6 MM HG (ref 83–108)
PO2 TEMP ADJ BLD: ABNORMAL MM[HG]
POTASSIUM SERPL-SCNC: 3.8 MMOL/L (ref 3.5–5.2)
PROCALCITONIN SERPL-MCNC: 0.03 NG/ML (ref 0–0.25)
PROT SERPL-MCNC: 6.6 G/DL (ref 6–8.5)
QT INTERVAL: 402 MS
QT INTERVAL: 410 MS
QT INTERVAL: 444 MS
QTC INTERVAL: 442 MS
QTC INTERVAL: 446 MS
QTC INTERVAL: 496 MS
RBC # BLD AUTO: 5 10*6/MM3 (ref 3.77–5.28)
S PNEUM AG SPEC QL LA: NEGATIVE
SAO2 % BLDCOA: 95.5 % (ref 94–99)
SODIUM SERPL-SCNC: 140 MMOL/L (ref 136–145)
VENTILATOR MODE: ABNORMAL
WBC NRBC COR # BLD AUTO: 7.72 10*3/MM3 (ref 3.4–10.8)

## 2024-01-03 PROCEDURE — 94799 UNLISTED PULMONARY SVC/PX: CPT

## 2024-01-03 PROCEDURE — 82375 ASSAY CARBOXYHB QUANT: CPT

## 2024-01-03 PROCEDURE — 94660 CPAP INITIATION&MGMT: CPT

## 2024-01-03 PROCEDURE — 85007 BL SMEAR W/DIFF WBC COUNT: CPT | Performed by: INTERNAL MEDICINE

## 2024-01-03 PROCEDURE — 93308 TTE F-UP OR LMTD: CPT

## 2024-01-03 PROCEDURE — 93321 DOPPLER ECHO F-UP/LMTD STD: CPT

## 2024-01-03 PROCEDURE — 87899 AGENT NOS ASSAY W/OPTIC: CPT | Performed by: INTERNAL MEDICINE

## 2024-01-03 PROCEDURE — 93010 ELECTROCARDIOGRAM REPORT: CPT | Performed by: INTERNAL MEDICINE

## 2024-01-03 PROCEDURE — 93321 DOPPLER ECHO F-UP/LMTD STD: CPT | Performed by: INTERNAL MEDICINE

## 2024-01-03 PROCEDURE — 87641 MR-STAPH DNA AMP PROBE: CPT | Performed by: INTERNAL MEDICINE

## 2024-01-03 PROCEDURE — 25010000002 METHYLPREDNISOLONE PER 40 MG

## 2024-01-03 PROCEDURE — 99233 SBSQ HOSP IP/OBS HIGH 50: CPT | Performed by: INTERNAL MEDICINE

## 2024-01-03 PROCEDURE — 93005 ELECTROCARDIOGRAM TRACING: CPT | Performed by: HOSPITALIST

## 2024-01-03 PROCEDURE — 93308 TTE F-UP OR LMTD: CPT | Performed by: INTERNAL MEDICINE

## 2024-01-03 PROCEDURE — 94761 N-INVAS EAR/PLS OXIMETRY MLT: CPT

## 2024-01-03 PROCEDURE — 25010000002 PIPERACILLIN SOD-TAZOBACTAM PER 1 G: Performed by: INTERNAL MEDICINE

## 2024-01-03 PROCEDURE — 86140 C-REACTIVE PROTEIN: CPT | Performed by: INTERNAL MEDICINE

## 2024-01-03 PROCEDURE — 83050 HGB METHEMOGLOBIN QUAN: CPT

## 2024-01-03 PROCEDURE — 36600 WITHDRAWAL OF ARTERIAL BLOOD: CPT

## 2024-01-03 PROCEDURE — 80053 COMPREHEN METABOLIC PANEL: CPT | Performed by: INTERNAL MEDICINE

## 2024-01-03 PROCEDURE — 84145 PROCALCITONIN (PCT): CPT | Performed by: INTERNAL MEDICINE

## 2024-01-03 PROCEDURE — 94664 DEMO&/EVAL PT USE INHALER: CPT

## 2024-01-03 PROCEDURE — 82805 BLOOD GASES W/O2 SATURATION: CPT

## 2024-01-03 PROCEDURE — 85025 COMPLETE CBC W/AUTO DIFF WBC: CPT | Performed by: INTERNAL MEDICINE

## 2024-01-03 PROCEDURE — 82948 REAGENT STRIP/BLOOD GLUCOSE: CPT

## 2024-01-03 RX ADMIN — Medication 10 ML: at 08:33

## 2024-01-03 RX ADMIN — Medication 10 ML: at 21:01

## 2024-01-03 RX ADMIN — METHYLPREDNISOLONE SODIUM SUCCINATE 40 MG: 40 INJECTION, POWDER, FOR SOLUTION INTRAMUSCULAR; INTRAVENOUS at 16:00

## 2024-01-03 RX ADMIN — IPRATROPIUM BROMIDE AND ALBUTEROL SULFATE 3 ML: 2.5; .5 SOLUTION RESPIRATORY (INHALATION) at 18:51

## 2024-01-03 RX ADMIN — AMLODIPINE BESYLATE 10 MG: 10 TABLET ORAL at 21:10

## 2024-01-03 RX ADMIN — METHYLPREDNISOLONE SODIUM SUCCINATE 40 MG: 40 INJECTION, POWDER, FOR SOLUTION INTRAMUSCULAR; INTRAVENOUS at 05:24

## 2024-01-03 RX ADMIN — RISPERIDONE 0.5 MG: 0.25 TABLET, FILM COATED ORAL at 21:00

## 2024-01-03 RX ADMIN — IPRATROPIUM BROMIDE AND ALBUTEROL SULFATE 3 ML: 2.5; .5 SOLUTION RESPIRATORY (INHALATION) at 07:16

## 2024-01-03 RX ADMIN — PIPERACILLIN SODIUM AND TAZOBACTAM SODIUM 3.38 G: 3; .375 INJECTION, POWDER, LYOPHILIZED, FOR SOLUTION INTRAVENOUS at 08:32

## 2024-01-03 RX ADMIN — IPRATROPIUM BROMIDE AND ALBUTEROL SULFATE 3 ML: 2.5; .5 SOLUTION RESPIRATORY (INHALATION) at 13:25

## 2024-01-03 RX ADMIN — Medication 10 ML: at 08:32

## 2024-01-03 RX ADMIN — BUDESONIDE AND FORMOTEROL FUMARATE DIHYDRATE 2 PUFF: 160; 4.5 AEROSOL RESPIRATORY (INHALATION) at 07:16

## 2024-01-03 RX ADMIN — BUDESONIDE AND FORMOTEROL FUMARATE DIHYDRATE 2 PUFF: 160; 4.5 AEROSOL RESPIRATORY (INHALATION) at 18:51

## 2024-01-03 RX ADMIN — DONEPEZIL HYDROCHLORIDE 10 MG: 5 TABLET, FILM COATED ORAL at 20:59

## 2024-01-03 RX ADMIN — APIXABAN 5 MG: 5 TABLET, FILM COATED ORAL at 21:00

## 2024-01-03 RX ADMIN — MEMANTINE HYDROCHLORIDE 10 MG: 10 TABLET, FILM COATED ORAL at 21:00

## 2024-01-03 RX ADMIN — BACLOFEN 5 MG: 10 TABLET ORAL at 21:00

## 2024-01-03 RX ADMIN — LAMOTRIGINE 25 MG: 100 TABLET ORAL at 20:59

## 2024-01-03 RX ADMIN — DOCUSATE SODIUM 50 MG AND SENNOSIDES 8.6 MG 2 TABLET: 8.6; 5 TABLET, FILM COATED ORAL at 21:00

## 2024-01-03 RX ADMIN — ROSUVASTATIN CALCIUM 40 MG: 20 TABLET, FILM COATED ORAL at 16:00

## 2024-01-03 RX ADMIN — TIOTROPIUM BROMIDE INHALATION SPRAY 2 PUFF: 3.12 SPRAY, METERED RESPIRATORY (INHALATION) at 07:16

## 2024-01-03 NOTE — PROGRESS NOTES
Hospitalist Update:    Notified by RN that patient had lost IV access, so precedex no longer infusing. She is now hitting, kicking and spitting at nursing staff. Unable to get IV access back in this state. Will give IM haldol 2mg now in hopes this will help with agitation and allow staff to regain IV access and restart precedex. Will need repeat EKG later to reassess QTc (only 446 on EKG last night).

## 2024-01-03 NOTE — PLAN OF CARE
Goal Outcome Evaluation:           Progress: no change  Outcome Evaluation: Patient is alert but confused. VSS at this time on BIPAP. Precedex gtt off due to low HR. Strict NPO. Patient has no complaints at this time. Bed low, locked, and call light in reach.

## 2024-01-03 NOTE — CASE MANAGEMENT/SOCIAL WORK
Discharge Planning Assessment  [facility]     Patient Name: Brenda Munroe  MRN: [id number]  Today's Date: 1/3/2024    Admit Date: 1/1/2024    Plan: Pt was admitted on 1/1/24 from [facility]. Per [facility] pt has 30 day bed hold upon admission. SS to follow.     Discharge Plan       Row Name 01/03/24 1035       Plan    Plan Pt was admitted on 1/1/24 from [facility]. Per [facility] pt has 30 day bed hold upon admission. SS to follow.                JORGE Lazo

## 2024-01-03 NOTE — PROGRESS NOTES
[facility] HOSPITALIST PROGRESS NOTE     Patient Identification:  Name:  Brenda Munroe  Age:  73 y.o.  Sex:  female  :  1950  MRN:  [id number]  Visit Number:  [id number]  ROOM: [address]     Primary Care Provider:  Bernadette Arevalo MD    Length of stay in inpatient status:  2    Subjective     Chief Compliant:    Chief Complaint   Patient presents with    Altered Mental Status     History of Presenting Illness:    Patient remains ill today, overnight lost IV access and more agitated, required Haldol then resumed on precedex, CT yesterday couldn't rule out Pneumonia definitively and antibiotics added this AM, repeated CRP and procalcitonin but were unremarkable and have stopped antibiotics, remains on steroids, bipap as needed, AM ABG ordered and reviewed, not retaining C02, prognosis guarded.   Objective     Current Hospital Meds:  amLODIPine, 10 mg, Oral, Nightly  apixaban, 5 mg, Oral, Q12H  baclofen, 5 mg, Oral, BID  budesonide-formoterol, 2 puff, Inhalation, BID - RT   And  tiotropium bromide monohydrate, 2 puff, Inhalation, Daily - RT  clopidogrel, 75 mg, Oral, Daily  donepezil, 10 mg, Oral, Nightly  ferrous sulfate, 325 mg, Oral, Daily With Breakfast  insulin lispro, 2-7 Units, Subcutaneous, 4x Daily AC & at Bedtime  ipratropium-albuterol, 3 mL, Nebulization, Q6H While Awake - RT  isosorbide mononitrate, 90 mg, Oral, Daily  lamoTRIgine, 100 mg, Oral, Daily  lamoTRIgine, 25 mg, Oral, Nightly  levothyroxine, 75 mcg, Oral, Daily  lisinopril, 10 mg, Oral, Q24H  memantine, 10 mg, Oral, Q12H  methylPREDNISolone sodium succinate, 40 mg, Intravenous, Q12H  metoprolol succinate XL, 12.5 mg, Oral, Daily  pantoprazole, 40 mg, Oral, Daily  risperiDONE, 0.5 mg, Oral, BID  rosuvastatin, 40 mg, Oral, Q PM  senna-docusate sodium, 2 tablet, Oral, BID  sertraline, 100 mg, Oral, Daily  sodium chloride, 10 mL, Intravenous, Q12H  sodium chloride, 10 mL, Intravenous, Q12H  [START ON 2024] Vitamin D3,  50,000 Units, Oral, Q7 Days      dexmedetomidine, 0.2-1.5 mcg/kg/hr, Last Rate: Stopped (01/03/24 0590)  Pharmacy to Dose Zosyn,       ----------------------------------------------------------------------------------------------------------------------  Vital Signs:  Temp:  [97.2 °F (36.2 °C)-98.3 °F (36.8 °C)] 97.7 °F (36.5 °C)  Heart Rate:  [45-92] 82  Resp:  [10-26] 20  BP: (104-185)/() 140/72  SpO2:  [90 %-100 %] 90 %  on  Flow (L/min):  [2] 2;   Device (Oxygen Therapy): nasal cannula  Body mass index is 27.73 kg/m².      Intake/Output Summary (Last 24 hours) at 1/3/2024 0916  Last data filed at 1/3/2024 0832  Gross per 24 hour   Intake 146.23 ml   Output 300 ml   Net -153.77 ml      ----------------------------------------------------------------------------------------------------------------------  Physical exam:  Constitutional:  Elderly, Minimal acute distress.      HENT:  Head:  Normocephalic and atraumatic.  Mouth:  Moist mucous membranes.    Eyes:  Conjunctivae and EOM are normal. No scleral icterus.    Neck:  Neck supple.  No JVD present.    Cardiovascular:  Normal rate, regular rhythm and normal heart sounds with no murmur.  Pulmonary/Chest:  No respiratory distress, no wheezes, no crackles, on NC  Abdominal:  Soft. No distension and no tenderness.   Musculoskeletal:  No tenderness, and no deformity.  No red or swollen joints anywhere.    Neurological:  resting, lightly sedated  Skin:  Skin is warm and dry. No rash noted. No pallor.   Peripheral vascular:  No clubbing, no cyanosis, no edema.  Psychiatric: unable to assess due to sedation  : External catheter in place    Edited by: Clarence Gomez MD at 1/3/2024 0943  ----------------------------------------------------------------------------------------------------------------------  WBC/HGB/HCT/PLT   7.72/13.4/46.4/154 (01/03 0829)  BUN/CREAT/GLUC/ALT/AST/SYD/LIP    18/0.70/98/10/20/--/-- (01/03 0829)  LYTES - Na/K/Cl/CO2: 140/3.8/98/33.3*  "(01/03 0829)     pH/pCO2/pO2/HCO3   7.384/64.9/77.6/38.7 (01/03 0816)  No results found for: \"URINECX\"  No results found for: \"BLOODCX\"    I have personally looked at the labs and they are summarized above.  ----------------------------------------------------------------------------------------------------------------------  Detailed radiology reports for the last 24 hours:  CT Chest Without Contrast Diagnostic    Result Date: 1/2/2024  1.  Bibasilar airspace disease with volume loss predominantly representing atelectasis. Superimposed pneumonia not excluded. 2.  Marked cardiomegaly and changes of prior CABG. 3.  Pulmonary vascular congestion and minuscule effusions. 4.  Stable 6.5 mm nodule left upper lobe. 5.  Other incidental/nonacute findings above stable from previous.   This report was finalized on 1/2/2024 10:23 AM by Dr. Erik Jones MD.      CT Head Without Contrast    Result Date: 1/1/2024   No acute intracranial hemorrhage,midline shift,or significant mass effect.   This report was finalized on 1/1/2024 11:20 AM by Magdalene Lux MD.      XR Chest 1 View    Result Date: 1/1/2024   Mild interstitial coarsening, improved in the interval.  No acute cardiopulmonary process identified.   This report was finalized on 1/1/2024 11:17 AM by Magdalene Lux MD.     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     #Acute Metabolic Encephalopathy due to Acute Hypercarbic on Chronic Hypoxic/Hypercarbic Respiratory Failure requiring Non-invasive Positive Pressure Ventilation in setting of underlying COPD/Emphysema with acute " exacerbation  - CT showed bibasilar airspace disease, likely atelectasis but superimposed Pneumonia could not be excluded  - Continue short acting beta agonist and short acting muscarinic antagonist; Continue scheduled Duonebs and Albuterol as needed   - Continue IV glucocorticoids with Methylprednisolone 40mg twice daily  - Continue supplemental oxygen to keep 02 sats 88-92%, caution with over oxygenation as not to suppress respiratory drive  - Continue Precedex drip for agitation  - This AM added antibiotics while awaiting repeat labs, labs today with normal WBC count CRP < 0.3, procalcitonin 0.05, will stop antibiotics at this time as Pneumonia is unlikely   - Admitted to PCU, monitor on telemetry, continuous pulse ox, continue 02 and wean as able    #Hypertension/Hyperlipidemia/Coronary Artery Disease status post CABG   #Diastolic Dysfunction/Elevated proBNP, without overt signs and symptoms Congestive Heart Failure   #Paroxysmal Atrial Fibrillation, on home anticoagulation   #Severe Bilateral Carotid Artery Stenosis   #Mod TR, Mild Pulmonary Hypertension   - Continue Statin, Plavix, Eliquis  - Continue beta blocker, Imdur, ACEI   - Continue monitoring, strict I/O's, trend heart rate and blood pressure     #Pre-Diabetes  - Hgb A1c = 5.2% last checked; No home oral agents, continue FSBG and SSI, add long acting as indicated.      #Hypothyroid  - Continue home Levothyroxine     #Gastroesophageal Reflux Disease/History Peptic Ulcer Disease   - Continue home PPI     #Severe Recurrent Depression with History Psychotic Features  - Continue home regimen     #Dementia with Behavioral Disturbances  - Continue home medications, supportive care     #Tobacco Dependence  - Recommended cessation, nicotine replacement therapy as needed    F: NPO for now  E: Monitor & Replace as needed   N: NPO Diet NPO Type: Strict NPO  PPx: Eliquis   Code Status (Patient has no pulse and is not breathing): CPR   Medical Interventions (Patient  has pulse or is breathing): Full Support    Dispo: Pending workup and clinical improvement, PT/OT consulted and following     *This patient is considered high risk secondary to Acute COPD Exacerbation, Acute Metabolic Encephalopathy, Hypercarbic respiratory failure, requiring Non-invasive Positive Pressure Ventilation .    Edited by: Clarence Gomez MD at 1/3/2024 7834  [facility]

## 2024-01-03 NOTE — PLAN OF CARE
Goal Outcome Evaluation:  Plan of Care Reviewed With: patient        Progress: no change  Outcome Evaluation: pt alert and confused. VSS during shift. Pt on 2LNC. Pt removing monitors during shift. no complaints at this time. will continue to follow plan of care til 7p.

## 2024-01-04 LAB
A-A DO2: 58.3 MMHG (ref 0–300)
ANION GAP SERPL CALCULATED.3IONS-SCNC: 7.5 MMOL/L (ref 5–15)
ARTERIAL PATENCY WRIST A: POSITIVE
ATMOSPHERIC PRESS: 732 MMHG
BASE EXCESS BLDA CALC-SCNC: 8.4 MMOL/L (ref 0–2)
BDY SITE: ABNORMAL
BUN SERPL-MCNC: 23 MG/DL (ref 8–23)
BUN/CREAT SERPL: 31.1 (ref 7–25)
CALCIUM SPEC-SCNC: 9.5 MG/DL (ref 8.6–10.5)
CHLORIDE SERPL-SCNC: 100 MMOL/L (ref 98–107)
CO2 BLDA-SCNC: 35.7 MMOL/L (ref 22–33)
CO2 SERPL-SCNC: 36.5 MMOL/L (ref 22–29)
COHGB MFR BLD: 2 % (ref 0–5)
CREAT SERPL-MCNC: 0.74 MG/DL (ref 0.57–1)
CRP SERPL-MCNC: <0.3 MG/DL (ref 0–0.5)
DEPRECATED RDW RBC AUTO: 75.4 FL (ref 37–54)
EGFRCR SERPLBLD CKD-EPI 2021: 84.5 ML/MIN/1.73
ERYTHROCYTE [DISTWIDTH] IN BLOOD BY AUTOMATED COUNT: 23.6 % (ref 12.3–15.4)
GAS FLOW AIRWAY: 2 LPM
GLUCOSE BLDC GLUCOMTR-MCNC: 103 MG/DL (ref 70–130)
GLUCOSE BLDC GLUCOMTR-MCNC: 103 MG/DL (ref 70–130)
GLUCOSE BLDC GLUCOMTR-MCNC: 118 MG/DL (ref 70–130)
GLUCOSE BLDC GLUCOMTR-MCNC: 160 MG/DL (ref 70–130)
GLUCOSE SERPL-MCNC: 117 MG/DL (ref 65–99)
HCO3 BLDA-SCNC: 34.1 MMOL/L (ref 20–26)
HCT VFR BLD AUTO: 41.9 % (ref 34–46.6)
HCT VFR BLD CALC: 39.3 % (ref 38–51)
HGB BLD-MCNC: 12.4 G/DL (ref 12–15.9)
HGB BLDA-MCNC: 12.8 G/DL (ref 13.5–17.5)
INHALED O2 CONCENTRATION: 28 %
Lab: ABNORMAL
MCH RBC QN AUTO: 26.6 PG (ref 26.6–33)
MCHC RBC AUTO-ENTMCNC: 29.6 G/DL (ref 31.5–35.7)
MCV RBC AUTO: 89.9 FL (ref 79–97)
METHGB BLD QL: 0 % (ref 0–3)
MODALITY: ABNORMAL
OXYHGB MFR BLDV: 94 % (ref 94–99)
PCO2 BLDA: 51.1 MM HG (ref 35–45)
PCO2 TEMP ADJ BLD: ABNORMAL MM[HG]
PH BLDA: 7.43 PH UNITS (ref 7.35–7.45)
PH, TEMP CORRECTED: ABNORMAL
PLATELET # BLD AUTO: 169 10*3/MM3 (ref 140–450)
PMV BLD AUTO: 11.1 FL (ref 6–12)
PO2 BLDA: 76.5 MM HG (ref 83–108)
PO2 TEMP ADJ BLD: ABNORMAL MM[HG]
POTASSIUM SERPL-SCNC: 3.2 MMOL/L (ref 3.5–5.2)
POTASSIUM SERPL-SCNC: 3.8 MMOL/L (ref 3.5–5.2)
POTASSIUM SERPL-SCNC: 4.4 MMOL/L (ref 3.5–5.2)
RBC # BLD AUTO: 4.66 10*6/MM3 (ref 3.77–5.28)
SAO2 % BLDCOA: 95.9 % (ref 94–99)
SODIUM SERPL-SCNC: 144 MMOL/L (ref 136–145)
VENTILATOR MODE: ABNORMAL
WBC NRBC COR # BLD AUTO: 6.9 10*3/MM3 (ref 3.4–10.8)

## 2024-01-04 PROCEDURE — 36600 WITHDRAWAL OF ARTERIAL BLOOD: CPT

## 2024-01-04 PROCEDURE — 97162 PT EVAL MOD COMPLEX 30 MIN: CPT

## 2024-01-04 PROCEDURE — 82375 ASSAY CARBOXYHB QUANT: CPT

## 2024-01-04 PROCEDURE — 99232 SBSQ HOSP IP/OBS MODERATE 35: CPT | Performed by: INTERNAL MEDICINE

## 2024-01-04 PROCEDURE — 83735 ASSAY OF MAGNESIUM: CPT | Performed by: HOSPITALIST

## 2024-01-04 PROCEDURE — 94660 CPAP INITIATION&MGMT: CPT

## 2024-01-04 PROCEDURE — 94761 N-INVAS EAR/PLS OXIMETRY MLT: CPT

## 2024-01-04 PROCEDURE — 97166 OT EVAL MOD COMPLEX 45 MIN: CPT

## 2024-01-04 PROCEDURE — 93005 ELECTROCARDIOGRAM TRACING: CPT | Performed by: HOSPITALIST

## 2024-01-04 PROCEDURE — 84132 ASSAY OF SERUM POTASSIUM: CPT | Performed by: HOSPITALIST

## 2024-01-04 PROCEDURE — 82805 BLOOD GASES W/O2 SATURATION: CPT

## 2024-01-04 PROCEDURE — 94799 UNLISTED PULMONARY SVC/PX: CPT

## 2024-01-04 PROCEDURE — 25010000002 METHYLPREDNISOLONE PER 40 MG

## 2024-01-04 PROCEDURE — 85027 COMPLETE CBC AUTOMATED: CPT | Performed by: INTERNAL MEDICINE

## 2024-01-04 PROCEDURE — 80048 BASIC METABOLIC PNL TOTAL CA: CPT | Performed by: INTERNAL MEDICINE

## 2024-01-04 PROCEDURE — 86140 C-REACTIVE PROTEIN: CPT | Performed by: INTERNAL MEDICINE

## 2024-01-04 PROCEDURE — 83050 HGB METHEMOGLOBIN QUAN: CPT

## 2024-01-04 PROCEDURE — 63710000001 INSULIN LISPRO (HUMAN) PER 5 UNITS

## 2024-01-04 PROCEDURE — 94664 DEMO&/EVAL PT USE INHALER: CPT

## 2024-01-04 PROCEDURE — 84132 ASSAY OF SERUM POTASSIUM: CPT | Performed by: INTERNAL MEDICINE

## 2024-01-04 PROCEDURE — 82948 REAGENT STRIP/BLOOD GLUCOSE: CPT

## 2024-01-04 RX ORDER — POTASSIUM CHLORIDE 20 MEQ/1
40 TABLET, EXTENDED RELEASE ORAL EVERY 4 HOURS
Status: DISCONTINUED | OUTPATIENT
Start: 2024-01-04 | End: 2024-01-04

## 2024-01-04 RX ORDER — POTASSIUM CHLORIDE 1.5 G/1.58G
40 POWDER, FOR SOLUTION ORAL EVERY 4 HOURS
Status: COMPLETED | OUTPATIENT
Start: 2024-01-04 | End: 2024-01-04

## 2024-01-04 RX ADMIN — POTASSIUM CHLORIDE 40 MEQ: 1.5 POWDER, FOR SOLUTION ORAL at 08:42

## 2024-01-04 RX ADMIN — DOCUSATE SODIUM 50 MG AND SENNOSIDES 8.6 MG 2 TABLET: 8.6; 5 TABLET, FILM COATED ORAL at 20:57

## 2024-01-04 RX ADMIN — METHYLPREDNISOLONE SODIUM SUCCINATE 40 MG: 40 INJECTION, POWDER, FOR SOLUTION INTRAMUSCULAR; INTRAVENOUS at 05:31

## 2024-01-04 RX ADMIN — LISINOPRIL 10 MG: 10 TABLET ORAL at 08:44

## 2024-01-04 RX ADMIN — DOCUSATE SODIUM 50 MG AND SENNOSIDES 8.6 MG 2 TABLET: 8.6; 5 TABLET, FILM COATED ORAL at 08:46

## 2024-01-04 RX ADMIN — INSULIN LISPRO 2 UNITS: 100 INJECTION, SOLUTION INTRAVENOUS; SUBCUTANEOUS at 17:23

## 2024-01-04 RX ADMIN — BACLOFEN 5 MG: 10 TABLET ORAL at 08:43

## 2024-01-04 RX ADMIN — AMLODIPINE BESYLATE 10 MG: 10 TABLET ORAL at 20:58

## 2024-01-04 RX ADMIN — RISPERIDONE 0.5 MG: 0.25 TABLET, FILM COATED ORAL at 20:57

## 2024-01-04 RX ADMIN — BACLOFEN 5 MG: 10 TABLET ORAL at 20:57

## 2024-01-04 RX ADMIN — BUDESONIDE AND FORMOTEROL FUMARATE DIHYDRATE 2 PUFF: 160; 4.5 AEROSOL RESPIRATORY (INHALATION) at 19:02

## 2024-01-04 RX ADMIN — BUDESONIDE AND FORMOTEROL FUMARATE DIHYDRATE 2 PUFF: 160; 4.5 AEROSOL RESPIRATORY (INHALATION) at 06:33

## 2024-01-04 RX ADMIN — RISPERIDONE 0.5 MG: 0.25 TABLET, FILM COATED ORAL at 08:43

## 2024-01-04 RX ADMIN — POTASSIUM CHLORIDE 40 MEQ: 1500 TABLET, EXTENDED RELEASE ORAL at 03:16

## 2024-01-04 RX ADMIN — MEMANTINE HYDROCHLORIDE 10 MG: 10 TABLET, FILM COATED ORAL at 08:43

## 2024-01-04 RX ADMIN — MEMANTINE HYDROCHLORIDE 10 MG: 10 TABLET, FILM COATED ORAL at 20:58

## 2024-01-04 RX ADMIN — Medication 10 ML: at 08:44

## 2024-01-04 RX ADMIN — PANTOPRAZOLE SODIUM 40 MG: 40 TABLET, DELAYED RELEASE ORAL at 08:46

## 2024-01-04 RX ADMIN — Medication 10 ML: at 21:04

## 2024-01-04 RX ADMIN — SERTRALINE 100 MG: 50 TABLET, FILM COATED ORAL at 08:42

## 2024-01-04 RX ADMIN — IPRATROPIUM BROMIDE AND ALBUTEROL SULFATE 3 ML: 2.5; .5 SOLUTION RESPIRATORY (INHALATION) at 06:33

## 2024-01-04 RX ADMIN — APIXABAN 5 MG: 5 TABLET, FILM COATED ORAL at 08:44

## 2024-01-04 RX ADMIN — ROSUVASTATIN CALCIUM 40 MG: 20 TABLET, FILM COATED ORAL at 16:52

## 2024-01-04 RX ADMIN — IPRATROPIUM BROMIDE AND ALBUTEROL SULFATE 3 ML: 2.5; .5 SOLUTION RESPIRATORY (INHALATION) at 19:02

## 2024-01-04 RX ADMIN — DONEPEZIL HYDROCHLORIDE 10 MG: 5 TABLET, FILM COATED ORAL at 20:58

## 2024-01-04 RX ADMIN — ISOSORBIDE MONONITRATE 90 MG: 30 TABLET, EXTENDED RELEASE ORAL at 08:43

## 2024-01-04 RX ADMIN — FERROUS SULFATE TAB 325 MG (65 MG ELEMENTAL FE) 325 MG: 325 (65 FE) TAB at 08:43

## 2024-01-04 RX ADMIN — LAMOTRIGINE 25 MG: 100 TABLET ORAL at 20:58

## 2024-01-04 RX ADMIN — CLOPIDOGREL BISULFATE 75 MG: 75 TABLET, FILM COATED ORAL at 08:42

## 2024-01-04 RX ADMIN — LORAZEPAM 0.5 MG: 0.5 TABLET ORAL at 21:05

## 2024-01-04 RX ADMIN — LAMOTRIGINE 100 MG: 100 TABLET ORAL at 08:43

## 2024-01-04 RX ADMIN — METHYLPREDNISOLONE SODIUM SUCCINATE 40 MG: 40 INJECTION, POWDER, FOR SOLUTION INTRAMUSCULAR; INTRAVENOUS at 16:52

## 2024-01-04 RX ADMIN — APIXABAN 5 MG: 5 TABLET, FILM COATED ORAL at 20:58

## 2024-01-04 RX ADMIN — TIOTROPIUM BROMIDE INHALATION SPRAY 2 PUFF: 3.12 SPRAY, METERED RESPIRATORY (INHALATION) at 06:33

## 2024-01-04 RX ADMIN — METOPROLOL SUCCINATE 12.5 MG: 25 TABLET, EXTENDED RELEASE ORAL at 08:42

## 2024-01-04 RX ADMIN — IPRATROPIUM BROMIDE AND ALBUTEROL SULFATE 3 ML: 2.5; .5 SOLUTION RESPIRATORY (INHALATION) at 12:57

## 2024-01-04 RX ADMIN — LEVOTHYROXINE SODIUM 75 MCG: 0.07 TABLET ORAL at 08:42

## 2024-01-04 NOTE — PLAN OF CARE
Goal Outcome Evaluation:  Plan of Care Reviewed With: patient        Progress: improving  Outcome Evaluation: pt alert and confused. VSS during shift. Pt on RA/2LNC, tolerating well. Pt up to chair during shift. no request at this time. Will continue to follow plan of care til 7p.

## 2024-01-04 NOTE — PROGRESS NOTES
Psychiatric HOSPITALIST PROGRESS NOTE     Patient Identification:  Name:  Brenda Munroe  Age:  73 y.o.  Sex:  female  :  1950  MRN:  [id number]  Visit Number:  [id number]  ROOM: [address]     Primary Care Provider:  Bernadette Arevalo MD    Length of stay in inpatient status:  3    Subjective     Chief Compliant:    Chief Complaint   Patient presents with    Altered Mental Status     History of Presenting Illness:    Patient remains ill but stable today, no acute events overnight, no new complaints, denies any fevers or chills, this AM off bipap, on NC, repeat ABG stable, patient more awake and oriented today, no signs and symptoms of infectious processes still, tolerating diet, PT/OT consulted and following, if stable today will transfer to telemetry tomorrow.    Objective     Current Hospital Meds:  amLODIPine, 10 mg, Oral, Nightly  apixaban, 5 mg, Oral, Q12H  baclofen, 5 mg, Oral, BID  budesonide-formoterol, 2 puff, Inhalation, BID - RT   And  tiotropium bromide monohydrate, 2 puff, Inhalation, Daily - RT  clopidogrel, 75 mg, Oral, Daily  donepezil, 10 mg, Oral, Nightly  ferrous sulfate, 325 mg, Oral, Daily With Breakfast  insulin lispro, 2-7 Units, Subcutaneous, 4x Daily AC & at Bedtime  ipratropium-albuterol, 3 mL, Nebulization, Q6H While Awake - RT  isosorbide mononitrate, 90 mg, Oral, Daily  lamoTRIgine, 100 mg, Oral, Daily  lamoTRIgine, 25 mg, Oral, Nightly  levothyroxine, 75 mcg, Oral, Daily  lisinopril, 10 mg, Oral, Q24H  memantine, 10 mg, Oral, Q12H  methylPREDNISolone sodium succinate, 40 mg, Intravenous, Q12H  metoprolol succinate XL, 12.5 mg, Oral, Daily  pantoprazole, 40 mg, Oral, Daily  risperiDONE, 0.5 mg, Oral, BID  rosuvastatin, 40 mg, Oral, Q PM  senna-docusate sodium, 2 tablet, Oral, BID  sertraline, 100 mg, Oral, Daily  sodium chloride, 10 mL, Intravenous, Q12H  sodium chloride, 10 mL, Intravenous, Q12H  [START ON 2024] Vitamin D3, 50,000 Units, Oral, Q7  "Days    ----------------------------------------------------------------------------------------------------------------------  Vital Signs:  Temp:  [97.6 °F (36.4 °C)-99.1 °F (37.3 °C)] 97.6 °F (36.4 °C)  Heart Rate:  [] 79  Resp:  [13-27] 24  BP: (115-171)/(66-99) 115/78  SpO2:  [87 %-100 %] 100 %  on  Flow (L/min):  [2] 2;   Device (Oxygen Therapy): nasal cannula  Body mass index is 28.32 kg/m².      Intake/Output Summary (Last 24 hours) at 1/4/2024 1029  Last data filed at 1/4/2024 0800  Gross per 24 hour   Intake 240 ml   Output 800 ml   Net -560 ml      ----------------------------------------------------------------------------------------------------------------------  Physical exam:  Constitutional:  Elderly, Minimal acute distress.      HENT:  Head:  Normocephalic and atraumatic.  Mouth:  Moist mucous membranes.    Eyes:  Conjunctivae and EOM are normal. No scleral icterus.    Neck:  Neck supple.  No JVD present.    Cardiovascular:  Normal rate, regular rhythm and normal heart sounds with no murmur.  Pulmonary/Chest:  No respiratory distress, no wheezes, no crackles, on NC  Abdominal:  Soft. No distension and no tenderness.   Musculoskeletal:  No tenderness, and no deformity.  No red or swollen joints anywhere.    Neurological:  Awake, oriented to person, no gross focal deficits   Skin:  Skin is warm and dry. No rash noted. No pallor.   Peripheral vascular:  No clubbing, no cyanosis, no edema.  Psychiatric: unable to assess due to sedation  : External catheter in place    Edited by: Clarence Gomez MD at 1/4/2024 1028  ----------------------------------------------------------------------------------------------------------------------  WBC/HGB/HCT/PLT   6.90/12.4/41.9/169 (01/04 0112)  BUN/CREAT/GLUC/ALT/AST/SYD/LIP    23/0.74/117/--/--/--/-- (01/04 0112)  LYTES - Na/K/Cl/CO2: 144/3.2*/100/36.5* (01/04 0112)  pH/pCO2/pO2/HCO3   7.433/51.1/76.5/34.1 (01/04 1014)  No results found for: \"URINECX\"  No " "results found for: \"BLOODCX\"    I have personally looked at the labs and they are summarized above.  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     #Acute Metabolic Encephalopathy due to Acute Hypercarbic on Chronic Hypoxic/Hypercarbic Respiratory Failure requiring Non-invasive Positive Pressure Ventilation in setting of underlying COPD/Emphysema with acute exacerbation  - CT showed bibasilar airspace disease, likely atelectasis but superimposed Pneumonia could not be excluded  - Continue short acting beta agonist and short acting muscarinic antagonist; Continue scheduled Duonebs and Albuterol as needed   - Continue IV glucocorticoids with Methylprednisolone 40mg twice daily  - Continue supplemental oxygen to keep 02 sats 88-92%, caution with over oxygenation as not to suppress respiratory drive  - Continue Precedex drip as needed for agitation  - Admitted to PCU, monitor on telemetry, continuous pulse ox, continue 02 and wean as able    #Hypertension/Hyperlipidemia/Coronary Artery Disease status post CABG   #Diastolic Dysfunction/Elevated proBNP, without overt signs and symptoms Congestive Heart Failure   #Paroxysmal Atrial Fibrillation, on home anticoagulation   #Severe Bilateral Carotid Artery Stenosis   #Mod TR, Mild Pulmonary Hypertension   - Continue Statin, Plavix, Eliquis  - Continue beta blocker, Imdur, ACEI   - Continue " monitoring, strict I/O's, trend heart rate and blood pressure     #Pre-Diabetes  - Hgb A1c = 5.2% last checked; No home oral agents, continue FSBG and SSI, add long acting as indicated.      #Hypothyroid  - Continue home Levothyroxine     #Gastroesophageal Reflux Disease/History Peptic Ulcer Disease   - Continue home PPI     #Severe Recurrent Depression with History Psychotic Features  - Continue home regimen     #Dementia with Behavioral Disturbances  - Continue home medications, supportive care     #Tobacco Dependence  - Recommended cessation, nicotine replacement therapy as needed    F: Oral  E: Monitor & Replace as needed   N: Diet: Diabetic Diets, Cardiac Diets; Healthy Heart (2-3 Na+); Consistent Carbohydrate; Texture: Soft to Chew (NDD 3); Soft to Chew: Chopped Meat; Fluid Consistency: Thin (IDDSI 0)  PPx: Eliquis   Code Status (Patient has no pulse and is not breathing): CPR   Medical Interventions (Patient has pulse or is breathing): Full Support    Dispo: Pending workup and clinical improvement, PT/OT consulted and following     *This patient is considered high risk secondary to Acute COPD Exacerbation, Acute Metabolic Encephalopathy, Hypercarbic respiratory failure, requiring Non-invasive Positive Pressure Ventilation .      Edited by: Clarence Gomze MD at 1/4/2024 1029  [facility]

## 2024-01-04 NOTE — THERAPY EVALUATION
Acute Care - Physical Therapy Initial Evaluation   [place]     Patient Name: Brenda Munroe  : 1950  MRN: [id number]  Today's Date: 2024   Onset of Illness/Injury or Date of Surgery: 24  Visit Dx:     ICD-10-CM ICD-9-CM   1. Acute on chronic respiratory failure with hypercapnia  J96.22 518.84     Patient Active Problem List   Diagnosis    Psychosis    Severe recurrent major depression with psychotic features    COPD (chronic obstructive pulmonary disease)    Coronary artery disease    Disease of thyroid gland    Arthritis    Hypertension    Paroxysmal atrial fibrillation    Chronic headaches    Vision changes    Carotid stenosis, asymptomatic, bilateral    Bradycardia, sinus    Acute respiratory failure with hypoxia and hypercapnia    NSTEMI (non-ST elevated myocardial infarction)    Acute hypercapnic respiratory failure     Past Medical History:   Diagnosis Date    Anxiety     Arthritis     Bell's palsy     Bladder prolapse, female, acquired     Carotid stenosis     COPD (chronic obstructive pulmonary disease)     COPD (chronic obstructive pulmonary disease)     Coronary artery disease     Dementia     Dementia     Depression     Difficulty walking     Disease of thyroid gland     Frequency of urination     GERD (gastroesophageal reflux disease)     Heart attack     Hyperlipidemia     Hypertension     Irregular heart beat     Peptic ulcer disease      Past Surgical History:   Procedure Laterality Date    CARDIAC CATHETERIZATION N/A 2023    Procedure: Left Heart Cath;  Surgeon: Tab Cifuentes MD;  Location: [facility] CATH INVASIVE LOCATION;  Service: Cardiology;  Laterality: N/A;    CARDIAC SURGERY      open heart  in     CYSTOSCOPY N/A 2017    Procedure: CYSTOSCOPY;  Surgeon: Karl Nicolas DO;  Location: [facility] OR;  Service:     OTHER SURGICAL HISTORY      states she had fluid drained no surgery    VAGINAL HYSTERECTOMY W/ ANTERIOR AND POSTERIOR VAGINAL REPAIR N/A  11/8/2017    Procedure: VAGINAL HYSTERECTOMY WITH ANTERIOR, POSTERIOR, AND ENTEROCELE VAGINAL REPAIR;  Surgeon: Karl Nicolas DO;  Location: [facility] OR;  Service:     VAGINAL VAULT SUSPENSION N/A 11/8/2017    Procedure: VAGINAL VAULT SUSPENSION ;  Surgeon: Karl Nicolas DO;  Location:  [facility] OR;  Service:      PT Assessment (last 12 hours)       PT Evaluation and Treatment       Row Name 01/04/24 1317          Physical Therapy Time and Intention    Subjective Information no complaints  -AG     Document Type evaluation  -AG     Mode of Treatment physical therapy  -AG     Patient Effort good  -AG     Symptoms Noted During/After Treatment fatigue  -AG       Row Name 01/04/24 1317          General Information    Patient Profile Reviewed yes  -AG     Onset of Illness/Injury or Date of Surgery 01/01/24  -AG     Referring Physician Dr. Gomez  -AG     Patient Observations agree to therapy;cooperative;alert  -AG     Patient/Family/Caregiver Comments/Observations pt. supine, alert in PCU bed;  has been unable to participate in PT evaluation the past couple days but now is cooperative, answering questions.  -AG     General Observations of Patient Pt. with moments of confusion but mostly answering questions appropriately.  Asks if she is at [facility] or the hospital.  Pt. is cooperative for evaluation today.  -AG     Prior Level of Function min assist:;all household mobility;gait;ADL's  -AG     Equipment Currently Used at Home --  pt. reports using walker for mobility within SNF, usually has someone walking beside her.  She states she performs self care independently.  -AG     Pertinent History of Current Functional Problem pt. admitted with acute hypercapnic respiratory failure  -AG     Existing Precautions/Restrictions fall;oxygen therapy device and L/min  -AG     Limitations/Impairments safety/cognitive  -AG     Equipment Issued to Patient gait belt  -AG     Risks Reviewed patient:;dizziness  -AG      Benefits Reviewed patient:;improve function;increase independence;increase strength;increase balance;increase knowledge;decrease risk of DVT  -AG     Barriers to Rehab cognitive status  -       Row Name 01/04/24 1317          Living Environment    Current Living Arrangements extended care facility  -AG     Home Accessibility wheelchair accessible  -     Primary Care Provided by --  self and facility staff  -[facility] Name 01/04/24 1317          Pain    Pretreatment Pain Rating 0/10 - no pain  -AG     Posttreatment Pain Rating 0/10 - no pain  -[facility] Name 01/04/24 1317          Cognition    Affect/Mental Status (Cognition) confused  -AG     Orientation Status (Cognition) oriented to;person  -AG     Follows Commands (Cognition) follows one-step commands  -     Personal Safety Interventions fall prevention program maintained;gait belt;nonskid shoes/slippers when out of bed;supervised activity  -[facility] Name 01/04/24 1317          Range of Motion (ROM)    Range of Motion ROM is WFL;bilateral lower extremities;bilateral upper extremities  -[facility] Name 01/04/24 1317          Strength (Manual Muscle Testing)    Strength (Manual Muscle Testing) --  L LE 4-/5; R LE 4/5; B UE WFL  -[facility] Name 01/04/24 1317          Sensory Assessment (Somatosensory)    Sensory Assessment (Somatosensory) LE sensation intact  -[facility] Name 01/04/24 1317          Mobility    Extremity Weight-bearing Status --  no restrictions  -[facility] Name 01/04/24 1317          Bed Mobility    Bed Mobility rolling left;rolling right;scooting/bridging;supine-sit;sit-supine  -AG     Rolling Left [place] (Bed Mobility) minimum assist (75% patient effort);nonverbal cues (demo/gesture);verbal cues  -AG     Rolling Right [place] (Bed Mobility) verbal cues;nonverbal cues (demo/gesture);minimum assist (75% patient effort)  -AG     Scooting/Bridging [place] (Bed Mobility) verbal cues;nonverbal cues  (demo/gesture);minimum assist (75% patient effort)  -AG     Supine-Sit [place] (Bed Mobility) verbal cues;nonverbal cues (demo/gesture);minimum assist (75% patient effort)  -AG     Sit-Supine [place] (Bed Mobility) nonverbal cues (demo/gesture);verbal cues;minimum assist (75% patient effort)  -AG     Bed Mobility, Safety Issues decreased use of legs for bridging/pushing;decreased use of arms for pushing/pulling  -AG     Assistive Device (Bed Mobility) bed rails  -AG       Row Name 01/04/24 1317          Transfers    Transfers sit-stand transfer;stand-sit transfer  -AG       Row Name 01/04/24 1317          Sit-Stand Transfer    Sit-Stand [place] (Transfers) minimum assist (75% patient effort);nonverbal cues (demo/gesture);verbal cues  -AG     Assistive Device (Sit-Stand Transfers) walker, front-wheeled  -AG       Row Name 01/04/24 1317          Stand-Sit Transfer    Stand-Sit [place] (Transfers) verbal cues;nonverbal cues (demo/gesture);minimum assist (75% patient effort)  -AG     Assistive Device (Stand-Sit Transfers) walker, front-wheeled  -AG       Row Name 01/04/24 1317          Gait/Stairs (Locomotion)    Comment, (Gait/Stairs) TBA; pt. able to stand at bedside x several seconds w/ min A, RW.  Pt. limited by PCU setting at this time.  SpO2 was erratic but questionable accuracy since pt. was asymptomatic.  RRT arrived to perform ABG at conclusion of treatment.  -AG       Row Name 01/04/24 1317          Balance    Balance Assessment sitting static balance;sitting dynamic balance;sit to stand dynamic balance;standing static balance;standing dynamic balance  -AG     Static Sitting Balance standby assist  -AG     Position, Sitting Balance unsupported;sitting edge of bed  -AG     Static Standing Balance minimal assist;non-verbal cues (demo/gesture);verbal cues  -AG     Dynamic Standing Balance verbal cues;non-verbal cues (demo/gesture);minimal assist  -AG     Position/Device Used, Standing Balance  walker, front-wheeled  -AG       Row Name 01/04/24 1317          Coping    Observed Emotional State cooperative;calm  -AG     Verbalized Emotional State acceptance  -AG     Trust Relationship/Rapport care explained;choices provided;thoughts/feelings acknowledged  -     Family/Support Persons son  -AG     Involvement in Care not present at bedside  -AG     Family/Support System Care support provided;self-care encouraged  -AG       Row Name 01/04/24 1317          Plan of Care Review    Plan of Care Reviewed With patient  -AG     Outcome Evaluation PT evaluation complete.  Pt. requires min A for functional mobility skills including STS w/ RW at bedside, able to stand x several seconds before fatiguing, required return to supine to undergo ABG.  PT will continue to follow and progress within tolerance.  -AG       Row Name 01/04/24 1317          Positioning and Restraints    Pre-Treatment Position in bed  -AG     Post Treatment Position bed  -AG     In Bed supine;call light within reach;encouraged to call for assist;exit alarm on;side rails up x3;heels elevated;with other staff  with RT  -AG       Row Name 01/04/24 1317          Therapy Assessment/Plan (PT)    Patient/Family Therapy Goals Statement (PT) return to SNF  -AG     Functional Level at Time of Evaluation (PT) min A  -AG     PT Diagnosis (PT) impaired functional mobility, endurance  -AG     Rehab Potential (PT) good, to achieve stated therapy goals  -     Criteria for Skilled Interventions Met (PT) yes;meets criteria  -     Therapy Frequency (PT) 2 times/wk  2-5 times/ week per priority  -AG     Predicted Duration of Therapy Intervention (PT) LOS  -AG     Problem List (PT) problems related to;balance;mobility;strength  -AG     Activity Limitations Related to Problem List (PT) unable to ambulate safely;unable to transfer safely;BADLs not performed adequately or safely  -AG       Row Name 01/04/24 1317          Therapy Plan Review/Discharge Plan (PT)     Therapy Plan Review (PT) evaluation/treatment results reviewed;care plan/treatment goals reviewed;risks/benefits reviewed;current/potential barriers reviewed;participants voiced agreement with care plan;participants included;patient  -AG       Row Name 01/04/24 1317          Physical Therapy Goals    Bed Mobility Goal Selection (PT) bed mobility, PT goal 1  -AG     Transfer Goal Selection (PT) transfer, PT goal 1  -AG     Gait Training Goal Selection (PT) gait training, PT goal 1  -AG       Row Name 01/04/24 1317          Bed Mobility Goal 1 (PT)    Activity/Assistive Device (Bed Mobility Goal 1, PT) rolling to left;rolling to right;scooting;sit to supine;supine to sit  -AG     [place] Level/Cues Needed (Bed Mobility Goal 1, PT) standby assist  -AG     Time Frame (Bed Mobility Goal 1, PT) by discharge  -AG       Row Name 01/04/24 1317          Transfer Goal 1 (PT)    Activity/Assistive Device (Transfer Goal 1, PT) sit-to-stand/stand-to-sit;bed-to-chair/chair-to-bed;toilet  -AG     [place] Level/Cues Needed (Transfer Goal 1, PT) standby assist  -AG     Time Frame (Transfer Goal 1, PT) by discharge  -AG       Row Name 01/04/24 1317          Gait Training Goal 1 (PT)    Activity/Assistive Device (Gait Training Goal 1, PT) gait (walking locomotion);assistive device use;decrease fall risk;diminish gait deviation;improve balance and speed;increase endurance/gait distance;walker, rolling  -AG     [place] Level (Gait Training Goal 1, PT) contact guard required  -AG     Distance (Gait Training Goal 1, PT) 30  -AG     Time Frame (Gait Training Goal 1, PT) by discharge  -AG               User Key  (r) = Recorded By, (t) = Taken By, (c) = Cosigned By      Initials Name Provider Type    AG Rosmery Lock, PT Physical Therapist                    Physical Therapy Education       Title: PT OT SLP Therapies (Done)       Topic: Physical Therapy (Done)       Point: Mobility training (Done)       Learning Progress  Summary             Patient Acceptance, E,D, VU,NR by  at 1/4/2024 1315                         Point: Home exercise program (Done)       Learning Progress Summary             Patient Acceptance, E,D, VU,NR by  at 1/4/2024 1315                         Point: Body mechanics (Done)       Learning Progress Summary             Patient Acceptance, E,D, VU,NR by  at 1/4/2024 1315                         Point: Precautions (Done)       Learning Progress Summary             Patient Acceptance, E,D, VU,NR by  at 1/4/2024 1315                                         User Key       Initials Effective Dates Name Provider Type Discipline     06/16/21 -  Rosmery Lock, PT Physical Therapist PT                  PT Recommendation and Plan  Planned Therapy Interventions (PT): balance training, bed mobility training, gait training, home exercise program, transfer training, strengthening, postural re-education, patient/family education  Therapy Frequency (PT): 2 times/wk (2-5 times/ week per priority)  Plan of Care Reviewed With: patient  Outcome Evaluation: PT evaluation complete.  Pt. requires min A for functional mobility skills including STS w/ RW at bedside, able to stand x several seconds before fatiguing, required return to supine to undergo ABG.  PT will continue to follow and progress within tolerance.       Time Calculation:    PT Charges       Row Name 01/04/24 1314             Time Calculation    PT Received On 01/04/24  -      PT Goal Re-Cert Due Date 01/18/24  -                User Key  (r) = Recorded By, (t) = Taken By, (c) = Cosigned By      Initials Name Provider Type     Rosmery Lock, PT Physical Therapist                  Therapy Charges for Today       Code Description Service Date Service Provider Modifiers Qty    [id number]  PT EVAL MOD COMPLEXITY 4 1/4/2024 Rosmery Lock, PT GP 1            PT G-Codes  AM-PAC 6 Clicks Score (PT): 11    Rosmery Lock, PT  1/4/2024

## 2024-01-04 NOTE — PLAN OF CARE
Goal Outcome Evaluation:   VSS at this time. Patient alert and confused. Patient pleasant, resting this shift with no complaints. Safety maintained. Plan of care ongoing 7p-7a.

## 2024-01-05 ENCOUNTER — APPOINTMENT (OUTPATIENT)
Dept: GENERAL RADIOLOGY | Facility: HOSPITAL | Age: 74
DRG: 189 | End: 2024-01-05
Payer: MEDICARE

## 2024-01-05 LAB
ANION GAP SERPL CALCULATED.3IONS-SCNC: 6.4 MMOL/L (ref 5–15)
BUN SERPL-MCNC: 28 MG/DL (ref 8–23)
BUN/CREAT SERPL: 28 (ref 7–25)
CALCIUM SPEC-SCNC: 9.1 MG/DL (ref 8.6–10.5)
CHLORIDE SERPL-SCNC: 102 MMOL/L (ref 98–107)
CO2 SERPL-SCNC: 35.6 MMOL/L (ref 22–29)
CREAT SERPL-MCNC: 1 MG/DL (ref 0.57–1)
CRP SERPL-MCNC: <0.3 MG/DL (ref 0–0.5)
DEPRECATED RDW RBC AUTO: 76 FL (ref 37–54)
EGFRCR SERPLBLD CKD-EPI 2021: 58.9 ML/MIN/1.73
ERYTHROCYTE [DISTWIDTH] IN BLOOD BY AUTOMATED COUNT: 23.7 % (ref 12.3–15.4)
GLUCOSE BLDC GLUCOMTR-MCNC: 102 MG/DL (ref 70–130)
GLUCOSE BLDC GLUCOMTR-MCNC: 108 MG/DL (ref 70–130)
GLUCOSE BLDC GLUCOMTR-MCNC: 111 MG/DL (ref 70–130)
GLUCOSE BLDC GLUCOMTR-MCNC: 145 MG/DL (ref 70–130)
GLUCOSE BLDC GLUCOMTR-MCNC: 99 MG/DL (ref 70–130)
GLUCOSE SERPL-MCNC: 106 MG/DL (ref 65–99)
HCT VFR BLD AUTO: 41.2 % (ref 34–46.6)
HGB BLD-MCNC: 12.4 G/DL (ref 12–15.9)
MAGNESIUM SERPL-MCNC: 1.9 MG/DL (ref 1.6–2.4)
MCH RBC QN AUTO: 27.3 PG (ref 26.6–33)
MCHC RBC AUTO-ENTMCNC: 30.1 G/DL (ref 31.5–35.7)
MCV RBC AUTO: 90.5 FL (ref 79–97)
PLATELET # BLD AUTO: 150 10*3/MM3 (ref 140–450)
PMV BLD AUTO: 11.2 FL (ref 6–12)
POTASSIUM SERPL-SCNC: 3.7 MMOL/L (ref 3.5–5.2)
QT INTERVAL: 382 MS
QTC INTERVAL: 482 MS
RBC # BLD AUTO: 4.55 10*6/MM3 (ref 3.77–5.28)
SODIUM SERPL-SCNC: 144 MMOL/L (ref 136–145)
WBC NRBC COR # BLD AUTO: 6.4 10*3/MM3 (ref 3.4–10.8)

## 2024-01-05 PROCEDURE — 94799 UNLISTED PULMONARY SVC/PX: CPT

## 2024-01-05 PROCEDURE — 97110 THERAPEUTIC EXERCISES: CPT

## 2024-01-05 PROCEDURE — 73090 X-RAY EXAM OF FOREARM: CPT | Performed by: RADIOLOGY

## 2024-01-05 PROCEDURE — 25010000002 ZIPRASIDONE MESYLATE PER 10 MG: Performed by: HOSPITALIST

## 2024-01-05 PROCEDURE — 73060 X-RAY EXAM OF HUMERUS: CPT

## 2024-01-05 PROCEDURE — 73090 X-RAY EXAM OF FOREARM: CPT

## 2024-01-05 PROCEDURE — 25010000002 METHYLPREDNISOLONE PER 40 MG

## 2024-01-05 PROCEDURE — 97116 GAIT TRAINING THERAPY: CPT

## 2024-01-05 PROCEDURE — 99232 SBSQ HOSP IP/OBS MODERATE 35: CPT | Performed by: INTERNAL MEDICINE

## 2024-01-05 PROCEDURE — 93010 ELECTROCARDIOGRAM REPORT: CPT | Performed by: INTERNAL MEDICINE

## 2024-01-05 PROCEDURE — 85027 COMPLETE CBC AUTOMATED: CPT | Performed by: INTERNAL MEDICINE

## 2024-01-05 PROCEDURE — 86140 C-REACTIVE PROTEIN: CPT | Performed by: INTERNAL MEDICINE

## 2024-01-05 PROCEDURE — 82948 REAGENT STRIP/BLOOD GLUCOSE: CPT

## 2024-01-05 PROCEDURE — 80048 BASIC METABOLIC PNL TOTAL CA: CPT | Performed by: INTERNAL MEDICINE

## 2024-01-05 PROCEDURE — 73060 X-RAY EXAM OF HUMERUS: CPT | Performed by: RADIOLOGY

## 2024-01-05 PROCEDURE — 97535 SELF CARE MNGMENT TRAINING: CPT

## 2024-01-05 PROCEDURE — 94761 N-INVAS EAR/PLS OXIMETRY MLT: CPT

## 2024-01-05 PROCEDURE — 94664 DEMO&/EVAL PT USE INHALER: CPT

## 2024-01-05 RX ORDER — METHYLPREDNISOLONE SODIUM SUCCINATE 40 MG/ML
40 INJECTION, POWDER, LYOPHILIZED, FOR SOLUTION INTRAMUSCULAR; INTRAVENOUS EVERY 24 HOURS
Status: DISCONTINUED | OUTPATIENT
Start: 2024-01-06 | End: 2024-01-08

## 2024-01-05 RX ADMIN — OFLOXACIN 50000 UNITS: 300 TABLET, COATED ORAL at 09:07

## 2024-01-05 RX ADMIN — RISPERIDONE 0.5 MG: 0.25 TABLET, FILM COATED ORAL at 09:08

## 2024-01-05 RX ADMIN — ISOSORBIDE MONONITRATE 90 MG: 30 TABLET, EXTENDED RELEASE ORAL at 09:06

## 2024-01-05 RX ADMIN — LAMOTRIGINE 25 MG: 100 TABLET ORAL at 22:07

## 2024-01-05 RX ADMIN — Medication 10 ML: at 21:00

## 2024-01-05 RX ADMIN — FERROUS SULFATE TAB 325 MG (65 MG ELEMENTAL FE) 325 MG: 325 (65 FE) TAB at 09:05

## 2024-01-05 RX ADMIN — IPRATROPIUM BROMIDE AND ALBUTEROL SULFATE 3 ML: 2.5; .5 SOLUTION RESPIRATORY (INHALATION) at 18:57

## 2024-01-05 RX ADMIN — MEMANTINE HYDROCHLORIDE 10 MG: 10 TABLET, FILM COATED ORAL at 22:08

## 2024-01-05 RX ADMIN — BACLOFEN 5 MG: 10 TABLET ORAL at 22:08

## 2024-01-05 RX ADMIN — METOPROLOL SUCCINATE 12.5 MG: 25 TABLET, EXTENDED RELEASE ORAL at 09:10

## 2024-01-05 RX ADMIN — LAMOTRIGINE 100 MG: 100 TABLET ORAL at 09:05

## 2024-01-05 RX ADMIN — ROSUVASTATIN CALCIUM 40 MG: 20 TABLET, FILM COATED ORAL at 17:52

## 2024-01-05 RX ADMIN — LEVOTHYROXINE SODIUM 75 MCG: 0.07 TABLET ORAL at 09:06

## 2024-01-05 RX ADMIN — CLOPIDOGREL BISULFATE 75 MG: 75 TABLET, FILM COATED ORAL at 09:05

## 2024-01-05 RX ADMIN — MEMANTINE HYDROCHLORIDE 10 MG: 10 TABLET, FILM COATED ORAL at 09:04

## 2024-01-05 RX ADMIN — IPRATROPIUM BROMIDE AND ALBUTEROL SULFATE 3 ML: 2.5; .5 SOLUTION RESPIRATORY (INHALATION) at 13:33

## 2024-01-05 RX ADMIN — SERTRALINE 100 MG: 50 TABLET, FILM COATED ORAL at 09:09

## 2024-01-05 RX ADMIN — AMLODIPINE BESYLATE 10 MG: 10 TABLET ORAL at 22:07

## 2024-01-05 RX ADMIN — Medication 10 ML: at 09:19

## 2024-01-05 RX ADMIN — TIOTROPIUM BROMIDE INHALATION SPRAY 2 PUFF: 3.12 SPRAY, METERED RESPIRATORY (INHALATION) at 06:36

## 2024-01-05 RX ADMIN — METHYLPREDNISOLONE SODIUM SUCCINATE 40 MG: 40 INJECTION, POWDER, FOR SOLUTION INTRAMUSCULAR; INTRAVENOUS at 05:17

## 2024-01-05 RX ADMIN — ZIPRASIDONE MESYLATE 10 MG: 20 INJECTION, POWDER, LYOPHILIZED, FOR SOLUTION INTRAMUSCULAR at 00:07

## 2024-01-05 RX ADMIN — DOCUSATE SODIUM 50 MG AND SENNOSIDES 8.6 MG 2 TABLET: 8.6; 5 TABLET, FILM COATED ORAL at 09:05

## 2024-01-05 RX ADMIN — BACLOFEN 5 MG: 10 TABLET ORAL at 09:08

## 2024-01-05 RX ADMIN — BUDESONIDE AND FORMOTEROL FUMARATE DIHYDRATE 2 PUFF: 160; 4.5 AEROSOL RESPIRATORY (INHALATION) at 18:57

## 2024-01-05 RX ADMIN — LISINOPRIL 10 MG: 10 TABLET ORAL at 09:06

## 2024-01-05 RX ADMIN — DONEPEZIL HYDROCHLORIDE 10 MG: 5 TABLET, FILM COATED ORAL at 22:07

## 2024-01-05 RX ADMIN — APIXABAN 5 MG: 5 TABLET, FILM COATED ORAL at 09:07

## 2024-01-05 RX ADMIN — BUDESONIDE AND FORMOTEROL FUMARATE DIHYDRATE 2 PUFF: 160; 4.5 AEROSOL RESPIRATORY (INHALATION) at 06:36

## 2024-01-05 RX ADMIN — APIXABAN 5 MG: 5 TABLET, FILM COATED ORAL at 22:07

## 2024-01-05 RX ADMIN — Medication 10 ML: at 09:20

## 2024-01-05 RX ADMIN — PANTOPRAZOLE SODIUM 40 MG: 40 TABLET, DELAYED RELEASE ORAL at 09:09

## 2024-01-05 RX ADMIN — IPRATROPIUM BROMIDE AND ALBUTEROL SULFATE 3 ML: 2.5; .5 SOLUTION RESPIRATORY (INHALATION) at 06:36

## 2024-01-05 RX ADMIN — DOCUSATE SODIUM 50 MG AND SENNOSIDES 8.6 MG 2 TABLET: 8.6; 5 TABLET, FILM COATED ORAL at 22:08

## 2024-01-05 NOTE — PLAN OF CARE
Goal Outcome Evaluation:   Pt remains alert to self only throughout shift in addition to being restless and agitated. Pt has removed 3 Ivs on her own, made several attempts to get out of bed without assistance, and has been verbally abusive towards nursing staff. All attempts of reorienting pt have been unsuccessful. Mattie JUAN notified, see orders. Pt continues to be restless. Plan of care ongoing.

## 2024-01-05 NOTE — PLAN OF CARE
Problem: Fall Injury Risk  Goal: Absence of Fall and Fall-Related Injury  Outcome: Ongoing, Progressing     Problem: Skin Injury Risk Increased  Goal: Skin Health and Integrity  Outcome: Ongoing, Progressing     Problem: Adult Inpatient Plan of Care  Goal: Plan of Care Review  Outcome: Ongoing, Progressing  Flowsheets (Taken 1/5/2024 1021)  Progress: improving  Plan of Care Reviewed With: patient  Goal: Patient-Specific Goal (Individualized)  Outcome: Ongoing, Progressing  Goal: Absence of Hospital-Acquired Illness or Injury  Outcome: Ongoing, Progressing  Goal: Optimal Comfort and Wellbeing  Outcome: Ongoing, Progressing  Intervention: Provide Person-Centered Care  Recent Flowsheet Documentation  Taken 1/5/2024 0810 by Jourdan Vasquez, RN  Trust Relationship/Rapport:   care explained   choices provided   questions answered   questions encouraged   reassurance provided   thoughts/feelings acknowledged  Goal: Readiness for Transition of Care  Outcome: Ongoing, Progressing     Problem: Breathing Pattern Ineffective  Goal: Effective Breathing Pattern  Outcome: Ongoing, Progressing  Intervention: Promote Improved Breathing Pattern  Recent Flowsheet Documentation  Taken 1/5/2024 0810 by Jourdan Vasquez, RN  Supportive Measures:   self-care encouraged   self-reflection promoted   self-responsibility promoted   verbalization of feelings encouraged   Goal Outcome Evaluation:  Plan of Care Reviewed With: patient        Progress: improving

## 2024-01-05 NOTE — THERAPY TREATMENT NOTE
Acute Care - Physical Therapy Treatment Note   [facility]     Patient Name: Brenda Munroe  : 1950  MRN: [id number]  Today's Date: 2024   Onset of Illness/Injury or Date of Surgery: 24  Visit Dx:     ICD-10-CM ICD-9-CM   1. Acute on chronic respiratory failure with hypercapnia  J96.22 518.84     Patient Active Problem List   Diagnosis    Psychosis    Severe recurrent major depression with psychotic features    COPD (chronic obstructive pulmonary disease)    Coronary artery disease    Disease of thyroid gland    Arthritis    Hypertension    Paroxysmal atrial fibrillation    Chronic headaches    Vision changes    Carotid stenosis, asymptomatic, bilateral    Bradycardia, sinus    Acute respiratory failure with hypoxia and hypercapnia    NSTEMI (non-ST elevated myocardial infarction)    Acute hypercapnic respiratory failure     Past Medical History:   Diagnosis Date    Anxiety     Arthritis     Bell's palsy     Bladder prolapse, female, acquired     Carotid stenosis     COPD (chronic obstructive pulmonary disease)     COPD (chronic obstructive pulmonary disease)     Coronary artery disease     Dementia     Dementia     Depression     Difficulty walking     Disease of thyroid gland     Frequency of urination     GERD (gastroesophageal reflux disease)     Heart attack     Hyperlipidemia     Hypertension     Irregular heart beat     Peptic ulcer disease      Past Surgical History:   Procedure Laterality Date    CARDIAC CATHETERIZATION N/A 2023    Procedure: Left Heart Cath;  Surgeon: Tab Cifuentes MD;  Location: [facility] CATH INVASIVE LOCATION;  Service: Cardiology;  Laterality: N/A;    CARDIAC SURGERY      open heart  in     CYSTOSCOPY N/A 2017    Procedure: CYSTOSCOPY;  Surgeon: Karl Nicolas DO;  Location: [facility] OR;  Service:     OTHER SURGICAL HISTORY      states she had fluid drained no surgery    VAGINAL HYSTERECTOMY W/ ANTERIOR AND POSTERIOR VAGINAL REPAIR N/A  11/8/2017    Procedure: VAGINAL HYSTERECTOMY WITH ANTERIOR, POSTERIOR, AND ENTEROCELE VAGINAL REPAIR;  Surgeon: Karl Nicolas DO;  Location:  [facility] OR;  Service:     VAGINAL VAULT SUSPENSION N/A 11/8/2017    Procedure: VAGINAL VAULT SUSPENSION ;  Surgeon: Karl Nicolas DO;  Location:  [facility] OR;  Service:      PT Assessment (last 12 hours)       PT Evaluation and Treatment       Row Name 01/05/24 1451          Physical Therapy Time and Intention    Subjective Information no complaints  -     Document Type therapy note (daily note)  -     Mode of Treatment physical therapy  -HC     Patient Effort good  -     Comment Pt and RN Jourdan in agreement for PT. Pt completed EOB exercises until tolerance. Walked 15' with RW in room with min A secondary to confusion. Pt requested to stay sitting EOB, Notified RN.  -       Row Name 01/05/24 7215          General Information    Patient Profile Reviewed yes  -     Equipment Currently Used at Home --  pt. reports using walker for mobility within SNF, usually has someone walking beside her.  She states she performs self care independently.  -     Existing Precautions/Restrictions fall;oxygen therapy device and L/min  -     Limitations/Impairments safety/cognitive  -       Row Name 01/05/24 145          Living Environment    Primary Care Provided by --  self and facility staff  -       Row Name 01/05/24 1190          Pain    Pretreatment Pain Rating 0/10 - no pain  -     Posttreatment Pain Rating 0/10 - no pain  -       Row Name 01/05/24 0996          Cognition    Affect/Mental Status (Cognition) confused  -     Orientation Status (Cognition) oriented to;person  -HC     Follows Commands (Cognition) follows one-step commands  -     Personal Safety Interventions fall prevention program maintained;gait belt;supervised activity;nonskid shoes/slippers when out of bed  -       Row Name 01/05/24 7036          Mobility    Extremity Weight-bearing  Status --  no restrictions  -       Row Name 01/05/24 1459          Bed Mobility    Bed Mobility rolling left;rolling right;scooting/bridging;supine-sit;sit-supine  -     Comment, (Bed Mobility) sitting EOB  -       Row Name 01/05/24 1459          Transfers    Transfers sit-stand transfer;stand-sit transfer  -       Row Name 01/05/24 1459          Sit-Stand Transfer    Sit-Stand [place] (Transfers) nonverbal cues (demo/gesture);verbal cues;contact guard  -     Assistive Device (Sit-Stand Transfers) walker, front-wheeled  -       Row Name 01/05/24 1459          Stand-Sit Transfer    Stand-Sit [place] (Transfers) verbal cues;nonverbal cues (demo/gesture);contact guard  -HC     Assistive Device (Stand-Sit Transfers) walker, front-wheeled  -       Row Name 01/05/24 1459          Gait/Stairs (Locomotion)    [place] Level (Gait) minimum assist (75% patient effort)  -     Assistive Device (Gait) walker, front-wheeled  -     Patient was able to Ambulate yes  -     Distance in Feet (Gait) 15'  -       Row Name 01/05/24 1459          Motor Skills    Therapeutic Exercise other (see comments)  Sitting: AP, LAQ, March, knees in/out  -       Row Name 01/05/24 1459          Coping    Observed Emotional State cooperative;calm  -     Verbalized Emotional State acceptance  -     Family/Support Persons son  -     Involvement in Care not present at bedside  -       Row Name 01/05/24 1453          Positioning and Restraints    Pre-Treatment Position in bed  -     Post Treatment Position bed  -HC     In Bed sitting EOB;call light within reach;encouraged to call for assist;notified nsg;exit alarm on  -       Row Name 01/05/24 1457          Therapy Assessment/Plan (PT)    Rehab Potential (PT) good, to achieve stated therapy goals  -     Criteria for Skilled Interventions Met (PT) yes;meets criteria  -     Therapy Frequency (PT) 2 times/wk  2-5 times/ week per priority  -     Problem  List (PT) problems related to;balance;mobility;strength  -     Activity Limitations Related to Problem List (PT) unable to ambulate safely;unable to transfer safely;BADLs not performed adequately or safely  -       Row Name 01/05/24 1459          Physical Therapy Goals    Bed Mobility Goal Selection (PT) bed mobility, PT goal 1  -HC     Transfer Goal Selection (PT) transfer, PT goal 1  -HC     Gait Training Goal Selection (PT) gait training, PT goal 1  -       Row Name 01/05/24 1459          Bed Mobility Goal 1 (PT)    Activity/Assistive Device (Bed Mobility Goal 1, PT) rolling to left;rolling to right;scooting;sit to supine;supine to sit  -     [place] Level/Cues Needed (Bed Mobility Goal 1, PT) standby assist  -HC     Time Frame (Bed Mobility Goal 1, PT) by discharge  -       Row Name 01/05/24 1459          Transfer Goal 1 (PT)    Activity/Assistive Device (Transfer Goal 1, PT) sit-to-stand/stand-to-sit;bed-to-chair/chair-to-bed;toilet  -     [place] Level/Cues Needed (Transfer Goal 1, PT) standby assist  -HC     Time Frame (Transfer Goal 1, PT) by discharge  -       Row Name 01/05/24 1459          Gait Training Goal 1 (PT)    Activity/Assistive Device (Gait Training Goal 1, PT) gait (walking locomotion);assistive device use;decrease fall risk;diminish gait deviation;improve balance and speed;increase endurance/gait distance;walker, rolling  -HC     [place] Level (Gait Training Goal 1, PT) contact guard required  -HC     Distance (Gait Training Goal 1, PT) 30  -HC     Time Frame (Gait Training Goal 1, PT) by discharge  -               User Key  (r) = Recorded By, (t) = Taken By, (c) = Cosigned By      Initials Name Provider Type    Cynthia Barreto PTA Physical Therapist Assistant                    Physical Therapy Education       Title: PT OT SLP Therapies (Done)       Topic: Physical Therapy (Done)       Point: Mobility training (Done)       Learning Progress Summary              Patient Acceptance, E,D, VU,NR by  at 1/4/2024 1315                         Point: Home exercise program (Done)       Learning Progress Summary             Patient Acceptance, E,D, VU,NR by  at 1/4/2024 1315                         Point: Body mechanics (Done)       Learning Progress Summary             Patient Acceptance, E,D, VU,NR by  at 1/4/2024 1315                         Point: Precautions (Done)       Learning Progress Summary             Patient Acceptance, E,D, VU,NR by  at 1/4/2024 1315                                         User Key       Initials Effective Dates Name Provider Type Discipline     06/16/21 -  Rosmery Lock, PT Physical Therapist PT                  PT Recommendation and Plan  Therapy Frequency (PT): 2 times/wk (2-5 times/ week per priority)          Time Calculation:    PT Charges       Row Name 01/05/24 1508             Time Calculation    PT Received On 01/05/24  -         Time Calculation- PT    Total Timed Code Minutes- PT 23 minute(s)  -                User Key  (r) = Recorded By, (t) = Taken By, (c) = Cosigned By      Initials Name Provider Type     Cynthia Rosen PTA Physical Therapist Assistant                  Therapy Charges for Today       Code Description Service Date Service Provider Modifiers Qty    18854236757 HC GAIT TRAINING EA 15 MIN 1/5/2024 Cynthia Rosen PTA GP, CQ 1    01664040059 HC PT THER PROC EA 15 MIN 1/5/2024 Cynthia Rosen PTA GP, CQ 1            PT G-Codes  AM-PAC 6 Clicks Score (PT): 11    Cynthia Rosen PTA  1/5/2024

## 2024-01-05 NOTE — THERAPY TREATMENT NOTE
Acute Care - Occupational Therapy Treatment Note  [facility]     Patient Name: Brenda Munroe  : 1950  MRN: [id number]  Today's Date: 2024  Onset of Illness/Injury or Date of Surgery: 24     Referring Physician: Dr. Gomez    Admit Date: 2024       ICD-10-CM ICD-9-CM   1. Acute on chronic respiratory failure with hypercapnia  J96.22 518.84     Patient Active Problem List   Diagnosis    Psychosis    Severe recurrent major depression with psychotic features    COPD (chronic obstructive pulmonary disease)    Coronary artery disease    Disease of thyroid gland    Arthritis    Hypertension    Paroxysmal atrial fibrillation    Chronic headaches    Vision changes    Carotid stenosis, asymptomatic, bilateral    Bradycardia, sinus    Acute respiratory failure with hypoxia and hypercapnia    NSTEMI (non-ST elevated myocardial infarction)    Acute hypercapnic respiratory failure     Past Medical History:   Diagnosis Date    Anxiety     Arthritis     Bell's palsy     Bladder prolapse, female, acquired     Carotid stenosis     COPD (chronic obstructive pulmonary disease)     COPD (chronic obstructive pulmonary disease)     Coronary artery disease     Dementia     Dementia     Depression     Difficulty walking     Disease of thyroid gland     Frequency of urination     GERD (gastroesophageal reflux disease)     Heart attack     Hyperlipidemia     Hypertension     Irregular heart beat     Peptic ulcer disease      Past Surgical History:   Procedure Laterality Date    CARDIAC CATHETERIZATION N/A 2023    Procedure: Left Heart Cath;  Surgeon: Tab Cifuentes MD;  Location: [facility] CATH INVASIVE LOCATION;  Service: Cardiology;  Laterality: N/A;    CARDIAC SURGERY      open heart  in     CYSTOSCOPY N/A 2017    Procedure: CYSTOSCOPY;  Surgeon: Karl Nicolas DO;  Location: [facility] OR;  Service:     OTHER SURGICAL HISTORY      states she had fluid drained no surgery    VAGINAL  HYSTERECTOMY W/ ANTERIOR AND POSTERIOR VAGINAL REPAIR N/A 11/8/2017    Procedure: VAGINAL HYSTERECTOMY WITH ANTERIOR, POSTERIOR, AND ENTEROCELE VAGINAL REPAIR;  Surgeon: Karl Nicolas DO;  Location: [facility] OR;  Service:     VAGINAL VAULT SUSPENSION N/A 11/8/2017    Procedure: VAGINAL VAULT SUSPENSION ;  Surgeon: Karl Nicolas DO;  Location: [facility] OR;  Service:          OT ASSESSMENT FLOWSHEET (last 12 hours)       OT Evaluation and Treatment       Row Name 01/05/24 [facility]                   OT Time and Intention    Subjective Information no complaints  -LA        Document Type therapy note (daily note)  -LA        Mode of Treatment occupational therapy  -LA        Patient Effort fair  -LA        Symptoms Noted During/After Treatment none  -LA           General Information    Patient Profile Reviewed yes  -LA        General Observations of Patient Patient agreeable to therapy this date. Patient pleasantly confused. Increased cues required due to cognition.  -LA        Existing Precautions/Restrictions fall;oxygen therapy device and L/min  -LA           Cognition    Affect/Mental Status (Cognition) confused  -LA        Orientation Status (Cognition) oriented to;person  -LA        Follows Commands (Cognition) repetition of directions required;verbal cues/prompting required  -LA           Toileting Assessment/Training    [place] Level (Toileting) minimum assist (75% patient effort);moderate assist (50% patient effort)  -LA           Bed Mobility    Supine-Sit [place] (Bed Mobility) minimum assist (75% patient effort);verbal cues  -LA        Sit-Supine [place] (Bed Mobility) minimum assist (75% patient effort);verbal cues  -LA           Sit-Stand Transfer    Sit-Stand [place] (Transfers) minimum assist (75% patient effort);verbal cues  -LA           Motor Skills    Motor Skills coordination;functional endurance  -LA        Coordination WFL;bilateral;upper extremity  -LA         Functional Endurance fair  -LA        Therapeutic Exercise shoulder;elbow/forearm;wrist;hand  -LA        Additional Documentation --  AROM exercises completed from EOB sitting position this date 15 x2 sets  -LA           Balance    Dynamic Standing Balance minimal assist  -LA           Plan of Care Review    Plan of Care Reviewed With patient  -LA        Progress improving  -LA           Positioning and Restraints    Pre-Treatment Position in bed  -LA        Post Treatment Position bed  -LA        In Bed supine;call light within reach;encouraged to call for assist;exit alarm on  -LA                  User Key  (r) = Recorded By, (t) = Taken By, (c) = Cosigned By      Initials Name Effective Dates    LA Mellissa Argueta OT 02/14/22 -                            OT Recommendation and Plan     Plan of Care Review  Plan of Care Reviewed With: patient  Progress: improving  Plan of Care Reviewed With: patient        Time Calculation:     Therapy Charges for Today       Code Description Service Date Service Provider Modifiers Qty    79258162419 HC OT SELF CARE/MGMT/TRAIN EA 15 MIN 1/5/2024 Mellissa Argueta OT GO 1    00970490304 HC OT THER PROC EA 15 MIN 1/5/2024 Mellissa Argueta OT GO 1                 Mellissa Argueta OT  1/5/2024

## 2024-01-05 NOTE — PROGRESS NOTES
[facility] HOSPITALIST PROGRESS NOTE     Patient Identification:  Name:  Brenda Munroe  Age:  73 y.o.  Sex:  female  :  1950  MRN:  [id number]  Visit Number:  [id number]  ROOM: [address] [place] [address]     Primary Care Provider:  Bernadette Arevalo MD    Length of stay in inpatient status:  4    Subjective     Chief Compliant:    Chief Complaint   Patient presents with    Altered Mental Status     History of Presenting Illness:    Patient remains ill but stable today, no acute events overnight, no new complaints, denies any fevers or chills, has had intermittent agitation but respiratory status has been much improved, weaning to IV steroids once daily, continue to monitor but hopefully return to nursing home soon.   Objective     Current Hospital Meds:  amLODIPine, 10 mg, Oral, Nightly  apixaban, 5 mg, Oral, Q12H  baclofen, 5 mg, Oral, BID  budesonide-formoterol, 2 puff, Inhalation, BID - RT   And  tiotropium bromide monohydrate, 2 puff, Inhalation, Daily - RT  clopidogrel, 75 mg, Oral, Daily  donepezil, 10 mg, Oral, Nightly  ferrous sulfate, 325 mg, Oral, Daily With Breakfast  insulin lispro, 2-7 Units, Subcutaneous, 4x Daily AC & at Bedtime  ipratropium-albuterol, 3 mL, Nebulization, Q6H While Awake - RT  isosorbide mononitrate, 90 mg, Oral, Daily  lamoTRIgine, 100 mg, Oral, Daily  lamoTRIgine, 25 mg, Oral, Nightly  levothyroxine, 75 mcg, Oral, Daily  lisinopril, 10 mg, Oral, Q24H  memantine, 10 mg, Oral, Q12H  [START ON 2024] methylPREDNISolone sodium succinate, 40 mg, Intravenous, Q24H  metoprolol succinate XL, 12.5 mg, Oral, Daily  pantoprazole, 40 mg, Oral, Daily  risperiDONE, 0.5 mg, Oral, BID  rosuvastatin, 40 mg, Oral, Q PM  senna-docusate sodium, 2 tablet, Oral, BID  sertraline, 100 mg, Oral, Daily  sodium chloride, 10 mL, Intravenous, Q12H  sodium chloride, 10 mL, Intravenous, Q12H  Vitamin D3, 50,000 Units, Oral, Q7  "Days    ----------------------------------------------------------------------------------------------------------------------  Vital Signs:  Temp:  [97.7 °F (36.5 °C)-98.6 °F (37 °C)] 97.7 °F (36.5 °C)  Heart Rate:  [57-98] 68  Resp:  [18-20] 18  BP: (108-160)/(60-78) 126/60  SpO2:  [87 %-99 %] 93 %  on  Flow (L/min):  [2-2.5] 2;   Device (Oxygen Therapy): nasal cannula  Body mass index is 26.24 kg/m².      Intake/Output Summary (Last 24 hours) at 1/5/2024 1141  Last data filed at 1/5/2024 0800  Gross per 24 hour   Intake 360 ml   Output --   Net 360 ml      ----------------------------------------------------------------------------------------------------------------------  Physical exam:  Constitutional:  Elderly, No acute distress.      HENT:  Head:  Normocephalic and atraumatic.  Mouth:  Moist mucous membranes.    Eyes:  Conjunctivae and EOM are normal. No scleral icterus.    Neck:  Neck supple.  No JVD present.    Cardiovascular:  Normal rate, regular rhythm and normal heart sounds with no murmur.  Pulmonary/Chest:  No respiratory distress, no wheezes, no crackles, on NC  Abdominal:  Soft. No distension and no tenderness.   Musculoskeletal:  No tenderness, and no deformity.  No red or swollen joints anywhere.    Neurological:  Awake, oriented to person, no gross focal deficits   Skin:  Skin is warm and dry. No rash noted. No pallor.   Peripheral vascular:  No clubbing, no cyanosis, no edema.  Psychiatric: unable to assess due to sedation  : External catheter in place    Edited by: Clarence Gomez MD at 1/5/2024 1140  ----------------------------------------------------------------------------------------------------------------------  WBC/HGB/HCT/PLT   6.40/12.4/41.2/150 (01/05 0222)  BUN/CREAT/GLUC/ALT/AST/SYD/LIP    28/1.00/106/--/--/--/-- (01/05 0222)  LYTES - Na/K/Cl/CO2: 144/3.7/102/35.6* (01/05 0222)     No results found for: \"URINECX\"  No results found for: \"BLOODCX\"    I have personally looked at the " labs and they are summarized above.  ----------------------------------------------------------------------------------------------------------------------  Detailed radiology reports for the last 24 hours:  XR Humerus Right    Result Date: 1/5/2024   1.  No acute fracture or dislocation. 2.  No foreign body.  This report was finalized on 1/5/2024 2:04 AM by Mauricio Etienne MD.      XR Forearm 2 View Right    Result Date: 1/5/2024   1.  No acute fracture or dislocation. 2.  No acute foreign body.  This report was finalized on 1/5/2024 2:03 AM by Mauricio Etienne MD.      XR Humerus Left    Result Date: 1/5/2024   1.  No acute fracture or dislocation. 2.  No foreign body.   This report was finalized on 1/5/2024 2:02 AM by Mauricio Etienne MD.      XR Forearm 2 View Left    Result Date: 1/5/2024   1.  No acute fracture or dislocation. 2.  IV catheter and cannula in place. 3.  No displaced foreign body.   This report was finalized on 1/5/2024 2:01 AM by Mauricio Etienne MD.     Assessment & Plan    73F Nursing Home Resident, Smoker [facility] GERD, Severe Recurrent Depression with History Psychotic Features, Dementia with Behavioral Disturbances, Hypothyroid, Severe bilateral Carotid Artery Stenosis, COPD/Emphysema, Hypertension, Hyperlipidemia, Coronary Artery Disease status post CABG (1999), Paroxysmal Atrial Fibrillation, presented to [facility] emergency room 1/1 with altered mental status.     #Acute Metabolic Encephalopathy due to Acute Hypercarbic on Chronic Hypoxic/Hypercarbic Respiratory Failure requiring Non-invasive Positive Pressure Ventilation in setting of underlying COPD/Emphysema with acute exacerbation  - CT showed bibasilar airspace disease, likely atelectasis but superimposed Pneumonia could not be excluded  - Continue short acting beta agonist and short acting muscarinic antagonist; Continue scheduled Duonebs and Albuterol as needed   - Continue IV glucocorticoids with Methylprednisolone 40mg, wean to  daily   - Continue supplemental oxygen to keep 02 sats 88-92%, caution with over oxygenation as not to suppress respiratory drive  - Monitor on telemetry, continuous pulse ox, continue 02 and wean as able    #Hypertension/Hyperlipidemia/Coronary Artery Disease status post CABG   #Diastolic Dysfunction/Elevated proBNP, without overt signs and symptoms Congestive Heart Failure   #Paroxysmal Atrial Fibrillation, on home anticoagulation   #Severe Bilateral Carotid Artery Stenosis   #Mod TR, Mild Pulmonary Hypertension   - Continue Statin, Plavix, Eliquis  - Continue beta blocker, Imdur, ACEI   - Continue monitoring, strict I/O's, trend heart rate and blood pressure     #Pre-Diabetes  - Hgb A1c = 5.2% last checked; No home oral agents, continue FSBG and SSI, add long acting as indicated.      #Hypothyroid  - Continue home Levothyroxine     #Gastroesophageal Reflux Disease/History Peptic Ulcer Disease   - Continue home PPI     #Severe Recurrent Depression with History Psychotic Features  - Continue home regimen     #Dementia with Behavioral Disturbances  - Continue home medications, supportive care     #Tobacco Dependence  - Recommended cessation, nicotine replacement therapy as needed    F: Oral  E: Monitor & Replace as needed   N: Diet: Diabetic Diets, Cardiac Diets; Healthy Heart (2-3 Na+); Consistent Carbohydrate; Texture: Soft to Chew (NDD 3); Soft to Chew: Chopped Meat; Fluid Consistency: Thin (IDDSI 0)  PPx: Eliquis   Code Status (Patient has no pulse and is not breathing): CPR   Medical Interventions (Patient has pulse or is breathing): Full Support    Dispo: Pending clinical improvement, PT/OT consulted and following, possibly return to nursing home 1-2 days with continued clinical stability.     *This patient is considered high risk secondary to Acute COPD Exacerbation, Acute Metabolic Encephalopathy, Hypercarbic respiratory failure, requiring Non-invasive Positive Pressure Ventilation    Edited by: Jason  MD Clarence at 1/5/2024 1141  [facility]

## 2024-01-05 NOTE — CASE MANAGEMENT/SOCIAL WORK
Discharge Planning Assessment  [facility]     Patient Name: Brenda Munroe  MRN: [id number]  Today's Date: 1/5/2024    Admit Date: 1/1/2024    Plan: Pt was admitted on 1/1/24 from [facility]. Per Pho[facility] pt has 30 day bed hold upon admission. SS to follow.   Discharge Plan       Row Name 01/05/24 0953       Plan    Plan Pt was admitted on 1/1/24 from [facility]. Per [facility] pt has 30 day bed hold upon admission. SS to follow.          JORGE Lazo

## 2024-01-06 LAB
ANION GAP SERPL CALCULATED.3IONS-SCNC: 5.1 MMOL/L (ref 5–15)
BUN SERPL-MCNC: 38 MG/DL (ref 8–23)
BUN/CREAT SERPL: 29.2 (ref 7–25)
CALCIUM SPEC-SCNC: 8.8 MG/DL (ref 8.6–10.5)
CHLORIDE SERPL-SCNC: 102 MMOL/L (ref 98–107)
CO2 SERPL-SCNC: 33.9 MMOL/L (ref 22–29)
CREAT SERPL-MCNC: 1.3 MG/DL (ref 0.57–1)
CRP SERPL-MCNC: <0.3 MG/DL (ref 0–0.5)
DEPRECATED RDW RBC AUTO: 77.2 FL (ref 37–54)
EGFRCR SERPLBLD CKD-EPI 2021: 43 ML/MIN/1.73
ERYTHROCYTE [DISTWIDTH] IN BLOOD BY AUTOMATED COUNT: 23.1 % (ref 12.3–15.4)
GLUCOSE BLDC GLUCOMTR-MCNC: 155 MG/DL (ref 70–130)
GLUCOSE BLDC GLUCOMTR-MCNC: 166 MG/DL (ref 70–130)
GLUCOSE BLDC GLUCOMTR-MCNC: 83 MG/DL (ref 70–130)
GLUCOSE BLDC GLUCOMTR-MCNC: 89 MG/DL (ref 70–130)
GLUCOSE SERPL-MCNC: 89 MG/DL (ref 65–99)
HCT VFR BLD AUTO: 37.7 % (ref 34–46.6)
HGB BLD-MCNC: 11.2 G/DL (ref 12–15.9)
MCH RBC QN AUTO: 27.5 PG (ref 26.6–33)
MCHC RBC AUTO-ENTMCNC: 29.7 G/DL (ref 31.5–35.7)
MCV RBC AUTO: 92.6 FL (ref 79–97)
PLATELET # BLD AUTO: 137 10*3/MM3 (ref 140–450)
PMV BLD AUTO: 11.2 FL (ref 6–12)
POTASSIUM SERPL-SCNC: 3.4 MMOL/L (ref 3.5–5.2)
POTASSIUM SERPL-SCNC: 5 MMOL/L (ref 3.5–5.2)
RBC # BLD AUTO: 4.07 10*6/MM3 (ref 3.77–5.28)
SODIUM SERPL-SCNC: 141 MMOL/L (ref 136–145)
WBC NRBC COR # BLD AUTO: 7 10*3/MM3 (ref 3.4–10.8)

## 2024-01-06 PROCEDURE — 25810000003 SODIUM CHLORIDE 0.9 % SOLUTION: Performed by: INTERNAL MEDICINE

## 2024-01-06 PROCEDURE — 94664 DEMO&/EVAL PT USE INHALER: CPT

## 2024-01-06 PROCEDURE — 80048 BASIC METABOLIC PNL TOTAL CA: CPT | Performed by: INTERNAL MEDICINE

## 2024-01-06 PROCEDURE — 99233 SBSQ HOSP IP/OBS HIGH 50: CPT | Performed by: INTERNAL MEDICINE

## 2024-01-06 PROCEDURE — 63710000001 INSULIN LISPRO (HUMAN) PER 5 UNITS

## 2024-01-06 PROCEDURE — 25010000002 METHYLPREDNISOLONE PER 40 MG: Performed by: INTERNAL MEDICINE

## 2024-01-06 PROCEDURE — 99222 1ST HOSP IP/OBS MODERATE 55: CPT | Performed by: NURSE PRACTITIONER

## 2024-01-06 PROCEDURE — 84132 ASSAY OF SERUM POTASSIUM: CPT

## 2024-01-06 PROCEDURE — 94799 UNLISTED PULMONARY SVC/PX: CPT

## 2024-01-06 PROCEDURE — 82948 REAGENT STRIP/BLOOD GLUCOSE: CPT

## 2024-01-06 PROCEDURE — 86140 C-REACTIVE PROTEIN: CPT | Performed by: INTERNAL MEDICINE

## 2024-01-06 PROCEDURE — 94761 N-INVAS EAR/PLS OXIMETRY MLT: CPT

## 2024-01-06 PROCEDURE — 85027 COMPLETE CBC AUTOMATED: CPT | Performed by: INTERNAL MEDICINE

## 2024-01-06 RX ORDER — METOPROLOL TARTRATE 1 MG/ML
2.5 INJECTION, SOLUTION INTRAVENOUS EVERY 6 HOURS PRN
Status: DISCONTINUED | OUTPATIENT
Start: 2024-01-06 | End: 2024-01-11 | Stop reason: HOSPADM

## 2024-01-06 RX ORDER — METOPROLOL SUCCINATE 25 MG/1
25 TABLET, EXTENDED RELEASE ORAL DAILY
Status: DISCONTINUED | OUTPATIENT
Start: 2024-01-07 | End: 2024-01-09

## 2024-01-06 RX ORDER — POTASSIUM CHLORIDE 1.5 G/1.58G
40 POWDER, FOR SOLUTION ORAL EVERY 4 HOURS
Status: COMPLETED | OUTPATIENT
Start: 2024-01-06 | End: 2024-01-06

## 2024-01-06 RX ORDER — METOPROLOL TARTRATE 1 MG/ML
5 INJECTION, SOLUTION INTRAVENOUS EVERY 6 HOURS PRN
Status: DISCONTINUED | OUTPATIENT
Start: 2024-01-06 | End: 2024-01-06

## 2024-01-06 RX ADMIN — ISOSORBIDE MONONITRATE 90 MG: 30 TABLET, EXTENDED RELEASE ORAL at 09:09

## 2024-01-06 RX ADMIN — RISPERIDONE 0.5 MG: 0.25 TABLET, FILM COATED ORAL at 09:10

## 2024-01-06 RX ADMIN — Medication 10 ML: at 20:56

## 2024-01-06 RX ADMIN — METOPROLOL TARTRATE 12.5 MG: 25 TABLET, FILM COATED ORAL at 20:57

## 2024-01-06 RX ADMIN — INSULIN LISPRO 2 UNITS: 100 INJECTION, SOLUTION INTRAVENOUS; SUBCUTANEOUS at 12:29

## 2024-01-06 RX ADMIN — DOCUSATE SODIUM 50 MG AND SENNOSIDES 8.6 MG 2 TABLET: 8.6; 5 TABLET, FILM COATED ORAL at 09:10

## 2024-01-06 RX ADMIN — FERROUS SULFATE TAB 325 MG (65 MG ELEMENTAL FE) 325 MG: 325 (65 FE) TAB at 09:10

## 2024-01-06 RX ADMIN — SERTRALINE 100 MG: 50 TABLET, FILM COATED ORAL at 09:10

## 2024-01-06 RX ADMIN — LAMOTRIGINE 25 MG: 100 TABLET ORAL at 22:15

## 2024-01-06 RX ADMIN — APIXABAN 5 MG: 5 TABLET, FILM COATED ORAL at 09:10

## 2024-01-06 RX ADMIN — MEMANTINE HYDROCHLORIDE 10 MG: 10 TABLET, FILM COATED ORAL at 09:10

## 2024-01-06 RX ADMIN — PANTOPRAZOLE SODIUM 40 MG: 40 TABLET, DELAYED RELEASE ORAL at 09:10

## 2024-01-06 RX ADMIN — IPRATROPIUM BROMIDE 0.5 MG: 0.5 SOLUTION RESPIRATORY (INHALATION) at 18:24

## 2024-01-06 RX ADMIN — CLOPIDOGREL BISULFATE 75 MG: 75 TABLET, FILM COATED ORAL at 09:09

## 2024-01-06 RX ADMIN — MEMANTINE HYDROCHLORIDE 10 MG: 10 TABLET, FILM COATED ORAL at 20:56

## 2024-01-06 RX ADMIN — TIOTROPIUM BROMIDE INHALATION SPRAY 2 PUFF: 3.12 SPRAY, METERED RESPIRATORY (INHALATION) at 06:21

## 2024-01-06 RX ADMIN — BACLOFEN 5 MG: 10 TABLET ORAL at 20:56

## 2024-01-06 RX ADMIN — RISPERIDONE 0.5 MG: 0.25 TABLET, FILM COATED ORAL at 00:22

## 2024-01-06 RX ADMIN — BACLOFEN 5 MG: 10 TABLET ORAL at 09:09

## 2024-01-06 RX ADMIN — ROSUVASTATIN CALCIUM 40 MG: 20 TABLET, FILM COATED ORAL at 17:44

## 2024-01-06 RX ADMIN — APIXABAN 5 MG: 5 TABLET, FILM COATED ORAL at 20:57

## 2024-01-06 RX ADMIN — POTASSIUM CHLORIDE 40 MEQ: 1.5 POWDER, FOR SOLUTION ORAL at 05:56

## 2024-01-06 RX ADMIN — INSULIN LISPRO 2 UNITS: 100 INJECTION, SOLUTION INTRAVENOUS; SUBCUTANEOUS at 20:56

## 2024-01-06 RX ADMIN — BUDESONIDE AND FORMOTEROL FUMARATE DIHYDRATE 2 PUFF: 160; 4.5 AEROSOL RESPIRATORY (INHALATION) at 06:13

## 2024-01-06 RX ADMIN — Medication 10 ML: at 20:57

## 2024-01-06 RX ADMIN — DOCUSATE SODIUM 50 MG AND SENNOSIDES 8.6 MG 2 TABLET: 8.6; 5 TABLET, FILM COATED ORAL at 20:56

## 2024-01-06 RX ADMIN — METOPROLOL SUCCINATE 12.5 MG: 25 TABLET, EXTENDED RELEASE ORAL at 06:35

## 2024-01-06 RX ADMIN — LAMOTRIGINE 100 MG: 100 TABLET ORAL at 09:09

## 2024-01-06 RX ADMIN — METHYLPREDNISOLONE SODIUM SUCCINATE 40 MG: 40 INJECTION, POWDER, FOR SOLUTION INTRAMUSCULAR; INTRAVENOUS at 05:56

## 2024-01-06 RX ADMIN — IPRATROPIUM BROMIDE 0.5 MG: 0.5 SOLUTION RESPIRATORY (INHALATION) at 13:00

## 2024-01-06 RX ADMIN — POTASSIUM CHLORIDE 40 MEQ: 1.5 POWDER, FOR SOLUTION ORAL at 09:11

## 2024-01-06 RX ADMIN — LEVOTHYROXINE SODIUM 75 MCG: 0.07 TABLET ORAL at 09:08

## 2024-01-06 RX ADMIN — RISPERIDONE 0.5 MG: 0.25 TABLET, FILM COATED ORAL at 20:56

## 2024-01-06 RX ADMIN — Medication 10 ML: at 09:11

## 2024-01-06 RX ADMIN — METOPROLOL TARTRATE 2.5 MG: 1 INJECTION, SOLUTION INTRAVENOUS at 18:43

## 2024-01-06 RX ADMIN — DONEPEZIL HYDROCHLORIDE 10 MG: 5 TABLET, FILM COATED ORAL at 20:57

## 2024-01-06 RX ADMIN — BUDESONIDE AND FORMOTEROL FUMARATE DIHYDRATE 2 PUFF: 160; 4.5 AEROSOL RESPIRATORY (INHALATION) at 18:24

## 2024-01-06 RX ADMIN — SODIUM CHLORIDE 250 ML: 9 INJECTION, SOLUTION INTRAVENOUS at 09:10

## 2024-01-06 NOTE — PLAN OF CARE
Goal Outcome Evaluation:  Plan of Care Reviewed With: patient   Pt resting in bed with no complaints. Pt shows no s/s of distress. Will continue with plan of care.       Problem: Adult Inpatient Plan of Care  Goal: Plan of Care Review  Outcome: Ongoing, Progressing

## 2024-01-06 NOTE — CONSULTS
[facility]  CONSULT  NOTE      Patient information:  Date of Admit: 1/1/2024  Date of Consult: 01/06/24  Hospitalist/Referring MD:Sukh Montez DO;   PCP: Bernadette Arevalo MD  MRN:  [id number]  Visit Number:  [id number]    LOS: 5  CODE STATUS:  Code Status and Medical Interventions:   Ordered at: 01/01/24 1500     Code Status (Patient has no pulse and is not breathing):    CPR (Attempt to Resuscitate)     Medical Interventions (Patient has pulse or is breathing):    Full Support     Comments:    Per review of ACP documentation on file.       PROBLEM LIST: Principal Problem:    Acute hypercapnic respiratory failure      Inpatient Cardiology Consult  Consult performed by: Radha Meraz APRN  Consult ordered by: Clarence Gomez MD        17:15 EST  1/6/2024    Interventional Cardiology Consulting Physician: Dr. Jasmina Alfonso MD [facility], Interventional Cardiology       Assessment    Paroxysmal atrial fibrillation  Chronic anticoagulation  3.  Hypothyroidism  4.  Hypertension  5.  Hyperlipidemia  6.  Hypokalemia          Recommendations/ Plan   1.  Continue Crestor  2.  Continue Eliquis for anticoagulation  3.  Continue Norvasc for blood pressure management  4.  Potassium replacement by primary team  5.  Continue Synthroid, recent TSH was normal        Reason for Cardiology consultation: Atrial fibrillation     Subjective Data   ADMISSION INFORMATION:  Chief Complaint   Patient presents with    Altered Mental Status     History of Present Illness    Brenda Munroe is a 73 y.o. female with a past medical history significant for   COPD, chronic hypoxic and hypercapnic respiratory failure, paroxysmal atrial fibrillation, hypothyroidism, arthritis, essential hypertension, hyperlipidemia, chronic headaches, type II DM, bilateral carotid stenosis without occlusion, depression, history of psychosis, dementia, anxiety, history of Bell's palsy, CAD, physical debility,  PUD, and GERD.  Patient presented to [facility] emergency department via EMS from local skilled nursing facility due to altered mental status.  No family members were present at bedside.  Patient was on BiPAP at the time of my exam.  Due to acuity of condition and dementia, patient was unable to provide details.  Information obtained from chart review and ED report.  Patient was lying on ED stretcher at the time of my exam.  Appeared restless.  Moving all extremities equally. Opens eyes to verbal stimuli.  Pupils appear pinpoint and sluggishly reactive/equal. Auscultation of lung sounds revealed expiratory wheezing in the left upper lobe. Decreased air movement. ABG obtained on arrival revealed acidotic pH with hypercapnia (PCO2 84.5). ABG repeated 2 hours after BiPAP placement; revealed improvement. PCO2 now 67.6. pH normalized. Patient will be admitted to the PCU for further monitoring, evaluation, and treatment.      Upon arrival to the ED, vital signs were temperature 98.5, pulse 59, respirations 20, blood pressure 144/75, SpO2 saturation 98% on 2 L nasal cannula.  ABG obtained on arrival noted pH 7.328, pCO2 84.5, pO2 76.2, HCO3 44.3, O2 saturation 95.4% on 2 L nasal cannula.  Patient placed on BiPAP.  Repeat ABG revealed pH 7.424, pCO2 67.6, pO2 60.9, HCO3 44.2, O2 saturation 92.8% on BiPAP.  High-sensitivity troponin T 26 with -3 delta.  CMP with chloride 96, CO2 44.3, anion gap 2.7, glucose 103, total protein 5.6, otherwise unremarkable.  TSH within normal limits.  CRP negative.  CBC with hemoglobin 10.1, platelets 135, RDW 22.4, MCHC 28.0, otherwise unremarkable.  Urinalysis with trace protein, otherwise unremarkable.  COVID-19 and flu A/B swab negative.  Chest x-ray noted mild interstitial coarsening, improved in the interval.  No acute cardiopulmonary process.  CT of the head without contrast noted no acute intracranial hemorrhage, midline shift, or significant mass effect.     Known Emergency  Department medications received prior to my evaluation included DuoNeb, 1 mg IV Ativan, 80 mg IV Solu-Medrol. Emergency Department Room location at the time of my evaluation was 404.  Discussed with admitting physician, Sukh Sanchez DO.     Cardiology has been consulted for further evaluation and management.     Primary Cardiologist has been Dr. Culp and she was last seen in the office on 6/23/2023.     Patient is in room 322 when she was seen and examined by Dr. Alfonso.  Patient resting comfortably in bed and denies any acute complaint.  She denies complaint of chest pain, palpitations or dyspnea.  Oxygen via nasal cannula.  No family at bedside.  Per nursing notes no new events overnight.  Vital signs this a.m. 108/63 and heart rate 118.  Potassium 3.4, creatinine 1.3 slightly elevated from 1.0, hemoglobin 11.2.      Cardiac risk factors:hypercholesterolemia and hypertension      Last Echo: Results for orders placed during the hospital encounter of 01/01/24    Adult Transthoracic Echo Limited W/ Cont if Necessary Per Protocol    Interpretation Summary    Normal left ventricular cavity size and wall thickness noted. All left ventricular wall segments contract normally.    Left ventricular ejection fraction appears to be 61 - 65%.    There is a trivial pericardial effusion adjacent to the left ventricle.         Last Stress: Results for orders placed during the hospital encounter of 03/17/22    Stress test with myocardial perfusion one day    Interpretation Summary  · Findings consistent with a normal ECG stress test.  · Myocardial perfusion imaging study showed a small sized severe reversible ischemia in the mid lateral and inferolateral segments..  · Left ventricular ejection fraction is hyperdynamic (Calculated EF > 70%). .  · Normal LV cavity size. Normal LV wall motion noted.  · Impressions are consistent with an intermediate risk study.        Last Cath: Results for orders placed during the hospital  encounter of 02/18/23    Cardiac Catheterization/Vascular Study    Narrative  Images from the original result were not included.  CARDIAC CATHETERIZATION / INTERVENTION REPORT    DATE OF PROCEDURE: 2/21/2023    INDICATION FOR PROCEDURE: NSTEMI      PRE PROCEDURE DIAGNOSIS:    Clinical frailty: 2- Well    ASA: 2=2- Mild to moderate systemic disease, medially well controlled, with no functional limitation    Mallampati: Class 2=2- Able to visualize the soft palate, fauces, uvula.  The anterior & posterior tonsilar pillars are hidden by the tongue.    POST PROCEDURE DIAGNOSIS:  CAD with patent LIMA graft ( well matured - no stenosis and supplies LAD and excellent collateral to RCA) Lcx mid had old dissection calcified 95% stenosis and two SVG grafts are chronically occluded    Face to face moderate conscious sedation time:      COMPLICATIONS : None    Specimens collected : None        PROCEDURE PERFORMED:    1. Selective right and left coronary Angiogram  2. Left heart catheterization  3, Selective LIMA angiogram  4. Selective SVG- RCA and OM angiography      PROCEDURE COMMENTS:    Prior to the procedure risks benefits alternatives and possible adverse events were discussed with the patient and informed consent was obtained.  Brenda Munroe was brought to the cath lab and placed on the cardiac catherization table in the postabsortive state. The patient was prepped and draped according to the cath lab protocol under strict aseptic and sterile condition. Patient's right femoral artery sight was prepped and draped. Under fluoroscopic guidance the right femoral artery was punctured using a Micropuncture gauge needle utilizing the modified Seldinger technique. 6 Trinidadian sheath was introduced over a wire. After aspirating for blood it was flushed with heparinized saline. Angio was performed to confirm the position of the sheath in  R CFA    We advanced Heraclio type diagnostic catheters over the wire. The  left and right  coronary arteries  were selectively engaged. Left and right coronary angiogram were obtained in multiple orthogonal projections. 5 Cymraes Pigtail catheter was used to cross across the AV retrograde into the LV cavity and resting LV pressures were recorded. Manual pull back was used to record gradient across the Aortic valve.    After completion of the procedure the femoral sheath was removed in the cath lab and hemostasis was achieved by manual compression. The patient tolerated the procedure without complications.      Coronary anatomy findings:    Patient's native left main had no stenosis, LAD was  in the proximal, there is a vein graft that appears to be attached to a diagonal artery which has been chronically occluded with no other significant diagonals visualized.  The patient is native left circumflex artery appeared to be a large caliber vessel, there was a significant 99% calcified or dissected stenosis in the mid circumflex and beyond the stenosis that supplies 3 moderate-sized OM branches.  This vessel is not grafted.  The native RCA , there is 2 vein grafts which appear to be chronically occluded at this time, 1 possibly going to the RCA underwent likely going either to the circumflex OM or diagonal.  The LIMA graft was well matured with no significant stenosis    Large distal LAD notes excellent collaterals filling the RCA.            EBL: Less than 10cc        Final Impression:  Patient's angiogram did not demonstrate any acute thrombotic lesions that would explain for type I non-STEMI, her non-STEMI is likely a type II from her hypoxia in the setting of her older chronic lesion.  The mid circumflex stenosis is a very complex calcified old dissected lesion, stenosis MAYO-3 flow I would recommend only medical management at this time.  If the patient ever needs intervention estimated atherectomy based PCI.  Since patient denies any anginal symptoms I would favor to continue with current identity  medical management for CAD.                  Tab Cifuentes MD, [facility]  Interventional Cardiology    02/21/23  14:29 EST                            Past Medical History:   Diagnosis Date    Anxiety     Arthritis     Bell's palsy     Bladder prolapse, female, acquired     Carotid stenosis     COPD (chronic obstructive pulmonary disease)     COPD (chronic obstructive pulmonary disease)     Coronary artery disease     Dementia     Dementia     Depression     Difficulty walking     Disease of thyroid gland     Frequency of urination     GERD (gastroesophageal reflux disease)     Heart attack     Hyperlipidemia     Hypertension     Irregular heart beat     Peptic ulcer disease      Past Surgical History:   Procedure Laterality Date    CARDIAC CATHETERIZATION N/A 2/21/2023    Procedure: Left Heart Cath;  Surgeon: Tab Cifuentes MD;  Location: [facility] CATH INVASIVE LOCATION;  Service: Cardiology;  Laterality: N/A;    CARDIAC SURGERY      open heart  in 1999    CYSTOSCOPY N/A 11/8/2017    Procedure: CYSTOSCOPY;  Surgeon: Karl Nicolas DO;  Location: [facility] OR;  Service:     OTHER SURGICAL HISTORY      states she had fluid drained no surgery    VAGINAL HYSTERECTOMY W/ ANTERIOR AND POSTERIOR VAGINAL REPAIR N/A 11/8/2017    Procedure: VAGINAL HYSTERECTOMY WITH ANTERIOR, POSTERIOR, AND ENTEROCELE VAGINAL REPAIR;  Surgeon: Karl Nicolas DO;  Location: [facility] OR;  Service:     VAGINAL VAULT SUSPENSION N/A 11/8/2017    Procedure: VAGINAL VAULT SUSPENSION ;  Surgeon: Karl Nicolas DO;  Location: [facility] OR;  Service:      Family History   Problem Relation Age of Onset    Dementia Sister     Heart attack Mother     Tuberculosis Father     Breast cancer Neg Hx      Social History     Tobacco Use    Smoking status: Every Day     Packs/day: 0.50     Years: 55.00     Additional pack years: 0.00     Total pack years: 27.50     Types: Cigarettes    Smokeless tobacco: Never   Vaping Use    Vaping  Use: Never used   Substance Use Topics    Alcohol use: No    Drug use: No       ALLERGIES: Patient has no known allergies.    Medications listed below are reported home medications pulling from within the system:  Medications Prior to Admission   Medication Sig Dispense Refill Last Dose    amLODIPine (NORVASC) 10 MG tablet Take 1 tablet by mouth Every Night.   12/31/2023 at 2000    apixaban (ELIQUIS) 5 MG tablet tablet Take 1 tablet by mouth 2 (Two) Times a Day.   1/1/2024 at 0800    baclofen (LIORESAL) 10 MG tablet Take 0.5 tablets by mouth 2 (Two) Times a Day. Indications: Muscle Spasticity   1/1/2024 at 0800    clopidogrel (PLAVIX) 75 MG tablet Take 1 tablet by mouth Daily. 30 tablet  1/1/2024 at 0800    donepezil (ARICEPT) 10 MG tablet Take 1 tablet by mouth Every Night.   12/31/2023 at 2000    ferrous sulfate 325 (65 FE) MG tablet Take 1 tablet by mouth 2 (Two) Times a Day. 60 tablet 5 1/1/2024 at 0800    isosorbide mononitrate (IMDUR) 30 MG 24 hr tablet Take 3 tablets by mouth Daily.   1/1/2024 at 0800    lamoTRIgine (LaMICtal) 100 MG tablet Take 1 tablet by mouth Daily.   1/1/2024 at 0800    lamoTRIgine (LaMICtal) 25 MG tablet Take 1 tablet by mouth Every Night.   12/31/2023 at 2000    levothyroxine (SYNTHROID, LEVOTHROID) 75 MCG tablet Take 1 tablet by mouth Daily.   1/1/2024 at 0600    lisinopril (PRINIVIL,ZESTRIL) 10 MG tablet Take 1 tablet by mouth Daily.   1/1/2024 at 0800    LORazepam (ATIVAN) 0.5 MG tablet Take 1 tablet by mouth Every 12 (Twelve) Hours As Needed for Anxiety.   1/1/2024 at 0704    memantine (NAMENDA) 10 MG tablet Take 1 tablet by mouth 2 (Two) Times a Day.   1/1/2024 at 0800    metoprolol succinate XL (TOPROL-XL) 25 MG 24 hr tablet Take 0.5 tablets by mouth Daily.   1/1/2024 at 0800    pantoprazole (PROTONIX) 40 MG EC tablet Take 1 tablet by mouth Daily. 10 tablet 0 1/1/2024 at 0800    risperiDONE (risperDAL) 0.5 MG tablet Take 1 tablet by mouth 2 (Two) Times a Day.   1/1/2024 at 0800     rosuvastatin (CRESTOR) 40 MG tablet Take 1 tablet by mouth Every Evening.   12/31/2023 at 1600    sertraline (ZOLOFT) 100 MG tablet Take 1 tablet by mouth Daily.   1/1/2024 at 0800    Trelegy Ellipta 100-62.5-25 MCG/INH inhaler Inhale 1 puff Daily.   1/1/2024 at 0800    acetaminophen (TYLENOL) 500 MG tablet Take 1 tablet by mouth Every 4 (Four) Hours As Needed for Mild Pain.   Unknown    Cholecalciferol (VITAMIN D3) 85468 units capsule Take 1 capsule by mouth Every 7 (Seven) Days.   12/29/2023    Dextran 70-Hypromellose (Lubricating Tears Eye Drops) 0.1-0.3 % solution Apply 1 drop to eye(s) as directed by provider Every 2 (Two) Hours As Needed (dry eyes). 30 mL 0 Unknown    guaiFENesin 200 MG tablet Take 2 tablets by mouth Every 4 (Four) Hours As Needed for Congestion.   Unknown    nitroglycerin (NITROSTAT) 0.4 MG SL tablet Place 1 tablet under the tongue Every 5 (Five) Minutes As Needed.   Unknown    phenol (Chloraseptic) 1.4 % liquid liquid Apply 1 spray to the mouth or throat Every 2 (Two) Hours As Needed (sore throat).   Unknown    senna (SENOKOT) 8.6 MG tablet Take 1 tablet by mouth Daily As Needed for Constipation.   Unknown       Review of Systems   Constitutional:  Negative for activity change, appetite change, chills, diaphoresis, fatigue and fever.   HENT:  Negative for congestion, drooling, ear discharge, ear pain, mouth sores, nosebleeds, postnasal drip, rhinorrhea, sinus pressure, sneezing and sore throat.    Eyes:  Negative for pain, discharge and visual disturbance.   Respiratory:  Negative for cough, chest tightness, shortness of breath and wheezing.    Cardiovascular:  Negative for chest pain, palpitations and leg swelling.   Gastrointestinal:  Negative for abdominal pain, constipation, diarrhea, nausea and vomiting.   Endocrine: Negative for cold intolerance, heat intolerance, polydipsia, polyphagia and polyuria.   Musculoskeletal:  Negative for arthralgias, myalgias and neck pain.   Skin:   Negative for rash and wound.   Neurological:  Negative for dizziness, syncope, speech difficulty, weakness, light-headedness and headaches.   Hematological:  Negative for adenopathy. Does not bruise/bleed easily.   Psychiatric/Behavioral:  Negative for confusion, dysphoric mood and sleep disturbance. The patient is not nervous/anxious.    All other systems reviewed and are negative.      Objective Data      Vital Signs:  Temp:  [97.7 °F (36.5 °C)-100.1 °F (37.8 °C)] 98.4 °F (36.9 °C)  Heart Rate:  [] 84  Resp:  [18-20] 20  BP: (100-169)/(60-90) 107/67  Flow (L/min):  [2] 2  Vital Signs (last 72 hrs)         01/03 0700 01/04 0659 01/04 0700 01/05 0659 01/05 0700 01/06 0659 01/06 0700 01/06 1715   Most Recent      Temp (°F) 97.7 -  99.1    97.6 -  98.6    97.2 -  100.1    98.1 -  98.4     98.4 (36.9) 01/06 1551    Heart Rate 51 -  117    57 -  98    58 -  136    84 -  118     84 01/06 1551    Resp 13 -  27    16 -  28    18 -  20    18 -  20     20 01/06 1551    /58 -  171/76    104/69 -  160/78    97/57 -  169/90    107/67 -  108/63     107/67 01/06 1551    SpO2 (%) 87 -  100    87 -  100    90 -  98    94 -  99     94 01/06 1551    Flow (L/min)   2    2 -  2.5      2      2     2 01/06 1300          BMI:  Body mass index is 26.54 kg/m².    WEIGHT:      01/05/24  0636 01/06/24  0500   Weight: 71.5 kg (157 lb 11.2 oz) (new admit) 72.3 kg (159 lb 8 oz)       DIET:  Diet: Regular/House Diet; Texture: Soft to Chew (NDD 3); Soft to Chew: Chopped Meat; Fluid Consistency: Thin (IDDSI 0)    I&O:  Intake & Output (last 3 days)         01/03 0701 01/04 0700 01/04 0701 01/05 0700 01/05 0701  01/06 0700 01/06 0701 01/07 0700    P.O. 0 600 480     I.V. (mL/kg) 0 (0)       IV Piggyback 100       Total Intake(mL/kg) 100 (1.4) 600 (8.4) 480 (6.6)     Urine (mL/kg/hr) 800 (0.5)       Stool 0       Total Output 800       Net -700 +600 +480             Urine Unmeasured Occurrence 2 x 3 x 2 x     Stool Unmeasured  Occurrence  2 x 1 x 1 x            Physical Exam  Vitals reviewed.   Constitutional:       Appearance: Normal appearance. She is well-developed.      Interventions: Nasal cannula in place.   HENT:      Head: Normocephalic.   Eyes:      Conjunctiva/sclera: Conjunctivae normal.   Neck:      Thyroid: No thyromegaly.      Vascular: No carotid bruit or JVD.   Cardiovascular:      Rate and Rhythm: Normal rate and regular rhythm.   Pulmonary:      Effort: Pulmonary effort is normal.      Breath sounds: Normal breath sounds.   Musculoskeletal:      Cervical back: Neck supple.      Right lower leg: No edema.      Left lower leg: No edema.   Skin:     General: Skin is warm and dry.   Neurological:      Mental Status: She is alert and oriented to person, place, and time.   Psychiatric:         Attention and Perception: Attention normal.         Mood and Affect: Mood normal.         Speech: Speech normal.         Behavior: Behavior normal. Behavior is cooperative.         Cognition and Memory: Cognition normal.         Results review   Results Review:    I have reviewed the patient's new clinical results.     Results from last 7 days   Lab Units 01/01/24  1304 01/01/24  1059   HSTROP T ng/L 23* 26*     Results from last 7 days   Lab Units 01/06/24  0315 01/05/24  0222 01/04/24  0112 01/03/24  0829 01/02/24  0407 01/01/24  1059   WBC 10*3/mm3 7.00 6.40 6.90 7.72 4.08 4.11   HEMOGLOBIN g/dL 11.2* 12.4 12.4 13.4 11.4* 10.1*   PLATELETS 10*3/mm3 137* 150 169 154 135* 135*     Results from last 7 days   Lab Units 01/06/24  1437 01/06/24  0315 01/05/24  0222 01/04/24  1747 01/04/24  1143 01/04/24  0112 01/03/24  0829 01/02/24  0407 01/01/24  1059   SODIUM mmol/L  --  141 144  --   --  144 140 142 143   POTASSIUM mmol/L 5.0 3.4* 3.7 3.8 4.4 3.2* 3.8 3.9 4.3   CHLORIDE mmol/L  --  102 102  --   --  100 98 97* 96*   CO2 mmol/L  --  33.9* 35.6*  --   --  36.5* 33.3* 40.8* 44.3*   BUN mg/dL  --  38* 28*  --   --  23 18 10 10   CREATININE  "mg/dL  --  1.30* 1.00  --   --  0.74 0.70 0.72 0.68   CALCIUM mg/dL  --  8.8 9.1  --   --  9.5 9.6 9.7 8.9   GLUCOSE mg/dL  --  89 106*  --   --  117* 98 125* 103*   ALT (SGPT) U/L  --   --   --   --   --   --  10  --  8   AST (SGOT) U/L  --   --   --   --   --   --  20  --  11     Lab Results   Component Value Date    INR 1.04 09/17/2023    INR 1.16 (H) 02/18/2023    INR 0.97 03/12/2022    INR 1.16 (H) 02/26/2021    INR 1.32 (H) 03/27/2020    INR 1.22 (H) 11/18/2019    INR 0.96 08/04/2015     No results found for: \"LABHEPA\"      Lab Results   Component Value Date    MG 1.9 01/04/2024    MG 2.1 12/27/2023    MG 2.1 02/18/2023     Lab Results   Component Value Date    TSH 3.020 01/01/2024      No results found for: \"CHOL\", \"TRIG\", \"HDL\", \"LDL\"  Lab Results   Component Value Date    PROBNP 2,515.0 (H) 02/18/2023    PROBNP 289.6 03/12/2022    PROBNP 339.6 03/08/2022     Estimated Creatinine Clearance: 37.2 mL/min (A) (by C-G formula based on SCr of 1.3 mg/dL (H)).  No results found for: \"URICACID\"  Pain Management Panel  More data exists         Latest Ref Rng & Units 12/27/2023 2/19/2023   Pain Management Panel   Creatinine, Urine mg/dL - 222.8    Amphetamine, Urine Qual Negative Negative  Negative    Barbiturates Screen, Urine Negative Negative  Negative    Benzodiazepine Screen, Urine Negative Negative  Positive    Buprenorphine, Screen, Urine Negative Negative  Negative    Cocaine Screen, Urine Negative Negative  Negative    Fentanyl, Urine Negative Negative  -   Methadone Screen , Urine Negative Negative  Negative    Methamphetamine, Ur Negative Negative  Negative      Microbiology Results (last 10 days)       Procedure Component Value - Date/Time    S. Pneumo Ag Urine or CSF - Urine, Urine, Clean Catch [[id number]]  (Normal) Collected: 01/03/24 1104    Lab Status: Final result Specimen: Urine, Clean Catch Updated: 01/03/24 1819     Strep Pneumo Ag Negative    MRSA Screen, PCR (Inpatient) - Swab, Nares [[id number]]  " "(Abnormal) Collected: 01/03/24 0834    Lab Status: Final result Specimen: Swab from Nares Updated: 01/03/24 0957     MRSA PCR MRSA Detected    Narrative:      The negative predictive value of this diagnostic test is high and should only be used to consider de-escalating anti-MRSA therapy. A positive result may indicate colonization with MRSA and must be correlated clinically.    COVID-19 and FLU A/B PCR, 1 HR TAT - Swab, Nasopharynx [[id number]]  (Normal) Collected: 01/01/24 1059    Lab Status: Final result Specimen: Swab from Nasopharynx Updated: 01/01/24 1123     COVID19 Not Detected     Influenza A PCR Not Detected     Influenza B PCR Not Detected    Narrative:      Fact sheet for providers: https://www.fda.gov/media/829655/download    Fact sheet for patients: https://www.fda.gov/media/770069/download    Test performed by PCR.           No results found for: \"BLOODCX\"      EKG:      ECG/EMG Results (last 24 hours)       Procedure Component Value Units Date/Time    ECG 12 Lead QT Measurement [[id number]] Collected: 01/05/24 0011     Updated: 01/05/24 1901     QT Interval 382 ms      QTC Interval 482 ms     Narrative:      Test Reason : QT Measurement  Blood Pressure :   */*   mmHG  Vent. Rate :  96 BPM     Atrial Rate :  58 BPM     P-R Int :   * ms          QRS Dur : 130 ms      QT Int : 382 ms       P-R-T Axes :   *  81  -9 degrees     QTc Int : 482 ms    Atrial fibrillation  Right bundle branch block  Abnormal ECG  When compared with ECG of 03-JAN-2024 03:59,  Atrial fibrillation has replaced Sinus rhythm  Confirmed by Jasmina Alfonso ([id number]) on 1/5/2024 6:59:35 PM    Referred By:            Confirmed By: Jasmina Alfonso            TELEMETRY:         RADIOLOGY STUDIES:  Imaging Results (Last 72 Hours)       Procedure Component Value Units Date/Time    XR Humerus Right [[id number]] Collected: 01/05/24 0203     Updated: 01/05/24 0206    Narrative:      PROCEDURE: X-ray examination of the right humerus performed on " January 5, 2024. 2 views.     HISTORY: Pulled out IVs from arm. Cannula missing. Possible cannula in  the arm.     COMPARISON: None.     FINDINGS:     No acute fracture or dislocation.  Possible heterotopic ossification adjacent to the right humeral neck.  No acute foreign body.  No air in the soft tissues.       Impression:         1.  No acute fracture or dislocation.  2.  No foreign body.     This report was finalized on 1/5/2024 2:04 AM by Mauricio Etienne MD.       XR Forearm 2 View Right [[id number]] Collected: 01/05/24 0202     Updated: 01/05/24 0205    Narrative:      PROCEDURE: X-ray examination of the right forearm performed on January 5, 2024. 2 views.     HISTORY: Pulled out IVs. Cannula missing. Possible cannula in on him.     COMPARISON: None.     FINDINGS:     Intact osseous structures of the right forearm with no acute fracture or  dislocation.  No foreign body.  No air in the soft tissues.       Impression:         1.  No acute fracture or dislocation.  2.  No acute foreign body.     This report was finalized on 1/5/2024 2:03 AM by Mauricio Etienne MD.       XR Humerus Left [[id number]] Collected: 01/05/24 0201     Updated: 01/05/24 0204    Narrative:      PROCEDURE: X-ray examination of the left humerus performed on January 5, 2024. 2 views.     HISTORY: Pulled out IVs. Cannula missing. Possible cannula in the arm.     COMPARISON: None.     FINDINGS:     Intact left humerus with no acute fracture or dislocation. No foreign  body. No air in the soft tissues.       Impression:         1.  No acute fracture or dislocation.   2.  No foreign body.        This report was finalized on 1/5/2024 2:02 AM by Mauricio Etienne MD.       XR Forearm 2 View Left [[id number]] Collected: 01/05/24 0159     Updated: 01/05/24 0203    Narrative:      PROCEDURE: X-ray examination of the left forearm performed on January 5, 2024. 2 views.     HISTORY: Possible cannula in the arm. Pulled out IVs.     Comparison: None.      FINDINGS:     No acute foreign body identified in the soft tissues of the left  forearm.  IV catheter and cannula in place with no features to suggest displaced  foreign body.  No air in the soft tissues.  No acute fracture or dislocation.  No lytic or blastic lesion.  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       Impression:         1.  No acute fracture or dislocation.  2.  IV catheter and cannula in place.  3.  No displaced foreign body.        This report was finalized on 1/5/2024 2:01 AM by Mauricio Etienne MD.               CURRENT MEDICATIONS:  Current list of medications may not reflect those currently placed in orders that are not signed or are being held.     ALLERGIES: Patient has no known allergies.    amLODIPine, 10 mg, Oral, Nightly  apixaban, 5 mg, Oral, Q12H  baclofen, 5 mg, Oral, BID  budesonide-formoterol, 2 puff, Inhalation, BID - RT   And  tiotropium bromide monohydrate, 2 puff, Inhalation, Daily - RT  clopidogrel, 75 mg, Oral, Daily  donepezil, 10 mg, Oral, Nightly  ferrous sulfate, 325 mg, Oral, Daily With Breakfast  insulin lispro, 2-7 Units, Subcutaneous, 4x Daily AC & at Bedtime  ipratropium, 0.5 mg, Nebulization, 4x Daily - RT  isosorbide mononitrate, 90 mg, Oral, Daily  lamoTRIgine, 100 mg, Oral, Daily  lamoTRIgine, 25 mg, Oral, Nightly  levothyroxine, 75 mcg, Oral, Daily  [Held by provider] lisinopril, 10 mg, Oral, Q24H  memantine, 10 mg, Oral, Q12H  methylPREDNISolone sodium succinate, 40 mg, Intravenous, Q24H  metoprolol succinate XL, 12.5 mg, Oral, Daily  pantoprazole, 40 mg, Oral, Daily  risperiDONE, 0.5 mg, Oral, BID  rosuvastatin, 40 mg, Oral, Q PM  senna-docusate sodium, 2 tablet, Oral, BID  sertraline, 100 mg, Oral, Daily  sodium chloride, 10 mL, Intravenous, Q12H  sodium chloride, 10 mL, Intravenous, Q12H  Vitamin D3, 50,000 Units, Oral, Q7 Days           acetaminophen **OR** acetaminophen    senna-docusate sodium **AND** polyethylene glycol **AND** bisacodyl  **AND** bisacodyl    carboxymethylcellulose    dextrose    dextrose    glucagon (human recombinant)    guaiFENesin    hydrALAZINE    LORazepam    Magnesium Standard Dose Replacement - Follow Nurse / BPA Driven Protocol    nitroglycerin    phenol    Potassium Replacement - Follow Nurse / BPA Driven Protocol    senna    sodium chloride    sodium chloride    sodium chloride    sodium chloride    sodium chloride        I have discussed this patient with Dr. Alfonso and together, we have formed a plan of care for this patient as stated above in the recommendations.     Thank you very much for asking us to be involved in this patient's care.  We will follow along with you.    I have discussed the patients findings and  recommendations with patient.                Electronically signed by PAULA Pettit, 01/06/24, 5:16 PM EST.              Please note that portions of this note were copied and has been reviewed and is accurate as of 1/6/2024 .      Please note that portions of this note were completed with a voice recognition program.    .Electronically signed by Jasmina Alfonso MD, 01/06/24, 6:14 PM EST.

## 2024-01-06 NOTE — PROGRESS NOTES
[facility] HOSPITALIST PROGRESS NOTE     Patient Identification:  Name:  Brenda Munroe  Age:  73 y.o.  Sex:  female  :  1950  MRN:  [id number]  Visit Number:  [id number]  ROOM: [address] [place] [address]     Primary Care Provider:  Bernadette Arevalo MD    Length of stay in inpatient status:  5    Subjective     Chief Compliant:    Chief Complaint   Patient presents with    Altered Mental Status     History of Presenting Illness:    Patient remains today, no acute events overnight, no new complaints, denies any fevers or chills, this AM with new Atrial Fibrillation with Rapid Ventricular Rate, has history Paroxysmal Atrial Fibrillation, already on anticoagulation, heart rate up to 136, morning medications given, improved to 120's when I saw in her room, cautious about increasing beta blocker dose as she is borderline bradycardic when not in Atrial Fibrillation, also creatinine up at 1.3, meets Acute Kidney Injury, had poor oral intake and borderline low blood pressure yesterday, holding home ACEI for now, giving small IVF bolus, consult Cardiology and appreciate recommendations.   Objective     Current Hospital Meds:  amLODIPine, 10 mg, Oral, Nightly  apixaban, 5 mg, Oral, Q12H  baclofen, 5 mg, Oral, BID  budesonide-formoterol, 2 puff, Inhalation, BID - RT   And  tiotropium bromide monohydrate, 2 puff, Inhalation, Daily - RT  clopidogrel, 75 mg, Oral, Daily  donepezil, 10 mg, Oral, Nightly  ferrous sulfate, 325 mg, Oral, Daily With Breakfast  insulin lispro, 2-7 Units, Subcutaneous, 4x Daily AC & at Bedtime  ipratropium, 0.5 mg, Nebulization, 4x Daily - RT  isosorbide mononitrate, 90 mg, Oral, Daily  lamoTRIgine, 100 mg, Oral, Daily  lamoTRIgine, 25 mg, Oral, Nightly  levothyroxine, 75 mcg, Oral, Daily  [Held by provider] lisinopril, 10 mg, Oral, Q24H  memantine, 10 mg, Oral, Q12H  methylPREDNISolone sodium succinate, 40 mg, Intravenous, Q24H  metoprolol succinate XL, 12.5 mg, Oral, Daily  pantoprazole, 40 mg,  Oral, Daily  risperiDONE, 0.5 mg, Oral, BID  rosuvastatin, 40 mg, Oral, Q PM  senna-docusate sodium, 2 tablet, Oral, BID  sertraline, 100 mg, Oral, Daily  sodium chloride, 10 mL, Intravenous, Q12H  sodium chloride, 10 mL, Intravenous, Q12H  Vitamin D3, 50,000 Units, Oral, Q7 Days         ----------------------------------------------------------------------------------------------------------------------  Vital Signs:  Temp:  [97.2 °F (36.2 °C)-100.1 °F (37.8 °C)] 97.7 °F (36.5 °C)  Heart Rate:  [] 116  Resp:  [18-20] 18  BP: ()/(57-90) 127/67  SpO2:  [90 %-98 %] 90 %  on  Flow (L/min):  [2] 2;   Device (Oxygen Therapy): nasal cannula  Body mass index is 26.54 kg/m².      Intake/Output Summary (Last 24 hours) at 1/6/2024 1017  Last data filed at 1/5/2024 1854  Gross per 24 hour   Intake 480 ml   Output --   Net 480 ml      ----------------------------------------------------------------------------------------------------------------------  Physical exam:  Constitutional:  Elderly, No acute distress.      HENT:  Head:  Normocephalic and atraumatic.  Mouth:  Moist mucous membranes.    Eyes:  Conjunctivae and EOM are normal. No scleral icterus.    Neck:  Neck supple.  No JVD present.    Cardiovascular:  Tachycardic, irregular rhythm and normal heart sounds with no murmur.  Pulmonary/Chest:  No respiratory distress, no wheezes, no crackles, on NC  Abdominal:  Soft. No distension and no tenderness.   Musculoskeletal:  No tenderness, and no deformity.  No red or swollen joints anywhere.    Neurological:  Awake, oriented to person, no gross focal deficits   Skin:  Skin is warm and dry. No rash noted. No pallor.   Peripheral vascular:  No clubbing, no cyanosis, no edema.  Psychiatric: unable to assess due to sedation  : External catheter in place    Edited by: Clarence Gomez MD at 1/6/2024  "1011  ----------------------------------------------------------------------------------------------------------------------  WBC/HGB/HCT/PLT   7.00/11.2/37.7/137 (01/06 0315)  BUN/CREAT/GLUC/ALT/AST/SYD/LIP    38/1.30/89/--/--/--/-- (01/06 0315)  LYTES - Na/K/Cl/CO2: 141/3.4*/102/33.9* (01/06 0315)     No results found for: \"URINECX\"  No results found for: \"BLOODCX\"    I have personally looked at the labs and they are summarized above.  ----------------------------------------------------------------------------------------------------------------------  Detailed radiology reports for the last 24 hours:  XR Humerus Right    Result Date: 1/5/2024   1.  No acute fracture or dislocation. 2.  No foreign body.  This report was finalized on 1/5/2024 2:04 AM by Mauricio Etienne MD.      XR Forearm 2 View Right    Result Date: 1/5/2024   1.  No acute fracture or dislocation. 2.  No acute foreign body.  This report was finalized on 1/5/2024 2:03 AM by Mauricio Etienne MD.      XR Humerus Left    Result Date: 1/5/2024   1.  No acute fracture or dislocation. 2.  No foreign body.   This report was finalized on 1/5/2024 2:02 AM by Mauricio Etienne MD.      XR Forearm 2 View Left    Result Date: 1/5/2024   1.  No acute fracture or dislocation. 2.  IV catheter and cannula in place. 3.  No displaced foreign body.   This report was finalized on 1/5/2024 2:01 AM by Mauricio Etienne MD.     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     #Acute Metabolic Encephalopathy due to Acute Hypercarbic on Chronic Hypoxic/Hypercarbic Respiratory Failure requiring " Non-invasive Positive Pressure Ventilation in setting of underlying COPD/Emphysema with acute exacerbation  - CT showed bibasilar airspace disease, likely atelectasis but superimposed Pneumonia could not be excluded  - Continue scheduled nebs but now having issues with Atrial Fibrillation and changed to Atrovent   - Continue IV glucocorticoids with Methylprednisolone 40mg daily   - Continue supplemental oxygen to keep 02 sats 88-92%, caution with over oxygenation as not to suppress respiratory drive  - Monitor on telemetry, continuous pulse ox, continue 02 and wean as able    #Paroxysmal Atrial Fibrillation, on home anticoagulation, now with Rapid Ventricular Rate    #Hypertension/Hyperlipidemia/Coronary Artery Disease status post CABG   #Diastolic Dysfunction/Elevated proBNP, without overt signs and symptoms Congestive Heart Failure   #Severe Bilateral Carotid Artery Stenosis   #Mod TR, Mild Pulmonary Hypertension   - Consult Cardiology; Recommendations pending   - Continue Statin, Plavix, Eliquis  - Continue beta blocker, hold on increased beta blocker dose given was borderline bradycardia on prior 12.5mg dose  - Continue home Imdur, holding ACEI now that worsening renal function, resume as indicated   - Continue monitoring, strict I/O's, trend heart rate and blood pressure    #Nonoliguric Acute Kidney Injury, suspect multifactorial, due to poor oral intake, ACEI use, and hypotension  - Baseline creatinine 0.7-0.9, now up to 1.3 1/6, 1/5 oral intake only 480cc's, had episode of low blood pressure 1/5 (100/60mmHg) likely contributing, on home ACEI   - Hold ACEI, give small 250cc bolus, Trend creatinine and urine output, avoid nephrotoxins, NSAIDs, dehydration and contrast as able.      #Pre-Diabetes  - Hgb A1c = 5.2% last checked; No home oral agents, continue FSBG and SSI, add long acting as indicated.      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  - Continue home regimen     #Dementia with Behavioral Disturbances  - Continue home medications, supportive care     #Tobacco Dependence  - Recommended cessation, nicotine replacement therapy as needed    F: Oral  E: Monitor & Replace as needed   N: Diet: Regular/House Diet; Texture: Soft to Chew (NDD 3); Soft to Chew: Chopped Meat; Fluid Consistency: Thin (IDDSI 0)  PPx: Eliquis   Code Status (Patient has no pulse and is not breathing): CPR   Medical Interventions (Patient has pulse or is breathing): Full Support    Dispo: Pending clinical improvement, PT/OT consulted and following, return to nursing home when stable.     *This patient is considered high risk secondary to Acute COPD Exacerbation, Acute Metabolic Encephalopathy, Hypercarbic respiratory failure, requiring Non-invasive Positive Pressure Ventilation .      Edited by: Clarence Gomez MD at 1/6/2024 [address] [place] [address]

## 2024-01-07 LAB
ANION GAP SERPL CALCULATED.3IONS-SCNC: 5 MMOL/L (ref 5–15)
ANISOCYTOSIS BLD QL: NORMAL
BASOPHILS # BLD AUTO: 0 10*3/MM3 (ref 0–0.2)
BASOPHILS NFR BLD AUTO: 0 % (ref 0–1.5)
BUN SERPL-MCNC: 32 MG/DL (ref 8–23)
BUN/CREAT SERPL: 38.1 (ref 7–25)
CALCIUM SPEC-SCNC: 8.8 MG/DL (ref 8.6–10.5)
CHLORIDE SERPL-SCNC: 105 MMOL/L (ref 98–107)
CO2 SERPL-SCNC: 30 MMOL/L (ref 22–29)
CREAT SERPL-MCNC: 0.84 MG/DL (ref 0.57–1)
CRP SERPL-MCNC: <0.3 MG/DL (ref 0–0.5)
DEPRECATED RDW RBC AUTO: 77.8 FL (ref 37–54)
EGFRCR SERPLBLD CKD-EPI 2021: 72.6 ML/MIN/1.73
EOSINOPHIL # BLD AUTO: 0.04 10*3/MM3 (ref 0–0.4)
EOSINOPHIL NFR BLD AUTO: 0.6 % (ref 0.3–6.2)
ERYTHROCYTE [DISTWIDTH] IN BLOOD BY AUTOMATED COUNT: 23.1 % (ref 12.3–15.4)
GLUCOSE BLDC GLUCOMTR-MCNC: 129 MG/DL (ref 70–130)
GLUCOSE BLDC GLUCOMTR-MCNC: 141 MG/DL (ref 70–130)
GLUCOSE BLDC GLUCOMTR-MCNC: 151 MG/DL (ref 70–130)
GLUCOSE BLDC GLUCOMTR-MCNC: 239 MG/DL (ref 70–130)
GLUCOSE BLDC GLUCOMTR-MCNC: 92 MG/DL (ref 70–130)
GLUCOSE SERPL-MCNC: 99 MG/DL (ref 65–99)
HCT VFR BLD AUTO: 40.4 % (ref 34–46.6)
HGB BLD-MCNC: 11.8 G/DL (ref 12–15.9)
HYPOCHROMIA BLD QL: NORMAL
IMM GRANULOCYTES # BLD AUTO: 0.03 10*3/MM3 (ref 0–0.05)
IMM GRANULOCYTES NFR BLD AUTO: 0.4 % (ref 0–0.5)
LYMPHOCYTES # BLD AUTO: 0.79 10*3/MM3 (ref 0.7–3.1)
LYMPHOCYTES NFR BLD AUTO: 11.6 % (ref 19.6–45.3)
MCH RBC QN AUTO: 27.3 PG (ref 26.6–33)
MCHC RBC AUTO-ENTMCNC: 29.2 G/DL (ref 31.5–35.7)
MCV RBC AUTO: 93.5 FL (ref 79–97)
MONOCYTES # BLD AUTO: 0.19 10*3/MM3 (ref 0.1–0.9)
MONOCYTES NFR BLD AUTO: 2.8 % (ref 5–12)
NEUTROPHILS NFR BLD AUTO: 5.77 10*3/MM3 (ref 1.7–7)
NEUTROPHILS NFR BLD AUTO: 84.6 % (ref 42.7–76)
NRBC BLD AUTO-RTO: 0 /100 WBC (ref 0–0.2)
PLAT MORPH BLD: NORMAL
PLATELET # BLD AUTO: 135 10*3/MM3 (ref 140–450)
PMV BLD AUTO: 10.7 FL (ref 6–12)
POIKILOCYTOSIS BLD QL SMEAR: NORMAL
POTASSIUM SERPL-SCNC: 4.4 MMOL/L (ref 3.5–5.2)
RBC # BLD AUTO: 4.32 10*6/MM3 (ref 3.77–5.28)
SODIUM SERPL-SCNC: 140 MMOL/L (ref 136–145)
WBC NRBC COR # BLD AUTO: 6.82 10*3/MM3 (ref 3.4–10.8)

## 2024-01-07 PROCEDURE — 82948 REAGENT STRIP/BLOOD GLUCOSE: CPT

## 2024-01-07 PROCEDURE — 86140 C-REACTIVE PROTEIN: CPT | Performed by: INTERNAL MEDICINE

## 2024-01-07 PROCEDURE — 25010000002 METHYLPREDNISOLONE PER 40 MG: Performed by: INTERNAL MEDICINE

## 2024-01-07 PROCEDURE — 94664 DEMO&/EVAL PT USE INHALER: CPT

## 2024-01-07 PROCEDURE — 80048 BASIC METABOLIC PNL TOTAL CA: CPT | Performed by: INTERNAL MEDICINE

## 2024-01-07 PROCEDURE — 85025 COMPLETE CBC W/AUTO DIFF WBC: CPT | Performed by: INTERNAL MEDICINE

## 2024-01-07 PROCEDURE — 99232 SBSQ HOSP IP/OBS MODERATE 35: CPT | Performed by: INTERNAL MEDICINE

## 2024-01-07 PROCEDURE — 94799 UNLISTED PULMONARY SVC/PX: CPT

## 2024-01-07 PROCEDURE — 94761 N-INVAS EAR/PLS OXIMETRY MLT: CPT

## 2024-01-07 PROCEDURE — 85007 BL SMEAR W/DIFF WBC COUNT: CPT | Performed by: INTERNAL MEDICINE

## 2024-01-07 PROCEDURE — 63710000001 INSULIN LISPRO (HUMAN) PER 5 UNITS

## 2024-01-07 RX ADMIN — FERROUS SULFATE TAB 325 MG (65 MG ELEMENTAL FE) 325 MG: 325 (65 FE) TAB at 08:37

## 2024-01-07 RX ADMIN — SERTRALINE 100 MG: 50 TABLET, FILM COATED ORAL at 08:38

## 2024-01-07 RX ADMIN — INSULIN LISPRO 2 UNITS: 100 INJECTION, SOLUTION INTRAVENOUS; SUBCUTANEOUS at 16:56

## 2024-01-07 RX ADMIN — PANTOPRAZOLE SODIUM 40 MG: 40 TABLET, DELAYED RELEASE ORAL at 09:02

## 2024-01-07 RX ADMIN — ROSUVASTATIN CALCIUM 40 MG: 20 TABLET, FILM COATED ORAL at 16:56

## 2024-01-07 RX ADMIN — APIXABAN 5 MG: 5 TABLET, FILM COATED ORAL at 21:06

## 2024-01-07 RX ADMIN — BACLOFEN 5 MG: 10 TABLET ORAL at 21:07

## 2024-01-07 RX ADMIN — METHYLPREDNISOLONE SODIUM SUCCINATE 40 MG: 40 INJECTION, POWDER, FOR SOLUTION INTRAMUSCULAR; INTRAVENOUS at 05:56

## 2024-01-07 RX ADMIN — Medication 10 ML: at 21:07

## 2024-01-07 RX ADMIN — RISPERIDONE 0.5 MG: 0.25 TABLET, FILM COATED ORAL at 08:37

## 2024-01-07 RX ADMIN — BUDESONIDE AND FORMOTEROL FUMARATE DIHYDRATE 2 PUFF: 160; 4.5 AEROSOL RESPIRATORY (INHALATION) at 19:26

## 2024-01-07 RX ADMIN — LAMOTRIGINE 100 MG: 100 TABLET ORAL at 08:38

## 2024-01-07 RX ADMIN — RISPERIDONE 0.5 MG: 0.25 TABLET, FILM COATED ORAL at 21:06

## 2024-01-07 RX ADMIN — IPRATROPIUM BROMIDE 0.5 MG: 0.5 SOLUTION RESPIRATORY (INHALATION) at 12:04

## 2024-01-07 RX ADMIN — DOCUSATE SODIUM 50 MG AND SENNOSIDES 8.6 MG 2 TABLET: 8.6; 5 TABLET, FILM COATED ORAL at 21:06

## 2024-01-07 RX ADMIN — Medication 10 ML: at 08:38

## 2024-01-07 RX ADMIN — LAMOTRIGINE 25 MG: 100 TABLET ORAL at 21:06

## 2024-01-07 RX ADMIN — METOPROLOL SUCCINATE 25 MG: 25 TABLET, EXTENDED RELEASE ORAL at 08:38

## 2024-01-07 RX ADMIN — MEMANTINE HYDROCHLORIDE 10 MG: 10 TABLET, FILM COATED ORAL at 21:06

## 2024-01-07 RX ADMIN — INSULIN LISPRO 3 UNITS: 100 INJECTION, SOLUTION INTRAVENOUS; SUBCUTANEOUS at 11:43

## 2024-01-07 RX ADMIN — APIXABAN 5 MG: 5 TABLET, FILM COATED ORAL at 08:37

## 2024-01-07 RX ADMIN — BACLOFEN 5 MG: 10 TABLET ORAL at 08:37

## 2024-01-07 RX ADMIN — MEMANTINE HYDROCHLORIDE 10 MG: 10 TABLET, FILM COATED ORAL at 08:38

## 2024-01-07 RX ADMIN — DONEPEZIL HYDROCHLORIDE 10 MG: 5 TABLET, FILM COATED ORAL at 21:06

## 2024-01-07 RX ADMIN — ISOSORBIDE MONONITRATE 90 MG: 30 TABLET, EXTENDED RELEASE ORAL at 08:37

## 2024-01-07 RX ADMIN — CLOPIDOGREL BISULFATE 75 MG: 75 TABLET, FILM COATED ORAL at 08:37

## 2024-01-07 RX ADMIN — DOCUSATE SODIUM 50 MG AND SENNOSIDES 8.6 MG 2 TABLET: 8.6; 5 TABLET, FILM COATED ORAL at 08:37

## 2024-01-07 RX ADMIN — LEVOTHYROXINE SODIUM 75 MCG: 0.07 TABLET ORAL at 08:38

## 2024-01-07 NOTE — PROGRESS NOTES
[facility]  PROGRESS NOTE      Patient information:  Name: Brenda Munroe  Age/Sex: 73 y.o. female  :  1950        PCP: Bernadette Arevalo MD  Attending: Sukh Montez DO  MRN:  [id number]  Visit Number:  [id number]    LOS:  LOS: 6 days   CODE STATUS:    Code Status and Medical Interventions:   Ordered at: 24 1500     Code Status (Patient has no pulse and is not breathing):    CPR (Attempt to Resuscitate)     Medical Interventions (Patient has pulse or is breathing):    Full Support     Comments:    Per review of ACP documentation on file.       PROBLEM LIST:Principal Problem:    Acute hypercapnic respiratory failure      Reason for Cardiology follow-up: Atrial fibrillation     Subjective   ADMISSION INFORMATION:  Chief Complaint   Patient presents with    Altered Mental Status       HPI:  Brenda Munroe is a 73 y.o. female with a past medical history significant for   COPD, chronic hypoxic and hypercapnic respiratory failure, paroxysmal atrial fibrillation, hypothyroidism, arthritis, essential hypertension, hyperlipidemia, chronic headaches, type II DM, bilateral carotid stenosis without occlusion, depression, history of psychosis, dementia, anxiety, history of Bell's palsy, CAD, physical debility, PUD, and GERD.  Patient presented to [facility] emergency department via EMS from local skilled nursing facility due to altered mental status.  No family members were present at bedside.  Patient was on BiPAP at the time of my exam.  Due to acuity of condition and dementia, patient was unable to provide details.  Information obtained from chart review and ED report.  Patient was lying on ED stretcher at the time of my exam.  Appeared restless.  Moving all extremities equally. Opens eyes to verbal stimuli.  Pupils appear pinpoint and sluggishly reactive/equal. Auscultation of lung sounds revealed expiratory wheezing in the left upper lobe. Decreased  air movement. ABG obtained on arrival revealed acidotic pH with hypercapnia (PCO2 84.5). ABG repeated 2 hours after BiPAP placement; revealed improvement. PCO2 now 67.6. pH normalized. Patient will be admitted to the PCU for further monitoring, evaluation, and treatment.      Upon arrival to the ED, vital signs were temperature 98.5, pulse 59, respirations 20, blood pressure 144/75, SpO2 saturation 98% on 2 L nasal cannula.  ABG obtained on arrival noted pH 7.328, pCO2 84.5, pO2 76.2, HCO3 44.3, O2 saturation 95.4% on 2 L nasal cannula.  Patient placed on BiPAP.  Repeat ABG revealed pH 7.424, pCO2 67.6, pO2 60.9, HCO3 44.2, O2 saturation 92.8% on BiPAP.  High-sensitivity troponin T 26 with -3 delta.  CMP with chloride 96, CO2 44.3, anion gap 2.7, glucose 103, total protein 5.6, otherwise unremarkable.  TSH within normal limits.  CRP negative.  CBC with hemoglobin 10.1, platelets 135, RDW 22.4, MCHC 28.0, otherwise unremarkable.  Urinalysis with trace protein, otherwise unremarkable.  COVID-19 and flu A/B swab negative.  Chest x-ray noted mild interstitial coarsening, improved in the interval.  No acute cardiopulmonary process.  CT of the head without contrast noted no acute intracranial hemorrhage, midline shift, or significant mass effect.     Known Emergency Department medications received prior to my evaluation included DuoNeb, 1 mg IV Ativan, 80 mg IV Solu-Medrol. Emergency Department Room location at the time of my evaluation was 404.  Discussed with admitting physician, Sukh Sanchez DO.     Cardiology has been consulted for further evaluation and management.      Primary Cardiologist has been Dr. Culp and she was last seen in the office on 6/23/2023.     Interval History:   Patient is in room 322 and was examined by Dr. Alfonso.      Vital Signs  Temp:  [97.8 °F (36.6 °C)-98.7 °F (37.1 °C)] 98.7 °F (37.1 °C)  Heart Rate:  [] 97  Resp:  [18-20] 18  BP: ()/(60-92) 116/81  Flow (L/min):  [2]  2  Vital Signs (last 72 hrs)         01/04 0700 01/05 0659 01/05 0700 01/06 0659 01/06 0700 01/07 0659 01/07 0700 01/07 0935   Most Recent      Temp (°F) 97.6 -  98.6    97.2 -  100.1    97.8 -  98.4      98.7     98.7 (37.1) 01/07 0701    Heart Rate 57 -  98    58 -  136    84 -  143      97     97 01/07 0701    Resp 16 -  28    18 -  20    18 -  20      18     18 01/07 0701    /69 -  160/78    97/57 -  169/90    95/69 -  140/92      116/81     116/81 01/07 0701    SpO2 (%) 87 -  100    90 -  98    90 -  99      92     92 01/07 0701    Flow (L/min) 2 -  2.5      2      2       2 01/07 0631          Body mass index is 27.07 kg/m².    Intake/Output Summary (Last 24 hours) at 1/7/2024 0935  Last data filed at 1/7/2024 0413  Gross per 24 hour   Intake --   Output 750 ml   Net -750 ml       Objective     Physical Exam:      General Appearance:  Sleepy, in no acute distress.   Head:    Normocephalic, without obvious abnormality, atraumatic.   Eyes:                          Conjunctivae and sclerae normal, no icterus, no pallor, corneas clear.   Neck:   No adenopathy, supple, trachea midline, no thyromegaly, no carotid bruit, no JVD.   Lungs:     Clear to auscultation, respirations regular, even and             unlabored.    Heart:    IrRegular rhythm and tachycardic rate, normal S1 and S2, no          murmur, no gallop, no rub, no click   Chest Wall:    No abnormalities observed.   Abdomen:     Normal bowel sounds, no masses, no organomegaly, soft nontender, nondistended, no guarding, no rebound tenderness.   Extremities:   Moves all extremities well, no edema, no cyanosis, no            redness.   Pulses:   Pulses palpable and equal bilaterally.   Skin:   No bleeding, bruising or rash.   Neurologic: Grossly intact      Results review   Results Review:   Results from last 7 days   Lab Units 01/07/24  0757 01/06/24  0315 01/05/24  0222 01/04/24  0112 01/03/24  0829 01/02/24  0407 01/01/24  1059   WBC 10*3/mm3  "6.82 7.00 6.40 6.90 7.72 4.08 4.11   HEMOGLOBIN g/dL 11.8* 11.2* 12.4 12.4 13.4 11.4* 10.1*   PLATELETS 10*3/mm3 135* 137* 150 169 154 135* 135*     Results from last 7 days   Lab Units 01/07/24  0758 01/06/24  1437 01/06/24  0315 01/05/24  0222 01/04/24  1747 01/04/24  1143 01/04/24  0112 01/03/24  0829 01/02/24  0407 01/01/24  1059   SODIUM mmol/L 140  --  141 144  --   --  144 140 142 143   POTASSIUM mmol/L 4.4 5.0 3.4* 3.7 3.8 4.4 3.2* 3.8 3.9 4.3   CHLORIDE mmol/L 105  --  102 102  --   --  100 98 97* 96*   CO2 mmol/L 30.0*  --  33.9* 35.6*  --   --  36.5* 33.3* 40.8* 44.3*   BUN mg/dL 32*  --  38* 28*  --   --  23 18 10 10   CREATININE mg/dL 0.84  --  1.30* 1.00  --   --  0.74 0.70 0.72 0.68   CALCIUM mg/dL 8.8  --  8.8 9.1  --   --  9.5 9.6 9.7 8.9   GLUCOSE mg/dL 99  --  89 106*  --   --  117* 98 125* 103*   ALT (SGPT) U/L  --   --   --   --   --   --   --  10  --  8   AST (SGOT) U/L  --   --   --   --   --   --   --  20  --  11     Results from last 7 days   Lab Units 01/01/24  1304 01/01/24  1059   HSTROP T ng/L 23* 26*     Lab Results   Component Value Date    PROBNP 2,515.0 (H) 02/18/2023    PROBNP 289.6 03/12/2022    PROBNP 339.6 03/08/2022     No results found.     Lab Results   Component Value Date    INR 1.04 09/17/2023    INR 1.16 (H) 02/18/2023    INR 0.97 03/12/2022    INR 1.16 (H) 02/26/2021    INR 1.32 (H) 03/27/2020    INR 1.22 (H) 11/18/2019    INR 0.96 08/04/2015     Lab Results   Component Value Date    MG 1.9 01/04/2024    MG 2.1 12/27/2023    MG 2.1 02/18/2023     Lab Results   Component Value Date    TSH 3.020 01/01/2024      No results found for: \"CHOL\", \"TRIG\", \"HDL\", \"LDL\"  Pain Management Panel  More data exists         Latest Ref Rng & Units 12/27/2023 2/19/2023   Pain Management Panel   Creatinine, Urine mg/dL - 222.8    Amphetamine, Urine Qual Negative Negative  Negative    Barbiturates Screen, Urine Negative Negative  Negative    Benzodiazepine Screen, Urine Negative Negative  " Positive    Buprenorphine, Screen, Urine Negative Negative  Negative    Cocaine Screen, Urine Negative Negative  Negative    Fentanyl, Urine Negative Negative  -   Methadone Screen , Urine Negative Negative  Negative    Methamphetamine, Ur Negative Negative  Negative      Microbiology Results (last 10 days)       Procedure Component Value - Date/Time    S. Pneumo Ag Urine or CSF - Urine, Urine, Clean Catch [[id number]]  (Normal) Collected: 01/03/24 1104    Lab Status: Final result Specimen: Urine, Clean Catch Updated: 01/03/24 1819     Strep Pneumo Ag Negative    MRSA Screen, PCR (Inpatient) - Swab, Nares [[id number]]  (Abnormal) Collected: 01/03/24 0834    Lab Status: Final result Specimen: Swab from Nares Updated: 01/03/24 0957     MRSA PCR MRSA Detected    Narrative:      The negative predictive value of this diagnostic test is high and should only be used to consider de-escalating anti-MRSA therapy. A positive result may indicate colonization with MRSA and must be correlated clinically.    COVID-19 and FLU A/B PCR, 1 HR TAT - Swab, Nasopharynx [[id number]]  (Normal) Collected: 01/01/24 1059    Lab Status: Final result Specimen: Swab from Nasopharynx Updated: 01/01/24 1123     COVID19 Not Detected     Influenza A PCR Not Detected     Influenza B PCR Not Detected    Narrative:      Fact sheet for providers: https://www.fda.gov/media/873913/download    Fact sheet for patients: https://www.fda.gov/media/422914/download    Test performed by PCR.           Imaging Results (Last 24 Hours)       ** No results found for the last 24 hours. **            ECHO:  Results for orders placed during the hospital encounter of 01/01/24    Adult Transthoracic Echo Limited W/ Cont if Necessary Per Protocol    Interpretation Summary    Normal left ventricular cavity size and wall thickness noted. All left ventricular wall segments contract normally.    Left ventricular ejection fraction appears to be 61 - 65%.    There is a trivial  pericardial effusion adjacent to the left ventricle.      STRESS TEST:  Results for orders placed during the hospital encounter of 03/17/22    Stress test with myocardial perfusion one day    Interpretation Summary  · Findings consistent with a normal ECG stress test.  · Myocardial perfusion imaging study showed a small sized severe reversible ischemia in the mid lateral and inferolateral segments..  · Left ventricular ejection fraction is hyperdynamic (Calculated EF > 70%). .  · Normal LV cavity size. Normal LV wall motion noted.  · Impressions are consistent with an intermediate risk study.       HEART CATH:  Results for orders placed during the hospital encounter of 02/18/23    Cardiac Catheterization/Vascular Study    Narrative  Images from the original result were not included.  CARDIAC CATHETERIZATION / INTERVENTION REPORT    DATE OF PROCEDURE: 2/21/2023    INDICATION FOR PROCEDURE: NSTEMI      PRE PROCEDURE DIAGNOSIS:    Clinical frailty: 2- Well    ASA: 2=2- Mild to moderate systemic disease, medially well controlled, with no functional limitation    Mallampati: Class 2=2- Able to visualize the soft palate, fauces, uvula.  The anterior & posterior tonsilar pillars are hidden by the tongue.    POST PROCEDURE DIAGNOSIS:  CAD with patent LIMA graft ( well matured - no stenosis and supplies LAD and excellent collateral to RCA) Lcx mid had old dissection calcified 95% stenosis and two SVG grafts are chronically occluded    Face to face moderate conscious sedation time:      COMPLICATIONS : None    Specimens collected : None        PROCEDURE PERFORMED:    1. Selective right and left coronary Angiogram  2. Left heart catheterization  3, Selective LIMA angiogram  4. Selective SVG- RCA and OM angiography      PROCEDURE COMMENTS:    Prior to the procedure risks benefits alternatives and possible adverse events were discussed with the patient and informed consent was obtained.  Brenda Carmonaey was brought to the cath lab  and placed on the cardiac catherization table in the postabsortive state. The patient was prepped and draped according to the cath lab protocol under strict aseptic and sterile condition. Patient's right femoral artery sight was prepped and draped. Under fluoroscopic guidance the right femoral artery was punctured using a Micropuncture gauge needle utilizing the modified Seldinger technique. 6 Frisian sheath was introduced over a wire. After aspirating for blood it was flushed with heparinized saline. Angio was performed to confirm the position of the sheath in  R CFA    We advanced Heraclio type diagnostic catheters over the wire. The  left and right coronary arteries  were selectively engaged. Left and right coronary angiogram were obtained in multiple orthogonal projections. 5 Pakistani Pigtail catheter was used to cross across the AV retrograde into the LV cavity and resting LV pressures were recorded. Manual pull back was used to record gradient across the Aortic valve.    After completion of the procedure the femoral sheath was removed in the cath lab and hemostasis was achieved by manual compression. The patient tolerated the procedure without complications.      Coronary anatomy findings:    Patient's native left main had no stenosis, LAD was  in the proximal, there is a vein graft that appears to be attached to a diagonal artery which has been chronically occluded with no other significant diagonals visualized.  The patient is native left circumflex artery appeared to be a large caliber vessel, there was a significant 99% calcified or dissected stenosis in the mid circumflex and beyond the stenosis that supplies 3 moderate-sized OM branches.  This vessel is not grafted.  The native RCA , there is 2 vein grafts which appear to be chronically occluded at this time, 1 possibly going to the RCA underwent likely going either to the circumflex OM or diagonal.  The LIMA graft was well matured with no significant  stenosis    Large distal LAD notes excellent collaterals filling the RCA.            EBL: Less than 10cc        Final Impression:  Patient's angiogram did not demonstrate any acute thrombotic lesions that would explain for type I non-STEMI, her non-STEMI is likely a type II from her hypoxia in the setting of her older chronic lesion.  The mid circumflex stenosis is a very complex calcified old dissected lesion, stenosis MAYO-3 flow I would recommend only medical management at this time.  If the patient ever needs intervention estimated atherectomy based PCI.  Since patient denies any anginal symptoms I would favor to continue with current identity medical management for CAD.                  Tab Cifuentes MD, [facility]  Interventional Cardiology    02/21/23  14:29 EST              TELEMETRY:     Atrial fib 90s        I reviewed the patient's new clinical results.    Medication Review:   Current list of medications may not reflect those currently placed in orders that are not signed or are being held.     apixaban, 5 mg, Oral, Q12H  baclofen, 5 mg, Oral, BID  budesonide-formoterol, 2 puff, Inhalation, BID - RT   And  tiotropium bromide monohydrate, 2 puff, Inhalation, Daily - RT  clopidogrel, 75 mg, Oral, Daily  donepezil, 10 mg, Oral, Nightly  ferrous sulfate, 325 mg, Oral, Daily With Breakfast  insulin lispro, 2-7 Units, Subcutaneous, 4x Daily AC & at Bedtime  ipratropium, 0.5 mg, Nebulization, 4x Daily - RT  isosorbide mononitrate, 90 mg, Oral, Daily  lamoTRIgine, 100 mg, Oral, Daily  lamoTRIgine, 25 mg, Oral, Nightly  levothyroxine, 75 mcg, Oral, Daily  [Held by provider] lisinopril, 10 mg, Oral, Q24H  memantine, 10 mg, Oral, Q12H  methylPREDNISolone sodium succinate, 40 mg, Intravenous, Q24H  metoprolol succinate XL, 25 mg, Oral, Daily  pantoprazole, 40 mg, Oral, Daily  risperiDONE, 0.5 mg, Oral, BID  rosuvastatin, 40 mg, Oral, Q PM  senna-docusate sodium, 2 tablet, Oral, BID  sertraline, 100 mg, Oral,  Daily  sodium chloride, 10 mL, Intravenous, Q12H  sodium chloride, 10 mL, Intravenous, Q12H  Vitamin D3, 50,000 Units, Oral, Q7 Days           acetaminophen **OR** acetaminophen    senna-docusate sodium **AND** polyethylene glycol **AND** bisacodyl **AND** bisacodyl    carboxymethylcellulose    dextrose    dextrose    glucagon (human recombinant)    guaiFENesin    hydrALAZINE    LORazepam    Magnesium Standard Dose Replacement - Follow Nurse / BPA Driven Protocol    metoprolol tartrate    nitroglycerin    phenol    Potassium Replacement - Follow Nurse / BPA Driven Protocol    senna    sodium chloride    sodium chloride    sodium chloride    sodium chloride    sodium chloride    Assessment      Coronary artery disease s/p bypass  A-fib with fast ventricular response.  On anticoagulation  Hypertension                Plan     1 Cardiac. patient with history of coronary artery disease with bypass surgery in the past.  Normal EF.  Came in with A-fib with fast ventricular response.  Anticoagulation to continue.  Has had history of paroxysmal atrial fibrillation.  Rate control with beta-blockers to keep heart rate less than 100          I have discussed this patient with Dr. Alfonso. Together, we have formed a plan of care for this patient.     I have discussed the patients findings and my recommendations with patient.                    Please note that portions of this note were completed with a voice recognition program.    Please note that portions of this note were copied and has been reviewed and is accurate as of 1/7/2024 .       .Electronically signed by Jasmina Alfonso MD, 01/07/24, 4:26 PM EST.

## 2024-01-07 NOTE — PLAN OF CARE
Goal Outcome Evaluation:                      Patient resting in bed on room air, maintaining oxygen sats greater than 90%. VSS. No s/s of distress noted. Will continue to follow plan of care.

## 2024-01-07 NOTE — PROGRESS NOTES
[facility] HOSPITALIST PROGRESS NOTE     Patient Identification:  Name:  Brenda Munroe  Age:  73 y.o.  Sex:  female  :  1950  MRN:  [id number]  Visit Number:  [id number]  ROOM: [address] [place] [address]     Primary Care Provider:  Bernadette Arevalo MD    Length of stay in inpatient status:  6    Subjective     Chief Compliant:    Chief Complaint   Patient presents with    Altered Mental Status     History of Presenting Illness:    Patient remains ill but stable today, no acute events overnight, no new complaints, denies any fevers or chills, last few days has been having issues with Rapid Ventricular Rate, Cardiology consulted and following, yesterday heart rate up to 140's in evening, patient received IV and oral as needed medications, today's dose of long acting beta blocker was increased, so far today heart rate has been improved, rates in 90's, still irregular rhythm, on IV steroids, breathing has been improved, creatinine has returned to baseline with small IVF bolus and holding home ACEI, possibly return to nursing home in 1-2 days with continued clinical stability.   Objective     Current Hospital Meds:  apixaban, 5 mg, Oral, Q12H  baclofen, 5 mg, Oral, BID  budesonide-formoterol, 2 puff, Inhalation, BID - RT   And  tiotropium bromide monohydrate, 2 puff, Inhalation, Daily - RT  clopidogrel, 75 mg, Oral, Daily  donepezil, 10 mg, Oral, Nightly  ferrous sulfate, 325 mg, Oral, Daily With Breakfast  insulin lispro, 2-7 Units, Subcutaneous, 4x Daily AC & at Bedtime  ipratropium, 0.5 mg, Nebulization, 4x Daily - RT  isosorbide mononitrate, 90 mg, Oral, Daily  lamoTRIgine, 100 mg, Oral, Daily  lamoTRIgine, 25 mg, Oral, Nightly  levothyroxine, 75 mcg, Oral, Daily  [Held by provider] lisinopril, 10 mg, Oral, Q24H  memantine, 10 mg, Oral, Q12H  methylPREDNISolone sodium succinate, 40 mg, Intravenous, Q24H  metoprolol succinate XL, 25 mg, Oral, Daily  pantoprazole, 40 mg, Oral, Daily  risperiDONE, 0.5 mg, Oral,  BID  rosuvastatin, 40 mg, Oral, Q PM  senna-docusate sodium, 2 tablet, Oral, BID  sertraline, 100 mg, Oral, Daily  sodium chloride, 10 mL, Intravenous, Q12H  sodium chloride, 10 mL, Intravenous, Q12H  Vitamin D3, 50,000 Units, Oral, Q7 Days    ----------------------------------------------------------------------------------------------------------------------  Vital Signs:  Temp:  [97.8 °F (36.6 °C)-98.7 °F (37.1 °C)] 98.7 °F (37.1 °C)  Heart Rate:  [] 97  Resp:  [18-20] 18  BP: ()/(60-92) 116/81  SpO2:  [90 %-99 %] 92 %  on  Flow (L/min):  [2] 2;   Device (Oxygen Therapy): nasal cannula  Body mass index is 27.07 kg/m².      Intake/Output Summary (Last 24 hours) at 1/7/2024 1052  Last data filed at 1/7/2024 0413  Gross per 24 hour   Intake --   Output 750 ml   Net -750 ml      ----------------------------------------------------------------------------------------------------------------------  Physical exam:  Constitutional:  Elderly, No acute distress.      HENT:  Head:  Normocephalic and atraumatic.  Mouth:  Moist mucous membranes.    Eyes:  Conjunctivae and EOM are normal. No scleral icterus.    Neck:  Neck supple.  No JVD present.    Cardiovascular:  regular rate, irregular rhythm and normal heart sounds with no murmur.  Pulmonary/Chest:  No respiratory distress, no wheezes, no crackles, on NC  Abdominal:  Soft. No distension and no tenderness.   Musculoskeletal:  No tenderness, and no deformity.  No red or swollen joints anywhere.    Neurological:  Awake, oriented to person, no gross focal deficits   Skin:  Skin is warm and dry. No rash noted. No pallor.   Peripheral vascular:  No clubbing, no cyanosis, no edema.  Psychiatric: unable to assess due to sedation  : External catheter in place    Edited by: Clarence Gomez MD at 1/7/2024 1050  ----------------------------------------------------------------------------------------------------------------------  WBC/HGB/HCT/PLT   6.82/11.8/40.4/135  "(01/07 0757)  BUN/CREAT/GLUC/ALT/AST/SYD/LIP    32/0.84/99/--/--/--/-- (01/07 0758)  LYTES - Na/K/Cl/CO2: 140/4.4/105/30.0* (01/07 0758)     No results found for: \"URINECX\"  No results found for: \"BLOODCX\"    I have personally looked at the labs and they are summarized above.  ----------------------------------------------------------------------------------------------------------------------  Detailed radiology reports for the last 24 hours:  No radiology results for the last day  Assessment & Plan    73F Nursing Home Resident, Smoker PMH GERD, Severe Recurrent Depression with History Psychotic Features, Dementia with Behavioral Disturbances, Hypothyroid, Severe bilateral Carotid Artery Stenosis, COPD/Emphysema, Hypertension, Hyperlipidemia, Coronary Artery Disease status post CABG (1999), Paroxysmal Atrial Fibrillation, presented to [facility] emergency room 1/1 with altered mental status.     #Acute Metabolic Encephalopathy due to Acute Hypercarbic on Chronic Hypoxic/Hypercarbic Respiratory Failure requiring Non-invasive Positive Pressure Ventilation in setting of underlying COPD/Emphysema with acute exacerbation  - CT showed bibasilar airspace disease, likely atelectasis but superimposed Pneumonia could not be excluded  - Continue scheduled Atrovent nebs  - Continue IV glucocorticoids with Methylprednisolone 40mg daily   - Continue supplemental oxygen to keep 02 sats 88-92%, caution with over oxygenation as not to suppress respiratory drive  - Monitor on telemetry, continuous pulse ox, continue 02 and wean as able    #Paroxysmal Atrial Fibrillation, on home anticoagulation, now with Rapid Ventricular Rate    #Hypertension/Hyperlipidemia/Coronary Artery Disease status post CABG   #Diastolic Dysfunction/Elevated proBNP, without overt signs and symptoms Congestive Heart Failure   #Severe Bilateral Carotid Artery Stenosis   #Mod TR, Mild Pulmonary Hypertension   - Cardiology consulted and following   - " Continue Statin, Plavix, Eliquis  - Continue beta blocker, again with Rapid Ventricular Rate 1/6, required as needed medications, increased dose today, heart rate is stable in 90's during evaluation this AM.   - Continue home Imdur  - Held home ACEI due to lower blood pressure, resume as indicated   - Continue monitoring, strict I/O's, trend heart rate and blood pressure    #Nonoliguric Acute Kidney Injury, suspect multifactorial, due to poor oral intake, ACEI use, and hypotension - Resolved   - Baseline creatinine 0.7-0.9, now up to 1.3 1/6, 1/5 oral intake only 480cc's, had episode of low blood pressure 1/5 (100/60mmHg) likely contributing, on home ACEI which was held, creatinine returned to baseline  - Trend creatinine and urine output, avoid nephrotoxins, NSAIDs, dehydration and contrast as able.      #Pre-Diabetes  - Hgb A1c = 5.2% last checked; No home oral agents, continue FSBG and SSI, add long acting as indicated.      #Hypothyroid  - Continue home Levothyroxine     #Gastroesophageal Reflux Disease/History Peptic Ulcer Disease   - Continue home PPI     #Severe Recurrent Depression with History Psychotic Features  - Continue home regimen     #Dementia with Behavioral Disturbances  - Continue home medications, supportive care     #Tobacco Dependence  - Recommended cessation, nicotine replacement therapy as needed    F: Oral  E: Monitor & Replace as needed   N: Diet: Regular/House Diet; Texture: Soft to Chew (NDD 3); Soft to Chew: Chopped Meat; Fluid Consistency: Thin (IDDSI 0)  PPx: Eliquis   Code Status (Patient has no pulse and is not breathing): CPR   Medical Interventions (Patient has pulse or is breathing): Full Support    Dispo: Pending clinical improvement, PT/OT consulted and following, return to nursing home when stable, possibly 1-2 days.     *This patient is considered high risk secondary to Acute COPD Exacerbation, Acute Metabolic Encephalopathy, Hypercarbic respiratory failure, requiring  Non-invasive Positive Pressure Ventilation     Edited by: Clarence oGmez MD at 1/7/2024 1052  [facility]

## 2024-01-08 LAB
ANION GAP SERPL CALCULATED.3IONS-SCNC: 3.8 MMOL/L (ref 5–15)
BUN SERPL-MCNC: 34 MG/DL (ref 8–23)
BUN/CREAT SERPL: 35.8 (ref 7–25)
CALCIUM SPEC-SCNC: 8.8 MG/DL (ref 8.6–10.5)
CHLORIDE SERPL-SCNC: 106 MMOL/L (ref 98–107)
CO2 SERPL-SCNC: 32.2 MMOL/L (ref 22–29)
CREAT SERPL-MCNC: 0.95 MG/DL (ref 0.57–1)
CRP SERPL-MCNC: <0.3 MG/DL (ref 0–0.5)
DEPRECATED RDW RBC AUTO: 78.4 FL (ref 37–54)
EGFRCR SERPLBLD CKD-EPI 2021: 62.6 ML/MIN/1.73
ERYTHROCYTE [DISTWIDTH] IN BLOOD BY AUTOMATED COUNT: 22.9 % (ref 12.3–15.4)
GLUCOSE BLDC GLUCOMTR-MCNC: 219 MG/DL (ref 70–130)
GLUCOSE BLDC GLUCOMTR-MCNC: 82 MG/DL (ref 70–130)
GLUCOSE BLDC GLUCOMTR-MCNC: 86 MG/DL (ref 70–130)
GLUCOSE BLDC GLUCOMTR-MCNC: 92 MG/DL (ref 70–130)
GLUCOSE SERPL-MCNC: 106 MG/DL (ref 65–99)
HCT VFR BLD AUTO: 38.2 % (ref 34–46.6)
HGB BLD-MCNC: 11 G/DL (ref 12–15.9)
MCH RBC QN AUTO: 27.2 PG (ref 26.6–33)
MCHC RBC AUTO-ENTMCNC: 28.8 G/DL (ref 31.5–35.7)
MCV RBC AUTO: 94.3 FL (ref 79–97)
PLATELET # BLD AUTO: 135 10*3/MM3 (ref 140–450)
PMV BLD AUTO: 11.1 FL (ref 6–12)
POTASSIUM SERPL-SCNC: 4.2 MMOL/L (ref 3.5–5.2)
RBC # BLD AUTO: 4.05 10*6/MM3 (ref 3.77–5.28)
SODIUM SERPL-SCNC: 142 MMOL/L (ref 136–145)
WBC NRBC COR # BLD AUTO: 7.69 10*3/MM3 (ref 3.4–10.8)

## 2024-01-08 PROCEDURE — 94761 N-INVAS EAR/PLS OXIMETRY MLT: CPT

## 2024-01-08 PROCEDURE — 86140 C-REACTIVE PROTEIN: CPT | Performed by: INTERNAL MEDICINE

## 2024-01-08 PROCEDURE — 80048 BASIC METABOLIC PNL TOTAL CA: CPT | Performed by: INTERNAL MEDICINE

## 2024-01-08 PROCEDURE — 82948 REAGENT STRIP/BLOOD GLUCOSE: CPT

## 2024-01-08 PROCEDURE — 94799 UNLISTED PULMONARY SVC/PX: CPT

## 2024-01-08 PROCEDURE — 94664 DEMO&/EVAL PT USE INHALER: CPT

## 2024-01-08 PROCEDURE — 94660 CPAP INITIATION&MGMT: CPT

## 2024-01-08 PROCEDURE — 99232 SBSQ HOSP IP/OBS MODERATE 35: CPT | Performed by: INTERNAL MEDICINE

## 2024-01-08 PROCEDURE — 25010000002 METHYLPREDNISOLONE PER 40 MG: Performed by: INTERNAL MEDICINE

## 2024-01-08 PROCEDURE — 85027 COMPLETE CBC AUTOMATED: CPT | Performed by: INTERNAL MEDICINE

## 2024-01-08 PROCEDURE — 63710000001 INSULIN LISPRO (HUMAN) PER 5 UNITS

## 2024-01-08 RX ORDER — PREDNISONE 20 MG/1
40 TABLET ORAL
Status: DISCONTINUED | OUTPATIENT
Start: 2024-01-09 | End: 2024-01-11 | Stop reason: HOSPADM

## 2024-01-08 RX ADMIN — APIXABAN 5 MG: 5 TABLET, FILM COATED ORAL at 09:10

## 2024-01-08 RX ADMIN — IPRATROPIUM BROMIDE 0.5 MG: 0.5 SOLUTION RESPIRATORY (INHALATION) at 12:30

## 2024-01-08 RX ADMIN — ROSUVASTATIN CALCIUM 40 MG: 20 TABLET, FILM COATED ORAL at 17:57

## 2024-01-08 RX ADMIN — APIXABAN 5 MG: 5 TABLET, FILM COATED ORAL at 20:56

## 2024-01-08 RX ADMIN — DONEPEZIL HYDROCHLORIDE 10 MG: 5 TABLET, FILM COATED ORAL at 20:56

## 2024-01-08 RX ADMIN — DOCUSATE SODIUM 50 MG AND SENNOSIDES 8.6 MG 2 TABLET: 8.6; 5 TABLET, FILM COATED ORAL at 20:56

## 2024-01-08 RX ADMIN — Medication 10 ML: at 09:11

## 2024-01-08 RX ADMIN — MEMANTINE HYDROCHLORIDE 10 MG: 10 TABLET, FILM COATED ORAL at 09:09

## 2024-01-08 RX ADMIN — BACLOFEN 5 MG: 10 TABLET ORAL at 09:10

## 2024-01-08 RX ADMIN — BUDESONIDE AND FORMOTEROL FUMARATE DIHYDRATE 2 PUFF: 160; 4.5 AEROSOL RESPIRATORY (INHALATION) at 06:17

## 2024-01-08 RX ADMIN — METOPROLOL SUCCINATE 25 MG: 25 TABLET, EXTENDED RELEASE ORAL at 09:10

## 2024-01-08 RX ADMIN — SERTRALINE 100 MG: 50 TABLET, FILM COATED ORAL at 09:10

## 2024-01-08 RX ADMIN — INSULIN LISPRO 3 UNITS: 100 INJECTION, SOLUTION INTRAVENOUS; SUBCUTANEOUS at 11:36

## 2024-01-08 RX ADMIN — FERROUS SULFATE TAB 325 MG (65 MG ELEMENTAL FE) 325 MG: 325 (65 FE) TAB at 09:10

## 2024-01-08 RX ADMIN — TIOTROPIUM BROMIDE INHALATION SPRAY 2 PUFF: 3.12 SPRAY, METERED RESPIRATORY (INHALATION) at 06:17

## 2024-01-08 RX ADMIN — LAMOTRIGINE 100 MG: 100 TABLET ORAL at 09:09

## 2024-01-08 RX ADMIN — LAMOTRIGINE 25 MG: 100 TABLET ORAL at 20:55

## 2024-01-08 RX ADMIN — RISPERIDONE 0.5 MG: 0.25 TABLET, FILM COATED ORAL at 09:10

## 2024-01-08 RX ADMIN — MEMANTINE HYDROCHLORIDE 10 MG: 10 TABLET, FILM COATED ORAL at 20:56

## 2024-01-08 RX ADMIN — RISPERIDONE 0.5 MG: 0.25 TABLET, FILM COATED ORAL at 20:56

## 2024-01-08 RX ADMIN — IPRATROPIUM BROMIDE 0.5 MG: 0.5 SOLUTION RESPIRATORY (INHALATION) at 06:17

## 2024-01-08 RX ADMIN — CLOPIDOGREL BISULFATE 75 MG: 75 TABLET, FILM COATED ORAL at 09:10

## 2024-01-08 RX ADMIN — BACLOFEN 5 MG: 10 TABLET ORAL at 20:56

## 2024-01-08 RX ADMIN — PANTOPRAZOLE SODIUM 40 MG: 40 TABLET, DELAYED RELEASE ORAL at 09:10

## 2024-01-08 RX ADMIN — LEVOTHYROXINE SODIUM 75 MCG: 0.07 TABLET ORAL at 09:10

## 2024-01-08 RX ADMIN — METHYLPREDNISOLONE SODIUM SUCCINATE 40 MG: 40 INJECTION, POWDER, FOR SOLUTION INTRAMUSCULAR; INTRAVENOUS at 05:54

## 2024-01-08 RX ADMIN — ISOSORBIDE MONONITRATE 90 MG: 30 TABLET, EXTENDED RELEASE ORAL at 09:09

## 2024-01-08 RX ADMIN — IPRATROPIUM BROMIDE 0.5 MG: 0.5 SOLUTION RESPIRATORY (INHALATION) at 18:25

## 2024-01-08 RX ADMIN — BUDESONIDE AND FORMOTEROL FUMARATE DIHYDRATE 2 PUFF: 160; 4.5 AEROSOL RESPIRATORY (INHALATION) at 18:25

## 2024-01-08 NOTE — PLAN OF CARE
Goal Outcome Evaluation:  Patient is resting in bed watching television. Patient alert to self only. Patient is currently room air. Patient has tolerated all interventions. No complaints or concerns at this time. No signs of acute distress noted. Will continue to follow plan of care.

## 2024-01-08 NOTE — PROGRESS NOTES
[facility] HospitalIST PROGRESS NOTE     Patient Identification:  Name:  Brenda Munroe  Age:  73 y.o.  Sex:  female  :  1950  MRN:  [id number]  Visit Number:  [id number]  Primary Care Provider:  Bernadette Arevalo MD    Length of stay:  7    Chief complaint: None    Subjective:    Patient seen and examined at bedside with no nursing staff present.  Patient is doing well and has no current complaints.  She specifically denies any fevers, chills, sweats, rigors, nausea or vomiting.  ----------------------------------------------------------------------------------------------------------------------  Current Hospital Meds:  apixaban, 5 mg, Oral, Q12H  baclofen, 5 mg, Oral, BID  budesonide-formoterol, 2 puff, Inhalation, BID - RT   And  tiotropium bromide monohydrate, 2 puff, Inhalation, Daily - RT  clopidogrel, 75 mg, Oral, Daily  donepezil, 10 mg, Oral, Nightly  ferrous sulfate, 325 mg, Oral, Daily With Breakfast  insulin lispro, 2-7 Units, Subcutaneous, 4x Daily AC & at Bedtime  ipratropium, 0.5 mg, Nebulization, 4x Daily - RT  isosorbide mononitrate, 90 mg, Oral, Daily  lamoTRIgine, 100 mg, Oral, Daily  lamoTRIgine, 25 mg, Oral, Nightly  levothyroxine, 75 mcg, Oral, Daily  [Held by provider] lisinopril, 10 mg, Oral, Q24H  memantine, 10 mg, Oral, Q12H  methylPREDNISolone sodium succinate, 40 mg, Intravenous, Q24H  metoprolol succinate XL, 25 mg, Oral, Daily  pantoprazole, 40 mg, Oral, Daily  risperiDONE, 0.5 mg, Oral, BID  rosuvastatin, 40 mg, Oral, Q PM  senna-docusate sodium, 2 tablet, Oral, BID  sertraline, 100 mg, Oral, Daily  sodium chloride, 10 mL, Intravenous, Q12H  sodium chloride, 10 mL, Intravenous, Q12H  Vitamin D3, 50,000 Units, Oral, Q7 Days         ----------------------------------------------------------------------------------------------------------------------  Vital Signs:  Temp:  [97.9 °F (36.6 °C)-98.8 °F (37.1 °C)] 97.9 °F (36.6 °C)  Heart Rate:  [] 89  Resp:   [16-20] 18  BP: ()/(58-81) 99/62      01/05/24  0636 01/06/24  0500 01/07/24  0500   Weight: 71.5 kg (157 lb 11.2 oz) (new admit) 72.3 kg (159 lb 8 oz) 73.8 kg (162 lb 10.4 oz)     Body mass index is 27.07 kg/m².    Intake/Output Summary (Last 24 hours) at 1/8/2024 1600  Last data filed at 1/8/2024 1300  Gross per 24 hour   Intake 780 ml   Output 350 ml   Net 430 ml     Diet: Regular/House Diet; Texture: Soft to Chew (NDD 3); Soft to Chew: Chopped Meat; Fluid Consistency: Thin (IDDSI 0)  ----------------------------------------------------------------------------------------------------------------------  Physical exam:  Constitutional: Well-nourished  female in no apparent distress.     HENT:  Head:  Normocephalic and atraumatic.  Mouth:  Moist mucous membranes.    Eyes:  Conjunctivae and EOM are normal.  Pupils are equal, round, and reactive to light.  No scleral icterus.    Neck:  Neck supple. No thyromegaly.  No JVD present.    Cardiovascular:  Regular rate and rhythm with no murmurs, rubs, clicks or gallops appreciated.  Pulmonary/Chest:  Clear to auscultation bilaterally with no crackles, wheezes or rhonchi appreciated.  Abdominal:  Soft. Nontender. Nondistended  Bowel sounds are normal in all four quadrants. No organomegally appreciated.   Musculoskeletal:  No edema, no tenderness, and no deformity.  No red or swollen joints anywhere.    Neurological:  Alert, Cranial nerves II-XII intact with no focal deficits.  No facial droop.  No slurred speech.   Skin:  Warm and dry to palpation with no rashes or lesions appreciated.  Peripheral vascular:  2+ radial and pedal pulses in bilateral upper and lower extremities.  Psychiatric:  Alert and oriented x3, demonstrates appropriate judgment and  "insight.  ------------------------------------------------------------------------------------------------------------  ----------------------------------------------------------------------------------------------------------------------      Results from last 7 days   Lab Units 01/08/24  0243 01/07/24  0757 01/07/24  0310 01/06/24  0315   CRP mg/dL <0.30  --  <0.30 <0.30   WBC 10*3/mm3 7.69 6.82  --  7.00   HEMOGLOBIN g/dL 11.0* 11.8*  --  11.2*   HEMATOCRIT % 38.2 40.4  --  37.7   MCV fL 94.3 93.5  --  92.6   MCHC g/dL 28.8* 29.2*  --  29.7*   PLATELETS 10*3/mm3 135* 135*  --  137*     Results from last 7 days   Lab Units 01/04/24  1014   PH, ARTERIAL pH units 7.433   PO2 ART mm Hg 76.5*   PCO2, ARTERIAL mm Hg 51.1*   HCO3 ART mmol/L 34.1*     Results from last 7 days   Lab Units 01/08/24  0243 01/07/24  0758 01/06/24  1437 01/06/24  0315 01/05/24  0222 01/04/24  1747 01/04/24  0112 01/03/24  0829   SODIUM mmol/L 142 140  --  141   < >  --    < > 140   POTASSIUM mmol/L 4.2 4.4 5.0 3.4*   < > 3.8   < > 3.8   MAGNESIUM mg/dL  --   --   --   --   --  1.9  --   --    CHLORIDE mmol/L 106 105  --  102   < >  --    < > 98   CO2 mmol/L 32.2* 30.0*  --  33.9*   < >  --    < > 33.3*   BUN mg/dL 34* 32*  --  38*   < >  --    < > 18   CREATININE mg/dL 0.95 0.84  --  1.30*   < >  --    < > 0.70   CALCIUM mg/dL 8.8 8.8  --  8.8   < >  --    < > 9.6   GLUCOSE mg/dL 106* 99  --  89   < >  --    < > 98   ALBUMIN g/dL  --   --   --   --   --   --   --  4.2   BILIRUBIN mg/dL  --   --   --   --   --   --   --  0.4   ALK PHOS U/L  --   --   --   --   --   --   --  59   AST (SGOT) U/L  --   --   --   --   --   --   --  20   ALT (SGPT) U/L  --   --   --   --   --   --   --  10    < > = values in this interval not displayed.   Estimated Creatinine Clearance: 51.5 mL/min (by C-G formula based on SCr of 0.95 mg/dL).    No results found for: \"AMMONIA\"      No results found for: \"BLOODCX\"  No results found for: \"URINECX\"  No results found " "for: \"WOUNDCX\"  No results found for: \"STOOLCX\"    I have personally looked at the labs and they are summarized above.  ----------------------------------------------------------------------------------------------------------------------  Imaging Results (Last 24 Hours)       ** No results found for the last 24 hours. **          ----------------------------------------------------------------------------------------------------------------------  Assessment and Plan:    Acute metabolic encephalopathy -much improved, patient reportedly back at baseline mentation.    2.  Acute hypercapnic on chronic hypoxic/hypercapnic respiratory failure -acute phase resolved, secondary to COPD exacerbation.    3.  COPD exacerbation -will transition to oral prednisone and continue nebulizer treatments while admitted    4.  Paroxysmal A-fib -patient in normal sinus rhythm today    5.  Essential hypertension -well controlled    6.  Hyperlipidemia -statin    7.  Acute kidney injury -resolved    8.  Hypothyroidism -Synthroid    Disposition likely discharge tomorrow    Carlos Dudley, DO   01/08/24   16:00 EST     "

## 2024-01-08 NOTE — CASE MANAGEMENT/SOCIAL WORK
Discharge Planning Assessment  [facility]     Patient Name: Brenda Munroe  MRN: [id number]  Today's Date: 1/8/2024    Admit Date: 1/1/2024    Plan: Pt was admitted on 1/1/24 from [facility].  Pt had a 30 day bed hold upon admission. SS to follow.     Discharge Plan       Row Name 01/08/24 1351       Plan    Plan Pt was admitted on 1/1/24 from [facility] and [facility].  Pt had a 30 day bed hold upon admission. SS to follow.              JORGE Lazo

## 2024-01-09 ENCOUNTER — APPOINTMENT (OUTPATIENT)
Dept: CT IMAGING | Facility: HOSPITAL | Age: 74
DRG: 189 | End: 2024-01-09
Payer: MEDICARE

## 2024-01-09 LAB
ANION GAP SERPL CALCULATED.3IONS-SCNC: 3.2 MMOL/L (ref 5–15)
BUN SERPL-MCNC: 24 MG/DL (ref 8–23)
BUN/CREAT SERPL: 24.2 (ref 7–25)
CALCIUM SPEC-SCNC: 8.7 MG/DL (ref 8.6–10.5)
CHLORIDE SERPL-SCNC: 104 MMOL/L (ref 98–107)
CO2 SERPL-SCNC: 32.8 MMOL/L (ref 22–29)
CREAT SERPL-MCNC: 0.99 MG/DL (ref 0.57–1)
DEPRECATED RDW RBC AUTO: 77.9 FL (ref 37–54)
EGFRCR SERPLBLD CKD-EPI 2021: 59.6 ML/MIN/1.73
ERYTHROCYTE [DISTWIDTH] IN BLOOD BY AUTOMATED COUNT: 22.5 % (ref 12.3–15.4)
GLUCOSE BLDC GLUCOMTR-MCNC: 109 MG/DL (ref 70–130)
GLUCOSE BLDC GLUCOMTR-MCNC: 114 MG/DL (ref 70–130)
GLUCOSE BLDC GLUCOMTR-MCNC: 122 MG/DL (ref 70–130)
GLUCOSE BLDC GLUCOMTR-MCNC: 127 MG/DL (ref 70–130)
GLUCOSE SERPL-MCNC: 90 MG/DL (ref 65–99)
HCT VFR BLD AUTO: 36.2 % (ref 34–46.6)
HGB BLD-MCNC: 10.7 G/DL (ref 12–15.9)
MCH RBC QN AUTO: 28 PG (ref 26.6–33)
MCHC RBC AUTO-ENTMCNC: 29.6 G/DL (ref 31.5–35.7)
MCV RBC AUTO: 94.8 FL (ref 79–97)
PLATELET # BLD AUTO: 141 10*3/MM3 (ref 140–450)
PMV BLD AUTO: 11.8 FL (ref 6–12)
POTASSIUM SERPL-SCNC: 4.6 MMOL/L (ref 3.5–5.2)
RBC # BLD AUTO: 3.82 10*6/MM3 (ref 3.77–5.28)
SODIUM SERPL-SCNC: 140 MMOL/L (ref 136–145)
WBC NRBC COR # BLD AUTO: 6.69 10*3/MM3 (ref 3.4–10.8)

## 2024-01-09 PROCEDURE — 25810000003 SODIUM CHLORIDE 0.9 % SOLUTION

## 2024-01-09 PROCEDURE — 63710000001 PREDNISONE PER 1 MG: Performed by: INTERNAL MEDICINE

## 2024-01-09 PROCEDURE — 94664 DEMO&/EVAL PT USE INHALER: CPT

## 2024-01-09 PROCEDURE — 25010000002 ONDANSETRON PER 1 MG: Performed by: INTERNAL MEDICINE

## 2024-01-09 PROCEDURE — 94761 N-INVAS EAR/PLS OXIMETRY MLT: CPT

## 2024-01-09 PROCEDURE — 85027 COMPLETE CBC AUTOMATED: CPT | Performed by: INTERNAL MEDICINE

## 2024-01-09 PROCEDURE — 82948 REAGENT STRIP/BLOOD GLUCOSE: CPT

## 2024-01-09 PROCEDURE — 94799 UNLISTED PULMONARY SVC/PX: CPT

## 2024-01-09 PROCEDURE — 25810000003 SODIUM CHLORIDE 0.9 % SOLUTION: Performed by: INTERNAL MEDICINE

## 2024-01-09 PROCEDURE — 74176 CT ABD & PELVIS W/O CONTRAST: CPT | Performed by: RADIOLOGY

## 2024-01-09 PROCEDURE — 74176 CT ABD & PELVIS W/O CONTRAST: CPT

## 2024-01-09 PROCEDURE — 99232 SBSQ HOSP IP/OBS MODERATE 35: CPT | Performed by: INTERNAL MEDICINE

## 2024-01-09 PROCEDURE — 80048 BASIC METABOLIC PNL TOTAL CA: CPT | Performed by: INTERNAL MEDICINE

## 2024-01-09 RX ORDER — SODIUM CHLORIDE 9 MG/ML
75 INJECTION, SOLUTION INTRAVENOUS CONTINUOUS
Status: DISCONTINUED | OUTPATIENT
Start: 2024-01-09 | End: 2024-01-11 | Stop reason: HOSPADM

## 2024-01-09 RX ORDER — METOPROLOL TARTRATE 1 MG/ML
5 INJECTION, SOLUTION INTRAVENOUS ONCE
Status: DISCONTINUED | OUTPATIENT
Start: 2024-01-09 | End: 2024-01-09

## 2024-01-09 RX ORDER — METOPROLOL TARTRATE 50 MG/1
50 TABLET, FILM COATED ORAL EVERY 12 HOURS SCHEDULED
Status: DISCONTINUED | OUTPATIENT
Start: 2024-01-09 | End: 2024-01-11 | Stop reason: HOSPADM

## 2024-01-09 RX ORDER — ISOSORBIDE MONONITRATE 30 MG/1
30 TABLET, EXTENDED RELEASE ORAL DAILY
Status: DISCONTINUED | OUTPATIENT
Start: 2024-01-10 | End: 2024-01-11 | Stop reason: HOSPADM

## 2024-01-09 RX ORDER — ONDANSETRON 2 MG/ML
4 INJECTION INTRAMUSCULAR; INTRAVENOUS EVERY 6 HOURS PRN
Status: DISCONTINUED | OUTPATIENT
Start: 2024-01-09 | End: 2024-01-11 | Stop reason: HOSPADM

## 2024-01-09 RX ORDER — METOPROLOL TARTRATE 1 MG/ML
2.5 INJECTION, SOLUTION INTRAVENOUS ONCE
Status: COMPLETED | OUTPATIENT
Start: 2024-01-09 | End: 2024-01-09

## 2024-01-09 RX ADMIN — Medication 10 ML: at 20:26

## 2024-01-09 RX ADMIN — RISPERIDONE 0.5 MG: 0.25 TABLET, FILM COATED ORAL at 09:22

## 2024-01-09 RX ADMIN — TIOTROPIUM BROMIDE INHALATION SPRAY 2 PUFF: 3.12 SPRAY, METERED RESPIRATORY (INHALATION) at 06:46

## 2024-01-09 RX ADMIN — ISOSORBIDE MONONITRATE 90 MG: 30 TABLET, EXTENDED RELEASE ORAL at 09:21

## 2024-01-09 RX ADMIN — SERTRALINE 100 MG: 50 TABLET, FILM COATED ORAL at 09:21

## 2024-01-09 RX ADMIN — APIXABAN 5 MG: 5 TABLET, FILM COATED ORAL at 20:25

## 2024-01-09 RX ADMIN — SODIUM CHLORIDE 75 ML/HR: 9 INJECTION, SOLUTION INTRAVENOUS at 11:44

## 2024-01-09 RX ADMIN — SODIUM CHLORIDE 500 ML: 9 INJECTION, SOLUTION INTRAVENOUS at 22:27

## 2024-01-09 RX ADMIN — BACLOFEN 5 MG: 10 TABLET ORAL at 20:25

## 2024-01-09 RX ADMIN — FERROUS SULFATE TAB 325 MG (65 MG ELEMENTAL FE) 325 MG: 325 (65 FE) TAB at 09:21

## 2024-01-09 RX ADMIN — MEMANTINE HYDROCHLORIDE 10 MG: 10 TABLET, FILM COATED ORAL at 20:25

## 2024-01-09 RX ADMIN — IPRATROPIUM BROMIDE 0.5 MG: 0.5 SOLUTION RESPIRATORY (INHALATION) at 18:21

## 2024-01-09 RX ADMIN — APIXABAN 5 MG: 5 TABLET, FILM COATED ORAL at 09:21

## 2024-01-09 RX ADMIN — BUDESONIDE AND FORMOTEROL FUMARATE DIHYDRATE 2 PUFF: 160; 4.5 AEROSOL RESPIRATORY (INHALATION) at 06:47

## 2024-01-09 RX ADMIN — LEVOTHYROXINE SODIUM 75 MCG: 0.07 TABLET ORAL at 09:22

## 2024-01-09 RX ADMIN — BACLOFEN 5 MG: 10 TABLET ORAL at 09:21

## 2024-01-09 RX ADMIN — Medication 10 ML: at 09:22

## 2024-01-09 RX ADMIN — METOPROLOL TARTRATE 2.5 MG: 1 INJECTION, SOLUTION INTRAVENOUS at 06:53

## 2024-01-09 RX ADMIN — PREDNISONE 40 MG: 20 TABLET ORAL at 09:21

## 2024-01-09 RX ADMIN — IPRATROPIUM BROMIDE 0.5 MG: 0.5 SOLUTION RESPIRATORY (INHALATION) at 00:08

## 2024-01-09 RX ADMIN — BUDESONIDE AND FORMOTEROL FUMARATE DIHYDRATE 2 PUFF: 160; 4.5 AEROSOL RESPIRATORY (INHALATION) at 18:21

## 2024-01-09 RX ADMIN — RISPERIDONE 0.5 MG: 0.25 TABLET, FILM COATED ORAL at 20:25

## 2024-01-09 RX ADMIN — LAMOTRIGINE 25 MG: 100 TABLET ORAL at 20:25

## 2024-01-09 RX ADMIN — MEMANTINE HYDROCHLORIDE 10 MG: 10 TABLET, FILM COATED ORAL at 09:22

## 2024-01-09 RX ADMIN — SODIUM CHLORIDE 500 ML: 9 INJECTION, SOLUTION INTRAVENOUS at 06:54

## 2024-01-09 RX ADMIN — PANTOPRAZOLE SODIUM 40 MG: 40 TABLET, DELAYED RELEASE ORAL at 09:21

## 2024-01-09 RX ADMIN — ONDANSETRON 4 MG: 2 INJECTION INTRAMUSCULAR; INTRAVENOUS at 07:35

## 2024-01-09 RX ADMIN — METOPROLOL TARTRATE 50 MG: 50 TABLET, FILM COATED ORAL at 09:45

## 2024-01-09 RX ADMIN — ROSUVASTATIN CALCIUM 40 MG: 20 TABLET, FILM COATED ORAL at 16:36

## 2024-01-09 RX ADMIN — DONEPEZIL HYDROCHLORIDE 10 MG: 5 TABLET, FILM COATED ORAL at 20:25

## 2024-01-09 RX ADMIN — LAMOTRIGINE 100 MG: 100 TABLET ORAL at 09:21

## 2024-01-09 RX ADMIN — CLOPIDOGREL BISULFATE 75 MG: 75 TABLET, FILM COATED ORAL at 09:21

## 2024-01-09 NOTE — CASE MANAGEMENT/SOCIAL WORK
Discharge Planning Assessment  [facility]     Patient Name: Brenda Munroe  MRN: [id number]  Today's Date: 1/9/2024    Admit Date: 1/1/2024    Plan: Pt was discussed in MD rounds on this date. Pt not medically stable for discharge at this time. SS notified [facility] per Phoebe. SS to follow.       Discharge Plan       Row Name 01/09/24 1700       Plan    Plan Pt was discussed in MD rounds on this date. Pt not medically stable for discharge at this time. SS notified [facility] per Phoebe. SS to follow.                JORGE Lazo

## 2024-01-09 NOTE — PLAN OF CARE
Goal Outcome Evaluation:                 Pt currently resting in bed. No s/s of acute distress noted at this time No complaints verbalized at this time. Plan of care ongoing.         Gastroenterology

## 2024-01-09 NOTE — PROGRESS NOTES
[facility]     Patient Identification:  Name:  Brenda Munroe  Age:  73 y.o.  Sex:  female  :  1950  MRN:  [id number]  Visit Number:  [id number]  Primary Care Provider:  Bernadette Arevalo MD    Length of stay:  8    Chief complaint: None    Subjective:    Patient seen and examined at bedside with patient's nurse present.  Patient was asleep when I entered the room but easily awakened.  Patient complains of abdominal pain with nausea.  Patient did have multiple episodes of vomiting and profuse diarrhea this morning.  ----------------------------------------------------------------------------------------------------------------------  Current Hospital Meds:  apixaban, 5 mg, Oral, Q12H  baclofen, 5 mg, Oral, BID  budesonide-formoterol, 2 puff, Inhalation, BID - RT   And  tiotropium bromide monohydrate, 2 puff, Inhalation, Daily - RT  clopidogrel, 75 mg, Oral, Daily  dilTIAZem, 30 mg, Oral, Q8H  donepezil, 10 mg, Oral, Nightly  ferrous sulfate, 325 mg, Oral, Daily With Breakfast  insulin lispro, 2-7 Units, Subcutaneous, 4x Daily AC & at Bedtime  ipratropium, 0.5 mg, Nebulization, 4x Daily - RT  [START ON 1/10/2024] isosorbide mononitrate, 30 mg, Oral, Daily  lamoTRIgine, 100 mg, Oral, Daily  lamoTRIgine, 25 mg, Oral, Nightly  levothyroxine, 75 mcg, Oral, Daily  [Held by provider] lisinopril, 10 mg, Oral, Q24H  memantine, 10 mg, Oral, Q12H  metoprolol tartrate, 50 mg, Oral, Q12H  pantoprazole, 40 mg, Oral, Daily  predniSONE, 40 mg, Oral, Daily With Breakfast  risperiDONE, 0.5 mg, Oral, BID  rosuvastatin, 40 mg, Oral, Q PM  senna-docusate sodium, 2 tablet, Oral, BID  sertraline, 100 mg, Oral, Daily  sodium chloride, 10 mL, Intravenous, Q12H  sodium chloride, 10 mL, Intravenous, Q12H  Vitamin D3, 50,000 Units, Oral, Q7 Days      sodium chloride, 75 mL/hr, Last Rate: 75 mL/hr (24  1144)      ----------------------------------------------------------------------------------------------------------------------  Vital Signs:  Temp:  [96.6 °F (35.9 °C)-98.6 °F (37 °C)] 97.3 °F (36.3 °C)  Heart Rate:  [] 112  Resp:  [16-22] 20  BP: ()/(56-95) 99/63      01/06/24  0500 01/07/24  0500 01/09/24  0500   Weight: 72.3 kg (159 lb 8 oz) 73.8 kg (162 lb 10.4 oz) 73.3 kg (161 lb 11.2 oz)     Body mass index is 26.91 kg/m².    Intake/Output Summary (Last 24 hours) at 1/9/2024 1556  Last data filed at 1/9/2024 1100  Gross per 24 hour   Intake 600 ml   Output 950 ml   Net -350 ml     Diet: Regular/House Diet; Texture: Soft to Chew (NDD 3); Soft to Chew: Chopped Meat; Fluid Consistency: Thin (IDDSI 0)  ----------------------------------------------------------------------------------------------------------------------  Physical exam:  Constitutional: Well-nourished  female in no apparent distress.     HENT:  Head:  Normocephalic and atraumatic.  Mouth:  Moist mucous membranes.    Eyes:  Conjunctivae and EOM are normal.  Pupils are equal, round, and reactive to light.  No scleral icterus.    Neck:  Neck supple. No thyromegaly.  No JVD present.    Cardiovascular:  Regular rate and rhythm with no murmurs, rubs, clicks or gallops appreciated.  Pulmonary/Chest:  Clear to auscultation bilaterally with no crackles, wheezes or rhonchi appreciated.  Abdominal:  Soft.  Mildly tender to palpation, nondistended  Bowel sounds are hypoactive in all four quadrants. No organomegally appreciated.   Musculoskeletal:  No edema, no tenderness, and no deformity.  No red or swollen joints anywhere.    Neurological:  Alert, Cranial nerves II-XII intact with no focal deficits.  No facial droop.  No slurred speech.   Skin:  Warm and dry to palpation with no rashes or lesions appreciated.  Peripheral vascular:  2+ radial and pedal pulses in bilateral upper and lower  "extremities.  ------------------------------------------------------------------------------------------------------------  ----------------------------------------------------------------------------------------------------------------------      Results from last 7 days   Lab Units 01/09/24 0118 01/08/24 0243 01/07/24 0757 01/07/24  0310 01/06/24  0315   CRP mg/dL  --  <0.30  --  <0.30 <0.30   WBC 10*3/mm3 6.69 7.69 6.82  --  7.00   HEMOGLOBIN g/dL 10.7* 11.0* 11.8*  --  11.2*   HEMATOCRIT % 36.2 38.2 40.4  --  37.7   MCV fL 94.8 94.3 93.5  --  92.6   MCHC g/dL 29.6* 28.8* 29.2*  --  29.7*   PLATELETS 10*3/mm3 141 135* 135*  --  137*     Results from last 7 days   Lab Units 01/04/24  1014   PH, ARTERIAL pH units 7.433   PO2 ART mm Hg 76.5*   PCO2, ARTERIAL mm Hg 51.1*   HCO3 ART mmol/L 34.1*     Results from last 7 days   Lab Units 01/09/24  0118 01/08/24  0243 01/07/24  0758 01/05/24  0222 01/04/24  1747 01/04/24  0112 01/03/24  0829   SODIUM mmol/L 140 142 140   < >  --    < > 140   POTASSIUM mmol/L 4.6 4.2 4.4   < > 3.8   < > 3.8   MAGNESIUM mg/dL  --   --   --   --  1.9  --   --    CHLORIDE mmol/L 104 106 105   < >  --    < > 98   CO2 mmol/L 32.8* 32.2* 30.0*   < >  --    < > 33.3*   BUN mg/dL 24* 34* 32*   < >  --    < > 18   CREATININE mg/dL 0.99 0.95 0.84   < >  --    < > 0.70   CALCIUM mg/dL 8.7 8.8 8.8   < >  --    < > 9.6   GLUCOSE mg/dL 90 106* 99   < >  --    < > 98   ALBUMIN g/dL  --   --   --   --   --   --  4.2   BILIRUBIN mg/dL  --   --   --   --   --   --  0.4   ALK PHOS U/L  --   --   --   --   --   --  59   AST (SGOT) U/L  --   --   --   --   --   --  20   ALT (SGPT) U/L  --   --   --   --   --   --  10    < > = values in this interval not displayed.   Estimated Creatinine Clearance: 49.2 mL/min (by C-G formula based on SCr of 0.99 mg/dL).    No results found for: \"AMMONIA\"      No results found for: \"BLOODCX\"  No results found for: \"URINECX\"  No results found for: \"WOUNDCX\"  No results found " "for: \"STOOLCX\"    I have personally looked at the labs and they are summarized above.  ----------------------------------------------------------------------------------------------------------------------  Imaging Results (Last 24 Hours)       Procedure Component Value Units Date/Time    CT Abdomen Pelvis Without Contrast [[id number]] Collected: 01/09/24 1240     Updated: 01/09/24 1244    Narrative:      EXAM:    CT Abdomen and Pelvis Without Intravenous Contrast     EXAM DATE:    1/9/2024 11:20 AM     CLINICAL HISTORY:    intractable nausea and vomiting; J96.22-Acute and chronic respiratory  failure with hypercapnia     TECHNIQUE:    Axial computed tomography images of the abdomen and pelvis without  intravenous contrast.  Sagittal and coronal reformatted images were  created and reviewed.  This CT exam was performed using one or more of  the following dose reduction techniques:  automated exposure control,  adjustment of the mA and/or kV according to patient size, and/or use of  iterative reconstruction technique.     COMPARISON:    12/18/2023     FINDINGS:    LUNG BASES:  Interstitial lung marking prominence of the lung bases  suggestive of edema.    PLEURAL SPACE:  Tiny right pleural effusion.    HEART:  Cardiomegaly.  Coronary artery calcifications.      ABDOMEN:    LIVER:  Unremarkable as visualized.    GALLBLADDER AND BILE DUCTS:  Unremarkable as visualized.  No calcified  stones.  No ductal dilation.    PANCREAS:  Unremarkable as visualized.  No ductal dilation.    SPLEEN:  Unremarkable as visualized.  No splenomegaly.    ADRENALS:  Unremarkable as visualized.  No mass.    KIDNEYS AND URETERS:  Unremarkable as visualized.  No obstructing  stones.  No hydronephrosis.    STOMACH AND BOWEL:  Several fluid-filled loops of small bowel may  represent enteritis.  Scattered diverticula in the colon.  No  diverticulitis.  Fluid seen throughout the colon suggestive of diarrheal  illness.  No obstruction.      " PELVIS:    APPENDIX:  No findings to suggest acute appendicitis.    BLADDER:  Unremarkable as visualized.  No stones.    REPRODUCTIVE:  Unremarkable as visualized.      ABDOMEN and PELVIS:    INTRAPERITONEAL SPACE:  Unremarkable as visualized.  No free air.  No  significant fluid collection.    BONES/JOINTS:  Degenerative disc disease throughout the lumbar spine.   Degenerative facet arthropathy throughout the lumbar spine, most  prominent in the lower lumbar spine.  No acute fracture.  No  dislocation.    SOFT TISSUES:  Unremarkable as visualized.    VASCULATURE:  Atherosclerotic disease.  Fairly extensive  atherosclerotic calcification of the SMA and celiac origins.    LYMPH NODES:  Unremarkable as visualized.  No enlarged lymph nodes.       Impression:      1.  Interstitial lung marking prominence of the lung bases suggestive of  edema.  2.  Several fluid-filled loops of small bowel may represent enteritis.  3.  Tiny right pleural effusion.  4.  Cardiomegaly.  5.  Fairly extensive atherosclerotic calcification of the SMA and celiac  origins.  6.  Fluid seen throughout the colon suggestive of diarrheal illness.  7.  Degenerative changes lumbar spine as described.        This report was finalized on 1/9/2024 12:42 PM by Dr. Danilo Beltrán MD.             ----------------------------------------------------------------------------------------------------------------------  Assessment and Plan:    Acute metabolic encephalopathy -much improved, patient reportedly back at baseline mentation.    2.  Enteritis/colitis -CT abdomen/pelvis demonstrates several fluid-filled loops of small bowel as well as fluid throughout the colon suggestive of diarrheal illness and enteritis.  Continue supportive care for now.    3.  Acute hypercapnic on chronic hypoxic/hypercapnic respiratory failure -acute phase resolved, secondary to COPD exacerbation.    4.  COPD exacerbation - Continue oral prednisone and continue nebulizer treatments  while admitted    5.  Paroxysmal A-fib -patient did go into A-fib overnight with average heart rate in the 120s.  Have added Cardizem for rate control and decreased Imdur to accommodate for blood pressure.  Continue Eliquis.    6.  Essential hypertension -well controlled    7.  Hyperlipidemia -statin    8.  Acute kidney injury -resolved    9.  Hypothyroidism -Synthroid    Disposition will likely require several more days hospitalization    Carlos Dudley,    01/09/24   15:56 EST

## 2024-01-09 NOTE — PLAN OF CARE
Goal Outcome Evaluation:  Patient is resting in bed watching television. Patient had nausea and vomiting this shift. PRN medications administered. Patient is currently on 3L nasal cannula. Patient has tolerated all interventions. No complaints or concerns at this time. No signs of acute distress noted. Will continue to follow plan of care.

## 2024-01-09 NOTE — SIGNIFICANT NOTE
01/02/24 1008   OTHER   Discipline physical therapist   Rehab Time/Intention   Session Not Performed unable to evaluate, medical status change  (pt. given precedex d/t agitation; PT will evaluate at later date.)       
   01/03/24 1154   OTHER   Discipline physical therapist   Rehab Time/Intention   Session Not Performed unable to evaluate, medical status change  (pt. continues with agitation, sedation; PT will defer evaluation to later date.)       
   01/08/24 1119   OTHER   Discipline physical therapy assistant   Rehab Time/Intention   Session Not Performed other (see comments)  (Pt reported she was to cold to work with PT in AM, Will follow)       
   01/08/24 1449   OTHER   Discipline physical therapy assistant   Rehab Time/Intention   Session Not Performed other (see comments)  (Pt requested to go to BR, When asking Pt to walk to BR refused and requested bedpan. Notified MARÍA Bob. Will follow)       
   01/09/24 1112   OTHER   Discipline physical therapy assistant   Rehab Time/Intention   Session Not Performed other (see comments)  (Pt on hold per nursing, pt vomiting and diarrhea)       
Pt unable to perform incentive due to bipap in use at this time.    
4

## 2024-01-09 NOTE — PROGRESS NOTES
LOS: 8 days     Name: Brenda Munroe  Age/Sex: 73 y.o. female  :  1950        PCP: Bernadette Arevalo MD    Principal Problem:    Acute hypercapnic respiratory failure      Admission Information: Brenda Munroe is a 73 y.o. female with coronary artery disease status post CABG, hypertension, hyperlipidemia, chronic hypoxic and hypercapnic respiratory failure secondary to COPD, paroxysmal atrial fibrillation, dementia, arthritis, bilateral carotid stenosis, anxiety, history of psychosis, PUD, and GERD.     Chief Complaint: Altered mental status    Interval history: Overnight returned to a rapid ventricular response.  Blood pressure soft.    Subjective     Patient awake and denies chest pain.  She remains confused and does not add much to the history.    Vital Signs  Vital Signs (last 72 hrs)          0700   0659 59  0659  1308   Most Recent      Temp (°F) 97.8 -  98.4    98.1 -  98.8    97.9 -  98.6      96.6     96.6 (35.9)  1035    Heart Rate 84 -  143    73 -  106    89 -  130      112     112  1035    Resp 18 -  20    16 -  20    16 -  22      18     18  1035    BP 95/69 -  140/92    101/57 -  136/76    99/62 -  146/95      90/56     90/56  1035    SpO2 (%) 90 -  99    92 -  96    93 -  99      97     97  1035    Flow (L/min)   2      2      2       2  0647          Temp:  [96.6 °F (35.9 °C)-98.6 °F (37 °C)] 96.6 °F (35.9 °C)  Heart Rate:  [] 112  Resp:  [16-22] 18  BP: ()/(56-95) 90/56  Body mass index is 26.91 kg/m².      Intake/Output Summary (Last 24 hours) at 2024 1308  Last data filed at 2024 1100  Gross per 24 hour   Intake 600 ml   Output 950 ml   Net -350 ml       Vitals and nursing note reviewed.   Constitutional:       Appearance: Not in distress. Chronically ill-appearing.      Interventions: Nasal cannula in place.      Comments: 2 L nasal cannula   Pulmonary:      Effort:  Pulmonary effort is normal.      Breath sounds: Examination of the left-lower field reveals wheezing.   Cardiovascular:      Tachycardia present. Irregularly irregular rhythm.      Murmurs: There is no murmur.   Edema:     Peripheral edema absent.   Skin:     General: Skin is warm and dry.   Neurological:      Mental Status: Mental status is at baseline. Disoriented.         Telemetry: Atrial fibrillation with RVR    Results Review:     Results from last 7 days   Lab Units 01/09/24  0118 01/08/24  0243 01/07/24  0757 01/06/24  0315 01/05/24  0222 01/04/24  0112 01/03/24  0829   WBC 10*3/mm3 6.69 7.69 6.82 7.00 6.40 6.90 7.72   HEMOGLOBIN g/dL 10.7* 11.0* 11.8* 11.2* 12.4 12.4 13.4   PLATELETS 10*3/mm3 141 135* 135* 137* 150 169 154     Results from last 7 days   Lab Units 01/09/24  0118 01/08/24  0243 01/07/24  0758 01/06/24  1437 01/06/24  0315 01/05/24  0222 01/04/24  1747 01/04/24  1143 01/04/24  0112 01/03/24  0829   SODIUM mmol/L 140 142 140  --  141 144  --   --  144 140   POTASSIUM mmol/L 4.6 4.2 4.4 5.0 3.4* 3.7 3.8   < > 3.2* 3.8   CHLORIDE mmol/L 104 106 105  --  102 102  --   --  100 98   CO2 mmol/L 32.8* 32.2* 30.0*  --  33.9* 35.6*  --   --  36.5* 33.3*   BUN mg/dL 24* 34* 32*  --  38* 28*  --   --  23 18   CREATININE mg/dL 0.99 0.95 0.84  --  1.30* 1.00  --   --  0.74 0.70   CALCIUM mg/dL 8.7 8.8 8.8  --  8.8 9.1  --   --  9.5 9.6   GLUCOSE mg/dL 90 106* 99  --  89 106*  --   --  117* 98    < > = values in this interval not displayed.                 I reviewed the patient's new clinical results.  I reviewed the patient's new imaging results and agree with the interpretation.  I personally viewed and interpreted the patient's EKG/Telemetry data      Medication Review:   apixaban, 5 mg, Oral, Q12H  baclofen, 5 mg, Oral, BID  budesonide-formoterol, 2 puff, Inhalation, BID - RT   And  tiotropium bromide monohydrate, 2 puff, Inhalation, Daily - RT  clopidogrel, 75 mg, Oral, Daily  dilTIAZem, 30 mg, Oral,  Q8H  donepezil, 10 mg, Oral, Nightly  ferrous sulfate, 325 mg, Oral, Daily With Breakfast  insulin lispro, 2-7 Units, Subcutaneous, 4x Daily AC & at Bedtime  ipratropium, 0.5 mg, Nebulization, 4x Daily - RT  [START ON 1/10/2024] isosorbide mononitrate, 30 mg, Oral, Daily  lamoTRIgine, 100 mg, Oral, Daily  lamoTRIgine, 25 mg, Oral, Nightly  levothyroxine, 75 mcg, Oral, Daily  [Held by provider] lisinopril, 10 mg, Oral, Q24H  memantine, 10 mg, Oral, Q12H  metoprolol tartrate, 50 mg, Oral, Q12H  pantoprazole, 40 mg, Oral, Daily  predniSONE, 40 mg, Oral, Daily With Breakfast  risperiDONE, 0.5 mg, Oral, BID  rosuvastatin, 40 mg, Oral, Q PM  senna-docusate sodium, 2 tablet, Oral, BID  sertraline, 100 mg, Oral, Daily  sodium chloride, 10 mL, Intravenous, Q12H  sodium chloride, 10 mL, Intravenous, Q12H  Vitamin D3, 50,000 Units, Oral, Q7 Days      sodium chloride, 75 mL/hr, Last Rate: 75 mL/hr (01/09/24 1144)        Assessment:  Atrial fibrillation with fast ventricular response  Coronary artery disease status post bypass  Hypertension      Recommendations:  Hospitalist increased metoprolol.  We have added Cardizem Q8 and decreased Imdur dose in the hopes of avoiding iatrogenic hypotension.  Plan to continue Eliquis anticoagulation  Continue Plavix and rosuvastatin.  Not on aspirin because she is on Eliquis with Plavix  Currently hypotensive.  Monitoring closely and adjusting as indicated.    I discussed the patients findings and my recommendations with patient.    Case discussed with Dr. Turcios and plan of care reflects his recommendations.      Electronically signed by PAULA Crocker, 01/09/24, 1:13 PM EST.

## 2024-01-10 LAB
ANION GAP SERPL CALCULATED.3IONS-SCNC: 5.6 MMOL/L (ref 5–15)
BUN SERPL-MCNC: 22 MG/DL (ref 8–23)
BUN/CREAT SERPL: 22 (ref 7–25)
CALCIUM SPEC-SCNC: 8.1 MG/DL (ref 8.6–10.5)
CHLORIDE SERPL-SCNC: 111 MMOL/L (ref 98–107)
CO2 SERPL-SCNC: 28.4 MMOL/L (ref 22–29)
CREAT SERPL-MCNC: 1 MG/DL (ref 0.57–1)
DEPRECATED RDW RBC AUTO: 80.6 FL (ref 37–54)
EGFRCR SERPLBLD CKD-EPI 2021: 58.9 ML/MIN/1.73
ERYTHROCYTE [DISTWIDTH] IN BLOOD BY AUTOMATED COUNT: 23 % (ref 12.3–15.4)
GLUCOSE BLDC GLUCOMTR-MCNC: 118 MG/DL (ref 70–130)
GLUCOSE BLDC GLUCOMTR-MCNC: 155 MG/DL (ref 70–130)
GLUCOSE BLDC GLUCOMTR-MCNC: 164 MG/DL (ref 70–130)
GLUCOSE BLDC GLUCOMTR-MCNC: 83 MG/DL (ref 70–130)
GLUCOSE SERPL-MCNC: 95 MG/DL (ref 65–99)
HCT VFR BLD AUTO: 39.9 % (ref 34–46.6)
HGB BLD-MCNC: 11.4 G/DL (ref 12–15.9)
MCH RBC QN AUTO: 27.6 PG (ref 26.6–33)
MCHC RBC AUTO-ENTMCNC: 28.6 G/DL (ref 31.5–35.7)
MCV RBC AUTO: 96.6 FL (ref 79–97)
PLATELET # BLD AUTO: 135 10*3/MM3 (ref 140–450)
PMV BLD AUTO: 11.2 FL (ref 6–12)
POTASSIUM SERPL-SCNC: 4.7 MMOL/L (ref 3.5–5.2)
RBC # BLD AUTO: 4.13 10*6/MM3 (ref 3.77–5.28)
SODIUM SERPL-SCNC: 145 MMOL/L (ref 136–145)
WBC NRBC COR # BLD AUTO: 7.66 10*3/MM3 (ref 3.4–10.8)

## 2024-01-10 PROCEDURE — 94799 UNLISTED PULMONARY SVC/PX: CPT

## 2024-01-10 PROCEDURE — 94761 N-INVAS EAR/PLS OXIMETRY MLT: CPT

## 2024-01-10 PROCEDURE — 82948 REAGENT STRIP/BLOOD GLUCOSE: CPT

## 2024-01-10 PROCEDURE — 97110 THERAPEUTIC EXERCISES: CPT

## 2024-01-10 PROCEDURE — 97116 GAIT TRAINING THERAPY: CPT

## 2024-01-10 PROCEDURE — 99232 SBSQ HOSP IP/OBS MODERATE 35: CPT | Performed by: INTERNAL MEDICINE

## 2024-01-10 PROCEDURE — 25810000003 SODIUM CHLORIDE 0.9 % SOLUTION: Performed by: INTERNAL MEDICINE

## 2024-01-10 PROCEDURE — 80048 BASIC METABOLIC PNL TOTAL CA: CPT | Performed by: INTERNAL MEDICINE

## 2024-01-10 PROCEDURE — 63710000001 PREDNISONE PER 1 MG: Performed by: INTERNAL MEDICINE

## 2024-01-10 PROCEDURE — 63710000001 INSULIN LISPRO (HUMAN) PER 5 UNITS

## 2024-01-10 PROCEDURE — 85027 COMPLETE CBC AUTOMATED: CPT | Performed by: INTERNAL MEDICINE

## 2024-01-10 RX ADMIN — INSULIN LISPRO 2 UNITS: 100 INJECTION, SOLUTION INTRAVENOUS; SUBCUTANEOUS at 20:07

## 2024-01-10 RX ADMIN — LAMOTRIGINE 100 MG: 100 TABLET ORAL at 08:45

## 2024-01-10 RX ADMIN — Medication 10 ML: at 08:45

## 2024-01-10 RX ADMIN — LAMOTRIGINE 25 MG: 100 TABLET ORAL at 20:07

## 2024-01-10 RX ADMIN — SERTRALINE 100 MG: 50 TABLET, FILM COATED ORAL at 08:45

## 2024-01-10 RX ADMIN — APIXABAN 5 MG: 5 TABLET, FILM COATED ORAL at 20:07

## 2024-01-10 RX ADMIN — IPRATROPIUM BROMIDE 0.5 MG: 0.5 SOLUTION RESPIRATORY (INHALATION) at 19:49

## 2024-01-10 RX ADMIN — MEMANTINE HYDROCHLORIDE 10 MG: 10 TABLET, FILM COATED ORAL at 08:45

## 2024-01-10 RX ADMIN — IPRATROPIUM BROMIDE 0.5 MG: 0.5 SOLUTION RESPIRATORY (INHALATION) at 00:17

## 2024-01-10 RX ADMIN — DONEPEZIL HYDROCHLORIDE 10 MG: 5 TABLET, FILM COATED ORAL at 20:07

## 2024-01-10 RX ADMIN — BACLOFEN 5 MG: 10 TABLET ORAL at 20:07

## 2024-01-10 RX ADMIN — RISPERIDONE 0.5 MG: 0.25 TABLET, FILM COATED ORAL at 08:44

## 2024-01-10 RX ADMIN — DOCUSATE SODIUM 50 MG AND SENNOSIDES 8.6 MG 2 TABLET: 8.6; 5 TABLET, FILM COATED ORAL at 20:07

## 2024-01-10 RX ADMIN — PANTOPRAZOLE SODIUM 40 MG: 40 TABLET, DELAYED RELEASE ORAL at 08:44

## 2024-01-10 RX ADMIN — RISPERIDONE 0.5 MG: 0.25 TABLET, FILM COATED ORAL at 20:07

## 2024-01-10 RX ADMIN — CLOPIDOGREL BISULFATE 75 MG: 75 TABLET, FILM COATED ORAL at 08:44

## 2024-01-10 RX ADMIN — IPRATROPIUM BROMIDE 0.5 MG: 0.5 SOLUTION RESPIRATORY (INHALATION) at 14:08

## 2024-01-10 RX ADMIN — METOPROLOL TARTRATE 50 MG: 50 TABLET, FILM COATED ORAL at 08:45

## 2024-01-10 RX ADMIN — BUDESONIDE AND FORMOTEROL FUMARATE DIHYDRATE 2 PUFF: 160; 4.5 AEROSOL RESPIRATORY (INHALATION) at 19:49

## 2024-01-10 RX ADMIN — METOPROLOL TARTRATE 50 MG: 50 TABLET, FILM COATED ORAL at 20:07

## 2024-01-10 RX ADMIN — LEVOTHYROXINE SODIUM 75 MCG: 0.07 TABLET ORAL at 08:45

## 2024-01-10 RX ADMIN — TIOTROPIUM BROMIDE INHALATION SPRAY 2 PUFF: 3.12 SPRAY, METERED RESPIRATORY (INHALATION) at 07:40

## 2024-01-10 RX ADMIN — DILTIAZEM HYDROCHLORIDE 30 MG: 30 TABLET, FILM COATED ORAL at 05:41

## 2024-01-10 RX ADMIN — FERROUS SULFATE TAB 325 MG (65 MG ELEMENTAL FE) 325 MG: 325 (65 FE) TAB at 08:44

## 2024-01-10 RX ADMIN — BACLOFEN 5 MG: 10 TABLET ORAL at 08:45

## 2024-01-10 RX ADMIN — ROSUVASTATIN CALCIUM 40 MG: 20 TABLET, FILM COATED ORAL at 17:44

## 2024-01-10 RX ADMIN — BUDESONIDE AND FORMOTEROL FUMARATE DIHYDRATE 2 PUFF: 160; 4.5 AEROSOL RESPIRATORY (INHALATION) at 07:40

## 2024-01-10 RX ADMIN — PREDNISONE 40 MG: 20 TABLET ORAL at 08:44

## 2024-01-10 RX ADMIN — APIXABAN 5 MG: 5 TABLET, FILM COATED ORAL at 08:44

## 2024-01-10 RX ADMIN — MEMANTINE HYDROCHLORIDE 10 MG: 10 TABLET, FILM COATED ORAL at 20:08

## 2024-01-10 RX ADMIN — INSULIN LISPRO 2 UNITS: 100 INJECTION, SOLUTION INTRAVENOUS; SUBCUTANEOUS at 17:44

## 2024-01-10 RX ADMIN — SODIUM CHLORIDE 75 ML/HR: 9 INJECTION, SOLUTION INTRAVENOUS at 18:59

## 2024-01-10 RX ADMIN — ISOSORBIDE MONONITRATE 30 MG: 30 TABLET, EXTENDED RELEASE ORAL at 08:44

## 2024-01-10 NOTE — PLAN OF CARE
Goal Outcome Evaluation:                     Pt currently resting in bed. No s/s of acute distress noted at this time. Plan of care ongoing.

## 2024-01-10 NOTE — CASE MANAGEMENT/SOCIAL WORK
Discharge Planning Assessment  [facility]     Patient Name: Brenda Munroe  MRN: [id number]  Today's Date: 1/10/2024    Admit Date: 1/1/2024    Plan: Pt was admitted on 1/1/24 [facility] Pt had a 30 day bed hold upon admission. SS to follow.     Discharge Plan       Row Name 01/10/24 1528       Plan    Plan Pt was admitted on 1/1/24 [facility]. Pt had a 30 day bed hold upon admission. SS to follow.                JORGE Lazo

## 2024-01-10 NOTE — PLAN OF CARE
Goal Outcome Evaluation:  Patient is resting in bed watching television. Patient is currently on 3L nasal cannula. Patient has tolerated all interventions. No complaints or concerns at this time. No signs of acute distress noted. Will continue to follow plan of care.

## 2024-01-10 NOTE — PROGRESS NOTES
[facility] PROGRESS NOTE     Patient Identification:  Name:  Brenda Munroe  Age:  73 y.o.  Sex:  female  :  1950  MRN:  [id number]  Visit Number:  [id number]  Primary Care Provider:  Bernadette Arevalo MD    Length of stay:  9    Chief complaint: None    Subjective:    Patient was asleep when entered the room but easily awakened.  Patient reports her abdominal discomfort is much improved today and she has had no nausea, vomiting or diarrhea.  Note is made of multiple sinus pauses on telemetry, the greatest of which was 2.5 seconds.  Have discussed this with cardiology services and have discontinued Cardizem.  Will continue metoprolol as well as Eliquis and monitor for another 24 hours.  ----------------------------------------------------------------------------------------------------------------------  Current Hospital Meds:  apixaban, 5 mg, Oral, Q12H  baclofen, 5 mg, Oral, BID  budesonide-formoterol, 2 puff, Inhalation, BID - RT   And  tiotropium bromide monohydrate, 2 puff, Inhalation, Daily - RT  clopidogrel, 75 mg, Oral, Daily  donepezil, 10 mg, Oral, Nightly  ferrous sulfate, 325 mg, Oral, Daily With Breakfast  insulin lispro, 2-7 Units, Subcutaneous, 4x Daily AC & at Bedtime  ipratropium, 0.5 mg, Nebulization, 4x Daily - RT  isosorbide mononitrate, 30 mg, Oral, Daily  lamoTRIgine, 100 mg, Oral, Daily  lamoTRIgine, 25 mg, Oral, Nightly  levothyroxine, 75 mcg, Oral, Daily  [Held by provider] lisinopril, 10 mg, Oral, Q24H  memantine, 10 mg, Oral, Q12H  metoprolol tartrate, 50 mg, Oral, Q12H  pantoprazole, 40 mg, Oral, Daily  predniSONE, 40 mg, Oral, Daily With Breakfast  risperiDONE, 0.5 mg, Oral, BID  rosuvastatin, 40 mg, Oral, Q PM  senna-docusate sodium, 2 tablet, Oral, BID  sertraline, 100 mg, Oral, Daily  sodium chloride, 10 mL, Intravenous, Q12H  sodium chloride, 10 mL, Intravenous, Q12H  Vitamin D3, 50,000 Units, Oral, Q7 Days      sodium chloride, 75 mL/hr, Last  Rate: 75 mL/hr (01/09/24 1144)      ----------------------------------------------------------------------------------------------------------------------  Vital Signs:  Temp:  [97.7 °F (36.5 °C)-99 °F (37.2 °C)] 98 °F (36.7 °C)  Heart Rate:  [] 109  Resp:  [18-20] 18  BP: ()/(52-79) 105/72      01/07/24  0500 01/09/24  0500 01/10/24  0400   Weight: 73.8 kg (162 lb 10.4 oz) 73.3 kg (161 lb 11.2 oz) 74.1 kg (163 lb 6.4 oz)     Body mass index is 27.19 kg/m².    Intake/Output Summary (Last 24 hours) at 1/10/2024 1839  Last data filed at 1/10/2024 1700  Gross per 24 hour   Intake 960 ml   Output --   Net 960 ml     Diet: Regular/House Diet; Texture: Soft to Chew (NDD 3); Soft to Chew: Chopped Meat; Fluid Consistency: Thin (IDDSI 0)  ----------------------------------------------------------------------------------------------------------------------  Physical exam:  Constitutional: Well-nourished  female in no apparent distress.     HENT:  Head:  Normocephalic and atraumatic.  Mouth:  Moist mucous membranes.    Eyes:  Conjunctivae and EOM are normal.  Pupils are equal, round, and reactive to light.  No scleral icterus.    Neck:  Neck supple. No thyromegaly.  No JVD present.    Cardiovascular:  Regular rate and rhythm with no murmurs, rubs, clicks or gallops appreciated.  Pulmonary/Chest:  Clear to auscultation bilaterally with no crackles, wheezes or rhonchi appreciated.  Abdominal:  Soft.  Mildly tender to palpation, nondistended  Bowel sounds are hypoactive in all four quadrants. No organomegally appreciated.   Musculoskeletal:  No edema, no tenderness, and no deformity.  No red or swollen joints anywhere.    Neurological:  Alert, Cranial nerves II-XII intact with no focal deficits.  No facial droop.  No slurred speech.   Skin:  Warm and dry to palpation with no rashes or lesions appreciated.  Peripheral vascular:  2+ radial and pedal pulses in bilateral upper and lower  "extremities.  ------------------------------------------------------------------------------------------------------------  ----------------------------------------------------------------------------------------------------------------------      Results from last 7 days   Lab Units 01/10/24  0354 01/09/24  0118 01/08/24  0243 01/07/24  0757 01/07/24 0310 01/06/24 0315   CRP mg/dL  --   --  <0.30  --  <0.30 <0.30   WBC 10*3/mm3 7.66 6.69 7.69   < >  --  7.00   HEMOGLOBIN g/dL 11.4* 10.7* 11.0*   < >  --  11.2*   HEMATOCRIT % 39.9 36.2 38.2   < >  --  37.7   MCV fL 96.6 94.8 94.3   < >  --  92.6   MCHC g/dL 28.6* 29.6* 28.8*   < >  --  29.7*   PLATELETS 10*3/mm3 135* 141 135*   < >  --  137*    < > = values in this interval not displayed.     Results from last 7 days   Lab Units 01/04/24  1014   PH, ARTERIAL pH units 7.433   PO2 ART mm Hg 76.5*   PCO2, ARTERIAL mm Hg 51.1*   HCO3 ART mmol/L 34.1*     Results from last 7 days   Lab Units 01/10/24  0354 01/09/24 0118 01/08/24 0243 01/05/24  0222 01/04/24  1747   SODIUM mmol/L 145 140 142   < >  --    POTASSIUM mmol/L 4.7 4.6 4.2   < > 3.8   MAGNESIUM mg/dL  --   --   --   --  1.9   CHLORIDE mmol/L 111* 104 106   < >  --    CO2 mmol/L 28.4 32.8* 32.2*   < >  --    BUN mg/dL 22 24* 34*   < >  --    CREATININE mg/dL 1.00 0.99 0.95   < >  --    CALCIUM mg/dL 8.1* 8.7 8.8   < >  --    GLUCOSE mg/dL 95 90 106*   < >  --     < > = values in this interval not displayed.   Estimated Creatinine Clearance: 49 mL/min (by C-G formula based on SCr of 1 mg/dL).    No results found for: \"AMMONIA\"      No results found for: \"BLOODCX\"  No results found for: \"URINECX\"  No results found for: \"WOUNDCX\"  No results found for: \"STOOLCX\"    I have personally looked at the labs and they are summarized above.  ----------------------------------------------------------------------------------------------------------------------  Imaging Results (Last 24 Hours)       ** No results found for " the last 24 hours. **          ----------------------------------------------------------------------------------------------------------------------  Assessment and Plan:    Acute metabolic encephalopathy -much improved, patient reportedly back at baseline mentation.    2.  Enteritis/colitis -nausea/vomiting have resolved    3.  Acute hypercapnic on chronic hypoxic/hypercapnic respiratory failure -acute phase resolved, secondary to COPD exacerbation.    4.  COPD exacerbation - Continue oral prednisone and continue nebulizer treatments while admitted    5.  Paroxysmal A-fib -patient did have multiple sinus pauses overnight, have discontinued Cardizem and will continue metoprolol as well as Eliquis and monitor overnight.    6.  Essential hypertension -well controlled    7.  Hyperlipidemia -statin    8.  Acute kidney injury -resolved    9.  Hypothyroidism -Synthroid    Disposition possible discharge tomorrow    Carlos Dudley,    01/10/24   18:39 EST

## 2024-01-10 NOTE — THERAPY TREATMENT NOTE
Acute Care - Physical Therapy Treatment Note   [facility]     Patient Name: Brenda Munroe  : 1950  MRN: [id number]  Today's Date: 1/10/2024   Onset of Illness/Injury or Date of Surgery: 24  Visit Dx:     ICD-10-CM ICD-9-CM   1. Acute on chronic respiratory failure with hypercapnia  J96.22 518.84     Patient Active Problem List   Diagnosis    Psychosis    Severe recurrent major depression with psychotic features    COPD (chronic obstructive pulmonary disease)    Coronary artery disease    Disease of thyroid gland    Arthritis    Hypertension    Paroxysmal atrial fibrillation    Chronic headaches    Vision changes    Carotid stenosis, asymptomatic, bilateral    Bradycardia, sinus    Acute respiratory failure with hypoxia and hypercapnia    NSTEMI (non-ST elevated myocardial infarction)    Acute hypercapnic respiratory failure     Past Medical History:   Diagnosis Date    Anxiety     Arthritis     Bell's palsy     Bladder prolapse, female, acquired     Carotid stenosis     COPD (chronic obstructive pulmonary disease)     COPD (chronic obstructive pulmonary disease)     Coronary artery disease     Dementia     Dementia     Depression     Difficulty walking     Disease of thyroid gland     Frequency of urination     GERD (gastroesophageal reflux disease)     Heart attack     Hyperlipidemia     Hypertension     Irregular heart beat     Peptic ulcer disease      Past Surgical History:   Procedure Laterality Date    CARDIAC CATHETERIZATION N/A 2023    Procedure: Left Heart Cath;  Surgeon: Tab Cifuentes MD;  [facility];  Service: Cardiology;  Laterality: N/A;    CARDIAC SURGERY      open heart  in     CYSTOSCOPY N/A 2017    Procedure: CYSTOSCOPY;  Surgeon: Karl Nicolas DO;  [facility];  Service:     OTHER SURGICAL HISTORY      states she had fluid drained no surgery    VAGINAL HYSTERECTOMY W/ ANTERIOR AND POSTERIOR VAGINAL REPAIR N/A  11/8/2017    Procedure: VAGINAL HYSTERECTOMY WITH ANTERIOR, POSTERIOR, AND ENTEROCELE VAGINAL REPAIR;  Surgeon: Karl Nicolas DO;  Location:  [facility];  Service:     VAGINAL VAULT SUSPENSION N/A 11/8/2017    Procedure: VAGINAL VAULT SUSPENSION ;  Surgeon: Karl Nicolas DO;  Location:  [facility];  Service:      PT Assessment (last 12 hours)       PT Evaluation and Treatment       Row Name 01/10/24 1415          Physical Therapy Time and Intention    Subjective Information no complaints  -     Document Type therapy note (daily note)  -     Mode of Treatment physical therapy  -     Patient Effort good  -     Comment Pt and RN Julissa in agreement for PT. Pt walked 25' with RW CGA, but had a very slow Gt. Sitting exercises completed EOB until tolerance.  -       Row Name 01/10/24 1415          General Information    Patient Profile Reviewed yes  -     Equipment Currently Used at Home --  pt. reports using walker for mobility within SNF, usually has someone walking beside her.  She states she performs self care independently.  -     Existing Precautions/Restrictions fall;oxygen therapy device and L/min  -     Limitations/Impairments safety/cognitive  -       Row Name 01/10/24 1415          Living Environment    Primary Care Provided by --  self and facility staff  -       Row Name 01/10/24 1415          Pain    Pretreatment Pain Rating 0/10 - no pain  -     Posttreatment Pain Rating 0/10 - no pain  -       Row Name 01/10/24 1415          Cognition    Affect/Mental Status (Cognition) confused  -     Orientation Status (Cognition) oriented to;person  -     Follows Commands (Cognition) follows one-step commands  -     Personal Safety Interventions fall prevention program maintained;gait belt;supervised activity;nonskid shoes/slippers when out of bed  -       Row Name 01/10/24 1415          Mobility    Extremity Weight-bearing Status --  no restrictions  -       Row Name  01/10/24 1415          Bed Mobility    Bed Mobility rolling left;rolling right;scooting/bridging;supine-sit;sit-supine  -HC     Supine-Sit [place] (Bed Mobility) minimum assist (75% patient effort);verbal cues;contact guard  -HC       Row Name 01/10/24 1415          Transfers    Transfers sit-stand transfer;stand-sit transfer  -       Row Name 01/10/24 1415          Sit-Stand Transfer    Sit-Stand [place] (Transfers) nonverbal cues (demo/gesture);verbal cues;contact guard  -HC     Assistive Device (Sit-Stand Transfers) walker, front-wheeled  -HC       Row Name 01/10/24 1415          Stand-Sit Transfer    Stand-Sit [place] (Transfers) verbal cues;nonverbal cues (demo/gesture);contact guard  -HC     Assistive Device (Stand-Sit Transfers) walker, front-wheeled  -HC       Row Name 01/10/24 1415          Gait/Stairs (Locomotion)    [place] Level (Gait) contact guard  -     Assistive Device (Gait) walker, front-wheeled  -     Patient was able to Ambulate yes  -     Distance in Feet (Gait) 25'  -     Pattern (Gait) step-through  -     Deviations/Abnormal Patterns (Gait) gait speed decreased  -       Row Name 01/10/24 1415          Motor Skills    Therapeutic Exercise other (see comments)  Sitting: LAQ, March, knees in/out  -       Row Name 01/10/24 1415          Coping    Observed Emotional State cooperative;calm  -     Verbalized Emotional State acceptance  -     Family/Support Persons son  -     Involvement in Care not present at bedside  -       Row Name 01/10/24 1415          Positioning and Restraints    Pre-Treatment Position in bed  -     Post Treatment Position bed  -     In Bed sitting EOB;encouraged to call for assist;call light within reach;with other staff  -       Row Name 01/10/24 1415          Therapy Assessment/Plan (PT)    Rehab Potential (PT) good, to achieve stated therapy goals  -     Criteria for Skilled Interventions Met (PT) yes;meets criteria  -      Therapy Frequency (PT) 2 times/wk  2-5 times/ week per priority  -     Problem List (PT) problems related to;balance;mobility;strength  -     Activity Limitations Related to Problem List (PT) unable to ambulate safely;unable to transfer safely;BADLs not performed adequately or safely  -       Row Name 01/10/24 1415          Physical Therapy Goals    Bed Mobility Goal Selection (PT) bed mobility, PT goal 1  -HC     Transfer Goal Selection (PT) transfer, PT goal 1  -     Gait Training Goal Selection (PT) gait training, PT goal 1  -       Row Name 01/10/24 1415          Bed Mobility Goal 1 (PT)    Activity/Assistive Device (Bed Mobility Goal 1, PT) rolling to left;rolling to right;scooting;sit to supine;supine to sit  -HC     [place] Level/Cues Needed (Bed Mobility Goal 1, PT) standby assist  -HC     Time Frame (Bed Mobility Goal 1, PT) by discharge  -       Row Name 01/10/24 1415          Transfer Goal 1 (PT)    Activity/Assistive Device (Transfer Goal 1, PT) sit-to-stand/stand-to-sit;bed-to-chair/chair-to-bed;toilet  -     [place] Level/Cues Needed (Transfer Goal 1, PT) standby assist  -HC     Time Frame (Transfer Goal 1, PT) by discharge  -       Row Name 01/10/24 1415          Gait Training Goal 1 (PT)    Activity/Assistive Device (Gait Training Goal 1, PT) gait (walking locomotion);assistive device use;decrease fall risk;diminish gait deviation;improve balance and speed;increase endurance/gait distance;walker, rolling  -     [place] Level (Gait Training Goal 1, PT) contact guard required  -HC     Distance (Gait Training Goal 1, PT) 30  -HC     Time Frame (Gait Training Goal 1, PT) by discharge  -               User Key  (r) = Recorded By, (t) = Taken By, (c) = Cosigned By      Initials Name Provider Type     Cynthia Rosen PTA Physical Therapist Assistant                    Physical Therapy Education       Title: PT OT SLP Therapies (In Progress)       Topic:  Physical Therapy (In Progress)       Point: Mobility training (In Progress)       Learning Progress Summary             Patient Acceptance, E,TB, NR by  at 1/7/2024 0937    Acceptance, E,D, VU,NR by AG at 1/4/2024 1315                         Point: Home exercise program (In Progress)       Learning Progress Summary             Patient Acceptance, E,TB, NR by MP at 1/7/2024 0937    Acceptance, E,D, VU,NR by AG at 1/4/2024 1315                         Point: Body mechanics (In Progress)       Learning Progress Summary             Patient Acceptance, E,TB, NR by  at 1/7/2024 0937    Acceptance, E,D, VU,NR by AG at 1/4/2024 1315                         Point: Precautions (In Progress)       Learning Progress Summary             Patient Acceptance, E,TB, NR by  at 1/7/2024 0937    Acceptance, E,D, VU,NR by AG at 1/4/2024 1315                                         User Key       Initials Effective Dates Name Provider Type Discipline     06/16/21 -  Meron Castle, RN Registered Nurse Nurse     06/16/21 -  Rosmery Lock, PT Physical Therapist PT                  PT Recommendation and Plan  Therapy Frequency (PT): 2 times/wk (2-5 times/ week per priority)          Time Calculation:    PT Charges       Row Name 01/10/24 1423             Time Calculation    PT Received On 01/10/24  -HC         Time Calculation- PT    Total Timed Code Minutes- PT 25 minute(s)  -HC                User Key  (r) = Recorded By, (t) = Taken By, (c) = Cosigned By      Initials Name Provider Type     Cynthia Rosen PTA Physical Therapist Assistant                  Therapy Charges for Today       Code Description Service Date Service Provider Modifiers Qty    82038876530 HC GAIT TRAINING EA 15 MIN 1/10/2024 Cynthia Rosen PTA GP, CQ 1    46971431708 HC PT THER PROC EA 15 MIN 1/10/2024 Cynthia Rosen PTA GP, CQ 1            PT G-Codes  AM-PAC 6 Clicks Score (PT): 18    Cynthia Rosen PTA  1/10/2024

## 2024-01-11 VITALS
RESPIRATION RATE: 20 BRPM | WEIGHT: 164.2 LBS | SYSTOLIC BLOOD PRESSURE: 122 MMHG | OXYGEN SATURATION: 95 % | DIASTOLIC BLOOD PRESSURE: 64 MMHG | BODY MASS INDEX: 27.36 KG/M2 | TEMPERATURE: 98 F | HEIGHT: 65 IN | HEART RATE: 77 BPM

## 2024-01-11 PROBLEM — J96.02 ACUTE HYPERCAPNIC RESPIRATORY FAILURE: Status: RESOLVED | Noted: 2024-01-01 | Resolved: 2024-01-11

## 2024-01-11 LAB
ANION GAP SERPL CALCULATED.3IONS-SCNC: 6.7 MMOL/L (ref 5–15)
BUN SERPL-MCNC: 19 MG/DL (ref 8–23)
BUN/CREAT SERPL: 24.1 (ref 7–25)
CALCIUM SPEC-SCNC: 8.4 MG/DL (ref 8.6–10.5)
CHLORIDE SERPL-SCNC: 110 MMOL/L (ref 98–107)
CO2 SERPL-SCNC: 26.3 MMOL/L (ref 22–29)
CREAT SERPL-MCNC: 0.79 MG/DL (ref 0.57–1)
DEPRECATED RDW RBC AUTO: 79.4 FL (ref 37–54)
EGFRCR SERPLBLD CKD-EPI 2021: 78.1 ML/MIN/1.73
ERYTHROCYTE [DISTWIDTH] IN BLOOD BY AUTOMATED COUNT: 22.4 % (ref 12.3–15.4)
GLUCOSE BLDC GLUCOMTR-MCNC: 154 MG/DL (ref 70–130)
GLUCOSE BLDC GLUCOMTR-MCNC: 89 MG/DL (ref 70–130)
GLUCOSE SERPL-MCNC: 63 MG/DL (ref 65–99)
HCT VFR BLD AUTO: 35.8 % (ref 34–46.6)
HGB BLD-MCNC: 10.1 G/DL (ref 12–15.9)
MCH RBC QN AUTO: 27.3 PG (ref 26.6–33)
MCHC RBC AUTO-ENTMCNC: 28.2 G/DL (ref 31.5–35.7)
MCV RBC AUTO: 96.8 FL (ref 79–97)
PLATELET # BLD AUTO: 138 10*3/MM3 (ref 140–450)
PMV BLD AUTO: 11.4 FL (ref 6–12)
POTASSIUM SERPL-SCNC: 3.9 MMOL/L (ref 3.5–5.2)
RBC # BLD AUTO: 3.7 10*6/MM3 (ref 3.77–5.28)
SODIUM SERPL-SCNC: 143 MMOL/L (ref 136–145)
WBC NRBC COR # BLD AUTO: 5.53 10*3/MM3 (ref 3.4–10.8)

## 2024-01-11 PROCEDURE — 94761 N-INVAS EAR/PLS OXIMETRY MLT: CPT

## 2024-01-11 PROCEDURE — 63710000001 PREDNISONE PER 1 MG: Performed by: INTERNAL MEDICINE

## 2024-01-11 PROCEDURE — 94664 DEMO&/EVAL PT USE INHALER: CPT

## 2024-01-11 PROCEDURE — 94799 UNLISTED PULMONARY SVC/PX: CPT

## 2024-01-11 PROCEDURE — 80048 BASIC METABOLIC PNL TOTAL CA: CPT | Performed by: INTERNAL MEDICINE

## 2024-01-11 PROCEDURE — 99239 HOSP IP/OBS DSCHRG MGMT >30: CPT | Performed by: INTERNAL MEDICINE

## 2024-01-11 PROCEDURE — 63710000001 INSULIN LISPRO (HUMAN) PER 5 UNITS

## 2024-01-11 PROCEDURE — 85027 COMPLETE CBC AUTOMATED: CPT | Performed by: INTERNAL MEDICINE

## 2024-01-11 PROCEDURE — 82948 REAGENT STRIP/BLOOD GLUCOSE: CPT

## 2024-01-11 RX ORDER — PREDNISONE 10 MG/1
TABLET ORAL
Qty: 9 TABLET | Refills: 0 | Status: SHIPPED | OUTPATIENT
Start: 2024-01-11 | End: 2024-01-17

## 2024-01-11 RX ORDER — METOPROLOL SUCCINATE 25 MG/1
50 TABLET, EXTENDED RELEASE ORAL DAILY
Qty: 30 TABLET | Refills: 1 | Status: ON HOLD | OUTPATIENT
Start: 2024-01-11

## 2024-01-11 RX ADMIN — SERTRALINE 100 MG: 50 TABLET, FILM COATED ORAL at 08:57

## 2024-01-11 RX ADMIN — BACLOFEN 5 MG: 10 TABLET ORAL at 08:57

## 2024-01-11 RX ADMIN — RISPERIDONE 0.5 MG: 0.25 TABLET, FILM COATED ORAL at 08:57

## 2024-01-11 RX ADMIN — IPRATROPIUM BROMIDE 0.5 MG: 0.5 SOLUTION RESPIRATORY (INHALATION) at 13:00

## 2024-01-11 RX ADMIN — Medication 10 ML: at 08:58

## 2024-01-11 RX ADMIN — LEVOTHYROXINE SODIUM 75 MCG: 0.07 TABLET ORAL at 08:57

## 2024-01-11 RX ADMIN — FERROUS SULFATE TAB 325 MG (65 MG ELEMENTAL FE) 325 MG: 325 (65 FE) TAB at 08:57

## 2024-01-11 RX ADMIN — ISOSORBIDE MONONITRATE 30 MG: 30 TABLET, EXTENDED RELEASE ORAL at 08:57

## 2024-01-11 RX ADMIN — IPRATROPIUM BROMIDE 0.5 MG: 0.5 SOLUTION RESPIRATORY (INHALATION) at 00:57

## 2024-01-11 RX ADMIN — CLOPIDOGREL BISULFATE 75 MG: 75 TABLET, FILM COATED ORAL at 08:57

## 2024-01-11 RX ADMIN — BUDESONIDE AND FORMOTEROL FUMARATE DIHYDRATE 2 PUFF: 160; 4.5 AEROSOL RESPIRATORY (INHALATION) at 06:09

## 2024-01-11 RX ADMIN — APIXABAN 5 MG: 5 TABLET, FILM COATED ORAL at 08:58

## 2024-01-11 RX ADMIN — MEMANTINE HYDROCHLORIDE 10 MG: 10 TABLET, FILM COATED ORAL at 08:57

## 2024-01-11 RX ADMIN — METOPROLOL TARTRATE 50 MG: 50 TABLET, FILM COATED ORAL at 08:57

## 2024-01-11 RX ADMIN — LAMOTRIGINE 100 MG: 100 TABLET ORAL at 08:57

## 2024-01-11 RX ADMIN — PREDNISONE 40 MG: 20 TABLET ORAL at 08:57

## 2024-01-11 RX ADMIN — TIOTROPIUM BROMIDE INHALATION SPRAY 2 PUFF: 3.12 SPRAY, METERED RESPIRATORY (INHALATION) at 06:09

## 2024-01-11 RX ADMIN — INSULIN LISPRO 2 UNITS: 100 INJECTION, SOLUTION INTRAVENOUS; SUBCUTANEOUS at 11:32

## 2024-01-11 RX ADMIN — IPRATROPIUM BROMIDE 0.5 MG: 0.5 SOLUTION RESPIRATORY (INHALATION) at 06:09

## 2024-01-11 RX ADMIN — PANTOPRAZOLE SODIUM 40 MG: 40 TABLET, DELAYED RELEASE ORAL at 08:58

## 2024-01-11 NOTE — PROGRESS NOTES
LOS: 10 days     Name: Brenda Munroe  Age/Sex: 73 y.o. female  :  1950        PCP: Bernadette Arevalo MD    Active Problems:    * No active hospital problems. *      Admission Information: Brenda Munroe is a 73 y.o. female with coronary artery disease status post CABG, hypertension, hyperlipidemia, chronic hypoxic and hypercapnic respiratory failure secondary to COPD, paroxysmal atrial fibrillation, dementia, arthritis, bilateral carotid stenosis, anxiety, history of psychosis, PUD, and GERD.      Chief Complaint: Altered mental status    Interval history: Gastroenteritis has improved.  No episodes of tachycardia.  No further pauses after discontinuing calcium channel blocker    Subjective     Appears to be in her baseline state of confusion.  She denies shortness of breath or chest pain.    Vital Signs  Vital Signs (last 72 hrs)          0700   0659  0700   0659  0700  01/10 0659 01/10 0700  01/10 1446   Most Recent      Temp (°F) 98.1 -  98.8    97.9 -  98.6    96.6 -  99      97.8     97.8 (36.6) 01/10 1018    Heart Rate 73 -  106    89 -  130    93 -  121    68 -  71     68 01/10 1018    Resp 16 -  20    16 -  22    18 -  20    18 -  20     20 01/10 1408    /57 -  136/76    99/62 -  146/95    90/56 -  126/69      99/58     99/58 01/10 1018    SpO2 (%) 92 -  96    93 -  99    90 -  97    91 -  94     91 01/10 1408    Flow (L/min)   2      2      3      3     3 01/10 1408          Temp:  [98 °F (36.7 °C)-99 °F (37.2 °C)] 98.1 °F (36.7 °C)  Heart Rate:  [] 59  Resp:  [16-20] 18  BP: (105-155)/(68-98) 133/88  Body mass index is 27.32 kg/m².      Intake/Output Summary (Last 24 hours) at 2024 1256  Last data filed at 2024 1124  Gross per 24 hour   Intake 1560 ml   Output 200 ml   Net 1360 ml       Vitals and nursing note reviewed.   Constitutional:       Appearance: Not in distress. Chronically ill-appearing.      Interventions: Nasal cannula in place.       Comments: 2 L nasal cannula   Pulmonary:      Effort: Pulmonary effort is normal.      Breath sounds: Wheezing present.   Cardiovascular:      Normal rate. Irregularly irregular rhythm.      Murmurs: There is no murmur.   Edema:     Peripheral edema absent.   Skin:     General: Skin is warm and dry.   Neurological:      Mental Status: Mental status is at baseline. Disoriented.         Telemetry: Symmetry reviewed and she has been atrial flutter 80s to 100s     Results Review:     Results from last 7 days   Lab Units 01/11/24  0021 01/10/24  0354 01/09/24  0118 01/08/24  0243 01/07/24  0757 01/06/24  0315 01/05/24  0222   WBC 10*3/mm3 5.53 7.66 6.69 7.69 6.82 7.00 6.40   HEMOGLOBIN g/dL 10.1* 11.4* 10.7* 11.0* 11.8* 11.2* 12.4   PLATELETS 10*3/mm3 138* 135* 141 135* 135* 137* 150     Results from last 7 days   Lab Units 01/11/24  0021 01/10/24  0354 01/09/24  0118 01/08/24  0243 01/07/24  0758 01/06/24  1437 01/06/24  0315 01/05/24  0222   SODIUM mmol/L 143 145 140 142 140  --  141 144   POTASSIUM mmol/L 3.9 4.7 4.6 4.2 4.4 5.0 3.4* 3.7   CHLORIDE mmol/L 110* 111* 104 106 105  --  102 102   CO2 mmol/L 26.3 28.4 32.8* 32.2* 30.0*  --  33.9* 35.6*   BUN mg/dL 19 22 24* 34* 32*  --  38* 28*   CREATININE mg/dL 0.79 1.00 0.99 0.95 0.84  --  1.30* 1.00   CALCIUM mg/dL 8.4* 8.1* 8.7 8.8 8.8  --  8.8 9.1   GLUCOSE mg/dL 63* 95 90 106* 99  --  89 106*                 I reviewed the patient's new clinical results.  I reviewed the patient's new imaging results and agree with the interpretation.  I personally viewed and interpreted the patient's EKG/Telemetry data      Medication Review:   apixaban, 5 mg, Oral, Q12H  baclofen, 5 mg, Oral, BID  budesonide-formoterol, 2 puff, Inhalation, BID - RT   And  tiotropium bromide monohydrate, 2 puff, Inhalation, Daily - RT  clopidogrel, 75 mg, Oral, Daily  donepezil, 10 mg, Oral, Nightly  ferrous sulfate, 325 mg, Oral, Daily With Breakfast  insulin lispro, 2-7 Units, Subcutaneous, 4x  Daily AC & at Bedtime  ipratropium, 0.5 mg, Nebulization, 4x Daily - RT  isosorbide mononitrate, 30 mg, Oral, Daily  lamoTRIgine, 100 mg, Oral, Daily  lamoTRIgine, 25 mg, Oral, Nightly  levothyroxine, 75 mcg, Oral, Daily  [Held by provider] lisinopril, 10 mg, Oral, Q24H  memantine, 10 mg, Oral, Q12H  metoprolol tartrate, 50 mg, Oral, Q12H  pantoprazole, 40 mg, Oral, Daily  predniSONE, 40 mg, Oral, Daily With Breakfast  risperiDONE, 0.5 mg, Oral, BID  rosuvastatin, 40 mg, Oral, Q PM  senna-docusate sodium, 2 tablet, Oral, BID  sertraline, 100 mg, Oral, Daily  sodium chloride, 10 mL, Intravenous, Q12H  sodium chloride, 10 mL, Intravenous, Q12H  Vitamin D3, 50,000 Units, Oral, Q7 Days      sodium chloride, 75 mL/hr, Last Rate: Stopped (01/11/24 1156)        Assessment:  Atrial fibrillation   Coronary artery disease status post bypass  Hypertension      Recommendations:  Controlled on current dose of beta-blocker.  No further pauses since discontinuing the calcium channel blocker.  Appropriately anticoagulated with Eliquis continue Plavix and rosuvastatin.  Well-controlled    I discussed the patients findings and my recommendations with patient and Dr. Dudley.    Patient is stable for discharge from a cardiology standpoint.        Electronically signed by PAULA Crocker, 01/10/24, 2:51 PM EST.

## 2024-01-11 NOTE — CASE MANAGEMENT/SOCIAL WORK
Discharge Planning Assessment  [facility]     Patient Name: Brenda Munroe  MRN: [id number]  Today's Date: 1/11/2024    Admit Date: 1/1/2024    Plan: SS discussed pt transportation with pt family (per daughter in law), pt RN, House Supervisor and nursing home per [facility].  Pt typically transports per [facility] at nursing home via [facility]. Pt ambulates with rolling walker and is able to sit up in bed.  Pt family per daughter in law agreeable for pt to transport via [facility].  House Supervisor agreeable for pt to transport via [facility].  SS will schedule [facility] for pt to return to [place].       Discharge Plan       Row Name 01/11/24 1433       Plan    Plan SS discussed pt transportation with pt family (per daughter in law), pt RN, House Supervisor and nursing home per [facility].  Pt typically transports per [facility] at nursing home via [facility]. Pt ambulates with rolling walker and is able to sit up in bed.  Pt family per daughter in law agreeable for pt to transport via [facility].  House Supervisor agreeable for pt to transport via [facility].  SS will schedule [facility] for pt to return to [place].      Row Name 01/11/24 1228       Plan    Final Discharge Disposition Code 03 - skilled nursing facility (SNF)    Final Note Pt to be discharged back to [facility] on this date.  Facility aware and agreeable per [facility].  Pt family aware and agreeable.  SS notified RN with number to call report.                  Continued Care and Services - Admitted Since 1/1/2024       Destination Coordination complete.      Service Provider Request Status Selected Services Address Phone Fax Patient Preferred    [facility]  Selected Skilled Nursing [address] [place] [id number] [phone] --                  Expected Discharge Date and Time       Expected Discharge Date Expected Discharge Time    Jan 11, 2024             JORGE Bruno

## 2024-01-11 NOTE — DISCHARGE SUMMARY
[facility] HOSPITALIST MEDICINE DISCHARGE SUMMARY    Patient Identification:  Name:  Brenda Munroe  Age:  73 y.o.  Sex:  female  :  1950  MRN:  [id number]  Visit Number:  [id number]    Date of Admission: 2024  Date of Discharge: 2024    PCP: Bernadette Arevalo MD    DISCHARGE DIAGNOSIS   Acute metabolic encephalopathy  Enteritis/colitis  Acute hypercapnic on chronic hypoxic/hypercapnic respiratory failure  COPD exacerbation  Paroxysmal A-fib  Essential hypertension  Hyperlipidemia  Acute kidney injury  Hypothyroidism      CONSULTS  Samantha Haas NP, Cardiology      PROCEDURES PERFORMED   None      HOSPITAL COURSE  Ms. Munroe is a 73 y.o. female who presented to [facility] ED on 2024 with a chief complaint of altered mental status.  Patient has a past medical history remarkable for COPD, chronic hypoxic/hypercapnic respiratory failure, hyperlipidemia, essential hypertension, type 2 diabetes mellitus, dementia, CAD, physical debility and GERD.  Patient was brought to the emergency department from a local nursing home facility secondary to altered mental status.  Patient was on BiPAP at initial exam and unfortunately was unable to provide any details.  Please see H&P for specific details.  Initial evaluation in the emergency department did consist of basic laboratory work as well as physical exam and vital signs.  Initial vital signs found patient's blood pressure 144/75, respirations 20, heart rate 59, temperature 98.5 °F and oxygen saturation 98% on 2 L nasal cannula.  ABG was obtained which demonstrated pH 7.32, pCO2 84, pO2 76 and bicarb 44.3.  After being placed on BiPAP, repeat ABG demonstrated pH 7.42, pCO2 67, pO2 60.9 and bicarb 44.  COVID-19 as well as influenza A/B swab was negative.  Chest x-ray demonstrated coarse interstitial lung markings.  Overall, patient was diagnosed with acute hypercapnic on chronic hypercapnic/hypoxic respiratory failure secondary to  COPD exacerbation and was admitted for further treatment and evaluation.    Patient was started on Solu-Medrol 40 mg IV twice daily as well as DuoNebs.  Patient did take a prolonged period of time to be able to decrease BiPAP settings and eventually discontinue BiPAP altogether.  During prolonged hospitalization, patient did have multiple episodes of A-fib with RVR for which cardiology services were consulted.  After thorough evaluation, recommendation was made to place patient on both metoprolol and Cardizem.  Unfortunately, patient had multiple sinus pauses greater than 2 seconds which did require Cardizem to be discontinued.  Patient continues to have paroxysmal A-fib but is currently rate controlled with metoprolol.  Patient has been continued on Eliquis.  Acute hypercapnic respiratory failure has resolved as well as patient's metabolic encephalopathy.  With this in mind, it is felt patient has reached maximum medical benefit of current hospitalization and will be discharged back to her nursing home in stable condition today.  The beforementioned plan was thoroughly discussed with the patient and she expressed understanding and willingness to proceed with the beforementioned plan.    VITAL SIGNS:      01/09/24  0500 01/10/24  0400 01/11/24  0500   Weight: 73.3 kg (161 lb 11.2 oz) 74.1 kg (163 lb 6.4 oz) 74.5 kg (164 lb 3.2 oz)     Body mass index is 27.32 kg/m².    PHYSICAL EXAM:  Constitutional: Well-nourished  female in no apparent distress.     HENT:  Head:  Normocephalic and atraumatic.  Mouth:  Moist mucous membranes.    Eyes:  Conjunctivae and EOM are normal.  Pupils are equal, round, and reactive to light.  No scleral icterus.    Neck:  Neck supple. No thyromegaly.  No JVD present.    Cardiovascular:  Regular rate and rhythm with no murmurs, rubs, clicks or gallops appreciated.  Pulmonary/Chest:  Clear to auscultation bilaterally with no crackles, wheezes or rhonchi appreciated.  Abdominal:   Soft.  Mildly tender to palpation, nondistended  Bowel sounds are hypoactive in all four quadrants. No organomegally appreciated.   Musculoskeletal:  No edema, no tenderness, and no deformity.  No red or swollen joints anywhere.    Neurological:  Alert, Cranial nerves II-XII intact with no focal deficits.  No facial droop.  No slurred speech.   Skin:  Warm and dry to palpation with no rashes or lesions appreciated.  Peripheral vascular:  2+ radial and pedal pulses in bilateral upper and lower extremities.    DISCHARGE DISPOSITION   Stable    DISCHARGE MEDICATIONS:     Discharge Medications        New Medications        Instructions Start Date   predniSONE 10 MG tablet  Commonly known as: DELTASONE   Take 2 tablets by mouth Daily for 3 days, THEN 1 tablet Daily for 3 days.   Start Date: January 11, 2024            Changes to Medications        Instructions Start Date   metoprolol succinate XL 25 MG 24 hr tablet  Commonly known as: TOPROL-XL  What changed: how much to take   50 mg, Oral, Daily             Continue These Medications        Instructions Start Date   acetaminophen 500 MG tablet  Commonly known as: TYLENOL   500 mg, Oral, Every 4 Hours PRN      apixaban 5 MG tablet tablet  Commonly known as: ELIQUIS   5 mg, Oral, 2 Times Daily      baclofen 10 MG tablet  Commonly known as: LIORESAL   5 mg, Oral, 2 Times Daily      Chloraseptic 1.4 % liquid liquid  Generic drug: phenol   1 spray, Mouth/Throat, Every 2 Hours PRN      clopidogrel 75 MG tablet  Commonly known as: PLAVIX   75 mg, Oral, Daily      donepezil 10 MG tablet  Commonly known as: ARICEPT   10 mg, Oral, Nightly      ferrous sulfate 325 (65 FE) MG tablet   325 mg, Oral, 2 Times Daily      guaiFENesin 200 MG tablet   400 mg, Oral, Every 4 Hours PRN      isosorbide mononitrate 30 MG 24 hr tablet  Commonly known as: IMDUR   90 mg, Oral, Daily      lamoTRIgine 100 MG tablet  Commonly known as: LaMICtal   100 mg, Oral, Daily      lamoTRIgine 25 MG  tablet  Commonly known as: LaMICtal   25 mg, Oral, Nightly      levothyroxine 75 MCG tablet  Commonly known as: SYNTHROID, LEVOTHROID   75 mcg, Oral, Daily      lisinopril 10 MG tablet  Commonly known as: PRINIVIL,ZESTRIL   10 mg, Oral, Every 24 Hours Scheduled      LORazepam 0.5 MG tablet  Commonly known as: ATIVAN   0.5 mg, Oral, Every 12 Hours PRN      Lubricating Tears Eye Drops 0.1-0.3 % solution  Generic drug: Dextran 70-Hypromellose   1 drop, Ophthalmic, Every 2 Hours PRN      memantine 10 MG tablet  Commonly known as: NAMENDA   10 mg, Oral, 2 Times Daily      nitroglycerin 0.4 MG SL tablet  Commonly known as: NITROSTAT   0.4 mg, Sublingual, Every 5 Minutes PRN      pantoprazole 40 MG EC tablet  Commonly known as: PROTONIX   40 mg, Oral, Daily      risperiDONE 0.5 MG tablet  Commonly known as: risperDAL   0.5 mg, Oral, 2 Times Daily      rosuvastatin 40 MG tablet  Commonly known as: CRESTOR   40 mg, Oral, Every Evening      senna 8.6 MG tablet  Commonly known as: SENOKOT   1 tablet, Oral, Daily PRN      sertraline 100 MG tablet  Commonly known as: ZOLOFT   100 mg, Oral, Daily      Trelegy Ellipta 100-62.5-25 MCG/INH inhaler  Generic drug: Fluticasone-Umeclidin-Vilant   1 puff, Inhalation, Daily - RT      Vitamin D3 1.25 MG (48259 UT) capsule   50,000 Units, Oral, Every 7 Days             Stop These Medications      amLODIPine 10 MG tablet  Commonly known as: NORVASC              Diet Instructions    Resume diet as tolerated          Your Scheduled Appointments      Sukh 15, 2024 10:45 AM  CT cor chest low dose wo with COR ADRIAN CT  [facility] (--[facility] [id number]  [facility] [address]  [place] [address]   N/A         Jan 23, 2024 12:15 PM  Lab with [place] NURSE [facility]  [facility] ([place]) [address] [place] [address] [address]  [place] [address] [phone]   Bring ID and  Insurance cards.  If you received paperwork in the mail, fill it out and  bring it with them.         Jan 23, 2024 12:30 PM  FOLLOW UP with PAULA Castañeda  [facility] ([place]) [address]  [facility] [address] [phone]   Bring ID and  Insurance cards.  New patients are to arrive 30 minutes prior to the appointement.  If you received paperwork in the mail, fill it out and bring it with them.  All other appt's need to be here 15 minutes early.        Additional instructions:    NO  APOINTMENTS  MADE  PT  DISCHARGED BACK  TO  Critical access hospital         Activity Instructions    Resume activity as tolerated          Additional Instructions for the Follow-ups that You Need to Schedule       Discharge Follow-up with PCP   As directed       Currently Documented PCP:    Bernadette Arevalo MD    PCP Phone Number:    [phone]     Follow Up Details: please follow up with pcp in 1 week               Contact information for follow-up providers       Bernadette Arevalo MD .    Specialty: Geriatric Medicine  Why: please follow up with pcp in 1 week  Contact information:  [address]  [facility] [address] [phone]                       Contact information for after-discharge care       Destination       [facility] .    Service: Skilled Nursing  Contact information:  [address] [facility] [address]  [phone]                                   TEST  RESULTS PENDING AT DISCHARGE       Carlos Dudley DO  01/11/24  14:01 EST    Please note that this discharge summary required more than 30 minutes to complete.    Please send a copy of this dictation to the following providers:  Bernadette Arevalo MD

## 2024-01-11 NOTE — CASE MANAGEMENT/SOCIAL WORK
Discharge Planning Assessment  [facility]     Patient Name: Brenda Munroe  MRN: [id number]  Today's Date: 1/11/2024    Admit Date: 1/1/2024    Plan: Pt was admitted on 1/1/24 from [facility]. Pt had a 30 day bed hold upon admission. SS to follow.       Discharge Plan       Row Name 01/11/24 1228       Plan    Final Discharge Disposition Code 03 - skilled nursing facility (SNF)    Final Note Pt to be discharged back to [facility] on this date.  Facility aware and agreeable per Phoebe.  Pt family aware and agreeable.  SS notified RN with number to call report.                  Continued Care and Services - Admitted Since 1/1/2024       Destination Coordination complete.      Service Provider Request Status Selected Services Address Phone Fax Patient Preferred    [facility]  Selected Skilled Nursing [address] [place] [id number] [phone] --                  Expected Discharge Date and Time       Expected Discharge Date Expected Discharge Time    Jan 11, 2024             MUNA BrunoW

## 2024-01-11 NOTE — DISCHARGE PLACEMENT REQUEST
"Alta Stevenson (73 y.o. Female)       Date of Birth   1950    Social Security Number       Address   [address] [place] [address]    Home Phone   [phone]    MRN   [id number]       [facility]    Marital Status                               Admission Date   1/1/24    Admission Type   Emergency    Admitting Provider   Sukh Montez DO    Attending Provider   Carlos Dudley DO    Department, Room/Bed   [address], 3322/1P       Discharge Date       Discharge Disposition   Skilled Nursing Facility (DC - External)    Discharge Destination                                 Attending Provider: Carlos Dudley DO    Allergies: No Known Allergies    Isolation: None   Infection: MRSA (01/03/24)   Code Status: CPR    Ht: 165.1 cm (65\")   Wt: 74.5 kg (164 lb 3.2 oz)    Admission Cmt: None   Principal Problem: Acute hypercapnic respiratory failure [J96.02]                   Active Insurance as of 1/1/2024       Primary Coverage       Payor Plan Insurance Group Employer/Plan Group    MEDICARE MEDICARE A & B        Payor Plan Address Payor Plan Phone Number Payor Plan Fax Number Effective Dates    [address] [phone]  4/1/2008 - None Entered    [facility] [id number]         Subscriber Name Subscriber Birth Date Member ID       ALTA STEVENSON 1950 [id number]               Secondary Coverage       Payor Plan Insurance Group Employer/Plan Group    [place]        Payor Plan Address Payor Plan Phone Number Payor Plan Fax Number Effective Dates    [address] [phone]  12/4/2023 - None Entered    [place] [address]         Subscriber Name Subscriber Birth Date Member ID       ALTA STEVENSON 1950 [id number]                     Emergency Contacts        (Rel.) Home Phone Work Phone Mobile Phone    Edi Stevenson (Son) [phone] -- [phone]    StevensonTammy barrett (Relative) [phone] -- " [phone]    Fabien Munroe (Son) -- -- [phone]              Brenda Munroe  MRN: [id number]    Icon primary diagnosis          Respiratory insufficiency   Icon Date   1/1/2024 - 1/11/2024   Icon Location   [address]   Icon Checklist header    Your Next Steps    Icon destination   [facility]   [address] [facility] [address]   [phone]    Icon do   Do    Icon Checkbox     these medications from [facility]. - [place], [place] - [address] - [phone]  - [phone] FX  metoprolol succinate XL  predniSONE  Icon read   Read    Icon Checkbox    Read these attachments  Acute Respiratory Failure  Adult (English)  Video: Pulmonary Rehabilitation (English)  Chronic Respiratory Failure (English)  Prednisone Tablets (English)  Icon Location   Go    Sukh 15 CT cor chest low dose wo 10:45 AM  [facility]   [address] [facility] [address]   [facility] [address]  N/A   You have more future appointments. Please review your full appointment list.  After Visit Summary  Instructions    Icon medication changes this visit           Your medications have changed    Icon medications to start taking   START taking:  predniSONE (DELTASONE)   Start taking on: January 11, 2024  Icon medications to change how you take   CHANGE how you take:  metoprolol succinate XL (TOPROL-XL) -- how much to take  Icon medications to stop taking   STOP taking:  amLODIPine 10 MG tablet (NORVASC)   Review your updated medication list below.  Your Next Steps  Icon destination   Destination  Icon do   Do  Icon read   Read  Icon Location   Go      COVID-19 Vaccination Information  Why Get Vaccinated?  Building defenses against COVID-19 is a team effort, and you are a pérez part of that team. Getting the COVID-19 vaccine adds one more layer of protection for you, your coworkers, and family. Here are ways you can  build people’s confidence in the COVID-19 vaccines in your community and at home.     Get vaccinated and enroll in the [employer] text messaging program to help CDC monitor vaccine safety.   Tell others why you are getting vaccinated and encourage them to get vaccinated. Share your success story.    Learn how to have conversations about COVID-19 vaccine with coworkers, family, and friends.  https://www.cdc.gov/coronavirus/2019-ncov/vaccines/index.html     How do I schedule an appointment for a vaccine?  https://www.vaccines.gov/ helps you find locations that carry COVID-19 vaccines and their contact information. Because every location handles appointments differently, you will need to schedule your appointment directly with the location you choose.        If you have any questions about your recovery, please call the [facility] Nurse Call Center at [phone]. A registered nurse is available 24 hours a day 7 days a week to assist you.   If you have any COVID-19 related questions, please call [phone].     Conversations that Matter: Have you thought about who would speak for you if you could not speak for yourself?     Consider appointing someone to make healthcare decisions for you if you are unable to do so. We can help! Call our Advance Care Planning helpline at [phone], or visit   Picket/ACP.  Icon activity    Activity instructions    Resume activity as tolerated      Icon diet    Diet instructions    Resume diet as tolerated      Icon Orders placed today                  Other instructions    Discharge Follow-up with PCP   Currently Documented PCP:   Bernadette Arevalo MD   PCP Phone Number:   [phone]   What's Next    What's Next          Follow up with Bernadette Arevalo  please follow up with pcp in 1 week [address] [place] [address]  [phone]   Sukh 15 CT cor chest low dose wo  Monday Sukh 15, 2024 10:45 AM  N/A [facility] CARE AND DIAG[facility]ST[facility] C[facility]  [address]  [facility] [address]   [facility] [address]   Jan 23 Lab  Tuesday Jan 23, 2024 12:15 PM (Arrive by 12:15 AM)  Bring ID and  Insurance cards.   If you received paperwork in the mail, fill it out and bring it with them. [facility] HEMATOLOGY & ONCOLOGY  [address] [place] [id number] [phone]          FOLLOW UP with Bessy Marleyslava  Tuesday Jan 23, 2024 12:30 PM (Arrive by 12:00 PM)  Bring ID and  Insurance cards.   New patients are to arrive 30 minutes prior to the appointement.   If you received paperwork in the mail, fill it out and bring it with them.   All other appt's need to be here 15 minutes early.  [facility] HEMATOLOGY & ONCOLOGY  [address] [place] [address]  [phone]   Icon Orders placed today                    Additional Information        NO  APOINTMENTS  MADE  PT  DISCHARGED BACK  TO  [facility]               Continuing Care    Icon destination    Destination    [facility]  Services: Skilled Nursing   Address: [address] [place] [address]   Phone: [phone]     Your Allergies  Date Reviewed: 1/1/2024  Your Allergies   No active allergies     Patient Belongings Returned    Document Return of Belongings Flowsheet    Were the patient bedside belongings sent home? Yes   Medication Retrieved from Unit & Sent Home --   Belongings Sent to Safe --   Belongings sent with: --   Belongings Retrieved from Security & Sent Home N/A   Medication Retrieved from Unit & Sent Home --   Medications Retrieved from Pharmacy & Sent Home N/A         [employer] Signup    [facility] Thinkature allows you to send messages to your doctor, view your test results, renew your prescriptions, schedule appointments, and more. To sign up, go to Pikum and click on the Sign Up Now link in the New User? box. Enter your Thinkature Activation Code exactly as it appears below along with the  last four digits of your Social Security Number and your Date of Birth () to complete the sign-up process. If you do not sign up before the expiration date, you must request a new code.     Isabel Activation Code: [email or web address]  Expires: 2/10/2024  6:25 AM     If you have questions, you can email [email or web address] or call [phone] to talk to our [employer] staff. Remember, [employer] is NOT to be used for urgent needs. For medical emergencies, dial 911.     Medication List  Medication List     Morning Afternoon Evening Bedtime As Needed    acetaminophen 500 MG tablet  Commonly known as: TYLENOL  Take 1 tablet by mouth Every 4 (Four) Hours As Needed for Mild Pain.         apixaban 5 MG tablet tablet  Commonly known as: ELIQUIS  Take 1 tablet by mouth 2 (Two) Times a Day.  Last time this was given: 5 mg on 2024  8:58 AM         baclofen 10 MG tablet  Commonly known as: LIORESAL  Take 0.5 tablets by mouth 2 (Two) Times a Day. Indications: Muscle Spasticity  For: Muscle Spasticity  Last time this was given: 5 mg on 2024  8:57 AM         Chloraseptic 1.4 % liquid liquid  Apply 1 spray to the mouth or throat Every 2 (Two) Hours As Needed (sore throat).  Generic drug: phenol         clopidogrel 75 MG tablet  Commonly known as: PLAVIX  Take 1 tablet by mouth Daily.  Last time this was given: 75 mg on 2024  8:57 AM  Signed by: René Mendoza         donepezil 10 MG tablet  Commonly known as: ARICEPT  Take 1 tablet by mouth Every Night.  Last time this was given: 10 mg on January 10, 2024  8:07 PM  Signed by: René Mendoza         ferrous sulfate 325 (65 FE) MG tablet  Take 1 tablet by mouth 2 (Two) Times a Day.  Last time this was given: 325 mg on 2024  8:57 AM  Signed by: Bessy Nicolas         guaiFENesin 200 MG tablet  Take 2 tablets by mouth Every 4 (Four) Hours As Needed for Congestion.  Signed by: René Mendoza         isosorbide mononitrate  30 MG 24 hr tablet  Commonly known as: IMDUR  Take 3 tablets by mouth Daily.  Last time this was given: 30 mg on January 11, 2024  8:57 AM         * lamoTRIgine 100 MG tablet  Commonly known as: LaMICtal  Take 1 tablet by mouth Daily.  Last time this was given: Ask your nurse or doctor         * lamoTRIgine 25 MG tablet  Commonly known as: LaMICtal  Take 1 tablet by mouth Every Night.  Last time this was given: Ask your nurse or doctor         levothyroxine 75 MCG tablet  Commonly known as: SYNTHROID, LEVOTHROID  Take 1 tablet by mouth Daily.  Last time this was given: 75 mcg on January 11, 2024  8:57 AM         lisinopril 10 MG tablet  Commonly known as: PRINIVIL,ZESTRIL  Take 1 tablet by mouth Daily.  Last time this was given: 10 mg on January 5, 2024  9:06 AM  Signed by: René Mendoza         LORazepam 0.5 MG tablet  Commonly known as: ATIVAN  Take 1 tablet by mouth Every 12 (Twelve) Hours As Needed for Anxiety.  Last time this was given: 0.5 mg on January 4, 2024  9:05 PM         Lubricating Tears Eye Drops 0.1-0.3 % solution  Apply 1 drop to eye(s) as directed by provider Every 2 (Two) Hours As Needed (dry eyes).  Generic drug: Dextran 70-Hypromellose  Signed by: Genaro Benjamin         memantine 10 MG tablet  Commonly known as: NAMENDA  Take 1 tablet by mouth 2 (Two) Times a Day.  Last time this was given: 10 mg on January 11, 2024  8:57 AM        Icon medications to change how you take   metoprolol succinate XL 25 MG 24 hr tablet  Commonly known as: TOPROL-XL  Take 2 tablets by mouth Daily.  What changed: how much to take  Last time this was given: Ask your nurse or doctor  Signed by: Carlos Dudley         nitroglycerin 0.4 MG SL tablet  Commonly known as: NITROSTAT  Place 1 tablet under the tongue Every 5 (Five) Minutes As Needed.         pantoprazole 40 MG EC tablet  Commonly known as: PROTONIX  Take 1 tablet by mouth Daily.  Last time this was given: 40 mg on January 11, 2024  8:58 AM  Signed by: Genaro  Cassidy Galvezon medications to start taking   predniSONE 10 MG tablet  Commonly known as: DELTASONE  Start taking on: January 11, 2024  Take 2 tablets by mouth Daily for 3 days, THEN 1 tablet Daily for 3 days.  Last time this was given: 40 mg on January 11, 2024  8:57 AM  Signed by: Carlos Dudley         risperiDONE 0.5 MG tablet  Commonly known as: risperDAL  Take 1 tablet by mouth 2 (Two) Times a Day.  Last time this was given: 0.5 mg on January 11, 2024  8:57 AM         rosuvastatin 40 MG tablet  Commonly known as: CRESTOR  Take 1 tablet by mouth Every Evening.  Last time this was given: 40 mg on January 10, 2024  5:44 PM         senna 8.6 MG tablet  Commonly known as: SENOKOT  Take 1 tablet by mouth Daily As Needed for Constipation.         sertraline 100 MG tablet  Commonly known as: ZOLOFT  Take 1 tablet by mouth Daily.  Last time this was given: 100 mg on January 11, 2024  8:57 AM         Trelegy Ellipta 100-62.5-25 MCG/INH inhaler  Inhale 1 puff Daily.  Generic drug: Fluticasone-Umeclidin-Vilant         Vitamin D3 1.25 MG (48406 UT) capsule  Take 1 capsule by mouth Every 7 (Seven) Days.  Last time this was given: 50,000 Units on January 5, 2024  9:07 AM Every 7 days          very important  * This list has 2 medication(s) that are the same as other medications prescribed for you. Read the directions carefully, and ask your doctor or other care provider to review them with you.       Where to  your medications    [facility] medication  information                   these medications at [facility]. - [place], [place] - [address] [facility] - [phone] [facility] [phone] FX    metoprolol succinate XL  predniSONE  Address: [address] [place] [address]   Phone: [phone]     Always carry an updated list of your medications with you. If there is an emergency, a responder can quickly see what medications you are taking. Take this paperwork with you the next time you see your health care  "provider.          [employer] Sign-Up    Send messages to your doctor, view your test results, renew your prescriptions, schedule appointments, and more.   Go to [email or web address] click \"Sign Up Now\", and enter your personal activation code: [email or web address]. Activation code expires 2/10/2024.  Icon List of Attachments     Attached Information  Acute Respiratory Failure  Adult (English)  Acute Respiratory Failure, Adult  Front view of a person's lungs.      Acute respiratory failure is when one or both of these things happen:  Oxygen cannot pass from the lungs into the blood, causing the blood oxygen level to drop. Loss of blood oxygen means tissues and organs may not work well.  A gas called carbon dioxide cannot pass from the blood into the lungs so the body can get rid of it. The buildup of carbon dioxide can damage the tissues and organs in the body.  Acute respiratory failure happens fast. It is an emergency and needs to be treated right away.  What are the causes?  Common causes of respiratory failure that may cause low oxygen levels include:  Trauma to the lung, chest, or ribs, or to the tissues around the lung.  Lung conditions, such as pneumonia, asthma, or blood clots in the lungs (pulmonary embolism).  Breathing in harmful chemicals, smoke, water, or vomit.  A widespread infection (sepsis).  Heart attack.  Common causes of respiratory failure that cause a buildup of carbon dioxide include:  Stroke.  A spinal cord injury.  Drug or alcohol overdose.  Sepsis.  The heart stopping all of a sudden (cardiac arrest).  What increases the risk?  This condition is more likely to develop in people who have:  Known lung conditions, such as asthma or chronic obstructive pulmonary disease (COPD).  A condition that damages or weakens the muscles, nerves, bones, or tissues that are involved in breathing, such as myasthenia gravis or Guillain-Barré syndrome.  A health problem that blocks the " unconscious reflex that is involved in breathing, such as hypothyroidism or sleep apnea.  What are the signs or symptoms?  Symptoms may depend on the cause and on the levels of oxygen and carbon dioxide in your blood.   Trouble breathing is the main symptom of acute respiratory failure.  Other symptoms may include:  Fast breathing.  Making high-pitched whistling sounds when you breathe (wheezing) and grunting.  Confusion or changes in behavior.  Feeling tired, sleeping more than normal, or being hard to wake.  Skin, lips, or fingernails that look blue (cyanosis).  Feeling restless or anxious.  Fast or irregular heartbeats (palpitations).  How is this diagnosed?  A device clipped onto a finger to measure the amount of oxygen in the blood.      This condition may be diagnosed based on:  Your medical history and a physical exam. Your health care provider will listen to your heart and lungs to check for abnormal sounds.  Tests to confirm the diagnosis and find the cause of respiratory failure. Tests may include:  Measuring the amount of oxygen in your blood (pulse oximetry). This involves placing a small device on your finger, earlobe, or toe.  Blood tests to measure levels of blood oxygen and carbon dioxide.  Chest X-ray.  How is this treated?  Treatment for this condition usually takes place in a hospital intensive care unit (ICU). Treatment depends on the cause of the condition. Treatment may include one or more of these:  Oxygen given through your nose or a face mask.  A device to help you breathe, such as a continuous positive airway pressure (CPAP) machine or bilevel positive airway pressure (BIPAP) machine. The device gives you oxygen and pressure.  Other breathing treatments, fluids, and medicines.  A breathing machine called a ventilator. This gives you oxygen and pressure to help you breathe. A tube is put into your mouth and windpipe (trachea) and connects to the ventilator.  Tracheostomy placement, if you  are on a ventilator for a long time. A tracheostomy is a breathing tube put through your neck into your trachea.  Follow these instructions at home:  Medicines  Take over-the-counter and prescription medicines only as told by your health care provider.  If you were prescribed antibiotics, take them as told by your health care provider. Do not stop using the antibiotic even if you start to feel better.  General instructions  Return to your normal activities as told by your health care provider. Ask your health care provider what activities are safe for you.  Do not use any products that contain nicotine or tobacco. These products include cigarettes, chewing tobacco, and vaping devices, such as e-cigarettes. If you need help quitting, ask your health care provider.  Keep all follow-up visits.  Contact a health care provider if:  Your symptoms do not improve or they get worse.  Get help right away if:  You are having trouble breathing.  You lose consciousness.  Your heart starts beating very fast.  Your fingers, lips, or other areas of your body turn blue.  You are confused.  These symptoms may be an emergency. Get help right away. Call 911.  Do not wait to see if the symptoms will go away.  Do not drive yourself to the hospital.  Summary  Acute respiratory failure is a condition that develops fast and needs to be treated right away.  The main symptom of this condition is trouble breathing.  Treatment for this condition usually takes place in a hospital intensive care unit (ICU). Treatment may include oxygen, fluids, and medicines. A machine may be used to help you breathe, such as a ventilator.  Contact a health care provider if your symptoms do not improve or if they get worse.  This information is not intended to replace advice given to you by your health care provider. Make sure you discuss any questions you have with your health care provider.  Document Revised: 02/24/2023 Document Reviewed: 02/24/2023  [employer]  Patient Education ©  Elsevier Inc.           Icon List of Attachments     Attached Information  Video: Pulmonary Rehabilitation (English)  Pulmonary Rehabilitation  In this video you will learn about pulmonary rehabilitation and its role in treating chronic lung disease.  To view the content, go to this web address:  [email or web address]    This video will  on: 2025. If you need access to this video following this date, please reach out to the healthcare provider who assigned it to you.  This information is not intended to replace advice given to you by your health care provider. Make sure you discuss any questions you have with your health care provider.  [employer] Patient Education ©  Elsevier Inc.         Icon List of Attachments     Attached Information  Chronic Respiratory Failure (English)  Chronic Respiratory Failure  Front view of a person's lungs.      Chronic respiratory failure is a long-term type of respiratory failure. Respiratory failure is when one or both of these things happen:  Oxygen cannot pass from the lungs into the blood, causing the blood oxygen level to drop. Loss of blood oxygen means tissues and organs may not work well.  A gas called carbon dioxide cannot pass from the blood into the lungs so the body can get rid of it. The buildup of carbon dioxide can damage the tissues and organs in the body.  Chronic respiratory failure may develop over weeks, months, or years. It is usually the body's response to another long-term (chronic) condition that affects breathing.  You may also get sudden shortness of breath (acute respiratory failure) when you have chronic respiratory failure. Sudden shortness of breath needs to be treated right away.  What increases the risk?  You are more likely to develop this condition if you have another chronic condition, such as:  Chronic obstructive pulmonary disease (COPD).  Cystic fibrosis.  Neuromuscular weakness from a stroke,  spinal cord injury, or myasthenia gravis.  Pulmonary fibrosis. This is scarring of the lung tissue.  Sleep apnea.  Obesity-hypoventilation syndrome.  Congestive heart failure.  What are the signs or symptoms?  Chronic respiratory failure may not have clear symptoms when it happens slowly over time and the body gets used to it.  Symptoms of this condition may include:  Shortness of breath or trouble breathing.  Feeling very tired, sleepy, or low energy.  Blurred vision or headaches.  Fast breathing.  Confusion.  Fingernail beds or toenail beds that look bluish.  Fingernails or toenails that look wide, swollen, spongy, or bulging.  How is this diagnosed?  This condition may be diagnosed based on:  Your medical history.  A physical exam.  Other tests. These may include:  Blood tests, such as an arterial blood gas test to check oxygen and carbon dioxide levels.  Imaging tests, such as a chest X-ray or CT scan.  Pulmonary function tests. These help to find out if you have chronic lung disease.  An echocardiogram. This test uses sound waves to make pictures of your heart.  How is this treated?  An oxygen tank.      Treatment for this condition depends on the cause. Treatment may include:  Oxygen given through a tube with prongs that sit in your nose (nasal cannula) or through a mask that fits over your face.  Noninvasive positive pressure ventilation. This is breathing support using a machine to blow air into your lungs through a mask. Examples of these machines are:  Continuous positive airway pressure (CPAP) machine.  Bilevel positive airway pressure (BIPAP) machine.  Medicines to help with breathing, such as:  Medicines that open up and relax air passages, such as bronchodilators. These may be given through a device that turns liquid medicines into a mist you can breathe in (nebulizer).  Diuretics. These medicines get rid of extra fluid in your lungs.  Steroid medicines. These decrease inflammation in the  lungs.  Pulmonary rehabilitation. This is an exercise program that strengthens the muscles in your chest and helps you learn breathing methods to manage your condition.  A ventilator. This is a breathing machine that sends oxygen to your lungs through a tube that is put into your windpipe (trachea). This machine is used when you can no longer breathe well enough on your own.  Follow these instructions at home:  Medicines  Take over-the-counter and prescription medicines only as told by your health care provider.  If you were prescribed antibiotics, take them as told by your health care provider. Do not stop using the antibiotic even if you start to feel better.  General instructions  A sign telling a person not to smoke.      Do not use any products that contain nicotine or tobacco. These products include cigarettes, chewing tobacco, and vaping devices, such as e-cigarettes. If you need help quitting, ask your health care provider.  If you were told to use oxygen therapy at home, follow the instructions from your health care provider. Do not use more oxygen than you were told. Getting too much oxygen may be harmful when you have this condition.  If you were given a CPAP or BIPAP machine, follow the instructions for use, cleaning, and care as told by your health care provider.  Return to your normal activities as told by your health care provider. Ask your health care provider what activities are safe for you.  Stay up to date on all vaccines, especially pneumonia and yearly flu (influenza) vaccines.  Stay away from people who are sick, and avoid crowded places, especially during the cold and flu season.  Keep all follow-up visits.  Contact a health care provider if:  Your shortness of breath gets worse.  You have more mucus from your lungs (sputum), high-pitched whistling sounds when you breathe (wheezing), coughing, or loss of energy.  You get oxygen therapy and you still have trouble breathing.  You have a fever  of 100.4°F (38°C) or higher.  Get help right away if:  Your shortness of breath gets much worse or gets worse very fast.  You get pain, tightness, or pressure in your chest.  You cannot say more than a few words without having to catch your breath.  Your lips, toenails, or fingernails look bluish.  You become confused or difficult to wake up.  These symptoms may be an emergency. Get help right away. Call 911.  Do not wait to see if the symptoms will go away.  Do not drive yourself to the hospital.  Summary  Chronic respiratory failure is a long-term breathing problem that may develop over weeks, months, or years. It is usually the body's response to another long-term condition that affects breathing.  This condition may not have clear symptoms when it happens slowly over time and the body gets used to it.  Shortness of breath and trouble breathing are the most common symptoms of this condition.  If you were told to use oxygen therapy at home, do not use more oxygen than you were told. Getting too much oxygen may be harmful when you have this condition.  This information is not intended to replace advice given to you by your health care provider. Make sure you discuss any questions you have with your health care provider.  Document Revised: 02/24/2023 Document Reviewed: 02/24/2023  [employer] Patient Education © 2023 [employer]           Icon List of Attachments     Attached Information  Prednisone Tablets (English)  Prednisone Tablets  What is this medication?  PREDNISONE (PRED ni sone) treats many conditions such as asthma, allergic reactions, arthritis, inflammatory bowel diseases, adrenal, and blood or bone marrow disorders. It works by decreasing inflammation, slowing down an overactive immune system, or replacing cortisol normally made in the body. Cortisol is a hormone that plays an important role in how the body responds to stress, illness, and injury. It belongs to a group of medications called  steroids.  This medicine may be used for other purposes; ask your health care provider or pharmacist if you have questions.  COMMON BRAND NAME(S): Deltasone, Predone, Sterapred, Sterapred DS  What should I tell my care team before I take this medication?  They need to know if you have any of these conditions:  Cushing's syndrome  Diabetes  Glaucoma  Heart disease  High blood pressure  Infection (especially a virus infection such as chickenpox, cold sores, or herpes)  Kidney disease  Liver disease  Mental illness  Myasthenia gravis  Osteoporosis  Seizures  Stomach or intestine problems  Thyroid disease  An unusual or allergic reaction to lactose, prednisone, other medications, foods, dyes, or preservatives  Pregnant or trying to get pregnant  Breast-feeding  How should I use this medication?  Take this medication by mouth with a glass of water. Follow the directions on the prescription label. Take this medication with food. If you are taking this medication once a day, take it in the morning. Do not take more medication than you are told to take. Do not suddenly stop taking your medication because you may develop a severe reaction. Your care team will tell you how much medication to take. If your care team wants you to stop the medication, the dose may be slowly lowered over time to avoid any side effects.  Talk to your care team about the use of this medication in children. Special care may be needed.  Overdosage: If you think you have taken too much of this medicine contact a poison control center or emergency room at once.  NOTE: This medicine is only for you. Do not share this medicine with others.  What if I miss a dose?  If you miss a dose, take it as soon as you can. If it is almost time for your next dose, talk to your care team. You may need to miss a dose or take an extra dose. Do not take double or extra doses without advice.  What may interact with this medication?  Do not take this medication with any of  the following:  Metyrapone  Mifepristone  This medication may also interact with the following:  Aminoglutethimide  Amphotericin B  Aspirin and aspirin-like medications  Barbiturates  Certain medications for diabetes, like glipizide or glyburide  Cholestyramine  Cholinesterase inhibitors  Cyclosporine  Digoxin  Diuretics  Ephedrine  Female hormones, like estrogens and birth control pills  Isoniazid  Ketoconazole  NSAIDS, medications for pain and inflammation, like ibuprofen or naproxen  Phenytoin  Rifampin  Toxoids  Vaccines  Warfarin  This list may not describe all possible interactions. Give your health care provider a list of all the medicines, herbs, non-prescription drugs, or dietary supplements you use. Also tell them if you smoke, drink alcohol, or use illegal drugs. Some items may interact with your medicine.  What should I watch for while using this medication?  Visit your care team for regular checks on your progress. If you are taking this medication over a prolonged period, carry an identification card with your name and address, the type and dose of your medication, and your care team's name and address.  This medication may increase your risk of getting an infection. Tell your care team if you are around anyone with measles or chickenpox, or if you develop sores or blisters that do not heal properly.  If you are going to have surgery, tell your care team that you have taken this medication within the last twelve months.  Ask your care team about your diet. You may need to lower the amount of salt you eat.  This medication may increase blood sugar. Ask your care team if changes in diet or medications are needed if you have diabetes.  What side effects may I notice from receiving this medication?  Side effects that you should report to your care team as soon as possible:  Allergic reactions--skin rash, itching, hives, swelling of the face, lips, tongue, or throat  Cushing syndrome--increased fat around  the midsection, upper back, neck, or face, pink or purple stretch marks on the skin, thinning, fragile skin that easily bruises, unexpected hair growth  High blood sugar (hyperglycemia)--increased thirst or amount of urine, unusual weakness or fatigue, blurry vision  Increase in blood pressure  Infection--fever, chills, cough, sore throat, wounds that don't heal, pain or trouble when passing urine, general feeling of discomfort or being unwell  Low adrenal gland function--nausea, vomiting, loss of appetite, unusual weakness or fatigue, dizziness  Mood and behavior changes--anxiety, nervousness, confusion, hallucinations, irritability, hostility, thoughts of suicide or self-harm, worsening mood, feelings of depression  Stomach bleeding--bloody or black, tar-like stools, vomiting blood or brown material that looks like coffee grounds  Swelling of the ankles, hands, or feet  Side effects that usually do not require medical attention (report to your care team if they continue or are bothersome):  Acne  General discomfort and fatigue  Headache  Increase in appetite  Nausea  Trouble sleeping  Weight gain  This list may not describe all possible side effects. Call your doctor for medical advice about side effects. You may report side effects to FDA at 4-185-FDA-2026.  Where should I keep my medication?  Keep out of the reach of children.  Store at room temperature between 15 and 30 degrees C (59 and 86 degrees F). Protect from light. Keep container tightly closed. Throw away any unused medication after the expiration date.  NOTE: This sheet is a summary. It may not cover all possible information. If you have questions about this medicine, talk to your doctor, pharmacist, or health care provider.  © 2023 [employer]/Gold Standard (2009-02-07 00:00:00)                 Opioid Resource    If you or someone you know needs information on substance abuse, please visit   https://www.findhelpnowky.org/ for listings of facilities and  resources across [place].  Stroke Symptoms    Call 911 or have someone take you to the Emergency Department if you have any of the following:  Sudden numbness or weakness of your face, arm or leg especially on one side of the body  Sudden confusion, difficulty speaking or trouble understanding   Changes in your vision or loss of sight in one eye  Sudden severe headache with no known cause  Sudden dizziness, trouble walking, loss of balance or coordination     It is important to seek emergency care right away if you have further stroke symptoms. If you get emergency help quickly, the powerful clot-dissolving medicines can reduce the disabilities caused by a stroke.      For more information:  American Stroke Association  1-843-1-STROKE  www.strokeassociation.org  Smoking Cessation    IF YOU SMOKE, VAPE OR USE TOBACCO PLEASE READ THE FOLLOWING:  Why is smoking bad for me?  Smoking increases the risk of heart disease, lung disease, vascular disease, stroke, and cancer. If you smoke, STOP!     For more information:  Quit Now [place]  1-800-QUIT-NOW  https://[place].quitlogix.org/en-[place]/  Suicidal Feelings    If you feel like life is too tough and are thinking of suicide or injuring yourself, get help right away!  Call or text [phone] to speak to someone.     Patient Experience    Thank you for choosing [facility]. You may receive a survey following your visit. Please take a moment to share what went well, where we need improvement, and which staff members deserve recognition. We value your input.                            YOU ARE THE MOST IMPORTANT FACTOR IN YOUR RECOVERY.      Follow all instructions carefully.      I have reviewed my discharge instructions with my nurse, including the following information, if applicable:                 Information about my illness and diagnosis              Follow up appointments (including lab draws)              Wound Care              Equipment Needs              Medications  "(new and continuing) along with side effects              Preventative information such as vaccines and smoking cessations              Diet              Pain              I know when to contact my Doctor's office or seek emergency care        I want my nurse to describe the side effects of my medications: YES NO   If the answer is no, I understand the side effects of my medications: YES NO   My nurse described the side effects of my medications in a way that I could understand: YES NO   I have taken my personal belongings and my own medications with me at discharge: YES NO      I have received this information and my questions have been answered. I have discussed any concerns I see with this plan with the nurse or physician. I understand these instructions.     Signature of Patient or Responsible Person: _____________________________________     Date: _________________  Time: __________________     Signature of Healthcare Provider: _______________________________________  Date: _________________  Time: __________________         After Visit Summary    Brenda Munroe  MRN: [id number]    Icon Date   3/8/2022 - 3/11/2022   Icon Location   [facility] PROGRESS CARE     Icon Checklist header      Your Next Steps      Icon do   Do    Icon Checkbox     these medications from [facility]. - [place], [place] - [address] [facility] [phone] [facility] [phone] FX  aspirin      Icon Location   Go    Tonny 2 Follow Up 11:15 AM   Florencia Willett MD  [facility]STRO[facility] UROLOGY   [address] [place] [address]. [address]   [facility] [id number] [phone]   Please bring any related outside labs/imaging on a disc. If insurance has changed, please bring updated information.       [employer] Sign-Up    Send messages to your doctor, view your test results, renew your prescriptions, schedule appointments, and more.     Go to [email or web address] click \"Sign Up Now\", and enter " your personal activation code: [email or web address]. Activation code expires 4/2/2022.      After Visit Summary    Instructions    Icon medication changes this visit             Your medications have changed    Icon medications to start taking   START taking:  aspirin   Start taking on: March 12, 2022      Icon medications to stop taking   STOP taking:  cloNIDine 0.1 MG tablet (CATAPRES)     lisinopril 40 MG tablet (PRINIVIL,ZESTRIL)     nebivolol 10 MG tablet (BYSTOLIC)       Review your updated medication list below.    Your Next Steps  Icon do   Do  Icon Location   Go        COVID-19 Vaccination Information  Why Get Vaccinated?  Building defenses against COVID-19 is a team effort, and you are a pérez part of that team. Getting the COVID-19 vaccine adds one more layer of protection for you, your coworkers, and family. Here are ways you can build people’s confidence in the COVID-19 vaccines in your community and at home.     Get vaccinated and enroll in the v-safe text messaging program to help CDC monitor vaccine safety.   Tell others why you are getting vaccinated and encourage them to get vaccinated. Share your success story.    Learn how to have conversations about COVID-19 vaccine with coworkers, family, and friends.  https://www.cdc.gov/coronavirus/2019-ncov/vaccines/index.html     How do I schedule an appointment for a vaccine?  https://www.vaccines.gov/ helps you find locations that carry COVID-19 vaccines and their contact information. Because every location handles appointments differently, you will need to schedule your appointment directly with the location you choose.          If you have any questions about your recovery, please call the [facility] Nurse Call Center at [phone]. A registered nurse is available 24 hours a day 7 days a week to assist you.   If you have any COVID-19 related questions, please call [phone].                            What's Next    What's Next          Follow  up with Bernadette Arevalo MD in 1 week(s) [address]   [facility] [address]  [phone]          Follow up with Soni Culp MD in 1 week(s) [address]   [address]   [facility] [id number] [phone]          Follow up with Jose Brooks MD in 1 month(s)  F/U severe LOI [address]   [address]   [facility] [address]  [phone]    Follow Up with Florencia Willett MD   11:15 AM   Please bring any related outside labs/imaging on a disc. If insurance has changed, please bring updated information. [facility] GASTROENTEROLOGY & UROLOGY  [address] [facility]. [address]   [facility] [address] [phone]             Your Allergies  Date Reviewed: 3/8/2022  Your Allergies   Allergen Reactions   Latex Other (See Comments)   ?         Patient Belongings Returned      Document Return of Belongings Flowsheet    Were the patient bedside belongings sent home? --   Medications Retrieved from Pharmacy & Sent Home --   Belongings Sent to Safe --   Belongings sent with: --   Belongings Retrieved from Security & Sent Home --           [employer] Signup    [facility] [employer]ello App allows you to send messages to your doctor, view your test results, renew your prescriptions, schedule appointments, and more. To sign up, go to [employer] and click on the Sign Up Now link in the New User? box. Enter your [employer] App Activation Code exactly as it appears below along with the last four digits of your Social Security Number and your Date of Birth () to complete the sign-up process. If you do not sign up before the expiration date, you must request a new code.     V[employer] App Activation Code: [email or web address]  Expires: 2022 10:49 AM     If you have questions, you can email [email or web address] or call [phone] to talk to our [employer] staff. Remember, [employer] is NOT to be used for urgent needs. For medical emergencies, dial 911.       Medication  List    Medication List     Morning Afternoon Evening Bedtime As Needed    acetaminophen 500 MG tablet  Commonly known as: TYLENOL  Take 500 mg by mouth Every 4 (Four) Hours As Needed for Mild Pain .         amLODIPine 10 MG tablet  Commonly known as: NORVASC  Take 10 mg by mouth Daily.         Anoro Ellipta 62.5-25 MCG/INH aerosol powder  inhaler  Inhale 1 puff Daily.  Generic drug: umeclidinium-vilanterol         apixaban 5 MG tablet tablet  Commonly known as: ELIQUIS  Take 5 mg by mouth 2 (Two) Times a Day.        Icon medications to start taking   aspirin 81 MG EC tablet  Start taking on: March 12, 2022  Take 1 tablet by mouth Daily for 30 days.  Last time this was given: 81 mg on March 11, 2022  8:28 AM         baclofen 10 MG tablet  Commonly known as: LIORESAL  Take 5 mg by mouth 2 (Two) Times a Day. Indications: Muscle Spasticity  For: Muscle Spasticity         busPIRone 15 MG tablet  Commonly known as: BUSPAR  Take 15 mg by mouth 3 (Three) Times a Day.         donepezil 10 MG tablet  Commonly known as: ARICEPT  Take 10 mg by mouth Daily.  Last time this was given: 10 mg on March 11, 2022  8:28 AM         levothyroxine 75 MCG tablet  Commonly known as: SYNTHROID, LEVOTHROID  Take 75 mcg by mouth Daily.  Last time this was given: 75 mcg on March 11, 2022  8:28 AM         Lidocaine 4 % patch  Apply 1 patch topically Daily. Prior to [facility] Admission, Patient was on: left shoulder         memantine 10 MG tablet  Commonly known as: NAMENDA  Take 10 mg by mouth 2 (Two) Times a Day.  Last time this was given: 10 mg on March 11, 2022  8:28 AM         rosuvastatin 40 MG tablet  Commonly known as: CRESTOR  Take 40 mg by mouth Daily.  Last time this was given: 40 mg on March 11, 2022  8:28 AM         senna 8.6 MG tablet  Commonly known as: SENOKOT  Take 2 tablets by mouth 2 (Two) Times a Day.  Last time this was given: 2 tablets on March 9, 2022  8:09 AM         sertraline 100 MG tablet  Commonly known as: ZOLOFT  Take  100 mg by mouth Daily.  Last time this was given: 100 mg on March 11, 2022  8:28 AM         trolamine salicylate 10 % cream  Commonly known as: ASPERCREME  Apply 1 application topically to the appropriate area as directed 2 (Two) Times a Day As Needed for Muscle / Joint Pain. Prior to [facility] Admission, Patient was on: bilateral hands         Vitamin D3 1.25 MG (87958 UT) capsule  Take 50,000 Units by mouth Every 7 (Seven) Days.  Last time this was given: 50,000 Units on March 8, 2022  9:35 PM            Where to  your medications    [facility] medication  information                     these medications at [facility]. - [place], [place] - [address] [facility] - [phone]  - [phone] FX    aspirin    Address: [address] [facility] [address]   Phone: [phone]           Always carry an updated list of your medications with you. If there is an emergency, a responder can quickly see what medications you are taking. Take this paperwork with you the next time you see your health care provider.              MyChart Sign-Up      Instructions    Please take medications as prescribed. Please go to follow-up appointments as recommended. Please seek medical attention if you have dizziness, syncope, chest pain, shortness of breath.      Please continue social distancing and isolation efforts as recommended by CDC and [facility] to help prevent spread of COVID-19.      Additional Instructions    Click to add instructions                      Opioid Resource    If you or someone you know needs information on substance abuse, please visit   [email or web address].findhelpnowky.[email or web address] for listings of facilities and resources across [place].    Stroke Symptoms    Call 911 or have someone take you to the Emergency Department if you have any of the following:  Sudden numbness or weakness of your face, arm or leg especially on one side of the body  Sudden confusion, difficulty speaking or trouble understanding    Changes in your vision or loss of sight in one eye  Sudden severe headache with no known cause  Sudden dizziness, trouble walking, loss of balance or coordination     It is important to seek emergency care right away if you have further stroke symptoms. If you get emergency help quickly, the powerful clot-dissolving medicines can reduce the disabilities caused by a stroke.      For more information:  American Stroke Association  5-838-5-STROKE  www.strokeassociation.org    Smoking Cessation    IF YOU SMOKE OR USE TOBACCO PLEASE READ THE FOLLOWING:  Why is smoking bad for me?  Smoking increases the risk of heart disease, lung disease, vascular disease, stroke, and cancer. If you smoke, STOP!     For more information:  Quit Now [place]  1-800-QUIT-NOW  https://[facility].Access MediQuiplogPocketbook.org/en-US/    Suicidal Feelings    If you feel like life is too tough and are thinking of suicide or injuring yourself, get help right away!  Call 911  Call a suicide hotline to speak to a counselor. 2-118-844-TALK or 7-702-IQOHQLE       Patient Experience    Thank you for choosing [facility]. You may receive a survey following your visit. Please take a moment to share what went well, where we need improvement, and which staff members deserve recognition. We value your input.                              YOU ARE THE MOST IMPORTANT FACTOR IN YOUR RECOVERY.      Follow all instructions carefully.      I have reviewed my discharge instructions with my nurse, including the following information, if applicable:                 Information about my illness and diagnosis              Follow up appointments (including lab draws)              Wound Care              Equipment Needs              Medications (new and continuing) along with side effects              Preventative information such as vaccines and smoking cessations              Diet              Pain              I know when to contact my Doctor's office or seek emergency care         I want my nurse to describe the side effects of my medications: YES NO   If the answer is no, I understand the side effects of my medications: YES NO   My nurse described the side effects of my medications in a way that I could understand: YES NO   I have taken my personal belongings and my own medications with me at discharge: YES NO      I have received this information and my questions have been answered. I have discussed any concerns I see with this plan with the nurse or physician. I understand these instructions.     Signature of Patient or Responsible Person: _____________________________________     Date: _________________  Time: __________________     Signature of Healthcare Provider: _______________________________________  Date: _________________  Time: __________________     Discharge Summary    No notes of this type exist for this encounter.       Discharge Order (From admission, onward)       Start     Ordered    01/11/24 1130  Discharge patient  Once        Expected Discharge Date: 01/11/24   Expected Discharge Time: Morning   Discharge Disposition: Skilled Nursing Facility (DC - External)   Physician of Record for Attribution - Please select from Treatment Team: MANJINDER LEGGETT [[id number]]   Review needed by CMO to determine Physician of Record: No      Question Answer Comment   Physician of Record for Attribution - Please select from Treatment Team MANJINDER LEGGETT    Review needed by CMO to determine Physician of Record No        01/11/24 1137

## 2024-01-11 NOTE — DISCHARGE PLACEMENT REQUEST
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 Medications   Medication Dose Route Frequency Provider Last Rate Last Admin    acetaminophen (TYLENOL) tablet 650 mg  650 mg Oral Q6H PRN Darlene Pavon APRN        Or    acetaminophen (TYLENOL) suppository 650 mg  650 mg Rectal Q4H PRN Darlene Pavon APRN        apixaban (ELIQUIS) tablet 5 mg  5 mg Oral Q12H Sukh Montez DO   5 mg at 01/11/24 0858    baclofen (LIORESAL) tablet 5 mg  5 mg Oral BID Sukh Montez DO   5 mg at 01/11/24 0857    sennosides-docusate (PERICOLACE) 8.6-50 MG per tablet 2 tablet  2 tablet Oral BID Sukh Montez DO   2 tablet at 01/10/24 2007    And    polyethylene glycol (MIRALAX) packet 17 g  17 g Oral Daily PRN Sukh Montez DO        And    bisacodyl (DULCOLAX) EC tablet 5 mg  5 mg Oral Daily PRN Sukh Montez DO        And    bisacodyl (DULCOLAX) suppository 10 mg  10 mg Rectal Daily PRN Melida, Sukh, DO        budesonide-formoterol (SYMBICORT) 160-4.5 MCG/ACT inhaler 2 puff  2 puff Inhalation BID - RT Darlene Pavon APRN   2 puff at 01/11/24 0609    And    tiotropium (SPIRIVA RESPIMAT) 2.5 mcg/act aerosol solution inhaler  2 puff Inhalation Daily - RT  Darlene Pavon APRN   2 puff at 01/11/24 0609    carboxymethylcellulose (REFRESH PLUS) 0.5 % ophthalmic solution 2 drop  2 drop Both Eyes Q2H PRN Sukh Montez DO        clopidogrel (PLAVIX) tablet 75 mg  75 mg Oral Daily Sukh Montez DO   75 mg at 01/11/24 0857    dextrose (D50W) (25 g/50 mL) IV injection 25 g  25 g Intravenous Q15 Min PRN Darlene Pavon APRN        dextrose (GLUTOSE) oral gel 15 g  15 g Oral Q15 Min PRN Darlene Pavon APRN        donepezil (ARICEPT) tablet 10 mg  10 mg Oral Nightly Sukh Montez DO   10 mg at 01/10/24 2007    ferrous sulfate tablet 325 mg  325 mg Oral Daily With Breakfast Sukh Montez DO   325 mg at 01/11/24 0857    glucagon HCl (Diagnostic) injection 1 mg  1 mg Intramuscular Q15 Min PRN Darlene Pavon APRN        guaiFENesin tablet 400 mg  400 mg Oral Q4H PRN Sukh Montez DO        hydrALAZINE (APRESOLINE) injection 10 mg  10 mg Intravenous Q6H PRN Clarence Gomez MD        Insulin Lispro (humaLOG) injection 2-7 Units  2-7 Units Subcutaneous 4x Daily AC & at Bedtime Darlene Pavon APRN   2 Units at 01/11/24 1132    ipratropium (ATROVENT) nebulizer solution 0.5 mg  0.5 mg Nebulization 4x Daily - RT Clarence Gomez MD   0.5 mg at 01/11/24 0609    isosorbide mononitrate (IMDUR) 24 hr tablet 30 mg  30 mg Oral Daily SubramaniyamTab MD   30 mg at 01/11/24 0857    lamoTRIgine (LaMICtal) tablet 100 mg  100 mg Oral Daily Sukh Montez DO   100 mg at 01/11/24 0857    lamoTRIgine (LaMICtal) tablet 25 mg  25 mg Oral Nightly Sukh Montez DO   25 mg at 01/10/24 2007    levothyroxine (SYNTHROID, LEVOTHROID) tablet 75 mcg  75 mcg Oral Daily Sukh Montez DO   75 mcg at 01/11/24 0857    [Held by provider] lisinopril (PRINIVIL,ZESTRIL) tablet 10 mg  10 mg Oral Q24H Sukh Montez DO   10 mg at 01/05/24 0906    LORazepam (ATIVAN) tablet 0.5 mg  0.5 mg Oral Q12H PRN Sukh Montez DO   0.5 mg at 01/04/24 2105    Magnesium Standard Dose Replacement - Follow Nurse  / BPA Driven Protocol   Does not apply PRN Darlene Pavon APRN        memantine (NAMENDA) tablet 10 mg  10 mg Oral Q12H Sukh Montez DO   10 mg at 01/11/24 0857    metoprolol tartrate (LOPRESSOR) injection 2.5 mg  2.5 mg Intravenous Q6H PRN Clarence Gomez MD   2.5 mg at 01/06/24 1843    metoprolol tartrate (LOPRESSOR) tablet 50 mg  50 mg Oral Q12H Carlos Dudley DO   50 mg at 01/11/24 0857    nitroglycerin (NITROSTAT) SL tablet 0.4 mg  0.4 mg Sublingual Q5 Min PRN Sukh Montez DO        ondansetron (ZOFRAN) injection 4 mg  4 mg Intravenous Q6H PRN Carlos Dudley DO   4 mg at 01/09/24 0735    pantoprazole (PROTONIX) EC tablet 40 mg  40 mg Oral Daily Sukh Montez DO   40 mg at 01/11/24 0858    phenol (CHLORASEPTIC) 1.4 % liquid 1 spray  1 spray Mouth/Throat Q2H PRN Sukh Montez DO        Potassium Replacement - Follow Nurse / BPA Driven Protocol   Does not apply PRN Darlene Pavon APRN        predniSONE (DELTASONE) tablet 40 mg  40 mg Oral Daily With Breakfast Carlos Dudley DO   40 mg at 01/11/24 0857    risperiDONE (risperDAL) tablet 0.5 mg  0.5 mg Oral BID Sukh Montez DO   0.5 mg at 01/11/24 0857    rosuvastatin (CRESTOR) tablet 40 mg  40 mg Oral Q PM Sukh Montez DO   40 mg at 01/10/24 1744    senna (SENOKOT) tablet 1 tablet  1 tablet Oral Daily PRN Sukh Montez DO        sertraline (ZOLOFT) tablet 100 mg  100 mg Oral Daily Sukh Montez DO   100 mg at 01/11/24 0857    sodium chloride 0.9 % flush 10 mL  10 mL Intravenous PRN Oziel Ramos PA-C        sodium chloride 0.9 % flush 10 mL  10 mL Intravenous Q12H Sukh Montez DO   10 mL at 01/11/24 0858    sodium chloride 0.9 % flush 10 mL  10 mL Intravenous PRN Sukh Montez DO        sodium chloride 0.9 % flush 10 mL  10 mL Intravenous Q12H Sukh Montez DO   10 mL at 01/11/24 0858    sodium chloride 0.9 % flush 10 mL  10 mL Intravenous PRN Sukh Montez DO        sodium chloride 0.9 % infusion 40  mL  40 mL Intravenous PRN Sukh Montez DO        sodium chloride 0.9 % infusion 40 mL  40 mL Intravenous PRN Sukh Montez DO        sodium chloride 0.9 % infusion  75 mL/hr Intravenous Continuous Carlos Dudley DO 75 mL/hr at 01/10/24 1859 75 mL/hr at 01/10/24 185    Vitamin D3 50,000 Units  50,000 Units Oral Q7 Days Sukh Montez DO   50,000 Units at 24 0907        Physician Progress Notes (most recent note)        Carlos Dudley DO at 01/10/24 1839              [facility]     Patient Identification:  Name:  Brenda Munroe  Age:  73 y.o.  Sex:  female  :  1950  MRN:  [id number]  Visit Number:  [id number]  Primary Care Provider:  Bernadette Arevalo MD    Length of stay:  9    Chief complaint: None    Subjective:    Patient was asleep when entered the room but easily awakened.  Patient reports her abdominal discomfort is much improved today and she has had no nausea, vomiting or diarrhea.  Note is made of multiple sinus pauses on telemetry, the greatest of which was 2.5 seconds.  Have discussed this with cardiology services and have discontinued Cardizem.  Will continue metoprolol as well as Eliquis and monitor for another 24 hours.  ----------------------------------------------------------------------------------------------------------------------  Current [facility] Meds:  apixaban, 5 mg, Oral, Q12H  baclofen, 5 mg, Oral, BID  budesonide-formoterol, 2 puff, Inhalation, BID - RT   And  tiotropium bromide monohydrate, 2 puff, Inhalation, Daily - RT  clopidogrel, 75 mg, Oral, Daily  donepezil, 10 mg, Oral, Nightly  ferrous sulfate, 325 mg, Oral, Daily With Breakfast  insulin lispro, 2-7 Units, Subcutaneous, 4x Daily AC & at Bedtime  ipratropium, 0.5 mg, Nebulization, 4x Daily - RT  isosorbide mononitrate, 30 mg, Oral, Daily  lamoTRIgine, 100 mg, Oral, Daily  lamoTRIgine, 25 mg, Oral, Nightly  levothyroxine, 75 mcg, Oral, Daily  [Held by  provider] lisinopril, 10 mg, Oral, Q24H  memantine, 10 mg, Oral, Q12H  metoprolol tartrate, 50 mg, Oral, Q12H  pantoprazole, 40 mg, Oral, Daily  predniSONE, 40 mg, Oral, Daily With Breakfast  risperiDONE, 0.5 mg, Oral, BID  rosuvastatin, 40 mg, Oral, Q PM  senna-docusate sodium, 2 tablet, Oral, BID  sertraline, 100 mg, Oral, Daily  sodium chloride, 10 mL, Intravenous, Q12H  sodium chloride, 10 mL, Intravenous, Q12H  Vitamin D3, 50,000 Units, Oral, Q7 Days      sodium chloride, 75 mL/hr, Last Rate: 75 mL/hr (01/09/24 1144)      ----------------------------------------------------------------------------------------------------------------------  Vital Signs:  Temp:  [97.7 °F (36.5 °C)-99 °F (37.2 °C)] 98 °F (36.7 °C)  Heart Rate:  [] 109  Resp:  [18-20] 18  BP: ()/(52-79) 105/72      01/07/24  0500 01/09/24  0500 01/10/24  0400   Weight: 73.8 kg (162 lb 10.4 oz) 73.3 kg (161 lb 11.2 oz) 74.1 kg (163 lb 6.4 oz)     Body mass index is 27.19 kg/m².    Intake/Output Summary (Last 24 hours) at 1/10/2024 1839  Last data filed at 1/10/2024 1700  Gross per 24 hour   Intake 960 ml   Output --   Net 960 ml     Diet: Regular/House Diet; Texture: Soft to Chew (NDD 3); Soft to Chew: Chopped Meat; Fluid Consistency: Thin (IDDSI 0)  ----------------------------------------------------------------------------------------------------------------------  Physical exam:  Constitutional: Well-nourished  female in no apparent distress.     HENT:  Head:  Normocephalic and atraumatic.  Mouth:  Moist mucous membranes.    Eyes:  Conjunctivae and EOM are normal.  Pupils are equal, round, and reactive to light.  No scleral icterus.    Neck:  Neck supple. No thyromegaly.  No JVD present.    Cardiovascular:  Regular rate and rhythm with no murmurs, rubs, clicks or gallops appreciated.  Pulmonary/Chest:  Clear to auscultation bilaterally with no crackles, wheezes or rhonchi appreciated.  Abdominal:  Soft.  Mildly tender to  palpation, nondistended  Bowel sounds are hypoactive in all four quadrants. No organomegally appreciated.   Musculoskeletal:  No edema, no tenderness, and no deformity.  No red or swollen joints anywhere.    Neurological:  Alert, Cranial nerves II-XII intact with no focal deficits.  No facial droop.  No slurred speech.   Skin:  Warm and dry to palpation with no rashes or lesions appreciated.  Peripheral vascular:  2+ radial and pedal pulses in bilateral upper and lower extremities.  ------------------------------------------------------------------------------------------------------------  ----------------------------------------------------------------------------------------------------------------------      Results from last 7 days   Lab Units 01/10/24  0354 01/09/24  0118 01/08/24  0243 01/07/24  0757 01/07/24  0310 01/06/24  0315   CRP mg/dL  --   --  <0.30  --  <0.30 <0.30   WBC 10*3/mm3 7.66 6.69 7.69   < >  --  7.00   HEMOGLOBIN g/dL 11.4* 10.7* 11.0*   < >  --  11.2*   HEMATOCRIT % 39.9 36.2 38.2   < >  --  37.7   MCV fL 96.6 94.8 94.3   < >  --  92.6   MCHC g/dL 28.6* 29.6* 28.8*   < >  --  29.7*   PLATELETS 10*3/mm3 135* 141 135*   < >  --  137*    < > = values in this interval not displayed.     Results from last 7 days   Lab Units 01/04/24  1014   PH, ARTERIAL pH units 7.433   PO2 ART mm Hg 76.5*   PCO2, ARTERIAL mm Hg 51.1*   HCO3 ART mmol/L 34.1*     Results from last 7 days   Lab Units 01/10/24  0354 01/09/24  0118 01/08/24  0243 01/05/24  0222 01/04/24  1747   SODIUM mmol/L 145 140 142   < >  --    POTASSIUM mmol/L 4.7 4.6 4.2   < > 3.8   MAGNESIUM mg/dL  --   --   --   --  1.9   CHLORIDE mmol/L 111* 104 106   < >  --    CO2 mmol/L 28.4 32.8* 32.2*   < >  --    BUN mg/dL 22 24* 34*   < >  --    CREATININE mg/dL 1.00 0.99 0.95   < >  --    CALCIUM mg/dL 8.1* 8.7 8.8   < >  --    GLUCOSE mg/dL 95 90 106*   < >  --     < > = values in this interval not displayed.   Estimated Creatinine Clearance: 49  "mL/min (by C-G formula based on SCr of 1 mg/dL).    No results found for: \"AMMONIA\"      No results found for: \"BLOODCX\"  No results found for: \"URINECX\"  No results found for: \"WOUNDCX\"  No results found for: \"STOOLCX\"    I have personally looked at the labs and they are summarized above.  ----------------------------------------------------------------------------------------------------------------------  Imaging Results (Last 24 Hours)       ** No results found for the last 24 hours. **          ----------------------------------------------------------------------------------------------------------------------  Assessment and Plan:    Acute metabolic encephalopathy -much improved, patient reportedly back at baseline mentation.    2.  Enteritis/colitis -nausea/vomiting have resolved    3.  Acute hypercapnic on chronic hypoxic/hypercapnic respiratory failure -acute phase resolved, secondary to COPD exacerbation.    4.  COPD exacerbation - Continue oral prednisone and continue nebulizer treatments while admitted    5.  Paroxysmal A-fib -patient did have multiple sinus pauses overnight, have discontinued Cardizem and will continue metoprolol as well as Eliquis and monitor overnight.    6.  Essential hypertension -well controlled    7.  Hyperlipidemia -statin    8.  Acute kidney injury -resolved    9.  Hypothyroidism -Synthroid    Disposition possible discharge tomorrow    Carlos Dudley DO   01/10/24   18:39 EST       Electronically signed by Carlos Dudley DO at 01/10/24 1840          Consult Notes (most recent note)        Jasmina Alfonso MD at 01/06/24 1715        Consult Orders    1. Inpatient Cardiology Consult [[id number]] ordered by Clarence Gomez MD                     [facility]  CONSULT  NOTE      Patient information:  Date of Admit: 1/1/2024  Date of Consult: 01/06/24  Hospitalist/Referring MD:Sukh Montez DO;   PCP: Bernadette Arevalo " MD AGUILAR  MRN:  [id number]  Visit Number:  [id number]    LOS: 5  CODE STATUS:  Code Status and Medical Interventions:   Ordered at: 01/01/24 1500     Code Status (Patient has no pulse and is not breathing):    CPR (Attempt to Resuscitate)     Medical Interventions (Patient has pulse or is breathing):    Full Support     Comments:    Per review of ACP documentation on file.       PROBLEM LIST: Principal Problem:    Acute hypercapnic respiratory failure      Inpatient Cardiology Consult  Consult performed by: Radha Meraz APRN  Consult ordered by: Clarence Gomez MD        17:15 EST  1/6/2024    Interventional Cardiology Consulting Physician: Dr. Jasmina Alfonso MD [facility], Interventional Cardiology       Assessment    Paroxysmal atrial fibrillation  Chronic anticoagulation  3.  Hypothyroidism  4.  Hypertension  5.  Hyperlipidemia  6.  Hypokalemia          Recommendations/ Plan   1.  Continue Crestor  2.  Continue Eliquis for anticoagulation  3.  Continue Norvasc for blood pressure management  4.  Potassium replacement by primary team  5.  Continue Synthroid, recent TSH was normal        Reason for Cardiology consultation: Atrial fibrillation     Subjective Data   ADMISSION INFORMATION:  Chief Complaint   Patient presents with    Altered Mental Status     History of Present Illness    Brenda Munroe is a 73 y.o. female with a past medical history significant for   COPD, chronic hypoxic and hypercapnic respiratory failure, paroxysmal atrial fibrillation, hypothyroidism, arthritis, essential hypertension, hyperlipidemia, chronic headaches, type II DM, bilateral carotid stenosis without occlusion, depression, history of psychosis, dementia, anxiety, history of Bell's palsy, CAD, physical debility, PUD, and GERD.  Patient presented to [facility] emergency department via EMS from local skilled nursing facility due to altered mental status.  No family members were present at bedside.  Patient was on BiPAP at the  time of my exam.  Due to acuity of condition and dementia, patient was unable to provide details.  Information obtained from chart review and ED report.  Patient was lying on ED stretcher at the time of my exam.  Appeared restless.  Moving all extremities equally. Opens eyes to verbal stimuli.  Pupils appear pinpoint and sluggishly reactive/equal. Auscultation of lung sounds revealed expiratory wheezing in the left upper lobe. Decreased air movement. ABG obtained on arrival revealed acidotic pH with hypercapnia (PCO2 84.5). ABG repeated 2 hours after BiPAP placement; revealed improvement. PCO2 now 67.6. pH normalized. Patient will be admitted to the PCU for further monitoring, evaluation, and treatment.      Upon arrival to the ED, vital signs were temperature 98.5, pulse 59, respirations 20, blood pressure 144/75, SpO2 saturation 98% on 2 L nasal cannula.  ABG obtained on arrival noted pH 7.328, pCO2 84.5, pO2 76.2, HCO3 44.3, O2 saturation 95.4% on 2 L nasal cannula.  Patient placed on BiPAP.  Repeat ABG revealed pH 7.424, pCO2 67.6, pO2 60.9, HCO3 44.2, O2 saturation 92.8% on BiPAP.  High-sensitivity troponin T 26 with -3 delta.  CMP with chloride 96, CO2 44.3, anion gap 2.7, glucose 103, total protein 5.6, otherwise unremarkable.  TSH within normal limits.  CRP negative.  CBC with hemoglobin 10.1, platelets 135, RDW 22.4, MCHC 28.0, otherwise unremarkable.  Urinalysis with trace protein, otherwise unremarkable.  COVID-19 and flu A/B swab negative.  Chest x-ray noted mild interstitial coarsening, improved in the interval.  No acute cardiopulmonary process.  CT of the head without contrast noted no acute intracranial hemorrhage, midline shift, or significant mass effect.     Known Emergency Department medications received prior to my evaluation included DuoNeb, 1 mg IV Ativan, 80 mg IV Solu-Medrol. Emergency Department Room location at the time of my evaluation was 404.  Discussed with admitting physician,   Sukh Montez DO.     Cardiology has been consulted for further evaluation and management.     Primary Cardiologist has been Dr. Culp and she was last seen in the office on 6/23/2023.     Patient is in room 322 when she was seen and examined by Dr. Alfonso.  Patient resting comfortably in bed and denies any acute complaint.  She denies complaint of chest pain, palpitations or dyspnea.  Oxygen via nasal cannula.  No family at bedside.  Per nursing notes no new events overnight.  Vital signs this a.m. 108/63 and heart rate 118.  Potassium 3.4, creatinine 1.3 slightly elevated from 1.0, hemoglobin 11.2.      Cardiac risk factors:hypercholesterolemia and hypertension      Last Echo: Results for orders placed during the hospital encounter of 01/01/24    Adult Transthoracic Echo Limited W/ Cont if Necessary Per Protocol    Interpretation Summary    Normal left ventricular cavity size and wall thickness noted. All left ventricular wall segments contract normally.    Left ventricular ejection fraction appears to be 61 - 65%.    There is a trivial pericardial effusion adjacent to the left ventricle.         Last Stress: Results for orders placed during the hospital encounter of 03/17/22    Stress test with myocardial perfusion one day    Interpretation Summary  · Findings consistent with a normal ECG stress test.  · Myocardial perfusion imaging study showed a small sized severe reversible ischemia in the mid lateral and inferolateral segments..  · Left ventricular ejection fraction is hyperdynamic (Calculated EF > 70%). .  · Normal LV cavity size. Normal LV wall motion noted.  · Impressions are consistent with an intermediate risk study.        Last Cath: Results for orders placed during the hospital encounter of 02/18/23    Cardiac Catheterization/Vascular Study    Narrative  Images from the original result were not included.  CARDIAC CATHETERIZATION / INTERVENTION REPORT    DATE OF PROCEDURE: 2/21/2023    INDICATION FOR  PROCEDURE: NSTEMI      PRE PROCEDURE DIAGNOSIS:    Clinical frailty: 2- Well    ASA: 2=2- Mild to moderate systemic disease, medially well controlled, with no functional limitation    Mallampati: Class 2=2- Able to visualize the soft palate, fauces, uvula.  The anterior & posterior tonsilar pillars are hidden by the tongue.    POST PROCEDURE DIAGNOSIS:  CAD with patent LIMA graft ( well matured - no stenosis and supplies LAD and excellent collateral to RCA) Lcx mid had old dissection calcified 95% stenosis and two SVG grafts are chronically occluded    Face to face moderate conscious sedation time:      COMPLICATIONS : None    Specimens collected : None        PROCEDURE PERFORMED:    1. Selective right and left coronary Angiogram  2. Left heart catheterization  3, Selective LIMA angiogram  4. Selective SVG- RCA and OM angiography      PROCEDURE COMMENTS:    Prior to the procedure risks benefits alternatives and possible adverse events were discussed with the patient and informed consent was obtained.  Brenda Munroe was brought to the cath lab and placed on the cardiac catherization table in the postabsortive state. The patient was prepped and draped according to the cath lab protocol under strict aseptic and sterile condition. Patient's right femoral artery sight was prepped and draped. Under fluoroscopic guidance the right femoral artery was punctured using a Micropuncture gauge needle utilizing the modified Seldinger technique. 6 German sheath was introduced over a wire. After aspirating for blood it was flushed with heparinized saline. Angio was performed to confirm the position of the sheath in  R CFA    We advanced Heraclio type diagnostic catheters over the wire. The  left and right coronary arteries  were selectively engaged. Left and right coronary angiogram were obtained in multiple orthogonal projections. 5 Russian Pigtail catheter was used to cross across the AV retrograde into the LV cavity and resting LV  pressures were recorded. Manual pull back was used to record gradient across the Aortic valve.    After completion of the procedure the femoral sheath was removed in the cath lab and hemostasis was achieved by manual compression. The patient tolerated the procedure without complications.      Coronary anatomy findings:    Patient's native left main had no stenosis, LAD was  in the proximal, there is a vein graft that appears to be attached to a diagonal artery which has been chronically occluded with no other significant diagonals visualized.  The patient is native left circumflex artery appeared to be a large caliber vessel, there was a significant 99% calcified or dissected stenosis in the mid circumflex and beyond the stenosis that supplies 3 moderate-sized OM branches.  This vessel is not grafted.  The native RCA , there is 2 vein grafts which appear to be chronically occluded at this time, 1 possibly going to the RCA underwent likely going either to the circumflex OM or diagonal.  The LIMA graft was well matured with no significant stenosis    Large distal LAD notes excellent collaterals filling the RCA.            EBL: Less than 10cc        Final Impression:  Patient's angiogram did not demonstrate any acute thrombotic lesions that would explain for type I non-STEMI, her non-STEMI is likely a type II from her hypoxia in the setting of her older chronic lesion.  The mid circumflex stenosis is a very complex calcified old dissected lesion, stenosis MAYO-3 flow I would recommend only medical management at this time.  If the patient ever needs intervention estimated atherectomy based PCI.  Since patient denies any anginal symptoms I would favor to continue with current identity medical management for CAD.                  Tab Cifuentes MD, [facility]  Interventional Cardiology    02/21/23  14:29 EST                            Past Medical History:   Diagnosis Date    Anxiety     Arthritis     Bell's palsy      Bladder prolapse, female, acquired     Carotid stenosis     COPD (chronic obstructive pulmonary disease)     COPD (chronic obstructive pulmonary disease)     Coronary artery disease     Dementia     Dementia     Depression     Difficulty walking     Disease of thyroid gland     Frequency of urination     GERD (gastroesophageal reflux disease)     Heart attack     Hyperlipidemia     Hypertension     Irregular heart beat     Peptic ulcer disease      Past Surgical History:   Procedure Laterality Date    CARDIAC CATHETERIZATION N/A 2/21/2023    Procedure: Left Heart Cath;  Surgeon: Tab Cifuentes MD;  Location: [facility] CATH INVASIVE LOCATION;  Service: Cardiology;  Laterality: N/A;    CARDIAC SURGERY      open heart  in 1999    CYSTOSCOPY N/A 11/8/2017    Procedure: CYSTOSCOPY;  Surgeon: Karl Nicolas DO;  Location: [facility] OR;  Service:     OTHER SURGICAL HISTORY      states she had fluid drained no surgery    VAGINAL HYSTERECTOMY W/ ANTERIOR AND POSTERIOR VAGINAL REPAIR N/A 11/8/2017    Procedure: VAGINAL HYSTERECTOMY WITH ANTERIOR, POSTERIOR, AND ENTEROCELE VAGINAL REPAIR;  Surgeon: Karl Nicolas DO;  Location: [facility] OR;  Service:     VAGINAL VAULT SUSPENSION N/A 11/8/2017    Procedure: VAGINAL VAULT SUSPENSION ;  Surgeon: Karl Nicolas DO;  Location: [facility] OR;  Service:      Family History   Problem Relation Age of Onset    Dementia Sister     Heart attack Mother     Tuberculosis Father     Breast cancer Neg Hx      Social History     Tobacco Use    Smoking status: Every Day     Packs/day: 0.50     Years: 55.00     Additional pack years: 0.00     Total pack years: 27.50     Types: Cigarettes    Smokeless tobacco: Never   Vaping Use    Vaping Use: Never used   Substance Use Topics    Alcohol use: No    Drug use: No       ALLERGIES: Patient has no known allergies.    Medications listed below are reported home medications pulling from within the system:  Medications Prior to  Admission   Medication Sig Dispense Refill Last Dose    amLODIPine (NORVASC) 10 MG tablet Take 1 tablet by mouth Every Night.   12/31/2023 at 2000    apixaban (ELIQUIS) 5 MG tablet tablet Take 1 tablet by mouth 2 (Two) Times a Day.   1/1/2024 at 0800    baclofen (LIORESAL) 10 MG tablet Take 0.5 tablets by mouth 2 (Two) Times a Day. Indications: Muscle Spasticity   1/1/2024 at 0800    clopidogrel (PLAVIX) 75 MG tablet Take 1 tablet by mouth Daily. 30 tablet  1/1/2024 at 0800    donepezil (ARICEPT) 10 MG tablet Take 1 tablet by mouth Every Night.   12/31/2023 at 2000    ferrous sulfate 325 (65 FE) MG tablet Take 1 tablet by mouth 2 (Two) Times a Day. 60 tablet 5 1/1/2024 at 0800    isosorbide mononitrate (IMDUR) 30 MG 24 hr tablet Take 3 tablets by mouth Daily.   1/1/2024 at 0800    lamoTRIgine (LaMICtal) 100 MG tablet Take 1 tablet by mouth Daily.   1/1/2024 at 0800    lamoTRIgine (LaMICtal) 25 MG tablet Take 1 tablet by mouth Every Night.   12/31/2023 at 2000    levothyroxine (SYNTHROID, LEVOTHROID) 75 MCG tablet Take 1 tablet by mouth Daily.   1/1/2024 at 0600    lisinopril (PRINIVIL,ZESTRIL) 10 MG tablet Take 1 tablet by mouth Daily.   1/1/2024 at 0800    LORazepam (ATIVAN) 0.5 MG tablet Take 1 tablet by mouth Every 12 (Twelve) Hours As Needed for Anxiety.   1/1/2024 at 0704    memantine (NAMENDA) 10 MG tablet Take 1 tablet by mouth 2 (Two) Times a Day.   1/1/2024 at 0800    metoprolol succinate XL (TOPROL-XL) 25 MG 24 hr tablet Take 0.5 tablets by mouth Daily.   1/1/2024 at 0800    pantoprazole (PROTONIX) 40 MG EC tablet Take 1 tablet by mouth Daily. 10 tablet 0 1/1/2024 at 0800    risperiDONE (risperDAL) 0.5 MG tablet Take 1 tablet by mouth 2 (Two) Times a Day.   1/1/2024 at 0800    rosuvastatin (CRESTOR) 40 MG tablet Take 1 tablet by mouth Every Evening.   12/31/2023 at 1600    sertraline (ZOLOFT) 100 MG tablet Take 1 tablet by mouth Daily.   1/1/2024 at 0800    Trelegy Ellipta 100-62.5-25 MCG/INH inhaler  Inhale 1 puff Daily.   1/1/2024 at 0800    acetaminophen (TYLENOL) 500 MG tablet Take 1 tablet by mouth Every 4 (Four) Hours As Needed for Mild Pain.   Unknown    Cholecalciferol (VITAMIN D3) 28823 units capsule Take 1 capsule by mouth Every 7 (Seven) Days.   12/29/2023    Dextran 70-Hypromellose (Lubricating Tears Eye Drops) 0.1-0.3 % solution Apply 1 drop to eye(s) as directed by provider Every 2 (Two) Hours As Needed (dry eyes). 30 mL 0 Unknown    guaiFENesin 200 MG tablet Take 2 tablets by mouth Every 4 (Four) Hours As Needed for Congestion.   Unknown    nitroglycerin (NITROSTAT) 0.4 MG SL tablet Place 1 tablet under the tongue Every 5 (Five) Minutes As Needed.   Unknown    phenol (Chloraseptic) 1.4 % liquid liquid Apply 1 spray to the mouth or throat Every 2 (Two) Hours As Needed (sore throat).   Unknown    senna (SENOKOT) 8.6 MG tablet Take 1 tablet by mouth Daily As Needed for Constipation.   Unknown       Review of Systems   Constitutional:  Negative for activity change, appetite change, chills, diaphoresis, fatigue and fever.   HENT:  Negative for congestion, drooling, ear discharge, ear pain, mouth sores, nosebleeds, postnasal drip, rhinorrhea, sinus pressure, sneezing and sore throat.    Eyes:  Negative for pain, discharge and visual disturbance.   Respiratory:  Negative for cough, chest tightness, shortness of breath and wheezing.    Cardiovascular:  Negative for chest pain, palpitations and leg swelling.   Gastrointestinal:  Negative for abdominal pain, constipation, diarrhea, nausea and vomiting.   Endocrine: Negative for cold intolerance, heat intolerance, polydipsia, polyphagia and polyuria.   Musculoskeletal:  Negative for arthralgias, myalgias and neck pain.   Skin:  Negative for rash and wound.   Neurological:  Negative for dizziness, syncope, speech difficulty, weakness, light-headedness and headaches.   Hematological:  Negative for adenopathy. Does not bruise/bleed easily.    Psychiatric/Behavioral:  Negative for confusion, dysphoric mood and sleep disturbance. The patient is not nervous/anxious.    All other systems reviewed and are negative.      Objective Data      Vital Signs:  Temp:  [97.7 °F (36.5 °C)-100.1 °F (37.8 °C)] 98.4 °F (36.9 °C)  Heart Rate:  [] 84  Resp:  [18-20] 20  BP: (100-169)/(60-90) 107/67  Flow (L/min):  [2] 2  Vital Signs (last 72 hrs)         01/03 0700 01/04 0659 01/04 0700 01/05 0659 01/05 0700 01/06 0659 01/06 0700 01/06 1715   Most Recent      Temp (°F) 97.7 -  99.1    97.6 -  98.6    97.2 -  100.1    98.1 -  98.4     98.4 (36.9) 01/06 1551    Heart Rate 51 -  117    57 -  98    58 -  136    84 -  118     84 01/06 1551    Resp 13 -  27    16 -  28    18 -  20    18 -  20     20 01/06 1551    /58 -  171/76    104/69 -  160/78    97/57 -  169/90    107/67 -  108/63     107/67 01/06 1551    SpO2 (%) 87 -  100    87 -  100    90 -  98    94 -  99     94 01/06 1551    Flow (L/min)   2    2 -  2.5      2      2     2 01/06 1300          BMI:  Body mass index is 26.54 kg/m².    WEIGHT:      01/05/24  0636 01/06/24  0500   Weight: 71.5 kg (157 lb 11.2 oz) (new admit) 72.3 kg (159 lb 8 oz)       DIET:  Diet: Regular/House Diet; Texture: Soft to Chew (NDD 3); Soft to Chew: Chopped Meat; Fluid Consistency: Thin (IDDSI 0)    I&O:  Intake & Output (last 3 days)         01/03 0701  01/04 0700 01/04 0701 01/05 0700 01/05 0701 01/06 0700 01/06 0701 01/07 0700    P.O. 0 600 480     I.V. (mL/kg) 0 (0)       IV Piggyback 100       Total Intake(mL/kg) 100 (1.4) 600 (8.4) 480 (6.6)     Urine (mL/kg/hr) 800 (0.5)       Stool 0       Total Output 800       Net -700 +600 +480             Urine Unmeasured Occurrence 2 x 3 x 2 x     Stool Unmeasured Occurrence  2 x 1 x 1 x            Physical Exam  Vitals reviewed.   Constitutional:       Appearance: Normal appearance. She is well-developed.      Interventions: Nasal cannula in place.   HENT:      Head:  Normocephalic.   Eyes:      Conjunctiva/sclera: Conjunctivae normal.   Neck:      Thyroid: No thyromegaly.      Vascular: No carotid bruit or JVD.   Cardiovascular:      Rate and Rhythm: Normal rate and regular rhythm.   Pulmonary:      Effort: Pulmonary effort is normal.      Breath sounds: Normal breath sounds.   Musculoskeletal:      Cervical back: Neck supple.      Right lower leg: No edema.      Left lower leg: No edema.   Skin:     General: Skin is warm and dry.   Neurological:      Mental Status: She is alert and oriented to person, place, and time.   Psychiatric:         Attention and Perception: Attention normal.         Mood and Affect: Mood normal.         Speech: Speech normal.         Behavior: Behavior normal. Behavior is cooperative.         Cognition and Memory: Cognition normal.         Results review   Results Review:    I have reviewed the patient's new clinical results.     Results from last 7 days   Lab Units 01/01/24  1304 01/01/24  1059   HSTROP T ng/L 23* 26*     Results from last 7 days   Lab Units 01/06/24  0315 01/05/24  0222 01/04/24  0112 01/03/24  0829 01/02/24  0407 01/01/24  1059   WBC 10*3/mm3 7.00 6.40 6.90 7.72 4.08 4.11   HEMOGLOBIN g/dL 11.2* 12.4 12.4 13.4 11.4* 10.1*   PLATELETS 10*3/mm3 137* 150 169 154 135* 135*     Results from last 7 days   Lab Units 01/06/24  1437 01/06/24  0315 01/05/24  0222 01/04/24  1747 01/04/24  1143 01/04/24  0112 01/03/24  0829 01/02/24  0407 01/01/24  1059   SODIUM mmol/L  --  141 144  --   --  144 140 142 143   POTASSIUM mmol/L 5.0 3.4* 3.7 3.8 4.4 3.2* 3.8 3.9 4.3   CHLORIDE mmol/L  --  102 102  --   --  100 98 97* 96*   CO2 mmol/L  --  33.9* 35.6*  --   --  36.5* 33.3* 40.8* 44.3*   BUN mg/dL  --  38* 28*  --   --  23 18 10 10   CREATININE mg/dL  --  1.30* 1.00  --   --  0.74 0.70 0.72 0.68   CALCIUM mg/dL  --  8.8 9.1  --   --  9.5 9.6 9.7 8.9   GLUCOSE mg/dL  --  89 106*  --   --  117* 98 125* 103*   ALT (SGPT) U/L  --   --   --   --   --    "--  10  --  8   AST (SGOT) U/L  --   --   --   --   --   --  20  --  11     Lab Results   Component Value Date    INR 1.04 09/17/2023    INR 1.16 (H) 02/18/2023    INR 0.97 03/12/2022    INR 1.16 (H) 02/26/2021    INR 1.32 (H) 03/27/2020    INR 1.22 (H) 11/18/2019    INR 0.96 08/04/2015     No results found for: \"LABHEPA\"      Lab Results   Component Value Date    MG 1.9 01/04/2024    MG 2.1 12/27/2023    MG 2.1 02/18/2023     Lab Results   Component Value Date    TSH 3.020 01/01/2024      No results found for: \"CHOL\", \"TRIG\", \"HDL\", \"LDL\"  Lab Results   Component Value Date    PROBNP 2,515.0 (H) 02/18/2023    PROBNP 289.6 03/12/2022    PROBNP 339.6 03/08/2022     Estimated Creatinine Clearance: 37.2 mL/min (A) (by C-G formula based on SCr of 1.3 mg/dL (H)).  No results found for: \"URICACID\"  Pain Management Panel  More data exists         Latest Ref Rng & Units 12/27/2023 2/19/2023   Pain Management Panel   Creatinine, Urine mg/dL - 222.8    Amphetamine, Urine Qual Negative Negative  Negative    Barbiturates Screen, Urine Negative Negative  Negative    Benzodiazepine Screen, Urine Negative Negative  Positive    Buprenorphine, Screen, Urine Negative Negative  Negative    Cocaine Screen, Urine Negative Negative  Negative    Fentanyl, Urine Negative Negative  -   Methadone Screen , Urine Negative Negative  Negative    Methamphetamine, Ur Negative Negative  Negative      Microbiology Results (last 10 days)       Procedure Component Value - Date/Time    S. Pneumo Ag Urine or CSF - Urine, Urine, Clean Catch [[id number]]  (Normal) Collected: 01/03/24 1104    Lab Status: Final result Specimen: Urine, Clean Catch Updated: 01/03/24 1819     Strep Pneumo Ag Negative    MRSA Screen, PCR (Inpatient) - Swab, Nares [[id number]]  (Abnormal) Collected: 01/03/24 0834    Lab Status: Final result Specimen: Swab from Nares Updated: 01/03/24 0957     MRSA PCR MRSA Detected    Narrative:      The negative predictive value of this " "diagnostic test is high and should only be used to consider de-escalating anti-MRSA therapy. A positive result may indicate colonization with MRSA and must be correlated clinically.    COVID-19 and FLU A/B PCR, 1 HR TAT - Swab, Nasopharynx [[id number]]  (Normal) Collected: 01/01/24 1059    Lab Status: Final result Specimen: Swab from Nasopharynx Updated: 01/01/24 1123     COVID19 Not Detected     Influenza A PCR Not Detected     Influenza B PCR Not Detected    Narrative:      Fact sheet for providers: https://www.fda.gov/media/040395/download    Fact sheet for patients: https://www.fda.gov/media/829112/download    Test performed by PCR.           No results found for: \"BLOODCX\"      EKG:      ECG/EMG Results (last 24 hours)       Procedure Component Value Units Date/Time    ECG 12 Lead QT Measurement [[id number]] Collected: 01/05/24 0011     Updated: 01/05/24 1901     QT Interval 382 ms      QTC Interval 482 ms     Narrative:      Test Reason : QT Measurement  Blood Pressure :   */*   mmHG  Vent. Rate :  96 BPM     Atrial Rate :  58 BPM     P-R Int :   * ms          QRS Dur : 130 ms      QT Int : 382 ms       P-R-T Axes :   *  81  -9 degrees     QTc Int : 482 ms    Atrial fibrillation  Right bundle branch block  Abnormal ECG  When compared with ECG of 03-JAN-2024 03:59,  Atrial fibrillation has replaced Sinus rhythm  Confirmed by Jasmina Alfonso ([id number]) on 1/5/2024 6:59:35 PM    Referred By:            Confirmed By: Jasmina Alfonso            TELEMETRY:         RADIOLOGY STUDIES:  Imaging Results (Last 72 Hours)       Procedure Component Value Units Date/Time    XR Humerus Right [[id number]] Collected: 01/05/24 0203     Updated: 01/05/24 0206    Narrative:      PROCEDURE: X-ray examination of the right humerus performed on January 5, 2024. 2 views.     HISTORY: Pulled out IVs from arm. Cannula missing. Possible cannula in  the arm.     COMPARISON: None.     FINDINGS:     No acute fracture or dislocation.  Possible " heterotopic ossification adjacent to the right humeral neck.  No acute foreign body.  No air in the soft tissues.       Impression:         1.  No acute fracture or dislocation.  2.  No foreign body.     This report was finalized on 1/5/2024 2:04 AM by Mauricio Etienne MD.       XR Forearm 2 View Right [[id number]] Collected: 01/05/24 0202     Updated: 01/05/24 0205    Narrative:      PROCEDURE: X-ray examination of the right forearm performed on January 5, 2024. 2 views.     HISTORY: Pulled out IVs. Cannula missing. Possible cannula in on him.     COMPARISON: None.     FINDINGS:     Intact osseous structures of the right forearm with no acute fracture or  dislocation.  No foreign body.  No air in the soft tissues.       Impression:         1.  No acute fracture or dislocation.  2.  No acute foreign body.     This report was finalized on 1/5/2024 2:03 AM by Mauricio Etienne MD.       XR Humerus Left [[id number]] Collected: 01/05/24 0201     Updated: 01/05/24 0204    Narrative:      PROCEDURE: X-ray examination of the left humerus performed on January 5, 2024. 2 views.     HISTORY: Pulled out IVs. Cannula missing. Possible cannula in the arm.     COMPARISON: None.     FINDINGS:     Intact left humerus with no acute fracture or dislocation. No foreign  body. No air in the soft tissues.       Impression:         1.  No acute fracture or dislocation.   2.  No foreign body.        This report was finalized on 1/5/2024 2:02 AM by Mauricio Etienne MD.       XR Forearm 2 View Left [[id number]] Collected: 01/05/24 0159     Updated: 01/05/24 0203    Narrative:      PROCEDURE: X-ray examination of the left forearm performed on January 5, 2024. 2 views.     HISTORY: Possible cannula in the arm. Pulled out IVs.     Comparison: None.     FINDINGS:     No acute foreign body identified in the soft tissues of the left  forearm.  IV catheter and cannula in place with no features to suggest displaced  foreign body.  No air in the soft  tissues.  No acute fracture or dislocation.  No lytic or blastic lesion.  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       Impression:         1.  No acute fracture or dislocation.  2.  IV catheter and cannula in place.  3.  No displaced foreign body.        This report was finalized on 1/5/2024 2:01 AM by Mauricio Etienne MD.               CURRENT MEDICATIONS:  Current list of medications may not reflect those currently placed in orders that are not signed or are being held.     ALLERGIES: Patient has no known allergies.    amLODIPine, 10 mg, Oral, Nightly  apixaban, 5 mg, Oral, Q12H  baclofen, 5 mg, Oral, BID  budesonide-formoterol, 2 puff, Inhalation, BID - RT   And  tiotropium bromide monohydrate, 2 puff, Inhalation, Daily - RT  clopidogrel, 75 mg, Oral, Daily  donepezil, 10 mg, Oral, Nightly  ferrous sulfate, 325 mg, Oral, Daily With Breakfast  insulin lispro, 2-7 Units, Subcutaneous, 4x Daily AC & at Bedtime  ipratropium, 0.5 mg, Nebulization, 4x Daily - RT  isosorbide mononitrate, 90 mg, Oral, Daily  lamoTRIgine, 100 mg, Oral, Daily  lamoTRIgine, 25 mg, Oral, Nightly  levothyroxine, 75 mcg, Oral, Daily  [Held by provider] lisinopril, 10 mg, Oral, Q24H  memantine, 10 mg, Oral, Q12H  methylPREDNISolone sodium succinate, 40 mg, Intravenous, Q24H  metoprolol succinate XL, 12.5 mg, Oral, Daily  pantoprazole, 40 mg, Oral, Daily  risperiDONE, 0.5 mg, Oral, BID  rosuvastatin, 40 mg, Oral, Q PM  senna-docusate sodium, 2 tablet, Oral, BID  sertraline, 100 mg, Oral, Daily  sodium chloride, 10 mL, Intravenous, Q12H  sodium chloride, 10 mL, Intravenous, Q12H  Vitamin D3, 50,000 Units, Oral, Q7 Days           acetaminophen **OR** acetaminophen    senna-docusate sodium **AND** polyethylene glycol **AND** bisacodyl **AND** bisacodyl    carboxymethylcellulose    dextrose    dextrose    glucagon (human recombinant)    guaiFENesin    hydrALAZINE    LORazepam    Magnesium Standard Dose Replacement - Follow Nurse / BPA  Driven Protocol    nitroglycerin    phenol    Potassium Replacement - Follow Nurse / BPA Driven Protocol    senna    sodium chloride    sodium chloride    sodium chloride    sodium chloride    sodium chloride        I have discussed this patient with Dr. Alfonso and together, we have formed a plan of care for this patient as stated above in the recommendations.     Thank you very much for asking us to be involved in this patient's care.  We will follow along with you.    I have discussed the patients findings and  recommendations with patient.                Electronically signed by PAULA Pettit, 24, 5:16 PM EST.              Please note that portions of this note were copied and has been reviewed and is accurate as of 2024 .      Please note that portions of this note were completed with a voice recognition program.    .Electronically signed by Jasmina Alfonso MD, 24, 6:14 PM EST.      Electronically signed by Jasmina Alfonso MD at 24 1814          Physical Therapy Notes (most recent note)        Cynthia Rosen PTA at 01/10/24 1424  Version 1 of 1         Acute Care - Physical Therapy Treatment Note  [facility]     Patient Name: Brenda Munroe  : 1950  MRN: [id number]  Today's Date: 1/10/2024   Onset of Illness/Injury or Date of Surgery: 24  Visit Dx:     ICD-10-CM ICD-9-CM   1. Acute on chronic respiratory failure with hypercapnia  J96.22 518.84     Patient Active Problem List   Diagnosis    Psychosis    Severe recurrent major depression with psychotic features    COPD (chronic obstructive pulmonary disease)    Coronary artery disease    Disease of thyroid gland    Arthritis    Hypertension    Paroxysmal atrial fibrillation    Chronic headaches    Vision changes    Carotid stenosis, asymptomatic, bilateral    Bradycardia, sinus    Acute respiratory failure with hypoxia and hypercapnia    NSTEMI (non-ST elevated myocardial infarction)    Acute hypercapnic respiratory  failure     Past Medical History:   Diagnosis Date    Anxiety     Arthritis     Bell's palsy     Bladder prolapse, female, acquired     Carotid stenosis     COPD (chronic obstructive pulmonary disease)     COPD (chronic obstructive pulmonary disease)     Coronary artery disease     Dementia     Dementia     Depression     Difficulty walking     Disease of thyroid gland     Frequency of urination     GERD (gastroesophageal reflux disease)     Heart attack     Hyperlipidemia     Hypertension     Irregular heart beat     Peptic ulcer disease      Past Surgical History:   Procedure Laterality Date    CARDIAC CATHETERIZATION N/A 2/21/2023    Procedure: Left Heart Cath;  Surgeon: Tab Cifuentes MD;  Location: [facility] CATH INVASIVE LOCATION;  Service: Cardiology;  Laterality: N/A;    CARDIAC SURGERY      open heart  in 1999    CYSTOSCOPY N/A 11/8/2017    Procedure: CYSTOSCOPY;  Surgeon: Karl Nicolas DO;  Location: [facility] OR;  Service:     OTHER SURGICAL HISTORY      states she had fluid drained no surgery    VAGINAL HYSTERECTOMY W/ ANTERIOR AND POSTERIOR VAGINAL REPAIR N/A 11/8/2017    Procedure: VAGINAL HYSTERECTOMY WITH ANTERIOR, POSTERIOR, AND ENTEROCELE VAGINAL REPAIR;  Surgeon: Karl Nicolas DO;  Location: [facility] OR;  Service:     VAGINAL VAULT SUSPENSION N/A 11/8/2017    Procedure: VAGINAL VAULT SUSPENSION ;  Surgeon: Karl Nicolas DO;  Location: [facility] OR;  Service:      PT Assessment (last 12 hours)       PT Evaluation and Treatment       Row Name 01/10/24 4875          Physical Therapy Time and Intention    Subjective Information no complaints  -HC     Document Type therapy note (daily note)  -HC     Mode of Treatment physical therapy  -HC     Patient Effort good  -HC     Comment Pt and JOSE Costa in agreement for PT. Pt walked 25' with RW CGA, but had a very slow Gt. Sitting exercises completed EOB until tolerance.  -HC       Row Name 01/10/24 3043          General  Information    Patient Profile Reviewed yes  -     Equipment Currently Used at Home --  pt. reports using walker for mobility within SNF, usually has someone walking beside her.  She states she performs self care independently.  -     Existing Precautions/Restrictions fall;oxygen therapy device and L/min  -     Limitations/Impairments safety/cognitive  -       Row Name 01/10/24 1415          Living Environment    Primary Care Provided by --  self and facility staff  -       Row Name 01/10/24 1415          Pain    Pretreatment Pain Rating 0/10 - no pain  -     Posttreatment Pain Rating 0/10 - no pain  -       Row Name 01/10/24 1415          Cognition    Affect/Mental Status (Cognition) confused  -     Orientation Status (Cognition) oriented to;person  -     Follows Commands (Cognition) follows one-step commands  -     Personal Safety Interventions fall prevention program maintained;gait belt;supervised activity;nonskid shoes/slippers when out of bed  -       Row Name 01/10/24 1415          Mobility    Extremity Weight-bearing Status --  no restrictions  -       Row Name 01/10/24 1415          Bed Mobility    Bed Mobility rolling left;rolling right;scooting/bridging;supine-sit;sit-supine  -     Supine-Sit [place] (Bed Mobility) minimum assist (75% patient effort);verbal cues;contact guard  -       Row Name 01/10/24 1415          Transfers    Transfers sit-stand transfer;stand-sit transfer  -       Row Name 01/10/24 1415          Sit-Stand Transfer    Sit-Stand [place] (Transfers) nonverbal cues (demo/gesture);verbal cues;contact guard  -     Assistive Device (Sit-Stand Transfers) walker, front-wheeled  -       Row Name 01/10/24 1415          Stand-Sit Transfer    Stand-Sit [place] (Transfers) verbal cues;nonverbal cues (demo/gesture);contact guard  -     Assistive Device (Stand-Sit Transfers) walker, front-wheeled  -       Row Name 01/10/24 1415          Gait/Stairs  (Locomotion)    [place] Level (Gait) contact guard  -     Assistive Device (Gait) walker, front-wheeled  -     Patient was able to Ambulate yes  -     Distance in Feet (Gait) 25'  -     Pattern (Gait) step-through  -     Deviations/Abnormal Patterns (Gait) gait speed decreased  -       Row Name 01/10/24 1415          Motor Skills    Therapeutic Exercise other (see comments)  Sitting: LAQ, March, knees in/out  -       Row Name 01/10/24 1415          Coping    Observed Emotional State cooperative;calm  -     Verbalized Emotional State acceptance  -     Family/Support Persons son  -     Involvement in Care not present at bedside  -       Row Name 01/10/24 1415          Positioning and Restraints    Pre-Treatment Position in bed  -     Post Treatment Position bed  -HC     In Bed sitting EOB;encouraged to call for assist;call light within reach;with other staff  -       Row Name 01/10/24 1410          Therapy Assessment/Plan (PT)    Rehab Potential (PT) good, to achieve stated therapy goals  -     Criteria for Skilled Interventions Met (PT) yes;meets criteria  -     Therapy Frequency (PT) 2 times/wk  2-5 times/ week per priority  -     Problem List (PT) problems related to;balance;mobility;strength  -     Activity Limitations Related to Problem List (PT) unable to ambulate safely;unable to transfer safely;BADLs not performed adequately or safely  -       Row Name 01/10/24 1410          Physical Therapy Goals    Bed Mobility Goal Selection (PT) bed mobility, PT goal 1  -     Transfer Goal Selection (PT) transfer, PT goal 1  -     Gait Training Goal Selection (PT) gait training, PT goal 1  -       Row Name 01/10/24 1417          Bed Mobility Goal 1 (PT)    Activity/Assistive Device (Bed Mobility Goal 1, PT) rolling to left;rolling to right;scooting;sit to supine;supine to sit  -     [place] Level/Cues Needed (Bed Mobility Goal 1, PT) standby assist  -     Time Frame  (Bed Mobility Goal 1, PT) by discharge  -HC       Row Name 01/10/24 1415          Transfer Goal 1 (PT)    Activity/Assistive Device (Transfer Goal 1, PT) sit-to-stand/stand-to-sit;bed-to-chair/chair-to-bed;toilet  -HC     [place] Level/Cues Needed (Transfer Goal 1, PT) standby assist  -HC     Time Frame (Transfer Goal 1, PT) by discharge  -HC       Row Name 01/10/24 1415          Gait Training Goal 1 (PT)    Activity/Assistive Device (Gait Training Goal 1, PT) gait (walking locomotion);assistive device use;decrease fall risk;diminish gait deviation;improve balance and speed;increase endurance/gait distance;walker, rolling  -HC     [place] Level (Gait Training Goal 1, PT) contact guard required  -HC     Distance (Gait Training Goal 1, PT) 30  -HC     Time Frame (Gait Training Goal 1, PT) by discharge  -               User Key  (r) = Recorded By, (t) = Taken By, (c) = Cosigned By      Initials Name Provider Type    Cynthia Barreto, PTA Physical Therapist Assistant                    Physical Therapy Education       Title: PT OT SLP Therapies (In Progress)       Topic: Physical Therapy (In Progress)       Point: Mobility training (In Progress)       Learning Progress Summary             Patient Acceptance, E,TB, NR by MP at 1/7/2024 0937    Acceptance, E,D, VU,NR by AG at 1/4/2024 1315                         Point: Home exercise program (In Progress)       Learning Progress Summary             Patient Acceptance, E,TB, NR by MP at 1/7/2024 0937    Acceptance, E,D, VU,NR by AG at 1/4/2024 1315                         Point: Body mechanics (In Progress)       Learning Progress Summary             Patient Acceptance, E,TB, NR by MP at 1/7/2024 0937    Acceptance, E,D, VU,NR by AG at 1/4/2024 1315                         Point: Precautions (In Progress)       Learning Progress Summary             Patient Acceptance, E,TB, NR by MP at 1/7/2024 0937    Acceptance, E,D, VU,NR by AG at 1/4/2024 1315                                          User Key       Initials Effective Dates Name Provider Type Discipline     21 -  Meron Castle, RN Registered Nurse Nurse     21 -  Rosmery Lock, PT Physical Therapist PT                  PT Recommendation and Plan  Therapy Frequency (PT): 2 times/wk (2-5 times/ week per priority)          Time Calculation:    PT Charges       Row Name 01/10/24 1423             Time Calculation    PT Received On 01/10/24  -HC         Time Calculation- PT    Total Timed Code Minutes- PT 25 minute(s)  -HC                User Key  (r) = Recorded By, (t) = Taken By, (c) = Cosigned By      Initials Name Provider Type     Cynthia Rosen PTA Physical Therapist Assistant                  Therapy Charges for Today       Code Description Service Date Service Provider Modifiers Qty    34437692687 HC GAIT TRAINING EA 15 MIN 1/10/2024 Cynthia Rosen PTA GP, CQ 1    07525176823 HC PT THER PROC EA 15 MIN 1/10/2024 Cynthia Rosen PTA GP, CQ 1            PT G-Codes  AM-PAC 6 Clicks Score (PT): 18    Cynthia Rosen PTA  1/10/2024      Electronically signed by Cynthia Rosen PTA at 01/10/24 1424          Occupational Therapy Notes (most recent note)        Mellissa Argueta, IVAN at 24 1042          Acute Care - Occupational Therapy Treatment Note  [facility]     Patient Name: Brenda Munroe  : 1950  MRN: [id number]  Today's Date: 2024  Onset of Illness/Injury or Date of Surgery: 24     Referring Physician: Dr. Gomez    Admit Date: 2024       ICD-10-CM ICD-9-CM   1. Acute on chronic respiratory failure with hypercapnia  J96.22 518.84     Patient Active Problem List   Diagnosis    Psychosis    Severe recurrent major depression with psychotic features    COPD (chronic obstructive pulmonary disease)    Coronary artery disease    Disease of thyroid gland    Arthritis    Hypertension    Paroxysmal atrial fibrillation    Chronic headaches     Vision changes    Carotid stenosis, asymptomatic, bilateral    Bradycardia, sinus    Acute respiratory failure with hypoxia and hypercapnia    NSTEMI (non-ST elevated myocardial infarction)    Acute hypercapnic respiratory failure     Past Medical History:   Diagnosis Date    Anxiety     Arthritis     Bell's palsy     Bladder prolapse, female, acquired     Carotid stenosis     COPD (chronic obstructive pulmonary disease)     COPD (chronic obstructive pulmonary disease)     Coronary artery disease     Dementia     Dementia     Depression     Difficulty walking     Disease of thyroid gland     Frequency of urination     GERD (gastroesophageal reflux disease)     Heart attack     Hyperlipidemia     Hypertension     Irregular heart beat     Peptic ulcer disease      Past Surgical History:   Procedure Laterality Date    CARDIAC CATHETERIZATION N/A 2/21/2023    Procedure: Left Heart Cath;  Surgeon: Tab Cifuentes MD;  Location: [facility] CATH INVASIVE LOCATION;  Service: Cardiology;  Laterality: N/A;    CARDIAC SURGERY      open heart  in 1999    CYSTOSCOPY N/A 11/8/2017    Procedure: CYSTOSCOPY;  Surgeon: Karl Nicolas DO;  Location: [facility] OR;  Service:     OTHER SURGICAL HISTORY      states she had fluid drained no surgery    VAGINAL HYSTERECTOMY W/ ANTERIOR AND POSTERIOR VAGINAL REPAIR N/A 11/8/2017    Procedure: VAGINAL HYSTERECTOMY WITH ANTERIOR, POSTERIOR, AND ENTEROCELE VAGINAL REPAIR;  Surgeon: Karl Nicolas DO;  Location: [facility] OR;  Service:     VAGINAL VAULT SUSPENSION N/A 11/8/2017    Procedure: VAGINAL VAULT SUSPENSION ;  Surgeon: Karl Nicolas DO;  Location: [facility] OR;  Service:          OT ASSESSMENT FLOWSHEET (last 12 hours)       OT Evaluation and Treatment       Row Name 01/05/24 [facility]                   OT Time and Intention    Subjective Information no complaints  -LA        Document Type therapy note (daily note)  -LA        Mode of Treatment occupational therapy   -LA        Patient Effort fair  -LA        Symptoms Noted During/After Treatment none  -LA           General Information    Patient Profile Reviewed yes  -LA        General Observations of Patient Patient agreeable to therapy this date. Patient pleasantly confused. Increased cues required due to cognition.  -LA        Existing Precautions/Restrictions fall;oxygen therapy device and L/min  -LA           Cognition    Affect/Mental Status (Cognition) confused  -LA        Orientation Status (Cognition) oriented to;person  -LA        Follows Commands (Cognition) repetition of directions required;verbal cues/prompting required  -LA           Toileting Assessment/Training    [place] Level (Toileting) minimum assist (75% patient effort);moderate assist (50% patient effort)  -LA           Bed Mobility    Supine-Sit [place] (Bed Mobility) minimum assist (75% patient effort);verbal cues  -LA        Sit-Supine [place] (Bed Mobility) minimum assist (75% patient effort);verbal cues  -LA           Sit-Stand Transfer    Sit-Stand [place] (Transfers) minimum assist (75% patient effort);verbal cues  -LA           Motor Skills    Motor Skills coordination;functional endurance  -LA        Coordination WFL;bilateral;upper extremity  -LA        Functional Endurance fair  -LA        Therapeutic Exercise shoulder;elbow/forearm;wrist;hand  -LA        Additional Documentation --  AROM exercises completed from EOB sitting position this date 15 x2 sets  -LA           Balance    Dynamic Standing Balance minimal assist  -LA           Plan of Care Review    Plan of Care Reviewed With patient  -LA        Progress improving  -LA           Positioning and Restraints    Pre-Treatment Position in bed  -LA        Post Treatment Position bed  -LA        In Bed supine;call light within reach;encouraged to call for assist;exit alarm on  -LA                  User Key  (r) = Recorded By, (t) = Taken By, (c) = Cosigned By      Initials  Name Effective Dates    Mellissa Griffith OT 02/14/22 -                            OT Recommendation and Plan     Plan of Care Review  Plan of Care Reviewed With: patient  Progress: improving  Plan of Care Reviewed With: patient        Time Calculation:     Therapy Charges for Today       Code Description Service Date Service Provider Modifiers Qty    13389623959 HC OT SELF CARE/MGMT/TRAIN EA 15 MIN 1/5/2024 Mellissa Argueta OT GO 1    63804067482 HC OT THER PROC EA 15 MIN 1/5/2024 Mellissa Argueta OT GO 1                 Mellissa Argueta OT  1/5/2024    Electronically signed by Mellissa Argueta OT at 01/05/24 1042       Speech Language Pathology Notes (most recent note)    No notes exist for this encounter.       ADL Documentation (most recent)      Flowsheet Row Most Recent Value   Transferring 2 - assistive person   Toileting 3 - assistive equipment and person   Bathing 2 - assistive person   Dressing 2 - assistive person   Eating 2 - assistive person   Communication 2 - difficulty understanding (not related to language barrier)   Swallowing 0 - swallows foods/liquids without difficulty   Equipment Currently Used at Home wheelchair            Discharge Summary    No notes of this type exist for this encounter.       Discharge Order (From admission, onward)       Start     Ordered    01/11/24 1130  Discharge patient  Once        Expected Discharge Date: 01/11/24   Expected Discharge Time: Morning   Discharge Disposition: Skilled Nursing Facility (DC - External)   Physician of Record for Attribution - Please select from Treatment Team: MANJINDER LEGGETT [[id number]]   Review needed by CMO to determine Physician of Record: No      Question Answer Comment   Physician of Record for Attribution - Please select from Treatment Team MANJINDER LEGGETT    Review needed by CMO to determine Physician of Record No        01/11/24 1138

## 2024-01-19 DIAGNOSIS — D64.9 NORMOCYTIC ANEMIA: Primary | ICD-10-CM

## 2024-01-22 ENCOUNTER — APPOINTMENT (OUTPATIENT)
Dept: GENERAL RADIOLOGY | Facility: HOSPITAL | Age: 74
DRG: 193 | End: 2024-01-22
Payer: MEDICARE

## 2024-01-22 ENCOUNTER — APPOINTMENT (OUTPATIENT)
Dept: CT IMAGING | Facility: HOSPITAL | Age: 74
DRG: 193 | End: 2024-01-22
Payer: MEDICARE

## 2024-01-22 ENCOUNTER — HOSPITAL ENCOUNTER (INPATIENT)
Facility: HOSPITAL | Age: 74
LOS: 9 days | Discharge: NURSING, REHAB OR LONG-TERM CARE | DRG: 193 | End: 2024-02-02
Attending: EMERGENCY MEDICINE | Admitting: INTERNAL MEDICINE
Payer: MEDICARE

## 2024-01-22 DIAGNOSIS — F41.1 GAD (GENERALIZED ANXIETY DISORDER): ICD-10-CM

## 2024-01-22 DIAGNOSIS — N39.0 ACUTE UTI: ICD-10-CM

## 2024-01-22 DIAGNOSIS — J18.9 PNEUMONIA OF LEFT UPPER LOBE DUE TO INFECTIOUS ORGANISM: ICD-10-CM

## 2024-01-22 DIAGNOSIS — J44.9 CHRONIC OBSTRUCTIVE PULMONARY DISEASE, UNSPECIFIED COPD TYPE: ICD-10-CM

## 2024-01-22 DIAGNOSIS — I48.91 ATRIAL FIBRILLATION WITH RVR: Primary | ICD-10-CM

## 2024-01-22 LAB
ALBUMIN SERPL-MCNC: 3.3 G/DL (ref 3.5–5.2)
ALBUMIN/GLOB SERPL: 1.7 G/DL
ALP SERPL-CCNC: 55 U/L (ref 39–117)
ALT SERPL W P-5'-P-CCNC: 19 U/L (ref 1–33)
ANION GAP SERPL CALCULATED.3IONS-SCNC: 3 MMOL/L (ref 5–15)
APTT PPP: 32.2 SECONDS (ref 26.5–34.5)
AST SERPL-CCNC: 15 U/L (ref 1–32)
BACTERIA UR QL AUTO: ABNORMAL /HPF
BASOPHILS # BLD AUTO: 0.01 10*3/MM3 (ref 0–0.2)
BASOPHILS NFR BLD AUTO: 0.1 % (ref 0–1.5)
BILIRUB SERPL-MCNC: 0.2 MG/DL (ref 0–1.2)
BILIRUB UR QL STRIP: NEGATIVE
BUN SERPL-MCNC: 17 MG/DL (ref 8–23)
BUN/CREAT SERPL: 16.5 (ref 7–25)
CALCIUM SPEC-SCNC: 8.9 MG/DL (ref 8.6–10.5)
CHLORIDE SERPL-SCNC: 102 MMOL/L (ref 98–107)
CLARITY UR: ABNORMAL
CO2 SERPL-SCNC: 40 MMOL/L (ref 22–29)
COLOR UR: YELLOW
CREAT SERPL-MCNC: 1.03 MG/DL (ref 0.57–1)
D-LACTATE SERPL-SCNC: 0.7 MMOL/L (ref 0.5–2)
DACRYOCYTES BLD QL SMEAR: NORMAL
DEPRECATED RDW RBC AUTO: 74.6 FL (ref 37–54)
EGFRCR SERPLBLD CKD-EPI 2021: 56.8 ML/MIN/1.73
EOSINOPHIL # BLD AUTO: 0.03 10*3/MM3 (ref 0–0.4)
EOSINOPHIL NFR BLD AUTO: 0.4 % (ref 0.3–6.2)
ERYTHROCYTE [DISTWIDTH] IN BLOOD BY AUTOMATED COUNT: 20.7 % (ref 12.3–15.4)
GEN 5 2HR TROPONIN T REFLEX: 111 NG/L
GLOBULIN UR ELPH-MCNC: 2 GM/DL
GLUCOSE SERPL-MCNC: 115 MG/DL (ref 65–99)
GLUCOSE UR STRIP-MCNC: NEGATIVE MG/DL
GRAN CASTS URNS QL MICRO: ABNORMAL /LPF
HCT VFR BLD AUTO: 35.3 % (ref 34–46.6)
HGB BLD-MCNC: 9.8 G/DL (ref 12–15.9)
HGB UR QL STRIP.AUTO: ABNORMAL
HOLD SPECIMEN: NORMAL
HYALINE CASTS UR QL AUTO: ABNORMAL /LPF
HYPOCHROMIA BLD QL: NORMAL
IMM GRANULOCYTES # BLD AUTO: 0.05 10*3/MM3 (ref 0–0.05)
IMM GRANULOCYTES NFR BLD AUTO: 0.6 % (ref 0–0.5)
INR PPP: 1.55 (ref 0.9–1.1)
KETONES UR QL STRIP: ABNORMAL
LARGE PLATELETS: NORMAL
LEUKOCYTE ESTERASE UR QL STRIP.AUTO: ABNORMAL
LYMPHOCYTES # BLD AUTO: 0.52 10*3/MM3 (ref 0.7–3.1)
LYMPHOCYTES NFR BLD AUTO: 6.4 % (ref 19.6–45.3)
MCH RBC QN AUTO: 27.5 PG (ref 26.6–33)
MCHC RBC AUTO-ENTMCNC: 27.8 G/DL (ref 31.5–35.7)
MCV RBC AUTO: 98.9 FL (ref 79–97)
MONOCYTES # BLD AUTO: 0.49 10*3/MM3 (ref 0.1–0.9)
MONOCYTES NFR BLD AUTO: 6 % (ref 5–12)
NEUTROPHILS NFR BLD AUTO: 7.01 10*3/MM3 (ref 1.7–7)
NEUTROPHILS NFR BLD AUTO: 86.5 % (ref 42.7–76)
NITRITE UR QL STRIP: NEGATIVE
NRBC BLD AUTO-RTO: 0 /100 WBC (ref 0–0.2)
NT-PROBNP SERPL-MCNC: 4452 PG/ML (ref 0–1800)
OVALOCYTES BLD QL SMEAR: NORMAL
PH UR STRIP.AUTO: 5.5 [PH] (ref 5–8)
PLATELET # BLD AUTO: 104 10*3/MM3 (ref 140–450)
PMV BLD AUTO: 11.5 FL (ref 6–12)
POTASSIUM SERPL-SCNC: 4.5 MMOL/L (ref 3.5–5.2)
PROT SERPL-MCNC: 5.3 G/DL (ref 6–8.5)
PROT UR QL STRIP: ABNORMAL
PROTHROMBIN TIME: 19.2 SECONDS (ref 12.1–14.7)
RBC # BLD AUTO: 3.57 10*6/MM3 (ref 3.77–5.28)
RBC # UR STRIP: ABNORMAL /HPF
REF LAB TEST METHOD: ABNORMAL
SODIUM SERPL-SCNC: 145 MMOL/L (ref 136–145)
SP GR UR STRIP: >=1.03 (ref 1–1.03)
SQUAMOUS #/AREA URNS HPF: ABNORMAL /HPF
TROPONIN T DELTA: 4 NG/L
TROPONIN T SERPL HS-MCNC: 107 NG/L
UROBILINOGEN UR QL STRIP: ABNORMAL
WBC # UR STRIP: ABNORMAL /HPF
WBC NRBC COR # BLD AUTO: 8.11 10*3/MM3 (ref 3.4–10.8)
WHOLE BLOOD HOLD COAG: NORMAL
WHOLE BLOOD HOLD SPECIMEN: NORMAL

## 2024-01-22 PROCEDURE — 94761 N-INVAS EAR/PLS OXIMETRY MLT: CPT

## 2024-01-22 PROCEDURE — 85610 PROTHROMBIN TIME: CPT | Performed by: NURSE PRACTITIONER

## 2024-01-22 PROCEDURE — 87086 URINE CULTURE/COLONY COUNT: CPT | Performed by: NURSE PRACTITIONER

## 2024-01-22 PROCEDURE — 94799 UNLISTED PULMONARY SVC/PX: CPT

## 2024-01-22 PROCEDURE — 99285 EMERGENCY DEPT VISIT HI MDM: CPT

## 2024-01-22 PROCEDURE — 94664 DEMO&/EVAL PT USE INHALER: CPT

## 2024-01-22 PROCEDURE — 81001 URINALYSIS AUTO W/SCOPE: CPT | Performed by: NURSE PRACTITIONER

## 2024-01-22 PROCEDURE — G0378 HOSPITAL OBSERVATION PER HR: HCPCS

## 2024-01-22 PROCEDURE — 25510000001 IOPAMIDOL PER 1 ML: Performed by: EMERGENCY MEDICINE

## 2024-01-22 PROCEDURE — 80053 COMPREHEN METABOLIC PANEL: CPT | Performed by: NURSE PRACTITIONER

## 2024-01-22 PROCEDURE — 83605 ASSAY OF LACTIC ACID: CPT | Performed by: NURSE PRACTITIONER

## 2024-01-22 PROCEDURE — 87077 CULTURE AEROBIC IDENTIFY: CPT | Performed by: NURSE PRACTITIONER

## 2024-01-22 PROCEDURE — 93005 ELECTROCARDIOGRAM TRACING: CPT | Performed by: NURSE PRACTITIONER

## 2024-01-22 PROCEDURE — 87040 BLOOD CULTURE FOR BACTERIA: CPT | Performed by: NURSE PRACTITIONER

## 2024-01-22 PROCEDURE — 85730 THROMBOPLASTIN TIME PARTIAL: CPT | Performed by: NURSE PRACTITIONER

## 2024-01-22 PROCEDURE — 83880 ASSAY OF NATRIURETIC PEPTIDE: CPT | Performed by: NURSE PRACTITIONER

## 2024-01-22 PROCEDURE — 85025 COMPLETE CBC W/AUTO DIFF WBC: CPT | Performed by: NURSE PRACTITIONER

## 2024-01-22 PROCEDURE — 87186 SC STD MICRODIL/AGAR DIL: CPT | Performed by: NURSE PRACTITIONER

## 2024-01-22 PROCEDURE — 71275 CT ANGIOGRAPHY CHEST: CPT

## 2024-01-22 PROCEDURE — 84484 ASSAY OF TROPONIN QUANT: CPT | Performed by: NURSE PRACTITIONER

## 2024-01-22 PROCEDURE — 25010000002 CEFTRIAXONE PER 250 MG: Performed by: NURSE PRACTITIONER

## 2024-01-22 PROCEDURE — 70450 CT HEAD/BRAIN W/O DYE: CPT

## 2024-01-22 PROCEDURE — 71045 X-RAY EXAM CHEST 1 VIEW: CPT

## 2024-01-22 PROCEDURE — 87154 CUL TYP ID BLD PTHGN 6+ TRGT: CPT | Performed by: NURSE PRACTITIONER

## 2024-01-22 PROCEDURE — 25810000003 SODIUM CHLORIDE 0.9 % SOLUTION: Performed by: NURSE PRACTITIONER

## 2024-01-22 PROCEDURE — 85007 BL SMEAR W/DIFF WBC COUNT: CPT | Performed by: NURSE PRACTITIONER

## 2024-01-22 PROCEDURE — 99223 1ST HOSP IP/OBS HIGH 75: CPT

## 2024-01-22 PROCEDURE — 87150 DNA/RNA AMPLIFIED PROBE: CPT | Performed by: NURSE PRACTITIONER

## 2024-01-22 RX ORDER — ACETAMINOPHEN 500 MG
500 TABLET ORAL EVERY 4 HOURS PRN
Status: DISCONTINUED | OUTPATIENT
Start: 2024-01-22 | End: 2024-02-02 | Stop reason: HOSPADM

## 2024-01-22 RX ORDER — BUDESONIDE AND FORMOTEROL FUMARATE DIHYDRATE 160; 4.5 UG/1; UG/1
2 AEROSOL RESPIRATORY (INHALATION)
Status: DISCONTINUED | OUTPATIENT
Start: 2024-01-22 | End: 2024-01-26

## 2024-01-22 RX ORDER — BACLOFEN 10 MG/1
5 TABLET ORAL 2 TIMES DAILY
Status: DISCONTINUED | OUTPATIENT
Start: 2024-01-22 | End: 2024-02-02 | Stop reason: HOSPADM

## 2024-01-22 RX ORDER — BISACODYL 5 MG/1
5 TABLET, DELAYED RELEASE ORAL DAILY PRN
Status: DISCONTINUED | OUTPATIENT
Start: 2024-01-22 | End: 2024-02-02 | Stop reason: HOSPADM

## 2024-01-22 RX ORDER — DILTIAZEM HYDROCHLORIDE 5 MG/ML
10 INJECTION INTRAVENOUS ONCE
Status: COMPLETED | OUTPATIENT
Start: 2024-01-22 | End: 2024-01-22

## 2024-01-22 RX ORDER — CLOPIDOGREL BISULFATE 75 MG/1
75 TABLET ORAL DAILY
Status: DISCONTINUED | OUTPATIENT
Start: 2024-01-23 | End: 2024-02-02 | Stop reason: HOSPADM

## 2024-01-22 RX ORDER — RISPERIDONE 0.25 MG/1
0.5 TABLET ORAL 2 TIMES DAILY
Status: DISCONTINUED | OUTPATIENT
Start: 2024-01-22 | End: 2024-02-02 | Stop reason: HOSPADM

## 2024-01-22 RX ORDER — LAMOTRIGINE 100 MG/1
100 TABLET ORAL DAILY
Status: DISCONTINUED | OUTPATIENT
Start: 2024-01-23 | End: 2024-02-02 | Stop reason: HOSPADM

## 2024-01-22 RX ORDER — LEVOTHYROXINE SODIUM 0.07 MG/1
75 TABLET ORAL
Status: DISCONTINUED | OUTPATIENT
Start: 2024-01-23 | End: 2024-02-02 | Stop reason: HOSPADM

## 2024-01-22 RX ORDER — MEMANTINE HYDROCHLORIDE 10 MG/1
10 TABLET ORAL EVERY 12 HOURS SCHEDULED
Status: DISCONTINUED | OUTPATIENT
Start: 2024-01-22 | End: 2024-02-02 | Stop reason: HOSPADM

## 2024-01-22 RX ORDER — PANTOPRAZOLE SODIUM 40 MG/1
40 TABLET, DELAYED RELEASE ORAL
Status: DISCONTINUED | OUTPATIENT
Start: 2024-01-23 | End: 2024-02-02 | Stop reason: HOSPADM

## 2024-01-22 RX ORDER — DONEPEZIL HYDROCHLORIDE 5 MG/1
10 TABLET, FILM COATED ORAL NIGHTLY
Status: DISCONTINUED | OUTPATIENT
Start: 2024-01-22 | End: 2024-02-02 | Stop reason: HOSPADM

## 2024-01-22 RX ORDER — LORAZEPAM 0.5 MG/1
0.5 TABLET ORAL EVERY 12 HOURS PRN
Status: DISCONTINUED | OUTPATIENT
Start: 2024-01-22 | End: 2024-02-02 | Stop reason: HOSPADM

## 2024-01-22 RX ORDER — NITROGLYCERIN 0.4 MG/1
0.4 TABLET SUBLINGUAL
Status: DISCONTINUED | OUTPATIENT
Start: 2024-01-22 | End: 2024-02-02 | Stop reason: HOSPADM

## 2024-01-22 RX ORDER — ROSUVASTATIN CALCIUM 20 MG/1
40 TABLET, COATED ORAL NIGHTLY
Status: DISCONTINUED | OUTPATIENT
Start: 2024-01-22 | End: 2024-02-02 | Stop reason: HOSPADM

## 2024-01-22 RX ORDER — ISOSORBIDE MONONITRATE 30 MG/1
90 TABLET, EXTENDED RELEASE ORAL DAILY
Status: DISCONTINUED | OUTPATIENT
Start: 2024-01-22 | End: 2024-01-27

## 2024-01-22 RX ORDER — LISINOPRIL 10 MG/1
10 TABLET ORAL
Status: DISCONTINUED | OUTPATIENT
Start: 2024-01-23 | End: 2024-01-27

## 2024-01-22 RX ORDER — ASPIRIN 81 MG/1
324 TABLET, CHEWABLE ORAL ONCE
Status: DISCONTINUED | OUTPATIENT
Start: 2024-01-22 | End: 2024-01-26

## 2024-01-22 RX ORDER — ERGOCALCIFEROL 1.25 MG/1
50000 CAPSULE ORAL WEEKLY
COMMUNITY

## 2024-01-22 RX ORDER — SODIUM CHLORIDE 0.9 % (FLUSH) 0.9 %
10 SYRINGE (ML) INJECTION EVERY 12 HOURS SCHEDULED
Status: DISCONTINUED | OUTPATIENT
Start: 2024-01-22 | End: 2024-02-02 | Stop reason: HOSPADM

## 2024-01-22 RX ORDER — FERROUS SULFATE 325(65) MG
325 TABLET ORAL 2 TIMES DAILY
Status: DISCONTINUED | OUTPATIENT
Start: 2024-01-22 | End: 2024-02-02 | Stop reason: HOSPADM

## 2024-01-22 RX ORDER — SODIUM CHLORIDE 0.9 % (FLUSH) 0.9 %
10 SYRINGE (ML) INJECTION AS NEEDED
Status: DISCONTINUED | OUTPATIENT
Start: 2024-01-22 | End: 2024-02-02 | Stop reason: HOSPADM

## 2024-01-22 RX ORDER — POLYETHYLENE GLYCOL 3350 17 G/17G
17 POWDER, FOR SOLUTION ORAL DAILY PRN
Status: DISCONTINUED | OUTPATIENT
Start: 2024-01-22 | End: 2024-02-02 | Stop reason: HOSPADM

## 2024-01-22 RX ORDER — BISACODYL 10 MG
10 SUPPOSITORY, RECTAL RECTAL DAILY PRN
Status: DISCONTINUED | OUTPATIENT
Start: 2024-01-22 | End: 2024-02-02 | Stop reason: HOSPADM

## 2024-01-22 RX ORDER — AMOXICILLIN 250 MG
2 CAPSULE ORAL 2 TIMES DAILY
Status: DISCONTINUED | OUTPATIENT
Start: 2024-01-22 | End: 2024-02-02 | Stop reason: HOSPADM

## 2024-01-22 RX ORDER — TROLAMINE SALICYLATE 10 G/100G
1 CREAM TOPICAL EVERY 6 HOURS PRN
COMMUNITY

## 2024-01-22 RX ORDER — SODIUM CHLORIDE 9 MG/ML
40 INJECTION, SOLUTION INTRAVENOUS AS NEEDED
Status: DISCONTINUED | OUTPATIENT
Start: 2024-01-22 | End: 2024-02-02 | Stop reason: HOSPADM

## 2024-01-22 RX ORDER — LAMOTRIGINE 100 MG/1
25 TABLET ORAL NIGHTLY
Status: DISCONTINUED | OUTPATIENT
Start: 2024-01-22 | End: 2024-02-02 | Stop reason: HOSPADM

## 2024-01-22 RX ADMIN — LORAZEPAM 0.5 MG: 0.5 TABLET ORAL at 20:24

## 2024-01-22 RX ADMIN — DILTIAZEM HYDROCHLORIDE 10 MG: 5 INJECTION INTRAVENOUS at 12:14

## 2024-01-22 RX ADMIN — DONEPEZIL HYDROCHLORIDE 10 MG: 5 TABLET, FILM COATED ORAL at 20:29

## 2024-01-22 RX ADMIN — ISOSORBIDE MONONITRATE 90 MG: 30 TABLET, EXTENDED RELEASE ORAL at 20:24

## 2024-01-22 RX ADMIN — DOXYCYCLINE 100 MG: 100 INJECTION, POWDER, LYOPHILIZED, FOR SOLUTION INTRAVENOUS at 14:58

## 2024-01-22 RX ADMIN — SODIUM CHLORIDE 2000 MG: 9 INJECTION, SOLUTION INTRAVENOUS at 14:55

## 2024-01-22 RX ADMIN — SERTRALINE 100 MG: 50 TABLET, FILM COATED ORAL at 20:24

## 2024-01-22 RX ADMIN — BACLOFEN 5 MG: 10 TABLET ORAL at 20:31

## 2024-01-22 RX ADMIN — APIXABAN 5 MG: 5 TABLET, FILM COATED ORAL at 20:28

## 2024-01-22 RX ADMIN — IOPAMIDOL 100 ML: 755 INJECTION, SOLUTION INTRAVENOUS at 14:04

## 2024-01-22 RX ADMIN — BUDESONIDE AND FORMOTEROL FUMARATE DIHYDRATE 2 PUFF: 160; 4.5 AEROSOL RESPIRATORY (INHALATION) at 18:30

## 2024-01-22 RX ADMIN — METOPROLOL TARTRATE 37.5 MG: 25 TABLET, FILM COATED ORAL at 20:28

## 2024-01-22 RX ADMIN — SODIUM CHLORIDE 500 ML: 9 INJECTION, SOLUTION INTRAVENOUS at 12:18

## 2024-01-22 RX ADMIN — METOPROLOL TARTRATE 25 MG: 25 TABLET, FILM COATED ORAL at 14:55

## 2024-01-22 RX ADMIN — RISPERIDONE 0.5 MG: 0.25 TABLET, FILM COATED ORAL at 20:29

## 2024-01-22 RX ADMIN — FERROUS SULFATE TAB 325 MG (65 MG ELEMENTAL FE) 325 MG: 325 (65 FE) TAB at 20:29

## 2024-01-22 RX ADMIN — MEMANTINE HYDROCHLORIDE 10 MG: 10 TABLET, FILM COATED ORAL at 20:28

## 2024-01-22 RX ADMIN — ROSUVASTATIN CALCIUM 40 MG: 20 TABLET, FILM COATED ORAL at 20:29

## 2024-01-22 RX ADMIN — Medication 10 ML: at 20:29

## 2024-01-22 RX ADMIN — LAMOTRIGINE 25 MG: 100 TABLET ORAL at 20:29

## 2024-01-22 NOTE — ED PROVIDER NOTES
Subjective   History of Present Illness  Patient is a 73 year old female presenting to the ER with past medical history positive for anxiety, arthritis, COPD, CAD, dementia, depression, frequent urination, GERD, HLD, HTN, PUD presenting to the ER with no complaints.  Nursing home staff at [facility] reported that she was worse altered mental status than her baseline.  Patient does have dementia.  Patient denies any complaints at this time.  Patient denies shortness of air, chest pain, cough, fever, nausea, vomiting or any additional symptoms at this time.    History provided by:  Caregiver and patient  History limited by:  Dementia   used: No        Review of Systems   Constitutional: Negative.  Negative for fever.   HENT: Negative.     Respiratory: Negative.     Cardiovascular: Negative.  Negative for chest pain.   Gastrointestinal: Negative.  Negative for abdominal pain.   Endocrine: Negative.    Genitourinary: Negative.  Negative for dysuria.   Skin: Negative.    Neurological: Negative.    Psychiatric/Behavioral: Negative.     All other systems reviewed and are negative.      Past Medical History:   Diagnosis Date    Anxiety     Arthritis     Bell's palsy     Bladder prolapse, female, acquired     Carotid stenosis     COPD (chronic obstructive pulmonary disease)     COPD (chronic obstructive pulmonary disease)     Coronary artery disease     Dementia     Dementia     Depression     Difficulty walking     Disease of thyroid gland     Frequency of urination     GERD (gastroesophageal reflux disease)     Heart attack     Hyperlipidemia     Hypertension     Irregular heart beat     Peptic ulcer disease        No Known Allergies    Past Surgical History:   Procedure Laterality Date    CARDIAC CATHETERIZATION N/A 2/21/2023    Procedure: Left Heart Cath;  Surgeon: Tab Cifuentes MD;  Location: [facility] CATH INVASIVE LOCATION;  Service: Cardiology;  Laterality: N/A;    CARDIAC SURGERY       open heart  in 1999    CYSTOSCOPY N/A 11/8/2017    Procedure: CYSTOSCOPY;  Surgeon: Karl Nicolas DO;  Location:  [facility] OR;  Service:     OTHER SURGICAL HISTORY      states she had fluid drained no surgery    VAGINAL HYSTERECTOMY W/ ANTERIOR AND POSTERIOR VAGINAL REPAIR N/A 11/8/2017    Procedure: VAGINAL HYSTERECTOMY WITH ANTERIOR, POSTERIOR, AND ENTEROCELE VAGINAL REPAIR;  Surgeon: Karl Nicolas DO;  Location:  [facility] OR;  Service:     VAGINAL VAULT SUSPENSION N/A 11/8/2017    Procedure: VAGINAL VAULT SUSPENSION ;  Surgeon: Karl Nicolas DO;  Location:  [facility] OR;  Service:        Family History   Problem Relation Age of Onset    Dementia Sister     Heart attack Mother     Tuberculosis Father     Breast cancer Neg Hx        Social History     Socioeconomic History    Marital status:     Number of children: 3   Tobacco Use    Smoking status: Every Day     Packs/day: 0.50     Years: 55.00     Additional pack years: 0.00     Total pack years: 27.50     Types: Cigarettes    Smokeless tobacco: Never   Vaping Use    Vaping Use: Never used   Substance and Sexual Activity    Alcohol use: No    Drug use: No    Sexual activity: Never     Comment: denies           Objective   Physical Exam  Vitals and nursing note reviewed.   Constitutional:       General: She is not in acute distress.     Appearance: She is well-developed. She is ill-appearing. She is not diaphoretic.   HENT:      Head: Normocephalic and atraumatic.      Right Ear: External ear normal.      Left Ear: External ear normal.      Nose: Nose normal.   Eyes:      Conjunctiva/sclera: Conjunctivae normal.      Pupils: Pupils are equal, round, and reactive to light.   Neck:      Vascular: No JVD.      Trachea: No tracheal deviation.   Cardiovascular:      Rate and Rhythm: Tachycardia present. Rhythm irregular.      Heart sounds: Normal heart sounds. No murmur heard.  Pulmonary:      Effort: Pulmonary effort is normal. No respiratory  distress.      Breath sounds: Normal breath sounds. Decreased air movement present. No wheezing.   Abdominal:      General: Bowel sounds are normal.      Palpations: Abdomen is soft.      Tenderness: There is no abdominal tenderness.   Musculoskeletal:         General: Swelling present. No deformity. Normal range of motion.      Cervical back: Normal range of motion and neck supple.   Skin:     General: Skin is warm and dry.      Coloration: Skin is not pale.      Findings: No erythema or rash.   Neurological:      Mental Status: She is alert. Mental status is at baseline.      GCS: GCS eye subscore is 4. GCS verbal subscore is 5. GCS motor subscore is 6.      Cranial Nerves: No cranial nerve deficit.   Psychiatric:         Behavior: Behavior normal.         Thought Content: Thought content normal.         Procedures       Results for orders placed or performed during the hospital encounter of 01/22/24   Comprehensive Metabolic Panel    Specimen: Blood   Result Value Ref Range    Glucose 115 (H) 65 - 99 mg/dL    BUN 17 8 - 23 mg/dL    Creatinine 1.03 (H) 0.57 - 1.00 mg/dL    Sodium 145 136 - 145 mmol/L    Potassium 4.5 3.5 - 5.2 mmol/L    Chloride 102 98 - 107 mmol/L    CO2 40.0 (H) 22.0 - 29.0 mmol/L    Calcium 8.9 8.6 - 10.5 mg/dL    Total Protein 5.3 (L) 6.0 - 8.5 g/dL    Albumin 3.3 (L) 3.5 - 5.2 g/dL    ALT (SGPT) 19 1 - 33 U/L    AST (SGOT) 15 1 - 32 U/L    Alkaline Phosphatase 55 39 - 117 U/L    Total Bilirubin 0.2 0.0 - 1.2 mg/dL    Globulin 2.0 gm/dL    A/G Ratio 1.7 g/dL    BUN/Creatinine Ratio 16.5 7.0 - 25.0    Anion Gap 3.0 (L) 5.0 - 15.0 mmol/L    eGFR 56.8 (L) >60.0 mL/min/1.73   Urinalysis With Microscopic If Indicated (No Culture) - Urine, Clean Catch    Specimen: Urine, Clean Catch   Result Value Ref Range    Color, UA Yellow Yellow, Straw    Appearance, UA Turbid (A) Clear    pH, UA 5.5 5.0 - 8.0    Specific Gravity, UA >=1.030 1.005 - 1.030    Glucose, UA Negative Negative    Ketones, UA Trace  (A) Negative    Bilirubin, UA Negative Negative    Blood, UA Moderate (2+) (A) Negative    Protein, UA >=300 mg/dL (3+) (A) Negative    Leuk Esterase, UA Small (1+) (A) Negative    Nitrite, UA Negative Negative    Urobilinogen, UA 1.0 E.U./dL 0.2 - 1.0 E.U./dL   CBC Auto Differential    Specimen: Blood   Result Value Ref Range    WBC 8.11 3.40 - 10.80 10*3/mm3    RBC 3.57 (L) 3.77 - 5.28 10*6/mm3    Hemoglobin 9.8 (L) 12.0 - 15.9 g/dL    Hematocrit 35.3 34.0 - 46.6 %    MCV 98.9 (H) 79.0 - 97.0 fL    MCH 27.5 26.6 - 33.0 pg    MCHC 27.8 (L) 31.5 - 35.7 g/dL    RDW 20.7 (H) 12.3 - 15.4 %    RDW-SD 74.6 (H) 37.0 - 54.0 fl    MPV 11.5 6.0 - 12.0 fL    Platelets 104 (L) 140 - 450 10*3/mm3    Neutrophil % 86.5 (H) 42.7 - 76.0 %    Lymphocyte % 6.4 (L) 19.6 - 45.3 %    Monocyte % 6.0 5.0 - 12.0 %    Eosinophil % 0.4 0.3 - 6.2 %    Basophil % 0.1 0.0 - 1.5 %    Immature Grans % 0.6 (H) 0.0 - 0.5 %    Neutrophils, Absolute 7.01 (H) 1.70 - 7.00 10*3/mm3    Lymphocytes, Absolute 0.52 (L) 0.70 - 3.10 10*3/mm3    Monocytes, Absolute 0.49 0.10 - 0.90 10*3/mm3    Eosinophils, Absolute 0.03 0.00 - 0.40 10*3/mm3    Basophils, Absolute 0.01 0.00 - 0.20 10*3/mm3    Immature Grans, Absolute 0.05 0.00 - 0.05 10*3/mm3    nRBC 0.0 0.0 - 0.2 /100 WBC   High Sensitivity Troponin T    Specimen: Blood   Result Value Ref Range    HS Troponin T 107 (C) <14 ng/L   Scan Slide    Specimen: Blood   Result Value Ref Range    Dacrocytes Slight/1+ None Seen    Hypochromia Mod/2+ None Seen    Ovalocytes Slight/1+ None Seen    Large Platelets Slight/1+ None Seen   High Sensitivity Troponin T 2Hr    Specimen: Hand, Left; Blood   Result Value Ref Range    HS Troponin T 111 (C) <14 ng/L    Troponin T Delta 4 (C) >=-4 - <+4 ng/L   BNP    Specimen: Blood   Result Value Ref Range    proBNP 4,452.0 (H) 0.0 - 1,800.0 pg/mL   Urinalysis, Microscopic Only - Urine, Clean Catch    Specimen: Urine, Clean Catch   Result Value Ref Range    RBC, UA 3-5 (A) None Seen,  0-2 /HPF    WBC, UA Too Numerous to Count (A) None Seen, 0-2 /HPF    Bacteria, UA 4+ (A) None Seen /HPF    Squamous Epithelial Cells, UA 3-6 (A) None Seen, 0-2 /HPF    Hyaline Casts, UA None Seen None Seen /LPF    Granular Casts, UA 7-12 None Seen /LPF    Methodology Automated Microscopy    [place] Urine Culture Tube - Urine, Clean Catch    Specimen: Urine, Clean Catch   Result Value Ref Range    Extra Tube Hold for add-ons.    Lactic Acid, Plasma    Specimen: Arm, Left; Blood   Result Value Ref Range    Lactate 0.7 0.5 - 2.0 mmol/L   aPTT    Specimen: Blood   Result Value Ref Range    PTT 32.2 26.5 - 34.5 seconds   Protime-INR    Specimen: Blood   Result Value Ref Range    Protime 19.2 (H) 12.1 - 14.7 Seconds    INR 1.55 (H) 0.90 - 1.10   ECG 12 Lead Rhythm Change   Result Value Ref Range    QT Interval 344 ms    QTC Interval 498 ms   Green Top (Gel)   Result Value Ref Range    Extra Tube Hold for add-ons.    Lavender Top   Result Value Ref Range    Extra Tube hold for add-on    Gold Top - SST   Result Value Ref Range    Extra Tube Hold for add-ons.    Light Blue Top   Result Value Ref Range    Extra Tube Hold for add-ons.       CT Angiogram Chest Pulmonary Embolism   Final Result   1. No evidence of pulmonary embolus.   2. Groundglass opacities in the left upper lobe which may be infectious   or inflammatory in nature.   3. Small left pleural effusion and basilar atelectasis.                   This report was finalized on 1/22/2024 2:43 PM by Lily uMllen M.D..          XR Chest 1 View   Final Result   Interstitial pulmonary edema and possible small effusions.           This report was finalized on 1/22/2024 12:09 PM by Lily Mullen M.D..          CT Head Without Contrast   Final Result   No acute intracranial process.                   This report was finalized on 1/22/2024 11:54 AM by Lily Mullen M.D..               ED Course  ED Course as of 01/22/24 1716   Mon Jan 22, 2024   1138 ECG  12 Lead Rhythm Change  Atrial fibrillation with RVR.  Rate 126.  Right axis.  Incomplete right bundle branch block.  Nonspecific T wave changes.  No ST elevation or depression.  Abnormal EKG.  Interpreted by me.  Electronically signed by Andrew Mccabe MD, 01/22/24, 11:39 AM EST.   [BC]   1201 CT Head Without Contrast []   1227 XR Chest 1 View [SM]   1418 Bacteria, UA(!): 4+ [SM]   1418 WBC, UA(!): Too Numerous to Count [SM]   1418 Leukocytes, UA(!): Small (1+) [SM]   1428 Troponin T Delta(!!): 4 [SM]   1430 Paged hospitalist for admission  []   1432 Patient absolutely refuses ASA at this time.  []   1442 D/W Dr. Dunham he is reaching out to cardiology and will advise from there.  []   1453 CT Angiogram Chest Pulmonary Embolism []   1453 D/W Dr. Dunham again will admit for Afib RVR []   1454 Dr. Turcios advises no ACS protocol initiated at this time. []      ED Course User Index  [BC] Andrew Mccabe MD  [] Jane Lew, PAULA                                             Medical Decision Making  Patient is a 73 year old female presenting to the ER with past medical history positive for anxiety, arthritis, COPD, CAD, dementia, depression, frequent urination, GERD, HLD, HTN, PUD presenting to the ER with no complaints.  Nursing home staff at [facility] reported that she was worse altered mental status than her baseline.  Patient does have dementia.  Patient denies any complaints at this time.  Patient denies shortness of air, chest pain, cough, fever, nausea, vomiting or any additional symptoms at this time.    Patient to be admitted to hospitalist for further evaluation and treatment.    Problems Addressed:  Acute UTI: complicated acute illness or injury  Atrial fibrillation with RVR: complicated acute illness or injury  Pneumonia of left upper lobe due to infectious organism: complicated acute illness or injury    Amount and/or Complexity of Data Reviewed  Labs: ordered. Decision-making details  documented in ED Course.  Radiology: ordered. Decision-making details documented in ED Course.  ECG/medicine tests: ordered. Decision-making details documented in ED Course.    Risk  OTC drugs.  Prescription drug management.  Decision regarding hospitalization.        Final diagnoses:   Atrial fibrillation with RVR   Pneumonia of left upper lobe due to infectious organism   Acute UTI       ED Disposition  ED Disposition       ED Disposition   Decision to Admit    Condition   --    Comment   Level of Care: Telemetry [5]   Diagnosis: A-fib [213036]                 No follow-up provider specified.       Medication List      No changes were made to your prescriptions during this visit.            Jane Lew, APRN  01/22/24 1718

## 2024-01-22 NOTE — H&P
[facility] Medicine Services  History & Physical    Patient Identification:  Name:  Brenda Munroe  Age:  73 y.o.  Sex:  female  :  1950  MRN:  [id number]   Visit Number:  [id number]  Admit Date: 2024   Primary Care Physician:  Bernadette Arevalo MD    Subjective     Chief complaint: tachycardia    History of presenting illness:      Brenda Munroe is a 73 y.o. female who, per ED hand off and provider note, presented for further evaluation of tachycardia, reported AMS, concern for possible infection. On exam patient resting comfortably, HR 110s on the monitor. She is unable to answer questions appropriately. Follows some commands. Baseline cognition is unclear but she does have a history of dementia noted in the chart.     Past medical history is significant for MDD, COPD, CAD, thyroid disease, hypertension, atrial fibrillation, carotid stenosis, dementia, hyperlipidemia    Upon arrival to the ED, vital signs were temp 97.5, heart rate 88, respirations 19, /89, SpO2 97% on 2 L per nasal cannula.  HS troponin elevated at 107 with repeat at 111.  No leukocytosis though neutrophil percentage is increased to 86.5. Urine concerning for infection with leukocytes, 3-5 RBCs and here, many WBCs and 4+ bacteria.  CT PE protocol was negative for PE.  EKG A-fib with RVR rate 126    Known Emergency Department medications received prior to my evaluation included rocephin, diltiazem, metoprolol tartrate, half liter normal saline.   Emergency Department Room location at the time of my evaluation was [facility].     ---------------------------------------------------------------------------------------------------------------------   Review of Systems   Reason unable to perform ROS: dementia, AMS.        ---------------------------------------------------------------------------------------------------------------------   Past Medical History:   Diagnosis Date    Anxiety     Arthritis     Bell's  palsy     Bladder prolapse, female, acquired     Carotid stenosis     COPD (chronic obstructive pulmonary disease)     COPD (chronic obstructive pulmonary disease)     Coronary artery disease     Dementia     Dementia     Depression     Difficulty walking     Disease of thyroid gland     Frequency of urination     GERD (gastroesophageal reflux disease)     Heart attack     Hyperlipidemia     Hypertension     Irregular heart beat     Peptic ulcer disease      Past Surgical History:   Procedure Laterality Date    CARDIAC CATHETERIZATION N/A 2/21/2023    Procedure: Left Heart Cath;  Surgeon: Tab Cifuentes MD;  Location: [facility] CATH INVASIVE LOCATION;  Service: Cardiology;  Laterality: N/A;    CARDIAC SURGERY      open heart  in 1999    CYSTOSCOPY N/A 11/8/2017    Procedure: CYSTOSCOPY;  Surgeon: Karl Nicolas DO;  Location: [facility] OR;  Service:     OTHER SURGICAL HISTORY      states she had fluid drained no surgery    VAGINAL HYSTERECTOMY W/ ANTERIOR AND POSTERIOR VAGINAL REPAIR N/A 11/8/2017    Procedure: VAGINAL HYSTERECTOMY WITH ANTERIOR, POSTERIOR, AND ENTEROCELE VAGINAL REPAIR;  Surgeon: Karl Nicolas DO;  Location: [facility] OR;  Service:     VAGINAL VAULT SUSPENSION N/A 11/8/2017    Procedure: VAGINAL VAULT SUSPENSION ;  Surgeon: Karl Nicolas DO;  Location: [facility] OR;  Service:      Family History   Problem Relation Age of Onset    Dementia Sister     Heart attack Mother     Tuberculosis Father     Breast cancer Neg Hx      Social History     Socioeconomic History    Marital status:     Number of children: 3   Tobacco Use    Smoking status: Every Day     Packs/day: 0.50     Years: 55.00     Additional pack years: 0.00     Total pack years: 27.50     Types: Cigarettes    Smokeless tobacco: Never   Vaping Use    Vaping Use: Never used   Substance and Sexual Activity    Alcohol use: No    Drug use: No    Sexual activity: Never     Comment: denies      ---------------------------------------------------------------------------------------------------------------------   Allergies:  Patient has no known allergies.  ---------------------------------------------------------------------------------------------------------------------   Home medications:    Medications below are reported home medications pulling from within the system; at this time, these medications have not been reconciled unless otherwise specified and are in the verification process for further verifcation as current home medications.  (Not in a hospital admission)      Hospital Scheduled Meds:  aspirin, 324 mg, Oral, Once  doxycycline, 100 mg, Intravenous, Once           Current listed hospital scheduled medications may not yet reflect those currently placed in orders that are signed and held awaiting patient's arrival to floor.   ---------------------------------------------------------------------------------------------------------------------     Objective     Vital Signs:  Temp:  [97.5 °F (36.4 °C)] 97.5 °F (36.4 °C)  Heart Rate:  [] 124  Resp:  [19] 19  BP: ()/(54-99) 101/90      01/22/24  1056   Weight: 74.8 kg (165 lb)     Body mass index is 27.46 kg/m².  ---------------------------------------------------------------------------------------------------------------------       Physical Exam  Vitals and nursing note reviewed.   Constitutional:       General: She is not in acute distress.  HENT:      Head: Normocephalic and atraumatic.   Eyes:      Extraocular Movements: Extraocular movements intact.      Conjunctiva/sclera: Conjunctivae normal.      Comments: Constricted pupils   Cardiovascular:      Rate and Rhythm: Tachycardia present. Rhythm irregular.   Pulmonary:      Effort: Pulmonary effort is normal. No respiratory distress.      Breath sounds: No rhonchi.   Abdominal:      Palpations: Abdomen is soft.   Musculoskeletal:      Right lower leg: No edema.      Left  "lower leg: No edema.   Skin:     General: Skin is warm and dry.   Neurological:      Mental Status: She is alert. She is disoriented.   Psychiatric:         Mood and Affect: Mood normal.         ---------------------------------------------------------------------------------------------------------------------  EKG:        I have personally looked at the EKG.  ---------------------------------------------------------------------------------------------------------------------   Results from last 7 days   Lab Units 01/22/24  1446 01/22/24  1136   LACTATE mmol/L 0.7  --    WBC 10*3/mm3  --  8.11   HEMOGLOBIN g/dL  --  9.8*   HEMATOCRIT %  --  35.3   MCV fL  --  98.9*   MCHC g/dL  --  27.8*   PLATELETS 10*3/mm3  --  104*   INR   --  1.55*         Results from last 7 days   Lab Units 01/22/24  1136   SODIUM mmol/L 145   POTASSIUM mmol/L 4.5   CHLORIDE mmol/L 102   CO2 mmol/L 40.0*   BUN mg/dL 17   CREATININE mg/dL 1.03*   CALCIUM mg/dL 8.9   GLUCOSE mg/dL 115*   ALBUMIN g/dL 3.3*   BILIRUBIN mg/dL 0.2   ALK PHOS U/L 55   AST (SGOT) U/L 15   ALT (SGPT) U/L 19   Estimated Creatinine Clearance: 47.8 mL/min (A) (by C-G formula based on SCr of 1.03 mg/dL (H)).  No results found for: \"AMMONIA\"  Results from last 7 days   Lab Units 01/22/24  1354 01/22/24  1136   HSTROP T ng/L 111* 107*     Results from last 7 days   Lab Units 01/22/24  1136   PROBNP pg/mL 4,452.0*     Lab Results   Component Value Date    HGBA1C 5.20 01/01/2024     Lab Results   Component Value Date    TSH 3.020 01/01/2024    FREET4 1.59 03/25/2021     No results found for: \"PREGTESTUR\", \"PREGSERUM\", \"HCG\", \"HCGQUANT\"  Pain Management Panel  More data exists         Latest Ref Rng & Units 12/27/2023 2/19/2023   Pain Management Panel   Creatinine, Urine mg/dL - 222.8    Amphetamine, Urine Qual Negative Negative  Negative    Barbiturates Screen, Urine Negative Negative  Negative    Benzodiazepine Screen, Urine Negative Negative  Positive    Buprenorphine, " "Screen, Urine Negative Negative  Negative    Cocaine Screen, Urine Negative Negative  Negative    Fentanyl, Urine Negative Negative  -   Methadone Screen , Urine Negative Negative  Negative    Methamphetamine, Ur Negative Negative  Negative      No results found for: \"BLOODCX\"  No results found for: \"URINECX\"  No results found for: \"WOUNDCX\"  No results found for: \"STOOLCX\"      ---------------------------------------------------------------------------------------------------------------------  Imaging Results (Last 7 Days)       Procedure Component Value Units Date/Time    CT Angiogram Chest Pulmonary Embolism [[id number]] Collected: 01/22/24 1414     Updated: 01/22/24 1445    Narrative:      PROCEDURE: CT ANGIOGRAM CHEST PULMONARY EMBOLISM-     HISTORY: Evaluate for PE     COMPARISON: 1/2/2024.     TECHNIQUE: Multiple axial CT images were obtained from the thoracic  inlet through the upper abdomen following the administration of Isovue  300 per the CT PE protocol. Coronal and oblique 3D MIP images were  reconstructed from the original axial data set. This study was performed  with techniques to keep radiation doses as low as reasonably achievable  (ALARA). Individualized dose reduction techniques using automated  exposure control or adjustment of mA and/or kV according to the patient  size were employed.     FINDINGS: There are no filling defects within the pulmonary arteries to  suggest an embolus. The thoracic aorta is normal in caliber. The heart  is enlarged. There is a small left pleural effusion. There is no  adenopathy. The thyroid gland is unremarkable. Lung windows reveal  interlobular septal thickening with groundglass opacities most  pronounced in the left upper lobe. There is atelectasis in the left  lower lobe and lingula. There is bronchial wall thickening in both lung  bases. The visualized upper abdomen is unremarkable. Bone windows reveal  no acute osseous abnormalities.       Impression:      1. " No evidence of pulmonary embolus.  2. Groundglass opacities in the left upper lobe which may be infectious  or inflammatory in nature.  3. Small left pleural effusion and basilar atelectasis.              This report was finalized on 1/22/2024 2:43 PM by Lily Mullen M.D..       XR Chest 1 View [[id number]] Collected: 01/22/24 1208     Updated: 01/22/24 1211    Narrative:      PROCEDURE: XR CHEST 1 VW-       HISTORY: AMS, SOA     COMPARISON: 1/1/2024.     FINDINGS: The patient is status post median sternotomy and CABG  procedure. The heart is normal in size. The mediastinum is unremarkable.  There is interstitial pulmonary edema. There are possible small  effusions. There is no pneumothorax. There are no acute osseous  abnormalities.       Impression:      Interstitial pulmonary edema and possible small effusions.        This report was finalized on 1/22/2024 12:09 PM by Lily Mullen M.D..       CT Head Without Contrast [[id number]] Collected: 01/22/24 1153     Updated: 01/22/24 1157    Narrative:      PROCEDURE: CT HEAD WO CONTRAST-     HISTORY: confused     COMPARISON: 1/1/2024.     TECHNIQUE: Multiple axial CT images were performed from the foramen  magnum to the vertex without enhancement. This study was performed with  techniques to keep radiation doses as low as reasonably achievable  (ALARA). Individualized dose reduction techniques using automated  exposure control or adjustment of mA and/or kV according to the patient  size were employed.     FINDINGS: There is generalized cerebral volume loss. Patchy  hypodensities in the periventricular white matter consistent with  chronic small vessel ischemic change. There is no CT evidence of  hemorrhage. There is no mass, mass effect or midline shift.  There is no  hydrocephalus. The paranasal sinuses are clear. Bone windows reveal no  acute osseous abnormalities.       Impression:      No acute intracranial process.              This report was finalized  "on 1/22/2024 11:54 AM by Lily Mullen M.D..               Cultures:  No results found for: \"BLOODCX\", \"URINECX\", \"WOUNDCX\", \"MRSACX\", \"RESPCX\", \"STOOLCX\"    Last echocardiogram:  Results for orders placed during the hospital encounter of 01/01/24    Adult Transthoracic Echo Limited W/ Cont if Necessary Per Protocol    Interpretation Summary    Normal left ventricular cavity size and wall thickness noted. All left ventricular wall segments contract normally.    Left ventricular ejection fraction appears to be 61 - 65%.    There is a trivial pericardial effusion adjacent to the left ventricle.          I have personally reviewed the above radiology images and read the final radiology report on 01/22/24  ---------------------------------------------------------------------------------------------------------------------  Assessment / Plan     Active Hospital Problems    Diagnosis  POA    **A-fib [I48.91]  Yes       ASSESSMENT/PLAN:    Atrial fibrillation, presenting with RVR  NSTEMI, likely T2 secondary to the above  Patient found to be tachycardic on arrival, EKG noted A-fib with RVR rate 126.  Did receive 10 mg IV diltiazem as well as metoprolol tartrate 25 mg x 1 in the ED.  Remained tachycardic 110s on admitting exam.  Admit to the telemetry unit for further management  Continue Eliquis  Will continue metoprolol tartrate at increased dose, 37.5 mg twice daily.  Monitor per telemetry and titrate dosing as needed.    Bacteriuria, concern for acute UTI  Questionable MIRA pneumonia   In the setting of COPD, not in acute exacerbation   Reported AMS, possibly metabolic encephalopathy due to above  Complicated by dementia   UA concerning for infection. CT PE protocol negative for PE but did note ground glass opacities in the MIRA concerning for infection.  Continue empiric coverage with rocephin, doxycycline  Follow-up blood and urine cultures  Repeat labs in the a.m.     Chronic:  MDD  CAD   Thyroid " disease  HTN  Carotid stenosis   Hyperlipidemia   Resume home regimen as indicated   Monitor vital signs   ----------  -DVT prophylaxis: Eliquis to serve   -Activity: as tolerated   -Expected length of stay: less than two midnights   -Disposition pending course     High risk secondary to A fib with RVR, likely UTI, with possible AMS in the setting of dementia     Code Status and Medical Interventions:   Ordered at: 01/22/24 1500     Code Status (Patient has no pulse and is not breathing):    CPR (Attempt to Resuscitate)     Medical Interventions (Patient has pulse or is breathing):    Full Support       Darryl Monteiro PA-C   01/22/24  15:41 EST

## 2024-01-22 NOTE — PLAN OF CARE
Goal Outcome Evaluation:   Pt resting in bed. No signs or symptoms of distress noted. No complaints at this time. Plan of care on going.

## 2024-01-23 LAB
ANION GAP SERPL CALCULATED.3IONS-SCNC: 5.6 MMOL/L (ref 5–15)
ANISOCYTOSIS BLD QL: NORMAL
BACTERIA BLD CULT: ABNORMAL
BASOPHILS # BLD AUTO: 0.02 10*3/MM3 (ref 0–0.2)
BASOPHILS NFR BLD AUTO: 0.3 % (ref 0–1.5)
BOTTLE TYPE: ABNORMAL
BUN SERPL-MCNC: 17 MG/DL (ref 8–23)
BUN/CREAT SERPL: 15.7 (ref 7–25)
CALCIUM SPEC-SCNC: 8.5 MG/DL (ref 8.6–10.5)
CHLORIDE SERPL-SCNC: 102 MMOL/L (ref 98–107)
CO2 SERPL-SCNC: 35.4 MMOL/L (ref 22–29)
CREAT SERPL-MCNC: 1.08 MG/DL (ref 0.57–1)
DEPRECATED RDW RBC AUTO: 78.2 FL (ref 37–54)
EGFRCR SERPLBLD CKD-EPI 2021: 53.7 ML/MIN/1.73
EOSINOPHIL # BLD AUTO: 0.03 10*3/MM3 (ref 0–0.4)
EOSINOPHIL NFR BLD AUTO: 0.4 % (ref 0.3–6.2)
ERYTHROCYTE [DISTWIDTH] IN BLOOD BY AUTOMATED COUNT: 21.1 % (ref 12.3–15.4)
GLUCOSE BLDC GLUCOMTR-MCNC: 77 MG/DL (ref 70–130)
GLUCOSE SERPL-MCNC: 105 MG/DL (ref 65–99)
HCT VFR BLD AUTO: 33.2 % (ref 34–46.6)
HGB BLD-MCNC: 9.2 G/DL (ref 12–15.9)
HYPOCHROMIA BLD QL: NORMAL
IMM GRANULOCYTES # BLD AUTO: 0.04 10*3/MM3 (ref 0–0.05)
IMM GRANULOCYTES NFR BLD AUTO: 0.6 % (ref 0–0.5)
LYMPHOCYTES # BLD AUTO: 0.65 10*3/MM3 (ref 0.7–3.1)
LYMPHOCYTES NFR BLD AUTO: 9 % (ref 19.6–45.3)
MAGNESIUM SERPL-MCNC: 2.1 MG/DL (ref 1.6–2.4)
MCH RBC QN AUTO: 27.9 PG (ref 26.6–33)
MCHC RBC AUTO-ENTMCNC: 27.7 G/DL (ref 31.5–35.7)
MCV RBC AUTO: 100.6 FL (ref 79–97)
MONOCYTES # BLD AUTO: 0.42 10*3/MM3 (ref 0.1–0.9)
MONOCYTES NFR BLD AUTO: 5.8 % (ref 5–12)
NEUTROPHILS NFR BLD AUTO: 6.04 10*3/MM3 (ref 1.7–7)
NEUTROPHILS NFR BLD AUTO: 83.9 % (ref 42.7–76)
NRBC BLD AUTO-RTO: 0 /100 WBC (ref 0–0.2)
OVALOCYTES BLD QL SMEAR: NORMAL
PLATELET # BLD AUTO: 99 10*3/MM3 (ref 140–450)
PMV BLD AUTO: 11.6 FL (ref 6–12)
POTASSIUM SERPL-SCNC: 4.4 MMOL/L (ref 3.5–5.2)
QT INTERVAL: 344 MS
QTC INTERVAL: 498 MS
RBC # BLD AUTO: 3.3 10*6/MM3 (ref 3.77–5.28)
SMALL PLATELETS BLD QL SMEAR: ADEQUATE
SODIUM SERPL-SCNC: 143 MMOL/L (ref 136–145)
TSH SERPL DL<=0.05 MIU/L-ACNC: 0.88 UIU/ML (ref 0.27–4.2)
WBC NRBC COR # BLD AUTO: 7.2 10*3/MM3 (ref 3.4–10.8)

## 2024-01-23 PROCEDURE — 85025 COMPLETE CBC W/AUTO DIFF WBC: CPT | Performed by: INTERNAL MEDICINE

## 2024-01-23 PROCEDURE — 85007 BL SMEAR W/DIFF WBC COUNT: CPT | Performed by: INTERNAL MEDICINE

## 2024-01-23 PROCEDURE — 25010000002 BUMETANIDE PER 0.5 MG: Performed by: INTERNAL MEDICINE

## 2024-01-23 PROCEDURE — 99232 SBSQ HOSP IP/OBS MODERATE 35: CPT | Performed by: INTERNAL MEDICINE

## 2024-01-23 PROCEDURE — 83735 ASSAY OF MAGNESIUM: CPT | Performed by: INTERNAL MEDICINE

## 2024-01-23 PROCEDURE — 80048 BASIC METABOLIC PNL TOTAL CA: CPT | Performed by: INTERNAL MEDICINE

## 2024-01-23 PROCEDURE — 94640 AIRWAY INHALATION TREATMENT: CPT

## 2024-01-23 PROCEDURE — G0378 HOSPITAL OBSERVATION PER HR: HCPCS

## 2024-01-23 PROCEDURE — 94664 DEMO&/EVAL PT USE INHALER: CPT

## 2024-01-23 PROCEDURE — 94799 UNLISTED PULMONARY SVC/PX: CPT

## 2024-01-23 PROCEDURE — 84443 ASSAY THYROID STIM HORMONE: CPT | Performed by: INTERNAL MEDICINE

## 2024-01-23 PROCEDURE — 97162 PT EVAL MOD COMPLEX 30 MIN: CPT

## 2024-01-23 PROCEDURE — 25010000002 CEFTRIAXONE PER 250 MG: Performed by: INTERNAL MEDICINE

## 2024-01-23 PROCEDURE — 94761 N-INVAS EAR/PLS OXIMETRY MLT: CPT

## 2024-01-23 PROCEDURE — 82948 REAGENT STRIP/BLOOD GLUCOSE: CPT

## 2024-01-23 RX ORDER — BUMETANIDE 0.25 MG/ML
1 INJECTION INTRAMUSCULAR; INTRAVENOUS ONCE
Status: COMPLETED | OUTPATIENT
Start: 2024-01-23 | End: 2024-01-23

## 2024-01-23 RX ORDER — METOPROLOL TARTRATE 50 MG/1
50 TABLET, FILM COATED ORAL EVERY 12 HOURS SCHEDULED
Status: DISCONTINUED | OUTPATIENT
Start: 2024-01-23 | End: 2024-01-27

## 2024-01-23 RX ORDER — DIGOXIN 125 MCG
125 TABLET ORAL
Status: DISCONTINUED | OUTPATIENT
Start: 2024-01-23 | End: 2024-01-25

## 2024-01-23 RX ADMIN — SERTRALINE 100 MG: 50 TABLET, FILM COATED ORAL at 09:07

## 2024-01-23 RX ADMIN — METOPROLOL TARTRATE 50 MG: 50 TABLET, FILM COATED ORAL at 21:40

## 2024-01-23 RX ADMIN — BACLOFEN 5 MG: 10 TABLET ORAL at 21:39

## 2024-01-23 RX ADMIN — DOXYCYCLINE 100 MG: 100 INJECTION, POWDER, LYOPHILIZED, FOR SOLUTION INTRAVENOUS at 14:14

## 2024-01-23 RX ADMIN — FERROUS SULFATE TAB 325 MG (65 MG ELEMENTAL FE) 325 MG: 325 (65 FE) TAB at 21:38

## 2024-01-23 RX ADMIN — APIXABAN 5 MG: 5 TABLET, FILM COATED ORAL at 21:38

## 2024-01-23 RX ADMIN — Medication 10 ML: at 21:39

## 2024-01-23 RX ADMIN — Medication 10 ML: at 09:07

## 2024-01-23 RX ADMIN — MEMANTINE HYDROCHLORIDE 10 MG: 10 TABLET, FILM COATED ORAL at 21:39

## 2024-01-23 RX ADMIN — DOCUSATE SODIUM 50 MG AND SENNOSIDES 8.6 MG 2 TABLET: 8.6; 5 TABLET, FILM COATED ORAL at 21:40

## 2024-01-23 RX ADMIN — BUMETANIDE 1 MG: 0.25 INJECTION, SOLUTION INTRAMUSCULAR; INTRAVENOUS at 18:55

## 2024-01-23 RX ADMIN — PANTOPRAZOLE SODIUM 40 MG: 40 TABLET, DELAYED RELEASE ORAL at 09:09

## 2024-01-23 RX ADMIN — ROSUVASTATIN CALCIUM 40 MG: 20 TABLET, FILM COATED ORAL at 21:38

## 2024-01-23 RX ADMIN — CLOPIDOGREL BISULFATE 75 MG: 75 TABLET, FILM COATED ORAL at 09:07

## 2024-01-23 RX ADMIN — FERROUS SULFATE TAB 325 MG (65 MG ELEMENTAL FE) 325 MG: 325 (65 FE) TAB at 09:06

## 2024-01-23 RX ADMIN — METOPROLOL TARTRATE 50 MG: 50 TABLET, FILM COATED ORAL at 09:06

## 2024-01-23 RX ADMIN — RISPERIDONE 0.5 MG: 0.25 TABLET, FILM COATED ORAL at 21:38

## 2024-01-23 RX ADMIN — DONEPEZIL HYDROCHLORIDE 10 MG: 5 TABLET, FILM COATED ORAL at 21:38

## 2024-01-23 RX ADMIN — MEMANTINE HYDROCHLORIDE 10 MG: 10 TABLET, FILM COATED ORAL at 09:05

## 2024-01-23 RX ADMIN — LAMOTRIGINE 100 MG: 100 TABLET ORAL at 09:07

## 2024-01-23 RX ADMIN — BUDESONIDE AND FORMOTEROL FUMARATE DIHYDRATE 2 PUFF: 160; 4.5 AEROSOL RESPIRATORY (INHALATION) at 18:43

## 2024-01-23 RX ADMIN — BACLOFEN 5 MG: 10 TABLET ORAL at 09:05

## 2024-01-23 RX ADMIN — LISINOPRIL 10 MG: 10 TABLET ORAL at 09:05

## 2024-01-23 RX ADMIN — DOCUSATE SODIUM 50 MG AND SENNOSIDES 8.6 MG 2 TABLET: 8.6; 5 TABLET, FILM COATED ORAL at 09:06

## 2024-01-23 RX ADMIN — ISOSORBIDE MONONITRATE 90 MG: 30 TABLET, EXTENDED RELEASE ORAL at 09:06

## 2024-01-23 RX ADMIN — RISPERIDONE 0.5 MG: 0.25 TABLET, FILM COATED ORAL at 09:07

## 2024-01-23 RX ADMIN — DOXYCYCLINE 100 MG: 100 INJECTION, POWDER, LYOPHILIZED, FOR SOLUTION INTRAVENOUS at 03:07

## 2024-01-23 RX ADMIN — APIXABAN 5 MG: 5 TABLET, FILM COATED ORAL at 09:05

## 2024-01-23 RX ADMIN — CEFTRIAXONE 1000 MG: 1 INJECTION, POWDER, FOR SOLUTION INTRAMUSCULAR; INTRAVENOUS at 14:13

## 2024-01-23 RX ADMIN — LAMOTRIGINE 25 MG: 100 TABLET ORAL at 21:38

## 2024-01-23 RX ADMIN — Medication 10 ML: at 21:40

## 2024-01-23 NOTE — PLAN OF CARE
Goal Outcome Evaluation:  Patient has been pleasant and drowsy majority of shift. Patient has been compliant with care. VSS. IV access maintained. IV Abx given as ordered. No S&S of distress noted at this time. Will continue the plan of care.

## 2024-01-23 NOTE — CONSULTS
[facility]  CONSULT  NOTE      Patient information:  Date of Admit: 1/22/2024  Date of Consult: 01/23/24  Hospitalist/Referring MD:Efren Dunham MD  PCP: Bernadette Arevalo MD  MRN:  [id number]  Visit Number:  [id number]    LOS: 0  CODE STATUS:  Code Status and Medical Interventions:   Ordered at: 01/22/24 1500     Code Status (Patient has no pulse and is not breathing):    CPR (Attempt to Resuscitate)     Medical Interventions (Patient has pulse or is breathing):    Full Support       PROBLEM LIST: Principal Problem:    A-fib        General Cardiology Consulting Physician: Dr. Toby Simeon MD, FACC    Assessment      Acute non-ST elevation myocardial infarction (possibly type II due to tachycardia)  ASCVD, status post CABG (remote) with recent cardiac catheterization on 2/18/2023 revealing a patent LIMA to LAD, occluded vein graft to the left circumflex and RCA with excellent collaterals from distal LAD to RCA and chronic calcific 95% stenosis in the mid left circumflex, and deemed to be high risk for PCI and left on medical therapy per Dr. Schmitz.  Possible acute HFpEF.  Abnormal nuclear stress test with small size, severe mid lateral and inferolateral myocardial ischemia in March 2022.  Paroxysmal atrial fibrillation with rapid ventricular response, patient has been on Eliquis for stroke prevention.  Acute mental status changes probably from metabolic encephalopathy from infection, being managed by the hospitalist service.  Continue tobacco abuse.  Mild anemia and thrombocytopenia  Chronic kidney disease (stage IIIa).  Staphylococcal bacteremia true versus contamination.    Recommendations     For her acute non-STEMI, continue with medical therapy with aspirin, statin and a beta-blocker as tolerated  Not a candidate for ischemic evaluation at this time due to altered mental status.  For her paroxysmal atrial fibrillation with rapid rate response, treated with  IV diltiazem and oral beta-blocker as needed and tolerated.  Continue with Eliquis  Continue to emphasize on smoking cessation.  For her acute HFpEF, consider cautious diuresis and monitor the kidney function closely.  Will transfer her care to interventional cardiology in a.m. (her primary cardiologist).    Reason for Cardiology consultation: NSTEMI, suspected type II.    Subjective Data   ADMISSION INFORMATION:  Chief Complaint   Patient presents with    Altered Mental Status     History of Present Illness    Brenda Munroe is a 73 y.o. female with a past medical history significant for COPD, chronic hypoxic and hypercapnic respiratory failure, paroxysmal atrial fibrillation, hypothyroidism, arthritis, essential hypertension, hyperlipidemia, chronic headaches, type II DM, bilateral carotid stenosis without occlusion, depression, history of psychosis, dementia, anxiety, history of Bell's palsy, CAD, physical debility, PUD, and GERD.     Patient [facility] emergency room (ER) on 1/22/2024 from her SNF with complaints of  tachycardia, reported AMS, concern for possible infection.     While in the ER, patient denied shortness of breath, chest pain, cough, fever, nausea, or vomiting. HS troponin elevated at 107 with repeat at 111.   Urine concerning for infection with leukocytes, 3-5 RBCs and here, many WBCs and 4+ bacteria.  CT PE protocol was negative for PE.  EKG A-fib with RVR rate 126.     Cardiology has been consulted for further evaluation and management NSTEMI, suspected type II.     Primary Cardiologist has been Dr. Alfonso in house during her last admission 01/01-01/11/2024.    Patient was in room 305 B when she was seen and examined by Dr. Simeon.  Patient is lying in bed resting quietly.  No acute distress noted at this time.  Head of bed is elevated to 90 degrees.  Patient is lethargic but arousable.  The only simple command patient followed was opening her eyes but only briefly.   "She did give verbal response and denied chest pain and shortness of breath with brief answer of \"no\".  Patient was able to tell us her name but she did not know where she was or the year. All her verbal communication was with her eyes closed.     Known medications given enroute vis EMS and in the ER:         Cardiac risk factors:diabetes mellitus, hypercholesterolemia, and hypertension      Last Echo: Results for orders placed during the hospital encounter of 01/01/24    Adult Transthoracic Echo Limited W/ Cont if Necessary Per Protocol    Interpretation Summary    Normal left ventricular cavity size and wall thickness noted. All left ventricular wall segments contract normally.    Left ventricular ejection fraction appears to be 61 - 65%.    There is a trivial pericardial effusion adjacent to the left ventricle.         Last Stress: Results for orders placed during the hospital encounter of 03/17/22    Stress test with myocardial perfusion one day    Interpretation Summary  · Findings consistent with a normal ECG stress test.  · Myocardial perfusion imaging study showed a small sized severe reversible ischemia in the mid lateral and inferolateral segments..  · Left ventricular ejection fraction is hyperdynamic (Calculated EF > 70%). .  · Normal LV cavity size. Normal LV wall motion noted.  · Impressions are consistent with an intermediate risk study.        Last Cath: Results for orders placed during the hospital encounter of 02/18/23    Cardiac Catheterization/Vascular Study    Narrative  Images from the original result were not included.  CARDIAC CATHETERIZATION / INTERVENTION REPORT    DATE OF PROCEDURE: 2/21/2023    INDICATION FOR PROCEDURE: NSTEMI      PRE PROCEDURE DIAGNOSIS:    Clinical frailty: 2- Well    ASA: 2=2- Mild to moderate systemic disease, medially well controlled, with no functional limitation    Mallampati: Class 2=2- Able to visualize the soft palate, fauces, uvula.  The anterior & posterior " tonsilar pillars are hidden by the tongue.    POST PROCEDURE DIAGNOSIS:  CAD with patent LIMA graft ( well matured - no stenosis and supplies LAD and excellent collateral to RCA) Lcx mid had old dissection calcified 95% stenosis and two SVG grafts are chronically occluded    Face to face moderate conscious sedation time:      COMPLICATIONS : None    Specimens collected : None        PROCEDURE PERFORMED:    1. Selective right and left coronary Angiogram  2. Left heart catheterization  3, Selective LIMA angiogram  4. Selective SVG- RCA and OM angiography      PROCEDURE COMMENTS:    Prior to the procedure risks benefits alternatives and possible adverse events were discussed with the patient and informed consent was obtained.  Brenda Munroe was brought to the cath lab and placed on the cardiac catherization table in the postabsortive state. The patient was prepped and draped according to the cath lab protocol under strict aseptic and sterile condition. Patient's right femoral artery sight was prepped and draped. Under fluoroscopic guidance the right femoral artery was punctured using a Micropuncture gauge needle utilizing the modified Seldinger technique. 6 Tunisian sheath was introduced over a wire. After aspirating for blood it was flushed with heparinized saline. Angio was performed to confirm the position of the sheath in  R CFA    We advanced Heraclio type diagnostic catheters over the wire. The  left and right coronary arteries  were selectively engaged. Left and right coronary angiogram were obtained in multiple orthogonal projections. 5 Georgian Pigtail catheter was used to cross across the AV retrograde into the LV cavity and resting LV pressures were recorded. Manual pull back was used to record gradient across the Aortic valve.    After completion of the procedure the femoral sheath was removed in the cath lab and hemostasis was achieved by manual compression. The patient tolerated the procedure without  complications.      Coronary anatomy findings:    Patient's native left main had no stenosis, LAD was  in the proximal, there is a vein graft that appears to be attached to a diagonal artery which has been chronically occluded with no other significant diagonals visualized.  The patient is native left circumflex artery appeared to be a large caliber vessel, there was a significant 99% calcified or dissected stenosis in the mid circumflex and beyond the stenosis that supplies 3 moderate-sized OM branches.  This vessel is not grafted.  The native RCA , there is 2 vein grafts which appear to be chronically occluded at this time, 1 possibly going to the RCA underwent likely going either to the circumflex OM or diagonal.  The LIMA graft was well matured with no significant stenosis    Large distal LAD notes excellent collaterals filling the RCA.            EBL: Less than 10cc        Final Impression:  Patient's angiogram did not demonstrate any acute thrombotic lesions that would explain for type I non-STEMI, her non-STEMI is likely a type II from her hypoxia in the setting of her older chronic lesion.  The mid circumflex stenosis is a very complex calcified old dissected lesion, stenosis MAYO-3 flow I would recommend only medical management at this time.  If the patient ever needs intervention estimated atherectomy based PCI.  Since patient denies any anginal symptoms I would favor to continue with current identity medical management for CAD.                  Tab Cifuentes MD, [facility]  Interventional Cardiology    02/21/23  14:29 EST                            Past Medical History:   Diagnosis Date    Anxiety     Arthritis     Bell's palsy     Bladder prolapse, female, acquired     Carotid stenosis     COPD (chronic obstructive pulmonary disease)     COPD (chronic obstructive pulmonary disease)     Coronary artery disease     Dementia     Dementia     Depression     Difficulty walking     Disease of thyroid  gland     Frequency of urination     GERD (gastroesophageal reflux disease)     Heart attack     Hyperlipidemia     Hypertension     Irregular heart beat     Peptic ulcer disease      Past Surgical History:   Procedure Laterality Date    CARDIAC CATHETERIZATION N/A 2/21/2023    Procedure: Left Heart Cath;  Surgeon: Tab Cifuentes MD;  Location: [facility] CATH INVASIVE LOCATION;  Service: Cardiology;  Laterality: N/A;    CARDIAC SURGERY      open heart  in 1999    CYSTOSCOPY N/A 11/8/2017    Procedure: CYSTOSCOPY;  Surgeon: Karl Nicolas DO;  Location: [facility] OR;  Service:     OTHER SURGICAL HISTORY      states she had fluid drained no surgery    VAGINAL HYSTERECTOMY W/ ANTERIOR AND POSTERIOR VAGINAL REPAIR N/A 11/8/2017    Procedure: VAGINAL HYSTERECTOMY WITH ANTERIOR, POSTERIOR, AND ENTEROCELE VAGINAL REPAIR;  Surgeon: Karl Nicolas DO;  Location: [facility] OR;  Service:     VAGINAL VAULT SUSPENSION N/A 11/8/2017    Procedure: VAGINAL VAULT SUSPENSION ;  Surgeon: Karl Nicolas DO;  Location: [facility] OR;  Service:      Family History   Problem Relation Age of Onset    Dementia Sister     Heart attack Mother     Tuberculosis Father     Breast cancer Neg Hx      Social History     Tobacco Use    Smoking status: Every Day     Packs/day: 0.50     Years: 55.00     Additional pack years: 0.00     Total pack years: 27.50     Types: Cigarettes    Smokeless tobacco: Never   Vaping Use    Vaping Use: Never used   Substance Use Topics    Alcohol use: No    Drug use: No       ALLERGIES: Patient has no known allergies.    Medications listed below are reported home medications pulling from within the system:  Medications Prior to Admission   Medication Sig Dispense Refill Last Dose    apixaban (ELIQUIS) 5 MG tablet tablet Take 1 tablet by mouth 2 (Two) Times a Day.   1/22/2024    baclofen (LIORESAL) 10 MG tablet Take 0.5 tablets by mouth 2 (Two) Times a Day. Indications: Muscle Spasticity    1/22/2024    clopidogrel (PLAVIX) 75 MG tablet Take 1 tablet by mouth Daily. 30 tablet  1/22/2024    donepezil (ARICEPT) 10 MG tablet Take 1 tablet by mouth Every Night.   1/21/2024    ferrous sulfate 325 (65 FE) MG tablet Take 1 tablet by mouth 2 (Two) Times a Day. 60 tablet 5 1/22/2024    isosorbide mononitrate (IMDUR) 30 MG 24 hr tablet Take 3 tablets by mouth Daily.   1/22/2024    lamoTRIgine (LaMICtal) 100 MG tablet Take 1 tablet by mouth Daily.   1/22/2024    lamoTRIgine (LaMICtal) 25 MG tablet Take 1 tablet by mouth Every Night.   1/21/2024    levothyroxine (SYNTHROID, LEVOTHROID) 75 MCG tablet Take 1 tablet by mouth Daily.   1/22/2024    lisinopril (PRINIVIL,ZESTRIL) 10 MG tablet Take 1 tablet by mouth Daily.   1/22/2024    memantine (NAMENDA) 10 MG tablet Take 1 tablet by mouth 2 (Two) Times a Day.   1/22/2024    metoprolol succinate XL (TOPROL-XL) 25 MG 24 hr tablet Take 2 tablets by mouth Daily. 30 tablet 1 1/22/2024    pantoprazole (PROTONIX) 40 MG EC tablet Take 1 tablet by mouth Daily. 10 tablet 0 1/22/2024    risperiDONE (risperDAL) 0.5 MG tablet Take 1 tablet by mouth 2 (Two) Times a Day.   1/22/2024    rosuvastatin (CRESTOR) 40 MG tablet Take 1 tablet by mouth Every Evening.   1/21/2024    sertraline (ZOLOFT) 100 MG tablet Take 1 tablet by mouth Daily.   1/22/2024    Trelegy Ellipta 100-62.5-25 MCG/INH inhaler Inhale 1 puff Daily.   1/22/2024    acetaminophen (TYLENOL) 500 MG tablet Take 1 tablet by mouth Every 4 (Four) Hours As Needed for Mild Pain.   1/18/2024    Dextran 70-Hypromellose (Lubricating Tears Eye Drops) 0.1-0.3 % solution Apply 1 drop to eye(s) as directed by provider Every 2 (Two) Hours As Needed (dry eyes). 30 mL 0 Unknown    guaiFENesin 200 MG tablet Take 2 tablets by mouth Every 4 (Four) Hours As Needed for Congestion.   Unknown    LORazepam (ATIVAN) 0.5 MG tablet Take 1 tablet by mouth Every 12 (Twelve) Hours As Needed for Anxiety.   1/1/2024    nitroglycerin (NITROSTAT) 0.4 MG  SL tablet Place 1 tablet under the tongue Every 5 (Five) Minutes As Needed.   Unknown    phenol (Chloraseptic) 1.4 % liquid liquid Apply 1 spray to the mouth or throat Every 2 (Two) Hours As Needed (sore throat).   Unknown    senna (SENOKOT) 8.6 MG tablet Take 1 tablet by mouth Daily As Needed for Constipation.   Unknown    trolamine salicylate (ASPERCREME) 10 % cream Apply 1 application  topically to the appropriate area as directed Every 6 (Six) Hours As Needed for Muscle / Joint Pain.   1/11/2024    vitamin D (ERGOCALCIFEROL) 1.25 MG (22567 UT) capsule capsule Take 1 capsule by mouth 1 (One) Time Per Week.   1/19/2024       Review of Systems   Unable to perform ROS: Mental status change   And Dementia     Objective Data      Vital Signs  Temp:  [97.4 °F (36.3 °C)-98.5 °F (36.9 °C)] 98 °F (36.7 °C)  Heart Rate:  [] 77  Resp:  [16-20] 16  BP: (101-134)/(54-99) 109/54  Flow (L/min):  [2-2.5] 2  Device (Oxygen Therapy): nasal cannula  Vital Signs (last 72 hrs)         01/20 0700  01/21 0659 01/21 0700  01/22 0659 01/22 0700  01/23 0659 01/23 0700  01/23 1153   Most Recent      Temp (°F)     97.4 -  98.5      98     98 (36.7) 01/23 1150    Heart Rate     86 -  133      77     77 01/23 1150    Resp     16 -  20      16     16 01/23 1150    BP     64/54 -  134/83      109/54     109/54 01/23 1150    SpO2 (%)     92 -  98      95     95 01/23 1150    Flow (L/min)     2 -  2.5      2     2 01/23 0745          BMI:  Body mass index is 28.39 kg/m².    WEIGHT:      01/22/24  1625 01/23/24  0500   Weight: 77.1 kg (170 lb) 75 kg (165 lb 6.4 oz)       DIET:  Diet: Cardiac Diets, Diabetic Diets; Healthy Heart (2-3 Na+); Consistent Carbohydrate; Texture: Regular Texture (IDDSI 7); Fluid Consistency: Thin (IDDSI 0)    I&O:  Intake & Output (last 3 days)         01/20 0701 01/21 0700 01/21 0701 01/22 0700 01/22 0701 01/23 0700 01/23 0701 01/24 0700    P.O.   240 120    IV Piggyback   700     Total Intake(mL/kg)   940  "(12.5) 120 (1.6)    Net   +940 +120            Urine Unmeasured Occurrence   4 x             Physical Exam  Constitutional:       Appearance: Normal appearance.      Comments: Lethargic, elderly and chronically ill-appearing.    HENT:      Head: Normocephalic and atraumatic.      Nose: Nose normal.      Mouth/Throat:      Mouth: Mucous membranes are moist.      Pharynx: Oropharynx is clear.   Eyes:      Conjunctiva/sclera: Conjunctivae normal.   Neck:      Vascular: No JVD.   Cardiovascular:      Rate and Rhythm: Tachycardia present. Rhythm irregular.      Pulses: Normal pulses.      Heart sounds: Normal heart sounds.   Pulmonary:      Effort: Pulmonary effort is normal.      Breath sounds: Normal breath sounds.   Abdominal:      General: Bowel sounds are normal.      Palpations: Abdomen is soft.   Musculoskeletal:         General: Normal range of motion.      Cervical back: Normal range of motion.      Right lower leg: No edema.      Left lower leg: No edema.   Skin:     General: Skin is warm and dry.   Neurological:      Comments: Patient is lethargic but arousable.  The only simple command patient followed was opening her eyes but only briefly.  She did give verbal response and denied chest pain and shortness of breath with brief answer of \"no\".  Patient was able to tell us her name but she did not know where she was or the year. All her verbal communication was with her eyes closed.    Psychiatric:         Mood and Affect: Mood normal.         Behavior: Behavior normal.       Results review   Results Review:    I have reviewed the patient's new clinical results.    Results from last 7 days   Lab Units 01/22/24  1354 01/22/24  1136   HSTROP T ng/L 111* 107*     Lab Results   Component Value Date    PROBNP 4,452.0 (H) 01/22/2024    PROBNP 2,515.0 (H) 02/18/2023    PROBNP 289.6 03/12/2022     Results from last 7 days   Lab Units 01/23/24  0048 01/22/24  1136   WBC 10*3/mm3 7.20 8.11   HEMOGLOBIN g/dL 9.2* 9.8* " "  PLATELETS 10*3/mm3 99* 104*     Results from last 7 days   Lab Units 01/23/24  0048 01/22/24  1136   SODIUM mmol/L 143 145   POTASSIUM mmol/L 4.4 4.5   CHLORIDE mmol/L 102 102   CO2 mmol/L 35.4* 40.0*   BUN mg/dL 17 17   CREATININE mg/dL 1.08* 1.03*   CALCIUM mg/dL 8.5* 8.9   GLUCOSE mg/dL 105* 115*   ALT (SGPT) U/L  --  19   AST (SGOT) U/L  --  15     Lab Results   Component Value Date    MG 2.1 01/23/2024    MG 1.9 01/04/2024    MG 2.1 12/27/2023     Lab Results   Component Value Date    CHOL 119 01/01/2024    TRIG 69 01/01/2024    HDL 59 01/01/2024    LDL 46 01/01/2024     Estimated Creatinine Clearance: 44.6 mL/min (A) (by C-G formula based on SCr of 1.08 mg/dL (H)).  Lab Results   Component Value Date    HGBA1C 5.20 01/01/2024     Lab Results   Component Value Date    INR 1.55 (H) 01/22/2024    INR 1.04 09/17/2023    INR 1.16 (H) 02/18/2023    INR 0.97 03/12/2022    INR 1.16 (H) 02/26/2021    INR 1.32 (H) 03/27/2020    INR 1.22 (H) 11/18/2019     No results found for: \"LABHEPA\"      Lab Results   Component Value Date    TSH 0.876 01/23/2024      No results found for: \"URICACID\"  Pain Management Panel  More data exists         Latest Ref Rng & Units 12/27/2023 2/19/2023   Pain Management Panel   Creatinine, Urine mg/dL - 222.8    Amphetamine, Urine Qual Negative Negative  Negative    Barbiturates Screen, Urine Negative Negative  Negative    Benzodiazepine Screen, Urine Negative Negative  Positive    Buprenorphine, Screen, Urine Negative Negative  Negative    Cocaine Screen, Urine Negative Negative  Negative    Fentanyl, Urine Negative Negative  -   Methadone Screen , Urine Negative Negative  Negative    Methamphetamine, Ur Negative Negative  Negative      Microbiology Results (last 10 days)       Procedure Component Value - Date/Time    Blood Culture - Blood, Arm, Left [[id number]]  (Abnormal) Collected: 01/22/24 1446    Lab Status: Preliminary result Specimen: Blood from Arm, Left Updated: 01/23/24 0859     " Blood Culture Abnormal Stain     Gram Stain Aerobic Bottle Gram positive cocci in pairs and clusters    Blood Culture ID, PCR - Blood, Arm, Left [[id number]]  (Abnormal) Collected: 24 1446    Lab Status: Final result Specimen: Blood from Arm, Left Updated: 24 0859     BCID, PCR Staph spp, not aureus or lugdunensis. Identification by BCID2 PCR.     BOTTLE TYPE Aerobic Bottle    Urine Culture - Urine, Urine, Clean Catch [[id number]]  (Abnormal) Collected: 24 1323    Lab Status: Preliminary result Specimen: Urine, Clean Catch Updated: 24 1025     Urine Culture 25,000 CFU/mL Gram Negative Bacilli    Narrative:      Colonization of the urinary tract without infection is common. Treatment is discouraged unless the patient is symptomatic, pregnant, or undergoing an invasive urologic procedure.           Blood Culture   Date Value Ref Range Status   2024 Abnormal Stain (C)  Preliminary         [id number]        ECG/EMG Results (last 24 hours)       Procedure Component Value Units Date/Time    SCANNED - TELEMETRY   [[id number]] Resulted: 24     Updated: 24 1950    SCANNED - TELEMETRY   [[id number]] Resulted: 24     Updated: 24 0728    SCANNED - TELEMETRY   [[id number]] Resulted: 24     Updated: 24 0836            TELEMETRY:   Atrial fibrillation 110s        RADIOLOGY STUDIES:  Imaging Results (Last 72 Hours)       Procedure Component Value Units Date/Time    CT Angiogram Chest Pulmonary Embolism [[id number]] Collected: 24 1414     Updated: 24 1445    Narrative:      PROCEDURE: CT ANGIOGRAM CHEST PULMONARY EMBOLISM-     HISTORY: Evaluate for PE     COMPARISON: 2024.     TECHNIQUE: Multiple axial CT images were obtained from the thoracic  inlet through the upper abdomen following the administration of Isovue  300 per the CT PE protocol. Coronal and oblique 3D MIP images were  reconstructed from the original axial data set. This study was  performed  with techniques to keep radiation doses as low as reasonably achievable  (ALARA). Individualized dose reduction techniques using automated  exposure control or adjustment of mA and/or kV according to the patient  size were employed.     FINDINGS: There are no filling defects within the pulmonary arteries to  suggest an embolus. The thoracic aorta is normal in caliber. The heart  is enlarged. There is a small left pleural effusion. There is no  adenopathy. The thyroid gland is unremarkable. Lung windows reveal  interlobular septal thickening with groundglass opacities most  pronounced in the left upper lobe. There is atelectasis in the left  lower lobe and lingula. There is bronchial wall thickening in both lung  bases. The visualized upper abdomen is unremarkable. Bone windows reveal  no acute osseous abnormalities.       Impression:      1. No evidence of pulmonary embolus.  2. Groundglass opacities in the left upper lobe which may be infectious  or inflammatory in nature.  3. Small left pleural effusion and basilar atelectasis.              This report was finalized on 1/22/2024 2:43 PM by Lily Mullen M.D..       XR Chest 1 View [[id number]] Collected: 01/22/24 1208     Updated: 01/22/24 1211    Narrative:      PROCEDURE: XR CHEST 1 VW-       HISTORY: AMS, SOA     COMPARISON: 1/1/2024.     FINDINGS: The patient is status post median sternotomy and CABG  procedure. The heart is normal in size. The mediastinum is unremarkable.  There is interstitial pulmonary edema. There are possible small  effusions. There is no pneumothorax. There are no acute osseous  abnormalities.       Impression:      Interstitial pulmonary edema and possible small effusions.        This report was finalized on 1/22/2024 12:09 PM by Lily Mullen M.D..       CT Head Without Contrast [[id number]] Collected: 01/22/24 1153     Updated: 01/22/24 1157    Narrative:      PROCEDURE: CT HEAD WO CONTRAST-     HISTORY:  confused     COMPARISON: 1/1/2024.     TECHNIQUE: Multiple axial CT images were performed from the foramen  magnum to the vertex without enhancement. This study was performed with  techniques to keep radiation doses as low as reasonably achievable  (ALARA). Individualized dose reduction techniques using automated  exposure control or adjustment of mA and/or kV according to the patient  size were employed.     FINDINGS: There is generalized cerebral volume loss. Patchy  hypodensities in the periventricular white matter consistent with  chronic small vessel ischemic change. There is no CT evidence of  hemorrhage. There is no mass, mass effect or midline shift.  There is no  hydrocephalus. The paranasal sinuses are clear. Bone windows reveal no  acute osseous abnormalities.       Impression:      No acute intracranial process.              This report was finalized on 1/22/2024 11:54 AM by Lily Mullen M.D..               ALLERGIES: Patient has no known allergies.    CURRENT MEDICATIONS:  Current list of medications may not reflect those currently placed in orders that are not signed or are being held.     apixaban, 5 mg, Oral, Q12H  aspirin, 324 mg, Oral, Once  baclofen, 5 mg, Oral, BID  budesonide-formoterol, 2 puff, Inhalation, BID - RT   And  tiotropium bromide monohydrate, 2 puff, Inhalation, Daily - RT  cefTRIAXone, 1,000 mg, Intravenous, Q24H  [START ON 1/26/2024] cholecalciferol, 50,000 Units, Oral, Weekly  clopidogrel, 75 mg, Oral, Daily  donepezil, 10 mg, Oral, Nightly  doxycycline, 100 mg, Intravenous, Q12H  ferrous sulfate, 325 mg, Oral, BID  isosorbide mononitrate, 90 mg, Oral, Daily  lamoTRIgine, 100 mg, Oral, Daily  lamoTRIgine, 25 mg, Oral, Nightly  levothyroxine, 75 mcg, Oral, Q AM  lisinopril, 10 mg, Oral, Q24H  memantine, 10 mg, Oral, Q12H  metoprolol tartrate, 50 mg, Oral, Q12H  pantoprazole, 40 mg, Oral, QAM AC  risperiDONE, 0.5 mg, Oral, BID  rosuvastatin, 40 mg, Oral,  Nightly  senna-docusate sodium, 2 tablet, Oral, BID  sertraline, 100 mg, Oral, Daily  sodium chloride, 10 mL, Intravenous, Q12H           acetaminophen    senna-docusate sodium **AND** polyethylene glycol **AND** bisacodyl **AND** bisacodyl    LORazepam    nitroglycerin    [COMPLETED] Insert Peripheral IV **AND** sodium chloride    sodium chloride    sodium chloride    Thank you very much for asking us to be involved in this patient's care.  We will follow along with you. Please do not hesitate to call for any questions or concerns.     I have discussed the patients findings and recommendations with patient.      Electronically signed by PAULA Clayton, 01/23/24, 12:14 PM EST.     Electronically signed by Toby Simeon MD, 01/23/24, 6:03 PM EST.          Please note that portions of this note were copied and has been reviewed and is accurate as of 1/23/2024 .      Please note that portions of this note were completed with a voice recognition program.

## 2024-01-23 NOTE — THERAPY EVALUATION
Acute Care - Physical Therapy Initial Evaluation  [facility]     Patient Name: Brenda Munroe  : 1950  MRN: [id number]  Today's Date: 2024      Visit Dx:     ICD-10-CM ICD-9-CM   1. Atrial fibrillation with RVR  I48.91 427.31   2. Pneumonia of left upper lobe due to infectious organism  J18.9 486   3. Acute UTI  N39.0 599.0     Patient Active Problem List   Diagnosis    Psychosis    Severe recurrent major depression with psychotic features    COPD (chronic obstructive pulmonary disease)    Coronary artery disease    Disease of thyroid gland    Arthritis    Hypertension    Paroxysmal atrial fibrillation    Chronic headaches    Vision changes    Carotid stenosis, asymptomatic, bilateral    Bradycardia, sinus    Acute respiratory failure with hypoxia and hypercapnia    NSTEMI (non-ST elevated myocardial infarction)    A-fib     Past Medical History:   Diagnosis Date    Anxiety     Arthritis     Bell's palsy     Bladder prolapse, female, acquired     Carotid stenosis     COPD (chronic obstructive pulmonary disease)     COPD (chronic obstructive pulmonary disease)     Coronary artery disease     Dementia     Dementia     Depression     Difficulty walking     Disease of thyroid gland     Frequency of urination     GERD (gastroesophageal reflux disease)     Heart attack     Hyperlipidemia     Hypertension     Irregular heart beat     Peptic ulcer disease      Past Surgical History:   Procedure Laterality Date    CARDIAC CATHETERIZATION N/A 2023    Procedure: Left Heart Cath;  Surgeon: Tab Cifuentes MD;  Location: [facility] CATH INVASIVE LOCATION;  Service: Cardiology;  Laterality: N/A;    CARDIAC SURGERY      open heart  in     CYSTOSCOPY N/A 2017    Procedure: CYSTOSCOPY;  Surgeon: Karl Nicolas DO;  Location: [facility] OR;  Service:     OTHER SURGICAL HISTORY      states she had fluid drained no surgery    VAGINAL HYSTERECTOMY W/ ANTERIOR AND POSTERIOR VAGINAL REPAIR N/A  11/8/2017    Procedure: VAGINAL HYSTERECTOMY WITH ANTERIOR, POSTERIOR, AND ENTEROCELE VAGINAL REPAIR;  Surgeon: Karl Nicolas DO;  Location:  [facility] OR;  Service:     VAGINAL VAULT SUSPENSION N/A 11/8/2017    Procedure: VAGINAL VAULT SUSPENSION ;  Surgeon: Karl Nicolas DO;  Location:  [facility] OR;  Service:      PT Assessment (last 12 hours)       PT Evaluation and Treatment       Row Name 01/23/24 1347          Physical Therapy Time and Intention    Document Type evaluation  -KM     Mode of Treatment individual therapy;physical therapy  -KM     Patient Effort fair  -KM     Symptoms Noted During/After Treatment fatigue  -KM       Row Name 01/23/24 1347          General Information    Patient Profile Reviewed yes  -KM     Patient Observations lethargic  -KM     Prior Level of Function max assist:;dependent:;bed mobility;transfer  -KM     Existing Precautions/Restrictions fall  -KM     Risks Reviewed patient:;LOB;nausea/vomiting;dizziness;increased discomfort  -KM     Benefits Reviewed patient:;improve function;increase independence;increase strength;increase balance  -KM     Barriers to Rehab medically complex;previous functional deficit;cognitive status  -KM       Row Name 01/23/24 1347          Living Environment    Current Living Arrangements assisted living facility  -KM     People in Home facility resident  -KM     Primary Care Provided by --  facility staff  -KM       Row Name 01/23/24 1347          Home Use of Assistive/Adaptive Equipment    Equipment Currently Used at Home oxygen;wheelchair, motorized  -KM       Row Name 01/23/24 1347          Cognition    Affect/Mental Status (Cognition) flat/blunted affect;low arousal/lethargic  -KM     Orientation Status (Cognition) oriented to;person  -KM     Follows Commands (Cognition) unable/difficult to assess  -KM       Row Name 01/23/24 1347          Range of Motion (ROM)    Range of Motion bilateral lower extremities;ROM is WFL  AAROM  -KM       Row  Name 01/23/24 1347          Strength (Manual Muscle Testing)    Strength (Manual Muscle Testing) --  <3/5  -KM       Row Name 01/23/24 1347          Bed Mobility    Bed Mobility rolling left;rolling right  -KM     Rolling Left Smyth (Bed Mobility) maximum assist (25% patient effort);dependent (less than 25% patient effort)  -KM     Rolling Right Smyth (Bed Mobility) maximum assist (25% patient effort);dependent (less than 25% patient effort)  -KM     Bed Mobility, Safety Issues decreased use of arms for pushing/pulling;decreased use of legs for bridging/pushing;impaired trunk control for bed mobility  -KM     Assistive Device (Bed Mobility) bed rails;draw sheet;head of bed elevated  -KM       Row Name 01/23/24 1347          Transfers    Comment, (Transfers) unable to perform  -KM       Row Name 01/23/24 1347          Gait/Stairs (Locomotion)    Patient was able to Ambulate no, other medical factors prevent ambulation  -KM     Reason Patient was unable to Ambulate Excessive Weakness;Non-Ambulatory at Baseline  -KM       Row Name 01/23/24 1347          Safety Issues, Functional Mobility    Impairments Affecting Function (Mobility) balance;endurance/activity tolerance;strength  -KM       Row Name 01/23/24 1347          Balance    Comment, Balance unable to assess  -KM       Row Name 01/23/24 1347          Plan of Care Review    Plan of Care Reviewed With patient  -KM     Outcome Evaluation Pt. evaluation completed during PT session. She was able to perform minimal bed mobility skills w/ maxA-dependent. Pt. was lethargic throughout evaluation and had difficulty following instructions. Pt. would benefit from PT services as notes are needed.  -KM       Row Name 01/23/24 1347          Therapy Assessment/Plan (PT)    Functional Level at Time of Evaluation (PT) maxA-dependent  -KM     PT Diagnosis (PT) decreased mobility  -KM     Rehab Potential (PT) fair, will monitor progress closely  -KM     Criteria for  Skilled Interventions Met (PT) yes;skilled treatment is necessary  -KM     Therapy Frequency (PT) --  notes as needed  -KM     Problem List (PT) problems related to;balance;cognition;mobility;strength;postural control  -KM     Activity Limitations Related to Problem List (PT) unable to ambulate safely;unable to transfer safely  -KM       Row Name 01/23/24 1348          Therapy Plan Review/Discharge Plan (PT)    Therapy Plan Review (PT) evaluation/treatment results reviewed;care plan/treatment goals reviewed;risks/benefits reviewed;patient  -KM       Row Name 01/23/24 1347          Physical Therapy Goals    Bed Mobility Goal Selection (PT) bed mobility, PT goal 1  -KM     Transfer Goal Selection (PT) transfer, PT goal 1  -KM       Row Name 01/23/24 1346          Bed Mobility Goal 1 (PT)    Activity/Assistive Device (Bed Mobility Goal 1, PT) bed mobility activities, all  -KM     Barber Level/Cues Needed (Bed Mobility Goal 1, PT) moderate assist (50-74% patient effort)  -KM     Time Frame (Bed Mobility Goal 1, PT) by discharge  -       Row Name 01/23/24 8460          Transfer Goal 1 (PT)    Activity/Assistive Device (Transfer Goal 1, PT) transfers, all;walker, rolling  -KM     Barber Level/Cues Needed (Transfer Goal 1, PT) moderate assist (50-74% patient effort)  -KM     Time Frame (Transfer Goal 1, PT) by discharge  -               User Key  (r) = Recorded By, (t) = Taken By, (c) = Cosigned By      Initials Name Provider Type    Alen Nava, PT Physical Therapist                    Physical Therapy Education       Title: PT OT SLP Therapies (Done)       Topic: Physical Therapy (Done)       Point: Mobility training (Done)       Learning Progress Summary             Patient Acceptance, E,TB, VU by  at 1/23/2024 1403                         Point: Home exercise program (Done)       Learning Progress Summary             Patient Acceptance, E,TB, VU by  at 1/23/2024 1403                          Point: Body mechanics (Done)       Learning Progress Summary             Patient Acceptance, E,TB, VU by  at 1/23/2024 1403                         Point: Precautions (Done)       Learning Progress Summary             Patient Acceptance, E,TB, VU by  at 1/23/2024 1403                                         User Key       Initials Effective Dates Name Provider Type Discipline     05/24/22 -  Alen Darling, PT Physical Therapist PT                  PT Recommendation and Plan  Anticipated Discharge Disposition (PT):  (return to prior facility)  Planned Therapy Interventions (PT): balance training, bed mobility training, strengthening, stretching, postural re-education, patient/family education, transfer training  Therapy Frequency (PT):  (notes as needed)  Plan of Care Reviewed With: patient  Outcome Evaluation: Pt. evaluation completed during PT session. She was able to perform minimal bed mobility skills w/ maxA-dependent. Pt. was lethargic throughout evaluation and had difficulty following instructions. Pt. would benefit from PT services as notes are needed.       Time Calculation:    PT Charges       Row Name 01/23/24 1346             Time Calculation    PT Received On 01/23/24  -KM      PT Goal Re-Cert Due Date 02/06/24  -KM                User Key  (r) = Recorded By, (t) = Taken By, (c) = Cosigned By      Initials Name Provider Type     Alen Darling, PT Physical Therapist                  Therapy Charges for Today       Code Description Service Date Service Provider Modifiers Qty    17203163905 HC PT EVAL MOD COMPLEXITY 4 1/23/2024 Alen Darling, PT GP 1            PT G-Codes  AM-PAC 6 Clicks Score (PT): 12    Alen Darling PT  1/23/2024

## 2024-01-23 NOTE — CASE MANAGEMENT/SOCIAL WORK
Discharge Planning Assessment  [facility]     Patient Name: Brenda Munroe  MRN: [id number]  Today's Date: 1/23/2024    Admit Date: 1/22/2024    Plan: SS received consult per Case Management pt admitted from [facility].  Pt has a bed reserved for 20 days upon admit to [facility] per [facility].  SS will follow.       Discharge Plan       Row Name 01/23/24 1312       Plan    Plan SS received consult per Case Management pt admitted from [facility].  Pt has a bed reserved for 20 days upon admit to [facility] per [facility].  SS will follow.                  Continued Care and Services - Admitted Since 1/22/2024    Coordination has not been started for this encounter.       Selected Continued Care - Prior Encounters Includes continued care and service providers with selected services from prior encounters from 10/24/2023 to 1/23/2024      Discharged on 1/11/2024 Admission date: 1/1/2024 - Discharge disposition: Skilled Nursing Facility (DC - External)      Destination       Service Provider Selected Services Address Phone Fax Patient Preferred    [facility] [address] [place], [place] [id number] [phone] --                          Expected Discharge Date and Time       Expected Discharge Date Expected Discharge Time    Jan 23, 2024           JORGE Bruno

## 2024-01-23 NOTE — PROGRESS NOTES
[facility] HOSPITALIST PROGRESS NOTE     Patient Identification:  Name:  Brenda uMnroe  Age:  73 y.o.  Sex:  female  :  1950  MRN:  [id number]  Visit Number:  [id number]  ROOM: [address] [place] [address]     Primary Care Provider:  Bernadette Arevalo MD    Length of stay in inpatient status:  0    Subjective     Chief Compliant:    Chief Complaint   Patient presents with    Altered Mental Status       History of Presenting Illness:    Patient drowsy this morning with limited breakfast intake. She denied any new complaints. She was saturating well on 2.5L NC. Tele reviewed and HR in 100's-120's range. Appeared to still be in afib. Normotensive.     ROS:  Otherwise 10 point ROS negative other than documented above in HPI.     Objective     Current Hospital Meds:apixaban, 5 mg, Oral, Q12H  aspirin, 324 mg, Oral, Once  baclofen, 5 mg, Oral, BID  budesonide-formoterol, 2 puff, Inhalation, BID - RT   And  tiotropium bromide monohydrate, 2 puff, Inhalation, Daily - RT  cefTRIAXone, 1,000 mg, Intravenous, Q24H  [START ON 2024] cholecalciferol, 50,000 Units, Oral, Weekly  clopidogrel, 75 mg, Oral, Daily  donepezil, 10 mg, Oral, Nightly  doxycycline, 100 mg, Intravenous, Q12H  ferrous sulfate, 325 mg, Oral, BID  isosorbide mononitrate, 90 mg, Oral, Daily  lamoTRIgine, 100 mg, Oral, Daily  lamoTRIgine, 25 mg, Oral, Nightly  levothyroxine, 75 mcg, Oral, Q AM  lisinopril, 10 mg, Oral, Q24H  memantine, 10 mg, Oral, Q12H  metoprolol tartrate, 50 mg, Oral, Q12H  pantoprazole, 40 mg, Oral, QAM AC  risperiDONE, 0.5 mg, Oral, BID  rosuvastatin, 40 mg, Oral, Nightly  senna-docusate sodium, 2 tablet, Oral, BID  sertraline, 100 mg, Oral, Daily  sodium chloride, 10 mL, Intravenous, Q12H         Current Antimicrobial Therapy:  Anti-Infectives (From admission, onward)      Ordered     Dose/Rate Route Frequency Start Stop    24 1637  cefTRIAXone (ROCEPHIN) 1,000 mg in sodium chloride 0.9 % 100 mL IVPB-VTB        Ordering  Provider: Efren Dunham MD    1,000 mg  200 mL/hr over 30 Minutes Intravenous Every 24 Hours 01/23/24 1500 01/28/24 1459    01/22/24 1637  doxycycline (VIBRAMYCIN) 100 mg in sodium chloride 0.9 % 100 mL IVPB-VTB        Ordering Provider: Efren Dunham MD    100 mg Intravenous Every 12 Hours 01/23/24 0300 01/28/24 0259    01/22/24 1453  doxycycline (VIBRAMYCIN) 100 mg in sodium chloride 0.9 % 100 mL IVPB-VTB        Ordering Provider: Jane Lew APRN    100 mg  over 60 Minutes Intravenous Once 01/22/24 1509 01/22/24 1629    01/22/24 1419  cefTRIAXone (ROCEPHIN) 2,000 mg in sodium chloride 0.9 % 100 mL IVPB-VTB        Ordering Provider: Jane Lew APRN    2,000 mg  200 mL/hr over 30 Minutes Intravenous Once 01/22/24 1435 01/22/24 1535          Current Diuretic Therapy:  Diuretics (From admission, onward)      None          ----------------------------------------------------------------------------------------------------------------------  Vital Signs:  Temp:  [97.4 °F (36.3 °C)-98.5 °F (36.9 °C)] 97.4 °F (36.3 °C)  Heart Rate:  [] 96  Resp:  [16-20] 16  BP: ()/(54-99) 116/75  SpO2:  [92 %-98 %] 95 %  on  Flow (L/min):  [2-2.5] 2.5;   Device (Oxygen Therapy): nasal cannula  Body mass index is 28.39 kg/m².    Wt Readings from Last 3 Encounters:   01/23/24 75 kg (165 lb 6.4 oz)   01/11/24 74.5 kg (164 lb 3.2 oz)   12/27/23 81.6 kg (180 lb)     Intake & Output (last 3 days)         01/20 0701 01/21 0700 01/21 0701 01/22 0700 01/22 0701 01/23 0700 01/23 0701 01/24 0700    P.O.   240 120    IV Piggyback   700     Total Intake(mL/kg)   940 (12.5) 120 (1.6)    Net   +940 +120            Urine Unmeasured Occurrence   4 x           Diet: Cardiac Diets, Diabetic Diets; Healthy Heart (2-3 Na+); Consistent Carbohydrate; Texture: Regular Texture (IDDSI 7); Fluid Consistency: Thin (IDDSI  0)  ----------------------------------------------------------------------------------------------------------------------  Physical exam:  Constitutional:  Well-developed and well-nourished.  No respiratory distress.      HENT:  Head:  Normocephalic and atraumatic.  Mouth:  Moist mucous membranes.    Eyes:  Conjunctivae and EOM are normal. No scleral icterus.    Neck:  Neck supple.  No JVD present.    Cardiovascular:  Normal rate, regular rhythm and normal heart sounds with no murmur.  Pulmonary/Chest:  No respiratory distress, no wheezes, no crackles, with normal breath sounds and good air movement.  Abdominal:  Soft.  Bowel sounds are normal.  No distension and no tenderness.   Musculoskeletal:  No edema, no tenderness, and no deformity.  No red or swollen joints anywhere.    Neurological:  Alert and oriented to person only. No focal deficits.   Skin:  Skin is warm and dry. No rash noted. No pallor.   Peripheral vascular:  Pulses in all 4 extremities with no clubbing, no cyanosis, no edema.  ----------------------------------------------------------------------------------------------------------------------  Tele:    ----------------------------------------------------------------------------------------------------------------------  Results from last 7 days   Lab Units 01/23/24  0048 01/22/24  1446 01/22/24  1136   LACTATE mmol/L  --  0.7  --    WBC 10*3/mm3 7.20  --  8.11   HEMOGLOBIN g/dL 9.2*  --  9.8*   HEMATOCRIT % 33.2*  --  35.3   MCV fL 100.6*  --  98.9*   MCHC g/dL 27.7*  --  27.8*   PLATELETS 10*3/mm3 99*  --  104*   INR   --   --  1.55*         Results from last 7 days   Lab Units 01/23/24  0048 01/22/24  1136   SODIUM mmol/L 143 145   POTASSIUM mmol/L 4.4 4.5   MAGNESIUM mg/dL 2.1  --    CHLORIDE mmol/L 102 102   CO2 mmol/L 35.4* 40.0*   BUN mg/dL 17 17   CREATININE mg/dL 1.08* 1.03*   CALCIUM mg/dL 8.5* 8.9   GLUCOSE mg/dL 105* 115*   ALBUMIN g/dL  --  3.3*   BILIRUBIN mg/dL  --  0.2   ALK PHOS  "U/L  --  55   AST (SGOT) U/L  --  15   ALT (SGPT) U/L  --  19   Estimated Creatinine Clearance: 44.6 mL/min (A) (by C-G formula based on SCr of 1.08 mg/dL (H)).  No results found for: \"AMMONIA\"  Results from last 7 days   Lab Units 01/22/24  1354 01/22/24  1136   HSTROP T ng/L 111* 107*     Results from last 7 days   Lab Units 01/22/24  1136   PROBNP pg/mL 4,452.0*         No results found for: \"HGBA1C\", \"POCGLU\"  Lab Results   Component Value Date    TSH 0.876 01/23/2024    FREET4 1.59 03/25/2021     No results found for: \"PREGTESTUR\", \"PREGSERUM\", \"HCG\", \"HCGQUANT\"  Pain Management Panel  More data exists         Latest Ref Rng & Units 12/27/2023 2/19/2023   Pain Management Panel   Creatinine, Urine mg/dL - 222.8    Amphetamine, Urine Qual Negative Negative  Negative    Barbiturates Screen, Urine Negative Negative  Negative    Benzodiazepine Screen, Urine Negative Negative  Positive    Buprenorphine, Screen, Urine Negative Negative  Negative    Cocaine Screen, Urine Negative Negative  Negative    Fentanyl, Urine Negative Negative  -   Methadone Screen , Urine Negative Negative  Negative    Methamphetamine, Ur Negative Negative  Negative      Brief Urine Lab Results  (Last result in the past 365 days)        Color   Clarity   Blood   Leuk Est   Nitrite   Protein   CREAT   Urine HCG        01/22/24 1323 Yellow   Turbid   Moderate (2+)   Small (1+)   Negative   >=300 mg/dL (3+)                 Blood Culture   Date Value Ref Range Status   01/22/2024 Abnormal Stain (C)  Preliminary     No results found for: \"URINECX\"  No results found for: \"WOUNDCX\"  No results found for: \"STOOLCX\"  No results found for: \"RESPCX\"  No results found for: \"AFBCX\"  Results from last 7 days   Lab Units 01/22/24  1446   LACTATE mmol/L 0.7       I have personally looked at the labs and they are summarized above.  ----------------------------------------------------------------------------------------------------------------------  Detailed " radiology reports for the last 24 hours:    Imaging Results (Last 24 Hours)       Procedure Component Value Units Date/Time    CT Angiogram Chest Pulmonary Embolism [[id number]] Collected: 01/22/24 1414     Updated: 01/22/24 1445    Narrative:      PROCEDURE: CT ANGIOGRAM CHEST PULMONARY EMBOLISM-     HISTORY: Evaluate for PE     COMPARISON: 1/2/2024.     TECHNIQUE: Multiple axial CT images were obtained from the thoracic  inlet through the upper abdomen following the administration of Isovue  300 per the CT PE protocol. Coronal and oblique 3D MIP images were  reconstructed from the original axial data set. This study was performed  with techniques to keep radiation doses as low as reasonably achievable  (ALARA). Individualized dose reduction techniques using automated  exposure control or adjustment of mA and/or kV according to the patient  size were employed.     FINDINGS: There are no filling defects within the pulmonary arteries to  suggest an embolus. The thoracic aorta is normal in caliber. The heart  is enlarged. There is a small left pleural effusion. There is no  adenopathy. The thyroid gland is unremarkable. Lung windows reveal  interlobular septal thickening with groundglass opacities most  pronounced in the left upper lobe. There is atelectasis in the left  lower lobe and lingula. There is bronchial wall thickening in both lung  bases. The visualized upper abdomen is unremarkable. Bone windows reveal  no acute osseous abnormalities.       Impression:      1. No evidence of pulmonary embolus.  2. Groundglass opacities in the left upper lobe which may be infectious  or inflammatory in nature.  3. Small left pleural effusion and basilar atelectasis.              This report was finalized on 1/22/2024 2:43 PM by Lily Mullen M.D..       XR Chest 1 View [[id number]] Collected: 01/22/24 1208     Updated: 01/22/24 1211    Narrative:      PROCEDURE: XR CHEST 1 VW-       HISTORY: AMS, SOA     COMPARISON:  1/1/2024.     FINDINGS: The patient is status post median sternotomy and CABG  procedure. The heart is normal in size. The mediastinum is unremarkable.  There is interstitial pulmonary edema. There are possible small  effusions. There is no pneumothorax. There are no acute osseous  abnormalities.       Impression:      Interstitial pulmonary edema and possible small effusions.        This report was finalized on 1/22/2024 12:09 PM by Lily Mullen M.D..       CT Head Without Contrast [[id number]] Collected: 01/22/24 1153     Updated: 01/22/24 1157    Narrative:      PROCEDURE: CT HEAD WO CONTRAST-     HISTORY: confused     COMPARISON: 1/1/2024.     TECHNIQUE: Multiple axial CT images were performed from the foramen  magnum to the vertex without enhancement. This study was performed with  techniques to keep radiation doses as low as reasonably achievable  (ALARA). Individualized dose reduction techniques using automated  exposure control or adjustment of mA and/or kV according to the patient  size were employed.     FINDINGS: There is generalized cerebral volume loss. Patchy  hypodensities in the periventricular white matter consistent with  chronic small vessel ischemic change. There is no CT evidence of  hemorrhage. There is no mass, mass effect or midline shift.  There is no  hydrocephalus. The paranasal sinuses are clear. Bone windows reveal no  acute osseous abnormalities.       Impression:      No acute intracranial process.              This report was finalized on 1/22/2024 11:54 AM by Lily Mullen M.D..             Assessment & Plan      #Afib w/ RVR  - HR still 100's-120's this AM. Increased metoprolol to 50 mg BID tartrate. Will continue to titrate as needed. Multiple different possible exacerbating factors being treated as below.  - [id number]: 4, continue home apixiban.      #Type II NSTEMI  - 107=>111  - EKG with nonspecific ST changes   - No chest pain. No PE on CTPE.   - Discussed case and  recent C in 2/23 with interventional cardiology on presentation. No urgent need for repeat ischemic evaluation. To expedite f/u will go ahead and have cardiology evaluate inpatient.     #UTI  #MIRA pneumonia?  #Metabolic encephalopathy?  - 1/2 staph non-aureus blood culture likely contaminant  - Will continue ceftriaxone/doxy as we follow urine and blood cultures.     Chronic medical problems:  MDD  CAD  Thyroid disease- TSH wnl  HTN  Carotid stenosis  HLD    Code status: Full     Dispo: Pending clinical improvement. Back to Nursing home at discharge.       VTE Prophylaxis:   Mechanical Order History:       None          Pharmalogical Order History:        Ordered     Dose Route Frequency Stop    01/22/24 8805  apixaban (ELIQUIS) tablet 5 mg         5 mg PO Every 12 Hours Scheduled --                      Efren Dunham MD  [facility] Hospitalist  01/23/24  09:42 EST

## 2024-01-23 NOTE — PROGRESS NOTES
Antimicrobial Length of Therapy:    Day 2/6 Rocephin  Day 2/5 Doxycycline    Thank you,    Zelda Hartmann, PharmD

## 2024-01-24 ENCOUNTER — APPOINTMENT (OUTPATIENT)
Dept: GENERAL RADIOLOGY | Facility: HOSPITAL | Age: 74
DRG: 193 | End: 2024-01-24
Payer: MEDICARE

## 2024-01-24 ENCOUNTER — APPOINTMENT (OUTPATIENT)
Dept: CT IMAGING | Facility: HOSPITAL | Age: 74
DRG: 193 | End: 2024-01-24
Payer: MEDICARE

## 2024-01-24 ENCOUNTER — APPOINTMENT (OUTPATIENT)
Dept: RESPIRATORY THERAPY | Facility: HOSPITAL | Age: 74
DRG: 193 | End: 2024-01-24
Payer: MEDICARE

## 2024-01-24 PROBLEM — J96.92 HYPERCAPNIC RESPIRATORY FAILURE: Status: ACTIVE | Noted: 2024-01-24

## 2024-01-24 LAB
A-A DO2: 25 MMHG (ref 0–300)
A-A DO2: 54.2 MMHG (ref 0–300)
A-A DO2: 57 MMHG (ref 0–300)
ANION GAP SERPL CALCULATED.3IONS-SCNC: 7.7 MMOL/L (ref 5–15)
ANISOCYTOSIS BLD QL: NORMAL
ARTERIAL PATENCY WRIST A: ABNORMAL
ARTERIAL PATENCY WRIST A: POSITIVE
ARTERIAL PATENCY WRIST A: POSITIVE
ATMOSPHERIC PRESS: 728 MMHG
ATMOSPHERIC PRESS: 730 MMHG
ATMOSPHERIC PRESS: 732 MMHG
BACTERIA SPEC AEROBE CULT: ABNORMAL
BASE EXCESS BLDA CALC-SCNC: 12.7 MMOL/L (ref 0–2)
BASE EXCESS BLDA CALC-SCNC: 12.8 MMOL/L (ref 0–2)
BASE EXCESS BLDA CALC-SCNC: 9.9 MMOL/L (ref 0–2)
BASOPHILS # BLD AUTO: 0.02 10*3/MM3 (ref 0–0.2)
BASOPHILS NFR BLD AUTO: 0.4 % (ref 0–1.5)
BDY SITE: ABNORMAL
BUN SERPL-MCNC: 20 MG/DL (ref 8–23)
BUN/CREAT SERPL: 19.2 (ref 7–25)
CALCIUM SPEC-SCNC: 8.6 MG/DL (ref 8.6–10.5)
CHLORIDE SERPL-SCNC: 101 MMOL/L (ref 98–107)
CO2 BLDA-SCNC: 42.4 MMOL/L (ref 22–33)
CO2 BLDA-SCNC: 42.8 MMOL/L (ref 22–33)
CO2 BLDA-SCNC: 43 MMOL/L (ref 22–33)
CO2 SERPL-SCNC: 38.3 MMOL/L (ref 22–29)
COHGB MFR BLD: 2.2 % (ref 0–5)
COHGB MFR BLD: 2.3 % (ref 0–5)
COHGB MFR BLD: 2.3 % (ref 0–5)
CREAT SERPL-MCNC: 1.04 MG/DL (ref 0.57–1)
DEPRECATED RDW RBC AUTO: 74.9 FL (ref 37–54)
EGFRCR SERPLBLD CKD-EPI 2021: 56.2 ML/MIN/1.73
EOSINOPHIL # BLD AUTO: 0.03 10*3/MM3 (ref 0–0.4)
EOSINOPHIL NFR BLD AUTO: 0.6 % (ref 0.3–6.2)
EPAP: 6
EPAP: 6
ERYTHROCYTE [DISTWIDTH] IN BLOOD BY AUTOMATED COUNT: 20.7 % (ref 12.3–15.4)
GAS FLOW AIRWAY: 2 LPM
GEN 5 2HR TROPONIN T REFLEX: 121 NG/L
GLUCOSE BLDC GLUCOMTR-MCNC: 103 MG/DL (ref 70–130)
GLUCOSE BLDC GLUCOMTR-MCNC: 71 MG/DL (ref 70–130)
GLUCOSE BLDC GLUCOMTR-MCNC: 82 MG/DL (ref 70–130)
GLUCOSE SERPL-MCNC: 75 MG/DL (ref 65–99)
HCO3 BLDA-SCNC: 40 MMOL/L (ref 20–26)
HCO3 BLDA-SCNC: 40.3 MMOL/L (ref 20–26)
HCO3 BLDA-SCNC: 40.7 MMOL/L (ref 20–26)
HCT VFR BLD AUTO: 34.3 % (ref 34–46.6)
HCT VFR BLD CALC: 31.5 % (ref 38–51)
HCT VFR BLD CALC: 31.6 % (ref 38–51)
HCT VFR BLD CALC: 34 % (ref 38–51)
HGB BLD-MCNC: 9.5 G/DL (ref 12–15.9)
HGB BLDA-MCNC: 10.3 G/DL (ref 13.5–17.5)
HGB BLDA-MCNC: 10.3 G/DL (ref 13.5–17.5)
HGB BLDA-MCNC: 11.1 G/DL (ref 13.5–17.5)
HYPOCHROMIA BLD QL: NORMAL
IMM GRANULOCYTES # BLD AUTO: 0.06 10*3/MM3 (ref 0–0.05)
IMM GRANULOCYTES NFR BLD AUTO: 1.1 % (ref 0–0.5)
INHALED O2 CONCENTRATION: 30 %
INHALED O2 CONCENTRATION: 32 %
INHALED O2 CONCENTRATION: 32 %
IPAP: 24
IPAP: 24
LYMPHOCYTES # BLD AUTO: 0.7 10*3/MM3 (ref 0.7–3.1)
LYMPHOCYTES NFR BLD AUTO: 13.1 % (ref 19.6–45.3)
Lab: ABNORMAL
MACROCYTES BLD QL SMEAR: NORMAL
MAGNESIUM SERPL-MCNC: 1.8 MG/DL (ref 1.6–2.4)
MCH RBC QN AUTO: 27.5 PG (ref 26.6–33)
MCHC RBC AUTO-ENTMCNC: 27.7 G/DL (ref 31.5–35.7)
MCV RBC AUTO: 99.4 FL (ref 79–97)
METHGB BLD QL: 0 % (ref 0–3)
METHGB BLD QL: 0.2 % (ref 0–3)
METHGB BLD QL: 0.2 % (ref 0–3)
MODALITY: ABNORMAL
MONOCYTES # BLD AUTO: 0.41 10*3/MM3 (ref 0.1–0.9)
MONOCYTES NFR BLD AUTO: 7.7 % (ref 5–12)
NEUTROPHILS NFR BLD AUTO: 4.12 10*3/MM3 (ref 1.7–7)
NEUTROPHILS NFR BLD AUTO: 77.1 % (ref 42.7–76)
NOTIFIED BY: ABNORMAL
NOTIFIED WHO: ABNORMAL
NRBC BLD AUTO-RTO: 0 /100 WBC (ref 0–0.2)
OVALOCYTES BLD QL SMEAR: NORMAL
OXYHGB MFR BLDV: 92.3 % (ref 94–99)
OXYHGB MFR BLDV: 95.1 % (ref 94–99)
OXYHGB MFR BLDV: 95.4 % (ref 94–99)
PCO2 BLDA: 67.8 MM HG (ref 35–45)
PCO2 BLDA: 73.6 MM HG (ref 35–45)
PCO2 BLDA: 93.6 MM HG (ref 35–45)
PCO2 TEMP ADJ BLD: ABNORMAL MM[HG]
PH BLDA: 7.24 PH UNITS (ref 7.35–7.45)
PH BLDA: 7.35 PH UNITS (ref 7.35–7.45)
PH BLDA: 7.38 PH UNITS (ref 7.35–7.45)
PH, TEMP CORRECTED: ABNORMAL
PLATELET # BLD AUTO: 94 10*3/MM3 (ref 140–450)
PMV BLD AUTO: 11 FL (ref 6–12)
PO2 BLDA: 76.1 MM HG (ref 83–108)
PO2 BLDA: 82.7 MM HG (ref 83–108)
PO2 BLDA: 85.7 MM HG (ref 83–108)
PO2 TEMP ADJ BLD: ABNORMAL MM[HG]
POTASSIUM SERPL-SCNC: 4 MMOL/L (ref 3.5–5.2)
RBC # BLD AUTO: 3.45 10*6/MM3 (ref 3.77–5.28)
SAO2 % BLDCOA: 94.6 % (ref 94–99)
SAO2 % BLDCOA: 97.5 % (ref 94–99)
SAO2 % BLDCOA: 97.6 % (ref 94–99)
SET MECH RESP RATE: 24
SET MECH RESP RATE: 24
SMALL PLATELETS BLD QL SMEAR: NORMAL
SODIUM SERPL-SCNC: 147 MMOL/L (ref 136–145)
TROPONIN T DELTA: -17 NG/L
TROPONIN T SERPL HS-MCNC: 138 NG/L
VENTILATOR MODE: ABNORMAL
WBC NRBC COR # BLD AUTO: 5.34 10*3/MM3 (ref 3.4–10.8)

## 2024-01-24 PROCEDURE — 95819 EEG AWAKE AND ASLEEP: CPT | Performed by: PSYCHIATRY & NEUROLOGY

## 2024-01-24 PROCEDURE — 94664 DEMO&/EVAL PT USE INHALER: CPT

## 2024-01-24 PROCEDURE — 25010000002 BUMETANIDE PER 0.5 MG: Performed by: INTERNAL MEDICINE

## 2024-01-24 PROCEDURE — 94799 UNLISTED PULMONARY SVC/PX: CPT

## 2024-01-24 PROCEDURE — 80048 BASIC METABOLIC PNL TOTAL CA: CPT | Performed by: INTERNAL MEDICINE

## 2024-01-24 PROCEDURE — 25010000002 METHYLPREDNISOLONE PER 40 MG: Performed by: INTERNAL MEDICINE

## 2024-01-24 PROCEDURE — 94660 CPAP INITIATION&MGMT: CPT

## 2024-01-24 PROCEDURE — 25010000002 CEFTRIAXONE PER 250 MG: Performed by: INTERNAL MEDICINE

## 2024-01-24 PROCEDURE — 85007 BL SMEAR W/DIFF WBC COUNT: CPT | Performed by: INTERNAL MEDICINE

## 2024-01-24 PROCEDURE — 82805 BLOOD GASES W/O2 SATURATION: CPT

## 2024-01-24 PROCEDURE — 71045 X-RAY EXAM CHEST 1 VIEW: CPT | Performed by: RADIOLOGY

## 2024-01-24 PROCEDURE — 95819 EEG AWAKE AND ASLEEP: CPT

## 2024-01-24 PROCEDURE — 36600 WITHDRAWAL OF ARTERIAL BLOOD: CPT

## 2024-01-24 PROCEDURE — 25010000002 NALOXONE PER 1 MG

## 2024-01-24 PROCEDURE — 84484 ASSAY OF TROPONIN QUANT: CPT | Performed by: NURSE PRACTITIONER

## 2024-01-24 PROCEDURE — 82375 ASSAY CARBOXYHB QUANT: CPT

## 2024-01-24 PROCEDURE — 25010000002 DIPHENHYDRAMINE PER 50 MG: Performed by: INTERNAL MEDICINE

## 2024-01-24 PROCEDURE — 71045 X-RAY EXAM CHEST 1 VIEW: CPT

## 2024-01-24 PROCEDURE — 83050 HGB METHEMOGLOBIN QUAN: CPT

## 2024-01-24 PROCEDURE — 83735 ASSAY OF MAGNESIUM: CPT | Performed by: INTERNAL MEDICINE

## 2024-01-24 PROCEDURE — 25010000002 LORAZEPAM PER 2 MG: Performed by: INTERNAL MEDICINE

## 2024-01-24 PROCEDURE — 85025 COMPLETE CBC W/AUTO DIFF WBC: CPT | Performed by: INTERNAL MEDICINE

## 2024-01-24 PROCEDURE — 70450 CT HEAD/BRAIN W/O DYE: CPT

## 2024-01-24 PROCEDURE — 70450 CT HEAD/BRAIN W/O DYE: CPT | Performed by: RADIOLOGY

## 2024-01-24 PROCEDURE — 82948 REAGENT STRIP/BLOOD GLUCOSE: CPT

## 2024-01-24 RX ORDER — SODIUM CHLORIDE 0.9 % (FLUSH) 0.9 %
10 SYRINGE (ML) INJECTION EVERY 12 HOURS SCHEDULED
Status: DISCONTINUED | OUTPATIENT
Start: 2024-01-24 | End: 2024-02-02 | Stop reason: HOSPADM

## 2024-01-24 RX ORDER — SODIUM CHLORIDE 9 MG/ML
40 INJECTION, SOLUTION INTRAVENOUS AS NEEDED
Status: DISCONTINUED | OUTPATIENT
Start: 2024-01-24 | End: 2024-02-02 | Stop reason: HOSPADM

## 2024-01-24 RX ORDER — NALOXONE HCL 0.4 MG/ML
0.2 VIAL (ML) INJECTION ONCE
Status: COMPLETED | OUTPATIENT
Start: 2024-01-24 | End: 2024-01-24

## 2024-01-24 RX ORDER — SODIUM CHLORIDE 0.9 % (FLUSH) 0.9 %
10 SYRINGE (ML) INJECTION AS NEEDED
Status: DISCONTINUED | OUTPATIENT
Start: 2024-01-24 | End: 2024-02-02 | Stop reason: HOSPADM

## 2024-01-24 RX ORDER — CASTOR OIL AND BALSAM, PERU 788; 87 MG/G; MG/G
1 OINTMENT TOPICAL EVERY 12 HOURS SCHEDULED
Status: DISCONTINUED | OUTPATIENT
Start: 2024-01-24 | End: 2024-02-02 | Stop reason: HOSPADM

## 2024-01-24 RX ORDER — BUMETANIDE 0.25 MG/ML
1 INJECTION INTRAMUSCULAR; INTRAVENOUS ONCE
Status: COMPLETED | OUTPATIENT
Start: 2024-01-24 | End: 2024-01-24

## 2024-01-24 RX ORDER — DIPHENHYDRAMINE HYDROCHLORIDE 50 MG/ML
25 INJECTION INTRAMUSCULAR; INTRAVENOUS ONCE
Status: COMPLETED | OUTPATIENT
Start: 2024-01-24 | End: 2024-01-24

## 2024-01-24 RX ORDER — METOPROLOL TARTRATE 1 MG/ML
2.5 INJECTION, SOLUTION INTRAVENOUS EVERY 6 HOURS PRN
Status: DISCONTINUED | OUTPATIENT
Start: 2024-01-24 | End: 2024-02-02 | Stop reason: HOSPADM

## 2024-01-24 RX ORDER — METHYLPREDNISOLONE SODIUM SUCCINATE 40 MG/ML
40 INJECTION, POWDER, LYOPHILIZED, FOR SOLUTION INTRAMUSCULAR; INTRAVENOUS EVERY 12 HOURS
Status: DISCONTINUED | OUTPATIENT
Start: 2024-01-24 | End: 2024-01-26

## 2024-01-24 RX ORDER — LORAZEPAM 2 MG/ML
0.5 INJECTION INTRAMUSCULAR ONCE
Status: COMPLETED | OUTPATIENT
Start: 2024-01-24 | End: 2024-01-24

## 2024-01-24 RX ORDER — DEXTROSE MONOHYDRATE 25 G/50ML
INJECTION, SOLUTION INTRAVENOUS
Status: COMPLETED
Start: 2024-01-24 | End: 2024-01-24

## 2024-01-24 RX ORDER — METOPROLOL TARTRATE 1 MG/ML
2.5 INJECTION, SOLUTION INTRAVENOUS ONCE
Status: COMPLETED | OUTPATIENT
Start: 2024-01-24 | End: 2024-01-24

## 2024-01-24 RX ADMIN — NALOXONE HYDROCHLORIDE 0.2 MG: 0.4 INJECTION, SOLUTION INTRAMUSCULAR; INTRAVENOUS; SUBCUTANEOUS at 03:47

## 2024-01-24 RX ADMIN — Medication 10 ML: at 09:54

## 2024-01-24 RX ADMIN — DOXYCYCLINE 100 MG: 100 INJECTION, POWDER, LYOPHILIZED, FOR SOLUTION INTRAVENOUS at 14:02

## 2024-01-24 RX ADMIN — CEFTRIAXONE 1000 MG: 1 INJECTION, POWDER, FOR SOLUTION INTRAMUSCULAR; INTRAVENOUS at 14:02

## 2024-01-24 RX ADMIN — DEXTROSE MONOHYDRATE 50 ML: 25 INJECTION, SOLUTION INTRAVENOUS at 03:58

## 2024-01-24 RX ADMIN — METHYLPREDNISOLONE SODIUM SUCCINATE 40 MG: 40 INJECTION, POWDER, FOR SOLUTION INTRAMUSCULAR; INTRAVENOUS at 23:09

## 2024-01-24 RX ADMIN — DOXYCYCLINE 100 MG: 100 INJECTION, POWDER, LYOPHILIZED, FOR SOLUTION INTRAVENOUS at 03:32

## 2024-01-24 RX ADMIN — METOPROLOL TARTRATE 2.5 MG: 1 INJECTION, SOLUTION INTRAVENOUS at 21:17

## 2024-01-24 RX ADMIN — CASTOR OIL AND BALSAM, PERU 1 APPLICATION: 788; 87 OINTMENT TOPICAL at 21:16

## 2024-01-24 RX ADMIN — DIPHENHYDRAMINE HYDROCHLORIDE 25 MG: 50 INJECTION, SOLUTION INTRAMUSCULAR; INTRAVENOUS at 23:08

## 2024-01-24 RX ADMIN — LORAZEPAM 0.5 MG: 2 INJECTION INTRAMUSCULAR; INTRAVENOUS at 23:09

## 2024-01-24 RX ADMIN — Medication 10 ML: at 21:17

## 2024-01-24 RX ADMIN — METOPROLOL TARTRATE 2.5 MG: 1 INJECTION, SOLUTION INTRAVENOUS at 14:02

## 2024-01-24 RX ADMIN — BUMETANIDE 1 MG: 0.25 INJECTION INTRAMUSCULAR; INTRAVENOUS at 13:10

## 2024-01-24 RX ADMIN — METHYLPREDNISOLONE SODIUM SUCCINATE 40 MG: 40 INJECTION, POWDER, FOR SOLUTION INTRAMUSCULAR; INTRAVENOUS at 10:54

## 2024-01-24 NOTE — CASE MANAGEMENT/SOCIAL WORK
Discharge Planning Assessment   Ozzy     Patient Name: Brenda Munroe  MRN: [id number]  Today's Date: 1/24/2024    Admit Date: 1/22/2024     Discharge Plan       Row Name 01/24/24 0948       Plan    Plan Pt was transferred from  to PCU on this date. Pt is a resident at [facility]. Per Phoebe, Pt had a 20 day bed hold. SS will follow.             MUNA BowersW

## 2024-01-24 NOTE — NURSING NOTE
Patient with DTPI to right heel.  2x0.5cm.  Purple and nonblanchable.  Will treat with venelex and leave open to air.  Order placed for heel protector boots.    Augustine score 12.  PI prevention orders initiated.       01/24/24 1334   Wound 01/24/24 0450 Right posterior heel Pressure Injury   Placement Date/Time: 01/24/24 0450   Side: Right  Orientation: posterior  Location: heel  Primary Wound Type: Pressure Injury   Wound Image   (see photo under media tab)   Pressure Injury Stage DTPI   Dressing Appearance open to air   Closure None   Base purple;non-blanchable   Periwound intact   Periwound Temperature warm   Periwound Skin Turgor soft   Edges rolled/closed   Wound Length (cm) 2 cm   Wound Width (cm) 0.5 cm   Wound Surface Area (cm^2) 1 cm^2   Drainage Amount none

## 2024-01-24 NOTE — PLAN OF CARE
Problem: Adult Inpatient Plan of Care  Goal: Plan of Care Review  Outcome: Ongoing, Progressing   Pt resting. VSS on bipap. Obtaining ABG at this time. Son at bedside this AM. Family updated on plan of care. No questions/concerns at this time.

## 2024-01-24 NOTE — PROGRESS NOTES
Patient has continued to slowly wake up more today. She is still drowsy but less lethargic. Squeezing my hands now and tracking with eyes to verbal stimuli. Still not communicating much. Repeat ABG continues to be compensated and improving. Reviewing last episode similar to this she slowly recovered as well. Unclear exacerbating event. Will get EEG and CT head and limit sedating medications as patient continues to become more alert. Pending continued improvement will plan to transition to NC with goal SpO2 88-92%.

## 2024-01-24 NOTE — PROGRESS NOTES
[facility] HOSPITALIST PROGRESS NOTE     Patient Identification:  Name:  Brenda Munroe  Age:  73 y.o.  Sex:  female  :  1950  MRN:  [id number]  Visit Number:  [id number]  ROOM: [place] [id number]     Primary Care Provider:  Bernadette Arevalo MD    Length of stay in inpatient status:  0    Subjective     Chief Compliant:    Chief Complaint   Patient presents with    Altered Mental Status       History of Presenting Illness:    Overnight patient acutely became unresponsive per report. Patient found to be severely hypercapnic. She has since been placed on BiPAP with improvement in blood gas. She is also more awake, opens eyes and tracks to verbal commands. Still very drowsy and mumbling. Tolerating BiPAP well. Saturating well. Normotensive. HR in upper 90's, lower 100's. No family bedside.     ROS:  Otherwise 10 point ROS negative other than documented above in HPI.     Objective     Current Hospital Meds:apixaban, 5 mg, Oral, Q12H  aspirin, 324 mg, Oral, Once  baclofen, 5 mg, Oral, BID  budesonide-formoterol, 2 puff, Inhalation, BID - RT   And  tiotropium bromide monohydrate, 2 puff, Inhalation, Daily - RT  cefTRIAXone, 1,000 mg, Intravenous, Q24H  [START ON 2024] cholecalciferol, 50,000 Units, Oral, Weekly  clopidogrel, 75 mg, Oral, Daily  digoxin, 125 mcg, Oral, Daily  donepezil, 10 mg, Oral, Nightly  doxycycline, 100 mg, Intravenous, Q12H  ferrous sulfate, 325 mg, Oral, BID  isosorbide mononitrate, 90 mg, Oral, Daily  lamoTRIgine, 100 mg, Oral, Daily  lamoTRIgine, 25 mg, Oral, Nightly  levothyroxine, 75 mcg, Oral, Q AM  lisinopril, 10 mg, Oral, Q24H  memantine, 10 mg, Oral, Q12H  metoprolol tartrate, 50 mg, Oral, Q12H  pantoprazole, 40 mg, Oral, QAM AC  risperiDONE, 0.5 mg, Oral, BID  rosuvastatin, 40 mg, Oral, Nightly  senna-docusate sodium, 2 tablet, Oral, BID  sertraline, 100 mg, Oral, Daily  sodium chloride, 10 mL, Intravenous, Q12H         Current Antimicrobial Therapy:  Anti-Infectives  (From admission, onward)      Ordered     Dose/Rate Route Frequency Start Stop    01/22/24 1637  cefTRIAXone (ROCEPHIN) 1,000 mg in sodium chloride 0.9 % 100 mL IVPB-VTB        Ordering Provider: Efren Dunham MD    1,000 mg  200 mL/hr over 30 Minutes Intravenous Every 24 Hours 01/23/24 1500 01/28/24 1459    01/22/24 1637  doxycycline (VIBRAMYCIN) 100 mg in sodium chloride 0.9 % 100 mL IVPB-VTB        Ordering Provider: Efren Dunham MD    100 mg Intravenous Every 12 Hours 01/23/24 0300 01/28/24 0259    01/22/24 1453  doxycycline (VIBRAMYCIN) 100 mg in sodium chloride 0.9 % 100 mL IVPB-VTB        Ordering Provider: Jane Lew APRN    100 mg  over 60 Minutes Intravenous Once 01/22/24 1509 01/22/24 1629    01/22/24 1419  cefTRIAXone (ROCEPHIN) 2,000 mg in sodium chloride 0.9 % 100 mL IVPB-VTB        Ordering Provider: Jane Lew APRN    2,000 mg  200 mL/hr over 30 Minutes Intravenous Once 01/22/24 1435 01/22/24 1535          Current Diuretic Therapy:  Diuretics (From admission, onward)      Ordered     Dose/Rate Route Frequency Start Stop    01/23/24 1809  bumetanide (BUMEX) injection 1 mg        Ordering Provider: Toby Simeon MD    1 mg Intravenous Once 01/23/24 1900 01/23/24 1855          ----------------------------------------------------------------------------------------------------------------------  Vital Signs:  Temp:  [97 °F (36.1 °C)-98.7 °F (37.1 °C)] 98.2 °F (36.8 °C)  Heart Rate:  [] 103  Resp:  [12-24] 16  BP: ()/(54-94) 141/86  SpO2:  [94 %-98 %] 96 %  on  Flow (L/min):  [2-2.5] 2;   Device (Oxygen Therapy): NPPV/NIV  Body mass index is 28.8 kg/m².    Wt Readings from Last 3 Encounters:   01/24/24 76.1 kg (167 lb 12.3 oz)   01/11/24 74.5 kg (164 lb 3.2 oz)   12/27/23 81.6 kg (180 lb)     Intake & Output (last 3 days)         01/21 0701  01/22 0700 01/22 0701  01/23 0700 01/23 0701 01/24 0700 01/24 0701  01/25 0700    P.O.  240 120 0    IV Piggyback  700       Total Intake(mL/kg)  940 (12.5) 120 (1.6) 0 (0)    Net  +940 +120 0            Urine Unmeasured Occurrence  4 x 5 x     Stool Unmeasured Occurrence   1 x           Diet: Cardiac Diets, Diabetic Diets; Healthy Heart (2-3 Na+); Consistent Carbohydrate; Texture: Regular Texture (IDDSI 7); Fluid Consistency: Thin (IDDSI 0)  ----------------------------------------------------------------------------------------------------------------------  Physical exam:  Constitutional: Chronically ill appearing.   HENT:  Head:  Normocephalic and atraumatic.  Mouth:  Moist mucous membranes.    Eyes:  Conjunctivae and EOM are normal. No scleral icterus.    Neck:  Neck supple.  No JVD present.    Cardiovascular:  Normal rate, regular rhythm and normal heart sounds with no murmur.  Pulmonary/Chest:  No respiratory distress, no wheezes, no crackles, with normal breath sounds and good air movement.  Abdominal:  Soft.  Bowel sounds are normal.  No distension and no tenderness.   Musculoskeletal:  No edema, no tenderness, and no deformity.  No red or swollen joints anywhere.    Neurological:  Alert but not oriented. No facial droop.   Skin:  Skin is warm and dry. No rash noted. No pallor.   Peripheral vascular:  Pulses in all 4 extremities with no clubbing, no cyanosis, no edema.  ----------------------------------------------------------------------------------------------------------------------  Tele:    ----------------------------------------------------------------------------------------------------------------------  Results from last 7 days   Lab Units 01/24/24  0019 01/23/24  0048 01/22/24  1446 01/22/24  1136   LACTATE mmol/L  --   --  0.7  --    WBC 10*3/mm3 5.34 7.20  --  8.11   HEMOGLOBIN g/dL 9.5* 9.2*  --  9.8*   HEMATOCRIT % 34.3 33.2*  --  35.3   MCV fL 99.4* 100.6*  --  98.9*   MCHC g/dL 27.7* 27.7*  --  27.8*   PLATELETS 10*3/mm3 94* 99*  --  104*   INR   --   --   --  1.55*     Results from last 7 days   Lab Units  "01/24/24  0646   PH, ARTERIAL pH units 7.351   PO2 ART mm Hg 82.7*   PCO2, ARTERIAL mm Hg 73.6*   HCO3 ART mmol/L 40.7*     Results from last 7 days   Lab Units 01/24/24  0019 01/23/24  0048 01/22/24  1136   SODIUM mmol/L 147* 143 145   POTASSIUM mmol/L 4.0 4.4 4.5   MAGNESIUM mg/dL 1.8 2.1  --    CHLORIDE mmol/L 101 102 102   CO2 mmol/L 38.3* 35.4* 40.0*   BUN mg/dL 20 17 17   CREATININE mg/dL 1.04* 1.08* 1.03*   CALCIUM mg/dL 8.6 8.5* 8.9   GLUCOSE mg/dL 75 105* 115*   ALBUMIN g/dL  --   --  3.3*   BILIRUBIN mg/dL  --   --  0.2   ALK PHOS U/L  --   --  55   AST (SGOT) U/L  --   --  15   ALT (SGPT) U/L  --   --  19   Estimated Creatinine Clearance: 46.7 mL/min (A) (by C-G formula based on SCr of 1.04 mg/dL (H)).  No results found for: \"AMMONIA\"  Results from last 7 days   Lab Units 01/22/24  1354 01/22/24  1136   HSTROP T ng/L 111* 107*     Results from last 7 days   Lab Units 01/22/24  1136   PROBNP pg/mL 4,452.0*         Glucose   Date/Time Value Ref Range Status   01/24/2024 0350 82 70 - 130 mg/dL Final   01/24/2024 0339 71 70 - 130 mg/dL Final   01/23/2024 1440 77 70 - 130 mg/dL Final     Lab Results   Component Value Date    TSH 0.876 01/23/2024    FREET4 1.59 03/25/2021     No results found for: \"PREGTESTUR\", \"PREGSERUM\", \"HCG\", \"HCGQUANT\"  Pain Management Panel  More data exists         Latest Ref Rng & Units 12/27/2023 2/19/2023   Pain Management Panel   Creatinine, Urine mg/dL - 222.8    Amphetamine, Urine Qual Negative Negative  Negative    Barbiturates Screen, Urine Negative Negative  Negative    Benzodiazepine Screen, Urine Negative Negative  Positive    Buprenorphine, Screen, Urine Negative Negative  Negative    Cocaine Screen, Urine Negative Negative  Negative    Fentanyl, Urine Negative Negative  -   Methadone Screen , Urine Negative Negative  Negative    Methamphetamine, Ur Negative Negative  Negative      Brief Urine Lab Results  (Last result in the past 365 days)        Color   Clarity   Blood   " "Leuk Est   Nitrite   Protein   CREAT   Urine HCG        01/22/24 1323 Yellow   Turbid   Moderate (2+)   Small (1+)   Negative   >=300 mg/dL (3+)                 Blood Culture   Date Value Ref Range Status   01/22/2024 No growth at 24 hours  Preliminary   01/22/2024 Gram Positive Cocci (C)  Preliminary     Urine Culture   Date Value Ref Range Status   01/22/2024 25,000 CFU/mL Klebsiella pneumoniae ssp pneumoniae (A)  Final     No results found for: \"WOUNDCX\"  No results found for: \"STOOLCX\"  No results found for: \"RESPCX\"  No results found for: \"AFBCX\"  Results from last 7 days   Lab Units 01/22/24  1446   LACTATE mmol/L 0.7       I have personally looked at the labs and they are summarized above.  ----------------------------------------------------------------------------------------------------------------------  Detailed radiology reports for the last 24 hours:    Imaging Results (Last 24 Hours)       ** No results found for the last 24 hours. **          Assessment & Plan    #Acute hypercapnic respiratory failure   - Has had prior admissions for this. Mild wheezing on exam. Will treat for COPD exacerbation possibly exacerbated by suspected pneumonia. Will add solumedrol to nebs.   - Will try to take off BiPAP as mentation improves with goal O2 88-92%.   - Will repeat plain film of chest    #Afib w/ RVR  - HR better controlled, continue increased metoprolol 50 mg BID.   - [id number]: 4, continue home apixiban.       #Type II NSTEMI  - 107=>111  - EKG with nonspecific ST changes   - No chest pain. No PE on CTPE.   - Discussed case and recent LHC in 2/23 with interventional cardiology on presentation. No urgent need for repeat ischemic evaluation. To expedite f/u will go ahead and have cardiology evaluate inpatient.      #UTI  #MIRA pneumonia?  - 1/2 staph non-aureus blood culture likely contaminant  - Will continue ceftriaxone/doxy as we follow urine and blood cultures.      Chronic medical " problems:  MDD  CAD  Thyroid disease- TSH wnl  HTN  Carotid stenosis  HLD     Code status: Full      Dispo: Pending clinical improvement. Back to Nursing home at discharge.     VTE Prophylaxis:   Mechanical Order History:       None          Pharmalogical Order History:        Ordered     Dose Route Frequency Stop    01/22/24 7042  apixaban (ELIQUIS) tablet 5 mg         5 mg PO Every 12 Hours Scheduled --                        Efren Dunham MD  [facility]  01/24/24  09:31 EST

## 2024-01-24 NOTE — NURSING NOTE
Patient unresponsive after multiple attempts of painful stimulation. PA contacted and aware. ABG and glucose obtained, vitals re-obtained. Meds administered without success, see HILL ZEPEDA aware and at bedside. ABG resulted, bipap administered,. Report called to PCU. Belongings packed and sent with patient.

## 2024-01-25 ENCOUNTER — APPOINTMENT (OUTPATIENT)
Dept: GENERAL RADIOLOGY | Facility: HOSPITAL | Age: 74
DRG: 193 | End: 2024-01-25
Payer: MEDICARE

## 2024-01-25 LAB
A-A DO2: 51.8 MMHG (ref 0–300)
ALBUMIN SERPL-MCNC: 3.4 G/DL (ref 3.5–5.2)
ALBUMIN/GLOB SERPL: 1.5 G/DL
ALP SERPL-CCNC: 51 U/L (ref 39–117)
ALT SERPL W P-5'-P-CCNC: 14 U/L (ref 1–33)
ANION GAP SERPL CALCULATED.3IONS-SCNC: 7.1 MMOL/L (ref 5–15)
ARTERIAL PATENCY WRIST A: ABNORMAL
AST SERPL-CCNC: 14 U/L (ref 1–32)
ATMOSPHERIC PRESS: 729 MMHG
BACTERIA SPEC AEROBE CULT: ABNORMAL
BASE EXCESS BLDA CALC-SCNC: 16.4 MMOL/L (ref 0–2)
BASOPHILS # BLD AUTO: 0.01 10*3/MM3 (ref 0–0.2)
BASOPHILS NFR BLD AUTO: 0.2 % (ref 0–1.5)
BDY SITE: ABNORMAL
BILIRUB SERPL-MCNC: 0.2 MG/DL (ref 0–1.2)
BUN SERPL-MCNC: 21 MG/DL (ref 8–23)
BUN/CREAT SERPL: 20.6 (ref 7–25)
CALCIUM SPEC-SCNC: 9.2 MG/DL (ref 8.6–10.5)
CHLORIDE SERPL-SCNC: 97 MMOL/L (ref 98–107)
CO2 BLDA-SCNC: 45.4 MMOL/L (ref 22–33)
CO2 SERPL-SCNC: 41.9 MMOL/L (ref 22–29)
COHGB MFR BLD: 1.9 % (ref 0–5)
CREAT SERPL-MCNC: 1.02 MG/DL (ref 0.57–1)
DEPRECATED RDW RBC AUTO: 68.7 FL (ref 37–54)
EGFRCR SERPLBLD CKD-EPI 2021: 57.5 ML/MIN/1.73
EOSINOPHIL # BLD AUTO: 0 10*3/MM3 (ref 0–0.4)
EOSINOPHIL NFR BLD AUTO: 0 % (ref 0.3–6.2)
EPAP: 6
ERYTHROCYTE [DISTWIDTH] IN BLOOD BY AUTOMATED COUNT: 20 % (ref 12.3–15.4)
GLOBULIN UR ELPH-MCNC: 2.3 GM/DL
GLUCOSE SERPL-MCNC: 92 MG/DL (ref 65–99)
GRAM STN SPEC: ABNORMAL
HCO3 BLDA-SCNC: 43.4 MMOL/L (ref 20–26)
HCT VFR BLD AUTO: 35.3 % (ref 34–46.6)
HCT VFR BLD CALC: 33.8 % (ref 38–51)
HGB BLD-MCNC: 10.3 G/DL (ref 12–15.9)
HGB BLDA-MCNC: 11 G/DL (ref 13.5–17.5)
IMM GRANULOCYTES # BLD AUTO: 0.06 10*3/MM3 (ref 0–0.05)
IMM GRANULOCYTES NFR BLD AUTO: 1.2 % (ref 0–0.5)
INHALED O2 CONCENTRATION: 32 %
IPAP: 24
ISOLATED FROM: ABNORMAL
L PNEUMO1 AG UR QL IA: NEGATIVE
LYMPHOCYTES # BLD AUTO: 0.41 10*3/MM3 (ref 0.7–3.1)
LYMPHOCYTES NFR BLD AUTO: 8 % (ref 19.6–45.3)
Lab: ABNORMAL
M PNEUMO IGM SER QL: NEGATIVE
MCH RBC QN AUTO: 27.5 PG (ref 26.6–33)
MCHC RBC AUTO-ENTMCNC: 29.2 G/DL (ref 31.5–35.7)
MCV RBC AUTO: 94.1 FL (ref 79–97)
METHGB BLD QL: 0.2 % (ref 0–3)
MODALITY: ABNORMAL
MONOCYTES # BLD AUTO: 0.21 10*3/MM3 (ref 0.1–0.9)
MONOCYTES NFR BLD AUTO: 4.1 % (ref 5–12)
NEUTROPHILS NFR BLD AUTO: 4.45 10*3/MM3 (ref 1.7–7)
NEUTROPHILS NFR BLD AUTO: 86.5 % (ref 42.7–76)
NRBC BLD AUTO-RTO: 0 /100 WBC (ref 0–0.2)
OXYHGB MFR BLDV: 96.2 % (ref 94–99)
PCO2 BLDA: 65 MM HG (ref 35–45)
PCO2 TEMP ADJ BLD: ABNORMAL MM[HG]
PH BLDA: 7.43 PH UNITS (ref 7.35–7.45)
PH, TEMP CORRECTED: ABNORMAL
PLATELET # BLD AUTO: 114 10*3/MM3 (ref 140–450)
PMV BLD AUTO: 11 FL (ref 6–12)
PO2 BLDA: 94.2 MM HG (ref 83–108)
PO2 TEMP ADJ BLD: ABNORMAL MM[HG]
POTASSIUM SERPL-SCNC: 3.7 MMOL/L (ref 3.5–5.2)
PROT SERPL-MCNC: 5.7 G/DL (ref 6–8.5)
RBC # BLD AUTO: 3.75 10*6/MM3 (ref 3.77–5.28)
SAO2 % BLDCOA: 98.3 % (ref 94–99)
SET MECH RESP RATE: 24
SODIUM SERPL-SCNC: 146 MMOL/L (ref 136–145)
VENTILATOR MODE: ABNORMAL
WBC NRBC COR # BLD AUTO: 5.14 10*3/MM3 (ref 3.4–10.8)

## 2024-01-25 PROCEDURE — 94799 UNLISTED PULMONARY SVC/PX: CPT

## 2024-01-25 PROCEDURE — 71045 X-RAY EXAM CHEST 1 VIEW: CPT | Performed by: RADIOLOGY

## 2024-01-25 PROCEDURE — 82375 ASSAY CARBOXYHB QUANT: CPT

## 2024-01-25 PROCEDURE — 36600 WITHDRAWAL OF ARTERIAL BLOOD: CPT

## 2024-01-25 PROCEDURE — 82805 BLOOD GASES W/O2 SATURATION: CPT

## 2024-01-25 PROCEDURE — 83050 HGB METHEMOGLOBIN QUAN: CPT

## 2024-01-25 PROCEDURE — 25010000002 BUMETANIDE PER 0.5 MG: Performed by: INTERNAL MEDICINE

## 2024-01-25 PROCEDURE — 87449 NOS EACH ORGANISM AG IA: CPT | Performed by: INTERNAL MEDICINE

## 2024-01-25 PROCEDURE — 25010000002 FUROSEMIDE PER 20 MG: Performed by: INTERNAL MEDICINE

## 2024-01-25 PROCEDURE — 99222 1ST HOSP IP/OBS MODERATE 55: CPT | Performed by: INTERNAL MEDICINE

## 2024-01-25 PROCEDURE — 25010000002 METHYLPREDNISOLONE PER 40 MG: Performed by: INTERNAL MEDICINE

## 2024-01-25 PROCEDURE — 86738 MYCOPLASMA ANTIBODY: CPT | Performed by: INTERNAL MEDICINE

## 2024-01-25 PROCEDURE — 25010000002 POTASSIUM CHLORIDE 10 MEQ/100ML SOLUTION: Performed by: INTERNAL MEDICINE

## 2024-01-25 PROCEDURE — 94761 N-INVAS EAR/PLS OXIMETRY MLT: CPT

## 2024-01-25 PROCEDURE — 99233 SBSQ HOSP IP/OBS HIGH 50: CPT | Performed by: INTERNAL MEDICINE

## 2024-01-25 PROCEDURE — 71045 X-RAY EXAM CHEST 1 VIEW: CPT

## 2024-01-25 PROCEDURE — 85025 COMPLETE CBC W/AUTO DIFF WBC: CPT | Performed by: INTERNAL MEDICINE

## 2024-01-25 PROCEDURE — 80053 COMPREHEN METABOLIC PANEL: CPT | Performed by: INTERNAL MEDICINE

## 2024-01-25 PROCEDURE — 25010000002 CEFTRIAXONE PER 250 MG: Performed by: INTERNAL MEDICINE

## 2024-01-25 RX ORDER — DIGOXIN 125 MCG
125 TABLET ORAL
Status: DISCONTINUED | OUTPATIENT
Start: 2024-01-26 | End: 2024-01-25

## 2024-01-25 RX ORDER — FUROSEMIDE 10 MG/ML
40 INJECTION INTRAMUSCULAR; INTRAVENOUS ONCE
Status: COMPLETED | OUTPATIENT
Start: 2024-01-25 | End: 2024-01-25

## 2024-01-25 RX ORDER — POTASSIUM CHLORIDE 7.45 MG/ML
10 INJECTION INTRAVENOUS
Status: COMPLETED | OUTPATIENT
Start: 2024-01-25 | End: 2024-01-25

## 2024-01-25 RX ORDER — BUMETANIDE 0.25 MG/ML
1 INJECTION INTRAMUSCULAR; INTRAVENOUS ONCE
Qty: 4 ML | Refills: 0 | Status: COMPLETED | OUTPATIENT
Start: 2024-01-25 | End: 2024-01-25

## 2024-01-25 RX ORDER — FUROSEMIDE 10 MG/ML
40 INJECTION INTRAMUSCULAR; INTRAVENOUS ONCE
Status: DISCONTINUED | OUTPATIENT
Start: 2024-01-25 | End: 2024-01-25

## 2024-01-25 RX ORDER — DIGOXIN 125 MCG
125 TABLET ORAL
Status: DISCONTINUED | OUTPATIENT
Start: 2024-01-25 | End: 2024-01-30

## 2024-01-25 RX ORDER — POTASSIUM CHLORIDE 7.45 MG/ML
10 INJECTION INTRAVENOUS
Status: DISCONTINUED | OUTPATIENT
Start: 2024-01-25 | End: 2024-01-25

## 2024-01-25 RX ADMIN — MEMANTINE HYDROCHLORIDE 10 MG: 10 TABLET, FILM COATED ORAL at 08:32

## 2024-01-25 RX ADMIN — DOCUSATE SODIUM 50 MG AND SENNOSIDES 8.6 MG 2 TABLET: 8.6; 5 TABLET, FILM COATED ORAL at 21:02

## 2024-01-25 RX ADMIN — CASTOR OIL AND BALSAM, PERU 1 APPLICATION: 788; 87 OINTMENT TOPICAL at 08:26

## 2024-01-25 RX ADMIN — ISOSORBIDE MONONITRATE 90 MG: 30 TABLET, EXTENDED RELEASE ORAL at 08:35

## 2024-01-25 RX ADMIN — RISPERIDONE 0.5 MG: 0.25 TABLET, FILM COATED ORAL at 21:02

## 2024-01-25 RX ADMIN — RISPERIDONE 0.5 MG: 0.25 TABLET, FILM COATED ORAL at 08:31

## 2024-01-25 RX ADMIN — METHYLPREDNISOLONE SODIUM SUCCINATE 40 MG: 40 INJECTION, POWDER, FOR SOLUTION INTRAMUSCULAR; INTRAVENOUS at 22:22

## 2024-01-25 RX ADMIN — Medication 10 ML: at 08:27

## 2024-01-25 RX ADMIN — DOXYCYCLINE 100 MG: 100 INJECTION, POWDER, LYOPHILIZED, FOR SOLUTION INTRAVENOUS at 14:26

## 2024-01-25 RX ADMIN — METOPROLOL TARTRATE 50 MG: 50 TABLET, FILM COATED ORAL at 21:03

## 2024-01-25 RX ADMIN — POTASSIUM CHLORIDE 10 MEQ: 7.46 INJECTION, SOLUTION INTRAVENOUS at 16:53

## 2024-01-25 RX ADMIN — CASTOR OIL AND BALSAM, PERU 1 APPLICATION: 788; 87 OINTMENT TOPICAL at 21:03

## 2024-01-25 RX ADMIN — POTASSIUM CHLORIDE 10 MEQ: 7.46 INJECTION, SOLUTION INTRAVENOUS at 17:08

## 2024-01-25 RX ADMIN — METHYLPREDNISOLONE SODIUM SUCCINATE 40 MG: 40 INJECTION, POWDER, FOR SOLUTION INTRAMUSCULAR; INTRAVENOUS at 12:01

## 2024-01-25 RX ADMIN — SERTRALINE 100 MG: 50 TABLET, FILM COATED ORAL at 08:30

## 2024-01-25 RX ADMIN — DOXYCYCLINE 100 MG: 100 INJECTION, POWDER, LYOPHILIZED, FOR SOLUTION INTRAVENOUS at 03:47

## 2024-01-25 RX ADMIN — CLOPIDOGREL BISULFATE 75 MG: 75 TABLET, FILM COATED ORAL at 08:32

## 2024-01-25 RX ADMIN — POTASSIUM CHLORIDE 10 MEQ: 7.46 INJECTION, SOLUTION INTRAVENOUS at 14:24

## 2024-01-25 RX ADMIN — APIXABAN 5 MG: 5 TABLET, FILM COATED ORAL at 21:02

## 2024-01-25 RX ADMIN — FERROUS SULFATE TAB 325 MG (65 MG ELEMENTAL FE) 325 MG: 325 (65 FE) TAB at 21:02

## 2024-01-25 RX ADMIN — MEMANTINE HYDROCHLORIDE 10 MG: 10 TABLET, FILM COATED ORAL at 21:03

## 2024-01-25 RX ADMIN — LAMOTRIGINE 100 MG: 100 TABLET ORAL at 08:32

## 2024-01-25 RX ADMIN — Medication 10 ML: at 21:03

## 2024-01-25 RX ADMIN — DOCUSATE SODIUM 50 MG AND SENNOSIDES 8.6 MG 2 TABLET: 8.6; 5 TABLET, FILM COATED ORAL at 08:31

## 2024-01-25 RX ADMIN — BACLOFEN 5 MG: 10 TABLET ORAL at 21:03

## 2024-01-25 RX ADMIN — LAMOTRIGINE 25 MG: 100 TABLET ORAL at 21:02

## 2024-01-25 RX ADMIN — METOPROLOL TARTRATE 2.5 MG: 1 INJECTION, SOLUTION INTRAVENOUS at 04:45

## 2024-01-25 RX ADMIN — LISINOPRIL 10 MG: 10 TABLET ORAL at 08:32

## 2024-01-25 RX ADMIN — POTASSIUM CHLORIDE 10 MEQ: 7.46 INJECTION, SOLUTION INTRAVENOUS at 15:12

## 2024-01-25 RX ADMIN — PANTOPRAZOLE SODIUM 40 MG: 40 TABLET, DELAYED RELEASE ORAL at 08:32

## 2024-01-25 RX ADMIN — BUMETANIDE 1 MG: 0.25 INJECTION INTRAMUSCULAR; INTRAVENOUS at 08:26

## 2024-01-25 RX ADMIN — FUROSEMIDE 40 MG: 10 INJECTION, SOLUTION INTRAMUSCULAR; INTRAVENOUS at 14:24

## 2024-01-25 RX ADMIN — METOPROLOL TARTRATE 50 MG: 50 TABLET, FILM COATED ORAL at 08:30

## 2024-01-25 RX ADMIN — APIXABAN 5 MG: 5 TABLET, FILM COATED ORAL at 08:29

## 2024-01-25 RX ADMIN — ROSUVASTATIN CALCIUM 40 MG: 20 TABLET, FILM COATED ORAL at 21:02

## 2024-01-25 RX ADMIN — BACLOFEN 5 MG: 10 TABLET ORAL at 08:31

## 2024-01-25 RX ADMIN — DONEPEZIL HYDROCHLORIDE 10 MG: 5 TABLET, FILM COATED ORAL at 21:02

## 2024-01-25 RX ADMIN — DIGOXIN 125 MCG: 0.12 TABLET ORAL at 17:07

## 2024-01-25 RX ADMIN — FERROUS SULFATE TAB 325 MG (65 MG ELEMENTAL FE) 325 MG: 325 (65 FE) TAB at 08:32

## 2024-01-25 RX ADMIN — CEFTRIAXONE 1000 MG: 1 INJECTION, POWDER, FOR SOLUTION INTRAMUSCULAR; INTRAVENOUS at 14:24

## 2024-01-25 NOTE — PLAN OF CARE
Goal Outcome Evaluation:           Progress: no change  Outcome Evaluation: Pt agitated/alert throughout this shift. VSS at this time on BIPAP. Lopressor 2.5mg given once per orders for hypertension. Patient has no complaints at this time. Bed low, locked, and call light in reach.

## 2024-01-25 NOTE — PROGRESS NOTES
LOS: 1 day     Name: Brenda Munroe  Age/Sex: 73 y.o. female  :  1950        PCP: Bernadette Arevalo MD    Principal Problem:    A-fib  Active Problems:    Hypercapnic respiratory failure      Admission Information: Brenda Munroe is a 73 y.o. female with coronary artery disease, paroxysmal atrial fibrillation, hypertension, hyperlipidemia, bilateral carotid stenosis without occlusion, hypothyroidism, arthritis, chronic headaches, dementia, physical debility, and GERD.    Chief Complaint: Altered mental status    Interval history: Heart rate has remained labile ranging  today.  Digoxin was ordered 2024, however her mental status and BiPAP requirement have forced the nursing staff to hold doses due to concern for aspiration.  Discussed with Idania GARICA who has noted that the patient seems to be more alert in the mornings and has tolerated morning medications.  I then discussed with Stanislav Morrow/pharmacist who agreed to change the dosing time to 0900 in the hopes of getting additional rate lowering medication on board.    Subjective     Patient sleepy and on BiPAP.    Vital Signs  Vital Signs (last 72 hrs)          0700   0659  0700   0659  0700   0659  0700   1508   Most Recent      Temp (°F) 97.4 -  98.5    97 -  98.7    97.6 -  98.9    97.3 -  97.7     97.7 (36.5)  1200    Heart Rate 86 -  133    77 -  118    88 -  137    89 -  152     113  1424    Resp 16 -  20    16 -  24    12 -  24    14 -  35     24  1300    BP 64/54 -  134/83    97/58 -  135/90    125/83 -  171/113    106/92 -  150/103     150/103  1424    SpO2 (%) 92 -  98    94 -  98    74 -  100    91 -  100     99  1300    Flow (L/min) 2 -  2.5    2 -  2.5         2  0320    Oxygen Concentration (%)     30    30 -  32      32     32  1430          Temp:  [97.3 °F (36.3 °C)-98.9 °F (37.2 °C)] 97.7 °F (36.5 °C)  Heart Rate:  [] 113  Resp:  [14-35] 24  BP:  (106171)/() 150/103  Body mass index is 28.42 kg/m².      Intake/Output Summary (Last 24 hours) at 1/25/2024 1508  Last data filed at 1/25/2024 1424  Gross per 24 hour   Intake 50 ml   Output 3525 ml   Net -3475 ml       Vitals and nursing note reviewed.   Constitutional:       Appearance: Chronically ill-appearing.      Comments: BiPAP is in place   Pulmonary:      Breath sounds: Decreased air movement present.   Cardiovascular:      Tachycardia present. Irregularly irregular rhythm.      Murmurs: There is no murmur.   Edema:     Peripheral edema absent.   Skin:     General: Skin is warm and dry.   Neurological:      Mental Status: Lethargic.         Telemetry: A-fib 100s     Results Review:     Results from last 7 days   Lab Units 01/25/24  0213 01/24/24  0019 01/23/24  0048 01/22/24  1136   WBC 10*3/mm3 5.14 5.34 7.20 8.11   HEMOGLOBIN g/dL 10.3* 9.5* 9.2* 9.8*   PLATELETS 10*3/mm3 114* 94* 99* 104*     Results from last 7 days   Lab Units 01/25/24  0213 01/24/24  0019 01/23/24  0048 01/22/24  1136   SODIUM mmol/L 146* 147* 143 145   POTASSIUM mmol/L 3.7 4.0 4.4 4.5   CHLORIDE mmol/L 97* 101 102 102   CO2 mmol/L 41.9* 38.3* 35.4* 40.0*   BUN mg/dL 21 20 17 17   CREATININE mg/dL 1.02* 1.04* 1.08* 1.03*   CALCIUM mg/dL 9.2 8.6 8.5* 8.9   GLUCOSE mg/dL 92 75 105* 115*     Results from last 7 days   Lab Units 01/24/24  1106 01/24/24  0019 01/22/24  1354 01/22/24  1136   HSTROP T ng/L 121* 138* 111* 107*       Results from last 7 days   Lab Units 01/22/24  1136   INR  1.55*       I reviewed the patient's new clinical results.  I reviewed the patient's new imaging results and agree with the interpretation.  I personally viewed and interpreted the patient's EKG/Telemetry data      Medication Review:   apixaban, 5 mg, Oral, Q12H  aspirin, 324 mg, Oral, Once  baclofen, 5 mg, Oral, BID  budesonide-formoterol, 2 puff, Inhalation, BID - RT   And  tiotropium bromide monohydrate, 2 puff, Inhalation, Daily - RT  castor  oil-balsam peru, 1 application , Topical, Q12H  cefTRIAXone, 1,000 mg, Intravenous, Q24H  [START ON 1/26/2024] cholecalciferol, 50,000 Units, Oral, Weekly  clopidogrel, 75 mg, Oral, Daily  digoxin, 125 mcg, Oral, Daily  donepezil, 10 mg, Oral, Nightly  doxycycline, 100 mg, Intravenous, Q12H  ferrous sulfate, 325 mg, Oral, BID  isosorbide mononitrate, 90 mg, Oral, Daily  lamoTRIgine, 100 mg, Oral, Daily  lamoTRIgine, 25 mg, Oral, Nightly  levothyroxine, 75 mcg, Oral, Q AM  lisinopril, 10 mg, Oral, Q24H  memantine, 10 mg, Oral, Q12H  methylPREDNISolone sodium succinate, 40 mg, Intravenous, Q12H  metoprolol tartrate, 50 mg, Oral, Q12H  pantoprazole, 40 mg, Oral, QAM AC  potassium chloride, 10 mEq, Intravenous, Q1H  risperiDONE, 0.5 mg, Oral, BID  rosuvastatin, 40 mg, Oral, Nightly  senna-docusate sodium, 2 tablet, Oral, BID  sertraline, 100 mg, Oral, Daily  sodium chloride, 10 mL, Intravenous, Q12H  sodium chloride, 10 mL, Intravenous, Q12H           Assessment:  NSTEMI likely type II secondary to tachycardia  ASCVD, status post CABG (remote) with recent cardiac catheterization on 2/18/2023 revealing a patent LIMA to LAD, occluded vein graft to the left circumflex and RCA with excellent collaterals from distal LAD to RCA and chronic calcific 95% stenosis in the mid left circumflex, and deemed to be high risk for PCI Possible acute HFpEF.  Paroxysmal atrial fibrillation with rapid ventricular response, patient has been on Eliquis for stroke prevention.  Acute mental status changes probably from metabolic encephalopathy from infection, being managed by the hospitalist service.  Continue tobacco abuse.      Recommendations:  Gradually increasing beta-blockers.  Have adjusted dose time for digoxin in the hopes of getting better control.  No signs that are worrisome for ischemia  Adjusting rate lowering medications as listed above.  Continue Eliquis for anticoagulation  Mental status waxes and wanes  Smoking cessation  encouraged    No family at bedside.      Electronically signed by PAULA Crocker, 01/25/24, 3:17 PM EST.

## 2024-01-25 NOTE — PLAN OF CARE
Problem: Adult Inpatient Plan of Care  Goal: Plan of Care Review  Outcome: Ongoing, Progressing  Flowsheets  Taken 1/25/2024 1626 by Idania Curtis RN  Progress: improving  Outcome Evaluation: VSS. Patient is on room air/bipap. Patient was able to take morning meds. Patient alert to self. No other issues reported at this time. Call light within reach, bed in the lowest position.  Taken 1/23/2024 1347 by Alen Darling PT  Plan of Care Reviewed With: patient  Goal: Patient-Specific Goal (Individualized)  Outcome: Ongoing, Progressing  Goal: Absence of Hospital-Acquired Illness or Injury  Outcome: Ongoing, Progressing  Intervention: Identify and Manage Fall Risk  Recent Flowsheet Documentation  Taken 1/25/2024 1500 by Idania Curtis RN  Safety Promotion/Fall Prevention: safety round/check completed  Taken 1/25/2024 1300 by Idania Curtis RN  Safety Promotion/Fall Prevention: safety round/check completed  Taken 1/25/2024 1100 by Idania Curtis RN  Safety Promotion/Fall Prevention: safety round/check completed  Taken 1/25/2024 0900 by Idania Curtis RN  Safety Promotion/Fall Prevention: safety round/check completed  Taken 1/25/2024 0700 by Idania Curtis RN  Safety Promotion/Fall Prevention: safety round/check completed  Intervention: Prevent Skin Injury  Recent Flowsheet Documentation  Taken 1/25/2024 1430 by Idania Curtis RN  Skin Protection:   adhesive use limited   tubing/devices free from skin contact   incontinence pads utilized  Taken 1/25/2024 0830 by Idania Curtis RN  Skin Protection:   adhesive use limited   tubing/devices free from skin contact   incontinence pads utilized  Intervention: Prevent and Manage VTE (Venous Thromboembolism) Risk  Recent Flowsheet Documentation  Taken 1/25/2024 1430 by Idania Curtis RN  VTE Prevention/Management: (eliquis) other (see comments)  Taken 1/25/2024 0830 by Idania Curtis RN  VTE Prevention/Management: (eliquis) other (see comments)  Intervention: Prevent  Infection  Recent Flowsheet Documentation  Taken 1/25/2024 1500 by Idania Curtis RN  Infection Prevention:   rest/sleep promoted   single patient room provided  Taken 1/25/2024 1300 by Idania Curtis RN  Infection Prevention:   rest/sleep promoted   single patient room provided  Taken 1/25/2024 1100 by Idania Curtis RN  Infection Prevention:   rest/sleep promoted   single patient room provided  Taken 1/25/2024 0900 by Idania Curtis RN  Infection Prevention:   rest/sleep promoted   single patient room provided  Taken 1/25/2024 0700 by Idania Curtis RN  Infection Prevention:   rest/sleep promoted   single patient room provided  Goal: Optimal Comfort and Wellbeing  Outcome: Ongoing, Progressing  Intervention: Provide Person-Centered Care  Recent Flowsheet Documentation  Taken 1/25/2024 1430 by Idania Curtis RN  Trust Relationship/Rapport:   care explained   choices provided   emotional support provided   empathic listening provided   questions answered   reassurance provided   questions encouraged   thoughts/feelings acknowledged  Taken 1/25/2024 0830 by Idania Curtis RN  Trust Relationship/Rapport:   care explained   choices provided  Goal: Readiness for Transition of Care  Outcome: Ongoing, Progressing     Problem: Fall Injury Risk  Goal: Absence of Fall and Fall-Related Injury  Outcome: Ongoing, Progressing  Intervention: Identify and Manage Contributors  Recent Flowsheet Documentation  Taken 1/25/2024 1500 by Idania Curtis RN  Medication Review/Management: medications reviewed  Taken 1/25/2024 1300 by Idania Curtis RN  Medication Review/Management: medications reviewed  Taken 1/25/2024 1100 by Idania Curtis RN  Medication Review/Management: medications reviewed  Taken 1/25/2024 0900 by Idania Curtis RN  Medication Review/Management: medications reviewed  Taken 1/25/2024 0700 by Idania Curtis RN  Medication Review/Management: medications reviewed  Intervention: Promote Injury-Free Environment  Recent  Flowsheet Documentation  Taken 1/25/2024 1500 by Idania Curtis RN  Safety Promotion/Fall Prevention: safety round/check completed  Taken 1/25/2024 1300 by Idania Curtis RN  Safety Promotion/Fall Prevention: safety round/check completed  Taken 1/25/2024 1100 by Idania Curtis RN  Safety Promotion/Fall Prevention: safety round/check completed  Taken 1/25/2024 0900 by Idania Curtis RN  Safety Promotion/Fall Prevention: safety round/check completed  Taken 1/25/2024 0700 by Idania Curtis RN  Safety Promotion/Fall Prevention: safety round/check completed     Problem: Skin Injury Risk Increased  Goal: Skin Health and Integrity  Outcome: Ongoing, Progressing  Intervention: Optimize Skin Protection  Recent Flowsheet Documentation  Taken 1/25/2024 1430 by Idania Curtis RN  Pressure Reduction Techniques: weight shift assistance provided  Pressure Reduction Devices: pressure-redistributing mattress utilized  Skin Protection:   adhesive use limited   tubing/devices free from skin contact   incontinence pads utilized  Taken 1/25/2024 0830 by Idania Curtis RN  Pressure Reduction Techniques:   weight shift assistance provided   positioned off wounds   heels elevated off bed   pressure points protected  Pressure Reduction Devices: pressure-redistributing mattress utilized  Skin Protection:   adhesive use limited   tubing/devices free from skin contact   incontinence pads utilized     Problem: Asthma Comorbidity  Goal: Maintenance of Asthma Control  Outcome: Ongoing, Progressing  Intervention: Maintain Asthma Symptom Control  Recent Flowsheet Documentation  Taken 1/25/2024 1500 by Idania Curtis RN  Medication Review/Management: medications reviewed  Taken 1/25/2024 1300 by Idania Curtis RN  Medication Review/Management: medications reviewed  Taken 1/25/2024 1100 by Idania Curtis RN  Medication Review/Management: medications reviewed  Taken 1/25/2024 0900 by Idania Curtis RN  Medication Review/Management: medications  reviewed  Taken 1/25/2024 0700 by Idania Curtis RN  Medication Review/Management: medications reviewed     Problem: Behavioral Health Comorbidity  Goal: Maintenance of Behavioral Health Symptom Control  Outcome: Ongoing, Progressing  Intervention: Maintain Behavioral Health Symptom Control  Recent Flowsheet Documentation  Taken 1/25/2024 1500 by Idania Curtis RN  Medication Review/Management: medications reviewed  Taken 1/25/2024 1300 by Idania Curtis RN  Medication Review/Management: medications reviewed  Taken 1/25/2024 1100 by Idania Curtis RN  Medication Review/Management: medications reviewed  Taken 1/25/2024 0900 by Idania Curtis RN  Medication Review/Management: medications reviewed  Taken 1/25/2024 0700 by Idania Curtis RN  Medication Review/Management: medications reviewed     Problem: COPD (Chronic Obstructive Pulmonary Disease) Comorbidity  Goal: Maintenance of COPD Symptom Control  Outcome: Ongoing, Progressing  Intervention: Maintain COPD-Symptom Control  Recent Flowsheet Documentation  Taken 1/25/2024 1500 by Idania Curtis RN  Medication Review/Management: medications reviewed  Taken 1/25/2024 1300 by Idania Curtis RN  Medication Review/Management: medications reviewed  Taken 1/25/2024 1100 by Idania Curtis RN  Medication Review/Management: medications reviewed  Taken 1/25/2024 0900 by Idania Curtis RN  Medication Review/Management: medications reviewed  Taken 1/25/2024 0700 by Idania Curtis RN  Medication Review/Management: medications reviewed     Problem: Diabetes Comorbidity  Goal: Blood Glucose Level Within Targeted Range  Outcome: Ongoing, Progressing     Problem: Heart Failure Comorbidity  Goal: Maintenance of Heart Failure Symptom Control  Outcome: Ongoing, Progressing  Intervention: Maintain Heart Failure-Management  Recent Flowsheet Documentation  Taken 1/25/2024 1500 by Idania Curtis RN  Medication Review/Management: medications reviewed  Taken 1/25/2024 1300 by Idania Curtis  RN  Medication Review/Management: medications reviewed  Taken 1/25/2024 1100 by Idania Curtis RN  Medication Review/Management: medications reviewed  Taken 1/25/2024 0900 by Idania Curtis RN  Medication Review/Management: medications reviewed  Taken 1/25/2024 0700 by Idania Curtis RN  Medication Review/Management: medications reviewed     Problem: Hypertension Comorbidity  Goal: Blood Pressure in Desired Range  Outcome: Ongoing, Progressing  Intervention: Maintain Blood Pressure Management  Recent Flowsheet Documentation  Taken 1/25/2024 1500 by Idania Curtis RN  Medication Review/Management: medications reviewed  Taken 1/25/2024 1300 by Idania Curtis RN  Medication Review/Management: medications reviewed  Taken 1/25/2024 1100 by Idania Curtis RN  Medication Review/Management: medications reviewed  Taken 1/25/2024 0900 by Idania Curtis RN  Medication Review/Management: medications reviewed  Taken 1/25/2024 0700 by Idania Curtis RN  Medication Review/Management: medications reviewed     Problem: Obstructive Sleep Apnea Risk or Actual Comorbidity Management  Goal: Unobstructed Breathing During Sleep  Outcome: Ongoing, Progressing     Problem: Osteoarthritis Comorbidity  Goal: Maintenance of Osteoarthritis Symptom Control  Outcome: Ongoing, Progressing  Intervention: Maintain Osteoarthritis Symptom Control  Recent Flowsheet Documentation  Taken 1/25/2024 1500 by Idania Curtis RN  Medication Review/Management: medications reviewed  Taken 1/25/2024 1300 by Idania Curtis RN  Medication Review/Management: medications reviewed  Taken 1/25/2024 1100 by Idania Curtis RN  Medication Review/Management: medications reviewed  Taken 1/25/2024 0900 by Idania Curtis RN  Medication Review/Management: medications reviewed  Taken 1/25/2024 0700 by Idania Curtis RN  Medication Review/Management: medications reviewed     Problem: Pain Chronic (Persistent) (Comorbidity Management)  Goal: Acceptable Pain Control and Functional  Ability  Outcome: Ongoing, Progressing  Intervention: Manage Persistent Pain  Recent Flowsheet Documentation  Taken 1/25/2024 1500 by Idania Curtis RN  Medication Review/Management: medications reviewed  Taken 1/25/2024 1300 by Idania Curtis RN  Medication Review/Management: medications reviewed  Taken 1/25/2024 1100 by Idania Curtis RN  Medication Review/Management: medications reviewed  Taken 1/25/2024 0900 by Idania Curtis RN  Medication Review/Management: medications reviewed  Taken 1/25/2024 0700 by Idania Curtis RN  Medication Review/Management: medications reviewed  Intervention: Optimize Psychosocial Wellbeing  Recent Flowsheet Documentation  Taken 1/25/2024 1430 by Idania Curtis RN  Diversional Activities: television  Family/Support System Care: self-care encouraged  Taken 1/25/2024 0830 by Idania Curtis RN  Diversional Activities: television  Family/Support System Care:   support provided   self-care encouraged     Problem: Seizure Disorder Comorbidity  Goal: Maintenance of Seizure Control  Outcome: Ongoing, Progressing     Problem: Noninvasive Ventilation Acute  Goal: Effective Unassisted Ventilation and Oxygenation  Outcome: Ongoing, Progressing   Goal Outcome Evaluation:           Progress: improving  Outcome Evaluation: VSS. Patient is on room air/bipap. Patient was able to take morning meds. Patient alert to self. No other issues reported at this time. Call light within reach, bed in the lowest position.

## 2024-01-25 NOTE — CONSULTS
Referring Provider: dr stanley  Reason for Consultation: Acute on chronic hypoxic and hypercarbic respiratory failure      Chief complaint -could not be obtained as on my assessment patient was on continuous BiPAP      History of present illness:    Most of the history is obtained from patient's medical chart as on my assessment patient was on continuous BiPAP none of the family members at bedside.    Patient is a 73-year-old female with past medical history significant for COPD, coronary artery disease, thyroid disease, hypertension, atrial fibrillation, carotid artery stenosis, dementia and hyperlipidemia.  Patient presented to the ED for evaluation of her shortness of breath and racing heart.  Treatment given in the ER and in PCU reviewed.  All the labs medications ins and outs and vitals since the time of admission reviewed.  Images reviewed.  Review of Systems  Could not be obtained as patient is on continuous BiPAP        History  Past Medical History:   Diagnosis Date    Anxiety     Arthritis     Bell's palsy     Bladder prolapse, female, acquired     Carotid stenosis     COPD (chronic obstructive pulmonary disease)     COPD (chronic obstructive pulmonary disease)     Coronary artery disease     Dementia     Dementia     Depression     Difficulty walking     Disease of thyroid gland     Frequency of urination     GERD (gastroesophageal reflux disease)     Heart attack     Hyperlipidemia     Hypertension     Irregular heart beat     Peptic ulcer disease    ,   Past Surgical History:   Procedure Laterality Date    CARDIAC CATHETERIZATION N/A 2/21/2023    Procedure: Left Heart Cath;  Surgeon: Tab Cifuentes MD;  Location: [facility] CATH INVASIVE LOCATION;  Service: Cardiology;  Laterality: N/A;    CARDIAC SURGERY      open heart  in 1999    CYSTOSCOPY N/A 11/8/2017    Procedure: CYSTOSCOPY;  Surgeon: Karl Nicolas DO;  Location: [facility] OR;  Service:     OTHER SURGICAL HISTORY      states  she had fluid drained no surgery    VAGINAL HYSTERECTOMY W/ ANTERIOR AND POSTERIOR VAGINAL REPAIR N/A 11/8/2017    Procedure: VAGINAL HYSTERECTOMY WITH ANTERIOR, POSTERIOR, AND ENTEROCELE VAGINAL REPAIR;  Surgeon: Karl Nicolas DO;  Location:  [facility] OR;  Service:     VAGINAL VAULT SUSPENSION N/A 11/8/2017    Procedure: VAGINAL VAULT SUSPENSION ;  Surgeon: Karl Nicolas DO;  Location:  [facility] OR;  Service:    ,   Family History   Problem Relation Age of Onset    Dementia Sister     Heart attack Mother     Tuberculosis Father     Breast cancer Neg Hx    ,   Social History     Tobacco Use    Smoking status: Every Day     Packs/day: 0.50     Years: 55.00     Additional pack years: 0.00     Total pack years: 27.50     Types: Cigarettes    Smokeless tobacco: Never   Vaping Use    Vaping Use: Never used   Substance Use Topics    Alcohol use: No    Drug use: No   ,   Medications Prior to Admission   Medication Sig Dispense Refill Last Dose    apixaban (ELIQUIS) 5 MG tablet tablet Take 1 tablet by mouth 2 (Two) Times a Day.   1/22/2024    baclofen (LIORESAL) 10 MG tablet Take 0.5 tablets by mouth 2 (Two) Times a Day. Indications: Muscle Spasticity   1/22/2024    clopidogrel (PLAVIX) 75 MG tablet Take 1 tablet by mouth Daily. 30 tablet  1/22/2024    donepezil (ARICEPT) 10 MG tablet Take 1 tablet by mouth Every Night.   1/21/2024    ferrous sulfate 325 (65 FE) MG tablet Take 1 tablet by mouth 2 (Two) Times a Day. 60 tablet 5 1/22/2024    isosorbide mononitrate (IMDUR) 30 MG 24 hr tablet Take 3 tablets by mouth Daily.   1/22/2024    lamoTRIgine (LaMICtal) 100 MG tablet Take 1 tablet by mouth Daily.   1/22/2024    lamoTRIgine (LaMICtal) 25 MG tablet Take 1 tablet by mouth Every Night.   1/21/2024    levothyroxine (SYNTHROID, LEVOTHROID) 75 MCG tablet Take 1 tablet by mouth Daily.   1/22/2024    lisinopril (PRINIVIL,ZESTRIL) 10 MG tablet Take 1 tablet by mouth Daily.   1/22/2024    memantine (NAMENDA) 10 MG  tablet Take 1 tablet by mouth 2 (Two) Times a Day.   1/22/2024    metoprolol succinate XL (TOPROL-XL) 25 MG 24 hr tablet Take 2 tablets by mouth Daily. 30 tablet 1 1/22/2024    pantoprazole (PROTONIX) 40 MG EC tablet Take 1 tablet by mouth Daily. 10 tablet 0 1/22/2024    risperiDONE (risperDAL) 0.5 MG tablet Take 1 tablet by mouth 2 (Two) Times a Day.   1/22/2024    rosuvastatin (CRESTOR) 40 MG tablet Take 1 tablet by mouth Every Evening.   1/21/2024    sertraline (ZOLOFT) 100 MG tablet Take 1 tablet by mouth Daily.   1/22/2024    Trelegy Ellipta 100-62.5-25 MCG/INH inhaler Inhale 1 puff Daily.   1/22/2024    acetaminophen (TYLENOL) 500 MG tablet Take 1 tablet by mouth Every 4 (Four) Hours As Needed for Mild Pain.   1/18/2024    Dextran 70-Hypromellose (Lubricating Tears Eye Drops) 0.1-0.3 % solution Apply 1 drop to eye(s) as directed by provider Every 2 (Two) Hours As Needed (dry eyes). 30 mL 0 Unknown    guaiFENesin 200 MG tablet Take 2 tablets by mouth Every 4 (Four) Hours As Needed for Congestion.   Unknown    LORazepam (ATIVAN) 0.5 MG tablet Take 1 tablet by mouth Every 12 (Twelve) Hours As Needed for Anxiety.   1/1/2024    nitroglycerin (NITROSTAT) 0.4 MG SL tablet Place 1 tablet under the tongue Every 5 (Five) Minutes As Needed.   Unknown    phenol (Chloraseptic) 1.4 % liquid liquid Apply 1 spray to the mouth or throat Every 2 (Two) Hours As Needed (sore throat).   Unknown    senna (SENOKOT) 8.6 MG tablet Take 1 tablet by mouth Daily As Needed for Constipation.   Unknown    trolamine salicylate (ASPERCREME) 10 % cream Apply 1 application  topically to the appropriate area as directed Every 6 (Six) Hours As Needed for Muscle / Joint Pain.   1/11/2024    vitamin D (ERGOCALCIFEROL) 1.25 MG (86484 UT) capsule capsule Take 1 capsule by mouth 1 (One) Time Per Week.   1/19/2024   , Scheduled Meds:  apixaban, 5 mg, Oral, Q12H  aspirin, 324 mg, Oral, Once  baclofen, 5 mg, Oral, BID  budesonide-formoterol, 2 puff,  Inhalation, BID - RT   And  tiotropium bromide monohydrate, 2 puff, Inhalation, Daily - RT  castor oil-balsam peru, 1 application , Topical, Q12H  cefTRIAXone, 1,000 mg, Intravenous, Q24H  [START ON 1/26/2024] cholecalciferol, 50,000 Units, Oral, Weekly  clopidogrel, 75 mg, Oral, Daily  digoxin, 125 mcg, Oral, Daily  donepezil, 10 mg, Oral, Nightly  doxycycline, 100 mg, Intravenous, Q12H  ferrous sulfate, 325 mg, Oral, BID  isosorbide mononitrate, 90 mg, Oral, Daily  lamoTRIgine, 100 mg, Oral, Daily  lamoTRIgine, 25 mg, Oral, Nightly  levothyroxine, 75 mcg, Oral, Q AM  lisinopril, 10 mg, Oral, Q24H  memantine, 10 mg, Oral, Q12H  methylPREDNISolone sodium succinate, 40 mg, Intravenous, Q12H  metoprolol tartrate, 50 mg, Oral, Q12H  pantoprazole, 40 mg, Oral, QAM AC  risperiDONE, 0.5 mg, Oral, BID  rosuvastatin, 40 mg, Oral, Nightly  senna-docusate sodium, 2 tablet, Oral, BID  sertraline, 100 mg, Oral, Daily  sodium chloride, 10 mL, Intravenous, Q12H  sodium chloride, 10 mL, Intravenous, Q12H    , Continuous Infusions:    and Allergies:  Patient has no known allergies.    Objective     Vital Signs   Temp:  [97.3 °F (36.3 °C)-98.9 °F (37.2 °C)] 97.3 °F (36.3 °C)  Heart Rate:  [] 106  Resp:  [14-35] 20  BP: (106-171)/() 134/95    Physical Exam:             General-chronically ill in appearance, on continuous BiPAP  HEENT-PERRLA    Neck-supple    Respiratory-respirations normal-on auscultation no wheezing no crackles, on continuous BiPAP, decreased breath sounds    Cardiovascular-NSR  GI-NTND bowel sounds positive    CNS-nonfocal    Musculoskeletal -no edema  Extremities- no obvious deformity noticed     Psychiatric-not alert skin- no visible rash                                                                   Results Review:    LABS:    Lab Results   Component Value Date    GLUCOSE 92 01/25/2024    BUN 21 01/25/2024    CREATININE 1.02 (H) 01/25/2024    EGFRIFNONA 75 03/25/2021    EGFRIFAFRI  09/24/2016       Comment:      <15 Indicative of kidney failure.    BCR 20.6 01/25/2024    CO2 41.9 (H) 01/25/2024    CALCIUM 9.2 01/25/2024    ALBUMIN 3.4 (L) 01/25/2024    LABIL2 1.4 (L) 08/04/2015    AST 14 01/25/2024    ALT 14 01/25/2024    WBC 5.14 01/25/2024    HGB 10.3 (L) 01/25/2024    HCT 35.3 01/25/2024    MCV 94.1 01/25/2024     (L) 01/25/2024     (H) 01/25/2024    K 3.7 01/25/2024    CL 97 (L) 01/25/2024    ANIONGAP 7.1 01/25/2024       Lab Results   Component Value Date    INR 1.55 (H) 01/22/2024    INR 1.04 09/17/2023    INR 1.16 (H) 02/18/2023    PROTIME 19.2 (H) 01/22/2024    PROTIME 14.1 09/17/2023    PROTIME 15.1 (H) 02/18/2023       Results from last 7 days   Lab Units 01/22/24  1136   INR  1.55*   APTT seconds 32.2          I reviewed the patient's new clinical results.  I reviewed the patient's new imaging results and agree with the interpretation.  Microbiology Results (last 10 days)       Procedure Component Value - Date/Time    Blood Culture - Blood, Arm, Right [[id number]]  (Normal) Collected: 01/22/24 1453    Lab Status: Preliminary result Specimen: Blood from Arm, Right Updated: 01/24/24 1500     Blood Culture No growth at 2 days    Blood Culture - Blood, Arm, Left [[id number]]  (Abnormal) Collected: 01/22/24 1446    Lab Status: Final result Specimen: Blood from Arm, Left Updated: 01/25/24 0723     Blood Culture Streptococcus mitis / oralis     Isolated from Aerobic Bottle     Gram Stain Aerobic Bottle Gram positive cocci in pairs and clusters    Narrative:      Probable contaminant requires clinical correlation, susceptibility not performed unless requested by physician.    Blood Culture ID, PCR - Blood, Arm, Left [[id number]]  (Abnormal) Collected: 01/22/24 1446    Lab Status: Final result Specimen: Blood from Arm, Left Updated: 01/23/24 0859     BCID, PCR Staph spp, not aureus or lugdunensis. Identification by BCID2 PCR.     BOTTLE TYPE Aerobic Bottle    Urine Culture - Urine, Urine, Clean  Catch [[id number]]  (Abnormal)  (Susceptibility) Collected: 01/22/24 1323    Lab Status: Final result Specimen: Urine, Clean Catch Updated: 01/24/24 7587     Urine Culture 25,000 CFU/mL Klebsiella pneumoniae ssp pneumoniae    Narrative:      Colonization of the urinary tract without infection is common. Treatment is discouraged unless the patient is symptomatic, pregnant, or undergoing an invasive urologic procedure.    Susceptibility        Klebsiella pneumoniae ssp pneumoniae      ISAAC      Amoxicillin + Clavulanate Susceptible      Ampicillin Resistant      Ampicillin + Sulbactam Susceptible      Cefazolin Susceptible      Cefepime Susceptible      Ceftazidime Susceptible      Ceftriaxone Susceptible      Gentamicin Susceptible      Levofloxacin Susceptible      Nitrofurantoin Intermediate      Piperacillin + Tazobactam Susceptible      Trimethoprim + Sulfamethoxazole Susceptible                                      Procalitonin Results:       Assessment & Plan     Acute on chronic hypoxic and hypercarbic respiratory failure-likely due to COPD, left-sided pneumonia deconditioning and volume overload.  Continue current antibiotics  Continue steroids  Continue nebs  Latest ABG reviewed.  Current BiPAP settings and graphics reviewed.  Patient's minute ventilation on current settings is appropriate.  ABG shows improvement in pH and pCO2.  Continue current settings.    Will put in a respiratory communication to make sure patient goes on BiPAP overnight.  Will get venous blood gas with morning labs tomorrow morning.    Will give diuretics to improve lung compliance and diffusion capacity.  Ins and outs reviewed  Latest microbiology reviewed.    Continue appropriate prophylaxis.    Will get Legionella urinary antigen.  Will get mycoplasma pneumonia IgM antibody.      Left lower lobe pneumonia-continue current antibiotics.    DVT prophylaxis-continue    Bedside rounds were done with RT and patient's nurse. All the lab and  clinical findings were discussed with them and plan was also discussed in great detail.    Family member present-none        Echo-  Results for orders placed during the hospital encounter of 01/01/24    Adult Transthoracic Echo Limited W/ Cont if Necessary Per Protocol    Interpretation Summary    Normal left ventricular cavity size and wall thickness noted. All left ventricular wall segments contract normally.    Left ventricular ejection fraction appears to be 61 - 65%.    There is a trivial pericardial effusion adjacent to the left ventricle.              A-fib    Hypercapnic respiratory failure          Ramiro Oden MD  01/25/24  11:22 EST

## 2024-01-25 NOTE — PROGRESS NOTES
[facility] HOSPITALIST PROGRESS NOTE     Patient Identification:  Name:  Brenda Munroe  Age:  73 y.o.  Sex:  female  :  1950  MRN:  [id number]  Visit Number:  [id number]  ROOM: [place] [id number]     Primary Care Provider:  Bernadette Arevalo MD    Length of stay in inpatient status:  1    Subjective     Chief Compliant:    Chief Complaint   Patient presents with    Altered Mental Status       History of Presenting Illness:    Patient more awake and alert today after taking BiPAP off. Saturating well on room air. Patient was able to take AM meds without concern per nursing staff. Patient developed dyspnea after and tachycardia to 150's. Placed on BiPAP with improvement in both dyspnea and tachycardia. No family bedside.     ROS:  Otherwise 10 point ROS negative other than documented above in HPI.     Objective     Current Hospital Meds:apixaban, 5 mg, Oral, Q12H  aspirin, 324 mg, Oral, Once  baclofen, 5 mg, Oral, BID  budesonide-formoterol, 2 puff, Inhalation, BID - RT   And  tiotropium bromide monohydrate, 2 puff, Inhalation, Daily - RT  castor oil-balsam peru, 1 application , Topical, Q12H  cefTRIAXone, 1,000 mg, Intravenous, Q24H  [START ON 2024] cholecalciferol, 50,000 Units, Oral, Weekly  clopidogrel, 75 mg, Oral, Daily  digoxin, 125 mcg, Oral, Daily  donepezil, 10 mg, Oral, Nightly  doxycycline, 100 mg, Intravenous, Q12H  ferrous sulfate, 325 mg, Oral, BID  isosorbide mononitrate, 90 mg, Oral, Daily  lamoTRIgine, 100 mg, Oral, Daily  lamoTRIgine, 25 mg, Oral, Nightly  levothyroxine, 75 mcg, Oral, Q AM  lisinopril, 10 mg, Oral, Q24H  memantine, 10 mg, Oral, Q12H  methylPREDNISolone sodium succinate, 40 mg, Intravenous, Q12H  metoprolol tartrate, 50 mg, Oral, Q12H  pantoprazole, 40 mg, Oral, QAM AC  risperiDONE, 0.5 mg, Oral, BID  rosuvastatin, 40 mg, Oral, Nightly  senna-docusate sodium, 2 tablet, Oral, BID  sertraline, 100 mg, Oral, Daily  sodium chloride, 10 mL, Intravenous, Q12H  sodium  chloride, 10 mL, Intravenous, Q12H         Current Antimicrobial Therapy:  Anti-Infectives (From admission, onward)      Ordered     Dose/Rate Route Frequency Start Stop    01/22/24 1637  cefTRIAXone (ROCEPHIN) 1,000 mg in sodium chloride 0.9 % 100 mL IVPB-VTB        Ordering Provider: Efren Dunham MD    1,000 mg  200 mL/hr over 30 Minutes Intravenous Every 24 Hours 01/23/24 1500 01/28/24 1459    01/22/24 1637  doxycycline (VIBRAMYCIN) 100 mg in sodium chloride 0.9 % 100 mL IVPB-VTB        Ordering Provider: Efren Dunham MD    100 mg Intravenous Every 12 Hours 01/23/24 0300 01/28/24 0259    01/22/24 1453  doxycycline (VIBRAMYCIN) 100 mg in sodium chloride 0.9 % 100 mL IVPB-VTB        Ordering Provider: Jane Lew APRN    100 mg  over 60 Minutes Intravenous Once 01/22/24 1509 01/22/24 1629    01/22/24 1419  cefTRIAXone (ROCEPHIN) 2,000 mg in sodium chloride 0.9 % 100 mL IVPB-VTB        Ordering Provider: Jane Lew APRN    2,000 mg  200 mL/hr over 30 Minutes Intravenous Once 01/22/24 1435 01/22/24 1535          Current Diuretic Therapy:  Diuretics (From admission, onward)      Ordered     Dose/Rate Route Frequency Start Stop    01/25/24 0725  bumetanide (BUMEX) injection 1 mg        Ordering Provider: Efren Dunham MD    1 mg Intravenous Once 01/25/24 0900 01/25/24 0826    01/24/24 1155  bumetanide (BUMEX) injection 1 mg        Ordering Provider: Efren Dunham MD    1 mg Intravenous Once 01/24/24 1245 01/24/24 1310    01/23/24 1809  bumetanide (BUMEX) injection 1 mg        Ordering Provider: Toby Simeon MD    1 mg Intravenous Once 01/23/24 1900 01/23/24 1855          ----------------------------------------------------------------------------------------------------------------------  Vital Signs:  Temp:  [97.3 °F (36.3 °C)-98.9 °F (37.2 °C)] 97.3 °F (36.3 °C)  Heart Rate:  [] 117  Resp:  [14-35] 22  BP: (106-171)/() 145/87  SpO2:  [74 %-100 %] 98 %  on   ;   Device  (Oxygen Therapy): NPPV/NIV  Body mass index is 28.42 kg/m².    Wt Readings from Last 3 Encounters:   01/25/24 75.1 kg (165 lb 9.1 oz)   01/11/24 74.5 kg (164 lb 3.2 oz)   12/27/23 81.6 kg (180 lb)     Intake & Output (last 3 days)         01/22 0701  01/23 0700 01/23 0701 01/24 0700 01/24 0701 01/25 0700 01/25 0701  01/26 0700    P.O. 240 120 0 50    IV Piggyback 700       Total Intake(mL/kg) 940 (12.5) 120 (1.6) 0 (0) 50 (0.7)    Urine (mL/kg/hr)   1550 (0.9)     Total Output   1550     Net +940 +120 -1550 +50            Urine Unmeasured Occurrence 4 x 5 x 1 x     Stool Unmeasured Occurrence  1 x            Diet: Cardiac Diets, Diabetic Diets; Healthy Heart (2-3 Na+); Consistent Carbohydrate; Texture: Regular Texture (IDDSI 7); Fluid Consistency: Thin (IDDSI 0)  ----------------------------------------------------------------------------------------------------------------------  Physical exam:  Constitutional:  Chronically ill appearing.   HENT:  Head:  Normocephalic and atraumatic.  Mouth:  Moist mucous membranes.    Eyes:  Conjunctivae and EOM are normal. No scleral icterus.    Neck:  Neck supple.  No JVD present.    Cardiovascular:  Normal rate, regular rhythm and normal heart sounds with no murmur.  Pulmonary/Chest:  No respiratory distress, no wheezes, no crackles, with normal breath sounds and good air movement.  Abdominal:  Soft.  Bowel sounds are normal.  No distension and no tenderness.   Musculoskeletal:  No edema, no tenderness, and no deformity.  No red or swollen joints anywhere.    Neurological:  Alert but not oriented. Following commands with all extremities.   Skin:  Skin is warm and dry. No rash noted. No pallor.   Peripheral vascular:  Pulses in all 4 extremities with no clubbing, no cyanosis, no edema.  ----------------------------------------------------------------------------------------------------------------------  Tele:   "  ----------------------------------------------------------------------------------------------------------------------  Results from last 7 days   Lab Units 01/25/24  0213 01/24/24  0019 01/23/24  0048 01/22/24  1446 01/22/24  1136   LACTATE mmol/L  --   --   --  0.7  --    WBC 10*3/mm3 5.14 5.34 7.20  --  8.11   HEMOGLOBIN g/dL 10.3* 9.5* 9.2*  --  9.8*   HEMATOCRIT % 35.3 34.3 33.2*  --  35.3   MCV fL 94.1 99.4* 100.6*  --  98.9*   MCHC g/dL 29.2* 27.7* 27.7*  --  27.8*   PLATELETS 10*3/mm3 114* 94* 99*  --  104*   INR   --   --   --   --  1.55*     Results from last 7 days   Lab Units 01/24/24  1423   PH, ARTERIAL pH units 7.382   PO2 ART mm Hg 85.7   PCO2, ARTERIAL mm Hg 67.8*   HCO3 ART mmol/L 40.3*     Results from last 7 days   Lab Units 01/25/24  0213 01/24/24  0019 01/23/24  0048 01/22/24  1136   SODIUM mmol/L 146* 147* 143 145   POTASSIUM mmol/L 3.7 4.0 4.4 4.5   MAGNESIUM mg/dL  --  1.8 2.1  --    CHLORIDE mmol/L 97* 101 102 102   CO2 mmol/L 41.9* 38.3* 35.4* 40.0*   BUN mg/dL 21 20 17 17   CREATININE mg/dL 1.02* 1.04* 1.08* 1.03*   CALCIUM mg/dL 9.2 8.6 8.5* 8.9   GLUCOSE mg/dL 92 75 105* 115*   ALBUMIN g/dL 3.4*  --   --  3.3*   BILIRUBIN mg/dL 0.2  --   --  0.2   ALK PHOS U/L 51  --   --  55   AST (SGOT) U/L 14  --   --  15   ALT (SGPT) U/L 14  --   --  19   Estimated Creatinine Clearance: 47.3 mL/min (A) (by C-G formula based on SCr of 1.02 mg/dL (H)).  No results found for: \"AMMONIA\"  Results from last 7 days   Lab Units 01/24/24  1106 01/24/24  0019 01/22/24  1354   HSTROP T ng/L 121* 138* 111*     Results from last 7 days   Lab Units 01/22/24  1136   PROBNP pg/mL 4,452.0*         Glucose   Date/Time Value Ref Range Status   01/24/2024 1738 103 70 - 130 mg/dL Final   01/24/2024 0350 82 70 - 130 mg/dL Final   01/24/2024 0339 71 70 - 130 mg/dL Final   01/23/2024 1440 77 70 - 130 mg/dL Final     Lab Results   Component Value Date    TSH 0.876 01/23/2024    FREET4 1.59 03/25/2021     No results found " "for: \"PREGTESTUR\", \"PREGSERUM\", \"HCG\", \"HCGQUANT\"  Pain Management Panel  More data exists         Latest Ref Rng & Units 12/27/2023 2/19/2023   Pain Management Panel   Creatinine, Urine mg/dL - 222.8    Amphetamine, Urine Qual Negative Negative  Negative    Barbiturates Screen, Urine Negative Negative  Negative    Benzodiazepine Screen, Urine Negative Negative  Positive    Buprenorphine, Screen, Urine Negative Negative  Negative    Cocaine Screen, Urine Negative Negative  Negative    Fentanyl, Urine Negative Negative  -   Methadone Screen , Urine Negative Negative  Negative    Methamphetamine, Ur Negative Negative  Negative      Brief Urine Lab Results  (Last result in the past 365 days)        Color   Clarity   Blood   Leuk Est   Nitrite   Protein   CREAT   Urine HCG        01/22/24 1323 Yellow   Turbid   Moderate (2+)   Small (1+)   Negative   >=300 mg/dL (3+)                 Blood Culture   Date Value Ref Range Status   01/22/2024 No growth at 2 days  Preliminary   01/22/2024 Streptococcus mitis / oralis (C)  Final     Urine Culture   Date Value Ref Range Status   01/22/2024 25,000 CFU/mL Klebsiella pneumoniae ssp pneumoniae (A)  Final     No results found for: \"WOUNDCX\"  No results found for: \"STOOLCX\"  No results found for: \"RESPCX\"  No results found for: \"AFBCX\"  Results from last 7 days   Lab Units 01/22/24  1446   LACTATE mmol/L 0.7       I have personally looked at the labs and they are summarized above.  ----------------------------------------------------------------------------------------------------------------------  Detailed radiology reports for the last 24 hours:    Imaging Results (Last 24 Hours)       Procedure Component Value Units Date/Time    XR Chest 1 View [[id number]] Resulted: 01/25/24 0916     Updated: 01/25/24 0916    CT Head Without Contrast [[id number]] Collected: 01/24/24 1738     Updated: 01/24/24 1742    Narrative:      EXAM:    CT Head Without Intravenous Contrast     EXAM DATE:    " 1/24/2024 5:19 PM     CLINICAL HISTORY:    Mental status change, unknown cause; I48.91-Unspecified atrial  fibrillation; J18.9-Pneumonia, unspecified organism; N39.0-Urinary tract  infection, site not specified     TECHNIQUE:    Axial computed tomography images of the head/brain without intravenous  contrast.  Sagittal and coronal reformatted images were created and  reviewed.  This CT exam was performed using one or more of the following  dose reduction techniques:  automated exposure control, adjustment of  the mA and/or kV according to patient size, and/or use of iterative  reconstruction technique.     COMPARISON:    1/22/2024     FINDINGS:    Brain and extra-axial spaces:  Advanced chronic small vessel ischemic  disease stable from previous exam.  Diffuse cerebral atrophy is again  noted in part age-related.  No evidence of intracranial hemorrhage.    Bones/joints:  Unremarkable as visualized.  No acute bony findings  identified.    Soft tissues:  Unremarkable as visualized.    Sinuses:  Acute right maxillary sinusitis.    Mastoid air cells:  Unremarkable as visualized.  No mastoid effusion.       Impression:      1.  Stable exam demonstrating senescent changes and advanced chronic  small vessel ischemic disease.  2.  Mild acute right maxillary sinusitis.  3.  No CT evidence of acute intracranial abnormality.        This report was finalized on 1/24/2024 5:39 PM by Dr. Erik Jones MD.       XR Chest 1 View [[id number]] Collected: 01/24/24 1032     Updated: 01/24/24 1035    Narrative:      PROCEDURE: XR CHEST 1 VW-       HISTORY: respiratory failure; I48.91-Unspecified atrial fibrillation;  J18.9-Pneumonia, unspecified organism; N39.0-Urinary tract infection,  site not specified     COMPARISON: 1/22/2024.     FINDINGS: The patient is status post median sternotomy and CABG  procedure. The heart is normal in size. The mediastinum is unremarkable.  There has been interval improvement in the patient's  interstitial  pulmonary edema. There are persistent small effusions. There is no  pneumothorax. There are no acute osseous abnormalities.       Impression:      Improvement in the patient's interstitial pulmonary edema  with persistent small effusions.        This report was finalized on 1/24/2024 10:33 AM by Lily Mullen M.D..             Assessment & Plan    #Acute hypercapnic respiratory failure   - Has had prior admissions for this. Mild wheezing on exam. Will treat for COPD exacerbation possibly exacerbated by suspected pneumonia. Added solumedrol to nebs.   - Will try to take off BiPAP as mentation improves with goal O2 88-92%.   - Will repeat plain film of chest and ABG this AM given dyspnea off BiPAP  - Will consult pulmonology as this appears to be a recurrent problem. May need QHS NIPPV.      #Afib w/ RVR  - HR better controlled, continue increased metoprolol 50 mg BID.   - [id number]: 4, continue home apixiban.    - Cardiology following. Appreciate recs.      #Type II NSTEMI  - 107=>111  - EKG with nonspecific ST changes   - No chest pain. No PE on CTPE.   - Discussed case and recent LHC in 2/23 with interventional cardiology on presentation. No urgent need for repeat ischemic evaluation. To expedite f/u will go ahead and have cardiology evaluate inpatient.      #UTI  #MIRA pneumonia?  - 1/2 staph non-aureus blood culture likely contaminant  - Will continue ceftriaxone/doxy as we follow urine and blood cultures.      Chronic medical problems:  MDD  CAD  Thyroid disease- TSH wnl  HTN  Carotid stenosis  HLD     Code status: Full      Dispo: Pending clinical improvement. Back to Nursing home at discharge.        VTE Prophylaxis:   Mechanical Order History:       None          Pharmalogical Order History:        Ordered     Dose Route Frequency Stop    01/22/24 1637  apixaban (ELIQUIS) tablet 5 mg         5 mg PO Every 12 Hours Scheduled --                    Efren Dunham MD  Psychiatric  Hospitalist  01/25/24  10:00 EST

## 2024-01-26 LAB
ALBUMIN SERPL-MCNC: 3.3 G/DL (ref 3.5–5.2)
ALBUMIN/GLOB SERPL: 1.6 G/DL
ALP SERPL-CCNC: 50 U/L (ref 39–117)
ALT SERPL W P-5'-P-CCNC: 15 U/L (ref 1–33)
ANION GAP SERPL CALCULATED.3IONS-SCNC: 7.6 MMOL/L (ref 5–15)
AST SERPL-CCNC: 15 U/L (ref 1–32)
ATMOSPHERIC PRESS: 727 MMHG
BASE EXCESS BLDV CALC-SCNC: 22.8 MMOL/L (ref 0–2)
BASOPHILS # BLD AUTO: 0 10*3/MM3 (ref 0–0.2)
BASOPHILS NFR BLD AUTO: 0 % (ref 0–1.5)
BDY SITE: ABNORMAL
BILIRUB SERPL-MCNC: 0.4 MG/DL (ref 0–1.2)
BUN SERPL-MCNC: 24 MG/DL (ref 8–23)
BUN/CREAT SERPL: 24.2 (ref 7–25)
CALCIUM SPEC-SCNC: 9.2 MG/DL (ref 8.6–10.5)
CHLORIDE SERPL-SCNC: 90 MMOL/L (ref 98–107)
CO2 BLDA-SCNC: 52.2 MMOL/L (ref 22–33)
CO2 SERPL-SCNC: 44.4 MMOL/L (ref 22–29)
COHGB MFR BLD: 1.8 % (ref 0–5)
CREAT SERPL-MCNC: 0.99 MG/DL (ref 0.57–1)
DEPRECATED RDW RBC AUTO: 66.9 FL (ref 37–54)
EGFRCR SERPLBLD CKD-EPI 2021: 59.6 ML/MIN/1.73
EOSINOPHIL # BLD AUTO: 0.01 10*3/MM3 (ref 0–0.4)
EOSINOPHIL NFR BLD AUTO: 0.2 % (ref 0.3–6.2)
EPAP: 6
ERYTHROCYTE [DISTWIDTH] IN BLOOD BY AUTOMATED COUNT: 20 % (ref 12.3–15.4)
GLOBULIN UR ELPH-MCNC: 2.1 GM/DL
GLUCOSE SERPL-MCNC: 111 MG/DL (ref 65–99)
HCO3 BLDV-SCNC: 50.1 MMOL/L (ref 22–28)
HCT VFR BLD AUTO: 37.7 % (ref 34–46.6)
HGB BLD-MCNC: 11.3 G/DL (ref 12–15.9)
HGB BLDA-MCNC: 11.6 G/DL (ref 13.5–17.5)
IMM GRANULOCYTES # BLD AUTO: 0.05 10*3/MM3 (ref 0–0.05)
IMM GRANULOCYTES NFR BLD AUTO: 1 % (ref 0–0.5)
INHALED O2 CONCENTRATION: 32 %
IPAP: 24
LYMPHOCYTES # BLD AUTO: 0.4 10*3/MM3 (ref 0.7–3.1)
LYMPHOCYTES NFR BLD AUTO: 7.8 % (ref 19.6–45.3)
Lab: ABNORMAL
Lab: ABNORMAL
MAGNESIUM SERPL-MCNC: 1.5 MG/DL (ref 1.6–2.4)
MCH RBC QN AUTO: 27.6 PG (ref 26.6–33)
MCHC RBC AUTO-ENTMCNC: 30 G/DL (ref 31.5–35.7)
MCV RBC AUTO: 92.2 FL (ref 79–97)
METHGB BLD QL: 0.2 % (ref 0–3)
MODALITY: ABNORMAL
MONOCYTES # BLD AUTO: 0.21 10*3/MM3 (ref 0.1–0.9)
MONOCYTES NFR BLD AUTO: 4.1 % (ref 5–12)
NEUTROPHILS NFR BLD AUTO: 4.48 10*3/MM3 (ref 1.7–7)
NEUTROPHILS NFR BLD AUTO: 86.9 % (ref 42.7–76)
NOTIFIED BY: ABNORMAL
NOTIFIED WHO: ABNORMAL
NRBC BLD AUTO-RTO: 0 /100 WBC (ref 0–0.2)
OXYHGB MFR BLDV: 87.7 % (ref 45–75)
PCO2 BLDV: 67.5 MM HG (ref 41–51)
PH BLDV: 7.48 PH UNITS (ref 7.32–7.42)
PLATELET # BLD AUTO: 115 10*3/MM3 (ref 140–450)
PMV BLD AUTO: 10.9 FL (ref 6–12)
PO2 BLDV: 56.1 MM HG (ref 27–53)
POTASSIUM SERPL-SCNC: 3.3 MMOL/L (ref 3.5–5.2)
PROT SERPL-MCNC: 5.4 G/DL (ref 6–8.5)
RBC # BLD AUTO: 4.09 10*6/MM3 (ref 3.77–5.28)
SAO2 % BLDCOV: 89.5 % (ref 45–75)
SET MECH RESP RATE: 18
SODIUM SERPL-SCNC: 142 MMOL/L (ref 136–145)
VENTILATOR MODE: ABNORMAL
WBC NRBC COR # BLD AUTO: 5.15 10*3/MM3 (ref 3.4–10.8)

## 2024-01-26 PROCEDURE — 25010000002 METHYLPREDNISOLONE PER 40 MG: Performed by: INTERNAL MEDICINE

## 2024-01-26 PROCEDURE — 25010000002 CEFTRIAXONE PER 250 MG: Performed by: INTERNAL MEDICINE

## 2024-01-26 PROCEDURE — 94664 DEMO&/EVAL PT USE INHALER: CPT

## 2024-01-26 PROCEDURE — 94761 N-INVAS EAR/PLS OXIMETRY MLT: CPT

## 2024-01-26 PROCEDURE — 80053 COMPREHEN METABOLIC PANEL: CPT | Performed by: INTERNAL MEDICINE

## 2024-01-26 PROCEDURE — 99232 SBSQ HOSP IP/OBS MODERATE 35: CPT | Performed by: INTERNAL MEDICINE

## 2024-01-26 PROCEDURE — 82820 HEMOGLOBIN-OXYGEN AFFINITY: CPT

## 2024-01-26 PROCEDURE — 82805 BLOOD GASES W/O2 SATURATION: CPT

## 2024-01-26 PROCEDURE — 25010000002 ACETAZOLAMIDE PER 500 MG: Performed by: INTERNAL MEDICINE

## 2024-01-26 PROCEDURE — 94799 UNLISTED PULMONARY SVC/PX: CPT

## 2024-01-26 PROCEDURE — 25010000002 FUROSEMIDE PER 20 MG: Performed by: INTERNAL MEDICINE

## 2024-01-26 PROCEDURE — 85025 COMPLETE CBC W/AUTO DIFF WBC: CPT | Performed by: INTERNAL MEDICINE

## 2024-01-26 PROCEDURE — 99233 SBSQ HOSP IP/OBS HIGH 50: CPT | Performed by: INTERNAL MEDICINE

## 2024-01-26 PROCEDURE — 83735 ASSAY OF MAGNESIUM: CPT | Performed by: INTERNAL MEDICINE

## 2024-01-26 PROCEDURE — 25010000002 DIPHENHYDRAMINE PER 50 MG: Performed by: INTERNAL MEDICINE

## 2024-01-26 RX ORDER — FUROSEMIDE 10 MG/ML
40 INJECTION INTRAMUSCULAR; INTRAVENOUS ONCE
Status: COMPLETED | OUTPATIENT
Start: 2024-01-26 | End: 2024-01-26

## 2024-01-26 RX ORDER — METHYLPREDNISOLONE SODIUM SUCCINATE 40 MG/ML
20 INJECTION, POWDER, LYOPHILIZED, FOR SOLUTION INTRAMUSCULAR; INTRAVENOUS EVERY 12 HOURS
Status: DISCONTINUED | OUTPATIENT
Start: 2024-01-26 | End: 2024-01-31

## 2024-01-26 RX ORDER — BUDESONIDE 0.5 MG/2ML
0.5 INHALANT ORAL
Status: DISCONTINUED | OUTPATIENT
Start: 2024-01-26 | End: 2024-02-02 | Stop reason: HOSPADM

## 2024-01-26 RX ORDER — POTASSIUM CHLORIDE 7.45 MG/ML
10 INJECTION INTRAVENOUS
Status: DISCONTINUED | OUTPATIENT
Start: 2024-01-26 | End: 2024-01-26

## 2024-01-26 RX ORDER — FUROSEMIDE 10 MG/ML
20 INJECTION INTRAMUSCULAR; INTRAVENOUS ONCE
Status: DISCONTINUED | OUTPATIENT
Start: 2024-01-26 | End: 2024-01-26

## 2024-01-26 RX ORDER — FUROSEMIDE 10 MG/ML
40 INJECTION INTRAMUSCULAR; INTRAVENOUS ONCE
Status: DISCONTINUED | OUTPATIENT
Start: 2024-01-26 | End: 2024-01-26

## 2024-01-26 RX ORDER — DIPHENHYDRAMINE HYDROCHLORIDE 50 MG/ML
25 INJECTION INTRAMUSCULAR; INTRAVENOUS ONCE
Status: COMPLETED | OUTPATIENT
Start: 2024-01-27 | End: 2024-01-26

## 2024-01-26 RX ORDER — POTASSIUM CHLORIDE 1.5 G/1.58G
40 POWDER, FOR SOLUTION ORAL 2 TIMES DAILY
Status: COMPLETED | OUTPATIENT
Start: 2024-01-26 | End: 2024-01-26

## 2024-01-26 RX ADMIN — FUROSEMIDE 40 MG: 10 INJECTION, SOLUTION INTRAMUSCULAR; INTRAVENOUS at 09:46

## 2024-01-26 RX ADMIN — DIPHENHYDRAMINE HYDROCHLORIDE 25 MG: 50 INJECTION, SOLUTION INTRAMUSCULAR; INTRAVENOUS at 23:27

## 2024-01-26 RX ADMIN — APIXABAN 5 MG: 5 TABLET, FILM COATED ORAL at 08:43

## 2024-01-26 RX ADMIN — DOXYCYCLINE 100 MG: 100 INJECTION, POWDER, LYOPHILIZED, FOR SOLUTION INTRAVENOUS at 16:24

## 2024-01-26 RX ADMIN — ACETAZOLAMIDE 500 MG: 500 INJECTION, POWDER, LYOPHILIZED, FOR SOLUTION INTRAVENOUS at 09:46

## 2024-01-26 RX ADMIN — RISPERIDONE 0.5 MG: 0.25 TABLET, FILM COATED ORAL at 21:09

## 2024-01-26 RX ADMIN — CEFTRIAXONE 1000 MG: 1 INJECTION, POWDER, FOR SOLUTION INTRAMUSCULAR; INTRAVENOUS at 16:25

## 2024-01-26 RX ADMIN — METOPROLOL TARTRATE 50 MG: 50 TABLET, FILM COATED ORAL at 21:08

## 2024-01-26 RX ADMIN — BACLOFEN 5 MG: 10 TABLET ORAL at 08:44

## 2024-01-26 RX ADMIN — LAMOTRIGINE 100 MG: 100 TABLET ORAL at 08:43

## 2024-01-26 RX ADMIN — POTASSIUM CHLORIDE 40 MEQ: 1.5 POWDER, FOR SOLUTION ORAL at 21:09

## 2024-01-26 RX ADMIN — DIGOXIN 125 MCG: 0.12 TABLET ORAL at 08:44

## 2024-01-26 RX ADMIN — IPRATROPIUM BROMIDE 0.5 MG: 0.5 SOLUTION RESPIRATORY (INHALATION) at 13:47

## 2024-01-26 RX ADMIN — POTASSIUM CHLORIDE 40 MEQ: 1.5 POWDER, FOR SOLUTION ORAL at 08:43

## 2024-01-26 RX ADMIN — APIXABAN 5 MG: 5 TABLET, FILM COATED ORAL at 21:08

## 2024-01-26 RX ADMIN — ROSUVASTATIN CALCIUM 40 MG: 20 TABLET, FILM COATED ORAL at 21:09

## 2024-01-26 RX ADMIN — CASTOR OIL AND BALSAM, PERU 1 APPLICATION: 788; 87 OINTMENT TOPICAL at 21:09

## 2024-01-26 RX ADMIN — Medication 10 ML: at 21:09

## 2024-01-26 RX ADMIN — LISINOPRIL 10 MG: 10 TABLET ORAL at 08:44

## 2024-01-26 RX ADMIN — LEVOTHYROXINE SODIUM 75 MCG: 0.07 TABLET ORAL at 06:30

## 2024-01-26 RX ADMIN — CLOPIDOGREL BISULFATE 75 MG: 75 TABLET, FILM COATED ORAL at 08:44

## 2024-01-26 RX ADMIN — LAMOTRIGINE 25 MG: 100 TABLET ORAL at 21:08

## 2024-01-26 RX ADMIN — DOXYCYCLINE 100 MG: 100 INJECTION, POWDER, LYOPHILIZED, FOR SOLUTION INTRAVENOUS at 03:10

## 2024-01-26 RX ADMIN — BACLOFEN 5 MG: 10 TABLET ORAL at 21:08

## 2024-01-26 RX ADMIN — BUDESONIDE INHALATION 0.5 MG: 0.5 SUSPENSION RESPIRATORY (INHALATION) at 09:34

## 2024-01-26 RX ADMIN — METOPROLOL TARTRATE 2.5 MG: 1 INJECTION, SOLUTION INTRAVENOUS at 06:26

## 2024-01-26 RX ADMIN — FERROUS SULFATE TAB 325 MG (65 MG ELEMENTAL FE) 325 MG: 325 (65 FE) TAB at 08:43

## 2024-01-26 RX ADMIN — DONEPEZIL HYDROCHLORIDE 10 MG: 5 TABLET, FILM COATED ORAL at 21:08

## 2024-01-26 RX ADMIN — METOPROLOL TARTRATE 50 MG: 50 TABLET, FILM COATED ORAL at 08:43

## 2024-01-26 RX ADMIN — ISOSORBIDE MONONITRATE 90 MG: 30 TABLET, EXTENDED RELEASE ORAL at 08:43

## 2024-01-26 RX ADMIN — MEMANTINE HYDROCHLORIDE 10 MG: 10 TABLET, FILM COATED ORAL at 08:44

## 2024-01-26 RX ADMIN — RISPERIDONE 0.5 MG: 0.25 TABLET, FILM COATED ORAL at 08:44

## 2024-01-26 RX ADMIN — METHYLPREDNISOLONE SODIUM SUCCINATE 20 MG: 40 INJECTION, POWDER, FOR SOLUTION INTRAMUSCULAR; INTRAVENOUS at 22:41

## 2024-01-26 RX ADMIN — Medication 10 ML: at 08:45

## 2024-01-26 RX ADMIN — DOCUSATE SODIUM 50 MG AND SENNOSIDES 8.6 MG 2 TABLET: 8.6; 5 TABLET, FILM COATED ORAL at 21:08

## 2024-01-26 RX ADMIN — FERROUS SULFATE TAB 325 MG (65 MG ELEMENTAL FE) 325 MG: 325 (65 FE) TAB at 21:08

## 2024-01-26 RX ADMIN — DOCUSATE SODIUM 50 MG AND SENNOSIDES 8.6 MG 2 TABLET: 8.6; 5 TABLET, FILM COATED ORAL at 08:44

## 2024-01-26 RX ADMIN — SERTRALINE 100 MG: 50 TABLET, FILM COATED ORAL at 08:44

## 2024-01-26 RX ADMIN — CASTOR OIL AND BALSAM, PERU 1 APPLICATION: 788; 87 OINTMENT TOPICAL at 08:45

## 2024-01-26 RX ADMIN — PANTOPRAZOLE SODIUM 40 MG: 40 TABLET, DELAYED RELEASE ORAL at 06:30

## 2024-01-26 RX ADMIN — BUDESONIDE INHALATION 0.5 MG: 0.5 SUSPENSION RESPIRATORY (INHALATION) at 18:26

## 2024-01-26 RX ADMIN — METHYLPREDNISOLONE SODIUM SUCCINATE 40 MG: 40 INJECTION, POWDER, FOR SOLUTION INTRAMUSCULAR; INTRAVENOUS at 10:14

## 2024-01-26 RX ADMIN — IPRATROPIUM BROMIDE 0.5 MG: 0.5 SOLUTION RESPIRATORY (INHALATION) at 18:26

## 2024-01-26 RX ADMIN — MEMANTINE HYDROCHLORIDE 10 MG: 10 TABLET, FILM COATED ORAL at 21:09

## 2024-01-26 NOTE — PROGRESS NOTES
Referring Provider: dr stanley  Reason for Consultation: Acute on chronic hypoxic and hypercarbic respiratory failure      Chief complaint -could not be obtained as on my assessment patient was on continuous BiPAP    Sub-overnight events reviewed.  All the labs medications and the notes and vitals reviewed.  Patient resting in bed comfortably wearing BiPAP.  Patient's son at bedside.  All the labs and images blood gases were shown and discussed with patient's son and answered his questions to his satisfaction.  Review of Systems  Could not be obtained as patient is on continuous BiPAP        History  Past Medical History:   Diagnosis Date    Anxiety     Arthritis     Bell's palsy     Bladder prolapse, female, acquired     Carotid stenosis     COPD (chronic obstructive pulmonary disease)     COPD (chronic obstructive pulmonary disease)     Coronary artery disease     Dementia     Dementia     Depression     Difficulty walking     Disease of thyroid gland     Frequency of urination     GERD (gastroesophageal reflux disease)     Heart attack     Hyperlipidemia     Hypertension     Irregular heart beat     Peptic ulcer disease    ,   Past Surgical History:   Procedure Laterality Date    CARDIAC CATHETERIZATION N/A 2/21/2023    Procedure: Left Heart Cath;  Surgeon: Tab Cifuentes MD;  Location: [facility] CATH INVASIVE LOCATION;  Service: Cardiology;  Laterality: N/A;    CARDIAC SURGERY      open heart  in 1999    CYSTOSCOPY N/A 11/8/2017    Procedure: CYSTOSCOPY;  Surgeon: Karl Nicolas DO;  Location: [facility] OR;  Service:     OTHER SURGICAL HISTORY      states she had fluid drained no surgery    VAGINAL HYSTERECTOMY W/ ANTERIOR AND POSTERIOR VAGINAL REPAIR N/A 11/8/2017    Procedure: VAGINAL HYSTERECTOMY WITH ANTERIOR, POSTERIOR, AND ENTEROCELE VAGINAL REPAIR;  Surgeon: Karl Nicolas DO;  Location: [facility] OR;  Service:     VAGINAL VAULT SUSPENSION N/A 11/8/2017    Procedure: VAGINAL VAULT  SUSPENSION ;  Surgeon: Karl Nicolas DO;  Location: [facility] OR;  Service:    ,   Family History   Problem Relation Age of Onset    Dementia Sister     Heart attack Mother     Tuberculosis Father     Breast cancer Neg Hx    ,   Social History     Tobacco Use    Smoking status: Every Day     Packs/day: 0.50     Years: 55.00     Additional pack years: 0.00     Total pack years: 27.50     Types: Cigarettes    Smokeless tobacco: Never   Vaping Use    Vaping Use: Never used   Substance Use Topics    Alcohol use: No    Drug use: No   ,   Medications Prior to Admission   Medication Sig Dispense Refill Last Dose    apixaban (ELIQUIS) 5 MG tablet tablet Take 1 tablet by mouth 2 (Two) Times a Day.   1/22/2024    baclofen (LIORESAL) 10 MG tablet Take 0.5 tablets by mouth 2 (Two) Times a Day. Indications: Muscle Spasticity   1/22/2024    clopidogrel (PLAVIX) 75 MG tablet Take 1 tablet by mouth Daily. 30 tablet  1/22/2024    donepezil (ARICEPT) 10 MG tablet Take 1 tablet by mouth Every Night.   1/21/2024    ferrous sulfate 325 (65 FE) MG tablet Take 1 tablet by mouth 2 (Two) Times a Day. 60 tablet 5 1/22/2024    isosorbide mononitrate (IMDUR) 30 MG 24 hr tablet Take 3 tablets by mouth Daily.   1/22/2024    lamoTRIgine (LaMICtal) 100 MG tablet Take 1 tablet by mouth Daily.   1/22/2024    lamoTRIgine (LaMICtal) 25 MG tablet Take 1 tablet by mouth Every Night.   1/21/2024    levothyroxine (SYNTHROID, LEVOTHROID) 75 MCG tablet Take 1 tablet by mouth Daily.   1/22/2024    lisinopril (PRINIVIL,ZESTRIL) 10 MG tablet Take 1 tablet by mouth Daily.   1/22/2024    memantine (NAMENDA) 10 MG tablet Take 1 tablet by mouth 2 (Two) Times a Day.   1/22/2024    metoprolol succinate XL (TOPROL-XL) 25 MG 24 hr tablet Take 2 tablets by mouth Daily. 30 tablet 1 1/22/2024    pantoprazole (PROTONIX) 40 MG EC tablet Take 1 tablet by mouth Daily. 10 tablet 0 1/22/2024    risperiDONE (risperDAL) 0.5 MG tablet Take 1 tablet by mouth 2 (Two) Times a  Day.   1/22/2024    rosuvastatin (CRESTOR) 40 MG tablet Take 1 tablet by mouth Every Evening.   1/21/2024    sertraline (ZOLOFT) 100 MG tablet Take 1 tablet by mouth Daily.   1/22/2024    Trelegy Ellipta 100-62.5-25 MCG/INH inhaler Inhale 1 puff Daily.   1/22/2024    acetaminophen (TYLENOL) 500 MG tablet Take 1 tablet by mouth Every 4 (Four) Hours As Needed for Mild Pain.   1/18/2024    Dextran 70-Hypromellose (Lubricating Tears Eye Drops) 0.1-0.3 % solution Apply 1 drop to eye(s) as directed by provider Every 2 (Two) Hours As Needed (dry eyes). 30 mL 0 Unknown    guaiFENesin 200 MG tablet Take 2 tablets by mouth Every 4 (Four) Hours As Needed for Congestion.   Unknown    LORazepam (ATIVAN) 0.5 MG tablet Take 1 tablet by mouth Every 12 (Twelve) Hours As Needed for Anxiety.   1/1/2024    nitroglycerin (NITROSTAT) 0.4 MG SL tablet Place 1 tablet under the tongue Every 5 (Five) Minutes As Needed.   Unknown    phenol (Chloraseptic) 1.4 % liquid liquid Apply 1 spray to the mouth or throat Every 2 (Two) Hours As Needed (sore throat).   Unknown    senna (SENOKOT) 8.6 MG tablet Take 1 tablet by mouth Daily As Needed for Constipation.   Unknown    trolamine salicylate (ASPERCREME) 10 % cream Apply 1 application  topically to the appropriate area as directed Every 6 (Six) Hours As Needed for Muscle / Joint Pain.   1/11/2024    vitamin D (ERGOCALCIFEROL) 1.25 MG (09433 UT) capsule capsule Take 1 capsule by mouth 1 (One) Time Per Week.   1/19/2024   , Scheduled Meds:  acetaZOLAMIDE (DIAMOX) 500 mg in sodium chloride 0.9 % 50 mL IVPB, 500 mg, Intravenous, Once  apixaban, 5 mg, Oral, Q12H  aspirin, 324 mg, Oral, Once  baclofen, 5 mg, Oral, BID  budesonide-formoterol, 2 puff, Inhalation, BID - RT   And  tiotropium bromide monohydrate, 2 puff, Inhalation, Daily - RT  castor oil-balsam peru, 1 application , Topical, Q12H  cefTRIAXone, 1,000 mg, Intravenous, Q24H  cholecalciferol, 50,000 Units, Oral, Weekly  clopidogrel, 75 mg, Oral,  Daily  digoxin, 125 mcg, Oral, Daily  donepezil, 10 mg, Oral, Nightly  doxycycline, 100 mg, Intravenous, Q12H  ferrous sulfate, 325 mg, Oral, BID  furosemide, 40 mg, Intravenous, Once  isosorbide mononitrate, 90 mg, Oral, Daily  lamoTRIgine, 100 mg, Oral, Daily  lamoTRIgine, 25 mg, Oral, Nightly  levothyroxine, 75 mcg, Oral, Q AM  lisinopril, 10 mg, Oral, Q24H  memantine, 10 mg, Oral, Q12H  methylPREDNISolone sodium succinate, 40 mg, Intravenous, Q12H  metoprolol tartrate, 50 mg, Oral, Q12H  pantoprazole, 40 mg, Oral, QAM AC  potassium chloride, 40 mEq, Oral, BID  potassium chloride, 10 mEq, Intravenous, Q1H  risperiDONE, 0.5 mg, Oral, BID  rosuvastatin, 40 mg, Oral, Nightly  senna-docusate sodium, 2 tablet, Oral, BID  sertraline, 100 mg, Oral, Daily  sodium chloride, 10 mL, Intravenous, Q12H  sodium chloride, 10 mL, Intravenous, Q12H    , Continuous Infusions:    and Allergies:  Patient has no known allergies.    Objective     Vital Signs   Temp:  [97.3 °F (36.3 °C)-98.6 °F (37 °C)] 97.9 °F (36.6 °C)  Heart Rate:  [] 77  Resp:  [13-35] 20  BP: (106-168)/() 164/106    Physical Exam:             General-chronically ill in appearance, on continuous BiPAP  HEENT-PERRLA    Neck-supple    Respiratory-respirations normal-on auscultation no wheezing no crackles, on continuous BiPAP, decreased breath sounds    Cardiovascular-NSR  GI-NTND bowel sounds positive    CNS-nonfocal    Musculoskeletal -no edema  Extremities- no obvious deformity noticed     Psychiatric-not alert skin- no visible rash                                                                   Results Review:    LABS:    Lab Results   Component Value Date    GLUCOSE 111 (H) 01/26/2024    BUN 24 (H) 01/26/2024    CREATININE 0.99 01/26/2024    EGFRIFNONA 75 03/25/2021    EGFRIFAFRI  09/24/2016      Comment:      <15 Indicative of kidney failure.    BCR 24.2 01/26/2024    CO2 44.4 (H) 01/26/2024    CALCIUM 9.2 01/26/2024    ALBUMIN 3.3 (L) 01/26/2024     LABIL2 1.4 (L) 08/04/2015    AST 15 01/26/2024    ALT 15 01/26/2024    WBC 5.15 01/26/2024    HGB 11.3 (L) 01/26/2024    HCT 37.7 01/26/2024    MCV 92.2 01/26/2024     (L) 01/26/2024     01/26/2024    K 3.3 (L) 01/26/2024    CL 90 (L) 01/26/2024    ANIONGAP 7.6 01/26/2024       Lab Results   Component Value Date    INR 1.55 (H) 01/22/2024    INR 1.04 09/17/2023    INR 1.16 (H) 02/18/2023    PROTIME 19.2 (H) 01/22/2024    PROTIME 14.1 09/17/2023    PROTIME 15.1 (H) 02/18/2023       Results from last 7 days   Lab Units 01/22/24  1136   INR  1.55*   APTT seconds 32.2          I reviewed the patient's new clinical results.  I reviewed the patient's new imaging results and agree with the interpretation.  Microbiology Results (last 10 days)       Procedure Component Value - Date/Time    Legionella Antigen, Urine - Urine, Urine, Clean Catch [[id number]]  (Normal) Collected: 01/25/24 1417    Lab Status: Final result Specimen: Urine, Clean Catch Updated: 01/25/24 1505     LEGIONELLA ANTIGEN, URINE Negative    Narrative:      Presumptive negative for L. pneumophilia serogroup 1 antigen, suggesting no recent or current infection.    Mycoplasma Pneumoniae Antibody, IgM - Blood, Arm, Left [[id number]]  (Normal) Collected: 01/25/24 1310    Lab Status: Final result Specimen: Blood from Arm, Left Updated: 01/25/24 1435     Mycoplasma pneumo IgM Negative    Blood Culture - Blood, Arm, Right [[id number]]  (Normal) Collected: 01/22/24 1453    Lab Status: Preliminary result Specimen: Blood from Arm, Right Updated: 01/25/24 1500     Blood Culture No growth at 3 days    Blood Culture - Blood, Arm, Left [[id number]]  (Abnormal) Collected: 01/22/24 1446    Lab Status: Final result Specimen: Blood from Arm, Left Updated: 01/25/24 0723     Blood Culture Streptococcus mitis / oralis     Isolated from Aerobic Bottle     Gram Stain Aerobic Bottle Gram positive cocci in pairs and clusters    Narrative:      Probable contaminant  requires clinical correlation, susceptibility not performed unless requested by physician.    Blood Culture ID, PCR - Blood, Arm, Left [[id number]]  (Abnormal) Collected: 01/22/24 1446    Lab Status: Final result Specimen: Blood from Arm, Left Updated: 01/23/24 0859     BCID, PCR Staph spp, not aureus or lugdunensis. Identification by BCID2 PCR.     BOTTLE TYPE Aerobic Bottle    Urine Culture - Urine, Urine, Clean Catch [[id number]]  (Abnormal)  (Susceptibility) Collected: 01/22/24 1323    Lab Status: Final result Specimen: Urine, Clean Catch Updated: 01/24/24 0419     Urine Culture 25,000 CFU/mL Klebsiella pneumoniae ssp pneumoniae    Narrative:      Colonization of the urinary tract without infection is common. Treatment is discouraged unless the patient is symptomatic, pregnant, or undergoing an invasive urologic procedure.    Susceptibility        Klebsiella pneumoniae ssp pneumoniae      ISAAC      Amoxicillin + Clavulanate Susceptible      Ampicillin Resistant      Ampicillin + Sulbactam Susceptible      Cefazolin Susceptible      Cefepime Susceptible      Ceftazidime Susceptible      Ceftriaxone Susceptible      Gentamicin Susceptible      Levofloxacin Susceptible      Nitrofurantoin Intermediate      Piperacillin + Tazobactam Susceptible      Trimethoprim + Sulfamethoxazole Susceptible                                        Procalitonin Results:          Latest Reference Range & Units 01/26/24 01:53   CO2 Content 22 - 33 mmol/L 52.2 (H)   Carboxyhemoglobin Venous 0.0 - 5.0 % 1.8   Methemoglobin Venous 0.0 - 3.0 % 0.2   Oxyhemoglobin Venous 45.0 - 75.0 % 87.7 (H)   Hemoglobin, Blood Gas 13.5 - 17.5 g/dL 11.6 (L)   Site  Lab   Modality  Room Air   FIO2 % 32   Ventilator Mode  BiPAP   Set Mech Resp Rate  18.0   IPAP  24   EPAP  6   Barometric Pressure for Blood Gas mmHg 727   Notified By  [id number]   Notified Time  01/26/2024 02:00   Notified Who  MD   pH, Venous 7.320 - 7.420 pH Units 7.480 (H)   pCO2, Venous 41.0 -  51.0 mm Hg 67.5 (H)   pO2, Venous 27.0 - 53.0 mm Hg 56.1 (H)   HCO3, Venous 22.0 - 28.0 mmol/L 50.1 (H)   Base Excess 0.0 - 2.0 mmol/L 22.8 (H)   O2 Saturation, Venous 45.0 - 75.0 % 89.5 (H)   Collected by  [id number]   (H): Data is abnormally high  (L): Data is abnormally low  Assessment & Plan     Acute on chronic hypoxic and hypercarbic respiratory failure-likely due to COPD, left-sided pneumonia deconditioning and volume overload.  Continue current antibiotics-care and de-escalate as per cultures.  Continue steroids-dose reviewed will reduce it.  Continue nebs  Latest ABG reviewed.  Current BiPAP settings and graphics reviewed.  Patient's minute ventilation on current settings is appropriate.    Continue BiPAP overnight and on as needed basis during the daytime.  VBG from today morning reviewed.  Will give 1 dose of Diamox for the metabolic alkalosis.  Will repeat VBG in the morning.  Will repeat Lasix later in the afternoon-to improve lung compliance and diffusion capacity.    Ins and outs reviewed  Latest microbiology reviewed.    Continue appropriate prophylaxis.    Left lower lobe pneumonia-continue current antibiotics.    DVT prophylaxis-continue    Bedside rounds were done with RT and patient's nurse. All the lab and clinical findings were discussed with them and plan was also discussed in great detail.    Family member present-none        Echo-  Results for orders placed during the hospital encounter of 01/01/24    Adult Transthoracic Echo Limited W/ Cont if Necessary Per Protocol    Interpretation Summary    Normal left ventricular cavity size and wall thickness noted. All left ventricular wall segments contract normally.    Left ventricular ejection fraction appears to be 61 - 65%.    There is a trivial pericardial effusion adjacent to the left ventricle.              A-fib    Hypercapnic respiratory failure          Ramiro Oden MD  01/26/24  07:33 EST

## 2024-01-26 NOTE — CASE MANAGEMENT/SOCIAL WORK
Discharge Planning Assessment   [place]     Patient Name: Brenda Munroe  MRN: [id number]  Today's Date: 1/26/2024    Admit Date: 1/22/2024     Discharge Plan       Row Name 01/26/24 1122       Plan    Plan Pt admitted on 1/22/24. [facility]. Per Phoebe, Pt had a 20 day bed hold. SS will follow.               JORGE Bowers

## 2024-01-26 NOTE — PROGRESS NOTES
LOS: 2 days     Name: Brenda Munroe  Age/Sex: 73 y.o. female  :  1950        PCP: Bernadette Arevalo MD    Principal Problem:    A-fib  Active Problems:    Hypercapnic respiratory failure      Admission Information: Brenda Munroe is a 73 y.o. female with   coronary artery disease, paroxysmal atrial fibrillation, hypertension, hyperlipidemia, bilateral carotid stenosis without occlusion, hypothyroidism, arthritis, chronic headaches, dementia, physical debility and GERD.    Chief Complaint: Altered mental status, atrial fibrillation with RVR.    Interval history: Patient was seen and examined.  Patient denies any chest pain.  She does remain on BiPAP.  JOSE Knott states that she was able to tolerate about an hour off of BiPAP today in order to eat her lunch.  Heart rate is A-fib in the 80s to 90s.    Subjective         Vital Signs  Vital Signs (last 72 hrs)          0700   0659  0700   0659  0700   0659  0700   1, and [id number]   Most Recent      Temp (°F) 97 -  98.7    97.6 -  98.9    97.3 -  98.6    96.7 -  98.3     98.3 (36.8)  1200    Heart Rate 77 -  118    88 -  137    77 -  152    91 -  128     108  1200    Resp 16 -  24    12 -  24    13 -  35    11 -  24     24  1200    BP 97/58 -  135/90    125/83 -  171/113    106/92 -  168/95    122/101 -  149/125     124/97  1200    SpO2 (%) 94 -  98    74 -  100    91 -  100    92 -  100     96  1200    Flow (L/min) 2 -  2.5           2  0320    Oxygen Concentration (%)   30    30 -  32      32      32     32  1200          Temp:  [96.7 °F (35.9 °C)-98.6 °F (37 °C)] 98.3 °F (36.8 °C)  Heart Rate:  [] 108  Resp:  [-] 24  BP: (120-168)/() 124/97  Body mass index is 27.4 kg/m².      Intake/Output Summary (Last 24 hours) at 2024 1339  Last data filed at 2024 1232  Gross per 24 hour   Intake 924.3 ml   Output 3975 ml   Net -3050.7 ml       Vitals reviewed.    Constitutional:       Appearance: Well-developed.   Neck:      Vascular: No carotid bruit or JVD.   Pulmonary:      Effort: Pulmonary effort is normal. No respiratory distress.      Breath sounds: Wheezing present. Rhonchi present. No rales.      Comments: BiPAP on  Cardiovascular:      Normal rate. Irregular rhythm.      Comments: No lower extremity edema  Skin:     General: Skin is warm and dry.   Neurological:      Mental Status: Alert and oriented to person, place, and time.         Telemetry: Atrial fibrillation 80s to 90s       Results Review:     Results from last 7 days   Lab Units 01/26/24  0148 01/25/24  0213 01/24/24  0019 01/23/24  0048 01/22/24  1136   WBC 10*3/mm3 5.15 5.14 5.34 7.20 8.11   HEMOGLOBIN g/dL 11.3* 10.3* 9.5* 9.2* 9.8*   PLATELETS 10*3/mm3 115* 114* 94* 99* 104*     Results from last 7 days   Lab Units 01/26/24  0148 01/25/24 0213 01/24/24  0019 01/23/24  0048 01/22/24  1136   SODIUM mmol/L 142 146* 147* 143 145   POTASSIUM mmol/L 3.3* 3.7 4.0 4.4 4.5   CHLORIDE mmol/L 90* 97* 101 102 102   CO2 mmol/L 44.4* 41.9* 38.3* 35.4* 40.0*   BUN mg/dL 24* 21 20 17 17   CREATININE mg/dL 0.99 1.02* 1.04* 1.08* 1.03*   CALCIUM mg/dL 9.2 9.2 8.6 8.5* 8.9   GLUCOSE mg/dL 111* 92 75 105* 115*     Results from last 7 days   Lab Units 01/24/24  1106 01/24/24  0019 01/22/24  1354 01/22/24  1136   HSTROP T ng/L 121* 138* 111* 107*       Results from last 7 days   Lab Units 01/22/24  1136   INR  1.55*       I reviewed the patient's new clinical results.  I reviewed the patient's new imaging results and agree with the interpretation.  I personally viewed and interpreted the patient's EKG/Telemetry data      Medication Review:   apixaban, 5 mg, Oral, Q12H  aspirin, 324 mg, Oral, Once  baclofen, 5 mg, Oral, BID  budesonide, 0.5 mg, Nebulization, BID - RT  castor oil-balsam peru, 1 application , Topical, Q12H  cefTRIAXone, 1,000 mg, Intravenous, Q24H  cholecalciferol, 50,000 Units, Oral, Weekly  clopidogrel,  75 mg, Oral, Daily  digoxin, 125 mcg, Oral, Daily  donepezil, 10 mg, Oral, Nightly  doxycycline, 100 mg, Intravenous, Q12H  ferrous sulfate, 325 mg, Oral, BID  ipratropium, 0.5 mg, Nebulization, 4x Daily - RT  isosorbide mononitrate, 90 mg, Oral, Daily  lamoTRIgine, 100 mg, Oral, Daily  lamoTRIgine, 25 mg, Oral, Nightly  levothyroxine, 75 mcg, Oral, Q AM  lisinopril, 10 mg, Oral, Q24H  memantine, 10 mg, Oral, Q12H  methylPREDNISolone sodium succinate, 20 mg, Intravenous, Q12H  metoprolol tartrate, 50 mg, Oral, Q12H  pantoprazole, 40 mg, Oral, QAM AC  potassium chloride, 40 mEq, Oral, BID  risperiDONE, 0.5 mg, Oral, BID  rosuvastatin, 40 mg, Oral, Nightly  senna-docusate sodium, 2 tablet, Oral, BID  sertraline, 100 mg, Oral, Daily  sodium chloride, 10 mL, Intravenous, Q12H  sodium chloride, 10 mL, Intravenous, Q12H           Assessment:  Non-STEMI, likely type II secondary to tachycardia  ASCVD status post CABG (remote) with recent cardiac catheterization on 2/18/2023 revealing patent LIMA to LAD, occluded vein graft to LCx, RCA with excellent collaterals from distal LAD to RCA, and chronic calcific 95% stenosis in the mid left circumflex.  Patient was deemed to be high risk for PCI.  Paroxysmal atrial fibrillation with RVR.  Currently chronically anticoagulated on Eliquis for stroke prophylaxis.  Acute mental status changes, likely secondary to metabolic encephalopathy from infection, management per hospitalist service.  Tobacco abuse      Recommendations:  Patient is to continue Plavix and rosuvastatin in the setting of ASCVD.  Aspirin has been discontinued today.  Patient does not need triple therapy.  Will continue to titrate beta-blocker for improved heart rate control.  Digoxin was previously ordered and held, however patient did get a dose today.  Will continue Eliquis for stroke prophylaxis.    I discussed the patients findings and my recommendations with patient and family    Electronically signed by Fernanda FREED  PAULA Stauffer, 01/26/24, 4:51 PM EST.

## 2024-01-26 NOTE — PROGRESS NOTES
Antimicrobial Length of Therapy:    Day 5/6 Rocephin  Day 5/5 Doxycycline    Thank you,    Zelda Hartmann, PharmD

## 2024-01-26 NOTE — PROGRESS NOTES
[facility] HOSPITALIST PROGRESS NOTE     Patient Identification:  Name:  Brenda Munroe  Age:  73 y.o.  Sex:  female  :  1950  MRN:  [id number]  Visit Number:  [id number]  ROOM: [place] [id number]     Primary Care Provider:  Bernadette Arevalo MD    Length of stay in inpatient status:  2    Subjective     Chief Compliant:    Chief Complaint   Patient presents with    Altered Mental Status       History of Presenting Illness:    Patient Off BiPAP for around a hour this morning. She again became dyspnic and tachcyardic which resolved when she was put back on. Continues to slowly become awake and more interactive. No family bedside.     ROS:  Otherwise 10 point ROS negative other than documented above in HPI.     Objective     Current Hospital Meds:apixaban, 5 mg, Oral, Q12H  aspirin, 324 mg, Oral, Once  baclofen, 5 mg, Oral, BID  budesonide, 0.5 mg, Nebulization, BID - RT  castor oil-balsam peru, 1 application , Topical, Q12H  cefTRIAXone, 1,000 mg, Intravenous, Q24H  cholecalciferol, 50,000 Units, Oral, Weekly  clopidogrel, 75 mg, Oral, Daily  digoxin, 125 mcg, Oral, Daily  donepezil, 10 mg, Oral, Nightly  doxycycline, 100 mg, Intravenous, Q12H  ferrous sulfate, 325 mg, Oral, BID  ipratropium, 0.5 mg, Nebulization, 4x Daily - RT  isosorbide mononitrate, 90 mg, Oral, Daily  lamoTRIgine, 100 mg, Oral, Daily  lamoTRIgine, 25 mg, Oral, Nightly  levothyroxine, 75 mcg, Oral, Q AM  lisinopril, 10 mg, Oral, Q24H  memantine, 10 mg, Oral, Q12H  methylPREDNISolone sodium succinate, 40 mg, Intravenous, Q12H  metoprolol tartrate, 50 mg, Oral, Q12H  pantoprazole, 40 mg, Oral, QAM AC  potassium chloride, 40 mEq, Oral, BID  risperiDONE, 0.5 mg, Oral, BID  rosuvastatin, 40 mg, Oral, Nightly  senna-docusate sodium, 2 tablet, Oral, BID  sertraline, 100 mg, Oral, Daily  sodium chloride, 10 mL, Intravenous, Q12H  sodium chloride, 10 mL, Intravenous, Q12H         Current Antimicrobial Therapy:  Anti-Infectives (From  admission, onward)      Ordered     Dose/Rate Route Frequency Start Stop    01/22/24 1637  cefTRIAXone (ROCEPHIN) 1,000 mg in sodium chloride 0.9 % 100 mL IVPB-VTB        Ordering Provider: Efren Dunham MD    1,000 mg  200 mL/hr over 30 Minutes Intravenous Every 24 Hours 01/23/24 1500 01/28/24 1459    01/22/24 1637  doxycycline (VIBRAMYCIN) 100 mg in sodium chloride 0.9 % 100 mL IVPB-VTB        Ordering Provider: Efren Dunham MD    100 mg Intravenous Every 12 Hours 01/23/24 0300 01/28/24 0259    01/22/24 1453  doxycycline (VIBRAMYCIN) 100 mg in sodium chloride 0.9 % 100 mL IVPB-VTB        Ordering Provider: Jane Lew APRN    100 mg  over 60 Minutes Intravenous Once 01/22/24 1509 01/22/24 1629    01/22/24 1419  cefTRIAXone (ROCEPHIN) 2,000 mg in sodium chloride 0.9 % 100 mL IVPB-VTB        Ordering Provider: Jane Lew APRN    2,000 mg  200 mL/hr over 30 Minutes Intravenous Once 01/22/24 1435 01/22/24 1535          Current Diuretic Therapy:  Diuretics (From admission, onward)      Ordered     Dose/Rate Route Frequency Start Stop    01/26/24 0725  furosemide (LASIX) injection 40 mg        Ordering Provider: Efren Dunham MD    40 mg Intravenous Once 01/26/24 1000 01/26/24 0946    01/26/24 0731  acetaZOLAMIDE (DIAMOX) 500 mg in sodium chloride 0.9 % 50 mL IVPB        Ordering Provider: Ramiro Oden MD    500 mg  200 mL/hr over 15 Minutes Intravenous Once 01/26/24 0830 01/26/24 1001    01/25/24 1144  furosemide (LASIX) injection 40 mg        Ordering Provider: Ramiro Oden MD    40 mg Intravenous Once 01/25/24 1400 01/25/24 1424    01/25/24 0725  bumetanide (BUMEX) injection 1 mg        Ordering Provider: Efren Dunham MD    1 mg Intravenous Once 01/25/24 0900 01/25/24 0826    01/24/24 1155  bumetanide (BUMEX) injection 1 mg        Ordering Provider: Efren Dunham MD    1 mg Intravenous Once 01/24/24 1245 01/24/24 1310    01/23/24 1809  bumetanide (BUMEX) injection 1 mg         Ordering Provider: Toby Simeon MD    1 mg Intravenous Once 01/23/24 1900 01/23/24 1855          ----------------------------------------------------------------------------------------------------------------------  Vital Signs:  Temp:  [96.7 °F (35.9 °C)-98.6 °F (37 °C)] 96.7 °F (35.9 °C)  Heart Rate:  [] 128  Resp:  [13-28] 18  BP: (120-168)/() 122/101  SpO2:  [92 %-100 %] 100 %  on   ;   Device (Oxygen Therapy): NPPV/NIV  Body mass index is 27.4 kg/m².    Wt Readings from Last 3 Encounters:   01/26/24 72.4 kg (159 lb 9.8 oz)   01/11/24 74.5 kg (164 lb 3.2 oz)   12/27/23 81.6 kg (180 lb)     Intake & Output (last 3 days)         01/23 0701 01/24 0700 01/24 0701 01/25 0700 01/25 0701 01/26 0700 01/26 0701 01/27 0700    P.O. 120 0 50 240    I.V. (mL/kg)   684.3 (9.5)     IV Piggyback        Total Intake(mL/kg) 120 (1.6) 0 (0) 734.3 (10.1) 240 (3.3)    Urine (mL/kg/hr)  1550 (0.9) 4225 (2.4)     Total Output  1550 4225     Net +120 -1550 -3490.7 +240            Urine Unmeasured Occurrence 5 x 1 x 3 x     Stool Unmeasured Occurrence 1 x             Diet: Cardiac Diets, Diabetic Diets; Healthy Heart (2-3 Na+); Consistent Carbohydrate; Texture: Regular Texture (IDDSI 7); Fluid Consistency: Thin (IDDSI 0)  ----------------------------------------------------------------------------------------------------------------------  Physical exam:  Constitutional:  Chornically ill apeparing.   HENT:  Head:  Normocephalic and atraumatic.  Mouth:  Moist mucous membranes.    Eyes:  Conjunctivae and EOM are normal. No scleral icterus.    Neck:  Neck supple.  No JVD present.    Cardiovascular:  Normal rate, regular rhythm and normal heart sounds with no murmur.  Pulmonary/Chest:  No respiratory distress, on BiPAP, expiratory wheezing.   Abdominal:  Soft.  Bowel sounds are normal.  No distension and no tenderness.   Musculoskeletal:  No edema, no tenderness, and no deformity.  No red or swollen joints anywhere.     Neurological:  Alert and oriented to person, place, and time.  No cranial nerve deficit.  No tongue deviation.  No facial droop.  No slurred speech.   Skin:  Skin is warm and dry. No rash noted. No pallor.   Peripheral vascular:  Pulses in all 4 extremities with no clubbing, no cyanosis, no edema.  ----------------------------------------------------------------------------------------------------------------------  Tele:    ----------------------------------------------------------------------------------------------------------------------  Results from last 7 days   Lab Units 01/26/24  0148 01/25/24  0213 01/24/24  0019 01/23/24  0048 01/22/24  1446 01/22/24  1136   LACTATE mmol/L  --   --   --   --  0.7  --    WBC 10*3/mm3 5.15 5.14 5.34   < >  --  8.11   HEMOGLOBIN g/dL 11.3* 10.3* 9.5*   < >  --  9.8*   HEMATOCRIT % 37.7 35.3 34.3   < >  --  35.3   MCV fL 92.2 94.1 99.4*   < >  --  98.9*   MCHC g/dL 30.0* 29.2* 27.7*   < >  --  27.8*   PLATELETS 10*3/mm3 115* 114* 94*   < >  --  104*   INR   --   --   --   --   --  1.55*    < > = values in this interval not displayed.     Results from last 7 days   Lab Units 01/25/24  1013   PH, ARTERIAL pH units 7.434   PO2 ART mm Hg 94.2   PCO2, ARTERIAL mm Hg 65.0*   HCO3 ART mmol/L 43.4*     Results from last 7 days   Lab Units 01/26/24  0148 01/25/24  0213 01/24/24  0019 01/23/24  0048 01/22/24  1136   SODIUM mmol/L 142 146* 147* 143 145   POTASSIUM mmol/L 3.3* 3.7 4.0 4.4 4.5   MAGNESIUM mg/dL 1.5*  --  1.8 2.1  --    CHLORIDE mmol/L 90* 97* 101 102 102   CO2 mmol/L 44.4* 41.9* 38.3* 35.4* 40.0*   BUN mg/dL 24* 21 20 17 17   CREATININE mg/dL 0.99 1.02* 1.04* 1.08* 1.03*   CALCIUM mg/dL 9.2 9.2 8.6 8.5* 8.9   GLUCOSE mg/dL 111* 92 75 105* 115*   ALBUMIN g/dL 3.3* 3.4*  --   --  3.3*   BILIRUBIN mg/dL 0.4 0.2  --   --  0.2   ALK PHOS U/L 50 51  --   --  55   AST (SGOT) U/L 15 14  --   --  15   ALT (SGPT) U/L 15 14  --   --  19   Estimated Creatinine Clearance: 47.9  "mL/min (by C-G formula based on SCr of 0.99 mg/dL).  No results found for: \"AMMONIA\"  Results from last 7 days   Lab Units 01/24/24  1106 01/24/24  0019 01/22/24  1354   HSTROP T ng/L 121* 138* 111*     Results from last 7 days   Lab Units 01/22/24  1136   PROBNP pg/mL 4,452.0*         Glucose   Date/Time Value Ref Range Status   01/24/2024 1738 103 70 - 130 mg/dL Final   01/24/2024 0350 82 70 - 130 mg/dL Final   01/24/2024 0339 71 70 - 130 mg/dL Final   01/23/2024 1440 77 70 - 130 mg/dL Final     Lab Results   Component Value Date    TSH 0.876 01/23/2024    FREET4 1.59 03/25/2021     No results found for: \"PREGTESTUR\", \"PREGSERUM\", \"HCG\", \"HCGQUANT\"  Pain Management Panel  More data exists         Latest Ref Rng & Units 12/27/2023 2/19/2023   Pain Management Panel   Creatinine, Urine mg/dL - 222.8    Amphetamine, Urine Qual Negative Negative  Negative    Barbiturates Screen, Urine Negative Negative  Negative    Benzodiazepine Screen, Urine Negative Negative  Positive    Buprenorphine, Screen, Urine Negative Negative  Negative    Cocaine Screen, Urine Negative Negative  Negative    Fentanyl, Urine Negative Negative  -   Methadone Screen , Urine Negative Negative  Negative    Methamphetamine, Ur Negative Negative  Negative      Brief Urine Lab Results  (Last result in the past 365 days)        Color   Clarity   Blood   Leuk Est   Nitrite   Protein   CREAT   Urine HCG        01/22/24 1323 Yellow   Turbid   Moderate (2+)   Small (1+)   Negative   >=300 mg/dL (3+)                 Blood Culture   Date Value Ref Range Status   01/22/2024 No growth at 3 days  Preliminary   01/22/2024 Streptococcus mitis / oralis (C)  Final     Urine Culture   Date Value Ref Range Status   01/22/2024 25,000 CFU/mL Klebsiella pneumoniae ssp pneumoniae (A)  Final     No results found for: \"WOUNDCX\"  No results found for: \"STOOLCX\"  No results found for: \"RESPCX\"  No results found for: \"AFBCX\"  Results from last 7 days   Lab Units " 01/22/24  1446   LACTATE mmol/L 0.7       I have personally looked at the labs and they are summarized above.  ----------------------------------------------------------------------------------------------------------------------  Detailed radiology reports for the last 24 hours:    Imaging Results (Last 24 Hours)       Procedure Component Value Units Date/Time    XR Chest 1 View [[id number]] Collected: 01/25/24 1022     Updated: 01/25/24 1026    Narrative:      PROCEDURE: XR CHEST 1 VW-       HISTORY: dyspnea; I48.91-Unspecified atrial fibrillation;  J18.9-Pneumonia, unspecified organism; N39.0-Urinary tract infection,  site not specified     COMPARISON: 1/24/2024.     FINDINGS: The patient is status post median sternotomy and CABG  procedure. The heart is normal in size. The mediastinum is unremarkable.  A left effusion and basilar opacity are unchanged. The right lung is  clear. There is no pneumothorax. There are no acute osseous  abnormalities.       Impression:      Left effusion and basilar opacity unchanged.        This report was finalized on 1/25/2024 10:24 AM by Lily Mullen M.D..             Assessment & Plan    #Acute hypercapnic respiratory failure   - Has had prior admissions for this. Mild wheezing on exam. Will treat for COPD exacerbation possibly exacerbated by suspected pneumonia. Added solumedrol to nebs.   - Will try to take off BiPAP for increasing periods as mentation improves with goal O2 88-92%.   - Will continue IV diuretics to improve lung compliance. Renal function and electrolytes  stable.  - Will consult pulmonology as this appears to be a recurrent problem. May need QHS NIPPV.      #Afib w/ RVR  - HR better controlled, continue increased metoprolol 50 mg BID.   - [id number]: 4, continue home apixiban.    - Cardiology following. Appreciate recs.      #Type II NSTEMI  - 107=>111  - EKG with nonspecific ST changes   - No chest pain. No PE on CTPE.   - Discussed case and recent [facility]  in 2/23 with interventional cardiology on presentation. No urgent need for repeat ischemic evaluation. To expedite f/u will go ahead and have cardiology evaluate inpatient. Appreciate recs.      #UTI  #MIRA pneumonia?  - 1/2 staph non-aureus blood culture likely contaminant. Grew out staph mitis which is common contaminant.   - Will continue ceftriaxone/doxy as we follow urine and blood cultures. Day 4/5.     #Hypokalemia  - Will get mag level  - Replace per protocol      Chronic medical problems:  MDD  CAD  Thyroid disease- TSH wnl  HTN  Carotid stenosis  HLD     Code status: Full      Dispo: Pending clinical improvement. Back to Nursing home at discharge.        VTE Prophylaxis:   Mechanical Order History:       None          Pharmalogical Order History:        Ordered     Dose Route Frequency Stop    01/22/24 9857  apixaban (ELIQUIS) tablet 5 mg         5 mg PO Every 12 Hours Scheduled --                    Efren Dunham MD  [facility] Hospitalist  01/26/24  10:25 EST

## 2024-01-26 NOTE — PLAN OF CARE
Goal Outcome Evaluation:  Plan of Care Reviewed With: patient        Progress: improving  Outcome Evaluation: pt alert. VSS during shift. Pt on BiPap/RA, tolerating well. No request at this time. Will continue to follow plan of care til 7p.

## 2024-01-26 NOTE — PLAN OF CARE
Goal Outcome Evaluation:   Patient remains alert to self this shift. Remains in controlled afib. Bipap at 32% overnight, RA during med pass. Patient rested well this shift with no complaints. Call light in reach, bed alarm on, bed in lowest position. Plan of care ongoing 7p-7a.

## 2024-01-27 LAB
ALBUMIN SERPL-MCNC: 3.1 G/DL (ref 3.5–5.2)
ALBUMIN SERPL-MCNC: 3.4 G/DL (ref 3.5–5.2)
ALBUMIN/GLOB SERPL: 1.6 G/DL
ALBUMIN/GLOB SERPL: 1.7 G/DL
ALP SERPL-CCNC: 49 U/L (ref 39–117)
ALP SERPL-CCNC: 52 U/L (ref 39–117)
ALT SERPL W P-5'-P-CCNC: 11 U/L (ref 1–33)
ALT SERPL W P-5'-P-CCNC: 14 U/L (ref 1–33)
ANION GAP SERPL CALCULATED.3IONS-SCNC: 5.6 MMOL/L (ref 5–15)
ANION GAP SERPL CALCULATED.3IONS-SCNC: 9.8 MMOL/L (ref 5–15)
ANISOCYTOSIS BLD QL: NORMAL
AST SERPL-CCNC: 14 U/L (ref 1–32)
AST SERPL-CCNC: 15 U/L (ref 1–32)
ATMOSPHERIC PRESS: 730 MMHG
BACTERIA SPEC AEROBE CULT: NORMAL
BASE EXCESS BLDV CALC-SCNC: 13.8 MMOL/L (ref 0–2)
BASOPHILS # BLD AUTO: 0.01 10*3/MM3 (ref 0–0.2)
BASOPHILS # BLD AUTO: 0.01 10*3/MM3 (ref 0–0.2)
BASOPHILS NFR BLD AUTO: 0.1 % (ref 0–1.5)
BASOPHILS NFR BLD AUTO: 0.2 % (ref 0–1.5)
BDY SITE: ABNORMAL
BILIRUB SERPL-MCNC: 0.4 MG/DL (ref 0–1.2)
BILIRUB SERPL-MCNC: 0.5 MG/DL (ref 0–1.2)
BUN SERPL-MCNC: 26 MG/DL (ref 8–23)
BUN SERPL-MCNC: 28 MG/DL (ref 8–23)
BUN/CREAT SERPL: 24.8 (ref 7–25)
BUN/CREAT SERPL: 24.8 (ref 7–25)
CALCIUM SPEC-SCNC: 8.9 MG/DL (ref 8.6–10.5)
CALCIUM SPEC-SCNC: 9.3 MG/DL (ref 8.6–10.5)
CHLORIDE SERPL-SCNC: 92 MMOL/L (ref 98–107)
CHLORIDE SERPL-SCNC: 94 MMOL/L (ref 98–107)
CO2 BLDA-SCNC: 41.9 MMOL/L (ref 22–33)
CO2 SERPL-SCNC: 31.2 MMOL/L (ref 22–29)
CO2 SERPL-SCNC: 42.4 MMOL/L (ref 22–29)
COHGB MFR BLD: 1.9 % (ref 0–5)
CREAT SERPL-MCNC: 1.05 MG/DL (ref 0.57–1)
CREAT SERPL-MCNC: 1.13 MG/DL (ref 0.57–1)
D-LACTATE SERPL-SCNC: 1.6 MMOL/L (ref 0.5–2)
DEPRECATED RDW RBC AUTO: 65.5 FL (ref 37–54)
DEPRECATED RDW RBC AUTO: 67.7 FL (ref 37–54)
EGFRCR SERPLBLD CKD-EPI 2021: 50.8 ML/MIN/1.73
EGFRCR SERPLBLD CKD-EPI 2021: 55.5 ML/MIN/1.73
EOSINOPHIL # BLD AUTO: 0.01 10*3/MM3 (ref 0–0.4)
EOSINOPHIL # BLD AUTO: 0.02 10*3/MM3 (ref 0–0.4)
EOSINOPHIL NFR BLD AUTO: 0.2 % (ref 0.3–6.2)
EOSINOPHIL NFR BLD AUTO: 0.3 % (ref 0.3–6.2)
EPAP: 8
ERYTHROCYTE [DISTWIDTH] IN BLOOD BY AUTOMATED COUNT: 19.8 % (ref 12.3–15.4)
ERYTHROCYTE [DISTWIDTH] IN BLOOD BY AUTOMATED COUNT: 20.2 % (ref 12.3–15.4)
GLOBULIN UR ELPH-MCNC: 2 GM/DL
GLOBULIN UR ELPH-MCNC: 2 GM/DL
GLUCOSE SERPL-MCNC: 153 MG/DL (ref 65–99)
GLUCOSE SERPL-MCNC: 88 MG/DL (ref 65–99)
HCO3 BLDV-SCNC: 40.1 MMOL/L (ref 22–28)
HCT VFR BLD AUTO: 39.5 % (ref 34–46.6)
HCT VFR BLD AUTO: 43 % (ref 34–46.6)
HGB BLD-MCNC: 11.9 G/DL (ref 12–15.9)
HGB BLD-MCNC: 12.7 G/DL (ref 12–15.9)
HGB BLDA-MCNC: 11.9 G/DL (ref 13.5–17.5)
HYPOCHROMIA BLD QL: NORMAL
IMM GRANULOCYTES # BLD AUTO: 0.04 10*3/MM3 (ref 0–0.05)
IMM GRANULOCYTES # BLD AUTO: 0.07 10*3/MM3 (ref 0–0.05)
IMM GRANULOCYTES NFR BLD AUTO: 0.6 % (ref 0–0.5)
IMM GRANULOCYTES NFR BLD AUTO: 1.2 % (ref 0–0.5)
INHALED O2 CONCENTRATION: 32 %
IPAP: 20
LYMPHOCYTES # BLD AUTO: 0.7 10*3/MM3 (ref 0.7–3.1)
LYMPHOCYTES # BLD AUTO: 1 10*3/MM3 (ref 0.7–3.1)
LYMPHOCYTES NFR BLD AUTO: 12 % (ref 19.6–45.3)
LYMPHOCYTES NFR BLD AUTO: 14.8 % (ref 19.6–45.3)
Lab: ABNORMAL
MAGNESIUM SERPL-MCNC: 1.5 MG/DL (ref 1.6–2.4)
MCH RBC QN AUTO: 27.4 PG (ref 26.6–33)
MCH RBC QN AUTO: 27.5 PG (ref 26.6–33)
MCHC RBC AUTO-ENTMCNC: 29.5 G/DL (ref 31.5–35.7)
MCHC RBC AUTO-ENTMCNC: 30.1 G/DL (ref 31.5–35.7)
MCV RBC AUTO: 91.2 FL (ref 79–97)
MCV RBC AUTO: 92.9 FL (ref 79–97)
METHGB BLD QL: 0 % (ref 0–3)
MODALITY: ABNORMAL
MONOCYTES # BLD AUTO: 0.41 10*3/MM3 (ref 0.1–0.9)
MONOCYTES # BLD AUTO: 0.43 10*3/MM3 (ref 0.1–0.9)
MONOCYTES NFR BLD AUTO: 6.1 % (ref 5–12)
MONOCYTES NFR BLD AUTO: 7.4 % (ref 5–12)
NEUTROPHILS NFR BLD AUTO: 4.63 10*3/MM3 (ref 1.7–7)
NEUTROPHILS NFR BLD AUTO: 5.29 10*3/MM3 (ref 1.7–7)
NEUTROPHILS NFR BLD AUTO: 78.1 % (ref 42.7–76)
NEUTROPHILS NFR BLD AUTO: 79 % (ref 42.7–76)
NRBC BLD AUTO-RTO: 0 /100 WBC (ref 0–0.2)
NRBC BLD AUTO-RTO: 0 /100 WBC (ref 0–0.2)
OXYHGB MFR BLDV: 81.9 % (ref 45–75)
PCO2 BLDV: 57.7 MM HG (ref 41–51)
PH BLDV: 7.45 PH UNITS (ref 7.32–7.42)
PLATELET # BLD AUTO: 129 10*3/MM3 (ref 140–450)
PLATELET # BLD AUTO: 138 10*3/MM3 (ref 140–450)
PMV BLD AUTO: 10.4 FL (ref 6–12)
PMV BLD AUTO: 11.3 FL (ref 6–12)
PO2 BLDV: 48.7 MM HG (ref 27–53)
POTASSIUM SERPL-SCNC: 3.2 MMOL/L (ref 3.5–5.2)
POTASSIUM SERPL-SCNC: 3.5 MMOL/L (ref 3.5–5.2)
POTASSIUM SERPL-SCNC: 3.9 MMOL/L (ref 3.5–5.2)
PROT SERPL-MCNC: 5.1 G/DL (ref 6–8.5)
PROT SERPL-MCNC: 5.4 G/DL (ref 6–8.5)
RBC # BLD AUTO: 4.33 10*6/MM3 (ref 3.77–5.28)
RBC # BLD AUTO: 4.63 10*6/MM3 (ref 3.77–5.28)
SAO2 % BLDCOV: 83.4 % (ref 45–75)
SET MECH RESP RATE: 18
SMALL PLATELETS BLD QL SMEAR: NORMAL
SODIUM SERPL-SCNC: 135 MMOL/L (ref 136–145)
SODIUM SERPL-SCNC: 140 MMOL/L (ref 136–145)
VENTILATOR MODE: ABNORMAL
WBC NRBC COR # BLD AUTO: 5.85 10*3/MM3 (ref 3.4–10.8)
WBC NRBC COR # BLD AUTO: 6.77 10*3/MM3 (ref 3.4–10.8)

## 2024-01-27 PROCEDURE — 85025 COMPLETE CBC W/AUTO DIFF WBC: CPT | Performed by: INTERNAL MEDICINE

## 2024-01-27 PROCEDURE — 83735 ASSAY OF MAGNESIUM: CPT | Performed by: INTERNAL MEDICINE

## 2024-01-27 PROCEDURE — 25010000002 POTASSIUM CHLORIDE 10 MEQ/100ML SOLUTION: Performed by: INTERNAL MEDICINE

## 2024-01-27 PROCEDURE — 25010000002 METHYLPREDNISOLONE PER 40 MG: Performed by: INTERNAL MEDICINE

## 2024-01-27 PROCEDURE — 25010000002 MAGNESIUM SULFATE 2 GM/50ML SOLUTION: Performed by: INTERNAL MEDICINE

## 2024-01-27 PROCEDURE — 80053 COMPREHEN METABOLIC PANEL: CPT | Performed by: INTERNAL MEDICINE

## 2024-01-27 PROCEDURE — 94664 DEMO&/EVAL PT USE INHALER: CPT

## 2024-01-27 PROCEDURE — 82820 HEMOGLOBIN-OXYGEN AFFINITY: CPT

## 2024-01-27 PROCEDURE — 99233 SBSQ HOSP IP/OBS HIGH 50: CPT | Performed by: INTERNAL MEDICINE

## 2024-01-27 PROCEDURE — 25010000002 ACETAZOLAMIDE PER 500 MG: Performed by: INTERNAL MEDICINE

## 2024-01-27 PROCEDURE — 94799 UNLISTED PULMONARY SVC/PX: CPT

## 2024-01-27 PROCEDURE — 85007 BL SMEAR W/DIFF WBC COUNT: CPT | Performed by: INTERNAL MEDICINE

## 2024-01-27 PROCEDURE — 82805 BLOOD GASES W/O2 SATURATION: CPT

## 2024-01-27 PROCEDURE — 94761 N-INVAS EAR/PLS OXIMETRY MLT: CPT

## 2024-01-27 PROCEDURE — 99232 SBSQ HOSP IP/OBS MODERATE 35: CPT | Performed by: INTERNAL MEDICINE

## 2024-01-27 PROCEDURE — 25010000002 CEFTRIAXONE PER 250 MG: Performed by: INTERNAL MEDICINE

## 2024-01-27 PROCEDURE — 94660 CPAP INITIATION&MGMT: CPT

## 2024-01-27 PROCEDURE — 25010000002 PHENYLEPHRINE 10 MG/ML SOLUTION: Performed by: INTERNAL MEDICINE

## 2024-01-27 PROCEDURE — 83605 ASSAY OF LACTIC ACID: CPT | Performed by: INTERNAL MEDICINE

## 2024-01-27 PROCEDURE — 84132 ASSAY OF SERUM POTASSIUM: CPT | Performed by: INTERNAL MEDICINE

## 2024-01-27 PROCEDURE — 25810000003 SODIUM CHLORIDE 0.9 % SOLUTION: Performed by: INTERNAL MEDICINE

## 2024-01-27 RX ORDER — PHENYLEPHRINE HCL IN 0.9% NACL 0.5 MG/5ML
.5-3 SYRINGE (ML) INTRAVENOUS
Status: DISCONTINUED | OUTPATIENT
Start: 2024-01-27 | End: 2024-01-30

## 2024-01-27 RX ORDER — POTASSIUM CHLORIDE 1.5 G/1.58G
40 POWDER, FOR SOLUTION ORAL 2 TIMES DAILY
Status: COMPLETED | OUTPATIENT
Start: 2024-01-27 | End: 2024-01-28

## 2024-01-27 RX ORDER — MAGNESIUM SULFATE HEPTAHYDRATE 40 MG/ML
2 INJECTION, SOLUTION INTRAVENOUS
Status: COMPLETED | OUTPATIENT
Start: 2024-01-27 | End: 2024-01-27

## 2024-01-27 RX ORDER — POTASSIUM CHLORIDE 7.45 MG/ML
10 INJECTION INTRAVENOUS
Status: COMPLETED | OUTPATIENT
Start: 2024-01-27 | End: 2024-01-27

## 2024-01-27 RX ADMIN — METHYLPREDNISOLONE SODIUM SUCCINATE 20 MG: 40 INJECTION, POWDER, FOR SOLUTION INTRAMUSCULAR; INTRAVENOUS at 22:22

## 2024-01-27 RX ADMIN — CASTOR OIL AND BALSAM, PERU 1 APPLICATION: 788; 87 OINTMENT TOPICAL at 08:14

## 2024-01-27 RX ADMIN — POTASSIUM CHLORIDE 10 MEQ: 7.46 INJECTION, SOLUTION INTRAVENOUS at 09:59

## 2024-01-27 RX ADMIN — ROSUVASTATIN CALCIUM 40 MG: 20 TABLET, FILM COATED ORAL at 21:11

## 2024-01-27 RX ADMIN — RISPERIDONE 0.5 MG: 0.25 TABLET, FILM COATED ORAL at 08:09

## 2024-01-27 RX ADMIN — METOPROLOL TARTRATE 25 MG: 25 TABLET, FILM COATED ORAL at 21:33

## 2024-01-27 RX ADMIN — CASTOR OIL AND BALSAM, PERU 1 APPLICATION: 788; 87 OINTMENT TOPICAL at 21:11

## 2024-01-27 RX ADMIN — BACLOFEN 5 MG: 10 TABLET ORAL at 08:10

## 2024-01-27 RX ADMIN — IPRATROPIUM BROMIDE 0.5 MG: 0.5 SOLUTION RESPIRATORY (INHALATION) at 00:16

## 2024-01-27 RX ADMIN — MEMANTINE HYDROCHLORIDE 10 MG: 10 TABLET, FILM COATED ORAL at 08:09

## 2024-01-27 RX ADMIN — LISINOPRIL 10 MG: 10 TABLET ORAL at 08:09

## 2024-01-27 RX ADMIN — METHYLPREDNISOLONE SODIUM SUCCINATE 20 MG: 40 INJECTION, POWDER, FOR SOLUTION INTRAMUSCULAR; INTRAVENOUS at 11:05

## 2024-01-27 RX ADMIN — SODIUM CHLORIDE 500 ML: 9 INJECTION, SOLUTION INTRAVENOUS at 14:33

## 2024-01-27 RX ADMIN — ACETAZOLAMIDE 500 MG: 500 INJECTION, POWDER, LYOPHILIZED, FOR SOLUTION INTRAVENOUS at 16:02

## 2024-01-27 RX ADMIN — METOPROLOL TARTRATE 50 MG: 50 TABLET, FILM COATED ORAL at 08:10

## 2024-01-27 RX ADMIN — DOXYCYCLINE 100 MG: 100 INJECTION, POWDER, LYOPHILIZED, FOR SOLUTION INTRAVENOUS at 14:34

## 2024-01-27 RX ADMIN — APIXABAN 5 MG: 5 TABLET, FILM COATED ORAL at 08:09

## 2024-01-27 RX ADMIN — POTASSIUM CHLORIDE 10 MEQ: 7.46 INJECTION, SOLUTION INTRAVENOUS at 11:01

## 2024-01-27 RX ADMIN — MAGNESIUM SULFATE HEPTAHYDRATE 2 G: 40 INJECTION, SOLUTION INTRAVENOUS at 09:59

## 2024-01-27 RX ADMIN — PHENYLEPHRINE HYDROCHLORIDE 0.5 MCG/KG/MIN: 10 INJECTION INTRAVENOUS at 12:51

## 2024-01-27 RX ADMIN — DONEPEZIL HYDROCHLORIDE 10 MG: 5 TABLET, FILM COATED ORAL at 21:11

## 2024-01-27 RX ADMIN — Medication 10 ML: at 21:11

## 2024-01-27 RX ADMIN — IPRATROPIUM BROMIDE 0.5 MG: 0.5 SOLUTION RESPIRATORY (INHALATION) at 18:48

## 2024-01-27 RX ADMIN — BUDESONIDE INHALATION 0.5 MG: 0.5 SUSPENSION RESPIRATORY (INHALATION) at 06:48

## 2024-01-27 RX ADMIN — PHENYLEPHRINE HYDROCHLORIDE 0.5 MCG/KG/MIN: 10 INJECTION INTRAVENOUS at 23:41

## 2024-01-27 RX ADMIN — POTASSIUM CHLORIDE 40 MEQ: 1.5 POWDER, FOR SOLUTION ORAL at 21:10

## 2024-01-27 RX ADMIN — MEMANTINE HYDROCHLORIDE 10 MG: 10 TABLET, FILM COATED ORAL at 21:11

## 2024-01-27 RX ADMIN — CLOPIDOGREL BISULFATE 75 MG: 75 TABLET, FILM COATED ORAL at 08:10

## 2024-01-27 RX ADMIN — POTASSIUM CHLORIDE 10 MEQ: 7.46 INJECTION, SOLUTION INTRAVENOUS at 08:04

## 2024-01-27 RX ADMIN — IPRATROPIUM BROMIDE 0.5 MG: 0.5 SOLUTION RESPIRATORY (INHALATION) at 06:47

## 2024-01-27 RX ADMIN — Medication 10 ML: at 08:11

## 2024-01-27 RX ADMIN — DOCUSATE SODIUM 50 MG AND SENNOSIDES 8.6 MG 2 TABLET: 8.6; 5 TABLET, FILM COATED ORAL at 08:09

## 2024-01-27 RX ADMIN — LAMOTRIGINE 25 MG: 100 TABLET ORAL at 21:11

## 2024-01-27 RX ADMIN — BACLOFEN 5 MG: 10 TABLET ORAL at 21:10

## 2024-01-27 RX ADMIN — SODIUM CHLORIDE 500 ML: 9 INJECTION, SOLUTION INTRAVENOUS at 12:02

## 2024-01-27 RX ADMIN — MAGNESIUM SULFATE HEPTAHYDRATE 2 G: 40 INJECTION, SOLUTION INTRAVENOUS at 11:52

## 2024-01-27 RX ADMIN — DOCUSATE SODIUM 50 MG AND SENNOSIDES 8.6 MG 2 TABLET: 8.6; 5 TABLET, FILM COATED ORAL at 21:11

## 2024-01-27 RX ADMIN — FERROUS SULFATE TAB 325 MG (65 MG ELEMENTAL FE) 325 MG: 325 (65 FE) TAB at 08:10

## 2024-01-27 RX ADMIN — POTASSIUM CHLORIDE 10 MEQ: 7.46 INJECTION, SOLUTION INTRAVENOUS at 09:16

## 2024-01-27 RX ADMIN — CEFTRIAXONE 1000 MG: 1 INJECTION, POWDER, FOR SOLUTION INTRAMUSCULAR; INTRAVENOUS at 14:35

## 2024-01-27 RX ADMIN — SERTRALINE 100 MG: 50 TABLET, FILM COATED ORAL at 08:10

## 2024-01-27 RX ADMIN — LAMOTRIGINE 100 MG: 100 TABLET ORAL at 08:09

## 2024-01-27 RX ADMIN — BUDESONIDE INHALATION 0.5 MG: 0.5 SUSPENSION RESPIRATORY (INHALATION) at 18:48

## 2024-01-27 RX ADMIN — FERROUS SULFATE TAB 325 MG (65 MG ELEMENTAL FE) 325 MG: 325 (65 FE) TAB at 21:11

## 2024-01-27 RX ADMIN — APIXABAN 5 MG: 5 TABLET, FILM COATED ORAL at 21:11

## 2024-01-27 RX ADMIN — DOXYCYCLINE 100 MG: 100 INJECTION, POWDER, LYOPHILIZED, FOR SOLUTION INTRAVENOUS at 02:17

## 2024-01-27 RX ADMIN — PANTOPRAZOLE SODIUM 40 MG: 40 TABLET, DELAYED RELEASE ORAL at 08:09

## 2024-01-27 RX ADMIN — RISPERIDONE 0.5 MG: 0.25 TABLET, FILM COATED ORAL at 21:11

## 2024-01-27 RX ADMIN — MAGNESIUM SULFATE HEPTAHYDRATE 2 G: 40 INJECTION, SOLUTION INTRAVENOUS at 08:04

## 2024-01-27 RX ADMIN — IPRATROPIUM BROMIDE 0.5 MG: 0.5 SOLUTION RESPIRATORY (INHALATION) at 12:27

## 2024-01-27 RX ADMIN — ISOSORBIDE MONONITRATE 90 MG: 30 TABLET, EXTENDED RELEASE ORAL at 08:10

## 2024-01-27 RX ADMIN — DIGOXIN 125 MCG: 0.12 TABLET ORAL at 08:10

## 2024-01-27 NOTE — PROGRESS NOTES
LOS: 3 days     Name: Brenda Munroe  Age/Sex: 73 y.o. female  :  1950        PCP: Bernadette Arevalo MD    Principal Problem:    A-fib  Active Problems:    Hypercapnic respiratory failure      Admission Information: Brenda Munroe is a 73 y.o. female with   coronary artery disease s/p CABG and [facility] 2023, paroxysmal atrial fibrillation, hypertension, hyperlipidemia, bilateral carotid stenosis without occlusion, hypothyroidism, arthritis, chronic headaches, dementia, physical debility and GERD.    Chief Complaint: Altered mental status, atrial fibrillation with RVR.    Interval history:     Patient was seen and examined in room 203B.   She is drowsy this AM and per discussion with RN received IV benadryl last evening.   No reported chest pain and now doing well off BiPAP.     Heart rate is A-fib in the 80s to 90s.    Subjective         Vital Signs  Vital Signs (last 72 hrs)          0700   0659  0700   0659  0700   0659  0700   1, and [id number]   Most Recent      Temp (°F) 97 -  98.7    97.6 -  98.9    97.3 -  98.6    96.7 -  98.3     98.3 (36.8)  1200    Heart Rate 77 -  118    88 -  137    77 -  152    91 -  128     108  1200    Resp 16 -  24    12 -  24    13 -  35    11 -  24     24  1200    BP 97/58 -  135/90    125/83 -  171/113    106/92 -  168/95    122/101 -  149/125     124/97  1200    SpO2 (%) 94 -  98    74 -  100    91 -  100    92 -  100     96  1200    Flow (L/min) 2 -  2.5           2  0320    Oxygen Concentration (%)   30    30 -  32      32      32     32  1200          Temp:  [97.6 °F (36.4 °C)-98.7 °F (37.1 °C)] 98.4 °F (36.9 °C)  Heart Rate:  [] 85  Resp:  [-] 16  BP: ()/() 96/62  Body mass index is 26 kg/m².      Intake/Output Summary (Last 24 hours) at 2024 0921  Last data filed at 2024 0335  Gross per 24 hour   Intake 540.11 ml   Output 2550 ml   Net -2009.89 ml       Vitals  reviewed.   Constitutional:       Appearance: Well-developed.   Neck:      Vascular: No carotid bruit or JVD.   Pulmonary:      Effort: Pulmonary effort is normal. No respiratory distress.      Breath sounds: Wheezing present. Rhonchi present. No rales.      Comments: BiPAP on  Cardiovascular:      Normal rate. Irregular rhythm.      Comments: No lower extremity edema  Skin:     General: Skin is warm and dry.   Neurological:      Mental Status: Alert and oriented to person, place, and time.           Telemetry: Atrial fibrillation in the 80s-90s       Results Review:     Results from last 7 days   Lab Units 01/27/24  0030 01/26/24  0148 01/25/24  0213 01/24/24  0019 01/23/24  0048 01/22/24  1136   WBC 10*3/mm3 5.85 5.15 5.14 5.34 7.20 8.11   HEMOGLOBIN g/dL 11.9* 11.3* 10.3* 9.5* 9.2* 9.8*   PLATELETS 10*3/mm3 129* 115* 114* 94* 99* 104*     Results from last 7 days   Lab Units 01/27/24  0030 01/26/24  0148 01/25/24  0213 01/24/24  0019 01/23/24  0048 01/22/24  1136   SODIUM mmol/L 140 142 146* 147* 143 145   POTASSIUM mmol/L 3.2* 3.3* 3.7 4.0 4.4 4.5   CHLORIDE mmol/L 92* 90* 97* 101 102 102   CO2 mmol/L 42.4* 44.4* 41.9* 38.3* 35.4* 40.0*   BUN mg/dL 26* 24* 21 20 17 17   CREATININE mg/dL 1.05* 0.99 1.02* 1.04* 1.08* 1.03*   CALCIUM mg/dL 9.3 9.2 9.2 8.6 8.5* 8.9   GLUCOSE mg/dL 88 111* 92 75 105* 115*     Results from last 7 days   Lab Units 01/24/24  1106 01/24/24  0019 01/22/24  1354 01/22/24  1136   HSTROP T ng/L 121* 138* 111* 107*       Results from last 7 days   Lab Units 01/22/24  1136   INR  1.55*       I reviewed the patient's new clinical results.  I reviewed the patient's new imaging results and agree with the interpretation.  I personally viewed and interpreted the patient's EKG/Telemetry data      Medication Review:   apixaban, 5 mg, Oral, Q12H  baclofen, 5 mg, Oral, BID  budesonide, 0.5 mg, Nebulization, BID - RT  castor oil-balsam peru, 1 application , Topical, Q12H  cefTRIAXone, 1,000 mg,  Intravenous, Q24H  cholecalciferol, 50,000 Units, Oral, Weekly  clopidogrel, 75 mg, Oral, Daily  digoxin, 125 mcg, Oral, Daily  donepezil, 10 mg, Oral, Nightly  doxycycline, 100 mg, Intravenous, Q12H  ferrous sulfate, 325 mg, Oral, BID  ipratropium, 0.5 mg, Nebulization, 4x Daily - RT  isosorbide mononitrate, 90 mg, Oral, Daily  lamoTRIgine, 100 mg, Oral, Daily  lamoTRIgine, 25 mg, Oral, Nightly  levothyroxine, 75 mcg, Oral, Q AM  lisinopril, 10 mg, Oral, Q24H  magnesium sulfate, 2 g, Intravenous, Q2H  memantine, 10 mg, Oral, Q12H  methylPREDNISolone sodium succinate, 20 mg, Intravenous, Q12H  metoprolol tartrate, 50 mg, Oral, Q12H  pantoprazole, 40 mg, Oral, QAM AC  potassium chloride, 10 mEq, Intravenous, Q1H  risperiDONE, 0.5 mg, Oral, BID  rosuvastatin, 40 mg, Oral, Nightly  senna-docusate sodium, 2 tablet, Oral, BID  sertraline, 100 mg, Oral, Daily  sodium chloride, 10 mL, Intravenous, Q12H  sodium chloride, 10 mL, Intravenous, Q12H           Assessment:  Non-STEMI, likely type II secondary to tachycardia  ASCVD status post CABG (remote) with recent cardiac catheterization on 2/18/2023 revealing patent LIMA to LAD, occluded vein graft to LCx, RCA with excellent collaterals from distal LAD to RCA, and chronic calcific 95% stenosis in the mid left circumflex.  Patient was deemed to be high risk for PCI.  Paroxysmal atrial fibrillation with RVR.  Currently chronically anticoagulated on Eliquis for stroke prophylaxis.  Acute mental status changes, likely secondary to metabolic encephalopathy from infection, management per hospitalist service.  Tobacco abuse      Recommendations:  Patient is to continue Plavix and rosuvastatin in the setting of ASCVD.  Aspirin has been discontinued today.  Patient does not need triple therapy.  Lopressor dose decreased per primary team given patient with some lower bloode pressures this AM.  Digoxin was previously ordered and held, however patient did get a dose on 01/25, 01/26, and  today.   Will continue Eliquis for stroke prophylaxis.  She is currently pending SNF placement.      I discussed the patients findings and my recommendations with patient and family        Radha Marks PA-C    01/27/24  13:14 EST

## 2024-01-27 NOTE — PROGRESS NOTES
Referring Provider: dr stanley  Reason for Consultation: Acute on chronic hypoxic and hypercarbic respiratory failure      Chief complaint -could not be obtained as on my assessment patient was on continuous BiPAP    Sub-  None of the family members at bedside today.  Overnight events reviewed.  All the labs medications and the notes and vitals reviewed.  Patient resting in bed.     Review of Systems  No changes        History  Past Medical History:   Diagnosis Date    Anxiety     Arthritis     Bell's palsy     Bladder prolapse, female, acquired     Carotid stenosis     COPD (chronic obstructive pulmonary disease)     COPD (chronic obstructive pulmonary disease)     Coronary artery disease     Dementia     Dementia     Depression     Difficulty walking     Disease of thyroid gland     Frequency of urination     GERD (gastroesophageal reflux disease)     Heart attack     Hyperlipidemia     Hypertension     Irregular heart beat     Peptic ulcer disease    ,   Past Surgical History:   Procedure Laterality Date    CARDIAC CATHETERIZATION N/A 2/21/2023    Procedure: Left Heart Cath;  Surgeon: Tab Cifuentes MD;  Location: [facility] CATH INVASIVE LOCATION;  Service: Cardiology;  Laterality: N/A;    CARDIAC SURGERY      open heart  in 1999    CYSTOSCOPY N/A 11/8/2017    Procedure: CYSTOSCOPY;  Surgeon: Karl Nicolas DO;  Location: [facility] OR;  Service:     OTHER SURGICAL HISTORY      states she had fluid drained no surgery    VAGINAL HYSTERECTOMY W/ ANTERIOR AND POSTERIOR VAGINAL REPAIR N/A 11/8/2017    Procedure: VAGINAL HYSTERECTOMY WITH ANTERIOR, POSTERIOR, AND ENTEROCELE VAGINAL REPAIR;  Surgeon: Karl Nicolas DO;  Location: [facility] OR;  Service:     VAGINAL VAULT SUSPENSION N/A 11/8/2017    Procedure: VAGINAL VAULT SUSPENSION ;  Surgeon: Karl Nicolas DO;  Location: [facility] OR;  Service:    ,   Family History   Problem Relation Age of Onset    Dementia Sister     Heart attack  Mother     Tuberculosis Father     Breast cancer Neg Hx    ,   Social History     Tobacco Use    Smoking status: Every Day     Packs/day: 0.50     Years: 55.00     Additional pack years: 0.00     Total pack years: 27.50     Types: Cigarettes    Smokeless tobacco: Never   Vaping Use    Vaping Use: Never used   Substance Use Topics    Alcohol use: No    Drug use: No   ,   Medications Prior to Admission   Medication Sig Dispense Refill Last Dose    apixaban (ELIQUIS) 5 MG tablet tablet Take 1 tablet by mouth 2 (Two) Times a Day.   1/22/2024    baclofen (LIORESAL) 10 MG tablet Take 0.5 tablets by mouth 2 (Two) Times a Day. Indications: Muscle Spasticity   1/22/2024    clopidogrel (PLAVIX) 75 MG tablet Take 1 tablet by mouth Daily. 30 tablet  1/22/2024    donepezil (ARICEPT) 10 MG tablet Take 1 tablet by mouth Every Night.   1/21/2024    ferrous sulfate 325 (65 FE) MG tablet Take 1 tablet by mouth 2 (Two) Times a Day. 60 tablet 5 1/22/2024    isosorbide mononitrate (IMDUR) 30 MG 24 hr tablet Take 3 tablets by mouth Daily.   1/22/2024    lamoTRIgine (LaMICtal) 100 MG tablet Take 1 tablet by mouth Daily.   1/22/2024    lamoTRIgine (LaMICtal) 25 MG tablet Take 1 tablet by mouth Every Night.   1/21/2024    levothyroxine (SYNTHROID, LEVOTHROID) 75 MCG tablet Take 1 tablet by mouth Daily.   1/22/2024    lisinopril (PRINIVIL,ZESTRIL) 10 MG tablet Take 1 tablet by mouth Daily.   1/22/2024    memantine (NAMENDA) 10 MG tablet Take 1 tablet by mouth 2 (Two) Times a Day.   1/22/2024    metoprolol succinate XL (TOPROL-XL) 25 MG 24 hr tablet Take 2 tablets by mouth Daily. 30 tablet 1 1/22/2024    pantoprazole (PROTONIX) 40 MG EC tablet Take 1 tablet by mouth Daily. 10 tablet 0 1/22/2024    risperiDONE (risperDAL) 0.5 MG tablet Take 1 tablet by mouth 2 (Two) Times a Day.   1/22/2024    rosuvastatin (CRESTOR) 40 MG tablet Take 1 tablet by mouth Every Evening.   1/21/2024    sertraline (ZOLOFT) 100 MG tablet Take 1 tablet by mouth Daily.    1/22/2024    Trelegy Ellipta 100-62.5-25 MCG/INH inhaler Inhale 1 puff Daily.   1/22/2024    acetaminophen (TYLENOL) 500 MG tablet Take 1 tablet by mouth Every 4 (Four) Hours As Needed for Mild Pain.   1/18/2024    Dextran 70-Hypromellose (Lubricating Tears Eye Drops) 0.1-0.3 % solution Apply 1 drop to eye(s) as directed by provider Every 2 (Two) Hours As Needed (dry eyes). 30 mL 0 Unknown    guaiFENesin 200 MG tablet Take 2 tablets by mouth Every 4 (Four) Hours As Needed for Congestion.   Unknown    LORazepam (ATIVAN) 0.5 MG tablet Take 1 tablet by mouth Every 12 (Twelve) Hours As Needed for Anxiety.   1/1/2024    nitroglycerin (NITROSTAT) 0.4 MG SL tablet Place 1 tablet under the tongue Every 5 (Five) Minutes As Needed.   Unknown    phenol (Chloraseptic) 1.4 % liquid liquid Apply 1 spray to the mouth or throat Every 2 (Two) Hours As Needed (sore throat).   Unknown    senna (SENOKOT) 8.6 MG tablet Take 1 tablet by mouth Daily As Needed for Constipation.   Unknown    trolamine salicylate (ASPERCREME) 10 % cream Apply 1 application  topically to the appropriate area as directed Every 6 (Six) Hours As Needed for Muscle / Joint Pain.   1/11/2024    vitamin D (ERGOCALCIFEROL) 1.25 MG (44598 UT) capsule capsule Take 1 capsule by mouth 1 (One) Time Per Week.   1/19/2024   , Scheduled Meds:  acetaZOLAMIDE (DIAMOX) 500 mg in sodium chloride 0.9 % 50 mL IVPB, 500 mg, Intravenous, Once  apixaban, 5 mg, Oral, Q12H  baclofen, 5 mg, Oral, BID  budesonide, 0.5 mg, Nebulization, BID - RT  castor oil-balsam peru, 1 application , Topical, Q12H  cholecalciferol, 50,000 Units, Oral, Weekly  clopidogrel, 75 mg, Oral, Daily  digoxin, 125 mcg, Oral, Daily  donepezil, 10 mg, Oral, Nightly  ferrous sulfate, 325 mg, Oral, BID  ipratropium, 0.5 mg, Nebulization, 4x Daily - RT  lamoTRIgine, 100 mg, Oral, Daily  lamoTRIgine, 25 mg, Oral, Nightly  levothyroxine, 75 mcg, Oral, Q AM  memantine, 10 mg, Oral, Q12H  methylPREDNISolone sodium  succinate, 20 mg, Intravenous, Q12H  metoprolol tartrate, 25 mg, Oral, Q12H  pantoprazole, 40 mg, Oral, QAM AC  potassium chloride, 40 mEq, Oral, BID  risperiDONE, 0.5 mg, Oral, BID  rosuvastatin, 40 mg, Oral, Nightly  senna-docusate sodium, 2 tablet, Oral, BID  sertraline, 100 mg, Oral, Daily  sodium chloride, 10 mL, Intravenous, Q12H  sodium chloride, 10 mL, Intravenous, Q12H    , Continuous Infusions:  phenylephrine, 0.5-3 mcg/kg/min, Last Rate: 0.5 mcg/kg/min (01/27/24 1525)     and Allergies:  Patient has no known allergies.    Objective     Vital Signs   Temp:  [97.6 °F (36.4 °C)-98.7 °F (37.1 °C)] 98.4 °F (36.9 °C)  Heart Rate:  [] 83  Resp:  [13-26] 18  BP: ()/() 149/108    Physical Exam: No changes             General-chronically ill in appearance, on nasal cannula oxygen  HEENT-PERRLA    Neck-supple    Respiratory-respirations normal  Not in any respiratory distress on nasal cannula oxygen  Cardiovascular-NSR  GI-grossly normal    CNS-nonfocal    Musculoskeletal -no edema  Extremities- no obvious deformity noticed     Psychiatric-not alert skin- no visible rash                                                                   Results Review:    LABS:    Lab Results   Component Value Date    GLUCOSE 153 (H) 01/27/2024    BUN 28 (H) 01/27/2024    CREATININE 1.13 (H) 01/27/2024    EGFRIFNONA 75 03/25/2021    EGFRIFAFRI  09/24/2016      Comment:      <15 Indicative of kidney failure.    BCR 24.8 01/27/2024    CO2 31.2 (H) 01/27/2024    CALCIUM 8.9 01/27/2024    ALBUMIN 3.1 (L) 01/27/2024    LABIL2 1.4 (L) 08/04/2015    AST 15 01/27/2024    ALT 11 01/27/2024    WBC 6.77 01/27/2024    HGB 12.7 01/27/2024    HCT 43.0 01/27/2024    MCV 92.9 01/27/2024     (L) 01/27/2024     (L) 01/27/2024    K 3.9 01/27/2024    CL 94 (L) 01/27/2024    ANIONGAP 9.8 01/27/2024       Lab Results   Component Value Date    INR 1.55 (H) 01/22/2024    INR 1.04 09/17/2023    INR 1.16 (H) 02/18/2023     PROTIME 19.2 (H) 01/22/2024    PROTIME 14.1 09/17/2023    PROTIME 15.1 (H) 02/18/2023       Results from last 7 days   Lab Units 01/22/24  1136   INR  1.55*   APTT seconds 32.2          I reviewed the patient's new clinical results.  I reviewed the patient's new imaging results and agree with the interpretation.  Microbiology Results (last 10 days)       Procedure Component Value - Date/Time    Legionella Antigen, Urine - Urine, Urine, Clean Catch [[id number]]  (Normal) Collected: 01/25/24 1417    Lab Status: Final result Specimen: Urine, Clean Catch Updated: 01/25/24 1505     LEGIONELLA ANTIGEN, URINE Negative    Narrative:      Presumptive negative for L. pneumophilia serogroup 1 antigen, suggesting no recent or current infection.    Mycoplasma Pneumoniae Antibody, IgM - Blood, Arm, Left [[id number]]  (Normal) Collected: 01/25/24 1310    Lab Status: Final result Specimen: Blood from Arm, Left Updated: 01/25/24 1435     Mycoplasma pneumo IgM Negative    Blood Culture - Blood, Arm, Right [[id number]]  (Normal) Collected: 01/22/24 1453    Lab Status: Final result Specimen: Blood from Arm, Right Updated: 01/27/24 1500     Blood Culture No growth at 5 days    Blood Culture - Blood, Arm, Left [[id number]]  (Abnormal) Collected: 01/22/24 1446    Lab Status: Final result Specimen: Blood from Arm, Left Updated: 01/25/24 0723     Blood Culture Streptococcus mitis / oralis     Isolated from Aerobic Bottle     Gram Stain Aerobic Bottle Gram positive cocci in pairs and clusters    Narrative:      Probable contaminant requires clinical correlation, susceptibility not performed unless requested by physician.    Blood Culture ID, PCR - Blood, Arm, Left [[id number]]  (Abnormal) Collected: 01/22/24 1446    Lab Status: Final result Specimen: Blood from Arm, Left Updated: 01/23/24 0859     BCID, PCR Staph spp, not aureus or lugdunensis. Identification by BCID2 PCR.     BOTTLE TYPE Aerobic Bottle    Urine Culture - Urine, Urine, Clean  Catch [[id number]]  (Abnormal)  (Susceptibility) Collected: 01/22/24 1323    Lab Status: Final result Specimen: Urine, Clean Catch Updated: 01/24/24 7813     Urine Culture 25,000 CFU/mL Klebsiella pneumoniae ssp pneumoniae    Narrative:      Colonization of the urinary tract without infection is common. Treatment is discouraged unless the patient is symptomatic, pregnant, or undergoing an invasive urologic procedure.    Susceptibility        Klebsiella pneumoniae ssp pneumoniae      ISAAC      Amoxicillin + Clavulanate Susceptible      Ampicillin Resistant      Ampicillin + Sulbactam Susceptible      Cefazolin Susceptible      Cefepime Susceptible      Ceftazidime Susceptible      Ceftriaxone Susceptible      Gentamicin Susceptible      Levofloxacin Susceptible      Nitrofurantoin Intermediate      Piperacillin + Tazobactam Susceptible      Trimethoprim + Sulfamethoxazole Susceptible                                        Procalitonin Results:          Latest Reference Range & Units 01/27/24 04:38   CO2 Content 22 - 33 mmol/L 41.9 (H)   Carboxyhemoglobin Venous 0.0 - 5.0 % 1.9   Methemoglobin Venous 0.0 - 3.0 % 0.0   Oxyhemoglobin Venous 45.0 - 75.0 % 81.9 (H)   Hemoglobin, Blood Gas 13.5 - 17.5 g/dL 11.9 (L)   Site  Lab   Modality  Room Air   FIO2 % 32   Ventilator Mode  BiPAP   Set Mech Resp Rate  18.0   IPAP  20   EPAP  8   Barometric Pressure for Blood Gas mmHg 730   pH, Venous 7.320 - 7.420 pH Units 7.451 (H)   pCO2, Venous 41.0 - 51.0 mm Hg 57.7 (H)   pO2, Venous 27.0 - 53.0 mm Hg 48.7   HCO3, Venous 22.0 - 28.0 mmol/L 40.1 (H)   Base Excess 0.0 - 2.0 mmol/L 13.8 (H)   O2 Saturation, Venous 45.0 - 75.0 % 83.4 (H)   Collected by  [id number]   (H): Data is abnormally high  (L): Data is abnormally low        Assessment & Plan     Acute on chronic hypoxic and hypercarbic respiratory failure-likely due to COPD, left-sided pneumonia deconditioning and volume overload.  Continue current antibiotics-care and de-escalate as  per cultures.  Latest microbiology reviewed.  Continue steroids-dose reviewed    Continue nebs  Latest ABG reviewed.    Current BiPAP settings and graphics reviewed.  Patient's minute ventilation on current settings is appropriate.      Latest venous blood gas reviewed and acid-base disorder is better after Diamox.  Patient dropped blood pressure will not give any more diuretics.      Ins and outs reviewed  Latest microbiology reviewed.    Continue appropriate prophylaxis.    Left lower lobe pneumonia-continue current antibiotics.    DVT prophylaxis-continue    Bedside rounds were done with RT and patient's nurse. All the lab and clinical findings were discussed with them and plan was also discussed in great detail.    Family member present-none        Echo-  Results for orders placed during the hospital encounter of 01/01/24    Adult Transthoracic Echo Limited W/ Cont if Necessary Per Protocol    Interpretation Summary    Normal left ventricular cavity size and wall thickness noted. All left ventricular wall segments contract normally.    Left ventricular ejection fraction appears to be 61 - 65%.    There is a trivial pericardial effusion adjacent to the left ventricle.              A-fib    Hypercapnic respiratory failure    Case d/w nurse and team   This service is provided via audio video tele medicine   I am in [place] and patient is in [place] MD  01/27/24  15:56 EST

## 2024-01-27 NOTE — PLAN OF CARE
Goal Outcome Evaluation:   VSS at this time. Patient alert to self. Remains in afib, bipap 32%/ RA for meds. Patient did begin pulling at lines and pulled bipap off multiple times. MD aware, see MAR for interventions. Patient currently resting well with no complaints. Call light in reach, bed alarm on, bed in lowest position. Plan of care ongoing 7p-7a.

## 2024-01-27 NOTE — PROGRESS NOTES
Patient hypotensive later in the day today. Patient tolerated 500 cc bolus, ordered another. BP improving. Has briefly required phenylephrine that is being titrated down currently on lowest dose and likely can come off soon. Lactate wnl. Repeat labs reviewed. Patient clinically doing better today lasting the longest period off BiPAP. Suspect we have make patient intravascularly depleted as she has had limited PO intake while BiPAP was on and diuresed. Hold imdur and lisinopril. Decreased metoprolol.

## 2024-01-27 NOTE — PLAN OF CARE
Goal Outcome Evaluation:              Outcome Evaluation: Pt. oriented to self only. Was able to be off the bipap for a few hours this morning. Pt. became hypotensive, has recieved 500mL bolus x2, now on Juan, per mar. Remains on bipap at this time. Bed alarm set, call light within reach.         Problem: Adult Inpatient Plan of Care  Goal: Plan of Care Review  Outcome: Ongoing, Progressing  Flowsheets (Taken 1/27/2024 1721)  Outcome Evaluation: Pt. oriented to self only. Was able to be off the bipap for a few hours this morning. Pt. became hypotensive, has recieved 500mL bolus x2, now on Juan, per mar. Remains on bipap at this time. Bed alarm set, call light within reach.  Goal: Patient-Specific Goal (Individualized)  Outcome: Ongoing, Progressing  Goal: Absence of Hospital-Acquired Illness or Injury  Outcome: Ongoing, Progressing  Intervention: Identify and Manage Fall Risk  Recent Flowsheet Documentation  Taken 1/27/2024 1700 by Marie Lowe RN  Safety Promotion/Fall Prevention:   nonskid shoes/slippers when out of bed   safety round/check completed  Taken 1/27/2024 1500 by Marie Lowe RN  Safety Promotion/Fall Prevention:   nonskid shoes/slippers when out of bed   safety round/check completed  Taken 1/27/2024 1400 by Marie Lowe RN  Safety Promotion/Fall Prevention:   activity supervised   fall prevention program maintained   safety round/check completed  Taken 1/27/2024 1300 by Marie Lowe RN  Safety Promotion/Fall Prevention:   nonskid shoes/slippers when out of bed   safety round/check completed  Taken 1/27/2024 1100 by Marie Lowe RN  Safety Promotion/Fall Prevention:   nonskid shoes/slippers when out of bed   safety round/check completed  Taken 1/27/2024 0900 by Marie Lowe RN  Safety Promotion/Fall Prevention:   nonskid shoes/slippers when out of bed   safety round/check completed  Taken 1/27/2024 0800 by Marie Lowe RN  Safety Promotion/Fall Prevention:   activity supervised   fall  prevention program maintained   safety round/check completed  Taken 1/27/2024 0700 by Marie Lowe RN  Safety Promotion/Fall Prevention:   nonskid shoes/slippers when out of bed   safety round/check completed  Intervention: Prevent Skin Injury  Recent Flowsheet Documentation  Taken 1/27/2024 1400 by Marie Lowe RN  Skin Protection:   adhesive use limited   incontinence pads utilized   skin-to-device areas padded   skin-to-skin areas padded   transparent dressing maintained   tubing/devices free from skin contact  Taken 1/27/2024 0800 by Marie Lowe RN  Skin Protection:   adhesive use limited   incontinence pads utilized   transparent dressing maintained   tubing/devices free from skin contact  Intervention: Prevent and Manage VTE (Venous Thromboembolism) Risk  Recent Flowsheet Documentation  Taken 1/27/2024 1400 by Marie Lowe RN  Activity Management: bedrest  VTE Prevention/Management: (eliquis) other (see comments)  Taken 1/27/2024 0800 by Marie Lowe RN  Activity Management: bedrest  VTE Prevention/Management: (eliquis) other (see comments)  Goal: Optimal Comfort and Wellbeing  Outcome: Ongoing, Progressing  Intervention: Provide Person-Centered Care  Recent Flowsheet Documentation  Taken 1/27/2024 1400 by Marie Lowe RN  Trust Relationship/Rapport:   care explained   choices provided   emotional support provided   thoughts/feelings acknowledged  Taken 1/27/2024 0800 by Marie Lowe RN  Trust Relationship/Rapport:   care explained   choices provided   emotional support provided   thoughts/feelings acknowledged  Goal: Readiness for Transition of Care  Outcome: Ongoing, Progressing     Problem: Fall Injury Risk  Goal: Absence of Fall and Fall-Related Injury  Outcome: Ongoing, Progressing  Intervention: Identify and Manage Contributors  Recent Flowsheet Documentation  Taken 1/27/2024 1700 by Marie Lowe RN  Medication Review/Management: medications reviewed  Taken 1/27/2024 1500 by [place],  JOSE Salazar  Medication Review/Management: medications reviewed  Taken 1/27/2024 1400 by Marie Lowe RN  Medication Review/Management: medications reviewed  Taken 1/27/2024 1300 by Marie Lowe RN  Medication Review/Management: medications reviewed  Taken 1/27/2024 1100 by Marie Lowe RN  Medication Review/Management: medications reviewed  Taken 1/27/2024 0900 by Marie Lowe RN  Medication Review/Management: medications reviewed  Taken 1/27/2024 0800 by Marie Lowe RN  Medication Review/Management: medications reviewed  Taken 1/27/2024 0700 by Marie Lowe RN  Medication Review/Management: medications reviewed  Intervention: Promote Injury-Free Environment  Recent Flowsheet Documentation  Taken 1/27/2024 1700 by Marie Lowe RN  Safety Promotion/Fall Prevention:   nonskid shoes/slippers when out of bed   safety round/check completed  Taken 1/27/2024 1500 by Marie Lowe RN  Safety Promotion/Fall Prevention:   nonskid shoes/slippers when out of bed   safety round/check completed  Taken 1/27/2024 1400 by Marie Lowe RN  Safety Promotion/Fall Prevention:   activity supervised   fall prevention program maintained   safety round/check completed  Taken 1/27/2024 1300 by Marie Lowe RN  Safety Promotion/Fall Prevention:   nonskid shoes/slippers when out of bed   safety round/check completed  Taken 1/27/2024 1100 by Marie Lowe RN  Safety Promotion/Fall Prevention:   nonskid shoes/slippers when out of bed   safety round/check completed  Taken 1/27/2024 0900 by Marie Lowe RN  Safety Promotion/Fall Prevention:   nonskid shoes/slippers when out of bed   safety round/check completed  Taken 1/27/2024 0800 by Marie Lowe RN  Safety Promotion/Fall Prevention:   activity supervised   fall prevention program maintained   safety round/check completed  Taken 1/27/2024 0700 by Marie Lowe RN  Safety Promotion/Fall Prevention:   nonskid shoes/slippers when out of bed   safety round/check completed      Problem: Skin Injury Risk Increased  Goal: Skin Health and Integrity  Outcome: Ongoing, Progressing  Intervention: Optimize Skin Protection  Recent Flowsheet Documentation  Taken 1/27/2024 1400 by Marie Lowe RN  Pressure Reduction Techniques:   weight shift assistance provided   rest period provided between sit times   pressure points protected   heels elevated off bed  Pressure Reduction Devices:   pressure-redistributing mattress utilized   positioning supports utilized  Skin Protection:   adhesive use limited   incontinence pads utilized   skin-to-device areas padded   skin-to-skin areas padded   transparent dressing maintained   tubing/devices free from skin contact  Taken 1/27/2024 0800 by Marie Lowe RN  Pressure Reduction Techniques:   weight shift assistance provided   rest period provided between sit times   pressure points protected   heels elevated off bed  Pressure Reduction Devices:   pressure-redistributing mattress utilized   positioning supports utilized  Skin Protection:   adhesive use limited   incontinence pads utilized   transparent dressing maintained   tubing/devices free from skin contact     Problem: Asthma Comorbidity  Goal: Maintenance of Asthma Control  Outcome: Ongoing, Progressing  Intervention: Maintain Asthma Symptom Control  Recent Flowsheet Documentation  Taken 1/27/2024 1700 by Marie Lowe RN  Medication Review/Management: medications reviewed  Taken 1/27/2024 1500 by Marie Lowe RN  Medication Review/Management: medications reviewed  Taken 1/27/2024 1400 by Marie Lowe RN  Medication Review/Management: medications reviewed  Taken 1/27/2024 1300 by Marie Lowe RN  Medication Review/Management: medications reviewed  Taken 1/27/2024 1100 by Marie Lowe RN  Medication Review/Management: medications reviewed  Taken 1/27/2024 0900 by Marie Lowe RN  Medication Review/Management: medications reviewed  Taken 1/27/2024 0800 by Marie Lowe RN  Medication  Review/Management: medications reviewed  Taken 1/27/2024 0700 by Marie Lowe RN  Medication Review/Management: medications reviewed     Problem: Behavioral Health Comorbidity  Goal: Maintenance of Behavioral Health Symptom Control  Outcome: Ongoing, Progressing  Intervention: Maintain Behavioral Health Symptom Control  Recent Flowsheet Documentation  Taken 1/27/2024 1700 by Marie Lowe RN  Medication Review/Management: medications reviewed  Taken 1/27/2024 1500 by Marie Lowe RN  Medication Review/Management: medications reviewed  Taken 1/27/2024 1400 by Marie Lowe RN  Medication Review/Management: medications reviewed  Taken 1/27/2024 1300 by Marie Lowe RN  Medication Review/Management: medications reviewed  Taken 1/27/2024 1100 by Marie Lowe RN  Medication Review/Management: medications reviewed  Taken 1/27/2024 0900 by Marie Lowe RN  Medication Review/Management: medications reviewed  Taken 1/27/2024 0800 by Marie Lowe RN  Medication Review/Management: medications reviewed  Taken 1/27/2024 0700 by Marie Lowe RN  Medication Review/Management: medications reviewed     Problem: COPD (Chronic Obstructive Pulmonary Disease) Comorbidity  Goal: Maintenance of COPD Symptom Control  Outcome: Ongoing, Progressing  Intervention: Maintain COPD-Symptom Control  Recent Flowsheet Documentation  Taken 1/27/2024 1700 by Marie Lowe RN  Medication Review/Management: medications reviewed  Taken 1/27/2024 1500 by Marie Lowe RN  Medication Review/Management: medications reviewed  Taken 1/27/2024 1400 by Marie Lowe RN  Medication Review/Management: medications reviewed  Taken 1/27/2024 1300 by Marie Lowe RN  Medication Review/Management: medications reviewed  Taken 1/27/2024 1100 by Marie Lowe RN  Medication Review/Management: medications reviewed  Taken 1/27/2024 0900 by Marie Lowe RN  Medication Review/Management: medications reviewed  Taken 1/27/2024 0800 by Marie Lowe  RN  Medication Review/Management: medications reviewed  Taken 1/27/2024 0700 by Marie Lowe RN  Medication Review/Management: medications reviewed     Problem: Diabetes Comorbidity  Goal: Blood Glucose Level Within Targeted Range  Outcome: Ongoing, Progressing     Problem: Heart Failure Comorbidity  Goal: Maintenance of Heart Failure Symptom Control  Outcome: Ongoing, Progressing  Intervention: Maintain Heart Failure-Management  Recent Flowsheet Documentation  Taken 1/27/2024 1700 by Marie Lowe RN  Medication Review/Management: medications reviewed  Taken 1/27/2024 1500 by Marie Lowe RN  Medication Review/Management: medications reviewed  Taken 1/27/2024 1400 by Marie Lowe RN  Medication Review/Management: medications reviewed  Taken 1/27/2024 1300 by Marie Lowe RN  Medication Review/Management: medications reviewed  Taken 1/27/2024 1100 by Marie Lowe RN  Medication Review/Management: medications reviewed  Taken 1/27/2024 0900 by Marie Lowe RN  Medication Review/Management: medications reviewed  Taken 1/27/2024 0800 by Marie Lowe RN  Medication Review/Management: medications reviewed  Taken 1/27/2024 0700 by Marie Lowe RN  Medication Review/Management: medications reviewed     Problem: Hypertension Comorbidity  Goal: Blood Pressure in Desired Range  Outcome: Ongoing, Progressing  Intervention: Maintain Blood Pressure Management  Recent Flowsheet Documentation  Taken 1/27/2024 1700 by Marie Lowe RN  Medication Review/Management: medications reviewed  Taken 1/27/2024 1500 by Marie Lowe RN  Medication Review/Management: medications reviewed  Taken 1/27/2024 1400 by Marie Lowe RN  Medication Review/Management: medications reviewed  Taken 1/27/2024 1300 by Marie Lowe RN  Medication Review/Management: medications reviewed  Taken 1/27/2024 1100 by Marie Lowe RN  Medication Review/Management: medications reviewed  Taken 1/27/2024 0900 by Marie Lowe RN  Medication  Review/Management: medications reviewed  Taken 1/27/2024 0800 by Marie Lowe RN  Medication Review/Management: medications reviewed  Taken 1/27/2024 0700 by Marie Lowe RN  Medication Review/Management: medications reviewed     Problem: Obstructive Sleep Apnea Risk or Actual Comorbidity Management  Goal: Unobstructed Breathing During Sleep  Outcome: Ongoing, Progressing     Problem: Osteoarthritis Comorbidity  Goal: Maintenance of Osteoarthritis Symptom Control  Outcome: Ongoing, Progressing  Intervention: Maintain Osteoarthritis Symptom Control  Recent Flowsheet Documentation  Taken 1/27/2024 1700 by Marie Lowe RN  Medication Review/Management: medications reviewed  Taken 1/27/2024 1500 by Marie Lowe RN  Medication Review/Management: medications reviewed  Taken 1/27/2024 1400 by Marie Lowe RN  Activity Management: bedrest  Medication Review/Management: medications reviewed  Taken 1/27/2024 1300 by Marie Lowe RN  Medication Review/Management: medications reviewed  Taken 1/27/2024 1100 by Marie Lowe RN  Medication Review/Management: medications reviewed  Taken 1/27/2024 0900 by Marie Lowe RN  Medication Review/Management: medications reviewed  Taken 1/27/2024 0800 by Marie Lowe RN  Activity Management: bedrest  Medication Review/Management: medications reviewed  Taken 1/27/2024 0700 by Marie Lowe RN  Medication Review/Management: medications reviewed     Problem: Pain Chronic (Persistent) (Comorbidity Management)  Goal: Acceptable Pain Control and Functional Ability  Outcome: Ongoing, Progressing  Intervention: Manage Persistent Pain  Recent Flowsheet Documentation  Taken 1/27/2024 1700 by Marie Lowe RN  Medication Review/Management: medications reviewed  Taken 1/27/2024 1500 by Marie Lowe RN  Medication Review/Management: medications reviewed  Taken 1/27/2024 1400 by Marie Lowe RN  Sleep/Rest Enhancement: awakenings minimized  Medication Review/Management:  medications reviewed  Taken 1/27/2024 1300 by Marie Lowe RN  Medication Review/Management: medications reviewed  Taken 1/27/2024 1100 by Marie Lowe RN  Medication Review/Management: medications reviewed  Taken 1/27/2024 0900 by Marie Lowe RN  Medication Review/Management: medications reviewed  Taken 1/27/2024 0800 by Marie Lowe RN  Sleep/Rest Enhancement: awakenings minimized  Medication Review/Management: medications reviewed  Taken 1/27/2024 0700 by Marie Lowe RN  Medication Review/Management: medications reviewed  Intervention: Optimize Psychosocial Wellbeing  Recent Flowsheet Documentation  Taken 1/27/2024 1400 by Marie Lowe RN  Diversional Activities: television  Family/Support System Care: support provided  Taken 1/27/2024 0800 by Marie Lowe RN  Diversional Activities: television  Family/Support System Care: support provided     Problem: Seizure Disorder Comorbidity  Goal: Maintenance of Seizure Control  Outcome: Ongoing, Progressing     Problem: Noninvasive Ventilation Acute  Goal: Effective Unassisted Ventilation and Oxygenation  Outcome: Ongoing, Progressing

## 2024-01-27 NOTE — PROGRESS NOTES
"    [facility] HOSPITALIST PROGRESS NOTE     Patient Identification:  Name:  Brenda Munroe  Age:  73 y.o.  Sex:  female  :  1950  MRN:  [id number]  Visit Number:  [id number]  ROOM: [place] [address]     Primary Care Provider:  Bernadette Arevalo MD    Length of stay in inpatient status:  3    Subjective     Chief Compliant:    Chief Complaint   Patient presents with    Altered Mental Status       History of Presenting Illness:    Patient was sleeping on initial evaluation. She was on NC 1.5L saturating 100%. Turned down to RA and still saturating well. She easily woke up and looked at the clock and said \"wow, it is 11 oclock in the morning\" which was the correct time. She was oriented and more agreeable. She was agreeable to trying to take her medications. HR in 90's. BP in 100's/70's.     ROS:  Otherwise 10 point ROS negative other than documented above in HPI.     Objective     Current Hospital Meds:apixaban, 5 mg, Oral, Q12H  baclofen, 5 mg, Oral, BID  budesonide, 0.5 mg, Nebulization, BID - RT  castor oil-balsam peru, 1 application , Topical, Q12H  cefTRIAXone, 1,000 mg, Intravenous, Q24H  cholecalciferol, 50,000 Units, Oral, Weekly  clopidogrel, 75 mg, Oral, Daily  digoxin, 125 mcg, Oral, Daily  donepezil, 10 mg, Oral, Nightly  doxycycline, 100 mg, Intravenous, Q12H  ferrous sulfate, 325 mg, Oral, BID  ipratropium, 0.5 mg, Nebulization, 4x Daily - RT  isosorbide mononitrate, 90 mg, Oral, Daily  lamoTRIgine, 100 mg, Oral, Daily  lamoTRIgine, 25 mg, Oral, Nightly  levothyroxine, 75 mcg, Oral, Q AM  lisinopril, 10 mg, Oral, Q24H  magnesium sulfate, 2 g, Intravenous, Q2H  memantine, 10 mg, Oral, Q12H  methylPREDNISolone sodium succinate, 20 mg, Intravenous, Q12H  metoprolol tartrate, 50 mg, Oral, Q12H  pantoprazole, 40 mg, Oral, QAM AC  potassium chloride, 10 mEq, Intravenous, Q1H  risperiDONE, 0.5 mg, Oral, BID  rosuvastatin, 40 mg, Oral, Nightly  senna-docusate sodium, 2 tablet, Oral, BID  sertraline, " 100 mg, Oral, Daily  sodium chloride, 10 mL, Intravenous, Q12H  sodium chloride, 10 mL, Intravenous, Q12H         Current Antimicrobial Therapy:  Anti-Infectives (From admission, onward)      Ordered     Dose/Rate Route Frequency Start Stop    01/22/24 1637  cefTRIAXone (ROCEPHIN) 1,000 mg in sodium chloride 0.9 % 100 mL IVPB-VTB        Ordering Provider: Efren Dunham MD    1,000 mg  200 mL/hr over 30 Minutes Intravenous Every 24 Hours 01/23/24 1500 01/28/24 1459    01/22/24 1637  doxycycline (VIBRAMYCIN) 100 mg in sodium chloride 0.9 % 100 mL IVPB-VTB        Ordering Provider: Efren Dunham MD    100 mg Intravenous Every 12 Hours 01/23/24 0300 01/28/24 0259    01/22/24 1453  doxycycline (VIBRAMYCIN) 100 mg in sodium chloride 0.9 % 100 mL IVPB-VTB        Ordering Provider: Jane Lew APRN    100 mg  over 60 Minutes Intravenous Once 01/22/24 1509 01/22/24 1629    01/22/24 1419  cefTRIAXone (ROCEPHIN) 2,000 mg in sodium chloride 0.9 % 100 mL IVPB-VTB        Ordering Provider: Jane Lew APRN    2,000 mg  200 mL/hr over 30 Minutes Intravenous Once 01/22/24 1435 01/22/24 1535          Current Diuretic Therapy:  Diuretics (From admission, onward)      Ordered     Dose/Rate Route Frequency Start Stop    01/26/24 0725  furosemide (LASIX) injection 40 mg        Ordering Provider: Efren Dunham MD    40 mg Intravenous Once 01/26/24 1000 01/26/24 0946    01/26/24 0731  acetaZOLAMIDE (DIAMOX) 500 mg in sodium chloride 0.9 % 50 mL IVPB        Ordering Provider: Ramiro Oden MD    500 mg  200 mL/hr over 15 Minutes Intravenous Once 01/26/24 0830 01/26/24 1001    01/25/24 1144  furosemide (LASIX) injection 40 mg        Ordering Provider: Ramiro Oden MD    40 mg Intravenous Once 01/25/24 1400 01/25/24 1424    01/25/24 0725  bumetanide (BUMEX) injection 1 mg        Ordering Provider: Efren Dunham MD    1 mg Intravenous Once 01/25/24 0900 01/25/24 0826    01/24/24 1155  bumetanide (BUMEX)  injection 1 mg        Ordering Provider: Efren Dunham MD    1 mg Intravenous Once 01/24/24 1245 01/24/24 1310    01/23/24 1809  bumetanide (BUMEX) injection 1 mg        Ordering Provider: Toby Simeon MD    1 mg Intravenous Once 01/23/24 1900 01/23/24 1855          ----------------------------------------------------------------------------------------------------------------------  Vital Signs:  Temp:  [97.6 °F (36.4 °C)-98.7 °F (37.1 °C)] 98.4 °F (36.9 °C)  Heart Rate:  [] 92  Resp:  [13-26] 16  BP: ()/() 122/83  SpO2:  [88 %-100 %] 99 %  on  Flow (L/min):  [1-2] 1;   Device (Oxygen Therapy): nasal cannula;humidified  Body mass index is 26 kg/m².    Wt Readings from Last 3 Encounters:   01/27/24 68.7 kg (151 lb 7.3 oz)   01/11/24 74.5 kg (164 lb 3.2 oz)   12/27/23 81.6 kg (180 lb)     Intake & Output (last 3 days)         01/24 0701 01/25 0700 01/25 0701 01/26 0700 01/26 0701 01/27 0700 01/27 0701 01/28 0700    P.O. 0 50 240 0    I.V. (mL/kg)  684.3 (9.5) 100.1 (1.5)     IV Piggyback   200     Total Intake(mL/kg) 0 (0) 734.3 (10.1) 540.1 (7.9) 0 (0)    Urine (mL/kg/hr) 1550 (0.9) 4225 (2.4) 2550 (1.5)     Stool   0     Total Output 1550 4225 2550     Net -1550 -3490.7 -2009.9 0            Urine Unmeasured Occurrence 1 x 3 x 1 x     Stool Unmeasured Occurrence   1 x           Diet: Cardiac Diets, Diabetic Diets; Healthy Heart (2-3 Na+); Consistent Carbohydrate; Texture: Regular Texture (IDDSI 7); Fluid Consistency: Thin (IDDSI 0)  ----------------------------------------------------------------------------------------------------------------------  Physical exam:  Constitutional:  Chronically ill appearing.      HENT:  Head:  Normocephalic and atraumatic.  Mouth:  Moist mucous membranes.    Eyes:  Conjunctivae and EOM are normal. No scleral icterus.    Neck:  Neck supple.  No JVD present.    Cardiovascular:  Normal rate, regular rhythm and normal heart sounds with no  murmur.  Pulmonary/Chest:  No respiratory distress, Mild bilateral wheezing.   Abdominal:  Soft.  Bowel sounds are normal.  No distension and no tenderness.   Musculoskeletal:  No edema, no tenderness, and no deformity.  No red or swollen joints anywhere.    Neurological:  Alert and oriented to person, place, and time.  No cranial nerve deficit.  No tongue deviation.  No facial droop.  No slurred speech.   Skin:  Skin is warm and dry. No rash noted. No pallor.   Peripheral vascular:  Pulses in all 4 extremities with no clubbing, no cyanosis, no edema.  ----------------------------------------------------------------------------------------------------------------------  Tele:    ----------------------------------------------------------------------------------------------------------------------  Results from last 7 days   Lab Units 01/27/24  0030 01/26/24  0148 01/25/24  0213 01/23/24  0048 01/22/24  1446 01/22/24  1136   LACTATE mmol/L  --   --   --   --  0.7  --    WBC 10*3/mm3 5.85 5.15 5.14   < >  --  8.11   HEMOGLOBIN g/dL 11.9* 11.3* 10.3*   < >  --  9.8*   HEMATOCRIT % 39.5 37.7 35.3   < >  --  35.3   MCV fL 91.2 92.2 94.1   < >  --  98.9*   MCHC g/dL 30.1* 30.0* 29.2*   < >  --  27.8*   PLATELETS 10*3/mm3 129* 115* 114*   < >  --  104*   INR   --   --   --   --   --  1.55*    < > = values in this interval not displayed.     Results from last 7 days   Lab Units 01/25/24  1013   PH, ARTERIAL pH units 7.434   PO2 ART mm Hg 94.2   PCO2, ARTERIAL mm Hg 65.0*   HCO3 ART mmol/L 43.4*     Results from last 7 days   Lab Units 01/27/24  0030 01/26/24  0148 01/25/24  0213 01/24/24  0019   SODIUM mmol/L 140 142 146* 147*   POTASSIUM mmol/L 3.2* 3.3* 3.7 4.0   MAGNESIUM mg/dL 1.5* 1.5*  --  1.8   CHLORIDE mmol/L 92* 90* 97* 101   CO2 mmol/L 42.4* 44.4* 41.9* 38.3*   BUN mg/dL 26* 24* 21 20   CREATININE mg/dL 1.05* 0.99 1.02* 1.04*   CALCIUM mg/dL 9.3 9.2 9.2 8.6   GLUCOSE mg/dL 88 111* 92 75   ALBUMIN g/dL 3.4* 3.3*  "3.4*  --    BILIRUBIN mg/dL 0.5 0.4 0.2  --    ALK PHOS U/L 52 50 51  --    AST (SGOT) U/L 14 15 14  --    ALT (SGPT) U/L 14 15 14  --    Estimated Creatinine Clearance: 44.1 mL/min (A) (by C-G formula based on SCr of 1.05 mg/dL (H)).  No results found for: \"AMMONIA\"  Results from last 7 days   Lab Units 01/24/24  1106 01/24/24  0019 01/22/24  1354   HSTROP T ng/L 121* 138* 111*     Results from last 7 days   Lab Units 01/22/24  1136   PROBNP pg/mL 4,452.0*         Glucose   Date/Time Value Ref Range Status   01/24/2024 1738 103 70 - 130 mg/dL Final     Lab Results   Component Value Date    TSH 0.876 01/23/2024    FREET4 1.59 03/25/2021     No results found for: \"PREGTESTUR\", \"PREGSERUM\", \"HCG\", \"HCGQUANT\"  Pain Management Panel  More data exists         Latest Ref Rng & Units 12/27/2023 2/19/2023   Pain Management Panel   Creatinine, Urine mg/dL - 222.8    Amphetamine, Urine Qual Negative Negative  Negative    Barbiturates Screen, Urine Negative Negative  Negative    Benzodiazepine Screen, Urine Negative Negative  Positive    Buprenorphine, Screen, Urine Negative Negative  Negative    Cocaine Screen, Urine Negative Negative  Negative    Fentanyl, Urine Negative Negative  -   Methadone Screen , Urine Negative Negative  Negative    Methamphetamine, Ur Negative Negative  Negative      Brief Urine Lab Results  (Last result in the past 365 days)        Color   Clarity   Blood   Leuk Est   Nitrite   Protein   CREAT   Urine HCG        01/22/24 1323 Yellow   Turbid   Moderate (2+)   Small (1+)   Negative   >=300 mg/dL (3+)                 Blood Culture   Date Value Ref Range Status   01/22/2024 No growth at 4 days  Preliminary   01/22/2024 Streptococcus mitis / oralis (C)  Final     Urine Culture   Date Value Ref Range Status   01/22/2024 25,000 CFU/mL Klebsiella pneumoniae ssp pneumoniae (A)  Final     No results found for: \"WOUNDCX\"  No results found for: \"STOOLCX\"  No results found for: \"RESPCX\"  No results found for: " "\"AFBCX\"  Results from last 7 days   Lab Units 01/22/24  1446   LACTATE mmol/L 0.7       I have personally looked at the labs and they are summarized above.  ----------------------------------------------------------------------------------------------------------------------  Detailed radiology reports for the last 24 hours:    Imaging Results (Last 24 Hours)       ** No results found for the last 24 hours. **          Assessment & Plan    #Acute hypercapnic respiratory failure   #COPD exacerbation  - Has had prior admissions for this. Mild wheezing on exam. Will treat for COPD exacerbation possibly exacerbated by suspected pneumonia. Added solumedrol to nebs.   - Saturating above goal of 88% on room air currently.   - Will continue IV diuretics PRN to improve lung compliance. Renal function and electrolytes  stable. Appears euvolemic today with relatively low BP. Will hold today. Now with respiratory alkalosis, will use diamox if needed.   - Continue nebs, last day of IV methylprednisolone   - Pulmonology consulted. Appreciate recs. Continue QHS BiPAP.      #Afib w/ RVR  - HR better controlled, continue increased metoprolol 50 mg BID.   - [id number]: 4, continue home apixiban.    - Cardiology following. Appreciate recs.      #Type II NSTEMI  - 107=>111  - EKG with nonspecific ST changes   - No chest pain. No PE on CTPE.   - Discussed case and recent LHC in 2/23 with interventional cardiology on presentation. No urgent need for repeat ischemic evaluation.   - Continue GDMT as per cardiology      #UTI  #MIRA pneumonia?  - 1/2 staph non-aureus blood culture likely contaminant. Grew out staph mitis which is common contaminant.   - Will continue ceftriaxone/doxy as we follow urine and blood cultures. Day 5/5.      #Hypokalemia  #Hypomagnesemia  - Replace per protocol      Chronic medical problems:  MDD  CAD  Thyroid disease- TSH wnl  HTN  Carotid stenosis  HLD     Code status: Full      Dispo: Pending clinical " improvement. Back to Nursing home at discharge.     VTE Prophylaxis:   Mechanical Order History:       None          Pharmalogical Order History:        Ordered     Dose Route Frequency Stop    01/22/24 9595  apixaban (ELIQUIS) tablet 5 mg         5 mg PO Every 12 Hours Scheduled --                      Efren Dunham MD  [facility]  01/27/24  11:13 EST

## 2024-01-28 LAB
ANION GAP SERPL CALCULATED.3IONS-SCNC: 8.6 MMOL/L (ref 5–15)
ATMOSPHERIC PRESS: 720 MMHG
BASE EXCESS BLDV CALC-SCNC: 8.6 MMOL/L (ref 0–2)
BASOPHILS # BLD AUTO: 0.01 10*3/MM3 (ref 0–0.2)
BASOPHILS NFR BLD AUTO: 0.2 % (ref 0–1.5)
BDY SITE: ABNORMAL
BUN SERPL-MCNC: 30 MG/DL (ref 8–23)
BUN/CREAT SERPL: 29.4 (ref 7–25)
CALCIUM SPEC-SCNC: 9 MG/DL (ref 8.6–10.5)
CHLORIDE SERPL-SCNC: 97 MMOL/L (ref 98–107)
CO2 BLDA-SCNC: 37.8 MMOL/L (ref 22–33)
CO2 SERPL-SCNC: 34.4 MMOL/L (ref 22–29)
COHGB MFR BLD: 1.6 % (ref 0–5)
CREAT SERPL-MCNC: 1.02 MG/DL (ref 0.57–1)
DEPRECATED RDW RBC AUTO: 64.2 FL (ref 37–54)
EGFRCR SERPLBLD CKD-EPI 2021: 57.5 ML/MIN/1.73
EOSINOPHIL # BLD AUTO: 0 10*3/MM3 (ref 0–0.4)
EOSINOPHIL NFR BLD AUTO: 0 % (ref 0.3–6.2)
EPAP: 8
ERYTHROCYTE [DISTWIDTH] IN BLOOD BY AUTOMATED COUNT: 20.3 % (ref 12.3–15.4)
GLUCOSE SERPL-MCNC: 98 MG/DL (ref 65–99)
HCO3 BLDV-SCNC: 36 MMOL/L (ref 22–28)
HCT VFR BLD AUTO: 40.2 % (ref 34–46.6)
HGB BLD-MCNC: 12.8 G/DL (ref 12–15.9)
HGB BLDA-MCNC: 13.3 G/DL (ref 13.5–17.5)
IMM GRANULOCYTES # BLD AUTO: 0.04 10*3/MM3 (ref 0–0.05)
IMM GRANULOCYTES NFR BLD AUTO: 0.8 % (ref 0–0.5)
INHALED O2 CONCENTRATION: 32 %
IPAP: 20
LYMPHOCYTES # BLD AUTO: 0.54 10*3/MM3 (ref 0.7–3.1)
LYMPHOCYTES NFR BLD AUTO: 10.3 % (ref 19.6–45.3)
Lab: ABNORMAL
MAGNESIUM SERPL-MCNC: 2.9 MG/DL (ref 1.6–2.4)
MCH RBC QN AUTO: 27.8 PG (ref 26.6–33)
MCHC RBC AUTO-ENTMCNC: 31.8 G/DL (ref 31.5–35.7)
MCV RBC AUTO: 87.2 FL (ref 79–97)
METHGB BLD QL: 0 % (ref 0–3)
MODALITY: ABNORMAL
MONOCYTES # BLD AUTO: 0.27 10*3/MM3 (ref 0.1–0.9)
MONOCYTES NFR BLD AUTO: 5.2 % (ref 5–12)
NEUTROPHILS NFR BLD AUTO: 4.36 10*3/MM3 (ref 1.7–7)
NEUTROPHILS NFR BLD AUTO: 83.5 % (ref 42.7–76)
NRBC BLD AUTO-RTO: 0 /100 WBC (ref 0–0.2)
OXYHGB MFR BLDV: 55.2 % (ref 45–75)
PCO2 BLDV: 61 MM HG (ref 41–51)
PH BLDV: 7.38 PH UNITS (ref 7.32–7.42)
PLATELET # BLD AUTO: 132 10*3/MM3 (ref 140–450)
PMV BLD AUTO: 11.3 FL (ref 6–12)
PO2 BLDV: 32.5 MM HG (ref 27–53)
POTASSIUM SERPL-SCNC: 3.2 MMOL/L (ref 3.5–5.2)
POTASSIUM SERPL-SCNC: 4.7 MMOL/L (ref 3.5–5.2)
RBC # BLD AUTO: 4.61 10*6/MM3 (ref 3.77–5.28)
SAO2 % BLDCOV: 56 % (ref 45–75)
SET MECH RESP RATE: 18
SODIUM SERPL-SCNC: 140 MMOL/L (ref 136–145)
TOTAL RATE: 20 BREATHS/MINUTE
VENTILATOR MODE: ABNORMAL
WBC NRBC COR # BLD AUTO: 5.22 10*3/MM3 (ref 3.4–10.8)

## 2024-01-28 PROCEDURE — 82820 HEMOGLOBIN-OXYGEN AFFINITY: CPT

## 2024-01-28 PROCEDURE — 99232 SBSQ HOSP IP/OBS MODERATE 35: CPT | Performed by: INTERNAL MEDICINE

## 2024-01-28 PROCEDURE — 84132 ASSAY OF SERUM POTASSIUM: CPT | Performed by: INTERNAL MEDICINE

## 2024-01-28 PROCEDURE — 94799 UNLISTED PULMONARY SVC/PX: CPT

## 2024-01-28 PROCEDURE — 82805 BLOOD GASES W/O2 SATURATION: CPT

## 2024-01-28 PROCEDURE — 85025 COMPLETE CBC W/AUTO DIFF WBC: CPT | Performed by: INTERNAL MEDICINE

## 2024-01-28 PROCEDURE — 80162 ASSAY OF DIGOXIN TOTAL: CPT | Performed by: NURSE PRACTITIONER

## 2024-01-28 PROCEDURE — 80048 BASIC METABOLIC PNL TOTAL CA: CPT | Performed by: INTERNAL MEDICINE

## 2024-01-28 PROCEDURE — 25010000002 PHENYLEPHRINE 10 MG/ML SOLUTION: Performed by: INTERNAL MEDICINE

## 2024-01-28 PROCEDURE — 25010000002 METHYLPREDNISOLONE PER 40 MG: Performed by: INTERNAL MEDICINE

## 2024-01-28 PROCEDURE — 25810000003 SODIUM CHLORIDE 0.9 % SOLUTION: Performed by: INTERNAL MEDICINE

## 2024-01-28 PROCEDURE — 94660 CPAP INITIATION&MGMT: CPT

## 2024-01-28 PROCEDURE — 83735 ASSAY OF MAGNESIUM: CPT | Performed by: INTERNAL MEDICINE

## 2024-01-28 PROCEDURE — 94761 N-INVAS EAR/PLS OXIMETRY MLT: CPT

## 2024-01-28 PROCEDURE — 94664 DEMO&/EVAL PT USE INHALER: CPT

## 2024-01-28 RX ORDER — POTASSIUM CHLORIDE 20 MEQ/1
40 TABLET, EXTENDED RELEASE ORAL EVERY 4 HOURS
Status: COMPLETED | OUTPATIENT
Start: 2024-01-28 | End: 2024-01-28

## 2024-01-28 RX ADMIN — IPRATROPIUM BROMIDE 0.5 MG: 0.5 SOLUTION RESPIRATORY (INHALATION) at 12:53

## 2024-01-28 RX ADMIN — RISPERIDONE 0.5 MG: 0.25 TABLET, FILM COATED ORAL at 08:06

## 2024-01-28 RX ADMIN — IPRATROPIUM BROMIDE 0.5 MG: 0.5 SOLUTION RESPIRATORY (INHALATION) at 06:43

## 2024-01-28 RX ADMIN — SERTRALINE 100 MG: 50 TABLET, FILM COATED ORAL at 08:05

## 2024-01-28 RX ADMIN — BUDESONIDE INHALATION 0.5 MG: 0.5 SUSPENSION RESPIRATORY (INHALATION) at 18:51

## 2024-01-28 RX ADMIN — IPRATROPIUM BROMIDE 0.5 MG: 0.5 SOLUTION RESPIRATORY (INHALATION) at 00:47

## 2024-01-28 RX ADMIN — Medication 10 ML: at 21:01

## 2024-01-28 RX ADMIN — MEMANTINE HYDROCHLORIDE 10 MG: 10 TABLET, FILM COATED ORAL at 20:58

## 2024-01-28 RX ADMIN — ROSUVASTATIN CALCIUM 40 MG: 20 TABLET, FILM COATED ORAL at 20:58

## 2024-01-28 RX ADMIN — Medication 10 ML: at 08:06

## 2024-01-28 RX ADMIN — CASTOR OIL AND BALSAM, PERU 1 APPLICATION: 788; 87 OINTMENT TOPICAL at 08:06

## 2024-01-28 RX ADMIN — BUDESONIDE INHALATION 0.5 MG: 0.5 SUSPENSION RESPIRATORY (INHALATION) at 06:43

## 2024-01-28 RX ADMIN — APIXABAN 5 MG: 5 TABLET, FILM COATED ORAL at 20:57

## 2024-01-28 RX ADMIN — PANTOPRAZOLE SODIUM 40 MG: 40 TABLET, DELAYED RELEASE ORAL at 06:33

## 2024-01-28 RX ADMIN — LAMOTRIGINE 100 MG: 100 TABLET ORAL at 08:06

## 2024-01-28 RX ADMIN — PHENYLEPHRINE HYDROCHLORIDE 0.5 MCG/KG/MIN: 10 INJECTION INTRAVENOUS at 01:53

## 2024-01-28 RX ADMIN — METOPROLOL TARTRATE 25 MG: 25 TABLET, FILM COATED ORAL at 20:58

## 2024-01-28 RX ADMIN — APIXABAN 5 MG: 5 TABLET, FILM COATED ORAL at 08:06

## 2024-01-28 RX ADMIN — BACLOFEN 5 MG: 10 TABLET ORAL at 20:58

## 2024-01-28 RX ADMIN — POTASSIUM CHLORIDE EXTENDED-RELEASE 40 MEQ: 1500 TABLET ORAL at 10:21

## 2024-01-28 RX ADMIN — FERROUS SULFATE TAB 325 MG (65 MG ELEMENTAL FE) 325 MG: 325 (65 FE) TAB at 20:57

## 2024-01-28 RX ADMIN — DOCUSATE SODIUM 50 MG AND SENNOSIDES 8.6 MG 2 TABLET: 8.6; 5 TABLET, FILM COATED ORAL at 20:57

## 2024-01-28 RX ADMIN — MEMANTINE HYDROCHLORIDE 10 MG: 10 TABLET, FILM COATED ORAL at 08:06

## 2024-01-28 RX ADMIN — POTASSIUM CHLORIDE 40 MEQ: 1.5 POWDER, FOR SOLUTION ORAL at 08:04

## 2024-01-28 RX ADMIN — IPRATROPIUM BROMIDE 0.5 MG: 0.5 SOLUTION RESPIRATORY (INHALATION) at 18:51

## 2024-01-28 RX ADMIN — LAMOTRIGINE 25 MG: 100 TABLET ORAL at 20:58

## 2024-01-28 RX ADMIN — METHYLPREDNISOLONE SODIUM SUCCINATE 20 MG: 40 INJECTION, POWDER, FOR SOLUTION INTRAMUSCULAR; INTRAVENOUS at 22:21

## 2024-01-28 RX ADMIN — CLOPIDOGREL BISULFATE 75 MG: 75 TABLET, FILM COATED ORAL at 08:06

## 2024-01-28 RX ADMIN — CASTOR OIL AND BALSAM, PERU 1 APPLICATION: 788; 87 OINTMENT TOPICAL at 21:01

## 2024-01-28 RX ADMIN — POTASSIUM CHLORIDE EXTENDED-RELEASE 40 MEQ: 1500 TABLET ORAL at 06:33

## 2024-01-28 RX ADMIN — METHYLPREDNISOLONE SODIUM SUCCINATE 20 MG: 40 INJECTION, POWDER, FOR SOLUTION INTRAMUSCULAR; INTRAVENOUS at 10:21

## 2024-01-28 RX ADMIN — RISPERIDONE 0.5 MG: 0.25 TABLET, FILM COATED ORAL at 20:58

## 2024-01-28 RX ADMIN — LEVOTHYROXINE SODIUM 75 MCG: 0.07 TABLET ORAL at 06:33

## 2024-01-28 RX ADMIN — BACLOFEN 5 MG: 10 TABLET ORAL at 08:05

## 2024-01-28 RX ADMIN — DOCUSATE SODIUM 50 MG AND SENNOSIDES 8.6 MG 2 TABLET: 8.6; 5 TABLET, FILM COATED ORAL at 08:06

## 2024-01-28 RX ADMIN — DONEPEZIL HYDROCHLORIDE 10 MG: 5 TABLET, FILM COATED ORAL at 20:57

## 2024-01-28 RX ADMIN — DIGOXIN 125 MCG: 0.12 TABLET ORAL at 08:05

## 2024-01-28 RX ADMIN — FERROUS SULFATE TAB 325 MG (65 MG ELEMENTAL FE) 325 MG: 325 (65 FE) TAB at 08:06

## 2024-01-28 NOTE — PLAN OF CARE
Goal Outcome Evaluation:   VSS at this time. Patient remains alert to self, bipap 32%/RA for meds, afib. Patient resting this shift. Juan gtt titrated this shift, currently remains off of juan, BP stable. Call light in reach, bed alarm on, bed in lowest position. Plan of care ongoing 7p-7a.

## 2024-01-28 NOTE — PROGRESS NOTES
LOS: 4 days     Name: Brenda Munroe  Age/Sex: 73 y.o. female  :  1950        PCP: Bernadette Arevalo MD    Principal Problem:    A-fib  Active Problems:    Hypercapnic respiratory failure      Admission Information: Brenda Munroe is a 73 y.o. female with   coronary artery disease s/p CABG and [facility] 2023, paroxysmal atrial fibrillation, hypertension, hyperlipidemia, bilateral carotid stenosis without occlusion, hypothyroidism, arthritis, chronic headaches, dementia, physical debility and GERD.    Chief Complaint: Altered mental status, atrial fibrillation with RVR.    Interval history:     Patient was seen and examined in room; she is more alert today and reports she feels somewhat improved.  She denies chest pain at the time of evaluation.      Discussed with AM JOSE Casanova with patient now off of Juan after start during 24 with hypotension.     Heart rate is A-fib in the 80s to 90s.    Subjective         Vital Signs  Vital Signs (last 72 hrs)          0700   0659  0700   0659  0700   0659  0700   1, and [id number]   Most Recent      Temp (°F) 97 -  98.7    97.6 -  98.9    97.3 -  98.6    96.7 -  98.3     98.3 (36.8)  1200    Heart Rate 77 -  118    88 -  137    77 -  152    91 -  128     108  1200    Resp 16 -  24    12 -  24    13 -  35    11 -  24     24  1200    BP 97/58 -  135/90    125/83 -  171/113    106/92 -  168/95    122/101 -  149/125     124/97  1200    SpO2 (%) 94 -  98    74 -  100    91 -  100    92 -  100     96  1200    Flow (L/min) 2 -  2.5           2  0320    Oxygen Concentration (%)   30    30 -  32      32      32     32  1200          Temp:  [96.8 °F (36 °C)-98.5 °F (36.9 °C)] 97.6 °F (36.4 °C)  Heart Rate:  [] 98  Resp:  [14-28] 18  BP: ()/() 118/80  Body mass index is 26.91 kg/m².      Intake/Output Summary (Last 24 hours) at 2024 1051  Last data filed at 2024 0800  Gross  per 24 hour   Intake 2457.21 ml   Output 1050 ml   Net 1407.21 ml       Vitals reviewed.   Constitutional:       Appearance: Well-developed.   Neck:      Vascular: No carotid bruit or JVD.   Pulmonary:      Effort: Pulmonary effort is normal. No respiratory distress.      Breath sounds: Wheezing present. Rhonchi present. No rales.   Cardiovascular:      Normal rate. Irregular rhythm.      Comments: No lower extremity edema  Skin:     General: Skin is warm and dry.   Neurological:      Mental Status: Alert and oriented to person, place, and time.           Telemetry: Atrial fibrillation in the 80s-90s         Results Review:     Results from last 7 days   Lab Units 01/28/24  0433 01/27/24  1254 01/27/24  0030 01/26/24  0148 01/25/24  0213 01/24/24  0019 01/23/24  0048   WBC 10*3/mm3 5.22 6.77 5.85 5.15 5.14 5.34 7.20   HEMOGLOBIN g/dL 12.8 12.7 11.9* 11.3* 10.3* 9.5* 9.2*   PLATELETS 10*3/mm3 132* 138* 129* 115* 114* 94* 99*     Results from last 7 days   Lab Units 01/28/24  0433 01/27/24  1715 01/27/24  1254 01/27/24  0030 01/26/24  0148 01/25/24  0213 01/24/24  0019 01/23/24  0048   SODIUM mmol/L 140  --  135* 140 142 146* 147* 143   POTASSIUM mmol/L 3.2* 3.5 3.9 3.2* 3.3* 3.7 4.0 4.4   CHLORIDE mmol/L 97*  --  94* 92* 90* 97* 101 102   CO2 mmol/L 34.4*  --  31.2* 42.4* 44.4* 41.9* 38.3* 35.4*   BUN mg/dL 30*  --  28* 26* 24* 21 20 17   CREATININE mg/dL 1.02*  --  1.13* 1.05* 0.99 1.02* 1.04* 1.08*   CALCIUM mg/dL 9.0  --  8.9 9.3 9.2 9.2 8.6 8.5*   GLUCOSE mg/dL 98  --  153* 88 111* 92 75 105*     Results from last 7 days   Lab Units 01/24/24  1106 01/24/24  0019 01/22/24  1354 01/22/24  1136   HSTROP T ng/L 121* 138* 111* 107*       Results from last 7 days   Lab Units 01/22/24  1136   INR  1.55*       I reviewed the patient's new clinical results.  I reviewed the patient's new imaging results and agree with the interpretation.  I personally viewed and interpreted the patient's EKG/Telemetry data      Medication  Review:   apixaban, 5 mg, Oral, Q12H  baclofen, 5 mg, Oral, BID  budesonide, 0.5 mg, Nebulization, BID - RT  castor oil-balsam peru, 1 application , Topical, Q12H  cholecalciferol, 50,000 Units, Oral, Weekly  clopidogrel, 75 mg, Oral, Daily  digoxin, 125 mcg, Oral, Daily  donepezil, 10 mg, Oral, Nightly  ferrous sulfate, 325 mg, Oral, BID  ipratropium, 0.5 mg, Nebulization, 4x Daily - RT  lamoTRIgine, 100 mg, Oral, Daily  lamoTRIgine, 25 mg, Oral, Nightly  levothyroxine, 75 mcg, Oral, Q AM  memantine, 10 mg, Oral, Q12H  methylPREDNISolone sodium succinate, 20 mg, Intravenous, Q12H  metoprolol tartrate, 25 mg, Oral, Q12H  pantoprazole, 40 mg, Oral, QAM AC  risperiDONE, 0.5 mg, Oral, BID  rosuvastatin, 40 mg, Oral, Nightly  senna-docusate sodium, 2 tablet, Oral, BID  sertraline, 100 mg, Oral, Daily  sodium chloride, 10 mL, Intravenous, Q12H  sodium chloride, 10 mL, Intravenous, Q12H      phenylephrine, 0.5-3 mcg/kg/min, Last Rate: Stopped (01/28/24 0402)        Assessment:  Non-STEMI, likely type II secondary to tachycardia  ASCVD status post CABG (remote) with recent cardiac catheterization on 2/18/2023 revealing patent LIMA to LAD, occluded vein graft to LCx, RCA with excellent collaterals from distal LAD to RCA, and chronic calcific 95% stenosis in the mid left circumflex.  Patient was deemed to be high risk for PCI.  Paroxysmal atrial fibrillation with RVR.  Currently chronically anticoagulated on Eliquis for stroke prophylaxis.  Acute hypokalemia  Acute mental status changes, likely secondary to metabolic encephalopathy from infection, management per hospitalist service.  Tobacco abuse      Recommendations:  Patient is to continue Plavix and rosuvastatin in the setting of ASCVD.  Aspirin has been previously discontinued as it was felt patient does not need triple therapy.  Lopressor dose decreased per primary team given patient with some lower bloode pressures on 01/27/23 AM.  Digoxin was previously ordered and  held, however patient did get a dose on 01/25, 01/26, and today.     Will continue Eliquis for stroke prophylaxis.      Potassium replacement protocol on board.  Recommended attempting to keep K around 4 and Mag around 2.     I discussed the patients findings and my recommendations with patient and family        Radha Marks PA-C    01/28/24  10:51 EST

## 2024-01-28 NOTE — PROGRESS NOTES
[facility] HOSPITALIST PROGRESS NOTE     Patient Identification:  Name:  Brenda Munroe  Age:  73 y.o.  Sex:  female  :  1950  MRN:  [id number]  Visit Number:  [id number]  ROOM: [place] [id number]     Primary Care Provider:  Bernadette Arevalo MD    Length of stay in inpatient status:  4    Subjective     Chief Compliant:    Chief Complaint   Patient presents with    Altered Mental Status       History of Presenting Illness:    Patient awake, alert on 2L NC, saturating on 2L NC. She was confused most of the time talking about the people that she has loved that has passed away. She continues to improve and stay longer periods off the BiPAP. She only required pressor support briefly yesterday.     ROS:  Otherwise 10 point ROS negative other than documented above in HPI.     Objective     Current Hospital Meds:apixaban, 5 mg, Oral, Q12H  baclofen, 5 mg, Oral, BID  budesonide, 0.5 mg, Nebulization, BID - RT  castor oil-balsam peru, 1 application , Topical, Q12H  cholecalciferol, 50,000 Units, Oral, Weekly  clopidogrel, 75 mg, Oral, Daily  digoxin, 125 mcg, Oral, Daily  donepezil, 10 mg, Oral, Nightly  ferrous sulfate, 325 mg, Oral, BID  ipratropium, 0.5 mg, Nebulization, 4x Daily - RT  lamoTRIgine, 100 mg, Oral, Daily  lamoTRIgine, 25 mg, Oral, Nightly  levothyroxine, 75 mcg, Oral, Q AM  memantine, 10 mg, Oral, Q12H  methylPREDNISolone sodium succinate, 20 mg, Intravenous, Q12H  metoprolol tartrate, 25 mg, Oral, Q12H  pantoprazole, 40 mg, Oral, QAM AC  risperiDONE, 0.5 mg, Oral, BID  rosuvastatin, 40 mg, Oral, Nightly  senna-docusate sodium, 2 tablet, Oral, BID  sertraline, 100 mg, Oral, Daily  sodium chloride, 10 mL, Intravenous, Q12H  sodium chloride, 10 mL, Intravenous, Q12H    phenylephrine, 0.5-3 mcg/kg/min, Last Rate: Stopped (24 1732)        Current Antimicrobial Therapy:  Anti-Infectives (From admission, onward)      Ordered     Dose/Rate Route Frequency Start Stop    24 5918   cefTRIAXone (ROCEPHIN) 1,000 mg in sodium chloride 0.9 % 100 mL IVPB-VTB        Ordering Provider: Efren Dunham MD    1,000 mg  200 mL/hr over 30 Minutes Intravenous Every 24 Hours 01/23/24 1500 01/27/24 1505    01/22/24 1637  doxycycline (VIBRAMYCIN) 100 mg in sodium chloride 0.9 % 100 mL IVPB-VTB        Ordering Provider: Efren Dunham MD    100 mg Intravenous Every 12 Hours 01/23/24 0300 01/27/24 1434    01/22/24 1453  doxycycline (VIBRAMYCIN) 100 mg in sodium chloride 0.9 % 100 mL IVPB-VTB        Ordering Provider: Jane Lew APRN    100 mg  over 60 Minutes Intravenous Once 01/22/24 1509 01/22/24 1629    01/22/24 1419  cefTRIAXone (ROCEPHIN) 2,000 mg in sodium chloride 0.9 % 100 mL IVPB-VTB        Ordering Provider: Jane Lew APRN    2,000 mg  200 mL/hr over 30 Minutes Intravenous Once 01/22/24 1435 01/22/24 1535          Current Diuretic Therapy:  Diuretics (From admission, onward)      Ordered     Dose/Rate Route Frequency Start Stop    01/27/24 1507  acetaZOLAMIDE (DIAMOX) 500 mg in sodium chloride 0.9 % 50 mL IVPB        Ordering Provider: Ramiro Oden MD    500 mg  200 mL/hr over 15 Minutes Intravenous Once 01/27/24 1600 01/27/24 1617    01/26/24 0725  furosemide (LASIX) injection 40 mg        Ordering Provider: Efren Dunham MD    40 mg Intravenous Once 01/26/24 1000 01/26/24 0946    01/26/24 0731  acetaZOLAMIDE (DIAMOX) 500 mg in sodium chloride 0.9 % 50 mL IVPB        Ordering Provider: Ramiro Oden MD    500 mg  200 mL/hr over 15 Minutes Intravenous Once 01/26/24 0830 01/26/24 1001    01/25/24 1144  furosemide (LASIX) injection 40 mg        Ordering Provider: Ramiro Oden MD    40 mg Intravenous Once 01/25/24 1400 01/25/24 1424    01/25/24 0725  bumetanide (BUMEX) injection 1 mg        Ordering Provider: Efren Dunham MD    1 mg Intravenous Once 01/25/24 0900 01/25/24 0826    01/24/24 1155  bumetanide (BUMEX) injection 1 mg        Ordering Provider: Roly  Efren PEOPLES MD    1 mg Intravenous Once 01/24/24 1245 01/24/24 1310    01/23/24 1809  bumetanide (BUMEX) injection 1 mg        Ordering Provider: Toby Simeon MD    1 mg Intravenous Once 01/23/24 1900 01/23/24 1855          ----------------------------------------------------------------------------------------------------------------------  Vital Signs:  Temp:  [96.8 °F (36 °C)-98.5 °F (36.9 °C)] 97.6 °F (36.4 °C)  Heart Rate:  [] 101  Resp:  [14-28] 20  BP: ()/() 129/82  SpO2:  [86 %-100 %] 93 %  on  Flow (L/min):  [2] 2;   Device (Oxygen Therapy): humidified;nasal cannula  Body mass index is 26.91 kg/m².    Wt Readings from Last 3 Encounters:   01/28/24 71.1 kg (156 lb 12 oz)   01/11/24 74.5 kg (164 lb 3.2 oz)   12/27/23 81.6 kg (180 lb)     Intake & Output (last 3 days)         01/25 0701 01/26 0700 01/26 0701 01/27 0700 01/27 0701 01/28 0700 01/28 0701 01/29 0700    P.O. 50 240 200 360    I.V. (mL/kg) 684.3 (9.5) 100.1 (1.5) 1897.2 (26.7)     IV Piggyback  200      Total Intake(mL/kg) 734.3 (10.1) 540.1 (7.9) 2097.2 (29.5) 360 (5.1)    Urine (mL/kg/hr) 4225 (2.4) 2550 (1.5) 1050 (0.6)     Stool  0      Total Output 4225 2550 1050     Net -3490.7 -2009.9 +1047.2 +360            Urine Unmeasured Occurrence 3 x 1 x      Stool Unmeasured Occurrence  1 x            Diet: Cardiac Diets, Diabetic Diets; Healthy Heart (2-3 Na+); Consistent Carbohydrate; Texture: Regular Texture (IDDSI 7); Fluid Consistency: Thin (IDDSI 0)  ----------------------------------------------------------------------------------------------------------------------  Physical exam:  Constitutional:  Well-developed and well-nourished.  No respiratory distress.      HENT:  Head:  Normocephalic and atraumatic.  Mouth:  Moist mucous membranes.    Eyes:  Conjunctivae and EOM are normal. No scleral icterus.    Neck:  Neck supple.  No JVD present.    Cardiovascular:  Normal rate, regular rhythm and normal heart sounds with no  murmur.  Pulmonary/Chest:  No respiratory distress, no wheezes, no crackles, with normal breath sounds and good air movement.  Abdominal:  Soft.  Bowel sounds are normal.  No distension and no tenderness.   Musculoskeletal:  No edema, no tenderness, and no deformity.  No red or swollen joints anywhere.    Neurological:  Alert and oriented to person, place only. No focal deficits.   Skin:  Skin is warm and dry. No rash noted. No pallor.   Peripheral vascular:  Pulses in all 4 extremities with no clubbing, no cyanosis, no edema.  ----------------------------------------------------------------------------------------------------------------------  Tele:    ----------------------------------------------------------------------------------------------------------------------  Results from last 7 days   Lab Units 01/28/24  0433 01/27/24  1254 01/27/24  0030 01/23/24  0048 01/22/24  1446 01/22/24  1136   LACTATE mmol/L  --  1.6  --   --  0.7  --    WBC 10*3/mm3 5.22 6.77 5.85   < >  --  8.11   HEMOGLOBIN g/dL 12.8 12.7 11.9*   < >  --  9.8*   HEMATOCRIT % 40.2 43.0 39.5   < >  --  35.3   MCV fL 87.2 92.9 91.2   < >  --  98.9*   MCHC g/dL 31.8 29.5* 30.1*   < >  --  27.8*   PLATELETS 10*3/mm3 132* 138* 129*   < >  --  104*   INR   --   --   --   --   --  1.55*    < > = values in this interval not displayed.     Results from last 7 days   Lab Units 01/25/24  1013   PH, ARTERIAL pH units 7.434   PO2 ART mm Hg 94.2   PCO2, ARTERIAL mm Hg 65.0*   HCO3 ART mmol/L 43.4*     Results from last 7 days   Lab Units 01/28/24  0433 01/27/24  1715 01/27/24  1254 01/27/24  0030 01/26/24  0148   SODIUM mmol/L 140  --  135* 140 142   POTASSIUM mmol/L 3.2* 3.5 3.9 3.2* 3.3*   MAGNESIUM mg/dL 2.9*  --   --  1.5* 1.5*   CHLORIDE mmol/L 97*  --  94* 92* 90*   CO2 mmol/L 34.4*  --  31.2* 42.4* 44.4*   BUN mg/dL 30*  --  28* 26* 24*   CREATININE mg/dL 1.02*  --  1.13* 1.05* 0.99   CALCIUM mg/dL 9.0  --  8.9 9.3 9.2   GLUCOSE mg/dL 98  --  153*  "88 111*   ALBUMIN g/dL  --   --  3.1* 3.4* 3.3*   BILIRUBIN mg/dL  --   --  0.4 0.5 0.4   ALK PHOS U/L  --   --  49 52 50   AST (SGOT) U/L  --   --  15 14 15   ALT (SGPT) U/L  --   --  11 14 15   Estimated Creatinine Clearance: 46.1 mL/min (A) (by C-G formula based on SCr of 1.02 mg/dL (H)).  No results found for: \"AMMONIA\"  Results from last 7 days   Lab Units 01/24/24  1106 01/24/24  0019 01/22/24  1354   HSTROP T ng/L 121* 138* 111*     Results from last 7 days   Lab Units 01/22/24  1136   PROBNP pg/mL 4,452.0*         No results found for: \"HGBA1C\", \"POCGLU\"  Lab Results   Component Value Date    TSH 0.876 01/23/2024    FREET4 1.59 03/25/2021     No results found for: \"PREGTESTUR\", \"PREGSERUM\", \"HCG\", \"HCGQUANT\"  Pain Management Panel  More data exists         Latest Ref Rng & Units 12/27/2023 2/19/2023   Pain Management Panel   Creatinine, Urine mg/dL - 222.8    Amphetamine, Urine Qual Negative Negative  Negative    Barbiturates Screen, Urine Negative Negative  Negative    Benzodiazepine Screen, Urine Negative Negative  Positive    Buprenorphine, Screen, Urine Negative Negative  Negative    Cocaine Screen, Urine Negative Negative  Negative    Fentanyl, Urine Negative Negative  -   Methadone Screen , Urine Negative Negative  Negative    Methamphetamine, Ur Negative Negative  Negative      Brief Urine Lab Results  (Last result in the past 365 days)        Color   Clarity   Blood   Leuk Est   Nitrite   Protein   CREAT   Urine HCG        01/22/24 1323 Yellow   Turbid   Moderate (2+)   Small (1+)   Negative   >=300 mg/dL (3+)                 Blood Culture   Date Value Ref Range Status   01/22/2024 No growth at 5 days  Final   01/22/2024 Streptococcus mitis / oralis (C)  Final     Urine Culture   Date Value Ref Range Status   01/22/2024 25,000 CFU/mL Klebsiella pneumoniae ssp pneumoniae (A)  Final     No results found for: \"WOUNDCX\"  No results found for: \"STOOLCX\"  No results found for: \"RESPCX\"  No results found " "for: \"AFBCX\"  Results from last 7 days   Lab Units 01/27/24  1254 01/22/24  1446   LACTATE mmol/L 1.6 0.7       I have personally looked at the labs and they are summarized above.  ----------------------------------------------------------------------------------------------------------------------  Detailed radiology reports for the last 24 hours:    Imaging Results (Last 24 Hours)       ** No results found for the last 24 hours. **          Assessment & Plan    #Acute hypercapnic respiratory failure   #COPD exacerbation  - Has had prior admissions for this. Mild wheezing on exam. Will treat for COPD exacerbation possibly exacerbated by suspected pneumonia. Added solumedrol to nebs.   - Will continue IV diuretics PRN to improve lung compliance. Renal function and electrolytes  stable. Appears euvolemic today.   - Continue nebs, IV solumedrol decreased to 20 mg BID.   - Pulmonology consulted. Appreciate recs. Continue NC with goal O2 88-92%. Continue PRN and QHS BiPAP.     #Hypotension 2/2 intravascular depletion   - Stopped imdur and lisinopril. Decreased metoprolol as above.   - Hypotension resolved after 1L of NS.      #Afib w/ RVR  - HR better controlled overall   - [id number]: 4, continue home apixiban.    - Cardiology following. Appreciate recs.      #Type II NSTEMI  - 107=>111  - EKG with nonspecific ST changes   - No chest pain. No PE on CTPE.   - Discussed case and recent LHC in 2/23 with interventional cardiology on presentation. No urgent need for repeat ischemic evaluation.   - Continue GDMT as per cardiology      #UTI  #MIRA pneumonia?  - 1/2 staph non-aureus blood culture likely contaminant. Grew out staph mitis which is common contaminant.   - Will continue ceftriaxone/doxy as we follow urine and blood cultures. Finished 5 days of doxy and ceftriaxone.      #Hypokalemia  #Hypomagnesemia  - Replace per protocol      Chronic medical problems:  MDD  CAD  Thyroid disease- TSH wnl  HTN  Carotid " stenosis  HLD     Code status: Full      Dispo: Pending clinical improvement. Back to Nursing home at discharge.     VTE Prophylaxis:   Mechanical Order History:       None          Pharmalogical Order History:        Ordered     Dose Route Frequency Stop    01/22/24 6088  apixaban (ELIQUIS) tablet 5 mg         5 mg PO Every 12 Hours Scheduled --                      Efren Dunham MD  [facility]  01/28/24  11:29 EST

## 2024-01-28 NOTE — PLAN OF CARE
Goal Outcome Evaluation:              Outcome Evaluation: Pt remains oriented to self only. VSS, afebrile, on 2L via humidified NC due to dropping early in the shift. Bed in lowest position, alarm on. Call light within reach. Pt resting in bed and denies any requests at this time.

## 2024-01-29 LAB — DIGOXIN SERPL-MCNC: 0.8 NG/ML (ref 0.6–1.2)

## 2024-01-29 PROCEDURE — 25010000002 METHYLPREDNISOLONE PER 40 MG: Performed by: INTERNAL MEDICINE

## 2024-01-29 PROCEDURE — 94761 N-INVAS EAR/PLS OXIMETRY MLT: CPT

## 2024-01-29 PROCEDURE — 94664 DEMO&/EVAL PT USE INHALER: CPT

## 2024-01-29 PROCEDURE — 99232 SBSQ HOSP IP/OBS MODERATE 35: CPT | Performed by: INTERNAL MEDICINE

## 2024-01-29 PROCEDURE — 25810000003 SODIUM CHLORIDE 0.9 % SOLUTION: Performed by: INTERNAL MEDICINE

## 2024-01-29 PROCEDURE — 94799 UNLISTED PULMONARY SVC/PX: CPT

## 2024-01-29 PROCEDURE — 99231 SBSQ HOSP IP/OBS SF/LOW 25: CPT | Performed by: INTERNAL MEDICINE

## 2024-01-29 PROCEDURE — 94660 CPAP INITIATION&MGMT: CPT

## 2024-01-29 RX ADMIN — BUDESONIDE INHALATION 0.5 MG: 0.5 SUSPENSION RESPIRATORY (INHALATION) at 07:55

## 2024-01-29 RX ADMIN — LEVOTHYROXINE SODIUM 75 MCG: 0.07 TABLET ORAL at 06:31

## 2024-01-29 RX ADMIN — DONEPEZIL HYDROCHLORIDE 10 MG: 5 TABLET, FILM COATED ORAL at 20:30

## 2024-01-29 RX ADMIN — METOPROLOL TARTRATE 25 MG: 25 TABLET, FILM COATED ORAL at 08:20

## 2024-01-29 RX ADMIN — METHYLPREDNISOLONE SODIUM SUCCINATE 20 MG: 40 INJECTION, POWDER, FOR SOLUTION INTRAMUSCULAR; INTRAVENOUS at 10:15

## 2024-01-29 RX ADMIN — Medication 10 ML: at 08:20

## 2024-01-29 RX ADMIN — FERROUS SULFATE TAB 325 MG (65 MG ELEMENTAL FE) 325 MG: 325 (65 FE) TAB at 20:30

## 2024-01-29 RX ADMIN — BUDESONIDE INHALATION 0.5 MG: 0.5 SUSPENSION RESPIRATORY (INHALATION) at 18:49

## 2024-01-29 RX ADMIN — APIXABAN 5 MG: 5 TABLET, FILM COATED ORAL at 08:19

## 2024-01-29 RX ADMIN — SODIUM CHLORIDE 500 ML: 9 INJECTION, SOLUTION INTRAVENOUS at 10:15

## 2024-01-29 RX ADMIN — FERROUS SULFATE TAB 325 MG (65 MG ELEMENTAL FE) 325 MG: 325 (65 FE) TAB at 08:20

## 2024-01-29 RX ADMIN — BACLOFEN 5 MG: 10 TABLET ORAL at 20:30

## 2024-01-29 RX ADMIN — ROSUVASTATIN CALCIUM 40 MG: 20 TABLET, FILM COATED ORAL at 20:30

## 2024-01-29 RX ADMIN — METOPROLOL TARTRATE 25 MG: 25 TABLET, FILM COATED ORAL at 20:29

## 2024-01-29 RX ADMIN — CASTOR OIL AND BALSAM, PERU 1 APPLICATION: 788; 87 OINTMENT TOPICAL at 20:31

## 2024-01-29 RX ADMIN — MEMANTINE HYDROCHLORIDE 10 MG: 10 TABLET, FILM COATED ORAL at 20:29

## 2024-01-29 RX ADMIN — MEMANTINE HYDROCHLORIDE 10 MG: 10 TABLET, FILM COATED ORAL at 08:20

## 2024-01-29 RX ADMIN — APIXABAN 5 MG: 5 TABLET, FILM COATED ORAL at 20:30

## 2024-01-29 RX ADMIN — Medication 10 ML: at 20:32

## 2024-01-29 RX ADMIN — IPRATROPIUM BROMIDE 0.5 MG: 0.5 SOLUTION RESPIRATORY (INHALATION) at 00:40

## 2024-01-29 RX ADMIN — BACLOFEN 5 MG: 10 TABLET ORAL at 08:20

## 2024-01-29 RX ADMIN — DIGOXIN 125 MCG: 0.12 TABLET ORAL at 08:20

## 2024-01-29 RX ADMIN — METHYLPREDNISOLONE SODIUM SUCCINATE 20 MG: 40 INJECTION, POWDER, FOR SOLUTION INTRAMUSCULAR; INTRAVENOUS at 23:22

## 2024-01-29 RX ADMIN — RISPERIDONE 0.5 MG: 0.25 TABLET, FILM COATED ORAL at 08:20

## 2024-01-29 RX ADMIN — IPRATROPIUM BROMIDE 0.5 MG: 0.5 SOLUTION RESPIRATORY (INHALATION) at 07:56

## 2024-01-29 RX ADMIN — CASTOR OIL AND BALSAM, PERU 1 APPLICATION: 788; 87 OINTMENT TOPICAL at 08:20

## 2024-01-29 RX ADMIN — Medication 10 ML: at 08:21

## 2024-01-29 RX ADMIN — CLOPIDOGREL BISULFATE 75 MG: 75 TABLET, FILM COATED ORAL at 08:20

## 2024-01-29 RX ADMIN — RISPERIDONE 0.5 MG: 0.25 TABLET, FILM COATED ORAL at 20:30

## 2024-01-29 RX ADMIN — PANTOPRAZOLE SODIUM 40 MG: 40 TABLET, DELAYED RELEASE ORAL at 06:31

## 2024-01-29 RX ADMIN — LAMOTRIGINE 25 MG: 100 TABLET ORAL at 20:30

## 2024-01-29 RX ADMIN — LAMOTRIGINE 100 MG: 100 TABLET ORAL at 08:20

## 2024-01-29 RX ADMIN — SERTRALINE 100 MG: 50 TABLET, FILM COATED ORAL at 08:20

## 2024-01-29 RX ADMIN — IPRATROPIUM BROMIDE 0.5 MG: 0.5 SOLUTION RESPIRATORY (INHALATION) at 18:49

## 2024-01-29 RX ADMIN — IPRATROPIUM BROMIDE 0.5 MG: 0.5 SOLUTION RESPIRATORY (INHALATION) at 12:53

## 2024-01-29 NOTE — CASE MANAGEMENT/SOCIAL WORK
Discharge Planning Assessment   [place]     Patient Name: Brenda Munroe  MRN: [id number]  Today's Date: 1/29/2024    Admit Date: 1/22/2024     Discharge Plan       Row Name 01/29/24 1110       Plan    Plan Pt admitted on 1/22/24. [facility] Per Phoebe, Pt had a 20 day bed hold. SS will follow.    13:03: SS received message from Dr. Romero if pt had BIPAP ordered for NH. SS contacted [facility] per Phoebe who states pt does not have BIPAP at NH. SS placed BIPAP settings in basket and notified Phoebe. SS to follow.              JORGE Bowers

## 2024-01-29 NOTE — PROGRESS NOTES
"   LOS: 5 days     Name: Brenda Munroe  Age/Sex: 73 y.o. female  :  1950        PCP: Bernadette Arevalo MD    Principal Problem:    A-fib  Active Problems:    Hypercapnic respiratory failure      Admission Information: Brenda Munroe is a 73 y.o. female with coronary artery disease s/p CABG and [facility] 2023, paroxysmal atrial fibrillation, hypertension, hyperlipidemia, bilateral carotid stenosis without occlusion, hypothyroidism, arthritis, chronic headaches, dementia, physical debility and GERD.     Chief Complaint: Altered mental status, A-fib with RVR    Interval history: Patient's heart rate continues to fluctuate with a range yesterday of .    Subjective     Patient is more alert today than I have seen her in the past week.  Her son is at bedside.  She reports that she is feeling \"much better.\"  She denies shortness of breath or chest pain.    Vital Signs  Vital Signs (last 72 hrs)          0700   0659  0700   0659  0700   0659  0700   0945   Most Recent      Temp (°F) 96.7 -  98.7    96.8 -  98.5    97.6 -  98.4      97.1     97.1 (36.2)  0800    Heart Rate 75 -  128    60 -  107    59 -  128    75 -  93     91  0820    Resp  -   -      18 -  21     21  0800    /68 -  149/125    50/38 -  166/125    92/68 -  156/97    97/66 -  151/85     115/83  0820    SpO2 (%) 88 -  100    87 -  100    86 -  100    98 -  100     98  0755    Flow (L/min)   1 -  2      2      2     2  0755    Oxygen Concentration (%)   32      32      32       32  0615          Temp:  [97.1 °F (36.2 °C)-98.4 °F (36.9 °C)] 97.1 °F (36.2 °C)  Heart Rate:  [] 91  Resp:  [14-] 21  BP: ()/() 115/83  Body mass index is 27.09 kg/m².      Intake/Output Summary (Last 24 hours) at 2024 0945  Last data filed at 2024 0800  Gross per 24 hour   Intake 465 ml   Output 1250 ml   Net -785 ml       Vitals " reviewed.   Constitutional:       Appearance: Well-developed.   Neck:      Vascular: No carotid bruit or JVD.   Pulmonary:      Effort: Pulmonary effort is normal. No respiratory distress.      Breath sounds: Normal breath sounds. No wheezing. No rhonchi. No rales.   Cardiovascular:      Normal rate. Irregular rhythm.      Comments: No lower extremity edema  Skin:     General: Skin is warm and dry.   Neurological:      Mental Status: Alert.         Telemetry: A-fib 70s to 90s     Results Review:     Results from last 7 days   Lab Units 01/28/24  0433 01/27/24  1254 01/27/24  0030 01/26/24  0148 01/25/24  0213 01/24/24  0019 01/23/24  0048   WBC 10*3/mm3 5.22 6.77 5.85 5.15 5.14 5.34 7.20   HEMOGLOBIN g/dL 12.8 12.7 11.9* 11.3* 10.3* 9.5* 9.2*   PLATELETS 10*3/mm3 132* 138* 129* 115* 114* 94* 99*     Results from last 7 days   Lab Units 01/28/24  1431 01/28/24  0433 01/27/24  1715 01/27/24  1254 01/27/24  0030 01/26/24  0148 01/25/24  0213 01/24/24  0019 01/23/24  0048   SODIUM mmol/L  --  140  --  135* 140 142 146* 147* 143   POTASSIUM mmol/L 4.7 3.2* 3.5 3.9 3.2* 3.3* 3.7 4.0 4.4   CHLORIDE mmol/L  --  97*  --  94* 92* 90* 97* 101 102   CO2 mmol/L  --  34.4*  --  31.2* 42.4* 44.4* 41.9* 38.3* 35.4*   BUN mg/dL  --  30*  --  28* 26* 24* 21 20 17   CREATININE mg/dL  --  1.02*  --  1.13* 1.05* 0.99 1.02* 1.04* 1.08*   CALCIUM mg/dL  --  9.0  --  8.9 9.3 9.2 9.2 8.6 8.5*   GLUCOSE mg/dL  --  98  --  153* 88 111* 92 75 105*     Results from last 7 days   Lab Units 01/24/24  1106 01/24/24  0019 01/22/24  1354 01/22/24  1136   HSTROP T ng/L 121* 138* 111* 107*       Results from last 7 days   Lab Units 01/22/24  1136   INR  1.55*       I reviewed the patient's new clinical results.  I reviewed the patient's new imaging results and agree with the interpretation.  I personally viewed and interpreted the patient's EKG/Telemetry data      Medication Review:   apixaban, 5 mg, Oral, Q12H  baclofen, 5 mg, Oral, BID  budesonide,  0.5 mg, Nebulization, BID - RT  castor oil-balsam peru, 1 application , Topical, Q12H  cholecalciferol, 50,000 Units, Oral, Weekly  clopidogrel, 75 mg, Oral, Daily  digoxin, 125 mcg, Oral, Daily  donepezil, 10 mg, Oral, Nightly  ferrous sulfate, 325 mg, Oral, BID  ipratropium, 0.5 mg, Nebulization, 4x Daily - RT  lamoTRIgine, 100 mg, Oral, Daily  lamoTRIgine, 25 mg, Oral, Nightly  levothyroxine, 75 mcg, Oral, Q AM  memantine, 10 mg, Oral, Q12H  methylPREDNISolone sodium succinate, 20 mg, Intravenous, Q12H  metoprolol tartrate, 25 mg, Oral, Q12H  pantoprazole, 40 mg, Oral, QAM AC  risperiDONE, 0.5 mg, Oral, BID  rosuvastatin, 40 mg, Oral, Nightly  senna-docusate sodium, 2 tablet, Oral, BID  sertraline, 100 mg, Oral, Daily  sodium chloride, 500 mL, Intravenous, Once  sodium chloride, 10 mL, Intravenous, Q12H  sodium chloride, 10 mL, Intravenous, Q12H      phenylephrine, 0.5-3 mcg/kg/min, Last Rate: Stopped (01/28/24 0402)        Assessment:  Non-STEMI, likely type II secondary to tachycardia  ASCVD status post CABG (remote) with recent cardiac catheterization on 2/18/2023 revealing patent LIMA to LAD, occluded vein graft to LCx, RCA with excellent collaterals from distal LAD to RCA, and chronic calcific 95% stenosis in the mid left circumflex.  Patient was deemed to be high risk for PCI.  Paroxysmal atrial fibrillation with RVR.  Currently chronically anticoagulated on Eliquis for stroke prophylaxis.  Acute mental status changes, likely secondary to metabolic encephalopathy from infection, management per hospitalist service.  Tobacco abuse      Recommendations:  Gradually getting better control of heart rate  Continue GDMT for coronary artery disease including metoprolol, Plavix, and rosuvastatin  She has been getting digoxin more consistently.  Level ordered for tomorrow.  Anticoagulated with Eliquis.  Greatly improved.  Recommend cessation    I discussed the patients findings and my recommendations with patient and  family.    Further decision making based on Dr. Alfonso's assessment and recommendations.        Electronically signed by PAULA Crocker, 01/29/24, 9:50 AM EST.      ADDENDUM -Case discussed with Dr. Alfonso.  As she has been on anticoagulation greater than 30 days, plan for cardioversion tomorrow.    Electronically signed by PAULA Crocker, 01/29/24, 4:13 PM EST.    .Electronically signed by Jasmina Alfonso MD, 01/29/24, 5:22 PM EST.

## 2024-01-29 NOTE — PLAN OF CARE
Goal Outcome Evaluation:   VSS at this time. Patient remains alert to self, controlled afib, bipap 32%/2L humidified NC. Patient pulled out IV this shift, new IV access obtained. Patient currently resting comfortably with no complaints. Call light in reach, bed alarm on, bed in lowest position. Plan of care ongoing 7p-7a.

## 2024-01-29 NOTE — PLAN OF CARE
Problem: Adult Inpatient Plan of Care  Goal: Plan of Care Review  Outcome: Ongoing, Progressing  Goal: Patient-Specific Goal (Individualized)  Outcome: Ongoing, Progressing  Goal: Absence of Hospital-Acquired Illness or Injury  Outcome: Ongoing, Progressing  Intervention: Identify and Manage Fall Risk  Recent Flowsheet Documentation  Taken 1/29/2024 1100 by Tati Appiah RN  Safety Promotion/Fall Prevention:   activity supervised   assistive device/personal items within reach   clutter free environment maintained   fall prevention program maintained   nonskid shoes/slippers when out of bed   room organization consistent   safety round/check completed  Taken 1/29/2024 0900 by Tati Appiah RN  Safety Promotion/Fall Prevention:   activity supervised   assistive device/personal items within reach   clutter free environment maintained   fall prevention program maintained   nonskid shoes/slippers when out of bed   room organization consistent   safety round/check completed  Taken 1/29/2024 0820 by Tati Appiah RN  Safety Promotion/Fall Prevention:   activity supervised   assistive device/personal items within reach   clutter free environment maintained   fall prevention program maintained   nonskid shoes/slippers when out of bed   room organization consistent   safety round/check completed  Taken 1/29/2024 0700 by Tati Appiah RN  Safety Promotion/Fall Prevention:   activity supervised   assistive device/personal items within reach   clutter free environment maintained   fall prevention program maintained   nonskid shoes/slippers when out of bed   room organization consistent   safety round/check completed  Intervention: Prevent and Manage VTE (Venous Thromboembolism) Risk  Recent Flowsheet Documentation  Taken 1/29/2024 1100 by Tati Appiah RN  Activity Management: activity encouraged  Taken 1/29/2024 0900 by Tati Appiah RN  Activity Management: activity encouraged  Taken 1/29/2024 0820 by Td  JOSE Duffy  Activity Management: activity encouraged  Taken 1/29/2024 0700 by Tati Appiah RN  Activity Management: activity encouraged  Intervention: Prevent Infection  Recent Flowsheet Documentation  Taken 1/29/2024 1100 by Tati Appiah RN  Infection Prevention:   rest/sleep promoted   single patient room provided  Taken 1/29/2024 0900 by Tati Appiah RN  Infection Prevention:   rest/sleep promoted   single patient room provided  Taken 1/29/2024 0820 by Tati Appiah RN  Infection Prevention:   rest/sleep promoted   single patient room provided  Taken 1/29/2024 0700 by Tati Appiah RN  Infection Prevention:   rest/sleep promoted   single patient room provided  Goal: Optimal Comfort and Wellbeing  Outcome: Ongoing, Progressing  Intervention: Provide Person-Centered Care  Recent Flowsheet Documentation  Taken 1/29/2024 1400 by Tati Appiah RN  Trust Relationship/Rapport:   care explained   choices provided   emotional support provided   empathic listening provided   questions answered   questions encouraged   reassurance provided   thoughts/feelings acknowledged  Taken 1/29/2024 0820 by Tati Appiah RN  Trust Relationship/Rapport:   care explained   choices provided   emotional support provided   empathic listening provided   questions answered   questions encouraged   reassurance provided   thoughts/feelings acknowledged  Goal: Readiness for Transition of Care  Outcome: Ongoing, Progressing     Problem: Fall Injury Risk  Goal: Absence of Fall and Fall-Related Injury  Outcome: Ongoing, Progressing  Intervention: Identify and Manage Contributors  Recent Flowsheet Documentation  Taken 1/29/2024 1100 by Tati Appiah RN  Medication Review/Management: medications reviewed  Taken 1/29/2024 0900 by Tati Appiah RN  Medication Review/Management: medications reviewed  Taken 1/29/2024 0820 by Tati Appiah RN  Medication Review/Management: medications reviewed  Taken 1/29/2024 0700 by Td  JOSE Duffy  Medication Review/Management: medications reviewed  Intervention: Promote Injury-Free Environment  Recent Flowsheet Documentation  Taken 1/29/2024 1100 by Tati Appiah RN  Safety Promotion/Fall Prevention:   activity supervised   assistive device/personal items within reach   clutter free environment maintained   fall prevention program maintained   nonskid shoes/slippers when out of bed   room organization consistent   safety round/check completed  Taken 1/29/2024 0900 by Tati Appiah RN  Safety Promotion/Fall Prevention:   activity supervised   assistive device/personal items within reach   clutter free environment maintained   fall prevention program maintained   nonskid shoes/slippers when out of bed   room organization consistent   safety round/check completed  Taken 1/29/2024 0820 by Tati Appiah RN  Safety Promotion/Fall Prevention:   activity supervised   assistive device/personal items within reach   clutter free environment maintained   fall prevention program maintained   nonskid shoes/slippers when out of bed   room organization consistent   safety round/check completed  Taken 1/29/2024 0700 by Tati Appiah RN  Safety Promotion/Fall Prevention:   activity supervised   assistive device/personal items within reach   clutter free environment maintained   fall prevention program maintained   nonskid shoes/slippers when out of bed   room organization consistent   safety round/check completed     Problem: Skin Injury Risk Increased  Goal: Skin Health and Integrity  Outcome: Ongoing, Progressing     Problem: Asthma Comorbidity  Goal: Maintenance of Asthma Control  Outcome: Ongoing, Progressing  Intervention: Maintain Asthma Symptom Control  Recent Flowsheet Documentation  Taken 1/29/2024 1100 by Tati Appiah RN  Medication Review/Management: medications reviewed  Taken 1/29/2024 0900 by Tati Appiah RN  Medication Review/Management: medications reviewed  Taken 1/29/2024 0820 by  Tati Appiah RN  Medication Review/Management: medications reviewed  Taken 1/29/2024 0700 by Tati Appiah RN  Medication Review/Management: medications reviewed     Problem: Behavioral Health Comorbidity  Goal: Maintenance of Behavioral Health Symptom Control  Outcome: Ongoing, Progressing  Intervention: Maintain Behavioral Health Symptom Control  Recent Flowsheet Documentation  Taken 1/29/2024 1100 by Tati Appiah RN  Medication Review/Management: medications reviewed  Taken 1/29/2024 0900 by Tati Appiah RN  Medication Review/Management: medications reviewed  Taken 1/29/2024 0820 by Tati Appiah RN  Medication Review/Management: medications reviewed  Taken 1/29/2024 0700 by Tati Appiah RN  Medication Review/Management: medications reviewed     Problem: COPD (Chronic Obstructive Pulmonary Disease) Comorbidity  Goal: Maintenance of COPD Symptom Control  Outcome: Ongoing, Progressing  Intervention: Maintain COPD-Symptom Control  Recent Flowsheet Documentation  Taken 1/29/2024 1100 by Tati Appiah RN  Medication Review/Management: medications reviewed  Taken 1/29/2024 0900 by Tati Appiah RN  Medication Review/Management: medications reviewed  Taken 1/29/2024 0820 by Tati Appiah RN  Medication Review/Management: medications reviewed  Taken 1/29/2024 0700 by Tati Appiah RN  Medication Review/Management: medications reviewed     Problem: Diabetes Comorbidity  Goal: Blood Glucose Level Within Targeted Range  Outcome: Ongoing, Progressing     Problem: Heart Failure Comorbidity  Goal: Maintenance of Heart Failure Symptom Control  Outcome: Ongoing, Progressing  Intervention: Maintain Heart Failure-Management  Recent Flowsheet Documentation  Taken 1/29/2024 1100 by Tati Appiah RN  Medication Review/Management: medications reviewed  Taken 1/29/2024 0900 by Tati Appiah RN  Medication Review/Management: medications reviewed  Taken 1/29/2024 0820 by Tati Appiah RN  Medication  Review/Management: medications reviewed  Taken 1/29/2024 0700 by Tati Appiah RN  Medication Review/Management: medications reviewed     Problem: Hypertension Comorbidity  Goal: Blood Pressure in Desired Range  Outcome: Ongoing, Progressing  Intervention: Maintain Blood Pressure Management  Recent Flowsheet Documentation  Taken 1/29/2024 1100 by Tati Appiah RN  Medication Review/Management: medications reviewed  Taken 1/29/2024 0900 by Tati Appiah RN  Medication Review/Management: medications reviewed  Taken 1/29/2024 0820 by Tati Appiah RN  Medication Review/Management: medications reviewed  Taken 1/29/2024 0700 by Tati Appiah RN  Medication Review/Management: medications reviewed     Problem: Obstructive Sleep Apnea Risk or Actual Comorbidity Management  Goal: Unobstructed Breathing During Sleep  Outcome: Ongoing, Progressing     Problem: Osteoarthritis Comorbidity  Goal: Maintenance of Osteoarthritis Symptom Control  Outcome: Ongoing, Progressing  Intervention: Maintain Osteoarthritis Symptom Control  Recent Flowsheet Documentation  Taken 1/29/2024 1100 by Tati Appiah RN  Activity Management: activity encouraged  Medication Review/Management: medications reviewed  Taken 1/29/2024 0900 by Tati Appiah RN  Activity Management: activity encouraged  Medication Review/Management: medications reviewed  Taken 1/29/2024 0820 by Tati Appiah RN  Activity Management: activity encouraged  Medication Review/Management: medications reviewed  Taken 1/29/2024 0700 by Tati Appiah RN  Activity Management: activity encouraged  Medication Review/Management: medications reviewed     Problem: Pain Chronic (Persistent) (Comorbidity Management)  Goal: Acceptable Pain Control and Functional Ability  Outcome: Ongoing, Progressing  Intervention: Manage Persistent Pain  Recent Flowsheet Documentation  Taken 1/29/2024 1100 by Tati Appiah RN  Medication Review/Management: medications reviewed  Taken  1/29/2024 0900 by Tati Appiah RN  Medication Review/Management: medications reviewed  Taken 1/29/2024 0820 by Tati Appiah RN  Medication Review/Management: medications reviewed  Taken 1/29/2024 0700 by Tati Appiah RN  Medication Review/Management: medications reviewed  Intervention: Optimize Psychosocial Wellbeing  Recent Flowsheet Documentation  Taken 1/29/2024 1400 by Tati Appiah RN  Diversional Activities: television  Family/Support System Care:   self-care encouraged   support provided  Taken 1/29/2024 0820 by Tati Appiah RN  Diversional Activities: television  Family/Support System Care:   self-care encouraged   support provided     Problem: Seizure Disorder Comorbidity  Goal: Maintenance of Seizure Control  Outcome: Ongoing, Progressing     Problem: Noninvasive Ventilation Acute  Goal: Effective Unassisted Ventilation and Oxygenation  Outcome: Ongoing, Progressing   Goal Outcome Evaluation:   Pt alert and oriented to self and place at this time. Pt is on 2L NC tolerating well. Wound care provided per orders. Pt has been drowsy this evening. No other changes this shift. Bed is locked in low position with call light in reach.

## 2024-01-29 NOTE — DISCHARGE PLACEMENT REQUEST
"Alta Stevenson (73 y.o. Female)       Date of Birth   1950    Social Security Number       Address   [address] [place] [address]    Home Phone   [phone]    MRN   [id number]       [employer]    Marital Status                               Admission Date   1/22/24    Admission Type   Emergency    Admitting Provider   Efren Dunham MD    Attending Provider   Prasad Romero DO    Department, Room/Bed   [facility] PROGRESS CARE, P203/S2       Discharge Date       Discharge Disposition       Discharge Destination                                 Attending Provider: Prasad Romero DO    Allergies: No Known Allergies    Isolation: None   Infection: None   Code Status: CPR    Ht: 162.6 cm (64\")   Wt: 71.6 kg (157 lb 12.8 oz)    Admission Cmt: None   Principal Problem: A-fib [I48.91]                   Active Insurance as of 1/22/2024       Primary Coverage       Payor Plan Insurance Group Employer/Plan Group    MEDICARE MEDICARE A & B        Payor Plan Address Payor Plan Phone Number Payor Plan Fax Number Effective Dates    [address] [phone]  4/1/2008 - None Entered    [facility] [id number]         Subscriber Name Subscriber Birth Date Member ID       ALTA STEVENSON 1950 [id number]               Secondary Coverage       Payor Plan Insurance Group Employer/Plan Group    [place]        Payor Plan Address Payor Plan Phone Number Payor Plan Fax Number Effective Dates    [address] [phone]  12/4/2023 - None Entered    [facility] [place] [address]         Subscriber Name Subscriber Birth Date Member ID       ALTA STEVENSON 1950 [id number]                     Emergency Contacts        (Rel.) Home Phone Work Phone Mobile Phone    Edi Stevenson (Son) [phone] -- [phone]    Stevenson,[place] (Relative) [phone] -- [phone]    Fabien Stevenson (Son) -- -- [phone]              [facility] " [address] [address]  Phone:  [phone]  Fax:  [phone]        Patient:     Brenda Munroe MRN:  [id number]   [address]   [place] [address] :  1950  SSN:    Phone: [phone] Sex:  F      INSURANCE PAYOR PLAN GROUP # SUBSCRIBER ID   Primary:  Secondary:    [place]ARE [place] [id number]  [id number]      [id number]  [id number]   Admitting Diagnosis: A-fib [I48.91]  Order Date:  2024        NIPPV (CPAP or BIPAP)       (Order ID: [id number])     Diagnosis:         Priority:  Routine Expected Date:   Expiration Date:        Interval:  At Bedtime & As Needed-RT Count:    Indication: Acute Respiratory Failure  Type: BIPAP  IPAP: 24  EPAP: 6  Breath Rate: 18  Titrate Oxygen for SpO2: 88 - 92%     Specimen Type:   Specimen Source:   Specimen Taken Date:   Specimen Taken Time:                  Authorizing Provider:Ramiro Oden MD  Authorizing Provider's NPI: [id number]  Order Entered By: Ramiro Oden MD 2024 12:06 PM     Electronically signed by: Ramiro Oden MD 2024 12:06 PM

## 2024-01-29 NOTE — PROGRESS NOTES
Subjective/24 Hrs major events:  No acute major overnight issues.  Patient was off BiPAP, she feels better, she denied any complaints.    Objective:  Vitals  [unfilled]  Temp:  [97.1 °F (36.2 °C)-98.4 °F (36.9 °C)] 97.8 °F (36.6 °C)  Heart Rate:  [] 98  Resp:  [14-27] 22  BP: ()/() 131/77    Physical examination:  GENERAL APPEARANCE:   Awake, no distress, off BiPAP  SKIN, HAIR, NAILS: No rashes or lesions.    HEAD: NC/AT    EYES: PERRLA,EOMI    ENT: Oropharynx clear, moist mucous membranes.    NECK: Supple, no lymphadenopathy, trachea central, no JVD.  CHEST: Diminished breath sounds bilaterally    HEART: RRR, no murmurs/rubs/gallops/clicks    ABDOMEN: +BS, soft, non-tender, non-distended.    EXTREMITIES: No edema/clubbing/cyanosis, 2+ distal pulses.  No calf tenderness, no joint tenderness.     CNS: Awake, alert and oriented x 3, nonfocal exam    Inpatient medications:  apixaban, 5 mg, Oral, Q12H  baclofen, 5 mg, Oral, BID  budesonide, 0.5 mg, Nebulization, BID - RT  castor oil-balsam peru, 1 application , Topical, Q12H  cholecalciferol, 50,000 Units, Oral, Weekly  clopidogrel, 75 mg, Oral, Daily  digoxin, 125 mcg, Oral, Daily  donepezil, 10 mg, Oral, Nightly  ferrous sulfate, 325 mg, Oral, BID  ipratropium, 0.5 mg, Nebulization, 4x Daily - RT  lamoTRIgine, 100 mg, Oral, Daily  lamoTRIgine, 25 mg, Oral, Nightly  levothyroxine, 75 mcg, Oral, Q AM  memantine, 10 mg, Oral, Q12H  methylPREDNISolone sodium succinate, 20 mg, Intravenous, Q12H  metoprolol tartrate, 25 mg, Oral, Q12H  pantoprazole, 40 mg, Oral, QAM AC  risperiDONE, 0.5 mg, Oral, BID  rosuvastatin, 40 mg, Oral, Nightly  senna-docusate sodium, 2 tablet, Oral, BID  sertraline, 100 mg, Oral, Daily  sodium chloride, 10 mL, Intravenous, Q12H  sodium chloride, 10 mL, Intravenous, Q12H        acetaminophen    senna-docusate sodium **AND** polyethylene glycol **AND** bisacodyl **AND** bisacodyl    Calcium Replacement - Follow Nurse / BPA Driven  Protocol    LORazepam    Magnesium Standard Dose Replacement - Follow Nurse / BPA Driven Protocol    metoprolol tartrate    nitroglycerin    Phosphorus Replacement - Follow Nurse / BPA Driven Protocol    Potassium Replacement - Follow Nurse / BPA Driven Protocol    [COMPLETED] Insert Peripheral IV **AND** sodium chloride    sodium chloride    sodium chloride    sodium chloride    sodium chloride    Labs:  I have personally reviewed the labs.  Lab Results   Component Value Date    GLUCOSE 98 01/28/2024    CALCIUM 9.0 01/28/2024     01/28/2024    K 4.7 01/28/2024    CO2 34.4 (H) 01/28/2024    CL 97 (L) 01/28/2024    BUN 30 (H) 01/28/2024    CREATININE 1.02 (H) 01/28/2024      Lab Results   Component Value Date    WBC 5.22 01/28/2024    HGB 12.8 01/28/2024    HCT 40.2 01/28/2024    MCV 87.2 01/28/2024     (L) 01/28/2024      Lab Results   Component Value Date    CKTOTAL 34 03/25/2021    CKMB 6.07 (H) 02/21/2023    TROPONINI 0.018 09/26/2017      Lab Results   Component Value Date    DDIMER 0.35 08/04/2015      Alkaline Phosphatase   Date Value Ref Range Status   01/27/2024 49 39 - 117 U/L Final      ALT (SGPT)   Date Value Ref Range Status   01/27/2024 11 1 - 33 U/L Final             Microbiology:  I have personally reviewed the cultures and sensitivity as below.  @LASTLABCV(culture)@    Imaging:  I have personally reviewed the x-rays and CT scans as below.  XR Chest 1 View  Narrative: PROCEDURE: XR CHEST 1 VW-       HISTORY: dyspnea; I48.91-Unspecified atrial fibrillation;  J18.9-Pneumonia, unspecified organism; N39.0-Urinary tract infection,  site not specified     COMPARISON: 1/24/2024.     FINDINGS: The patient is status post median sternotomy and CABG  procedure. The heart is normal in size. The mediastinum is unremarkable.  A left effusion and basilar opacity are unchanged. The right lung is  clear. There is no pneumothorax. There are no acute osseous  abnormalities.     Impression: Left effusion and  basilar opacity unchanged.        This report was finalized on 1/25/2024 10:24 AM by Lily Mullen M.D..       @LASTIMGCATIMPRESSION(IMG1)@   @LASTIMGCATIMPRESSION(CTR777)@   [unfilled]     Intake and output:    Intake/Output Summary (Last 24 hours) at 1/29/2024 1231  Last data filed at 1/29/2024 1200  Gross per 24 hour   Intake 480 ml   Output 1250 ml   Net -770 ml      ==================================  ASSESSMENT/PLAN: 73-year-old male with COPD exacerbation, acute on chronic hypoxic hypercapnic respiratory failure requiring BiPAP, pneumonia.    IMPRESSION:  Acute on chronic hypoxic and hypercarbic respiratory failure: Multifactorial likely due to COPD, left-sided pneumonia deconditioning and volume overload.     Recommendations: Continue DuoNebs, continue current settings of BiPAP at night, taper steroids, finish the course of antibiotics.  =====================================  Thank you for allowing us to help take care of this patient.  Pulmonary will sign off.  Please call with any questions.    Elder Pineda MD   01/29/24

## 2024-01-29 NOTE — PROGRESS NOTES
Referring Provider: dr stanley  Reason for Consultation: Acute on chronic hypoxic and hypercarbic respiratory failure      Chief complaint -shortness of breath    Sub-  All the labs medications ins and outs and vitals reviewed.  Patient resting in bed comfortably.  Not in any distress.  Required vasopressors for a short time yesterday.  Antihypertensive medications were adjusted.    Review of Systems  Generalized fatigue otherwise negative        History  Past Medical History:   Diagnosis Date    Anxiety     Arthritis     Bell's palsy     Bladder prolapse, female, acquired     Carotid stenosis     COPD (chronic obstructive pulmonary disease)     COPD (chronic obstructive pulmonary disease)     Coronary artery disease     Dementia     Dementia     Depression     Difficulty walking     Disease of thyroid gland     Frequency of urination     GERD (gastroesophageal reflux disease)     Heart attack     Hyperlipidemia     Hypertension     Irregular heart beat     Peptic ulcer disease    ,   Past Surgical History:   Procedure Laterality Date    CARDIAC CATHETERIZATION N/A 2/21/2023    Procedure: Left Heart Cath;  Surgeon: Tab Cifuentes MD;  Location: [facility] CATH INVASIVE LOCATION;  Service: Cardiology;  Laterality: N/A;    CARDIAC SURGERY      open heart  in 1999    CYSTOSCOPY N/A 11/8/2017    Procedure: CYSTOSCOPY;  Surgeon: Karl Nicolas DO;  Location: [facility] OR;  Service:     OTHER SURGICAL HISTORY      states she had fluid drained no surgery    VAGINAL HYSTERECTOMY W/ ANTERIOR AND POSTERIOR VAGINAL REPAIR N/A 11/8/2017    Procedure: VAGINAL HYSTERECTOMY WITH ANTERIOR, POSTERIOR, AND ENTEROCELE VAGINAL REPAIR;  Surgeon: Karl Nicolas DO;  Location: [facility] OR;  Service:     VAGINAL VAULT SUSPENSION N/A 11/8/2017    Procedure: VAGINAL VAULT SUSPENSION ;  Surgeon: Karl Nicolas DO;  Location: [facility] OR;  Service:    ,   Family History   Problem Relation Age of Onset    Dementia  Sister     Heart attack Mother     Tuberculosis Father     Breast cancer Neg Hx    ,   Social History     Tobacco Use    Smoking status: Every Day     Packs/day: 0.50     Years: 55.00     Additional pack years: 0.00     Total pack years: 27.50     Types: Cigarettes    Smokeless tobacco: Never   Vaping Use    Vaping Use: Never used   Substance Use Topics    Alcohol use: No    Drug use: No   ,   Medications Prior to Admission   Medication Sig Dispense Refill Last Dose    apixaban (ELIQUIS) 5 MG tablet tablet Take 1 tablet by mouth 2 (Two) Times a Day.   1/22/2024    baclofen (LIORESAL) 10 MG tablet Take 0.5 tablets by mouth 2 (Two) Times a Day. Indications: Muscle Spasticity   1/22/2024    clopidogrel (PLAVIX) 75 MG tablet Take 1 tablet by mouth Daily. 30 tablet  1/22/2024    donepezil (ARICEPT) 10 MG tablet Take 1 tablet by mouth Every Night.   1/21/2024    ferrous sulfate 325 (65 FE) MG tablet Take 1 tablet by mouth 2 (Two) Times a Day. 60 tablet 5 1/22/2024    isosorbide mononitrate (IMDUR) 30 MG 24 hr tablet Take 3 tablets by mouth Daily.   1/22/2024    lamoTRIgine (LaMICtal) 100 MG tablet Take 1 tablet by mouth Daily.   1/22/2024    lamoTRIgine (LaMICtal) 25 MG tablet Take 1 tablet by mouth Every Night.   1/21/2024    levothyroxine (SYNTHROID, LEVOTHROID) 75 MCG tablet Take 1 tablet by mouth Daily.   1/22/2024    lisinopril (PRINIVIL,ZESTRIL) 10 MG tablet Take 1 tablet by mouth Daily.   1/22/2024    memantine (NAMENDA) 10 MG tablet Take 1 tablet by mouth 2 (Two) Times a Day.   1/22/2024    metoprolol succinate XL (TOPROL-XL) 25 MG 24 hr tablet Take 2 tablets by mouth Daily. 30 tablet 1 1/22/2024    pantoprazole (PROTONIX) 40 MG EC tablet Take 1 tablet by mouth Daily. 10 tablet 0 1/22/2024    risperiDONE (risperDAL) 0.5 MG tablet Take 1 tablet by mouth 2 (Two) Times a Day.   1/22/2024    rosuvastatin (CRESTOR) 40 MG tablet Take 1 tablet by mouth Every Evening.   1/21/2024    sertraline (ZOLOFT) 100 MG tablet Take  1 tablet by mouth Daily.   1/22/2024    Trelegy Ellipta 100-62.5-25 MCG/INH inhaler Inhale 1 puff Daily.   1/22/2024    acetaminophen (TYLENOL) 500 MG tablet Take 1 tablet by mouth Every 4 (Four) Hours As Needed for Mild Pain.   1/18/2024    Dextran 70-Hypromellose (Lubricating Tears Eye Drops) 0.1-0.3 % solution Apply 1 drop to eye(s) as directed by provider Every 2 (Two) Hours As Needed (dry eyes). 30 mL 0 Unknown    guaiFENesin 200 MG tablet Take 2 tablets by mouth Every 4 (Four) Hours As Needed for Congestion.   Unknown    LORazepam (ATIVAN) 0.5 MG tablet Take 1 tablet by mouth Every 12 (Twelve) Hours As Needed for Anxiety.   1/1/2024    nitroglycerin (NITROSTAT) 0.4 MG SL tablet Place 1 tablet under the tongue Every 5 (Five) Minutes As Needed.   Unknown    phenol (Chloraseptic) 1.4 % liquid liquid Apply 1 spray to the mouth or throat Every 2 (Two) Hours As Needed (sore throat).   Unknown    senna (SENOKOT) 8.6 MG tablet Take 1 tablet by mouth Daily As Needed for Constipation.   Unknown    trolamine salicylate (ASPERCREME) 10 % cream Apply 1 application  topically to the appropriate area as directed Every 6 (Six) Hours As Needed for Muscle / Joint Pain.   1/11/2024    vitamin D (ERGOCALCIFEROL) 1.25 MG (96367 UT) capsule capsule Take 1 capsule by mouth 1 (One) Time Per Week.   1/19/2024   , Scheduled Meds:  apixaban, 5 mg, Oral, Q12H  baclofen, 5 mg, Oral, BID  budesonide, 0.5 mg, Nebulization, BID - RT  castor oil-balsam peru, 1 application , Topical, Q12H  cholecalciferol, 50,000 Units, Oral, Weekly  clopidogrel, 75 mg, Oral, Daily  digoxin, 125 mcg, Oral, Daily  donepezil, 10 mg, Oral, Nightly  ferrous sulfate, 325 mg, Oral, BID  ipratropium, 0.5 mg, Nebulization, 4x Daily - RT  lamoTRIgine, 100 mg, Oral, Daily  lamoTRIgine, 25 mg, Oral, Nightly  levothyroxine, 75 mcg, Oral, Q AM  memantine, 10 mg, Oral, Q12H  methylPREDNISolone sodium succinate, 20 mg, Intravenous, Q12H  metoprolol tartrate, 25 mg, Oral,  Q12H  pantoprazole, 40 mg, Oral, QAM AC  risperiDONE, 0.5 mg, Oral, BID  rosuvastatin, 40 mg, Oral, Nightly  senna-docusate sodium, 2 tablet, Oral, BID  sertraline, 100 mg, Oral, Daily  sodium chloride, 10 mL, Intravenous, Q12H  sodium chloride, 10 mL, Intravenous, Q12H    , Continuous Infusions:  phenylephrine, 0.5-3 mcg/kg/min, Last Rate: Stopped (01/28/24 0402)     and Allergies:  Patient has no known allergies.    Objective     Vital Signs   Temp:  [97.6 °F (36.4 °C)-98.4 °F (36.9 °C)] 97.6 °F (36.4 °C)  Heart Rate:  [] 94  Resp:  [14-28] 18  BP: ()/() 95/59    Physical Exam:              General-chronically ill in appearance, on nasal cannula oxygen  HEENT-PERRLA    Neck-supple    Respiratory-respirations normal  Not in any respiratory distress on nasal cannula oxygen    Cardiovascular-NSR  GI-grossly normal    CNS-nonfocal    Musculoskeletal -no edema  Extremities- no obvious deformity noticed     Psychiatric-not alert skin- no visible rash                                                                   Results Review:    LABS:    Lab Results   Component Value Date    GLUCOSE 98 01/28/2024    BUN 30 (H) 01/28/2024    CREATININE 1.02 (H) 01/28/2024    EGFRIFNONA 75 03/25/2021    EGFRIFAFRI  09/24/2016      Comment:      <15 Indicative of kidney failure.    BCR 29.4 (H) 01/28/2024    CO2 34.4 (H) 01/28/2024    CALCIUM 9.0 01/28/2024    ALBUMIN 3.1 (L) 01/27/2024    LABIL2 1.4 (L) 08/04/2015    AST 15 01/27/2024    ALT 11 01/27/2024    WBC 5.22 01/28/2024    HGB 12.8 01/28/2024    HCT 40.2 01/28/2024    MCV 87.2 01/28/2024     (L) 01/28/2024     01/28/2024    K 4.7 01/28/2024    CL 97 (L) 01/28/2024    ANIONGAP 8.6 01/28/2024       Lab Results   Component Value Date    INR 1.55 (H) 01/22/2024    INR 1.04 09/17/2023    INR 1.16 (H) 02/18/2023    PROTIME 19.2 (H) 01/22/2024    PROTIME 14.1 09/17/2023    PROTIME 15.1 (H) 02/18/2023       Results from last 7 days   Lab Units  01/22/24  1136   INR  1.55*   APTT seconds 32.2          I reviewed the patient's new clinical results.  I reviewed the patient's new imaging results and agree with the interpretation.  Microbiology Results (last 10 days)       Procedure Component Value - Date/Time    Legionella Antigen, Urine - Urine, Urine, Clean Catch [[id number]]  (Normal) Collected: 01/25/24 1417    Lab Status: Final result Specimen: Urine, Clean Catch Updated: 01/25/24 1505     LEGIONELLA ANTIGEN, URINE Negative    Narrative:      Presumptive negative for L. pneumophilia serogroup 1 antigen, suggesting no recent or current infection.    Mycoplasma Pneumoniae Antibody, IgM - Blood, Arm, Left [[id number]]  (Normal) Collected: 01/25/24 1310    Lab Status: Final result Specimen: Blood from Arm, Left Updated: 01/25/24 1435     Mycoplasma pneumo IgM Negative    Blood Culture - Blood, Arm, Right [[id number]]  (Normal) Collected: 01/22/24 1453    Lab Status: Final result Specimen: Blood from Arm, Right Updated: 01/27/24 1500     Blood Culture No growth at 5 days    Blood Culture - Blood, Arm, Left [[id number]]  (Abnormal) Collected: 01/22/24 1446    Lab Status: Final result Specimen: Blood from Arm, Left Updated: 01/25/24 0723     Blood Culture Streptococcus mitis / oralis     Isolated from Aerobic Bottle     Gram Stain Aerobic Bottle Gram positive cocci in pairs and clusters    Narrative:      Probable contaminant requires clinical correlation, susceptibility not performed unless requested by physician.    Blood Culture ID, PCR - Blood, Arm, Left [[id number]]  (Abnormal) Collected: 01/22/24 1446    Lab Status: Final result Specimen: Blood from Arm, Left Updated: 01/23/24 0859     BCID, PCR Staph spp, not aureus or lugdunensis. Identification by BCID2 PCR.     BOTTLE TYPE Aerobic Bottle    Urine Culture - Urine, Urine, Clean Catch [[id number]]  (Abnormal)  (Susceptibility) Collected: 01/22/24 1323    Lab Status: Final result Specimen: Urine, Clean Catch  Updated: 01/24/24 0419     Urine Culture 25,000 CFU/mL Klebsiella pneumoniae ssp pneumoniae    Narrative:      Colonization of the urinary tract without infection is common. Treatment is discouraged unless the patient is symptomatic, pregnant, or undergoing an invasive urologic procedure.    Susceptibility        Klebsiella pneumoniae ssp pneumoniae      ISAAC      Amoxicillin + Clavulanate Susceptible      Ampicillin Resistant      Ampicillin + Sulbactam Susceptible      Cefazolin Susceptible      Cefepime Susceptible      Ceftazidime Susceptible      Ceftriaxone Susceptible      Gentamicin Susceptible      Levofloxacin Susceptible      Nitrofurantoin Intermediate      Piperacillin + Tazobactam Susceptible      Trimethoprim + Sulfamethoxazole Susceptible                                        Procalitonin Results:          Latest Reference Range & Units 01/27/24 04:38   CO2 Content 22 - 33 mmol/L 41.9 (H)   Carboxyhemoglobin Venous 0.0 - 5.0 % 1.9   Methemoglobin Venous 0.0 - 3.0 % 0.0   Oxyhemoglobin Venous 45.0 - 75.0 % 81.9 (H)   Hemoglobin, Blood Gas 13.5 - 17.5 g/dL 11.9 (L)   Site  Lab   Modality  Room Air   FIO2 % 32   Ventilator Mode  BiPAP   Set Mech Resp Rate  18.0   IPAP  20   EPAP  8   Barometric Pressure for Blood Gas mmHg 730   pH, Venous 7.320 - 7.420 pH Units 7.451 (H)   pCO2, Venous 41.0 - 51.0 mm Hg 57.7 (H)   pO2, Venous 27.0 - 53.0 mm Hg 48.7   HCO3, Venous 22.0 - 28.0 mmol/L 40.1 (H)   Base Excess 0.0 - 2.0 mmol/L 13.8 (H)   O2 Saturation, Venous 45.0 - 75.0 % 83.4 (H)   Collected by  [id number]   (H): Data is abnormally high  (L): Data is abnormally low     Latest Reference Range & Units 01/28/24 04:22   CO2 Content 22 - 33 mmol/L 37.8 (H)   Carboxyhemoglobin Venous 0.0 - 5.0 % 1.6   Methemoglobin Venous 0.0 - 3.0 % 0.0   Oxyhemoglobin Venous 45.0 - 75.0 % 55.2   Hemoglobin, Blood Gas 13.5 - 17.5 g/dL 13.3 (L)   Site  Lab   Modality  Room Air   FIO2 % 32   Ventilator Mode  BiPAP   Set Mech Resp Rate   18.0   Rate Breaths/minute 20   IPAP  20   EPAP  8   Barometric Pressure for Blood Gas mmHg 720   pH, Venous 7.320 - 7.420 pH Units 7.379   pCO2, Venous 41.0 - 51.0 mm Hg 61.0 (H)   pO2, Venous 27.0 - 53.0 mm Hg 32.5   HCO3, Venous 22.0 - 28.0 mmol/L 36.0 (H)   Base Excess 0.0 - 2.0 mmol/L 8.6 (H)   O2 Saturation, Venous 45.0 - 75.0 % 56.0   Collected by  [id number]   (H): Data is abnormally high  (L): Data is abnormally low    Assessment & Plan     Acute on chronic hypoxic and hypercarbic respiratory failure-likely due to COPD, left-sided pneumonia deconditioning and volume overload.    Latest microbiology reviewed  Continue steroids  Remain stable can start tapering from tomorrow    Continue nebs  Latest vBG reviewed-shows pH within physiological range.  Continue current settings of BiPAP.    Current BiPAP settings and graphics reviewed.  Patient's minute ventilation on current settings is appropriate.      Blood pressure better after saline bolus and adjustment of blood pressure medications.      Ins and outs reviewed  Latest microbiology reviewed.    Continue appropriate prophylaxis.    Left lower lobe pneumonia-continue current antibiotics.    DVT prophylaxis-continue    Bedside rounds were done with RT and patient's nurse. All the lab and clinical findings were discussed with them and plan was also discussed in great detail.    Family member present-none        Echo-  Results for orders placed during the hospital encounter of 01/01/24    Adult Transthoracic Echo Limited W/ Cont if Necessary Per Protocol    Interpretation Summary    Normal left ventricular cavity size and wall thickness noted. All left ventricular wall segments contract normally.    Left ventricular ejection fraction appears to be 61 - 65%.    There is a trivial pericardial effusion adjacent to the left ventricle.              A-fib    Hypercapnic respiratory failure    Case d/w nurse and team   This service is provided via audio video tele  medicine   I am in [place] and patient is in christ Oden MD  01/29/24  06:02 EST

## 2024-01-29 NOTE — PROGRESS NOTES
[facility] HOSPITALIST PROGRESS NOTE    Subjective     History:   Brenda Munroe is a 73 y.o. female admitted on 1/22/2024 secondary to A-fib     Procedures: None    CC: Follow up resp failure, Afib    Patient seen and examined with JOSE Duffy. Awake and alert but confused. Her son is present at bedside. Cough noted but states she feels better with improving dyspnea. No reported CP or palpitations. BP borderline low this AM. No acute events overnight per RN.     History taken from: patient, pt's son, chart, and RN.      Objective     Vital Signs  Temp:  [97.1 °F (36.2 °C)-98.4 °F (36.9 °C)] 97.8 °F (36.6 °C)  Heart Rate:  [] 85  Resp:  [14-27] 18  BP: ()/() 131/77    Intake/Output Summary (Last 24 hours) at 1/29/2024 1256  Last data filed at 1/29/2024 1200  Gross per 24 hour   Intake 480 ml   Output 1250 ml   Net -770 ml         Physical Exam:  General:    Awake, alert but confused, in no acute distress   Heart:      Normal S1 and S2. Irregularly irregular.    Lungs:     Respirations regular, even and unlabored. Diminished breath sounds. No wheezes, rales or rhonchi.   Abdomen:   Soft and nontender. No guarding, rebound tenderness or  organomegaly noted. Bowel sounds present x 4.   Extremities:  No clubbing, cyanosis or edema noted.      Results Review:    Results from last 7 days   Lab Units 01/28/24  0433 01/27/24  1254 01/27/24  0030 01/26/24  0148 01/25/24  0213 01/24/24  0019 01/23/24  0048   WBC 10*3/mm3 5.22 6.77 5.85 5.15 5.14 5.34 7.20   HEMOGLOBIN g/dL 12.8 12.7 11.9* 11.3* 10.3* 9.5* 9.2*   PLATELETS 10*3/mm3 132* 138* 129* 115* 114* 94* 99*     Results from last 7 days   Lab Units 01/28/24  1431 01/28/24  0433 01/27/24  1715 01/27/24  1254 01/27/24  0030 01/26/24  0148 01/25/24  0213 01/24/24  0019 01/23/24  0048   SODIUM mmol/L  --  140  --  135* 140 142 146* 147* 143   POTASSIUM mmol/L 4.7 3.2* 3.5 3.9 3.2* 3.3* 3.7 4.0 4.4   CHLORIDE mmol/L  --  97*  --  94* 92* 90*  97* 101 102   CO2 mmol/L  --  34.4*  --  31.2* 42.4* 44.4* 41.9* 38.3* 35.4*   BUN mg/dL  --  30*  --  28* 26* 24* 21 20 17   CREATININE mg/dL  --  1.02*  --  1.13* 1.05* 0.99 1.02* 1.04* 1.08*   CALCIUM mg/dL  --  9.0  --  8.9 9.3 9.2 9.2 8.6 8.5*   GLUCOSE mg/dL  --  98  --  153* 88 111* 92 75 105*     Results from last 7 days   Lab Units 01/27/24  1254 01/27/24  0030 01/26/24  0148 01/25/24  0213   BILIRUBIN mg/dL 0.4 0.5 0.4 0.2   ALK PHOS U/L 49 52 50 51   AST (SGOT) U/L 15 14 15 14   ALT (SGPT) U/L 11 14 15 14     Results from last 7 days   Lab Units 01/28/24  0433 01/27/24  0030 01/26/24  0148 01/24/24  0019 01/23/24  0048   MAGNESIUM mg/dL 2.9* 1.5* 1.5* 1.8 2.1         Results from last 7 days   Lab Units 01/24/24  1106 01/24/24  0019 01/22/24  1354   HSTROP T ng/L 121* 138* 111*       Imaging Results (Last 24 Hours)       ** No results found for the last 24 hours. **              Medications:  apixaban, 5 mg, Oral, Q12H  baclofen, 5 mg, Oral, BID  budesonide, 0.5 mg, Nebulization, BID - RT  castor oil-balsam peru, 1 application , Topical, Q12H  cholecalciferol, 50,000 Units, Oral, Weekly  clopidogrel, 75 mg, Oral, Daily  digoxin, 125 mcg, Oral, Daily  donepezil, 10 mg, Oral, Nightly  ferrous sulfate, 325 mg, Oral, BID  ipratropium, 0.5 mg, Nebulization, 4x Daily - RT  lamoTRIgine, 100 mg, Oral, Daily  lamoTRIgine, 25 mg, Oral, Nightly  levothyroxine, 75 mcg, Oral, Q AM  memantine, 10 mg, Oral, Q12H  methylPREDNISolone sodium succinate, 20 mg, Intravenous, Q12H  metoprolol tartrate, 25 mg, Oral, Q12H  pantoprazole, 40 mg, Oral, QAM AC  risperiDONE, 0.5 mg, Oral, BID  rosuvastatin, 40 mg, Oral, Nightly  senna-docusate sodium, 2 tablet, Oral, BID  sertraline, 100 mg, Oral, Daily  sodium chloride, 10 mL, Intravenous, Q12H  sodium chloride, 10 mL, Intravenous, Q12H      phenylephrine, 0.5-3 mcg/kg/min, Last Rate: Stopped (01/28/24 0402)            Assessment & Plan   Acute exacerbation of COPD: Cont steroids  and nebs.     Left-sided pneumonia: CT revealed groundglass opacities in the MIRA. (+) MRSA PCR. Legionella urine antigen negative. Completed course of Rocephin and Doxycycline.     Acute hypercapnic respiratory failure: Likely 2/2 above, left-sided pneumonia and concern for component of volume overload. Improved with steroids, antibiotics, diuresis, nebs and BiPAP. Contacted SS regarding arranging BiPAP at [facility]. Pulm input appreciated.     Hypotension: Likely 2/2 intravascular volume depletion. Diuresis held and antihypertensives held or adjusted. Briefly requiring vasopressor support. Improved with IVF's. Ordered additional fluid bolus this AM. Cont to monitor.     Paroxysmal Afib with RVR: Rate overall improved. Troponin's elevated and thought to be type II. Echo reveals an EF of 61-65%. Cont metoprolol as BP tolerates. Cont Eliquis for stroke prevention. Monitor on telemetry. Cardiology input appreciated.     Type II NSTEMI: : Nonspecific changes on EKG. No reported CP. No evidence of PE. Echo as above. S/P LHC in 2/23. Cont medical management. Monitor on telemetry.     Abnormal UA/possible UTI: Urine culture revealed low colony count of Klebsiella pneumoniae. Completed course of antibiotics as above.     (+) blood culture: 1/2 blood cultures revealed Strep mitis/oralis which is likely a contaminant.     Hypokalemia: K+ improved with supplementation. Mg now >2 with supplementation. Cont to monitor.     DVT PPX: Eliquis    Disposition Likely return to SNF when medically stable.     Prasad Romero,   01/29/24  12:56 EST

## 2024-01-30 ENCOUNTER — ANESTHESIA EVENT (OUTPATIENT)
Dept: CARDIOLOGY | Facility: HOSPITAL | Age: 74
End: 2024-01-30
Payer: MEDICARE

## 2024-01-30 ENCOUNTER — ANESTHESIA (OUTPATIENT)
Dept: CARDIOLOGY | Facility: HOSPITAL | Age: 74
End: 2024-01-30
Payer: MEDICARE

## 2024-01-30 ENCOUNTER — APPOINTMENT (OUTPATIENT)
Dept: CARDIOLOGY | Facility: HOSPITAL | Age: 74
DRG: 193 | End: 2024-01-30
Payer: MEDICARE

## 2024-01-30 LAB
ALBUMIN SERPL-MCNC: 3.2 G/DL (ref 3.5–5.2)
ALBUMIN/GLOB SERPL: 1.7 G/DL
ALP SERPL-CCNC: 56 U/L (ref 39–117)
ALT SERPL W P-5'-P-CCNC: 18 U/L (ref 1–33)
ANION GAP SERPL CALCULATED.3IONS-SCNC: 5.4 MMOL/L (ref 5–15)
AST SERPL-CCNC: 21 U/L (ref 1–32)
BASOPHILS # BLD AUTO: 0.01 10*3/MM3 (ref 0–0.2)
BASOPHILS NFR BLD AUTO: 0.2 % (ref 0–1.5)
BILIRUB SERPL-MCNC: 0.3 MG/DL (ref 0–1.2)
BUN SERPL-MCNC: 27 MG/DL (ref 8–23)
BUN/CREAT SERPL: 30.3 (ref 7–25)
CALCIUM SPEC-SCNC: 8.9 MG/DL (ref 8.6–10.5)
CHLORIDE SERPL-SCNC: 104 MMOL/L (ref 98–107)
CO2 SERPL-SCNC: 29.6 MMOL/L (ref 22–29)
CREAT SERPL-MCNC: 0.89 MG/DL (ref 0.57–1)
DEPRECATED RDW RBC AUTO: 64.7 FL (ref 37–54)
EGFRCR SERPLBLD CKD-EPI 2021: 67.7 ML/MIN/1.73
EOSINOPHIL # BLD AUTO: 0.01 10*3/MM3 (ref 0–0.4)
EOSINOPHIL NFR BLD AUTO: 0.2 % (ref 0.3–6.2)
ERYTHROCYTE [DISTWIDTH] IN BLOOD BY AUTOMATED COUNT: 20.3 % (ref 12.3–15.4)
GLOBULIN UR ELPH-MCNC: 1.9 GM/DL
GLUCOSE SERPL-MCNC: 114 MG/DL (ref 65–99)
HCT VFR BLD AUTO: 38.9 % (ref 34–46.6)
HGB BLD-MCNC: 12.5 G/DL (ref 12–15.9)
IMM GRANULOCYTES # BLD AUTO: 0.05 10*3/MM3 (ref 0–0.05)
IMM GRANULOCYTES NFR BLD AUTO: 0.8 % (ref 0–0.5)
LYMPHOCYTES # BLD AUTO: 0.53 10*3/MM3 (ref 0.7–3.1)
LYMPHOCYTES NFR BLD AUTO: 8.9 % (ref 19.6–45.3)
MAGNESIUM SERPL-MCNC: 2 MG/DL (ref 1.6–2.4)
MCH RBC QN AUTO: 28.3 PG (ref 26.6–33)
MCHC RBC AUTO-ENTMCNC: 32.1 G/DL (ref 31.5–35.7)
MCV RBC AUTO: 88.2 FL (ref 79–97)
MONOCYTES # BLD AUTO: 0.32 10*3/MM3 (ref 0.1–0.9)
MONOCYTES NFR BLD AUTO: 5.4 % (ref 5–12)
NEUTROPHILS NFR BLD AUTO: 5.04 10*3/MM3 (ref 1.7–7)
NEUTROPHILS NFR BLD AUTO: 84.5 % (ref 42.7–76)
NRBC BLD AUTO-RTO: 0 /100 WBC (ref 0–0.2)
PHOSPHATE SERPL-MCNC: 2.9 MG/DL (ref 2.5–4.5)
PLATELET # BLD AUTO: 123 10*3/MM3 (ref 140–450)
PMV BLD AUTO: 9.8 FL (ref 6–12)
POTASSIUM SERPL-SCNC: 4.1 MMOL/L (ref 3.5–5.2)
PROT SERPL-MCNC: 5.1 G/DL (ref 6–8.5)
QT INTERVAL: 438 MS
QTC INTERVAL: 422 MS
RBC # BLD AUTO: 4.41 10*6/MM3 (ref 3.77–5.28)
SODIUM SERPL-SCNC: 139 MMOL/L (ref 136–145)
WBC NRBC COR # BLD AUTO: 5.96 10*3/MM3 (ref 3.4–10.8)

## 2024-01-30 PROCEDURE — 94799 UNLISTED PULMONARY SVC/PX: CPT

## 2024-01-30 PROCEDURE — P9047 ALBUMIN (HUMAN), 25%, 50ML: HCPCS | Performed by: INTERNAL MEDICINE

## 2024-01-30 PROCEDURE — 84100 ASSAY OF PHOSPHORUS: CPT | Performed by: INTERNAL MEDICINE

## 2024-01-30 PROCEDURE — 25010000002 PROPOFOL 10 MG/ML EMULSION: Performed by: NURSE ANESTHETIST, CERTIFIED REGISTERED

## 2024-01-30 PROCEDURE — 80053 COMPREHEN METABOLIC PANEL: CPT | Performed by: INTERNAL MEDICINE

## 2024-01-30 PROCEDURE — 92960 CARDIOVERSION ELECTRIC EXT: CPT

## 2024-01-30 PROCEDURE — 94660 CPAP INITIATION&MGMT: CPT

## 2024-01-30 PROCEDURE — 94761 N-INVAS EAR/PLS OXIMETRY MLT: CPT

## 2024-01-30 PROCEDURE — 85025 COMPLETE CBC W/AUTO DIFF WBC: CPT | Performed by: INTERNAL MEDICINE

## 2024-01-30 PROCEDURE — 94664 DEMO&/EVAL PT USE INHALER: CPT

## 2024-01-30 PROCEDURE — 25010000002 ALBUMIN HUMAN 25% PER 50 ML: Performed by: INTERNAL MEDICINE

## 2024-01-30 PROCEDURE — 99232 SBSQ HOSP IP/OBS MODERATE 35: CPT | Performed by: INTERNAL MEDICINE

## 2024-01-30 PROCEDURE — 25010000002 METHYLPREDNISOLONE PER 40 MG: Performed by: INTERNAL MEDICINE

## 2024-01-30 PROCEDURE — 93005 ELECTROCARDIOGRAM TRACING: CPT | Performed by: INTERNAL MEDICINE

## 2024-01-30 PROCEDURE — 25810000003 LACTATED RINGERS PER 1000 ML: Performed by: NURSE ANESTHETIST, CERTIFIED REGISTERED

## 2024-01-30 PROCEDURE — 83735 ASSAY OF MAGNESIUM: CPT | Performed by: INTERNAL MEDICINE

## 2024-01-30 PROCEDURE — 92960 CARDIOVERSION ELECTRIC EXT: CPT | Performed by: INTERNAL MEDICINE

## 2024-01-30 RX ORDER — ALBUMIN (HUMAN) 12.5 G/50ML
25 SOLUTION INTRAVENOUS ONCE
Qty: 100 ML | Refills: 0 | Status: COMPLETED | OUTPATIENT
Start: 2024-01-30 | End: 2024-01-30

## 2024-01-30 RX ORDER — EPHEDRINE SULFATE 5 MG/ML
INJECTION INTRAVENOUS AS NEEDED
Status: DISCONTINUED | OUTPATIENT
Start: 2024-01-30 | End: 2024-01-30 | Stop reason: SURG

## 2024-01-30 RX ORDER — PROPOFOL 10 MG/ML
VIAL (ML) INTRAVENOUS AS NEEDED
Status: DISCONTINUED | OUTPATIENT
Start: 2024-01-30 | End: 2024-01-30 | Stop reason: SURG

## 2024-01-30 RX ORDER — SODIUM CHLORIDE, SODIUM LACTATE, POTASSIUM CHLORIDE, CALCIUM CHLORIDE 600; 310; 30; 20 MG/100ML; MG/100ML; MG/100ML; MG/100ML
INJECTION, SOLUTION INTRAVENOUS CONTINUOUS PRN
Status: DISCONTINUED | OUTPATIENT
Start: 2024-01-30 | End: 2024-01-30 | Stop reason: SURG

## 2024-01-30 RX ADMIN — FERROUS SULFATE TAB 325 MG (65 MG ELEMENTAL FE) 325 MG: 325 (65 FE) TAB at 08:19

## 2024-01-30 RX ADMIN — DOCUSATE SODIUM 50 MG AND SENNOSIDES 8.6 MG 2 TABLET: 8.6; 5 TABLET, FILM COATED ORAL at 20:51

## 2024-01-30 RX ADMIN — FERROUS SULFATE TAB 325 MG (65 MG ELEMENTAL FE) 325 MG: 325 (65 FE) TAB at 20:49

## 2024-01-30 RX ADMIN — RISPERIDONE 0.5 MG: 0.25 TABLET, FILM COATED ORAL at 08:19

## 2024-01-30 RX ADMIN — BUDESONIDE INHALATION 0.5 MG: 0.5 SUSPENSION RESPIRATORY (INHALATION) at 06:38

## 2024-01-30 RX ADMIN — MEMANTINE HYDROCHLORIDE 10 MG: 10 TABLET, FILM COATED ORAL at 08:19

## 2024-01-30 RX ADMIN — CLOPIDOGREL BISULFATE 75 MG: 75 TABLET, FILM COATED ORAL at 08:19

## 2024-01-30 RX ADMIN — Medication 10 ML: at 22:02

## 2024-01-30 RX ADMIN — SERTRALINE 100 MG: 50 TABLET, FILM COATED ORAL at 08:19

## 2024-01-30 RX ADMIN — BACLOFEN 5 MG: 10 TABLET ORAL at 20:50

## 2024-01-30 RX ADMIN — BUDESONIDE INHALATION 0.5 MG: 0.5 SUSPENSION RESPIRATORY (INHALATION) at 18:33

## 2024-01-30 RX ADMIN — BACLOFEN 5 MG: 10 TABLET ORAL at 08:19

## 2024-01-30 RX ADMIN — RISPERIDONE 0.5 MG: 0.25 TABLET, FILM COATED ORAL at 20:50

## 2024-01-30 RX ADMIN — CASTOR OIL AND BALSAM, PERU 1 APPLICATION: 788; 87 OINTMENT TOPICAL at 20:51

## 2024-01-30 RX ADMIN — LAMOTRIGINE 100 MG: 100 TABLET ORAL at 08:19

## 2024-01-30 RX ADMIN — PROPOFOL 30 MG: 10 INJECTION, EMULSION INTRAVENOUS at 09:16

## 2024-01-30 RX ADMIN — Medication 10 ML: at 08:20

## 2024-01-30 RX ADMIN — Medication 10 ML: at 22:03

## 2024-01-30 RX ADMIN — LORAZEPAM 0.5 MG: 0.5 TABLET ORAL at 20:49

## 2024-01-30 RX ADMIN — METHYLPREDNISOLONE SODIUM SUCCINATE 20 MG: 40 INJECTION, POWDER, FOR SOLUTION INTRAMUSCULAR; INTRAVENOUS at 23:41

## 2024-01-30 RX ADMIN — SODIUM CHLORIDE, POTASSIUM CHLORIDE, SODIUM LACTATE AND CALCIUM CHLORIDE: 600; 310; 30; 20 INJECTION, SOLUTION INTRAVENOUS at 09:16

## 2024-01-30 RX ADMIN — IPRATROPIUM BROMIDE 0.5 MG: 0.5 SOLUTION RESPIRATORY (INHALATION) at 06:38

## 2024-01-30 RX ADMIN — EPHEDRINE SULFATE 5 MG: 5 INJECTION INTRAVENOUS at 09:29

## 2024-01-30 RX ADMIN — METHYLPREDNISOLONE SODIUM SUCCINATE 20 MG: 40 INJECTION, POWDER, FOR SOLUTION INTRAMUSCULAR; INTRAVENOUS at 10:08

## 2024-01-30 RX ADMIN — ROSUVASTATIN CALCIUM 40 MG: 20 TABLET, FILM COATED ORAL at 20:50

## 2024-01-30 RX ADMIN — MEMANTINE HYDROCHLORIDE 10 MG: 10 TABLET, FILM COATED ORAL at 20:50

## 2024-01-30 RX ADMIN — IPRATROPIUM BROMIDE 0.5 MG: 0.5 SOLUTION RESPIRATORY (INHALATION) at 18:33

## 2024-01-30 RX ADMIN — DIGOXIN 125 MCG: 0.12 TABLET ORAL at 08:19

## 2024-01-30 RX ADMIN — DONEPEZIL HYDROCHLORIDE 10 MG: 5 TABLET, FILM COATED ORAL at 20:49

## 2024-01-30 RX ADMIN — LAMOTRIGINE 25 MG: 100 TABLET ORAL at 20:51

## 2024-01-30 RX ADMIN — APIXABAN 5 MG: 5 TABLET, FILM COATED ORAL at 20:49

## 2024-01-30 RX ADMIN — CASTOR OIL AND BALSAM, PERU 56.7 APPLICATION: 788; 87 OINTMENT TOPICAL at 08:21

## 2024-01-30 RX ADMIN — METOPROLOL TARTRATE 25 MG: 25 TABLET, FILM COATED ORAL at 08:19

## 2024-01-30 RX ADMIN — IPRATROPIUM BROMIDE 0.5 MG: 0.5 SOLUTION RESPIRATORY (INHALATION) at 13:47

## 2024-01-30 RX ADMIN — IPRATROPIUM BROMIDE 0.5 MG: 0.5 SOLUTION RESPIRATORY (INHALATION) at 00:27

## 2024-01-30 RX ADMIN — ALBUMIN HUMAN 25 G: 0.25 SOLUTION INTRAVENOUS at 10:08

## 2024-01-30 RX ADMIN — APIXABAN 5 MG: 5 TABLET, FILM COATED ORAL at 08:19

## 2024-01-30 NOTE — PROGRESS NOTES
.Patient Identification:  Name:  Brenda Munroe  Age:  73 y.o.  Sex:  female  :  1950  MRN:  [id number]  Visit Number:  [id number]    Chief Complaint:   Shortness of breath    Admission Information: Brenda Munroe is a 73 y.o. female with coronary artery disease s/p CABG and [facility] 2023, paroxysmal atrial fibrillation, hypertension, hyperlipidemia, bilateral carotid stenosis without occlusion, hypothyroidism, arthritis, chronic headaches, dementia, physical debility and GERD.     Subjective:    Patient staying in [facility] with rate controlled.  Has shortness of breath.  Denies any chest pains.  Sleepy but arousable.  ----------------------------------------------------------------------------------------------------------------------  Current Hospital Meds:  apixaban, 5 mg, Oral, Q12H  baclofen, 5 mg, Oral, BID  budesonide, 0.5 mg, Nebulization, BID - RT  castor oil-balsam peru, 1 application , Topical, Q12H  cholecalciferol, 50,000 Units, Oral, Weekly  clopidogrel, 75 mg, Oral, Daily  donepezil, 10 mg, Oral, Nightly  ferrous sulfate, 325 mg, Oral, BID  ipratropium, 0.5 mg, Nebulization, 4x Daily - RT  lamoTRIgine, 100 mg, Oral, Daily  lamoTRIgine, 25 mg, Oral, Nightly  levothyroxine, 75 mcg, Oral, Q AM  memantine, 10 mg, Oral, Q12H  methylPREDNISolone sodium succinate, 20 mg, Intravenous, Q12H  pantoprazole, 40 mg, Oral, QAM AC  risperiDONE, 0.5 mg, Oral, BID  rosuvastatin, 40 mg, Oral, Nightly  senna-docusate sodium, 2 tablet, Oral, BID  sertraline, 100 mg, Oral, Daily  sodium chloride, 10 mL, Intravenous, Q12H  sodium chloride, 10 mL, Intravenous, Q12H         ----------------------------------------------------------------------------------------------------------------------  Vital Signs:  Temp:  [97 °F (36.1 °C)-98.6 °F (37 °C)] 98.3 °F (36.8 °C)  Heart Rate:  [42-96] 58  Resp:  [-] 18  BP: ()/() 143/72      24  0502 24  0500 24  0910   Weight: 71.6 kg (157 lb 12.8 oz)  70.8 kg (156 lb 1.4 oz) 70.8 kg (156 lb 1.4 oz)     Body mass index is 26.78 kg/m².    Intake/Output Summary (Last 24 hours) at 1/30/2024 1437  Last data filed at 1/30/2024 1200  Gross per 24 hour   Intake 576 ml   Output 850 ml   Net -274 ml     Diet: Diabetic Diets; Consistent Carbohydrate; Texture: Regular Texture (IDDSI 7); Fluid Consistency: Thin (IDDSI 0)  ----------------------------------------------------------------------------------------------------------------------  Physical exam:  Constitutional:    HENT:  Head:  Normocephalic and atraumatic.    Eyes:  Conjunctivae and EOM are normal.  Pupils are equal, round, and reactive to light.  No scleral icterus.    Neck:  Neck supple.  No JVD present.    Cardiovascular: Normal rate, irregular rhythm, S1 S2+, NO S3 / S4  Pulmonary/Chest:  Vesicular breath sounds B/L  Abdominal:  Soft.  Bowel sounds are normal.  No distension and no tenderness.      Neurological: Sleepy but arousable.    Skin:  Skin is warm and dry. No rash noted. No pallor.   Musculoskeletal:  No edema, no tenderness, and no deformity.  No red or swollen joints anywhere.   Peripheral vascular:  2+ Pulses B/L DP  ----------------------------------------------------------------------------------------------------------------------    ----------------------------------------------------------------------------------------------------------------------  Results from last 7 days   Lab Units 01/24/24  1106 01/24/24  0019   HSTROP T ng/L 121* 138*     Results from last 7 days   Lab Units 01/30/24  0207 01/28/24  0433 01/27/24  1254   LACTATE mmol/L  --   --  1.6   WBC 10*3/mm3 5.96 5.22 6.77   HEMOGLOBIN g/dL 12.5 12.8 12.7   HEMATOCRIT % 38.9 40.2 43.0   MCV fL 88.2 87.2 92.9   MCHC g/dL 32.1 31.8 29.5*   PLATELETS 10*3/mm3 123* 132* 138*     Results from last 7 days   Lab Units 01/25/24  1013   PH, ARTERIAL pH units 7.434   PO2 ART mm Hg 94.2   PCO2, ARTERIAL mm Hg 65.0*   HCO3 ART mmol/L 43.4*  "    Results from last 7 days   Lab Units 01/30/24  0207 01/28/24  1431 01/28/24  0433 01/27/24  1715 01/27/24  1254 01/27/24  0030   SODIUM mmol/L 139  --  140  --  135* 140   POTASSIUM mmol/L 4.1 4.7 3.2*   < > 3.9 3.2*   MAGNESIUM mg/dL 2.0  --  2.9*  --   --  1.5*   CHLORIDE mmol/L 104  --  97*  --  94* 92*   CO2 mmol/L 29.6*  --  34.4*  --  31.2* 42.4*   BUN mg/dL 27*  --  30*  --  28* 26*   CREATININE mg/dL 0.89  --  1.02*  --  1.13* 1.05*   CALCIUM mg/dL 8.9  --  9.0  --  8.9 9.3   GLUCOSE mg/dL 114*  --  98  --  153* 88   ALBUMIN g/dL 3.2*  --   --   --  3.1* 3.4*   BILIRUBIN mg/dL 0.3  --   --   --  0.4 0.5   ALK PHOS U/L 56  --   --   --  49 52   AST (SGOT) U/L 21  --   --   --  15 14   ALT (SGPT) U/L 18  --   --   --  11 14    < > = values in this interval not displayed.   Estimated Creatinine Clearance: 52.7 mL/min (by C-G formula based on SCr of 0.89 mg/dL).    No results found for: \"AMMONIA\"      No results found for: \"BLOODCX\"  No results found for: \"URINECX\"  No results found for: \"WOUNDCX\"  No results found for: \"STOOLCX\"  Echo:  Results for orders placed during the hospital encounter of 01/01/24    Adult Transthoracic Echo Limited W/ Cont if Necessary Per Protocol    Interpretation Summary    Normal left ventricular cavity size and wall thickness noted. All left ventricular wall segments contract normally.    Left ventricular ejection fraction appears to be 61 - 65%.    There is a trivial pericardial effusion adjacent to the left ventricle.    @EKG: A-fib  @Cardiolite (Tc-99m Sestamibi) stress test@       I have personally looked at the labs and they are summarized above.  ----------------------------------------------------------------------------------------------------------------------  Imaging Results (Last 24 Hours)       ** No results found for the last 24 hours. **      "     ----------------------------------------------------------------------------------------------------------------------    Assessment:  Atrial fibrillation  Coronary artery disease s/p bypass surgery in the past  Shortness of breath    Plan:  #1 cardiac.  Patient with history of coronary artery s/p bypass surgery in the past.  Last cath was in February 2023.  Patient had LIMA to LAD which was patent occluded vein graft to left circumflex.  Right coronary artery with excellent excellent collaterals from distal LAD.  Calcific lesion in the mid circumflex.  If she continues to have angina symptoms, may consider getting a stress test to see if she needs to have a cardia catheterization done.  #2 paroxysmal atrial fibrillation.  Patient with persistent A-fib.  On anticoagulation.  Cardioversion today was successful and patient is in sinus rhythm now.  Will hold off on beta-blockers and digoxin due to heart rate in 40s.  Anticoagulation to continue.        This document has been electronically signed by Jasmina Alfonso MD [facility], Interventional Cardiology  January 30, 2024 14:37 EST

## 2024-01-30 NOTE — ANESTHESIA PREPROCEDURE EVALUATION
Anesthesia Evaluation     Patient summary reviewed and Nursing notes reviewed   NPO Solid Status: > 8 hours  NPO Liquid Status: > 8 hours           Airway   Mallampati: I  Dental    (+) poor dentition    Pulmonary    (+) COPD,decreased breath sounds  Cardiovascular   Exercise tolerance: poor (<4 METS)    ECG reviewed  PT is on anticoagulation therapy  Rhythm: irregular  Rate: abnormal    (+) hypertension, past MI , CAD, dysrhythmias, hyperlipidemia,  carotid artery disease      Neuro/Psych  (+) headaches, numbness, psychiatric history, dementia  GI/Hepatic/Renal/Endo    (+) GERD, PUD, thyroid problem     Musculoskeletal     Abdominal    Substance History      OB/GYN          Other   arthritis,                 Anesthesia Plan    ASA 3     general   total IV anesthesia    Anesthetic plan, risks, benefits, and alternatives have been provided, discussed and informed consent has been obtained with: patient.    Use of blood products discussed with patient .      CODE STATUS:    Code Status (Patient has no pulse and is not breathing): CPR (Attempt to Resuscitate)  Medical Interventions (Patient has pulse or is breathing): Full Support

## 2024-01-30 NOTE — PLAN OF CARE
Problem: Adult Inpatient Plan of Care  Goal: Plan of Care Review  Outcome: Ongoing, Progressing  Goal: Patient-Specific Goal (Individualized)  Outcome: Ongoing, Progressing  Goal: Absence of Hospital-Acquired Illness or Injury  Outcome: Ongoing, Progressing  Intervention: Identify and Manage Fall Risk  Recent Flowsheet Documentation  Taken 1/30/2024 1300 by Tati Appiah RN  Safety Promotion/Fall Prevention:   activity supervised   assistive device/personal items within reach   clutter free environment maintained   fall prevention program maintained   nonskid shoes/slippers when out of bed   room organization consistent   safety round/check completed  Taken 1/30/2024 1100 by Tati Appiah RN  Safety Promotion/Fall Prevention:   activity supervised   assistive device/personal items within reach   clutter free environment maintained   fall prevention program maintained   nonskid shoes/slippers when out of bed   room organization consistent   safety round/check completed  Taken 1/30/2024 0900 by Tati Appiah RN  Safety Promotion/Fall Prevention:   activity supervised   assistive device/personal items within reach   clutter free environment maintained   fall prevention program maintained   nonskid shoes/slippers when out of bed   room organization consistent   safety round/check completed  Taken 1/30/2024 0819 by Tati Appiah RN  Safety Promotion/Fall Prevention:   activity supervised   assistive device/personal items within reach   clutter free environment maintained   fall prevention program maintained   nonskid shoes/slippers when out of bed   room organization consistent   safety round/check completed  Taken 1/30/2024 0700 by Tati Appiah RN  Safety Promotion/Fall Prevention:   activity supervised   assistive device/personal items within reach   clutter free environment maintained   fall prevention program maintained   nonskid shoes/slippers when out of bed   room organization consistent   safety  round/check completed  Intervention: Prevent and Manage VTE (Venous Thromboembolism) Risk  Recent Flowsheet Documentation  Taken 1/30/2024 1300 by Tati Appiah RN  Activity Management: activity encouraged  Taken 1/30/2024 1100 by Tati Appiah RN  Activity Management: activity encouraged  Taken 1/30/2024 0900 by Tati Appiah RN  Activity Management: activity encouraged  Taken 1/30/2024 0819 by Tati Appiah RN  Activity Management: activity encouraged  Taken 1/30/2024 0700 by Tati Appiah RN  Activity Management: activity encouraged  Intervention: Prevent Infection  Recent Flowsheet Documentation  Taken 1/30/2024 1300 by Tati Appiah RN  Infection Prevention:   rest/sleep promoted   single patient room provided  Taken 1/30/2024 1100 by Tati Appiah RN  Infection Prevention:   rest/sleep promoted   single patient room provided  Taken 1/30/2024 0900 by Tati Appiah RN  Infection Prevention:   rest/sleep promoted   single patient room provided  Taken 1/30/2024 0819 by Tati Appiah RN  Infection Prevention:   rest/sleep promoted   single patient room provided  Taken 1/30/2024 0700 by Tati Appiah RN  Infection Prevention:   rest/sleep promoted   single patient room provided  Goal: Optimal Comfort and Wellbeing  Outcome: Ongoing, Progressing  Intervention: Provide Person-Centered Care  Recent Flowsheet Documentation  Taken 1/30/2024 0819 by Tati Appiah RN  Trust Relationship/Rapport:   care explained   choices provided   emotional support provided   empathic listening provided   questions answered   questions encouraged   reassurance provided   thoughts/feelings acknowledged  Goal: Readiness for Transition of Care  Outcome: Ongoing, Progressing     Problem: Fall Injury Risk  Goal: Absence of Fall and Fall-Related Injury  Outcome: Ongoing, Progressing  Intervention: Identify and Manage Contributors  Recent Flowsheet Documentation  Taken 1/30/2024 1300 by Tati Appiah RN  Medication  Review/Management: medications reviewed  Taken 1/30/2024 1100 by Tati Appiah RN  Medication Review/Management: medications reviewed  Taken 1/30/2024 0900 by Tati Appiah RN  Medication Review/Management: medications reviewed  Taken 1/30/2024 0819 by Tati Appiah RN  Medication Review/Management: medications reviewed  Taken 1/30/2024 0700 by Tati Appiah RN  Medication Review/Management: medications reviewed  Intervention: Promote Injury-Free Environment  Recent Flowsheet Documentation  Taken 1/30/2024 1300 by Tati Appiah RN  Safety Promotion/Fall Prevention:   activity supervised   assistive device/personal items within reach   clutter free environment maintained   fall prevention program maintained   nonskid shoes/slippers when out of bed   room organization consistent   safety round/check completed  Taken 1/30/2024 1100 by Tati Appiah RN  Safety Promotion/Fall Prevention:   activity supervised   assistive device/personal items within reach   clutter free environment maintained   fall prevention program maintained   nonskid shoes/slippers when out of bed   room organization consistent   safety round/check completed  Taken 1/30/2024 0900 by Tati Appiah RN  Safety Promotion/Fall Prevention:   activity supervised   assistive device/personal items within reach   clutter free environment maintained   fall prevention program maintained   nonskid shoes/slippers when out of bed   room organization consistent   safety round/check completed  Taken 1/30/2024 0819 by Tati Appiah RN  Safety Promotion/Fall Prevention:   activity supervised   assistive device/personal items within reach   clutter free environment maintained   fall prevention program maintained   nonskid shoes/slippers when out of bed   room organization consistent   safety round/check completed  Taken 1/30/2024 0700 by Tati Appiah RN  Safety Promotion/Fall Prevention:   activity supervised   assistive device/personal items within  reach   clutter free environment maintained   fall prevention program maintained   nonskid shoes/slippers when out of bed   room organization consistent   safety round/check completed     Problem: Skin Injury Risk Increased  Goal: Skin Health and Integrity  Outcome: Ongoing, Progressing     Problem: Asthma Comorbidity  Goal: Maintenance of Asthma Control  Outcome: Ongoing, Progressing  Intervention: Maintain Asthma Symptom Control  Recent Flowsheet Documentation  Taken 1/30/2024 1300 by Tati Appiah RN  Medication Review/Management: medications reviewed  Taken 1/30/2024 1100 by Tati Appiah RN  Medication Review/Management: medications reviewed  Taken 1/30/2024 0900 by Tati Appiah RN  Medication Review/Management: medications reviewed  Taken 1/30/2024 0819 by Tati Appiah RN  Medication Review/Management: medications reviewed  Taken 1/30/2024 0700 by Tati Appiah RN  Medication Review/Management: medications reviewed     Problem: Behavioral Health Comorbidity  Goal: Maintenance of Behavioral Health Symptom Control  Outcome: Ongoing, Progressing  Intervention: Maintain Behavioral Health Symptom Control  Recent Flowsheet Documentation  Taken 1/30/2024 1300 by Tati Appiah RN  Medication Review/Management: medications reviewed  Taken 1/30/2024 1100 by Tati Appiah RN  Medication Review/Management: medications reviewed  Taken 1/30/2024 0900 by Tati Appiah RN  Medication Review/Management: medications reviewed  Taken 1/30/2024 0819 by Tati Appiah RN  Medication Review/Management: medications reviewed  Taken 1/30/2024 0700 by Tati Appiah RN  Medication Review/Management: medications reviewed     Problem: COPD (Chronic Obstructive Pulmonary Disease) Comorbidity  Goal: Maintenance of COPD Symptom Control  Outcome: Ongoing, Progressing  Intervention: Maintain COPD-Symptom Control  Recent Flowsheet Documentation  Taken 1/30/2024 1300 by Tati Appiah RN  Medication Review/Management:  medications reviewed  Taken 1/30/2024 1100 by Tati Appiah RN  Medication Review/Management: medications reviewed  Taken 1/30/2024 0900 by Tati Appiah RN  Medication Review/Management: medications reviewed  Taken 1/30/2024 0819 by Tati Appiah RN  Medication Review/Management: medications reviewed  Taken 1/30/2024 0700 by Tati Appiah RN  Medication Review/Management: medications reviewed     Problem: Diabetes Comorbidity  Goal: Blood Glucose Level Within Targeted Range  Outcome: Ongoing, Progressing     Problem: Heart Failure Comorbidity  Goal: Maintenance of Heart Failure Symptom Control  Outcome: Ongoing, Progressing  Intervention: Maintain Heart Failure-Management  Recent Flowsheet Documentation  Taken 1/30/2024 1300 by Tati Appiah RN  Medication Review/Management: medications reviewed  Taken 1/30/2024 1100 by Tati Appiah RN  Medication Review/Management: medications reviewed  Taken 1/30/2024 0900 by Tati Appiah RN  Medication Review/Management: medications reviewed  Taken 1/30/2024 0819 by Tati Appiah RN  Medication Review/Management: medications reviewed  Taken 1/30/2024 0700 by Tati Appiah RN  Medication Review/Management: medications reviewed     Problem: Hypertension Comorbidity  Goal: Blood Pressure in Desired Range  Outcome: Ongoing, Progressing  Intervention: Maintain Blood Pressure Management  Recent Flowsheet Documentation  Taken 1/30/2024 1300 by Tati Appiah RN  Medication Review/Management: medications reviewed  Taken 1/30/2024 1100 by Tati Appiah RN  Medication Review/Management: medications reviewed  Taken 1/30/2024 0900 by Tati Appiah RN  Medication Review/Management: medications reviewed  Taken 1/30/2024 0819 by Tati Appiah RN  Medication Review/Management: medications reviewed  Taken 1/30/2024 0700 by Tati Appiah RN  Medication Review/Management: medications reviewed     Problem: Obstructive Sleep Apnea Risk or Actual Comorbidity  Management  Goal: Unobstructed Breathing During Sleep  Outcome: Ongoing, Progressing     Problem: Osteoarthritis Comorbidity  Goal: Maintenance of Osteoarthritis Symptom Control  Outcome: Ongoing, Progressing  Intervention: Maintain Osteoarthritis Symptom Control  Recent Flowsheet Documentation  Taken 1/30/2024 1300 by Tati Appiah RN  Activity Management: activity encouraged  Medication Review/Management: medications reviewed  Taken 1/30/2024 1100 by Tati Appiah RN  Activity Management: activity encouraged  Medication Review/Management: medications reviewed  Taken 1/30/2024 0900 by Tati Appiah RN  Activity Management: activity encouraged  Medication Review/Management: medications reviewed  Taken 1/30/2024 0819 by Tati Appiah RN  Activity Management: activity encouraged  Medication Review/Management: medications reviewed  Taken 1/30/2024 0700 by Tati Appiah RN  Activity Management: activity encouraged  Medication Review/Management: medications reviewed     Problem: Pain Chronic (Persistent) (Comorbidity Management)  Goal: Acceptable Pain Control and Functional Ability  Outcome: Ongoing, Progressing  Intervention: Manage Persistent Pain  Recent Flowsheet Documentation  Taken 1/30/2024 1300 by Tati Appiah RN  Medication Review/Management: medications reviewed  Taken 1/30/2024 1100 by Tati Appiah RN  Medication Review/Management: medications reviewed  Taken 1/30/2024 0900 by Tati Appiah RN  Medication Review/Management: medications reviewed  Taken 1/30/2024 0819 by Tati Appiah RN  Medication Review/Management: medications reviewed  Taken 1/30/2024 0700 by Tati Appiah RN  Medication Review/Management: medications reviewed  Intervention: Optimize Psychosocial Wellbeing  Recent Flowsheet Documentation  Taken 1/30/2024 0819 by Tati Appiah RN  Diversional Activities: television  Family/Support System Care:   self-care encouraged   support provided     Problem: Seizure Disorder  Comorbidity  Goal: Maintenance of Seizure Control  Outcome: Ongoing, Progressing     Problem: Noninvasive Ventilation Acute  Goal: Effective Unassisted Ventilation and Oxygenation  Outcome: Ongoing, Progressing   Goal Outcome Evaluation:   Pt alert and oriented to person and place at this time. Pt on 2L NC. Cardioverted per provider at bedside this am. Pt now in NSR. No complaints from pt. Bed is locked in low position with call light in reach.

## 2024-01-30 NOTE — ANESTHESIA POSTPROCEDURE EVALUATION
Patient: Brenda Munroe    Procedure Summary       Date: 01/30/24 Room / Location: [facility]    Anesthesia Start: 0913 Anesthesia Stop: 0933    Procedure: CARDIOVERSION EXTERNAL IN CARDIOLOGY DEPARTMENT Diagnosis: (a fib)    Scheduled Providers: Jasmina Alfonso MD Provider: Bernard Abad DO    Anesthesia Type: general ASA Status: 3            Anesthesia Type: general    Vitals  Vitals Value Taken Time   BP 94/59 01/30/24 0930   Temp     Pulse 42 01/30/24 0933   Resp     SpO2 97 % 01/30/24 0933   Vitals shown include unfiled device data.        Post Anesthesia Care and Evaluation    Patient location during evaluation: bedside  Patient participation: complete - patient participated  Level of consciousness: awake and alert  Pain score: 1  Pain management: adequate    Airway patency: patent  Anesthetic complications: No anesthetic complications  PONV Status: controlled  Cardiovascular status: acceptable  Respiratory status: acceptable  Hydration status: acceptable    Comments: 113/74  49  98  96%  22  No anesthesia care post op

## 2024-01-30 NOTE — CASE MANAGEMENT/SOCIAL WORK
Discharge Planning Assessment   Ozzy     Patient Name: Brenda Munroe  MRN: [id number]  Today's Date: 1/30/2024    Admit Date: 1/22/2024       Discharge Plan       Row Name 01/30/24 1112       Plan    Plan SS contacted [facility] per Phoebe who states pt BIPAP has been ordered. SS to follow.               JORGE Bowers

## 2024-01-30 NOTE — PROGRESS NOTES
[facility] HOSPITALIST PROGRESS NOTE    Subjective     History:   Brenda Munroe is a 73 y.o. female admitted on 1/22/2024 secondary to A-fib     Procedures:   1/30/24: Electrical cardioversion     CC: Follow up resp failure, Afib    Patient seen and examined with JOSE Duffy. Awake and alert but confused. Dyspnea appears stable. No reported CP or palpitations. BP has fluctuated. No acute events overnight per RN.     History taken from: patient, chart, and RN.      Objective     Vital Signs  Temp:  [97 °F (36.1 °C)-98.6 °F (37 °C)] 98.3 °F (36.8 °C)  Heart Rate:  [42-96] 58  Resp:  [12-29] 22  BP: ()/() 145/84    Intake/Output Summary (Last 24 hours) at 1/30/2024 1551  Last data filed at 1/30/2024 1200  Gross per 24 hour   Intake 576 ml   Output 850 ml   Net -274 ml         Physical Exam: Unchanged from previous.   General:    Awake, alert but confused, in no acute distress   Heart:      Normal S1 and S2. Irregularly irregular.    Lungs:     Respirations regular, even and unlabored. Diminished breath sounds. No wheezes, rales or rhonchi.   Abdomen:   Soft and nontender. No guarding, rebound tenderness or  organomegaly noted. Bowel sounds present x 4.   Extremities:  No clubbing, cyanosis or edema noted.      Results Review:    Results from last 7 days   Lab Units 01/30/24  0207 01/28/24  0433 01/27/24  1254 01/27/24  0030 01/26/24  0148 01/25/24  0213 01/24/24  0019   WBC 10*3/mm3 5.96 5.22 6.77 5.85 5.15 5.14 5.34   HEMOGLOBIN g/dL 12.5 12.8 12.7 11.9* 11.3* 10.3* 9.5*   PLATELETS 10*3/mm3 123* 132* 138* 129* 115* 114* 94*     Results from last 7 days   Lab Units 01/30/24  0207 01/28/24  1431 01/28/24  0433 01/27/24  1715 01/27/24  1254 01/27/24  0030 01/26/24  0148 01/25/24  0213 01/24/24  0019   SODIUM mmol/L 139  --  140  --  135* 140 142 146* 147*   POTASSIUM mmol/L 4.1 4.7 3.2* 3.5 3.9 3.2* 3.3* 3.7 4.0   CHLORIDE mmol/L 104  --  97*  --  94* 92* 90* 97* 101   CO2 mmol/L 29.6*  --   34.4*  --  31.2* 42.4* 44.4* 41.9* 38.3*   BUN mg/dL 27*  --  30*  --  28* 26* 24* 21 20   CREATININE mg/dL 0.89  --  1.02*  --  1.13* 1.05* 0.99 1.02* 1.04*   CALCIUM mg/dL 8.9  --  9.0  --  8.9 9.3 9.2 9.2 8.6   GLUCOSE mg/dL 114*  --  98  --  153* 88 111* 92 75     Results from last 7 days   Lab Units 01/30/24  0207 01/27/24  1254 01/27/24  0030 01/26/24  0148 01/25/24  0213   BILIRUBIN mg/dL 0.3 0.4 0.5 0.4 0.2   ALK PHOS U/L 56 49 52 50 51   AST (SGOT) U/L 21 15 14 15 14   ALT (SGPT) U/L 18 11 14 15 14     Results from last 7 days   Lab Units 01/30/24  0207 01/28/24  0433 01/27/24  0030 01/26/24  0148 01/24/24  0019   MAGNESIUM mg/dL 2.0 2.9* 1.5* 1.5* 1.8         Results from last 7 days   Lab Units 01/24/24  1106 01/24/24  0019   HSTROP T ng/L 121* 138*       Imaging Results (Last 24 Hours)       ** No results found for the last 24 hours. **              Medications:  apixaban, 5 mg, Oral, Q12H  baclofen, 5 mg, Oral, BID  budesonide, 0.5 mg, Nebulization, BID - RT  castor oil-balsam peru, 1 application , Topical, Q12H  cholecalciferol, 50,000 Units, Oral, Weekly  clopidogrel, 75 mg, Oral, Daily  donepezil, 10 mg, Oral, Nightly  ferrous sulfate, 325 mg, Oral, BID  ipratropium, 0.5 mg, Nebulization, 4x Daily - RT  lamoTRIgine, 100 mg, Oral, Daily  lamoTRIgine, 25 mg, Oral, Nightly  levothyroxine, 75 mcg, Oral, Q AM  memantine, 10 mg, Oral, Q12H  methylPREDNISolone sodium succinate, 20 mg, Intravenous, Q12H  pantoprazole, 40 mg, Oral, QAM AC  risperiDONE, 0.5 mg, Oral, BID  rosuvastatin, 40 mg, Oral, Nightly  senna-docusate sodium, 2 tablet, Oral, BID  sertraline, 100 mg, Oral, Daily  sodium chloride, 10 mL, Intravenous, Q12H  sodium chloride, 10 mL, Intravenous, Q12H                 Assessment & Plan   Acute exacerbation of COPD: Cont steroids and nebs.     Left-sided pneumonia: CT revealed groundglass opacities in the MIRA. (+) MRSA PCR. Legionella urine antigen negative. Completed course of Rocephin and  Doxycycline.     Acute hypercapnic respiratory failure: Likely 2/2 above, left-sided pneumonia and concern for component of volume overload. Improved with steroids, antibiotics, diuresis, nebs and BiPAP. SS has contacted regarding arranging BiPAP at SNF upon discharge. Pulm input appreciated.     Hypotension: Likely 2/2 intravascular volume depletion. Diuresis held and antihypertensives held or adjusted. Briefly required vasopressor support. Improved with IVF's. Ordered albumin this AM. Cont to monitor.     Paroxysmal Afib with RVR: Rate overall improved. Troponin's elevated and thought to be type II. Echo reveals an EF of 61-65%. S/P successful electrical cardioversion today. Hold metoprolol and Digoxin for now per cards recs as rate dropped into the 40's s/p CV. Cont Eliquis for stroke prevention. Monitor on telemetry. Cardiology input appreciated.     Type II NSTEMI: Nonspecific changes on EKG. No reported CP. No evidence of PE. Echo as above. S/P LHC in 2/23. Cont medical management. Monitor on telemetry.     Abnormal UA/possible UTI: Urine culture revealed low colony count of Klebsiella pneumoniae. Completed course of antibiotics as above.     (+) blood culture: 1/2 blood cultures revealed Strep mitis/oralis which is likely a contaminant.     Hypokalemia: K+ improved with supplementation and stable today. Mg now 2 with supplementation. Cont to monitor.     DVT PPX: Eliquis    Discussed with Dr. Alfonso.     Disposition Likely return to SNF when medically stable/pending cardiology follow up.     Prasad Romero,   01/30/24  15:51 EST

## 2024-01-30 NOTE — PLAN OF CARE
Goal Outcome Evaluation:           Progress: improving  Outcome Evaluation: Pt VSS at this time on BIPAP. Pt AO to self and place only. Patient has no complaints at this time. Bed low, locked, and call light in reach.

## 2024-01-31 ENCOUNTER — APPOINTMENT (OUTPATIENT)
Dept: CT IMAGING | Facility: HOSPITAL | Age: 74
DRG: 193 | End: 2024-01-31
Payer: MEDICARE

## 2024-01-31 LAB
A-A DO2: 48.8 MMHG (ref 0–300)
AMMONIA BLD-SCNC: 22 UMOL/L (ref 11–51)
ANION GAP SERPL CALCULATED.3IONS-SCNC: 7.4 MMOL/L (ref 5–15)
ANISOCYTOSIS BLD QL: NORMAL
APTT PPP: 29.5 SECONDS (ref 26.5–34.5)
ARTERIAL PATENCY WRIST A: POSITIVE
ATMOSPHERIC PRESS: 730 MMHG
BASE EXCESS BLDA CALC-SCNC: 3.1 MMOL/L (ref 0–2)
BASOPHILS # BLD AUTO: 0.01 10*3/MM3 (ref 0–0.2)
BASOPHILS NFR BLD AUTO: 0.2 % (ref 0–1.5)
BDY SITE: ABNORMAL
BUN SERPL-MCNC: 20 MG/DL (ref 8–23)
BUN/CREAT SERPL: 28.2 (ref 7–25)
CALCIUM SPEC-SCNC: 8.8 MG/DL (ref 8.6–10.5)
CHLORIDE SERPL-SCNC: 105 MMOL/L (ref 98–107)
CO2 BLDA-SCNC: 29.7 MMOL/L (ref 22–33)
CO2 SERPL-SCNC: 27.6 MMOL/L (ref 22–29)
COHGB MFR BLD: 1.6 % (ref 0–5)
CREAT SERPL-MCNC: 0.71 MG/DL (ref 0.57–1)
DACRYOCYTES BLD QL SMEAR: NORMAL
DEPRECATED RDW RBC AUTO: 70.1 FL (ref 37–54)
EGFRCR SERPLBLD CKD-EPI 2021: 88.8 ML/MIN/1.73
EOSINOPHIL # BLD AUTO: 0 10*3/MM3 (ref 0–0.4)
EOSINOPHIL NFR BLD AUTO: 0 % (ref 0.3–6.2)
EPAP: 8
ERYTHROCYTE [DISTWIDTH] IN BLOOD BY AUTOMATED COUNT: 20.2 % (ref 12.3–15.4)
GLUCOSE SERPL-MCNC: 130 MG/DL (ref 65–99)
HCO3 BLDA-SCNC: 28.3 MMOL/L (ref 20–26)
HCT VFR BLD AUTO: 37 % (ref 34–46.6)
HCT VFR BLD CALC: 36.9 % (ref 38–51)
HGB BLD-MCNC: 11.1 G/DL (ref 12–15.9)
HGB BLDA-MCNC: 12 G/DL (ref 13.5–17.5)
HYPOCHROMIA BLD QL: NORMAL
IMM GRANULOCYTES # BLD AUTO: 0.03 10*3/MM3 (ref 0–0.05)
IMM GRANULOCYTES NFR BLD AUTO: 0.5 % (ref 0–0.5)
INHALED O2 CONCENTRATION: 25 %
INR PPP: 1.32 (ref 0.9–1.1)
IPAP: 20
LARGE PLATELETS: NORMAL
LYMPHOCYTES # BLD AUTO: 0.88 10*3/MM3 (ref 0.7–3.1)
LYMPHOCYTES NFR BLD AUTO: 16 % (ref 19.6–45.3)
Lab: ABNORMAL
MCH RBC QN AUTO: 28.4 PG (ref 26.6–33)
MCHC RBC AUTO-ENTMCNC: 30 G/DL (ref 31.5–35.7)
MCV RBC AUTO: 94.6 FL (ref 79–97)
METHGB BLD QL: 0 % (ref 0–3)
MODALITY: ABNORMAL
MONOCYTES # BLD AUTO: 0.34 10*3/MM3 (ref 0.1–0.9)
MONOCYTES NFR BLD AUTO: 6.2 % (ref 5–12)
NEUTROPHILS NFR BLD AUTO: 4.24 10*3/MM3 (ref 1.7–7)
NEUTROPHILS NFR BLD AUTO: 77.1 % (ref 42.7–76)
NRBC BLD AUTO-RTO: 0 /100 WBC (ref 0–0.2)
OXYHGB MFR BLDV: 94.6 % (ref 94–99)
PCO2 BLDA: 44.6 MM HG (ref 35–45)
PCO2 TEMP ADJ BLD: ABNORMAL MM[HG]
PH BLDA: 7.41 PH UNITS (ref 7.35–7.45)
PH, TEMP CORRECTED: ABNORMAL
PLATELET # BLD AUTO: 117 10*3/MM3 (ref 140–450)
PMV BLD AUTO: 10.6 FL (ref 6–12)
PO2 BLDA: 71.9 MM HG (ref 83–108)
PO2 TEMP ADJ BLD: ABNORMAL MM[HG]
POTASSIUM SERPL-SCNC: 3.7 MMOL/L (ref 3.5–5.2)
PROTHROMBIN TIME: 17 SECONDS (ref 12.1–14.7)
RBC # BLD AUTO: 3.91 10*6/MM3 (ref 3.77–5.28)
SAO2 % BLDCOA: 96 % (ref 94–99)
SET MECH RESP RATE: 18
SMALL PLATELETS BLD QL SMEAR: NORMAL
SODIUM SERPL-SCNC: 140 MMOL/L (ref 136–145)
UFH PPP CHRO-ACNC: >1.1 IU/ML (ref 0.3–0.7)
UFH PPP CHRO-ACNC: >1.1 IU/ML (ref 0.3–0.7)
VENTILATOR MODE: ABNORMAL
WBC NRBC COR # BLD AUTO: 5.5 10*3/MM3 (ref 3.4–10.8)

## 2024-01-31 PROCEDURE — 36600 WITHDRAWAL OF ARTERIAL BLOOD: CPT

## 2024-01-31 PROCEDURE — 70450 CT HEAD/BRAIN W/O DYE: CPT | Performed by: RADIOLOGY

## 2024-01-31 PROCEDURE — 80048 BASIC METABOLIC PNL TOTAL CA: CPT | Performed by: INTERNAL MEDICINE

## 2024-01-31 PROCEDURE — 85520 HEPARIN ASSAY: CPT | Performed by: NURSE PRACTITIONER

## 2024-01-31 PROCEDURE — 83050 HGB METHEMOGLOBIN QUAN: CPT

## 2024-01-31 PROCEDURE — 25010000002 METHYLPREDNISOLONE PER 40 MG: Performed by: INTERNAL MEDICINE

## 2024-01-31 PROCEDURE — 82375 ASSAY CARBOXYHB QUANT: CPT

## 2024-01-31 PROCEDURE — 85730 THROMBOPLASTIN TIME PARTIAL: CPT | Performed by: NURSE PRACTITIONER

## 2024-01-31 PROCEDURE — 94799 UNLISTED PULMONARY SVC/PX: CPT

## 2024-01-31 PROCEDURE — 82140 ASSAY OF AMMONIA: CPT | Performed by: INTERNAL MEDICINE

## 2024-01-31 PROCEDURE — 99232 SBSQ HOSP IP/OBS MODERATE 35: CPT | Performed by: INTERNAL MEDICINE

## 2024-01-31 PROCEDURE — 94660 CPAP INITIATION&MGMT: CPT

## 2024-01-31 PROCEDURE — 85610 PROTHROMBIN TIME: CPT | Performed by: NURSE PRACTITIONER

## 2024-01-31 PROCEDURE — 82805 BLOOD GASES W/O2 SATURATION: CPT

## 2024-01-31 PROCEDURE — 70450 CT HEAD/BRAIN W/O DYE: CPT

## 2024-01-31 PROCEDURE — 85520 HEPARIN ASSAY: CPT | Performed by: INTERNAL MEDICINE

## 2024-01-31 PROCEDURE — 94664 DEMO&/EVAL PT USE INHALER: CPT

## 2024-01-31 PROCEDURE — 85007 BL SMEAR W/DIFF WBC COUNT: CPT | Performed by: INTERNAL MEDICINE

## 2024-01-31 PROCEDURE — 94761 N-INVAS EAR/PLS OXIMETRY MLT: CPT

## 2024-01-31 PROCEDURE — 85025 COMPLETE CBC W/AUTO DIFF WBC: CPT | Performed by: INTERNAL MEDICINE

## 2024-01-31 RX ORDER — HEPARIN SODIUM 10000 [USP'U]/100ML
12 INJECTION, SOLUTION INTRAVENOUS
Status: DISCONTINUED | OUTPATIENT
Start: 2024-01-31 | End: 2024-01-31

## 2024-01-31 RX ORDER — HYDRALAZINE HYDROCHLORIDE 20 MG/ML
10 INJECTION INTRAMUSCULAR; INTRAVENOUS EVERY 6 HOURS PRN
Status: DISCONTINUED | OUTPATIENT
Start: 2024-01-31 | End: 2024-02-02 | Stop reason: HOSPADM

## 2024-01-31 RX ORDER — PREDNISONE 20 MG/1
20 TABLET ORAL
Status: DISCONTINUED | OUTPATIENT
Start: 2024-02-01 | End: 2024-02-02 | Stop reason: HOSPADM

## 2024-01-31 RX ORDER — HEPARIN SODIUM 5000 [USP'U]/ML
50 INJECTION, SOLUTION INTRAVENOUS; SUBCUTANEOUS AS NEEDED
Status: DISCONTINUED | OUTPATIENT
Start: 2024-01-31 | End: 2024-01-31

## 2024-01-31 RX ORDER — HEPARIN SODIUM 5000 [USP'U]/ML
25 INJECTION, SOLUTION INTRAVENOUS; SUBCUTANEOUS AS NEEDED
Status: DISCONTINUED | OUTPATIENT
Start: 2024-01-31 | End: 2024-01-31

## 2024-01-31 RX ADMIN — DOCUSATE SODIUM 50 MG AND SENNOSIDES 8.6 MG 2 TABLET: 8.6; 5 TABLET, FILM COATED ORAL at 08:40

## 2024-01-31 RX ADMIN — CLOPIDOGREL BISULFATE 75 MG: 75 TABLET, FILM COATED ORAL at 08:39

## 2024-01-31 RX ADMIN — IPRATROPIUM BROMIDE 0.5 MG: 0.5 SOLUTION RESPIRATORY (INHALATION) at 00:15

## 2024-01-31 RX ADMIN — LAMOTRIGINE 25 MG: 100 TABLET ORAL at 23:03

## 2024-01-31 RX ADMIN — FERROUS SULFATE TAB 325 MG (65 MG ELEMENTAL FE) 325 MG: 325 (65 FE) TAB at 21:07

## 2024-01-31 RX ADMIN — RISPERIDONE 0.5 MG: 0.25 TABLET, FILM COATED ORAL at 21:07

## 2024-01-31 RX ADMIN — BUDESONIDE INHALATION 0.5 MG: 0.5 SUSPENSION RESPIRATORY (INHALATION) at 18:31

## 2024-01-31 RX ADMIN — CASTOR OIL AND BALSAM, PERU 1 APPLICATION: 788; 87 OINTMENT TOPICAL at 21:04

## 2024-01-31 RX ADMIN — RISPERIDONE 0.5 MG: 0.25 TABLET, FILM COATED ORAL at 08:40

## 2024-01-31 RX ADMIN — SERTRALINE 100 MG: 50 TABLET, FILM COATED ORAL at 08:39

## 2024-01-31 RX ADMIN — BACLOFEN 5 MG: 10 TABLET ORAL at 21:05

## 2024-01-31 RX ADMIN — MEMANTINE HYDROCHLORIDE 10 MG: 10 TABLET, FILM COATED ORAL at 21:06

## 2024-01-31 RX ADMIN — METHYLPREDNISOLONE SODIUM SUCCINATE 20 MG: 40 INJECTION, POWDER, FOR SOLUTION INTRAMUSCULAR; INTRAVENOUS at 11:54

## 2024-01-31 RX ADMIN — LEVOTHYROXINE SODIUM 75 MCG: 0.07 TABLET ORAL at 06:18

## 2024-01-31 RX ADMIN — APIXABAN 5 MG: 5 TABLET, FILM COATED ORAL at 08:40

## 2024-01-31 RX ADMIN — MEMANTINE HYDROCHLORIDE 10 MG: 10 TABLET, FILM COATED ORAL at 08:39

## 2024-01-31 RX ADMIN — Medication 10 ML: at 21:08

## 2024-01-31 RX ADMIN — FERROUS SULFATE TAB 325 MG (65 MG ELEMENTAL FE) 325 MG: 325 (65 FE) TAB at 08:39

## 2024-01-31 RX ADMIN — Medication 10 ML: at 08:41

## 2024-01-31 RX ADMIN — PANTOPRAZOLE SODIUM 40 MG: 40 TABLET, DELAYED RELEASE ORAL at 08:40

## 2024-01-31 RX ADMIN — BUDESONIDE INHALATION 0.5 MG: 0.5 SUSPENSION RESPIRATORY (INHALATION) at 06:25

## 2024-01-31 RX ADMIN — IPRATROPIUM BROMIDE 0.5 MG: 0.5 SOLUTION RESPIRATORY (INHALATION) at 18:31

## 2024-01-31 RX ADMIN — DOCUSATE SODIUM 50 MG AND SENNOSIDES 8.6 MG 2 TABLET: 8.6; 5 TABLET, FILM COATED ORAL at 21:06

## 2024-01-31 RX ADMIN — DONEPEZIL HYDROCHLORIDE 10 MG: 5 TABLET, FILM COATED ORAL at 21:06

## 2024-01-31 RX ADMIN — LAMOTRIGINE 100 MG: 100 TABLET ORAL at 08:39

## 2024-01-31 RX ADMIN — BACLOFEN 5 MG: 10 TABLET ORAL at 08:39

## 2024-01-31 RX ADMIN — ROSUVASTATIN CALCIUM 40 MG: 20 TABLET, FILM COATED ORAL at 21:06

## 2024-01-31 RX ADMIN — IPRATROPIUM BROMIDE 0.5 MG: 0.5 SOLUTION RESPIRATORY (INHALATION) at 12:40

## 2024-01-31 RX ADMIN — IPRATROPIUM BROMIDE 0.5 MG: 0.5 SOLUTION RESPIRATORY (INHALATION) at 06:25

## 2024-01-31 NOTE — CASE MANAGEMENT/SOCIAL WORK
Discharge Planning Assessment   [place]     Patient Name: Brenda Munroe  MRN: [id number]  Today's Date: 1/31/2024    Admit Date: 1/22/2024     Discharge Plan       Row Name 01/31/24 0906       Plan    Plan Pt admitted on 1/22/24. Pt is a resident at [facility]. Per Phoebe, Pt had a 20 day bed hold. SS spoke with [facility] per Phoebe who states facility has BIPAP for pt. SS will follow.               MUNA BowersW

## 2024-01-31 NOTE — PROGRESS NOTES
LOS: 7 days     Name: Brenda Munroe  Age/Sex: 73 y.o. female  :  1950        PCP: Bernadette Arevalo MD    Principal Problem:    A-fib  Active Problems:    Hypercapnic respiratory failure      Admission Information: Brenda Munroe is a 73 y.o. female with coronary artery disease s/p CABG and [facility] 2023, paroxysmal atrial fibrillation, hypertension, hyperlipidemia, bilateral carotid stenosis without occlusion, hypothyroidism, arthritis, chronic headaches, dementia, physical debility and GERD.      Chief Complaint: Altered mental status, atrial fibrillation with RVR    Interval history: Underwent cardioversion yesterday and has maintained sinus rhythm since that time.  Has been bradycardic ranging 40-52.  Beta-blocker and digoxin held.    Subjective   Patient awake in bed and in no distress.  She reports that she feels though she continues to improve.  She denies shortness of breath or chest pain.      Vital Signs  Vital Signs (last 72 hrs)          0700   0659  0700   0659  0700   0659  0700   1347   Most Recent      Temp (°F) 97.6 -  98.4    97.1 -  97.9    97 -  99.2    97.5 -  97.7     97.5 (36.4)  1200    Heart Rate 59 -  128    66 -  98    42 -  96    40 -  52     52  1250    Resp 14 -      12 -   -      18 -  28     24  1250    BP 92/68 -  156/97    87/65 -  151/85    94/59 -  170/84    110/54 -  149/71     110/54  1000    SpO2 (%) 86 -  100    90 -  100    90 -  100    96 -  100     100  1250    Flow (L/min)   2      2      2      2     2  0839    Oxygen Concentration (%)   32      32    25 -  32       25  0625          Temp:  [97.5 °F (36.4 °C)-99.2 °F (37.3 °C)] 97.5 °F (36.4 °C)  Heart Rate:  [40-64] 52  Resp:  [16-29] 24  BP: (110-170)/(45-84) 110/54  Body mass index is 26.48 kg/m².      Intake/Output Summary (Last 24 hours) at 2024 1347  Last data filed at 2024 1200  Gross per 24 hour   Intake  508 ml   Output 750 ml   Net -242 ml       Vitals reviewed.   Constitutional:       Appearance: Frail.   Eyes:      Conjunctiva/sclera: Conjunctivae normal.   Neck:      Vascular: No carotid bruit or JVD.   Pulmonary:      Effort: Pulmonary effort is normal. No respiratory distress.      Breath sounds: Normal breath sounds. No wheezing. No rhonchi. No rales.   Cardiovascular:      Bradycardia present. Regular rhythm.      Murmurs: There is a systolic murmur.      Comments: No lower extremity edema  Skin:     General: Skin is warm and dry.   Neurological:      Mental Status: Alert.      Comments: Rapid like movement of lower jaw         Telemetry: Sinus bradycardia 40s to 50s     Results Review:     Results from last 7 days   Lab Units 01/31/24  0120 01/30/24  0207 01/28/24  0433 01/27/24  1254 01/27/24  0030 01/26/24  0148 01/25/24  0213   WBC 10*3/mm3 5.50 5.96 5.22 6.77 5.85 5.15 5.14   HEMOGLOBIN g/dL 11.1* 12.5 12.8 12.7 11.9* 11.3* 10.3*   PLATELETS 10*3/mm3 117* 123* 132* 138* 129* 115* 114*     Results from last 7 days   Lab Units 01/31/24  0120 01/30/24  0207 01/28/24  1431 01/28/24  0433 01/27/24  1715 01/27/24  1254 01/27/24  0030 01/26/24  0148 01/25/24  0213   SODIUM mmol/L 140 139  --  140  --  135* 140 142 146*   POTASSIUM mmol/L 3.7 4.1 4.7 3.2* 3.5 3.9 3.2* 3.3* 3.7   CHLORIDE mmol/L 105 104  --  97*  --  94* 92* 90* 97*   CO2 mmol/L 27.6 29.6*  --  34.4*  --  31.2* 42.4* 44.4* 41.9*   BUN mg/dL 20 27*  --  30*  --  28* 26* 24* 21   CREATININE mg/dL 0.71 0.89  --  1.02*  --  1.13* 1.05* 0.99 1.02*   CALCIUM mg/dL 8.8 8.9  --  9.0  --  8.9 9.3 9.2 9.2   GLUCOSE mg/dL 130* 114*  --  98  --  153* 88 111* 92           Results from last 7 days   Lab Units 01/31/24  0928   INR  1.32*       I reviewed the patient's new clinical results.  I reviewed the patient's new imaging results and agree with the interpretation.  I personally viewed and interpreted the patient's EKG/Telemetry data      Medication  Review:   [Held by provider] apixaban, 5 mg, Oral, Q12H  baclofen, 5 mg, Oral, BID  budesonide, 0.5 mg, Nebulization, BID - RT  castor oil-balsam peru, 1 application , Topical, Q12H  cholecalciferol, 50,000 Units, Oral, Weekly  clopidogrel, 75 mg, Oral, Daily  donepezil, 10 mg, Oral, Nightly  ferrous sulfate, 325 mg, Oral, BID  ipratropium, 0.5 mg, Nebulization, 4x Daily - RT  lamoTRIgine, 100 mg, Oral, Daily  lamoTRIgine, 25 mg, Oral, Nightly  levothyroxine, 75 mcg, Oral, Q AM  memantine, 10 mg, Oral, Q12H  methylPREDNISolone sodium succinate, 20 mg, Intravenous, Q12H  pantoprazole, 40 mg, Oral, QAM AC  risperiDONE, 0.5 mg, Oral, BID  rosuvastatin, 40 mg, Oral, Nightly  senna-docusate sodium, 2 tablet, Oral, BID  sertraline, 100 mg, Oral, Daily  sodium chloride, 10 mL, Intravenous, Q12H  sodium chloride, 10 mL, Intravenous, Q12H      heparin, 12 Units/kg/hr  Pharmacy to Dose Heparin,         Assessment:  Non-STEMI, likely type II secondary to tachycardia  ASCVD status post CABG (remote) with recent cardiac catheterization on 2/18/2023 revealing patent LIMA to LAD, occluded vein graft to LCx, RCA with excellent collaterals from distal LAD to RCA, and chronic calcific 95% stenosis in the mid left circumflex.   Paroxysmal atrial fibrillation with RVR.  Currently chronically anticoagulated on Eliquis for stroke prophylaxis.  Tobacco abuse      Recommendations:  Tachycardia now resolved  Plan for stress test to reevaluate the impact of the mid left circumflex lesion.  Continue Plavix and rosuvastatin  Now in sinus rhythm status post cardioversion.  All rate lowering agents held due to bradycardia.  Eliquis held in anticipation of potential heart catheterization.  Heparin drip initiated  Recommend cessation    Case discussed with Dr. Alfonso and plan of care reflects his recommendations.  I discussed the patients findings and my recommendations with patient.        Electronically signed by PAULA Crocker, 01/31/24,  1:58 PM EST.    .Electronically signed by Jasmina Alfonso MD, 01/31/24, 2:04 PM EST.

## 2024-01-31 NOTE — SIGNIFICANT NOTE
01/31/24 1449   OTHER   Discipline physical therapist   Rehab Time/Intention   Session Not Performed unable to treat, medical status change  (Pt on BiPAP this PM. Will continue to follow and progress w/i tolerance.)

## 2024-01-31 NOTE — PROGRESS NOTES
HEPARIN INFUSION  Brenda Munroe is a  73 y.o. female receiving heparin infusion.     Therapy for (VTE/Cardiac):    cardiac  Patient Dosing Weight:   70   kg  Initial Bolus (Y/N):     no  Any Bolus (Y/N):    yes     Signs or Symptoms of Bleeding:   no    Cardiac or Other (Not VTE)   Initial Bolus: 60 units/kg (Max 4,000 units)  Initial rate: 12 units/kg/hr (Max 1,000 units/hr)   Anti Xa Rebolus Infusion Hold time Change infusion Dose (Units/kg/hr) Next Anti Xa or aPTT Level Due   < 0.11 50 Units/kg  (4000 Units Max) None Increase by  3 Units/kg/hr 6 hours   0.11- 0.19 25 Units/kg  (2000 Units Max) None Increase by  2 Units/kg/hr 6 hours   0.2 - 0.29 0 None Increase by  1 Units/kg/hr 6 hours   0.3 - 0.5 0 None No Change 6 hours (after 2 consecutive levels in range check q24h @0700)   0.51 - 0.6 0 None Decrease by  1 Units/kg/hr 6 hours   0.61 - 0.8 0 30 Minutes Decrease by  2 Units/kg/hr 6 hours   0.81 - 1 0 60 Minutes Decrease by  3 Units/kg/hr 6 hours   >1 0 Hold  After Anti Xa less than 0.5 decrease previous rate by  4 Units/kg/hr  Every 2 hours until Anti Xa  less than 0.5 then when infusion restarts in 6 hours         Recommend anti-Xa every 6 hours.          Date   Time   Anti-Xa Current Rate (Unit/kg/hr) Bolus   (Units) Rate Change   (Unit/kg/hr) New Rate (Unit/kg/hr) Next   Anti-Xa Comments  Pump Check Daily   1/31 0942 > 1.1 0 no - - 1700 S/p eliquis, holding infusion, no s/s bleeding ,  d/w   Tati GARCIA                                                                                                                                                                                                                                 Pharmacy will continue to follow anti-Xa results and monitor for signs and symptoms of bleeding or thrombosis.    Gabe Morrow, PharmD  01/31/24 09:26 EST

## 2024-01-31 NOTE — PLAN OF CARE
Goal Outcome Evaluation:           Progress: improving  Outcome Evaluation: Pt VSS at this time on BIPAP, patient tolerating well. Sinus khadijah on monitor. Patient has no complaints at this time. Bed low, locked, and call light in reach.

## 2024-01-31 NOTE — PROGRESS NOTES
[facility] HOSPITALIST PROGRESS NOTE    Subjective     History:   Brenda Munroe is a 73 y.o. female admitted on 1/22/2024 secondary to A-fib     Procedures:   1/30/24: Electrical cardioversion     CC: Follow up resp failure, Afib    Patient seen and examined with JOSE Duffy. Awake and alert but confused. Reports improvement in her dyspnea. No reported CP or palpitations. BP stable. HR in the 40's intermittently. No acute events overnight per RN.     History taken from: patient, chart, and RN.      Objective     Vital Signs  Temp:  [97.5 °F (36.4 °C)-99.2 °F (37.3 °C)] 97.5 °F (36.4 °C)  Heart Rate:  [40-64] 52  Resp:  [16-29] 24  BP: (110-170)/(45-84) 110/54    Intake/Output Summary (Last 24 hours) at 1/31/2024 1356  Last data filed at 1/31/2024 1200  Gross per 24 hour   Intake 508 ml   Output 750 ml   Net -242 ml         Physical Exam:    General:    Awake, alert but confused, in no acute distress   Heart:      Normal S1 and S2. Regular rate and rhythm.     Lungs:     Respirations regular, even and unlabored. Diminished breath sounds. No wheezes, rales or rhonchi.   Abdomen:   Soft and nontender. No guarding, rebound tenderness or  organomegaly noted. Bowel sounds present x 4.   Extremities:  No clubbing, cyanosis or edema noted.      Results Review:    Results from last 7 days   Lab Units 01/31/24  0120 01/30/24  0207 01/28/24  0433 01/27/24  1254 01/27/24  0030 01/26/24  0148 01/25/24  0213   WBC 10*3/mm3 5.50 5.96 5.22 6.77 5.85 5.15 5.14   HEMOGLOBIN g/dL 11.1* 12.5 12.8 12.7 11.9* 11.3* 10.3*   PLATELETS 10*3/mm3 117* 123* 132* 138* 129* 115* 114*     Results from last 7 days   Lab Units 01/31/24  0120 01/30/24  0207 01/28/24  1431 01/28/24  0433 01/27/24  1715 01/27/24  1254 01/27/24  0030 01/26/24  0148 01/25/24  0213   SODIUM mmol/L 140 139  --  140  --  135* 140 142 146*   POTASSIUM mmol/L 3.7 4.1 4.7 3.2* 3.5 3.9 3.2* 3.3* 3.7   CHLORIDE mmol/L 105 104  --  97*  --  94* 92* 90* 97*   CO2  mmol/L 27.6 29.6*  --  34.4*  --  31.2* 42.4* 44.4* 41.9*   BUN mg/dL 20 27*  --  30*  --  28* 26* 24* 21   CREATININE mg/dL 0.71 0.89  --  1.02*  --  1.13* 1.05* 0.99 1.02*   CALCIUM mg/dL 8.8 8.9  --  9.0  --  8.9 9.3 9.2 9.2   GLUCOSE mg/dL 130* 114*  --  98  --  153* 88 111* 92     Results from last 7 days   Lab Units 01/30/24  0207 01/27/24  1254 01/27/24  0030 01/26/24  0148 01/25/24  0213   BILIRUBIN mg/dL 0.3 0.4 0.5 0.4 0.2   ALK PHOS U/L 56 49 52 50 51   AST (SGOT) U/L 21 15 14 15 14   ALT (SGPT) U/L 18 11 14 15 14     Results from last 7 days   Lab Units 01/30/24  0207 01/28/24  0433 01/27/24  0030 01/26/24  0148   MAGNESIUM mg/dL 2.0 2.9* 1.5* 1.5*     Results from last 7 days   Lab Units 01/31/24  0928   INR  1.32*             Imaging Results (Last 24 Hours)       ** No results found for the last 24 hours. **              Medications:  [Held by provider] apixaban, 5 mg, Oral, Q12H  baclofen, 5 mg, Oral, BID  budesonide, 0.5 mg, Nebulization, BID - RT  castor oil-balsam peru, 1 application , Topical, Q12H  cholecalciferol, 50,000 Units, Oral, Weekly  clopidogrel, 75 mg, Oral, Daily  donepezil, 10 mg, Oral, Nightly  ferrous sulfate, 325 mg, Oral, BID  ipratropium, 0.5 mg, Nebulization, 4x Daily - RT  lamoTRIgine, 100 mg, Oral, Daily  lamoTRIgine, 25 mg, Oral, Nightly  levothyroxine, 75 mcg, Oral, Q AM  memantine, 10 mg, Oral, Q12H  methylPREDNISolone sodium succinate, 20 mg, Intravenous, Q12H  pantoprazole, 40 mg, Oral, QAM AC  risperiDONE, 0.5 mg, Oral, BID  rosuvastatin, 40 mg, Oral, Nightly  senna-docusate sodium, 2 tablet, Oral, BID  sertraline, 100 mg, Oral, Daily  sodium chloride, 10 mL, Intravenous, Q12H  sodium chloride, 10 mL, Intravenous, Q12H      heparin, 12 Units/kg/hr  Pharmacy to Dose Heparin,               Assessment & Plan   Acute exacerbation of COPD: Cont steroids and nebs. Further taper steroids.     Left-sided pneumonia: CT revealed groundglass opacities in the MIRA. (+) MRSA PCR.  Legionella urine antigen negative. Completed course of Rocephin and Doxycycline.     Acute hypercapnic respiratory failure: Likely 2/2 above, left-sided pneumonia and concern for component of volume overload. Improved with steroids, antibiotics, diuresis, nebs and BiPAP. SS has contacted SNF regarding arranging BiPAP at SNF upon discharge. Pulm input appreciated.     Hypotension: Likely 2/2 intravascular volume depletion. Diuresis held and antihypertensives held or adjusted. Briefly required vasopressor support. Improved with IVF's and albumin. Cont to monitor.     Paroxysmal Afib with RVR: Troponin's elevated and thought to be type II. Echo reveals an EF of 61-65%. S/P successful electrical cardioversion. Holding metoprolol and Digoxin for now per cards recs as rate has dropped into the 40's s/p CV. Cards has transitioned from Eliquis to heparin gtt for stroke prevention with plans for repeat stress and possible LHC. Monitor on telemetry. Cardiology input appreciated.     Type II NSTEMI: Nonspecific changes on EKG. No reported CP. No evidence of PE. Echo as above. S/P LHC in 2/23. Cardiology now planning for repeat stress and possible LHC. Cont medical management. Monitor on telemetry.     Abnormal UA/possible UTI: Urine culture revealed low colony count of Klebsiella pneumoniae. Completed course of antibiotics as above.     (+) blood culture: 1/2 blood cultures revealed Strep mitis/oralis which is likely a contaminant.     Hypokalemia: K+ improved with supplementation and stable today. Mg now 2 with supplementation. Cont to monitor.     DVT PPX: Heparin gtt     Discussed with cardiology APRN.      Disposition Likely return to [facility] when medically stable/pending cardiology follow up.     Prasad Romero DO  01/31/24  13:56 EST

## 2024-01-31 NOTE — PLAN OF CARE
Problem: Adult Inpatient Plan of Care  Goal: Plan of Care Review  Outcome: Ongoing, Progressing  Goal: Patient-Specific Goal (Individualized)  Outcome: Ongoing, Progressing  Goal: Absence of Hospital-Acquired Illness or Injury  Outcome: Ongoing, Progressing  Intervention: Identify and Manage Fall Risk  Recent Flowsheet Documentation  Taken 1/31/2024 1700 by Tati Appiah RN  Safety Promotion/Fall Prevention:   activity supervised   assistive device/personal items within reach   clutter free environment maintained   fall prevention program maintained   nonskid shoes/slippers when out of bed   room organization consistent   safety round/check completed  Taken 1/31/2024 1500 by Tati Appiah RN  Safety Promotion/Fall Prevention:   activity supervised   assistive device/personal items within reach   clutter free environment maintained   fall prevention program maintained   nonskid shoes/slippers when out of bed   room organization consistent   safety round/check completed  Taken 1/31/2024 1400 by Tati Appiah RN  Safety Promotion/Fall Prevention:   activity supervised   assistive device/personal items within reach   clutter free environment maintained   fall prevention program maintained   nonskid shoes/slippers when out of bed   room organization consistent   safety round/check completed  Taken 1/31/2024 1300 by Tati Appiah RN  Safety Promotion/Fall Prevention:   activity supervised   assistive device/personal items within reach   clutter free environment maintained   fall prevention program maintained   nonskid shoes/slippers when out of bed   room organization consistent   safety round/check completed  Taken 1/31/2024 1100 by Tati Appiah RN  Safety Promotion/Fall Prevention:   activity supervised   assistive device/personal items within reach   clutter free environment maintained   fall prevention program maintained   nonskid shoes/slippers when out of bed   room organization consistent   safety  round/check completed  Taken 1/31/2024 0900 by Tati Appiah RN  Safety Promotion/Fall Prevention:   activity supervised   assistive device/personal items within reach   clutter free environment maintained   fall prevention program maintained   nonskid shoes/slippers when out of bed   room organization consistent   safety round/check completed  Taken 1/31/2024 0839 by Tati Appiah RN  Safety Promotion/Fall Prevention:   activity supervised   assistive device/personal items within reach   clutter free environment maintained   fall prevention program maintained   nonskid shoes/slippers when out of bed   room organization consistent   safety round/check completed  Taken 1/31/2024 0700 by Tati Appiah RN  Safety Promotion/Fall Prevention:   activity supervised   assistive device/personal items within reach   clutter free environment maintained   fall prevention program maintained   nonskid shoes/slippers when out of bed   room organization consistent   safety round/check completed  Intervention: Prevent and Manage VTE (Venous Thromboembolism) Risk  Recent Flowsheet Documentation  Taken 1/31/2024 1700 by Tati Appiah RN  Activity Management: activity encouraged  Taken 1/31/2024 1500 by Tati Appiah RN  Activity Management: activity encouraged  Taken 1/31/2024 1400 by Tati Appiah RN  Activity Management: activity encouraged  Taken 1/31/2024 1300 by Tati Appiah RN  Activity Management: activity encouraged  Taken 1/31/2024 1100 by Tati Appiah RN  Activity Management: activity encouraged  Taken 1/31/2024 0900 by Tati Appiah RN  Activity Management: activity encouraged  Taken 1/31/2024 0839 by Tati Appiah RN  Activity Management: activity encouraged  Taken 1/31/2024 0700 by Tati Appiah RN  Activity Management: activity encouraged  Intervention: Prevent Infection  Recent Flowsheet Documentation  Taken 1/31/2024 1700 by Tati Appiah RN  Infection Prevention:   rest/sleep promoted    single patient room provided  Taken 1/31/2024 1500 by Tati Appiah RN  Infection Prevention:   rest/sleep promoted   single patient room provided  Taken 1/31/2024 1400 by Tati Appiah RN  Infection Prevention:   rest/sleep promoted   single patient room provided  Taken 1/31/2024 1300 by Tati Appiah RN  Infection Prevention:   rest/sleep promoted   single patient room provided  Taken 1/31/2024 1100 by Tati Appiah RN  Infection Prevention:   rest/sleep promoted   single patient room provided  Taken 1/31/2024 0900 by Tati Appiah RN  Infection Prevention:   rest/sleep promoted   single patient room provided  Taken 1/31/2024 0839 by Tati Appiah RN  Infection Prevention:   rest/sleep promoted   single patient room provided  Taken 1/31/2024 0700 by Tati Appiah RN  Infection Prevention:   rest/sleep promoted   single patient room provided  Goal: Optimal Comfort and Wellbeing  Outcome: Ongoing, Progressing  Intervention: Provide Person-Centered Care  Recent Flowsheet Documentation  Taken 1/31/2024 1400 by Tati Appiah RN  Trust Relationship/Rapport:   care explained   choices provided   emotional support provided   empathic listening provided   questions answered   questions encouraged   reassurance provided   thoughts/feelings acknowledged  Taken 1/31/2024 0839 by Tati Appiah RN  Trust Relationship/Rapport:   care explained   choices provided   emotional support provided   empathic listening provided   questions answered   questions encouraged   reassurance provided   thoughts/feelings acknowledged  Goal: Readiness for Transition of Care  Outcome: Ongoing, Progressing     Problem: Fall Injury Risk  Goal: Absence of Fall and Fall-Related Injury  Outcome: Ongoing, Progressing  Intervention: Identify and Manage Contributors  Recent Flowsheet Documentation  Taken 1/31/2024 1700 by Tati Appiah RN  Medication Review/Management: medications reviewed  Taken 1/31/2024 1500 by Tati Appiah  RN  Medication Review/Management: medications reviewed  Taken 1/31/2024 1400 by Tati Appiah RN  Medication Review/Management: medications reviewed  Taken 1/31/2024 1300 by Tati Appiah RN  Medication Review/Management: medications reviewed  Taken 1/31/2024 1100 by Tati Appiah RN  Medication Review/Management: medications reviewed  Taken 1/31/2024 0900 by Tati Appiah RN  Medication Review/Management: medications reviewed  Taken 1/31/2024 0839 by Tati Appiah RN  Medication Review/Management: medications reviewed  Taken 1/31/2024 0700 by Tati Appiah RN  Medication Review/Management: medications reviewed  Intervention: Promote Injury-Free Environment  Recent Flowsheet Documentation  Taken 1/31/2024 1700 by Tati Appiah RN  Safety Promotion/Fall Prevention:   activity supervised   assistive device/personal items within reach   clutter free environment maintained   fall prevention program maintained   nonskid shoes/slippers when out of bed   room organization consistent   safety round/check completed  Taken 1/31/2024 1500 by Tati Appiah RN  Safety Promotion/Fall Prevention:   activity supervised   assistive device/personal items within reach   clutter free environment maintained   fall prevention program maintained   nonskid shoes/slippers when out of bed   room organization consistent   safety round/check completed  Taken 1/31/2024 1400 by Tati Appiah RN  Safety Promotion/Fall Prevention:   activity supervised   assistive device/personal items within reach   clutter free environment maintained   fall prevention program maintained   nonskid shoes/slippers when out of bed   room organization consistent   safety round/check completed  Taken 1/31/2024 1300 by Tati Appiah RN  Safety Promotion/Fall Prevention:   activity supervised   assistive device/personal items within reach   clutter free environment maintained   fall prevention program maintained   nonskid shoes/slippers when out of  bed   room organization consistent   safety round/check completed  Taken 1/31/2024 1100 by Tati Appiah RN  Safety Promotion/Fall Prevention:   activity supervised   assistive device/personal items within reach   clutter free environment maintained   fall prevention program maintained   nonskid shoes/slippers when out of bed   room organization consistent   safety round/check completed  Taken 1/31/2024 0900 by Tati Appiah RN  Safety Promotion/Fall Prevention:   activity supervised   assistive device/personal items within reach   clutter free environment maintained   fall prevention program maintained   nonskid shoes/slippers when out of bed   room organization consistent   safety round/check completed  Taken 1/31/2024 0839 by Tati Appiah RN  Safety Promotion/Fall Prevention:   activity supervised   assistive device/personal items within reach   clutter free environment maintained   fall prevention program maintained   nonskid shoes/slippers when out of bed   room organization consistent   safety round/check completed  Taken 1/31/2024 0700 by Tati Appiah RN  Safety Promotion/Fall Prevention:   activity supervised   assistive device/personal items within reach   clutter free environment maintained   fall prevention program maintained   nonskid shoes/slippers when out of bed   room organization consistent   safety round/check completed     Problem: Skin Injury Risk Increased  Goal: Skin Health and Integrity  Outcome: Ongoing, Progressing     Problem: Asthma Comorbidity  Goal: Maintenance of Asthma Control  Outcome: Ongoing, Progressing  Intervention: Maintain Asthma Symptom Control  Recent Flowsheet Documentation  Taken 1/31/2024 1700 by Tati Appiah RN  Medication Review/Management: medications reviewed  Taken 1/31/2024 1500 by Tati Appiah RN  Medication Review/Management: medications reviewed  Taken 1/31/2024 1400 by Tati Appiah RN  Medication Review/Management: medications reviewed  Taken  1/31/2024 1300 by Tati Appiah RN  Medication Review/Management: medications reviewed  Taken 1/31/2024 1100 by Tati Appiah RN  Medication Review/Management: medications reviewed  Taken 1/31/2024 0900 by Tati Appiah RN  Medication Review/Management: medications reviewed  Taken 1/31/2024 0839 by Tati Appiah RN  Medication Review/Management: medications reviewed  Taken 1/31/2024 0700 by Tati Appiah RN  Medication Review/Management: medications reviewed     Problem: Behavioral Health Comorbidity  Goal: Maintenance of Behavioral Health Symptom Control  Outcome: Ongoing, Progressing  Intervention: Maintain Behavioral Health Symptom Control  Recent Flowsheet Documentation  Taken 1/31/2024 1700 by Tati Appiah RN  Medication Review/Management: medications reviewed  Taken 1/31/2024 1500 by Tati Appiah RN  Medication Review/Management: medications reviewed  Taken 1/31/2024 1400 by Tati Appiah RN  Medication Review/Management: medications reviewed  Taken 1/31/2024 1300 by Tati Appiah RN  Medication Review/Management: medications reviewed  Taken 1/31/2024 1100 by Tati Appiah RN  Medication Review/Management: medications reviewed  Taken 1/31/2024 0900 by Tati Appiah RN  Medication Review/Management: medications reviewed  Taken 1/31/2024 0839 by Tati Appiah RN  Medication Review/Management: medications reviewed  Taken 1/31/2024 0700 by Tati Appiah RN  Medication Review/Management: medications reviewed     Problem: COPD (Chronic Obstructive Pulmonary Disease) Comorbidity  Goal: Maintenance of COPD Symptom Control  Outcome: Ongoing, Progressing  Intervention: Maintain COPD-Symptom Control  Recent Flowsheet Documentation  Taken 1/31/2024 1700 by Tati Appiah RN  Medication Review/Management: medications reviewed  Taken 1/31/2024 1500 by Tati Appiah RN  Medication Review/Management: medications reviewed  Taken 1/31/2024 1400 by Tati Appiah RN  Medication Review/Management:  medications reviewed  Taken 1/31/2024 1300 by Tati Appiah RN  Medication Review/Management: medications reviewed  Taken 1/31/2024 1100 by Tati Appiah RN  Medication Review/Management: medications reviewed  Taken 1/31/2024 0900 by Tati Appiah RN  Medication Review/Management: medications reviewed  Taken 1/31/2024 0839 by Tati Appiah RN  Medication Review/Management: medications reviewed  Taken 1/31/2024 0700 by Tati Appiah RN  Medication Review/Management: medications reviewed     Problem: Diabetes Comorbidity  Goal: Blood Glucose Level Within Targeted Range  Outcome: Ongoing, Progressing     Problem: Heart Failure Comorbidity  Goal: Maintenance of Heart Failure Symptom Control  Outcome: Ongoing, Progressing  Intervention: Maintain Heart Failure-Management  Recent Flowsheet Documentation  Taken 1/31/2024 1700 by Tati Appiah RN  Medication Review/Management: medications reviewed  Taken 1/31/2024 1500 by Tati Appiah RN  Medication Review/Management: medications reviewed  Taken 1/31/2024 1400 by Tati Appiah RN  Medication Review/Management: medications reviewed  Taken 1/31/2024 1300 by Tati Appiah RN  Medication Review/Management: medications reviewed  Taken 1/31/2024 1100 by Tati Appiah RN  Medication Review/Management: medications reviewed  Taken 1/31/2024 0900 by Tati Appiah RN  Medication Review/Management: medications reviewed  Taken 1/31/2024 0839 by Tati Appiah RN  Medication Review/Management: medications reviewed  Taken 1/31/2024 0700 by Tati Appiah RN  Medication Review/Management: medications reviewed     Problem: Hypertension Comorbidity  Goal: Blood Pressure in Desired Range  Outcome: Ongoing, Progressing  Intervention: Maintain Blood Pressure Management  Recent Flowsheet Documentation  Taken 1/31/2024 1700 by Tait Appiah RN  Medication Review/Management: medications reviewed  Taken 1/31/2024 1500 by Tati Appiah RN  Medication Review/Management:  medications reviewed  Taken 1/31/2024 1400 by Tati Appiah RN  Medication Review/Management: medications reviewed  Taken 1/31/2024 1300 by Tati Appiah RN  Medication Review/Management: medications reviewed  Taken 1/31/2024 1100 by Tati Appiah RN  Medication Review/Management: medications reviewed  Taken 1/31/2024 0900 by Tati Appiah RN  Medication Review/Management: medications reviewed  Taken 1/31/2024 0839 by Tati Appiah RN  Medication Review/Management: medications reviewed  Taken 1/31/2024 0700 by Tati Appiah RN  Medication Review/Management: medications reviewed     Problem: Obstructive Sleep Apnea Risk or Actual Comorbidity Management  Goal: Unobstructed Breathing During Sleep  Outcome: Ongoing, Progressing     Problem: Osteoarthritis Comorbidity  Goal: Maintenance of Osteoarthritis Symptom Control  Outcome: Ongoing, Progressing  Intervention: Maintain Osteoarthritis Symptom Control  Recent Flowsheet Documentation  Taken 1/31/2024 1700 by Tati Appiah RN  Activity Management: activity encouraged  Medication Review/Management: medications reviewed  Taken 1/31/2024 1500 by Tati Appiah RN  Activity Management: activity encouraged  Medication Review/Management: medications reviewed  Taken 1/31/2024 1400 by Tati Appiah RN  Activity Management: activity encouraged  Medication Review/Management: medications reviewed  Taken 1/31/2024 1300 by Tati Appiah RN  Activity Management: activity encouraged  Medication Review/Management: medications reviewed  Taken 1/31/2024 1100 by Tati Appiah RN  Activity Management: activity encouraged  Medication Review/Management: medications reviewed  Taken 1/31/2024 0900 by Tati Appiah RN  Activity Management: activity encouraged  Medication Review/Management: medications reviewed  Taken 1/31/2024 0839 by Tati Appiah RN  Activity Management: activity encouraged  Medication Review/Management: medications reviewed  Taken 1/31/2024 0700 by  Td, Tati, RN  Activity Management: activity encouraged  Medication Review/Management: medications reviewed     Problem: Pain Chronic (Persistent) (Comorbidity Management)  Goal: Acceptable Pain Control and Functional Ability  Outcome: Ongoing, Progressing  Intervention: Manage Persistent Pain  Recent Flowsheet Documentation  Taken 1/31/2024 1700 by Tati Appiah RN  Medication Review/Management: medications reviewed  Taken 1/31/2024 1500 by Tati Appiah RN  Medication Review/Management: medications reviewed  Taken 1/31/2024 1400 by Tati Appiah RN  Medication Review/Management: medications reviewed  Taken 1/31/2024 1300 by Tati Appiah RN  Medication Review/Management: medications reviewed  Taken 1/31/2024 1100 by Tati Appiah RN  Medication Review/Management: medications reviewed  Taken 1/31/2024 0900 by Tati Appiah RN  Medication Review/Management: medications reviewed  Taken 1/31/2024 0839 by Tati Appiah RN  Medication Review/Management: medications reviewed  Taken 1/31/2024 0700 by Tati Appiah RN  Medication Review/Management: medications reviewed  Intervention: Optimize Psychosocial Wellbeing  Recent Flowsheet Documentation  Taken 1/31/2024 1400 by Tati Appiah RN  Diversional Activities: television  Family/Support System Care:   self-care encouraged   support provided  Taken 1/31/2024 0839 by Tati Appiah RN  Diversional Activities: television  Family/Support System Care:   self-care encouraged   support provided     Problem: Seizure Disorder Comorbidity  Goal: Maintenance of Seizure Control  Outcome: Ongoing, Progressing     Problem: Noninvasive Ventilation Acute  Goal: Effective Unassisted Ventilation and Oxygenation  Outcome: Ongoing, Progressing   Goal Outcome Evaluation:   Pt alert but has become increasingly confused this evening. Pt placed on the bipap. MD made aware. No other changes this shift. Bed is locked in low position with call light in reach.

## 2024-02-01 ENCOUNTER — APPOINTMENT (OUTPATIENT)
Dept: NUCLEAR MEDICINE | Facility: HOSPITAL | Age: 74
DRG: 193 | End: 2024-02-01
Payer: MEDICARE

## 2024-02-01 ENCOUNTER — APPOINTMENT (OUTPATIENT)
Dept: CARDIOLOGY | Facility: HOSPITAL | Age: 74
DRG: 193 | End: 2024-02-01
Payer: MEDICARE

## 2024-02-01 LAB
ANION GAP SERPL CALCULATED.3IONS-SCNC: 6.8 MMOL/L (ref 5–15)
BASOPHILS # BLD AUTO: 0.01 10*3/MM3 (ref 0–0.2)
BASOPHILS NFR BLD AUTO: 0.2 % (ref 0–1.5)
BH CV NUCLEAR PRIOR STUDY: 3
BH CV REST NUCLEAR ISOTOPE DOSE: 10.6 MCI
BH CV STRESS BP STAGE 1: NORMAL
BH CV STRESS COMMENTS STAGE 1: NORMAL
BH CV STRESS DOSE REGADENOSON STAGE 1: 0.4
BH CV STRESS DURATION MIN STAGE 1: 0
BH CV STRESS DURATION SEC STAGE 1: 10
BH CV STRESS HR STAGE 1: 78
BH CV STRESS NUCLEAR ISOTOPE DOSE: 30.2 MCI
BH CV STRESS PROTOCOL 1: NORMAL
BH CV STRESS RECOVERY BP: NORMAL MMHG
BH CV STRESS RECOVERY HR: 75 BPM
BH CV STRESS STAGE 1: 1
BUN SERPL-MCNC: 14 MG/DL (ref 8–23)
BUN/CREAT SERPL: 16.9 (ref 7–25)
CALCIUM SPEC-SCNC: 8.8 MG/DL (ref 8.6–10.5)
CHLORIDE SERPL-SCNC: 105 MMOL/L (ref 98–107)
CO2 SERPL-SCNC: 27.2 MMOL/L (ref 22–29)
CREAT SERPL-MCNC: 0.83 MG/DL (ref 0.57–1)
DEPRECATED RDW RBC AUTO: 69.1 FL (ref 37–54)
EGFRCR SERPLBLD CKD-EPI 2021: 73.6 ML/MIN/1.73
EOSINOPHIL # BLD AUTO: 0 10*3/MM3 (ref 0–0.4)
EOSINOPHIL NFR BLD AUTO: 0 % (ref 0.3–6.2)
ERYTHROCYTE [DISTWIDTH] IN BLOOD BY AUTOMATED COUNT: 20.1 % (ref 12.3–15.4)
GEN 5 2HR TROPONIN T REFLEX: 52 NG/L
GLUCOSE SERPL-MCNC: 89 MG/DL (ref 65–99)
HCT VFR BLD AUTO: 38.1 % (ref 34–46.6)
HGB BLD-MCNC: 11.4 G/DL (ref 12–15.9)
IMM GRANULOCYTES # BLD AUTO: 0.08 10*3/MM3 (ref 0–0.05)
IMM GRANULOCYTES NFR BLD AUTO: 1.4 % (ref 0–0.5)
LV EF NUC BP: 66 %
LYMPHOCYTES # BLD AUTO: 1.02 10*3/MM3 (ref 0.7–3.1)
LYMPHOCYTES NFR BLD AUTO: 18.3 % (ref 19.6–45.3)
MAGNESIUM SERPL-MCNC: 1.9 MG/DL (ref 1.6–2.4)
MAXIMAL PREDICTED HEART RATE: 145 BPM
MCH RBC QN AUTO: 27.7 PG (ref 26.6–33)
MCHC RBC AUTO-ENTMCNC: 29.9 G/DL (ref 31.5–35.7)
MCV RBC AUTO: 92.7 FL (ref 79–97)
MONOCYTES # BLD AUTO: 0.35 10*3/MM3 (ref 0.1–0.9)
MONOCYTES NFR BLD AUTO: 6.3 % (ref 5–12)
NEUTROPHILS NFR BLD AUTO: 4.12 10*3/MM3 (ref 1.7–7)
NEUTROPHILS NFR BLD AUTO: 73.8 % (ref 42.7–76)
NRBC BLD AUTO-RTO: 0 /100 WBC (ref 0–0.2)
PERCENT MAX PREDICTED HR: 53.79 %
PLATELET # BLD AUTO: 117 10*3/MM3 (ref 140–450)
PMV BLD AUTO: 11.8 FL (ref 6–12)
POTASSIUM SERPL-SCNC: 3.4 MMOL/L (ref 3.5–5.2)
POTASSIUM SERPL-SCNC: 4.1 MMOL/L (ref 3.5–5.2)
RBC # BLD AUTO: 4.11 10*6/MM3 (ref 3.77–5.28)
SODIUM SERPL-SCNC: 139 MMOL/L (ref 136–145)
STRESS BASELINE BP: NORMAL MMHG
STRESS BASELINE HR: 52 BPM
STRESS PERCENT HR: 63 %
STRESS POST PEAK BP: NORMAL MMHG
STRESS POST PEAK HR: 78 BPM
STRESS TARGET HR: 123 BPM
TROPONIN T DELTA: -9 NG/L
TROPONIN T SERPL HS-MCNC: 61 NG/L
UFH PPP CHRO-ACNC: 1 IU/ML (ref 0.3–0.7)
UFH PPP CHRO-ACNC: >1.1 IU/ML (ref 0.3–0.7)
UFH PPP CHRO-ACNC: >1.1 IU/ML (ref 0.3–0.7)
WBC NRBC COR # BLD AUTO: 5.58 10*3/MM3 (ref 3.4–10.8)

## 2024-02-01 PROCEDURE — 93017 CV STRESS TEST TRACING ONLY: CPT

## 2024-02-01 PROCEDURE — 94660 CPAP INITIATION&MGMT: CPT

## 2024-02-01 PROCEDURE — 80048 BASIC METABOLIC PNL TOTAL CA: CPT | Performed by: INTERNAL MEDICINE

## 2024-02-01 PROCEDURE — 85520 HEPARIN ASSAY: CPT | Performed by: INTERNAL MEDICINE

## 2024-02-01 PROCEDURE — 25010000002 REGADENOSON 0.4 MG/5ML SOLUTION: Performed by: INTERNAL MEDICINE

## 2024-02-01 PROCEDURE — 63710000001 PREDNISONE PER 1 MG: Performed by: INTERNAL MEDICINE

## 2024-02-01 PROCEDURE — 78452 HT MUSCLE IMAGE SPECT MULT: CPT

## 2024-02-01 PROCEDURE — 78452 HT MUSCLE IMAGE SPECT MULT: CPT | Performed by: INTERNAL MEDICINE

## 2024-02-01 PROCEDURE — 93018 CV STRESS TEST I&R ONLY: CPT | Performed by: INTERNAL MEDICINE

## 2024-02-01 PROCEDURE — A9500 TC99M SESTAMIBI: HCPCS | Performed by: INTERNAL MEDICINE

## 2024-02-01 PROCEDURE — 94799 UNLISTED PULMONARY SVC/PX: CPT

## 2024-02-01 PROCEDURE — 83735 ASSAY OF MAGNESIUM: CPT | Performed by: INTERNAL MEDICINE

## 2024-02-01 PROCEDURE — 25010000002 HYDRALAZINE PER 20 MG: Performed by: INTERNAL MEDICINE

## 2024-02-01 PROCEDURE — 85025 COMPLETE CBC W/AUTO DIFF WBC: CPT | Performed by: INTERNAL MEDICINE

## 2024-02-01 PROCEDURE — 97530 THERAPEUTIC ACTIVITIES: CPT

## 2024-02-01 PROCEDURE — 84484 ASSAY OF TROPONIN QUANT: CPT | Performed by: INTERNAL MEDICINE

## 2024-02-01 PROCEDURE — 0 TECHNETIUM SESTAMIBI: Performed by: INTERNAL MEDICINE

## 2024-02-01 PROCEDURE — 99232 SBSQ HOSP IP/OBS MODERATE 35: CPT | Performed by: INTERNAL MEDICINE

## 2024-02-01 PROCEDURE — 84132 ASSAY OF SERUM POTASSIUM: CPT | Performed by: INTERNAL MEDICINE

## 2024-02-01 PROCEDURE — 94664 DEMO&/EVAL PT USE INHALER: CPT

## 2024-02-01 PROCEDURE — 94761 N-INVAS EAR/PLS OXIMETRY MLT: CPT

## 2024-02-01 RX ORDER — ISOSORBIDE MONONITRATE 30 MG/1
15 TABLET, EXTENDED RELEASE ORAL
Status: DISCONTINUED | OUTPATIENT
Start: 2024-02-01 | End: 2024-02-02 | Stop reason: HOSPADM

## 2024-02-01 RX ORDER — REGADENOSON 0.08 MG/ML
0.4 INJECTION, SOLUTION INTRAVENOUS
Status: COMPLETED | OUTPATIENT
Start: 2024-02-01 | End: 2024-02-01

## 2024-02-01 RX ORDER — LISINOPRIL 10 MG/1
10 TABLET ORAL
Status: DISCONTINUED | OUTPATIENT
Start: 2024-02-01 | End: 2024-02-02 | Stop reason: HOSPADM

## 2024-02-01 RX ORDER — POTASSIUM CHLORIDE 20 MEQ/1
40 TABLET, EXTENDED RELEASE ORAL EVERY 4 HOURS
Status: COMPLETED | OUTPATIENT
Start: 2024-02-01 | End: 2024-02-01

## 2024-02-01 RX ADMIN — BUDESONIDE INHALATION 0.5 MG: 0.5 SUSPENSION RESPIRATORY (INHALATION) at 18:23

## 2024-02-01 RX ADMIN — IPRATROPIUM BROMIDE 0.5 MG: 0.5 SOLUTION RESPIRATORY (INHALATION) at 06:24

## 2024-02-01 RX ADMIN — LEVOTHYROXINE SODIUM 75 MCG: 0.07 TABLET ORAL at 06:58

## 2024-02-01 RX ADMIN — CASTOR OIL AND BALSAM, PERU 1 APPLICATION: 788; 87 OINTMENT TOPICAL at 20:49

## 2024-02-01 RX ADMIN — TECHNETIUM TC 99M SESTAMIBI 1 DOSE: 1 INJECTION INTRAVENOUS at 09:44

## 2024-02-01 RX ADMIN — MEMANTINE HYDROCHLORIDE 10 MG: 10 TABLET, FILM COATED ORAL at 11:57

## 2024-02-01 RX ADMIN — APIXABAN 5 MG: 5 TABLET, FILM COATED ORAL at 20:49

## 2024-02-01 RX ADMIN — HYDRALAZINE HYDROCHLORIDE 10 MG: 20 INJECTION INTRAMUSCULAR; INTRAVENOUS at 12:45

## 2024-02-01 RX ADMIN — Medication 10 ML: at 12:02

## 2024-02-01 RX ADMIN — FERROUS SULFATE TAB 325 MG (65 MG ELEMENTAL FE) 325 MG: 325 (65 FE) TAB at 20:47

## 2024-02-01 RX ADMIN — Medication 10 ML: at 20:50

## 2024-02-01 RX ADMIN — SERTRALINE 100 MG: 50 TABLET, FILM COATED ORAL at 11:58

## 2024-02-01 RX ADMIN — POTASSIUM CHLORIDE EXTENDED-RELEASE 40 MEQ: 1500 TABLET ORAL at 04:36

## 2024-02-01 RX ADMIN — MEMANTINE HYDROCHLORIDE 10 MG: 10 TABLET, FILM COATED ORAL at 20:49

## 2024-02-01 RX ADMIN — TECHNETIUM TC 99M SESTAMIBI 1 DOSE: 1 INJECTION INTRAVENOUS at 07:43

## 2024-02-01 RX ADMIN — BACLOFEN 5 MG: 10 TABLET ORAL at 11:57

## 2024-02-01 RX ADMIN — CLOPIDOGREL BISULFATE 75 MG: 75 TABLET, FILM COATED ORAL at 11:57

## 2024-02-01 RX ADMIN — LISINOPRIL 10 MG: 10 TABLET ORAL at 17:39

## 2024-02-01 RX ADMIN — DONEPEZIL HYDROCHLORIDE 10 MG: 5 TABLET, FILM COATED ORAL at 20:48

## 2024-02-01 RX ADMIN — IPRATROPIUM BROMIDE 0.5 MG: 0.5 SOLUTION RESPIRATORY (INHALATION) at 18:23

## 2024-02-01 RX ADMIN — CASTOR OIL AND BALSAM, PERU 1 APPLICATION: 788; 87 OINTMENT TOPICAL at 12:01

## 2024-02-01 RX ADMIN — LAMOTRIGINE 100 MG: 100 TABLET ORAL at 11:57

## 2024-02-01 RX ADMIN — PANTOPRAZOLE SODIUM 40 MG: 40 TABLET, DELAYED RELEASE ORAL at 11:58

## 2024-02-01 RX ADMIN — REGADENOSON 0.4 MG: 0.08 INJECTION, SOLUTION INTRAVENOUS at 09:44

## 2024-02-01 RX ADMIN — IPRATROPIUM BROMIDE 0.5 MG: 0.5 SOLUTION RESPIRATORY (INHALATION) at 12:12

## 2024-02-01 RX ADMIN — FERROUS SULFATE TAB 325 MG (65 MG ELEMENTAL FE) 325 MG: 325 (65 FE) TAB at 11:57

## 2024-02-01 RX ADMIN — ISOSORBIDE MONONITRATE 15 MG: 30 TABLET, EXTENDED RELEASE ORAL at 17:39

## 2024-02-01 RX ADMIN — LAMOTRIGINE 25 MG: 100 TABLET ORAL at 20:45

## 2024-02-01 RX ADMIN — POTASSIUM CHLORIDE EXTENDED-RELEASE 40 MEQ: 1500 TABLET ORAL at 11:58

## 2024-02-01 RX ADMIN — PREDNISONE 20 MG: 20 TABLET ORAL at 11:57

## 2024-02-01 RX ADMIN — RISPERIDONE 0.5 MG: 0.25 TABLET, FILM COATED ORAL at 11:57

## 2024-02-01 RX ADMIN — IPRATROPIUM BROMIDE 0.5 MG: 0.5 SOLUTION RESPIRATORY (INHALATION) at 00:13

## 2024-02-01 RX ADMIN — BACLOFEN 5 MG: 10 TABLET ORAL at 20:47

## 2024-02-01 RX ADMIN — RISPERIDONE 0.5 MG: 0.25 TABLET, FILM COATED ORAL at 20:49

## 2024-02-01 RX ADMIN — BUDESONIDE INHALATION 0.5 MG: 0.5 SUSPENSION RESPIRATORY (INHALATION) at 06:24

## 2024-02-01 RX ADMIN — DOCUSATE SODIUM 50 MG AND SENNOSIDES 8.6 MG 2 TABLET: 8.6; 5 TABLET, FILM COATED ORAL at 20:47

## 2024-02-01 RX ADMIN — ROSUVASTATIN CALCIUM 40 MG: 20 TABLET, FILM COATED ORAL at 20:49

## 2024-02-01 NOTE — PROGRESS NOTES
[facility]  Brenda Munroe is a  73 y.o. female receiving heparin infusion.     Therapy for (VTE/Cardiac):    cardiac  Patient Dosing Weight:   70   kg  Initial Bolus (Y/N):     no  Any Bolus (Y/N):    yes     Signs or Symptoms of Bleeding:   no    Cardiac or Other (Not VTE)   Initial Bolus: 60 units/kg (Max 4,000 units)  Initial rate: 12 units/kg/hr (Max 1,000 units/hr)   Anti Xa Rebolus Infusion Hold time Change infusion Dose (Units/kg/hr) Next Anti Xa or aPTT Level Due   < 0.11 50 Units/kg  (4000 Units Max) None Increase by  3 Units/kg/hr 6 hours   0.11- 0.19 25 Units/kg  (2000 Units Max) None Increase by  2 Units/kg/hr 6 hours   0.2 - 0.29 0 None Increase by  1 Units/kg/hr 6 hours   0.3 - 0.5 0 None No Change 6 hours (after 2 consecutive levels in range check q24h @0700)   0.51 - 0.6 0 None Decrease by  1 Units/kg/hr 6 hours   0.61 - 0.8 0 30 Minutes Decrease by  2 Units/kg/hr 6 hours   0.81 - 1 0 60 Minutes Decrease by  3 Units/kg/hr 6 hours   >1 0 Hold  After Anti Xa less than 0.5 decrease previous rate by  4 Units/kg/hr  Every 2 hours until Anti Xa  less than 0.5 then when infusion restarts in 6 hours         Recommend anti-Xa every 6 hours.          Date   Time   Anti-Xa Current Rate (Unit/kg/hr) Bolus   (Units) Rate Change   (Unit/kg/hr) New Rate (Unit/kg/hr) Next   Anti-Xa Comments  Pump Check Daily   1/31 0942 > 1.1 0 no - - 1700 S/p eliquis, holding infusion, no s/s bleeding ,  d/w   Tati GARCIA   1/31 1249 >1.10 - - - - 0200 Continuing hold.  Placeholder order discontinued for now.   2/1 0300 >1.1 - - - - 0900 Continuing to hold heparin. Let patients nurse, Gume know.    2/1 0838 >1.1 - - - - 1400 Continue to hold heparin. No s/s bleeding, d/w  Kennedi John RN                                                                                                                                                                                                Pharmacy will continue to follow anti-Xa results  and monitor for signs and symptoms of bleeding or thrombosis.      dawn  02/01/24

## 2024-02-01 NOTE — PROGRESS NOTES
[facility] HOSPITALIST PROGRESS NOTE    Subjective     History:   Brenda Munroe is a 73 y.o. female admitted on 1/22/2024 secondary to A-fib     Procedures:   1/30/24: Electrical cardioversion   2/1/24: Nuclear stress test    Myocardial perfusion imaging indicates a normal myocardial perfusion study with no evidence of ischemia.    (Calculated EF = 66%).    Breast attenuation artifact is present.    TID 1.03.    Findings consistent with a normal ECG stress test.    CC: Follow up resp failure, Afib    Patient seen and examined with Kennedi John RN. Awake and alert but confused. No reported CP or palpitations. BP intermittently elevated. HR stable. No acute events overnight per RN.     History taken from: patient, chart, and RN.      Objective     Vital Signs  Temp:  [97.5 °F (36.4 °C)-98.5 °F (36.9 °C)] 98.5 °F (36.9 °C)  Heart Rate:  [42-86] 76  Resp:  [13-25] 18  BP: (118-200)/() 157/108    Intake/Output Summary (Last 24 hours) at 2/1/2024 1740  Last data filed at 2/1/2024 1600  Gross per 24 hour   Intake 225 ml   Output 100 ml   Net 125 ml         Physical Exam:    General:    Awake, alert but confused, in no acute distress, pupils unequal   Heart:      Normal S1 and S2. Regular rate and rhythm.     Lungs:     Respirations regular, even and unlabored. Diminished breath sounds at bases. No wheezes, rales or rhonchi.   Abdomen:   Soft and nontender. No guarding, rebound tenderness or  organomegaly noted. Bowel sounds present x 4.   Extremities:  No clubbing, cyanosis or edema noted.      Results Review:    Results from last 7 days   Lab Units 02/01/24  0154 01/31/24  0120 01/30/24  0207 01/28/24  0433 01/27/24  1254 01/27/24  0030 01/26/24  0148   WBC 10*3/mm3 5.58 5.50 5.96 5.22 6.77 5.85 5.15   HEMOGLOBIN g/dL 11.4* 11.1* 12.5 12.8 12.7 11.9* 11.3*   PLATELETS 10*3/mm3 117* 117* 123* 132* 138* 129* 115*     Results from last 7 days   Lab Units 02/01/24  1151 02/01/24  0154 01/31/24  0120  01/30/24  0207 01/28/24  1431 01/28/24  0433 01/27/24  1715 01/27/24  1254 01/27/24  0030 01/26/24  0148   SODIUM mmol/L  --  139 140 139  --  140  --  135* 140 142   POTASSIUM mmol/L 4.1 3.4* 3.7 4.1 4.7 3.2* 3.5 3.9 3.2* 3.3*   CHLORIDE mmol/L  --  105 105 104  --  97*  --  94* 92* 90*   CO2 mmol/L  --  27.2 27.6 29.6*  --  34.4*  --  31.2* 42.4* 44.4*   BUN mg/dL  --  14 20 27*  --  30*  --  28* 26* 24*   CREATININE mg/dL  --  0.83 0.71 0.89  --  1.02*  --  1.13* 1.05* 0.99   CALCIUM mg/dL  --  8.8 8.8 8.9  --  9.0  --  8.9 9.3 9.2   GLUCOSE mg/dL  --  89 130* 114*  --  98  --  153* 88 111*     Results from last 7 days   Lab Units 01/30/24  0207 01/27/24  1254 01/27/24  0030 01/26/24  0148   BILIRUBIN mg/dL 0.3 0.4 0.5 0.4   ALK PHOS U/L 56 49 52 50   AST (SGOT) U/L 21 15 14 15   ALT (SGPT) U/L 18 11 14 15     Results from last 7 days   Lab Units 02/01/24  0154 01/30/24  0207 01/28/24  0433 01/27/24  0030 01/26/24  0148   MAGNESIUM mg/dL 1.9 2.0 2.9* 1.5* 1.5*     Results from last 7 days   Lab Units 01/31/24  0928   INR  1.32*     Results from last 7 days   Lab Units 02/01/24  1151 02/01/24  0154   HSTROP T ng/L 52* 61*         Imaging Results (Last 24 Hours)       Procedure Component Value Units Date/Time    CT Head Without Contrast [[id number]] Collected: 01/1950     Updated: 01/31/24 1950    Narrative:      INDICATION: Altered mental status.     COMPARISON: CT from 1/24/2024.     TECHNIQUE: Axial CT images of the head were obtained without IV  contrast. Coronal and sagittal reformations were reviewed.     FINDINGS:  Gray-white differentiation is relatively preserved. Moderate chronic  ischemic white matter changes. No mass, mass effect or midline shift. No  evidence of acute large territorial infarction or acute intracranial  hemorrhage. Ventricles appear normal in size. Basal cisterns are patent.  No depressed calvarial fracture. Small amount of fluid in the right  maxillary sinus.       Impression:       No acute intracranial process.        This report was finalized on 1/31/2024 7:48 PM by Alex Pallas, DO.                 Medications:  apixaban, 5 mg, Oral, Q12H  baclofen, 5 mg, Oral, BID  budesonide, 0.5 mg, Nebulization, BID - RT  castor oil-balsam peru, 1 application , Topical, Q12H  cholecalciferol, 50,000 Units, Oral, Weekly  clopidogrel, 75 mg, Oral, Daily  donepezil, 10 mg, Oral, Nightly  ferrous sulfate, 325 mg, Oral, BID  ipratropium, 0.5 mg, Nebulization, 4x Daily - RT  isosorbide mononitrate, 15 mg, Oral, Q24H  lamoTRIgine, 100 mg, Oral, Daily  lamoTRIgine, 25 mg, Oral, Nightly  levothyroxine, 75 mcg, Oral, Q AM  lisinopril, 10 mg, Oral, Q24H  memantine, 10 mg, Oral, Q12H  pantoprazole, 40 mg, Oral, QAM AC  predniSONE, 20 mg, Oral, Daily With Breakfast  risperiDONE, 0.5 mg, Oral, BID  rosuvastatin, 40 mg, Oral, Nightly  senna-docusate sodium, 2 tablet, Oral, BID  sertraline, 100 mg, Oral, Daily  sodium chloride, 10 mL, Intravenous, Q12H  sodium chloride, 10 mL, Intravenous, Q12H                   Assessment & Plan   Acute exacerbation of COPD: Cont steroids and nebs with steroid taper.     Left-sided pneumonia: CT revealed groundglass opacities in the MIRA. (+) MRSA PCR. Legionella urine antigen negative. Completed course of Rocephin and Doxycycline.     Acute hypercapnic respiratory failure: Likely 2/2 above, left-sided pneumonia and concern for component of volume overload. Improved with steroids, antibiotics, diuresis, nebs and BiPAP. SS has contacted SNF regarding arranging BiPAP at SNF upon discharge. Pulm input appreciated.     Hypotension: Likely 2/2 intravascular volume depletion. Diuresis held and antihypertensives held or adjusted. Briefly required vasopressor support. Improved with IVF's and albumin. BP now intermittently elevated and cardiology has started lisinopril. Cont to monitor.     Paroxysmal Afib with RVR: Troponin's elevated and thought to be type II. Echo reveals an EF of 61-65%.  S/P successful electrical cardioversion. Holding metoprolol and Digoxin for now per cards recs as rate has dropped into the 40's s/p CV. Cards previously transitioned from Eliquis to heparin gtt for stroke prevention with plans for repeat stress and possible LHC. Stress test normal and Eliquis has been resumed. Monitor on telemetry. Cardiology input appreciated.     Type II NSTEMI: Nonspecific changes on EKG. No reported CP. No evidence of PE. Echo as above. S/P LHC in 2/23. S/P nuclear stress test with no evidence of ischemia. Cont medical management. Monitor on telemetry.     Abnormal UA/possible UTI: Urine culture revealed low colony count of Klebsiella pneumoniae. Completed course of antibiotics as above.     (+) blood culture: 1/2 blood cultures revealed Strep mitis/oralis which is likely a contaminant.     Hypokalemia: K+ mildly low and replaced. Mg 1.9. Cont to monitor.     Dementia: Some worsening confusion noted yesterday with stable ABG and no acute findings on CT head. Confused but appears more back to baseline today. Cont supportive treatment.     DVT PPX: Heparin gtt with transition back to Eliquis.     Discussed with Dr. Alfonso.      Disposition Likely return to SNF when medically stable, possibly tomorrow.     Prasad Romero, DO  02/01/24  17:40 EST

## 2024-02-01 NOTE — PROGRESS NOTES
LOS: 8 days     Name: Brenda Munroe  Age/Sex: 73 y.o. female  :  1950        PCP: Bernadette Arevalo MD    Principal Problem:    A-fib  Active Problems:    Hypercapnic respiratory failure      Admission Information: Brenda Munroe is a 73 y.o. female with  coronary artery disease s/p CABG and [facility] 2023, paroxysmal atrial fibrillation, hypertension, hyperlipidemia, bilateral carotid stenosis without occlusion, hypothyroidism, arthritis, chronic headaches, dementia, physical debility and GERD.       Chief Complaint: Altered mental status, atrial fibrillation with RVR    Interval history: Remains in sinus rhythm since cardioversion.  Blood pressure elevated today.  Adjusting medications.  Stress test negative, will manage her CAD medically    Subjective         Vital Signs  Vital Signs (last 72 hrs)          0700   0659  0700   0659  0700   0659  0700   1514   Most Recent      Temp (°F) 97.1 -  97.9    97 -  99.2    97.5 -  98.5    97.6 -  97.7     97.7 (36.5)  1200    Heart Rate 66 -  98    42 -  96    40 -  67    49 -  86     70 02 1500    Resp  -   -   -      18 -  20     18  1222    BP 87/65 -  151/85    94/59 -  170/84    110/54 -  166/90    118/106 -  200/103     132/104  1500    SpO2 (%) 90 -  100    90 -  100    91 -  100    93 -  100     96  1500    Flow (L/min)   2      2      2      2     2  1345    Oxygen Concentration (%)   32    25 -  32      25       25  0226          Temp:  [97.5 °F (36.4 °C)-98.5 °F (36.9 °C)] 97.7 °F (36.5 °C)  Heart Rate:  [42-86] 70  Resp:  [-] 18  BP: (118-200)/() 132/104  Body mass index is 28.75 kg/m².      Intake/Output Summary (Last 24 hours) at 2024 1514  Last data filed at 2024 1200  Gross per 24 hour   Intake 225 ml   Output 250 ml   Net -25 ml     On examination:  Patient is a elderly female lying in bed, moving extremities, replying to questions.   No distress  Atraumatic normocephalic head,   Left pupil more constricted than the right  No wheezing or crackle  regular rate rhythm, normal S1-S2  Abdomen nontender nondistended  No edema      Telemetry: Sinus 50s to 60s     Results Review:   STRESS TEST WITH MYOCARDIAL PERFUSION ONE DAY 02/01/2024    Interpretation Summary    Myocardial perfusion imaging indicates a normal myocardial perfusion study with no evidence of ischemia.    (Calculated EF = 66%).    Breast attenuation artifact is present.    TID 1.03.    Findings consistent with a normal ECG stress test.    Results from last 7 days   Lab Units 02/01/24  0154 01/31/24  0120 01/30/24  0207 01/28/24  0433 01/27/24  1254 01/27/24  0030 01/26/24  0148   WBC 10*3/mm3 5.58 5.50 5.96 5.22 6.77 5.85 5.15   HEMOGLOBIN g/dL 11.4* 11.1* 12.5 12.8 12.7 11.9* 11.3*   PLATELETS 10*3/mm3 117* 117* 123* 132* 138* 129* 115*     Results from last 7 days   Lab Units 02/01/24  1151 02/01/24  0154 01/31/24  0120 01/30/24  0207 01/28/24  1431 01/28/24  0433 01/27/24  1715 01/27/24  1254 01/27/24  0030 01/26/24  0148   SODIUM mmol/L  --  139 140 139  --  140  --  135* 140 142   POTASSIUM mmol/L 4.1 3.4* 3.7 4.1 4.7 3.2* 3.5 3.9 3.2* 3.3*   CHLORIDE mmol/L  --  105 105 104  --  97*  --  94* 92* 90*   CO2 mmol/L  --  27.2 27.6 29.6*  --  34.4*  --  31.2* 42.4* 44.4*   BUN mg/dL  --  14 20 27*  --  30*  --  28* 26* 24*   CREATININE mg/dL  --  0.83 0.71 0.89  --  1.02*  --  1.13* 1.05* 0.99   CALCIUM mg/dL  --  8.8 8.8 8.9  --  9.0  --  8.9 9.3 9.2   GLUCOSE mg/dL  --  89 130* 114*  --  98  --  153* 88 111*     Results from last 7 days   Lab Units 02/01/24  1151 02/01/24  0154   HSTROP T ng/L 52* 61*       Results from last 7 days   Lab Units 01/31/24  0928   INR  1.32*       I reviewed the patient's new clinical results.  I reviewed the patient's new imaging results and agree with the interpretation.  I personally viewed and interpreted the patient's EKG/Telemetry  data      Medication Review:   [Held by provider] apixaban, 5 mg, Oral, Q12H  baclofen, 5 mg, Oral, BID  budesonide, 0.5 mg, Nebulization, BID - RT  castor oil-balsam peru, 1 application , Topical, Q12H  cholecalciferol, 50,000 Units, Oral, Weekly  clopidogrel, 75 mg, Oral, Daily  donepezil, 10 mg, Oral, Nightly  ferrous sulfate, 325 mg, Oral, BID  ipratropium, 0.5 mg, Nebulization, 4x Daily - RT  lamoTRIgine, 100 mg, Oral, Daily  lamoTRIgine, 25 mg, Oral, Nightly  levothyroxine, 75 mcg, Oral, Q AM  lisinopril, 10 mg, Oral, Q24H  memantine, 10 mg, Oral, Q12H  pantoprazole, 40 mg, Oral, QAM AC  predniSONE, 20 mg, Oral, Daily With Breakfast  risperiDONE, 0.5 mg, Oral, BID  rosuvastatin, 40 mg, Oral, Nightly  senna-docusate sodium, 2 tablet, Oral, BID  sertraline, 100 mg, Oral, Daily  sodium chloride, 10 mL, Intravenous, Q12H  sodium chloride, 10 mL, Intravenous, Q12H      Pharmacy to Dose Heparin,         Assessment:  Non-STEMI, likely type II secondary to tachycardia  ASCVD status post CABG (remote) with recent cardiac catheterization on 2/18/2023 revealing patent LIMA to LAD, occluded vein graft to LCx, RCA with excellent collaterals from distal LAD to RCA, and chronic calcific 95% stenosis in the mid left circumflex.   Paroxysmal atrial fibrillation with RVR.    Hypertension  Tobacco abuse      Recommendations:  Troponins continue to trend downward  Stress test did not reveal ischemia.  Will watch her clinically for now.  No indication for cath.  Can consider adding low-dose nitrates for now.  Maintaining sinus rhythm at a controlled rate.  As there is no need for invasive coronary angiogram we will renew her home Eliquis  Resume low-dose lisinopril today  Recommend cessation    Patient seen and evaluated by Dr. Alfonso.  Plan of care reflects his recommendations.        Electronically signed by PAULA Crocker, 02/01/24, 3:44 PM EST.    .Electronically signed by Jasmina Alfonso MD, 02/01/24, 3:52 PM  EST.

## 2024-02-01 NOTE — PLAN OF CARE
Goal Outcome Evaluation:           Progress: no change  Outcome Evaluation: Pt alert to self only. Went done for stress test-see results. PRN hydralazine given for HTN. VSS. Diet restarted. No needs at this time. Call light in hand.

## 2024-02-01 NOTE — PLAN OF CARE
Goal Outcome Evaluation:      Pt had no significant changes throughout shift, safety maintained, PT is NPO for possible procedure today. Continuing plan of care, call light within reach.

## 2024-02-01 NOTE — PROGRESS NOTES
HEPARIN INFUSION  Brenda Munroe is a  73 y.o. female receiving heparin infusion.     Therapy for (VTE/Cardiac):    cardiac  Patient Dosing Weight:   70   kg  Initial Bolus (Y/N):     no  Any Bolus (Y/N):    yes     Signs or Symptoms of Bleeding:   no    Cardiac or Other (Not VTE)   Initial Bolus: 60 units/kg (Max 4,000 units)  Initial rate: 12 units/kg/hr (Max 1,000 units/hr)   Anti Xa Rebolus Infusion Hold time Change infusion Dose (Units/kg/hr) Next Anti Xa or aPTT Level Due   < 0.11 50 Units/kg  (4000 Units Max) None Increase by  3 Units/kg/hr 6 hours   0.11- 0.19 25 Units/kg  (2000 Units Max) None Increase by  2 Units/kg/hr 6 hours   0.2 - 0.29 0 None Increase by  1 Units/kg/hr 6 hours   0.3 - 0.5 0 None No Change 6 hours (after 2 consecutive levels in range check q24h @0700)   0.51 - 0.6 0 None Decrease by  1 Units/kg/hr 6 hours   0.61 - 0.8 0 30 Minutes Decrease by  2 Units/kg/hr 6 hours   0.81 - 1 0 60 Minutes Decrease by  3 Units/kg/hr 6 hours   >1 0 Hold  After Anti Xa less than 0.5 decrease previous rate by  4 Units/kg/hr  Every 2 hours until Anti Xa  less than 0.5 then when infusion restarts in 6 hours         Recommend anti-Xa every 6 hours.          Date   Time   Anti-Xa Current Rate (Unit/kg/hr) Bolus   (Units) Rate Change   (Unit/kg/hr) New Rate (Unit/kg/hr) Next   Anti-Xa Comments  Pump Check Daily   1/31 0942 > 1.1 0 no - - 1700 S/p eliquis, holding infusion, no s/s bleeding ,  d/w   Tati GARCIA   1/31 9829 >1.10 - - - - 0200 Continuing hold.  Placeholder order discontinued for now.   2/1 0300 >1.1 - - - - 0900 Continuing to hold heparin. Let patients nurseGume know.                                                                                                                                                                                                            Pharmacy will continue to follow anti-Xa results and monitor for signs and symptoms of bleeding or thrombosis.    Renetta Early  PharmD  02:59 EST.  02/01/24

## 2024-02-01 NOTE — THERAPY TREATMENT NOTE
Acute Care - Physical Therapy Treatment Note  [facility]     Patient Name: Brenda Munroe  : 1950  MRN: [id number]  Today's Date: 2024      Visit Dx:     ICD-10-CM ICD-9-CM   1. Atrial fibrillation with RVR  I48.91 427.31   2. Pneumonia of left upper lobe due to infectious organism  J18.9 486   3. Acute UTI  N39.0 599.0     Patient Active Problem List   Diagnosis    Psychosis    Severe recurrent major depression with psychotic features    COPD (chronic obstructive pulmonary disease)    Coronary artery disease    Disease of thyroid gland    Arthritis    Hypertension    Paroxysmal atrial fibrillation    Chronic headaches    Vision changes    Carotid stenosis, asymptomatic, bilateral    Bradycardia, sinus    Acute respiratory failure with hypoxia and hypercapnia    NSTEMI (non-ST elevated myocardial infarction)    A-fib    Hypercapnic respiratory failure     Past Medical History:   Diagnosis Date    Anxiety     Arthritis     Bell's palsy     Bladder prolapse, female, acquired     Carotid stenosis     COPD (chronic obstructive pulmonary disease)     COPD (chronic obstructive pulmonary disease)     Coronary artery disease     Dementia     Dementia     Depression     Difficulty walking     Disease of thyroid gland     Frequency of urination     GERD (gastroesophageal reflux disease)     Heart attack     Hyperlipidemia     Hypertension     Irregular heart beat     Peptic ulcer disease      Past Surgical History:   Procedure Laterality Date    CARDIAC CATHETERIZATION N/A 2023    Procedure: Left Heart Cath;  Surgeon: Tab Cifuentes MD;  Location: [facility] CATH INVASIVE LOCATION;  Service: Cardiology;  Laterality: N/A;    CARDIAC SURGERY      open heart  in     CYSTOSCOPY N/A 2017    Procedure: CYSTOSCOPY;  Surgeon: Karl Nicolas DO;  Location: [facility] OR;  Service:     OTHER SURGICAL HISTORY      states she had fluid drained no surgery    VAGINAL HYSTERECTOMY W/ ANTERIOR AND  POSTERIOR VAGINAL REPAIR N/A 11/8/2017    Procedure: VAGINAL HYSTERECTOMY WITH ANTERIOR, POSTERIOR, AND ENTEROCELE VAGINAL REPAIR;  Surgeon: Karl Nicolas DO;  Location:  [facility] OR;  Service:     VAGINAL VAULT SUSPENSION N/A 11/8/2017    Procedure: VAGINAL VAULT SUSPENSION ;  Surgeon: Karl Nicolas DO;  Location:  [facility] OR;  Service:      PT Assessment (last 12 hours)       PT Evaluation and Treatment       Row Name 02/01/24 1430          Physical Therapy Time and Intention    Subjective Information pain  in left great toe  -KP     Document Type therapy note (daily note)  -KP     Mode of Treatment physical therapy  -KP     Patient Effort good  -KP     Symptoms Noted During/After Treatment fatigue;other (see comments)  fear of falling  -KP       Row Name 02/01/24 1430          General Information    Patient Profile Reviewed yes  -KP     Patient Observations cooperative;alert  -     General Observations of Patient Pt is very confused but was cooperative with therapy this date and was able to follow commands most of the time with vc and tactile cues  -     Existing Precautions/Restrictions fall  -KP     Limitations/Impairments safety/cognitive  -KP       Row Name 02/01/24 1430          Pain    Additional Documentation Pain Scale: FACES Pre/Post-Treatment (Group)  -KP       Row Name 02/01/24 1430          Pain Scale: FACES Pre/Post-Treatment    Pain: FACES Scale, Pretreatment 0-->no hurt  -KP     Posttreatment Pain Rating 4-->hurts little more  -KP     Pain Location - Side/Orientation Left  -KP     Pain Location - toe  -KP       Row Name 02/01/24 1430          Cognition    Affect/Mental Status (Cognition) confused  -KP     Follows Commands (Cognition) follows one-step commands;75-90% accuracy;delayed response/completion;increased processing time needed;initiation impaired;physical/tactile prompts required;repetition of directions required;verbal cues/prompting required  -KP     Cognitive Function  safety deficit  -     Personal Safety Interventions fall prevention program maintained;gait belt;muscle strengthening facilitated;nonskid shoes/slippers when out of bed;supervised activity  -       Row Name 02/01/24 1430          Bed Mobility    Bed Mobility supine-sit;sit-supine  -     Supine-Sit Converse (Bed Mobility) moderate assist (50% patient effort);1 person assist  -     Sit-Supine [place] (Bed Mobility) minimum assist (75% patient effort);1 person assist  -     Bed Mobility, Safety Issues decreased use of arms for pushing/pulling;decreased use of legs for bridging/pushing;cognitive deficits limit understanding  -     Assistive Device (Bed Mobility) bed rails;draw sheet;head of bed elevated  -       Row Name 02/01/24 1430          Transfers    Transfers sit-stand transfer;stand-sit transfer  -       Row Name 02/01/24 1430          Sit-Stand Transfer    Sit-Stand [place] (Transfers) minimum assist (75% patient effort);moderate assist (50% patient effort);1 person assist;verbal cues;nonverbal cues (demo/gesture)  -     Assistive Device (Sit-Stand Transfers) walker, front-wheeled  -     Comment, (Sit-Stand Transfer) Performed 3x  -       Row Name 02/01/24 1430          Stand-Sit Transfer    Stand-Sit [place] (Transfers) minimum assist (75% patient effort);1 person assist;verbal cues  -     Assistive Device (Stand-Sit Transfers) walker, front-wheeled  -       Row Name 02/01/24 1430          Gait/Stairs (Locomotion)    Converse Level (Gait) moderate assist (50% patient effort);minimum assist (75% patient effort);1 person assist;verbal cues;nonverbal cues (demo/gesture)  -     Assistive Device (Gait) walker, front-wheeled  [facility]     Distance in Feet (Gait) --  few steps to the side  -     Comment, (Gait/Stairs) Pt required max cues and min/moderate assist for balance and sequencing with walker to complete a few steps to the side.  -       Row Name 02/01/24 1430           Balance    Balance Assessment sitting static balance;sitting dynamic balance;standing static balance;standing dynamic balance  -KP     Static Sitting Balance standby assist;1-person assist  -KP     Dynamic Sitting Balance contact guard;1-person assist  -KP     Position, Sitting Balance sitting edge of bed  -KP     Static Standing Balance contact guard;minimal assist;1-person assist  -KP     Dynamic Standing Balance minimal assist;moderate assist;1-person assist  -KP     Position/Device Used, Standing Balance walker, front-wheeled  -KP       Row Name             Wound 01/24/24 0450 Right posterior heel Pressure Injury    Wound - Properties Group Placement Date: 01/24/24  -SS Placement Time: 0450  -SS Side: Right  -SS Orientation: posterior  -SS Location: heel  -SS Primary Wound Type: Pressure inj  -TW    Retired Wound - Properties Group Placement Date: 01/24/24  -SS Placement Time: 0450  -SS Side: Right  -SS Orientation: posterior  -SS Location: heel  -SS Primary Wound Type: Pressure inj  -TW    Retired Wound - Properties Group Date first assessed: 01/24/24 -SS Time first assessed: 0450  -SS Side: Right  -SS Location: heel  -SS Primary Wound Type: Pressure inj  -TW      Row Name 02/01/24 1430          Plan of Care Review    Plan of Care Reviewed With patient  -KP     Outcome Evaluation PT treatment completed.  Patient was alert today but remains very confused.  She was able to follow commands with maximum cues needed. Patient required min/moderate assist for mobility, was able to stand 3x with FWW and take side steps.  Continue PT POC.  -KP       Row Name 02/01/24 1430          Positioning and Restraints    Pre-Treatment Position in bed  -KP     Post Treatment Position bed  -KP     In Bed side lying right;call light within reach;encouraged to call for assist;exit alarm on;side rails up x3  Lines in tact and needs in reach  -KP               User Key  (r) = Recorded By, (t) = Taken By, (c) = Cosigned By       Initials Name Provider Type    Audra Go, RN Registered Nurse    Melany Nelson, PT Physical Therapist    SS Gorge Richards, RN Registered Nurse                    Physical Therapy Education       Title: PT OT SLP Therapies (In Progress)       Topic: Physical Therapy (In Progress)       Point: Mobility training (In Progress)       Learning Progress Summary             Patient Acceptance, E, NL,NR by AW at 2/1/2024 1052    Acceptance, E, NR by ED at 1/29/2024 0413    Acceptance, E, NL,NR by LS at 1/28/2024 0930    Acceptance, E, NR by ED at 1/28/2024 0553    Acceptance, E, NR by ED at 1/27/2024 0521    Acceptance, E, NR by ED at 1/26/2024 0521    Acceptance, E,TB, VU by KM at 1/23/2024 1403                         Point: Home exercise program (In Progress)       Learning Progress Summary             Patient Acceptance, E, NL,NR by AW at 2/1/2024 1052    Acceptance, E, NR by ED at 1/29/2024 0413    Acceptance, E, NL,NR by LS at 1/28/2024 0930    Acceptance, E, NR by ED at 1/28/2024 0553    Acceptance, E, NR by ED at 1/27/2024 0521    Acceptance, E, NR by ED at 1/26/2024 0521    Acceptance, E,TB, VU by KM at 1/23/2024 1403                         Point: Body mechanics (In Progress)       Learning Progress Summary             Patient Acceptance, E, NL,NR by AW at 2/1/2024 1052    Acceptance, E, NR by ED at 1/29/2024 0413    Acceptance, E, NL,NR by LS at 1/28/2024 0930    Acceptance, E, NR by ED at 1/28/2024 0553    Acceptance, E, NR by ED at 1/27/2024 0521    Acceptance, E, NR by ED at 1/26/2024 0521    Acceptance, E,TB, VU by KM at 1/23/2024 1403                         Point: Precautions (In Progress)       Learning Progress Summary             Patient Acceptance, E, NL,NR by AW at 2/1/2024 1052    Acceptance, E, NR by ED at 1/29/2024 0413    Acceptance, E, NL,NR by LS at 1/28/2024 0930    Acceptance, E, NR by ED at 1/28/2024 0553    Acceptance, E, NR by ED at 1/27/2024 0521    Acceptance, E, NR by ED  at 1/26/2024 0521    Acceptance, E,TB, VU by KM at 1/23/2024 1403                                         User Key       Initials Effective Dates Name Provider Type Discipline    AW 03/13/23 -  Kennedi Robles, RN Registered Nurse Nurse    LS 06/09/23 -  Jocelyne Vivas, RN Registered Nurse Nurse    KM 05/24/22 -  Alen Darling PT Physical Therapist PT    ED 06/06/23 -  Janeen Maurice, RN Registered Nurse Nurse                  PT Recommendation and Plan     Plan of Care Reviewed With: patient  Outcome Evaluation: PT treatment completed.  Patient was alert today but remains very confused.  She was able to follow commands with maximum cues needed. Patient required min/moderate assist for mobility, was able to stand 3x with FWW and take side steps.  Continue PT POC.       Time Calculation:    PT Charges       Row Name 02/01/24 1439             Time Calculation    PT Received On 02/01/24  -KP      PT Goal Re-Cert Due Date 02/06/24  -KP         Timed Charges    87725 - PT Therapeutic Activity Minutes 25  -KP         Total Minutes    Timed Charges Total Minutes 25  -KP       Total Minutes 25  -KP                User Key  (r) = Recorded By, (t) = Taken By, (c) = Cosigned By      Initials Name Provider Type     Melany Gomez, PT Physical Therapist                  Therapy Charges for Today       Code Description Service Date Service Provider Modifiers Qty    61958339034 HC PT THERAPEUTIC ACT EA 15 MIN 2/1/2024 Melany Gomez, PT GP 2            PT G-Codes  AM-PAC 6 Clicks Score (PT): 8    Melany Gomez PT  2/1/2024

## 2024-02-02 VITALS
DIASTOLIC BLOOD PRESSURE: 97 MMHG | HEART RATE: 60 BPM | BODY MASS INDEX: 25.93 KG/M2 | HEIGHT: 64 IN | SYSTOLIC BLOOD PRESSURE: 150 MMHG | WEIGHT: 151.9 LBS | OXYGEN SATURATION: 98 % | TEMPERATURE: 97.3 F | RESPIRATION RATE: 20 BRPM

## 2024-02-02 LAB
ANION GAP SERPL CALCULATED.3IONS-SCNC: 7.1 MMOL/L (ref 5–15)
BASOPHILS # BLD AUTO: 0.02 10*3/MM3 (ref 0–0.2)
BASOPHILS NFR BLD AUTO: 0.3 % (ref 0–1.5)
BUN SERPL-MCNC: 10 MG/DL (ref 8–23)
BUN/CREAT SERPL: 14.1 (ref 7–25)
CALCIUM SPEC-SCNC: 9.2 MG/DL (ref 8.6–10.5)
CHLORIDE SERPL-SCNC: 104 MMOL/L (ref 98–107)
CO2 SERPL-SCNC: 30.9 MMOL/L (ref 22–29)
CREAT SERPL-MCNC: 0.71 MG/DL (ref 0.57–1)
DEPRECATED RDW RBC AUTO: 69.1 FL (ref 37–54)
EGFRCR SERPLBLD CKD-EPI 2021: 88.8 ML/MIN/1.73
EOSINOPHIL # BLD AUTO: 0 10*3/MM3 (ref 0–0.4)
EOSINOPHIL NFR BLD AUTO: 0 % (ref 0.3–6.2)
ERYTHROCYTE [DISTWIDTH] IN BLOOD BY AUTOMATED COUNT: 20.7 % (ref 12.3–15.4)
GLUCOSE SERPL-MCNC: 87 MG/DL (ref 65–99)
HCT VFR BLD AUTO: 38.7 % (ref 34–46.6)
HGB BLD-MCNC: 11.9 G/DL (ref 12–15.9)
IMM GRANULOCYTES # BLD AUTO: 0.08 10*3/MM3 (ref 0–0.05)
IMM GRANULOCYTES NFR BLD AUTO: 1.4 % (ref 0–0.5)
LYMPHOCYTES # BLD AUTO: 0.74 10*3/MM3 (ref 0.7–3.1)
LYMPHOCYTES NFR BLD AUTO: 12.9 % (ref 19.6–45.3)
MAGNESIUM SERPL-MCNC: 1.8 MG/DL (ref 1.6–2.4)
MCH RBC QN AUTO: 28.3 PG (ref 26.6–33)
MCHC RBC AUTO-ENTMCNC: 30.7 G/DL (ref 31.5–35.7)
MCV RBC AUTO: 92.1 FL (ref 79–97)
MONOCYTES # BLD AUTO: 0.35 10*3/MM3 (ref 0.1–0.9)
MONOCYTES NFR BLD AUTO: 6.1 % (ref 5–12)
NEUTROPHILS NFR BLD AUTO: 4.53 10*3/MM3 (ref 1.7–7)
NEUTROPHILS NFR BLD AUTO: 79.3 % (ref 42.7–76)
NRBC BLD AUTO-RTO: 0 /100 WBC (ref 0–0.2)
PLATELET # BLD AUTO: 149 10*3/MM3 (ref 140–450)
PMV BLD AUTO: 11.2 FL (ref 6–12)
POTASSIUM SERPL-SCNC: 4.2 MMOL/L (ref 3.5–5.2)
RBC # BLD AUTO: 4.2 10*6/MM3 (ref 3.77–5.28)
SODIUM SERPL-SCNC: 142 MMOL/L (ref 136–145)
WBC NRBC COR # BLD AUTO: 5.72 10*3/MM3 (ref 3.4–10.8)

## 2024-02-02 PROCEDURE — 94799 UNLISTED PULMONARY SVC/PX: CPT

## 2024-02-02 PROCEDURE — 85025 COMPLETE CBC W/AUTO DIFF WBC: CPT | Performed by: INTERNAL MEDICINE

## 2024-02-02 PROCEDURE — 94660 CPAP INITIATION&MGMT: CPT

## 2024-02-02 PROCEDURE — 80048 BASIC METABOLIC PNL TOTAL CA: CPT | Performed by: INTERNAL MEDICINE

## 2024-02-02 PROCEDURE — 99239 HOSP IP/OBS DSCHRG MGMT >30: CPT | Performed by: INTERNAL MEDICINE

## 2024-02-02 PROCEDURE — 94761 N-INVAS EAR/PLS OXIMETRY MLT: CPT

## 2024-02-02 PROCEDURE — 83735 ASSAY OF MAGNESIUM: CPT | Performed by: INTERNAL MEDICINE

## 2024-02-02 PROCEDURE — 94664 DEMO&/EVAL PT USE INHALER: CPT

## 2024-02-02 PROCEDURE — 63710000001 PREDNISONE PER 1 MG: Performed by: INTERNAL MEDICINE

## 2024-02-02 RX ORDER — ISOSORBIDE MONONITRATE 30 MG/1
15 TABLET, EXTENDED RELEASE ORAL
Start: 2024-02-03

## 2024-02-02 RX ORDER — LORAZEPAM 0.5 MG/1
0.5 TABLET ORAL EVERY 12 HOURS PRN
Qty: 6 TABLET | Refills: 0 | Status: SHIPPED | OUTPATIENT
Start: 2024-02-02

## 2024-02-02 RX ORDER — PREDNISONE 20 MG/1
20 TABLET ORAL
Start: 2024-02-02 | End: 2024-02-04

## 2024-02-02 RX ADMIN — CASTOR OIL AND BALSAM, PERU 1 APPLICATION: 788; 87 OINTMENT TOPICAL at 08:26

## 2024-02-02 RX ADMIN — CLOPIDOGREL BISULFATE 75 MG: 75 TABLET, FILM COATED ORAL at 08:26

## 2024-02-02 RX ADMIN — BUDESONIDE INHALATION 0.5 MG: 0.5 SUSPENSION RESPIRATORY (INHALATION) at 06:45

## 2024-02-02 RX ADMIN — APIXABAN 5 MG: 5 TABLET, FILM COATED ORAL at 08:26

## 2024-02-02 RX ADMIN — LISINOPRIL 10 MG: 10 TABLET ORAL at 08:25

## 2024-02-02 RX ADMIN — RISPERIDONE 0.5 MG: 0.25 TABLET, FILM COATED ORAL at 08:26

## 2024-02-02 RX ADMIN — IPRATROPIUM BROMIDE 0.5 MG: 0.5 SOLUTION RESPIRATORY (INHALATION) at 06:45

## 2024-02-02 RX ADMIN — SERTRALINE 100 MG: 50 TABLET, FILM COATED ORAL at 08:25

## 2024-02-02 RX ADMIN — PANTOPRAZOLE SODIUM 40 MG: 40 TABLET, DELAYED RELEASE ORAL at 06:31

## 2024-02-02 RX ADMIN — IPRATROPIUM BROMIDE 0.5 MG: 0.5 SOLUTION RESPIRATORY (INHALATION) at 00:04

## 2024-02-02 RX ADMIN — MEMANTINE HYDROCHLORIDE 10 MG: 10 TABLET, FILM COATED ORAL at 08:26

## 2024-02-02 RX ADMIN — LAMOTRIGINE 100 MG: 100 TABLET ORAL at 08:26

## 2024-02-02 RX ADMIN — BACLOFEN 5 MG: 10 TABLET ORAL at 08:25

## 2024-02-02 RX ADMIN — DOCUSATE SODIUM 50 MG AND SENNOSIDES 8.6 MG 2 TABLET: 8.6; 5 TABLET, FILM COATED ORAL at 08:25

## 2024-02-02 RX ADMIN — LEVOTHYROXINE SODIUM 75 MCG: 0.07 TABLET ORAL at 06:27

## 2024-02-02 RX ADMIN — PREDNISONE 20 MG: 20 TABLET ORAL at 09:48

## 2024-02-02 RX ADMIN — ISOSORBIDE MONONITRATE 15 MG: 30 TABLET, EXTENDED RELEASE ORAL at 08:25

## 2024-02-02 RX ADMIN — Medication 10 ML: at 08:26

## 2024-02-02 RX ADMIN — FERROUS SULFATE TAB 325 MG (65 MG ELEMENTAL FE) 325 MG: 325 (65 FE) TAB at 08:25

## 2024-02-02 RX ADMIN — OFLOXACIN 50000 UNITS: 300 TABLET, COATED ORAL at 08:25

## 2024-02-02 NOTE — CASE MANAGEMENT/SOCIAL WORK
Discharge Planning Assessment  [facility]     Patient Name: Brenda Munroe  MRN: [id number]  Today's Date: 2/2/2024    Admit Date: 1/22/2024     Discharge Plan       Row Name 02/02/24 0928       Plan    Final Discharge Disposition Code 03 - skilled nursing facility (SNF)    Final Note Pt is being discharged back to [facility] on this date. SS notified [facility] per Phoebe who states she will print off d/c orders. SS attempted to contact pt son Edi and spoke with his spouse instead who states she will let her pt son know. SS to provided RN with report number when facility is ready. SS completed [facility] form and will arrange transportation once report has been called. SS to follow.    10:38: SS provided RN with report number [phone]. Per [facility] is not available at this time. SS contacted [facility] who is agreeable to transport. SS faxed pt PCS and facesheet to [facility]. SS notified RN. No other needs identified.                   Continued Care and Services - Admitted Since 1/22/2024       Destination       Service Provider Request Status Selected Services Address Phone Fax Patient Preferred    [facility] Accept[facility]A [address] [phone] --               JORGE Bowers

## 2024-02-02 NOTE — DISCHARGE SUMMARY
[facility] DISCHARGE SUMMARY      Date of Admission: 1/22/2024    Date of Discharge:  2/2/2024    PCP: Bernadette Arevaol MD    Admission Diagnosis:   Please see admission H&P    Discharge Diagnosis:   Acute exacerbation of COPD  Left-sided pneumonia  Acute hypercapnic respiratory failure  Hypotension with intravascular volume depletion  Paroxysmal Afib with RVR  Acute metabolic encephalopathy superimposed on baseline dementia   Type II NSTEMI  Hypokalemia  Abnormal UA/possible UTI  (+) blood culture, likely a contaminant    Procedures Performed:  1/30/24: Electrical cardioversion     2/1/24: Nuclear stress test    Myocardial perfusion imaging indicates a normal myocardial perfusion study with no evidence of ischemia.    (Calculated EF = 66%).    Breast attenuation artifact is present.    TID 1.03.    Findings consistent with a normal ECG stress test.     Consults:   Consults       Date and Time Order Name Status Description    1/25/2024  9:06 AM Inpatient Pulmonology Consult Completed     1/23/2024  9:58 AM Inpatient Cardiology Consult Completed     1/6/2024 10:13 AM Inpatient Cardiology Consult Completed               History of Present Illness:  Brenda Munroe is a 73 y.o. female who presented to [facility] ED from local SNF for evaluation of worsening AMS and tachycardia. Please see admission H&P for complete details.     In the ED,  workup revealed Afib with RVR, NSTEMI, possible UTI and left-sided pneumonia. CT chest PE protocol was negative for PE. Cultures were obtained and IV antibiotics initiated. She was given Cardizem and metoprolol as well.      Hospital Course  Brenda Munroe was admitted to the telemetry floor for further evaluation and treatment. She was continued on IV antibiotics including Rocephin and Doxycycline. Her PO metoprolol was titrated upward. She was continued on Eliquis for stroke prevention.     Cultures were followed with 1/2 initial blood cultures revealing Strep mitis/oralis  which was thought to be a contaminant. Urine culture revealed a low colony count of Klebsiella pneumoniae. MRSA PCR was positive. Legionella urine antigen was negative. Her troponin elevation was thought to be type II in the setting of tachycardia. Echo revealed an EF of 61-65%. Early in her hospitalization, she developed worsening hypercapnia and was transferred to the PCU with initiation of BiPAP. She was started on steroids and intermittently diuresed as well with concern for volume overload. Pulm was consulted and assisted with management.     With antibiotics, steroids, diuresis, nebs and BiPAP she began clinically improving over the course of a few days. She remained confused with baseline dementia but her mentation did improve. She tolerated the BiPAP well and the SNF was contacted to facilitate arranging BiPAP for upon discharge. She later developed some hypotension thought to be 2/2 intravascular volume depletion in the setting of diuresis. Diuretics were held and she was given IVF's as well as albumin. She briefly required vasopressor support but this quickly improved. She would complete a course of IV antibiotics while hospitalized.     Over the course of a few days, her rate improved but she remained in Afib. Cardiology initially recommended to continue metoprolol while adding Digoxin. Once her overall condition improved, cardiology proceeded with electrical cardioversion which was successful. She developed some bradycardia with HR in the 40's and both metoprolol and Digoxin were held. Her BP later trended upward and lisinopril restarted. Cardiology proceeded with a stress test which did not reveal any evidence of ischemia.     Ms. Munroe was evaluated on 2/2 and deemed medically stable for discharge after discussion with cardiology as she remained hemodynamically stable with stable labs. She was prescribed a short course of steroids with instructions for follow up with her PCP at the SNF, cardiology and  pulmonology.     Condition on Discharge:  Stable    Vital Signs  Vitals:    02/02/24 0800   BP:    Pulse:    Resp:    Temp: 98.3 °F (36.8 °C)   SpO2:        Physical Exam:  General:    Awake, alert but confused, in no acute distress   Heart:      Normal S1 and S2. Regular rate and rhythm. No significant murmur, rubs or gallops appreciated.   Lungs:     Respirations regular, even and unlabored. Lungs clear to auscultation B/L. No wheezes, rales or rhonchi.   Abdomen:   Soft and nontender. No guarding, rebound tenderness or  organomegaly noted. Bowel sounds present x 4.   Extremities:  No clubbing, cyanosis or edema noted.      Discharge Disposition:   SNF      Discharge Medications:     Discharge Medications        New Medications        Instructions Start Date   predniSONE 20 MG tablet  Commonly known as: DELTASONE   20 mg, Oral, Daily With Breakfast             Changes to Medications        Instructions Start Date   isosorbide mononitrate 30 MG 24 hr tablet  Commonly known as: IMDUR  What changed:   how much to take  when to take this   15 mg, Oral, Every 24 Hours Scheduled   Start Date: February 3, 2024            Continue These Medications        Instructions Start Date   acetaminophen 500 MG tablet  Commonly known as: TYLENOL   500 mg, Oral, Every 4 Hours PRN      apixaban 5 MG tablet tablet  Commonly known as: ELIQUIS   5 mg, Oral, 2 Times Daily      baclofen 10 MG tablet  Commonly known as: LIORESAL   5 mg, Oral, 2 Times Daily      Chloraseptic 1.4 % liquid liquid  Generic drug: phenol   1 spray, Mouth/Throat, Every 2 Hours PRN      clopidogrel 75 MG tablet  Commonly known as: PLAVIX   75 mg, Oral, Daily      donepezil 10 MG tablet  Commonly known as: ARICEPT   10 mg, Oral, Nightly      ferrous sulfate 325 (65 FE) MG tablet   325 mg, Oral, 2 Times Daily      guaiFENesin 200 MG tablet   400 mg, Oral, Every 4 Hours PRN      lamoTRIgine 100 MG tablet  Commonly known as: LaMICtal   100 mg, Oral, Daily       lamoTRIgine 25 MG tablet  Commonly known as: LaMICtal   25 mg, Oral, Nightly      levothyroxine 75 MCG tablet  Commonly known as: SYNTHROID, LEVOTHROID   75 mcg, Oral, Daily      lisinopril 10 MG tablet  Commonly known as: PRINIVIL,ZESTRIL   10 mg, Oral, Every 24 Hours Scheduled      LORazepam 0.5 MG tablet  Commonly known as: ATIVAN   0.5 mg, Oral, Every 12 Hours PRN      Lubricating Tears Eye Drops 0.1-0.3 % solution  Generic drug: Dextran 70-Hypromellose   1 drop, Ophthalmic, Every 2 Hours PRN      memantine 10 MG tablet  Commonly known as: NAMENDA   10 mg, Oral, 2 Times Daily      nitroglycerin 0.4 MG SL tablet  Commonly known as: NITROSTAT   0.4 mg, Sublingual, Every 5 Minutes PRN      pantoprazole 40 MG EC tablet  Commonly known as: PROTONIX   40 mg, Oral, Daily      risperiDONE 0.5 MG tablet  Commonly known as: risperDAL   0.5 mg, Oral, 2 Times Daily      rosuvastatin 40 MG tablet  Commonly known as: CRESTOR   40 mg, Oral, Every Evening      senna 8.6 MG tablet  Commonly known as: SENOKOT   1 tablet, Oral, Daily PRN      sertraline 100 MG tablet  Commonly known as: ZOLOFT   100 mg, Oral, Daily      Trelegy Ellipta 100-62.5-25 MCG/INH inhaler  Generic drug: Fluticasone-Umeclidin-Vilant   1 puff, Inhalation, Daily - RT      trolamine salicylate 10 % cream  Commonly known as: ASPERCREME   1 application , Topical, Every 6 Hours PRN      vitamin D 1.25 MG (09635 UT) capsule capsule  Commonly known as: ERGOCALCIFEROL   50,000 Units, Oral, Weekly             Stop These Medications      metoprolol succinate XL 25 MG 24 hr tablet  Commonly known as: TOPROL-XL                Discharge Diet:   Dietary Orders (From admission, onward)       Start     Ordered    02/01/24 1216  Diet: Diabetic Diets; Consistent Carbohydrate; Texture: Regular Texture (IDDSI 7); Fluid Consistency: Thin (IDDSI 0)  Diet Effective Now        References:    Diet Order [place]   Question Answer Comment   Diets: Diabetic Diets    Diabetic Diet:  Consistent Carbohydrate    Texture: Regular Texture (IDDSI 7)    Fluid Consistency: Thin (IDDSI 0)        02/01/24 1215    01/30/24 1800  Dietary Nutrition Supplements Boost Plus (Ensure Enlive, Ensure Plus)  Daily With Breakfast & Dinner      Question:  Select Supplement  Answer:  Boost Plus (Ensure Enlive, Ensure Plus)    01/30/24 1408                    Activity at Discharge:  activity as tolerated    Follow-up Appointments:  Additional Instructions for the Follow-ups that You Need to Schedule       Discharge Follow-up with PCP   As directed       Currently Documented PCP:    Bernadette Arevalo MD    PCP Phone Number:    [phone]     Follow Up Details: FOllow up with PCP at SNF within 1 week.        Discharge Follow-up with Specified Provider: Follow up with cardiology in 2-4 weeks.   As directed      To: Follow up with cardiology in 2-4 weeks.        Discharge Follow-up with Specified Provider: Follow up with pulmonology in 4 weeks.   As directed      To: Follow up with pulmonology in 4 weeks.               Follow-up Information       Bernadette Arevalo MD .    Specialty: Geriatric Medicine  Why: FOllow up with PCP at [facility] within 1 week.  Contact information:  [address]  [facility] [address]  [phone]               [facility] .    Specialty: Pulmonology  Contact information:  [address]  [facility] [address] [phone]                         Your Scheduled Appointments      Feb 15, 2024 11:00 AM  Lab with MERI NURSE LAB  [facility] HEMATOLOGY & ONCOLOGY ([place]) [address] [facility] [address]  [facility] [address] [phone]   Bring ID and  Insurance cards.  If you received paperwork in the mail, fill it out and bring it with them.         Feb 15, 2024 11:30 AM  FOLLOW UP with PAULA Castañeda  [facility] HEMATOLOGY [facility] ([place]) [address] [facility] [address]  [facility] [address] [phone]   Bring ID and  Insurance  cards.  New patients are to arrive 30 minutes prior to the appointement.  If you received paperwork in the mail, fill it out and bring it with them.  All other appt's need to be here 15 minutes early.                   Test Results Pending at Discharge:       The ASCVD Risk score (Iwona ZAMORA, et al., 2019) failed to calculate for the following reasons:    The patient has a prior MI or stroke diagnosis      Prasad Romero DO  02/02/24  08:51 EST      Time: Greater than 30 minutes spent on this discharge.

## 2024-02-02 NOTE — PLAN OF CARE
Goal Outcome Evaluation:      Pt lying in bed, vss, no acute changes at this time, pt on 2 L NC tolerating well, safety maintained, call light within reach.

## 2024-02-11 ENCOUNTER — HOSPITAL ENCOUNTER (EMERGENCY)
Facility: HOSPITAL | Age: 74
Discharge: NURSING, REHAB OR LONG-TERM CARE | End: 2024-02-11
Attending: STUDENT IN AN ORGANIZED HEALTH CARE EDUCATION/TRAINING PROGRAM | Admitting: STUDENT IN AN ORGANIZED HEALTH CARE EDUCATION/TRAINING PROGRAM
Payer: MEDICARE

## 2024-02-11 ENCOUNTER — APPOINTMENT (OUTPATIENT)
Dept: GENERAL RADIOLOGY | Facility: HOSPITAL | Age: 74
End: 2024-02-11
Payer: MEDICARE

## 2024-02-11 VITALS
BODY MASS INDEX: 25.78 KG/M2 | RESPIRATION RATE: 16 BRPM | HEIGHT: 64 IN | OXYGEN SATURATION: 99 % | SYSTOLIC BLOOD PRESSURE: 158 MMHG | HEART RATE: 66 BPM | WEIGHT: 151 LBS | DIASTOLIC BLOOD PRESSURE: 78 MMHG | TEMPERATURE: 98.9 F

## 2024-02-11 DIAGNOSIS — I50.9 CONGESTIVE HEART FAILURE, UNSPECIFIED HF CHRONICITY, UNSPECIFIED HEART FAILURE TYPE: ICD-10-CM

## 2024-02-11 DIAGNOSIS — Z00.00 WELL ADULT HEALTH CHECK: Primary | ICD-10-CM

## 2024-02-11 DIAGNOSIS — D64.9 CHRONIC ANEMIA: ICD-10-CM

## 2024-02-11 LAB
ALBUMIN SERPL-MCNC: 3.5 G/DL (ref 3.5–5.2)
ALBUMIN/GLOB SERPL: 1.8 G/DL
ALP SERPL-CCNC: 61 U/L (ref 39–117)
ALT SERPL W P-5'-P-CCNC: 16 U/L (ref 1–33)
AMMONIA BLD-SCNC: 21 UMOL/L (ref 11–51)
AMPHET+METHAMPHET UR QL: NEGATIVE
AMPHETAMINES UR QL: NEGATIVE
ANION GAP SERPL CALCULATED.3IONS-SCNC: 7.5 MMOL/L (ref 5–15)
ANISOCYTOSIS BLD QL: NORMAL
AST SERPL-CCNC: 15 U/L (ref 1–32)
B PARAPERT DNA SPEC QL NAA+PROBE: NOT DETECTED
B PERT DNA SPEC QL NAA+PROBE: NOT DETECTED
BACTERIA UR QL AUTO: NORMAL /HPF
BARBITURATES UR QL SCN: NEGATIVE
BASOPHILS # BLD AUTO: 0.01 10*3/MM3 (ref 0–0.2)
BASOPHILS NFR BLD AUTO: 0.2 % (ref 0–1.5)
BENZODIAZ UR QL SCN: POSITIVE
BILIRUB SERPL-MCNC: 0.3 MG/DL (ref 0–1.2)
BILIRUB UR QL STRIP: NEGATIVE
BUN SERPL-MCNC: 16 MG/DL (ref 8–23)
BUN/CREAT SERPL: 23.5 (ref 7–25)
BUPRENORPHINE SERPL-MCNC: NEGATIVE NG/ML
C PNEUM DNA NPH QL NAA+NON-PROBE: NOT DETECTED
CALCIUM SPEC-SCNC: 9.1 MG/DL (ref 8.6–10.5)
CANNABINOIDS SERPL QL: NEGATIVE
CHLORIDE SERPL-SCNC: 105 MMOL/L (ref 98–107)
CLARITY UR: CLEAR
CO2 SERPL-SCNC: 36.5 MMOL/L (ref 22–29)
COCAINE UR QL: NEGATIVE
COLOR UR: YELLOW
CREAT SERPL-MCNC: 0.68 MG/DL (ref 0.57–1)
D-LACTATE SERPL-SCNC: 0.7 MMOL/L (ref 0.5–2)
DACRYOCYTES BLD QL SMEAR: NORMAL
DEPRECATED RDW RBC AUTO: 73.2 FL (ref 37–54)
EGFRCR SERPLBLD CKD-EPI 2021: 91 ML/MIN/1.73
EOSINOPHIL # BLD AUTO: 0.01 10*3/MM3 (ref 0–0.4)
EOSINOPHIL NFR BLD AUTO: 0.2 % (ref 0.3–6.2)
ERYTHROCYTE [DISTWIDTH] IN BLOOD BY AUTOMATED COUNT: 20.1 % (ref 12.3–15.4)
FENTANYL UR-MCNC: NEGATIVE NG/ML
FLUAV SUBTYP SPEC NAA+PROBE: NOT DETECTED
FLUBV RNA ISLT QL NAA+PROBE: NOT DETECTED
GEN 5 2HR TROPONIN T REFLEX: 34 NG/L
GLOBULIN UR ELPH-MCNC: 2 GM/DL
GLUCOSE SERPL-MCNC: 105 MG/DL (ref 65–99)
GLUCOSE UR STRIP-MCNC: NEGATIVE MG/DL
HADV DNA SPEC NAA+PROBE: NOT DETECTED
HCOV 229E RNA SPEC QL NAA+PROBE: NOT DETECTED
HCOV HKU1 RNA SPEC QL NAA+PROBE: NOT DETECTED
HCOV NL63 RNA SPEC QL NAA+PROBE: NOT DETECTED
HCOV OC43 RNA SPEC QL NAA+PROBE: NOT DETECTED
HCT VFR BLD AUTO: 37.8 % (ref 34–46.6)
HGB BLD-MCNC: 10.8 G/DL (ref 12–15.9)
HGB UR QL STRIP.AUTO: NEGATIVE
HMPV RNA NPH QL NAA+NON-PROBE: NOT DETECTED
HOLD SPECIMEN: NORMAL
HOLD SPECIMEN: NORMAL
HPIV1 RNA ISLT QL NAA+PROBE: NOT DETECTED
HPIV2 RNA SPEC QL NAA+PROBE: NOT DETECTED
HPIV3 RNA NPH QL NAA+PROBE: NOT DETECTED
HPIV4 P GENE NPH QL NAA+PROBE: NOT DETECTED
HYALINE CASTS UR QL AUTO: NORMAL /LPF
HYPOCHROMIA BLD QL: NORMAL
IMM GRANULOCYTES # BLD AUTO: 0.06 10*3/MM3 (ref 0–0.05)
IMM GRANULOCYTES NFR BLD AUTO: 1 % (ref 0–0.5)
INR PPP: 1.18 (ref 0.9–1.1)
KETONES UR QL STRIP: ABNORMAL
LARGE PLATELETS: NORMAL
LEUKOCYTE ESTERASE UR QL STRIP.AUTO: NEGATIVE
LYMPHOCYTES # BLD AUTO: 0.58 10*3/MM3 (ref 0.7–3.1)
LYMPHOCYTES NFR BLD AUTO: 9.9 % (ref 19.6–45.3)
M PNEUMO IGG SER IA-ACNC: NOT DETECTED
MACROCYTES BLD QL SMEAR: NORMAL
MCH RBC QN AUTO: 28.2 PG (ref 26.6–33)
MCHC RBC AUTO-ENTMCNC: 28.6 G/DL (ref 31.5–35.7)
MCV RBC AUTO: 98.7 FL (ref 79–97)
METHADONE UR QL SCN: NEGATIVE
MONOCYTES # BLD AUTO: 0.46 10*3/MM3 (ref 0.1–0.9)
MONOCYTES NFR BLD AUTO: 7.8 % (ref 5–12)
NEUTROPHILS NFR BLD AUTO: 4.75 10*3/MM3 (ref 1.7–7)
NEUTROPHILS NFR BLD AUTO: 80.9 % (ref 42.7–76)
NITRITE UR QL STRIP: NEGATIVE
NRBC BLD AUTO-RTO: 0 /100 WBC (ref 0–0.2)
NT-PROBNP SERPL-MCNC: 2298 PG/ML (ref 0–1800)
OPIATES UR QL: NEGATIVE
OVALOCYTES BLD QL SMEAR: NORMAL
OXYCODONE UR QL SCN: NEGATIVE
PCP UR QL SCN: NEGATIVE
PH UR STRIP.AUTO: 5.5 [PH] (ref 5–8)
PLATELET # BLD AUTO: 125 10*3/MM3 (ref 140–450)
PMV BLD AUTO: 11 FL (ref 6–12)
POTASSIUM SERPL-SCNC: 3.5 MMOL/L (ref 3.5–5.2)
PROCALCITONIN SERPL-MCNC: 0.05 NG/ML (ref 0–0.25)
PROT SERPL-MCNC: 5.5 G/DL (ref 6–8.5)
PROT UR QL STRIP: ABNORMAL
PROTHROMBIN TIME: 15.5 SECONDS (ref 12.1–14.7)
QT INTERVAL: 428 MS
QTC INTERVAL: 458 MS
RBC # BLD AUTO: 3.83 10*6/MM3 (ref 3.77–5.28)
RBC # UR STRIP: NORMAL /HPF
REF LAB TEST METHOD: NORMAL
RHINOVIRUS RNA SPEC NAA+PROBE: NOT DETECTED
RSV RNA NPH QL NAA+NON-PROBE: NOT DETECTED
SARS-COV-2 RNA NPH QL NAA+NON-PROBE: NOT DETECTED
SMALL PLATELETS BLD QL SMEAR: NORMAL
SODIUM SERPL-SCNC: 149 MMOL/L (ref 136–145)
SP GR UR STRIP: 1.03 (ref 1–1.03)
SQUAMOUS #/AREA URNS HPF: NORMAL /HPF
TRICYCLICS UR QL SCN: NEGATIVE
TROPONIN T DELTA: -1 NG/L
TROPONIN T SERPL HS-MCNC: 35 NG/L
UROBILINOGEN UR QL STRIP: ABNORMAL
WBC # UR STRIP: NORMAL /HPF
WBC NRBC COR # BLD AUTO: 5.87 10*3/MM3 (ref 3.4–10.8)
WHOLE BLOOD HOLD COAG: NORMAL
WHOLE BLOOD HOLD SPECIMEN: NORMAL

## 2024-02-11 PROCEDURE — 71045 X-RAY EXAM CHEST 1 VIEW: CPT

## 2024-02-11 PROCEDURE — 82140 ASSAY OF AMMONIA: CPT | Performed by: STUDENT IN AN ORGANIZED HEALTH CARE EDUCATION/TRAINING PROGRAM

## 2024-02-11 PROCEDURE — 81001 URINALYSIS AUTO W/SCOPE: CPT | Performed by: STUDENT IN AN ORGANIZED HEALTH CARE EDUCATION/TRAINING PROGRAM

## 2024-02-11 PROCEDURE — 80053 COMPREHEN METABOLIC PANEL: CPT | Performed by: STUDENT IN AN ORGANIZED HEALTH CARE EDUCATION/TRAINING PROGRAM

## 2024-02-11 PROCEDURE — 85610 PROTHROMBIN TIME: CPT | Performed by: STUDENT IN AN ORGANIZED HEALTH CARE EDUCATION/TRAINING PROGRAM

## 2024-02-11 PROCEDURE — 93005 ELECTROCARDIOGRAM TRACING: CPT | Performed by: STUDENT IN AN ORGANIZED HEALTH CARE EDUCATION/TRAINING PROGRAM

## 2024-02-11 PROCEDURE — 36415 COLL VENOUS BLD VENIPUNCTURE: CPT

## 2024-02-11 PROCEDURE — P9612 CATHETERIZE FOR URINE SPEC: HCPCS

## 2024-02-11 PROCEDURE — 0202U NFCT DS 22 TRGT SARS-COV-2: CPT | Performed by: STUDENT IN AN ORGANIZED HEALTH CARE EDUCATION/TRAINING PROGRAM

## 2024-02-11 PROCEDURE — 80307 DRUG TEST PRSMV CHEM ANLYZR: CPT | Performed by: STUDENT IN AN ORGANIZED HEALTH CARE EDUCATION/TRAINING PROGRAM

## 2024-02-11 PROCEDURE — 85007 BL SMEAR W/DIFF WBC COUNT: CPT | Performed by: STUDENT IN AN ORGANIZED HEALTH CARE EDUCATION/TRAINING PROGRAM

## 2024-02-11 PROCEDURE — 99284 EMERGENCY DEPT VISIT MOD MDM: CPT

## 2024-02-11 PROCEDURE — 84484 ASSAY OF TROPONIN QUANT: CPT | Performed by: STUDENT IN AN ORGANIZED HEALTH CARE EDUCATION/TRAINING PROGRAM

## 2024-02-11 PROCEDURE — 85025 COMPLETE CBC W/AUTO DIFF WBC: CPT | Performed by: STUDENT IN AN ORGANIZED HEALTH CARE EDUCATION/TRAINING PROGRAM

## 2024-02-11 PROCEDURE — 84145 PROCALCITONIN (PCT): CPT | Performed by: STUDENT IN AN ORGANIZED HEALTH CARE EDUCATION/TRAINING PROGRAM

## 2024-02-11 PROCEDURE — 83605 ASSAY OF LACTIC ACID: CPT | Performed by: STUDENT IN AN ORGANIZED HEALTH CARE EDUCATION/TRAINING PROGRAM

## 2024-02-11 PROCEDURE — 83880 ASSAY OF NATRIURETIC PEPTIDE: CPT | Performed by: STUDENT IN AN ORGANIZED HEALTH CARE EDUCATION/TRAINING PROGRAM

## 2024-02-11 PROCEDURE — 93010 ELECTROCARDIOGRAM REPORT: CPT | Performed by: SPECIALIST

## 2024-02-11 PROCEDURE — 71045 X-RAY EXAM CHEST 1 VIEW: CPT | Performed by: RADIOLOGY

## 2024-02-22 ENCOUNTER — HOSPITAL ENCOUNTER (OUTPATIENT)
Dept: PULMONOLOGY | Facility: HOSPITAL | Age: 74
Discharge: HOME | End: 2024-02-22
Payer: MEDICARE

## 2024-02-22 VITALS — HEART RATE: 99 BPM | OXYGEN SATURATION: 87 %

## 2024-02-22 DIAGNOSIS — J44.9 CHRONIC OBSTRUCTIVE PULMONARY DISEASE, UNSPECIFIED COPD TYPE: Primary | ICD-10-CM

## 2024-02-22 DIAGNOSIS — J96.12 CHRONIC RESPIRATORY FAILURE WITH HYPOXIA AND HYPERCAPNIA: ICD-10-CM

## 2024-02-22 DIAGNOSIS — J96.11 CHRONIC RESPIRATORY FAILURE WITH HYPOXIA AND HYPERCAPNIA: ICD-10-CM

## 2024-02-22 PROCEDURE — G0463 HOSPITAL OUTPT CLINIC VISIT: HCPCS | Performed by: PHYSICIAN ASSISTANT

## 2024-02-22 PROCEDURE — 99203 OFFICE O/P NEW LOW 30 MIN: CPT | Performed by: PHYSICIAN ASSISTANT

## 2024-02-22 RX ORDER — ALBUTEROL SULFATE 90 UG/1
2 AEROSOL, METERED RESPIRATORY (INHALATION) EVERY 4 HOURS PRN
Qty: 18 G | Refills: 11 | Status: ON HOLD | OUTPATIENT
Start: 2024-02-22

## 2024-02-23 ENCOUNTER — HOSPITAL ENCOUNTER (INPATIENT)
Facility: HOSPITAL | Age: 74
LOS: 15 days | Discharge: NURSING, REHAB OR LONG-TERM CARE | DRG: 082 | End: 2024-03-09
Attending: STUDENT IN AN ORGANIZED HEALTH CARE EDUCATION/TRAINING PROGRAM | Admitting: INTERNAL MEDICINE
Payer: MEDICARE

## 2024-02-23 ENCOUNTER — APPOINTMENT (OUTPATIENT)
Dept: CT IMAGING | Facility: HOSPITAL | Age: 74
DRG: 082 | End: 2024-02-23
Payer: MEDICARE

## 2024-02-23 ENCOUNTER — APPOINTMENT (OUTPATIENT)
Dept: GENERAL RADIOLOGY | Facility: HOSPITAL | Age: 74
DRG: 082 | End: 2024-02-23
Payer: MEDICARE

## 2024-02-23 DIAGNOSIS — S02.2XXA CLOSED FRACTURE OF NASAL BONE, INITIAL ENCOUNTER: ICD-10-CM

## 2024-02-23 DIAGNOSIS — F41.1 GAD (GENERALIZED ANXIETY DISORDER): ICD-10-CM

## 2024-02-23 DIAGNOSIS — S06.5XAA SUBDURAL HEMATOMA: Primary | ICD-10-CM

## 2024-02-23 DIAGNOSIS — Z79.01 CHRONIC ANTICOAGULATION: ICD-10-CM

## 2024-02-23 DIAGNOSIS — S05.11XA TRAUMATIC ORBITAL HEMATOMA, RIGHT, INITIAL ENCOUNTER: ICD-10-CM

## 2024-02-23 DIAGNOSIS — I21.4 NSTEMI (NON-ST ELEVATED MYOCARDIAL INFARCTION): ICD-10-CM

## 2024-02-23 LAB
A-A DO2: 46.1 MMHG (ref 0–300)
A-A DO2: ABNORMAL
ALBUMIN SERPL-MCNC: 3.2 G/DL (ref 3.5–5.2)
ALBUMIN SERPL-MCNC: 3.3 G/DL (ref 3.5–5.2)
ALBUMIN/GLOB SERPL: 1.3 G/DL
ALBUMIN/GLOB SERPL: 1.6 G/DL
ALP SERPL-CCNC: 73 U/L (ref 39–117)
ALP SERPL-CCNC: 74 U/L (ref 39–117)
ALT SERPL W P-5'-P-CCNC: 13 U/L (ref 1–33)
ALT SERPL W P-5'-P-CCNC: 15 U/L (ref 1–33)
AMPHET+METHAMPHET UR QL: NEGATIVE
AMPHETAMINES UR QL: NEGATIVE
ANION GAP SERPL CALCULATED.3IONS-SCNC: 1.9 MMOL/L (ref 5–15)
ANION GAP SERPL CALCULATED.3IONS-SCNC: 7.2 MMOL/L (ref 5–15)
ANISOCYTOSIS BLD QL: NORMAL
ANISOCYTOSIS BLD QL: NORMAL
APTT PPP: 33.4 SECONDS (ref 26.5–34.5)
ARTERIAL PATENCY WRIST A: ABNORMAL
ARTERIAL PATENCY WRIST A: ABNORMAL
AST SERPL-CCNC: 19 U/L (ref 1–32)
AST SERPL-CCNC: 19 U/L (ref 1–32)
ATMOSPHERIC PRESS: 717 MMHG
ATMOSPHERIC PRESS: 719 MMHG
BACTERIA UR QL AUTO: ABNORMAL /HPF
BARBITURATES UR QL SCN: NEGATIVE
BASE EXCESS BLDA CALC-SCNC: 11 MMOL/L (ref 0–2)
BASE EXCESS BLDA CALC-SCNC: 12.3 MMOL/L (ref 0–2)
BASOPHILS # BLD AUTO: 0.02 10*3/MM3 (ref 0–0.2)
BASOPHILS # BLD AUTO: 0.02 10*3/MM3 (ref 0–0.2)
BASOPHILS NFR BLD AUTO: 0.4 % (ref 0–1.5)
BASOPHILS NFR BLD AUTO: 0.4 % (ref 0–1.5)
BDY SITE: ABNORMAL
BDY SITE: ABNORMAL
BENZODIAZ UR QL SCN: POSITIVE
BILIRUB SERPL-MCNC: 0.3 MG/DL (ref 0–1.2)
BILIRUB SERPL-MCNC: 0.3 MG/DL (ref 0–1.2)
BILIRUB UR QL STRIP: ABNORMAL
BUN SERPL-MCNC: 16 MG/DL (ref 8–23)
BUN SERPL-MCNC: 17 MG/DL (ref 8–23)
BUN/CREAT SERPL: 19 (ref 7–25)
BUN/CREAT SERPL: 19.8 (ref 7–25)
BUPRENORPHINE SERPL-MCNC: NEGATIVE NG/ML
CALCIUM SPEC-SCNC: 8.9 MG/DL (ref 8.6–10.5)
CALCIUM SPEC-SCNC: 9.1 MG/DL (ref 8.6–10.5)
CANNABINOIDS SERPL QL: NEGATIVE
CHLORIDE SERPL-SCNC: 105 MMOL/L (ref 98–107)
CHLORIDE SERPL-SCNC: 105 MMOL/L (ref 98–107)
CK SERPL-CCNC: 89 U/L (ref 20–180)
CLARITY UR: ABNORMAL
CO2 BLDA-SCNC: 40.8 MMOL/L (ref 22–33)
CO2 BLDA-SCNC: 42.5 MMOL/L (ref 22–33)
CO2 SERPL-SCNC: 35.8 MMOL/L (ref 22–29)
CO2 SERPL-SCNC: 40.1 MMOL/L (ref 22–29)
COCAINE UR QL: NEGATIVE
COHGB MFR BLD: 1.6 % (ref 0–5)
COHGB MFR BLD: 1.9 % (ref 0–5)
COLOR UR: ABNORMAL
CREAT SERPL-MCNC: 0.84 MG/DL (ref 0.57–1)
CREAT SERPL-MCNC: 0.86 MG/DL (ref 0.57–1)
CRP SERPL-MCNC: 0.42 MG/DL (ref 0–0.5)
D-LACTATE SERPL-SCNC: 1 MMOL/L (ref 0.5–2)
DACRYOCYTES BLD QL SMEAR: NORMAL
DEPRECATED RDW RBC AUTO: 74.3 FL (ref 37–54)
DEPRECATED RDW RBC AUTO: 75.6 FL (ref 37–54)
EGFRCR SERPLBLD CKD-EPI 2021: 70.6 ML/MIN/1.73
EGFRCR SERPLBLD CKD-EPI 2021: 72.6 ML/MIN/1.73
EOSINOPHIL # BLD AUTO: 0.02 10*3/MM3 (ref 0–0.4)
EOSINOPHIL # BLD AUTO: 0.03 10*3/MM3 (ref 0–0.4)
EOSINOPHIL NFR BLD AUTO: 0.4 % (ref 0.3–6.2)
EOSINOPHIL NFR BLD AUTO: 0.6 % (ref 0.3–6.2)
ERYTHROCYTE [DISTWIDTH] IN BLOOD BY AUTOMATED COUNT: 20.1 % (ref 12.3–15.4)
ERYTHROCYTE [DISTWIDTH] IN BLOOD BY AUTOMATED COUNT: 20.4 % (ref 12.3–15.4)
ERYTHROCYTE [SEDIMENTATION RATE] IN BLOOD: 8 MM/HR (ref 0–30)
ETHANOL BLD-MCNC: <10 MG/DL (ref 0–10)
ETHANOL UR QL: <0.01 %
FENTANYL UR-MCNC: NEGATIVE NG/ML
GAS FLOW AIRWAY: 2 LPM
GAS FLOW AIRWAY: 4 LPM
GEN 5 2HR TROPONIN T REFLEX: 452 NG/L
GLOBULIN UR ELPH-MCNC: 2.1 GM/DL
GLOBULIN UR ELPH-MCNC: 2.4 GM/DL
GLUCOSE BLDC GLUCOMTR-MCNC: 79 MG/DL (ref 70–130)
GLUCOSE SERPL-MCNC: 100 MG/DL (ref 65–99)
GLUCOSE SERPL-MCNC: 97 MG/DL (ref 65–99)
GLUCOSE UR STRIP-MCNC: NEGATIVE MG/DL
HCO3 BLDA-SCNC: 38.9 MMOL/L (ref 20–26)
HCO3 BLDA-SCNC: 40 MMOL/L (ref 20–26)
HCT VFR BLD AUTO: 37 % (ref 34–46.6)
HCT VFR BLD AUTO: 39 % (ref 34–46.6)
HCT VFR BLD CALC: 29.8 % (ref 38–51)
HCT VFR BLD CALC: 30.7 % (ref 38–51)
HGB BLD-MCNC: 10.6 G/DL (ref 12–15.9)
HGB BLD-MCNC: 11.1 G/DL (ref 12–15.9)
HGB BLDA-MCNC: 10 G/DL (ref 13.5–17.5)
HGB BLDA-MCNC: 9.7 G/DL (ref 13.5–17.5)
HGB UR QL STRIP.AUTO: ABNORMAL
HOLD SPECIMEN: NORMAL
HOLD SPECIMEN: NORMAL
HYALINE CASTS UR QL AUTO: ABNORMAL /LPF
HYPOCHROMIA BLD QL: NORMAL
HYPOCHROMIA BLD QL: NORMAL
IMM GRANULOCYTES # BLD AUTO: 0.04 10*3/MM3 (ref 0–0.05)
IMM GRANULOCYTES # BLD AUTO: 0.06 10*3/MM3 (ref 0–0.05)
IMM GRANULOCYTES NFR BLD AUTO: 0.7 % (ref 0–0.5)
IMM GRANULOCYTES NFR BLD AUTO: 1.1 % (ref 0–0.5)
INHALED O2 CONCENTRATION: 28 %
INHALED O2 CONCENTRATION: 36 %
INR PPP: 1.5 (ref 0.9–1.1)
KETONES UR QL STRIP: NEGATIVE
LEUKOCYTE ESTERASE UR QL STRIP.AUTO: ABNORMAL
LYMPHOCYTES # BLD AUTO: 0.66 10*3/MM3 (ref 0.7–3.1)
LYMPHOCYTES # BLD AUTO: 0.74 10*3/MM3 (ref 0.7–3.1)
LYMPHOCYTES NFR BLD AUTO: 12.4 % (ref 19.6–45.3)
LYMPHOCYTES NFR BLD AUTO: 13.5 % (ref 19.6–45.3)
Lab: ABNORMAL
MACROCYTES BLD QL SMEAR: NORMAL
MCH RBC QN AUTO: 28.7 PG (ref 26.6–33)
MCH RBC QN AUTO: 28.9 PG (ref 26.6–33)
MCHC RBC AUTO-ENTMCNC: 28.5 G/DL (ref 31.5–35.7)
MCHC RBC AUTO-ENTMCNC: 28.6 G/DL (ref 31.5–35.7)
MCV RBC AUTO: 100.8 FL (ref 79–97)
MCV RBC AUTO: 100.8 FL (ref 79–97)
METHADONE UR QL SCN: NEGATIVE
METHGB BLD QL: 0.1 % (ref 0–3)
METHGB BLD QL: <-0.1 % (ref 0–3)
MODALITY: ABNORMAL
MODALITY: ABNORMAL
MONOCYTES # BLD AUTO: 0.38 10*3/MM3 (ref 0.1–0.9)
MONOCYTES # BLD AUTO: 0.42 10*3/MM3 (ref 0.1–0.9)
MONOCYTES NFR BLD AUTO: 7.1 % (ref 5–12)
MONOCYTES NFR BLD AUTO: 7.7 % (ref 5–12)
NEUTROPHILS NFR BLD AUTO: 4.18 10*3/MM3 (ref 1.7–7)
NEUTROPHILS NFR BLD AUTO: 4.23 10*3/MM3 (ref 1.7–7)
NEUTROPHILS NFR BLD AUTO: 77.3 % (ref 42.7–76)
NEUTROPHILS NFR BLD AUTO: 78.4 % (ref 42.7–76)
NITRITE UR QL STRIP: NEGATIVE
NOTIFIED BY: ABNORMAL
NOTIFIED BY: ABNORMAL
NOTIFIED WHO: ABNORMAL
NOTIFIED WHO: ABNORMAL
NRBC BLD AUTO-RTO: 0 /100 WBC (ref 0–0.2)
NRBC BLD AUTO-RTO: 0 /100 WBC (ref 0–0.2)
OPIATES UR QL: NEGATIVE
OVALOCYTES BLD QL SMEAR: NORMAL
OVALOCYTES BLD QL SMEAR: NORMAL
OXYCODONE UR QL SCN: NEGATIVE
OXYHGB MFR BLDV: 94.3 % (ref 94–99)
OXYHGB MFR BLDV: 98.5 % (ref 94–99)
PCO2 BLDA: 61.9 MM HG (ref 35–45)
PCO2 BLDA: 83.3 MM HG (ref 35–45)
PCO2 TEMP ADJ BLD: ABNORMAL MM[HG]
PCO2 TEMP ADJ BLD: ABNORMAL MM[HG]
PCP UR QL SCN: NEGATIVE
PH BLDA: 7.29 PH UNITS (ref 7.35–7.45)
PH BLDA: 7.41 PH UNITS (ref 7.35–7.45)
PH UR STRIP.AUTO: 5.5 [PH] (ref 5–8)
PH, TEMP CORRECTED: ABNORMAL
PH, TEMP CORRECTED: ABNORMAL
PLAT MORPH BLD: NORMAL
PLAT MORPH BLD: NORMAL
PLATELET # BLD AUTO: 225 10*3/MM3 (ref 140–450)
PLATELET # BLD AUTO: 233 10*3/MM3 (ref 140–450)
PMV BLD AUTO: 10.1 FL (ref 6–12)
PMV BLD AUTO: 9.8 FL (ref 6–12)
PO2 BLDA: 194 MM HG (ref 83–108)
PO2 BLDA: 72.7 MM HG (ref 83–108)
PO2 TEMP ADJ BLD: ABNORMAL MM[HG]
PO2 TEMP ADJ BLD: ABNORMAL MM[HG]
POLYCHROMASIA BLD QL SMEAR: NORMAL
POLYCHROMASIA BLD QL SMEAR: NORMAL
POTASSIUM SERPL-SCNC: 3.9 MMOL/L (ref 3.5–5.2)
POTASSIUM SERPL-SCNC: 4.6 MMOL/L (ref 3.5–5.2)
PROT SERPL-MCNC: 5.4 G/DL (ref 6–8.5)
PROT SERPL-MCNC: 5.6 G/DL (ref 6–8.5)
PROT UR QL STRIP: ABNORMAL
PROTHROMBIN TIME: 18.8 SECONDS (ref 12.1–14.7)
RBC # BLD AUTO: 3.67 10*6/MM3 (ref 3.77–5.28)
RBC # BLD AUTO: 3.87 10*6/MM3 (ref 3.77–5.28)
RBC # UR STRIP: ABNORMAL /HPF
REF LAB TEST METHOD: ABNORMAL
SAO2 % BLDCOA: 95.9 % (ref 94–99)
SAO2 % BLDCOA: >99.2 % (ref 94–99)
SODIUM SERPL-SCNC: 147 MMOL/L (ref 136–145)
SODIUM SERPL-SCNC: 148 MMOL/L (ref 136–145)
SP GR UR STRIP: >1.03 (ref 1–1.03)
SQUAMOUS #/AREA URNS HPF: ABNORMAL /HPF
TRICYCLICS UR QL SCN: NEGATIVE
TROPONIN T DELTA: -22 NG/L
TROPONIN T SERPL HS-MCNC: 474 NG/L
UROBILINOGEN UR QL STRIP: ABNORMAL
VENTILATOR MODE: ABNORMAL
VENTILATOR MODE: ABNORMAL
WBC # UR STRIP: ABNORMAL /HPF
WBC NRBC COR # BLD AUTO: 5.33 10*3/MM3 (ref 3.4–10.8)
WBC NRBC COR # BLD AUTO: 5.47 10*3/MM3 (ref 3.4–10.8)
WHOLE BLOOD HOLD COAG: NORMAL
WHOLE BLOOD HOLD SPECIMEN: NORMAL

## 2024-02-23 PROCEDURE — 72125 CT NECK SPINE W/O DYE: CPT | Performed by: RADIOLOGY

## 2024-02-23 PROCEDURE — 71250 CT THORAX DX C-: CPT

## 2024-02-23 PROCEDURE — 72131 CT LUMBAR SPINE W/O DYE: CPT

## 2024-02-23 PROCEDURE — 85025 COMPLETE CBC W/AUTO DIFF WBC: CPT | Performed by: STUDENT IN AN ORGANIZED HEALTH CARE EDUCATION/TRAINING PROGRAM

## 2024-02-23 PROCEDURE — 94660 CPAP INITIATION&MGMT: CPT

## 2024-02-23 PROCEDURE — 5A09357 ASSISTANCE WITH RESPIRATORY VENTILATION, LESS THAN 24 CONSECUTIVE HOURS, CONTINUOUS POSITIVE AIRWAY PRESSURE: ICD-10-PCS | Performed by: INTERNAL MEDICINE

## 2024-02-23 PROCEDURE — 80053 COMPREHEN METABOLIC PANEL: CPT | Performed by: INTERNAL MEDICINE

## 2024-02-23 PROCEDURE — 99223 1ST HOSP IP/OBS HIGH 75: CPT | Performed by: INTERNAL MEDICINE

## 2024-02-23 PROCEDURE — 74176 CT ABD & PELVIS W/O CONTRAST: CPT | Performed by: RADIOLOGY

## 2024-02-23 PROCEDURE — 70450 CT HEAD/BRAIN W/O DYE: CPT

## 2024-02-23 PROCEDURE — 80053 COMPREHEN METABOLIC PANEL: CPT | Performed by: STUDENT IN AN ORGANIZED HEALTH CARE EDUCATION/TRAINING PROGRAM

## 2024-02-23 PROCEDURE — 94799 UNLISTED PULMONARY SVC/PX: CPT

## 2024-02-23 PROCEDURE — 85730 THROMBOPLASTIN TIME PARTIAL: CPT | Performed by: STUDENT IN AN ORGANIZED HEALTH CARE EDUCATION/TRAINING PROGRAM

## 2024-02-23 PROCEDURE — 82805 BLOOD GASES W/O2 SATURATION: CPT

## 2024-02-23 PROCEDURE — 36600 WITHDRAWAL OF ARTERIAL BLOOD: CPT

## 2024-02-23 PROCEDURE — 82948 REAGENT STRIP/BLOOD GLUCOSE: CPT

## 2024-02-23 PROCEDURE — 73110 X-RAY EXAM OF WRIST: CPT

## 2024-02-23 PROCEDURE — 80307 DRUG TEST PRSMV CHEM ANLYZR: CPT | Performed by: STUDENT IN AN ORGANIZED HEALTH CARE EDUCATION/TRAINING PROGRAM

## 2024-02-23 PROCEDURE — 93005 ELECTROCARDIOGRAM TRACING: CPT | Performed by: STUDENT IN AN ORGANIZED HEALTH CARE EDUCATION/TRAINING PROGRAM

## 2024-02-23 PROCEDURE — 81001 URINALYSIS AUTO W/SCOPE: CPT | Performed by: STUDENT IN AN ORGANIZED HEALTH CARE EDUCATION/TRAINING PROGRAM

## 2024-02-23 PROCEDURE — 83605 ASSAY OF LACTIC ACID: CPT | Performed by: STUDENT IN AN ORGANIZED HEALTH CARE EDUCATION/TRAINING PROGRAM

## 2024-02-23 PROCEDURE — 74176 CT ABD & PELVIS W/O CONTRAST: CPT

## 2024-02-23 PROCEDURE — 99221 1ST HOSP IP/OBS SF/LOW 40: CPT | Performed by: INTERNAL MEDICINE

## 2024-02-23 PROCEDURE — 82077 ASSAY SPEC XCP UR&BREATH IA: CPT | Performed by: STUDENT IN AN ORGANIZED HEALTH CARE EDUCATION/TRAINING PROGRAM

## 2024-02-23 PROCEDURE — 93010 ELECTROCARDIOGRAM REPORT: CPT | Performed by: INTERNAL MEDICINE

## 2024-02-23 PROCEDURE — P9612 CATHETERIZE FOR URINE SPEC: HCPCS

## 2024-02-23 PROCEDURE — 83050 HGB METHEMOGLOBIN QUAN: CPT

## 2024-02-23 PROCEDURE — 72131 CT LUMBAR SPINE W/O DYE: CPT | Performed by: RADIOLOGY

## 2024-02-23 PROCEDURE — 72125 CT NECK SPINE W/O DYE: CPT

## 2024-02-23 PROCEDURE — 82375 ASSAY CARBOXYHB QUANT: CPT

## 2024-02-23 PROCEDURE — 84484 ASSAY OF TROPONIN QUANT: CPT | Performed by: STUDENT IN AN ORGANIZED HEALTH CARE EDUCATION/TRAINING PROGRAM

## 2024-02-23 PROCEDURE — 71250 CT THORAX DX C-: CPT | Performed by: RADIOLOGY

## 2024-02-23 PROCEDURE — 87086 URINE CULTURE/COLONY COUNT: CPT | Performed by: STUDENT IN AN ORGANIZED HEALTH CARE EDUCATION/TRAINING PROGRAM

## 2024-02-23 PROCEDURE — 85007 BL SMEAR W/DIFF WBC COUNT: CPT | Performed by: STUDENT IN AN ORGANIZED HEALTH CARE EDUCATION/TRAINING PROGRAM

## 2024-02-23 PROCEDURE — 94761 N-INVAS EAR/PLS OXIMETRY MLT: CPT

## 2024-02-23 PROCEDURE — 25810000003 SODIUM CHLORIDE 0.9 % SOLUTION: Performed by: INTERNAL MEDICINE

## 2024-02-23 PROCEDURE — 82550 ASSAY OF CK (CPK): CPT | Performed by: STUDENT IN AN ORGANIZED HEALTH CARE EDUCATION/TRAINING PROGRAM

## 2024-02-23 PROCEDURE — 73110 X-RAY EXAM OF WRIST: CPT | Performed by: RADIOLOGY

## 2024-02-23 PROCEDURE — 72128 CT CHEST SPINE W/O DYE: CPT

## 2024-02-23 PROCEDURE — 70486 CT MAXILLOFACIAL W/O DYE: CPT | Performed by: RADIOLOGY

## 2024-02-23 PROCEDURE — 85610 PROTHROMBIN TIME: CPT | Performed by: STUDENT IN AN ORGANIZED HEALTH CARE EDUCATION/TRAINING PROGRAM

## 2024-02-23 PROCEDURE — 85007 BL SMEAR W/DIFF WBC COUNT: CPT | Performed by: INTERNAL MEDICINE

## 2024-02-23 PROCEDURE — 70486 CT MAXILLOFACIAL W/O DYE: CPT

## 2024-02-23 PROCEDURE — 25010000002 LEVETIRACETAM IN NACL 0.82% 500 MG/100ML SOLUTION: Performed by: INTERNAL MEDICINE

## 2024-02-23 PROCEDURE — 85025 COMPLETE CBC W/AUTO DIFF WBC: CPT | Performed by: INTERNAL MEDICINE

## 2024-02-23 PROCEDURE — 36415 COLL VENOUS BLD VENIPUNCTURE: CPT

## 2024-02-23 PROCEDURE — 72128 CT CHEST SPINE W/O DYE: CPT | Performed by: RADIOLOGY

## 2024-02-23 PROCEDURE — 85652 RBC SED RATE AUTOMATED: CPT | Performed by: STUDENT IN AN ORGANIZED HEALTH CARE EDUCATION/TRAINING PROGRAM

## 2024-02-23 PROCEDURE — 86140 C-REACTIVE PROTEIN: CPT | Performed by: STUDENT IN AN ORGANIZED HEALTH CARE EDUCATION/TRAINING PROGRAM

## 2024-02-23 PROCEDURE — 99291 CRITICAL CARE FIRST HOUR: CPT

## 2024-02-23 RX ORDER — GUAIFENESIN 200 MG/1
400 TABLET ORAL EVERY 4 HOURS PRN
Status: CANCELLED | OUTPATIENT
Start: 2024-02-23

## 2024-02-23 RX ORDER — LISINOPRIL 10 MG/1
10 TABLET ORAL
Status: CANCELLED | OUTPATIENT
Start: 2024-02-23

## 2024-02-23 RX ORDER — ISOSORBIDE MONONITRATE 30 MG/1
15 TABLET, EXTENDED RELEASE ORAL
Status: CANCELLED | OUTPATIENT
Start: 2024-02-23

## 2024-02-23 RX ORDER — CLOPIDOGREL BISULFATE 75 MG/1
75 TABLET ORAL DAILY
Status: CANCELLED | OUTPATIENT
Start: 2024-02-23

## 2024-02-23 RX ORDER — MEMANTINE HYDROCHLORIDE 10 MG/1
10 TABLET ORAL 2 TIMES DAILY
Status: CANCELLED | OUTPATIENT
Start: 2024-02-23

## 2024-02-23 RX ORDER — LAMOTRIGINE 100 MG/1
100 TABLET ORAL DAILY
Status: CANCELLED | OUTPATIENT
Start: 2024-02-23

## 2024-02-23 RX ORDER — SODIUM CHLORIDE 9 MG/ML
40 INJECTION, SOLUTION INTRAVENOUS AS NEEDED
Status: DISCONTINUED | OUTPATIENT
Start: 2024-02-23 | End: 2024-03-09 | Stop reason: HOSPADM

## 2024-02-23 RX ORDER — NITROGLYCERIN 0.4 MG/1
0.4 TABLET SUBLINGUAL
Status: CANCELLED | OUTPATIENT
Start: 2024-02-23

## 2024-02-23 RX ORDER — SODIUM CHLORIDE 0.9 % (FLUSH) 0.9 %
10 SYRINGE (ML) INJECTION EVERY 12 HOURS SCHEDULED
Status: DISCONTINUED | OUTPATIENT
Start: 2024-02-23 | End: 2024-03-09 | Stop reason: HOSPADM

## 2024-02-23 RX ORDER — RISPERIDONE 0.25 MG/1
0.5 TABLET ORAL 2 TIMES DAILY
Status: CANCELLED | OUTPATIENT
Start: 2024-02-23

## 2024-02-23 RX ORDER — SODIUM CHLORIDE 0.9 % (FLUSH) 0.9 %
10 SYRINGE (ML) INJECTION AS NEEDED
Status: DISCONTINUED | OUTPATIENT
Start: 2024-02-23 | End: 2024-03-09 | Stop reason: HOSPADM

## 2024-02-23 RX ORDER — PANTOPRAZOLE SODIUM 40 MG/1
40 TABLET, DELAYED RELEASE ORAL DAILY
Status: CANCELLED | OUTPATIENT
Start: 2024-02-23

## 2024-02-23 RX ORDER — LEVETIRACETAM 5 MG/ML
500 INJECTION INTRAVASCULAR EVERY 12 HOURS SCHEDULED
Status: DISCONTINUED | OUTPATIENT
Start: 2024-02-23 | End: 2024-02-24

## 2024-02-23 RX ORDER — PHENYLEPHRINE HCL IN 0.9% NACL 1 MG/10 ML
SYRINGE (ML) INTRAVENOUS
Status: DISPENSED
Start: 2024-02-23 | End: 2024-02-24

## 2024-02-23 RX ORDER — BISACODYL 10 MG
10 SUPPOSITORY, RECTAL RECTAL DAILY PRN
Status: DISCONTINUED | OUTPATIENT
Start: 2024-02-23 | End: 2024-03-09 | Stop reason: HOSPADM

## 2024-02-23 RX ORDER — PHENYLEPHRINE HCL IN 0.9% NACL 0.5 MG/5ML
.5-3 SYRINGE (ML) INTRAVENOUS
Status: DISCONTINUED | OUTPATIENT
Start: 2024-02-23 | End: 2024-02-28

## 2024-02-23 RX ORDER — ROSUVASTATIN CALCIUM 20 MG/1
40 TABLET, COATED ORAL EVERY EVENING
Status: CANCELLED | OUTPATIENT
Start: 2024-02-23

## 2024-02-23 RX ORDER — LORAZEPAM 0.5 MG/1
0.5 TABLET ORAL EVERY 12 HOURS PRN
Status: CANCELLED | OUTPATIENT
Start: 2024-02-23

## 2024-02-23 RX ORDER — ACETAMINOPHEN 500 MG
500 TABLET ORAL EVERY 4 HOURS PRN
Status: CANCELLED | OUTPATIENT
Start: 2024-02-23

## 2024-02-23 RX ORDER — SENNOSIDES A AND B 8.6 MG/1
1 TABLET, FILM COATED ORAL DAILY PRN
Status: CANCELLED | OUTPATIENT
Start: 2024-02-23

## 2024-02-23 RX ORDER — FERROUS SULFATE 325(65) MG
325 TABLET ORAL 2 TIMES DAILY
Status: CANCELLED | OUTPATIENT
Start: 2024-02-23

## 2024-02-23 RX ORDER — DONEPEZIL HYDROCHLORIDE 5 MG/1
10 TABLET, FILM COATED ORAL NIGHTLY
Status: CANCELLED | OUTPATIENT
Start: 2024-02-23

## 2024-02-23 RX ORDER — BISACODYL 5 MG/1
5 TABLET, DELAYED RELEASE ORAL DAILY PRN
Status: DISCONTINUED | OUTPATIENT
Start: 2024-02-23 | End: 2024-03-09 | Stop reason: HOSPADM

## 2024-02-23 RX ORDER — BACLOFEN 10 MG/1
5 TABLET ORAL 2 TIMES DAILY
Status: CANCELLED | OUTPATIENT
Start: 2024-02-23

## 2024-02-23 RX ORDER — LEVOTHYROXINE SODIUM 0.07 MG/1
75 TABLET ORAL DAILY
Status: CANCELLED | OUTPATIENT
Start: 2024-02-23

## 2024-02-23 RX ORDER — POLYETHYLENE GLYCOL 3350 17 G/17G
17 POWDER, FOR SOLUTION ORAL DAILY PRN
Status: DISCONTINUED | OUTPATIENT
Start: 2024-02-23 | End: 2024-03-09 | Stop reason: HOSPADM

## 2024-02-23 RX ORDER — AMOXICILLIN 250 MG
2 CAPSULE ORAL 2 TIMES DAILY
Status: DISCONTINUED | OUTPATIENT
Start: 2024-02-23 | End: 2024-03-09 | Stop reason: HOSPADM

## 2024-02-23 RX ORDER — LAMOTRIGINE 100 MG/1
25 TABLET ORAL NIGHTLY
Status: CANCELLED | OUTPATIENT
Start: 2024-02-23

## 2024-02-23 RX ADMIN — LEVETIRACETAM 500 MG: 5 INJECTION INTRAVENOUS at 20:31

## 2024-02-23 RX ADMIN — Medication 10 ML: at 20:20

## 2024-02-23 NOTE — PROGRESS NOTES
Asked to review the noncontrast head CT of this 73-year-old woman who apparently suffered a ground-level fall.  She is, by report on Plavix and Eliquis.    There is a very small, barely perceptible fall seen subdural hematoma between the anterior frontal lobes.  It measures approximately 4 m millimeters in maximal thickness.  I do not appreciate any intraparenchymal contusions or subarachnoid hemorrhage.  The likelihood that this fall seen subdural will progress to anything that is clinically significant, let alone requiring surgical intervention is essentially 0.    It does sound like she may have some superimposed cardiac issues that are playing out as well.  I certainly would not recommend reversing her Eliquis at this point on the basis of this small parafalcine subdural.  The risk-benefit calculus of reversal and the concomitant prothrombotic side effects did not seem to outweigh the risk of the natural history of this subdural hematoma.    If she does end up getting transferred to [facility], I would be happy to see her in consultation, however if she stays at [facility], which I think probably can provide the appropriate level of care for her at this point, then I would recommend repeating a noncontrast head CT tomorrow morning.    I am available by cell phone for the rest of the weekend if any issues or problems arise ([phone])

## 2024-02-23 NOTE — CONSULTS
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 Patient is on Eliquis as well as Plavix.    Stroke Risk Factors/ Pertinent Data           Scoring Scales     Intracerebral Hemmorhage (ICH) Score  Age>=80: no    NIH Stroke Scale             Patient alert awake follows simple command right-sided raccoon eye was noted face appeared symmetric.  Visual fields intact.  Follows simple command.  Mildly dysarthric.  Bilateral lower extremities drift was noted no ataxia noted.     Review of Systems     Review of Systems    Objective   Exam     Exam performed with the help of support staff from the referring site  Neurological Exam    Result Review    Results          Personal review of CNS imaging:(Official report by radiologist pending)  Imaging  CT Imaging Review: CT Imaging reviewed, POSITIVE for acute hemorrhage    Thrombolytic   Thrombolytics: not applicable     Assessment & Plan   Assessment/ Plan     Assessment:  Acute Stroke Evaluation: ACUTE intracranial hemorrhage       Traumatic subdural hematoma.  Plan:    Consult Neurosurgery.    Minimal amount of subdural hematoma.  Recommend discussion with Neurosurgery whether they would like to reversed with Kcentra okay with it otherwise repeat CT in 4-6 hours documented stability of subdural hematoma.    There was also question patient had acute MI cardiac arrest.  At this point with subdural hematoma and active bleed may not be a good  candidate for  heparinization.    Frequent neuro monitoring.  Q 1 neuro check if there is any change in neuro status please get a stat CT head.    Start course of Keppra 500 mg b.I.d. for 7 days.    Seizure precautions.    Primary and secondary trauma survey.    Thank you for the consult         Disposition     Disposition: The patient will remain at the referring institution for further evaluation and management    Medical Decision Making  Medical Data Reviewed: Data reviewed including: clinical labs, radiology and/or medical tests  Length of visit: 20 minutes    IAndreea MD, saw the patient on 02/23/24 at 1312 for an initial in-patient or emergency room telememedicine face to face consult using interactive technology for 20 minutes. The location of the patient was [place]. I was located at [place].    I have proceeded with this evaluation at the request of the referring practitioner as it is felt to be an emergency setting and no appropriate specialist is available to perform this evaluation. The originating hospital has reported that this is the correct patient and has obtained consent from the patient/surrogate to perform this telemedicine evaluation(including obtaining history, performing examination and reviewing data provided by the patient an/or originating site of care provider)    I have introduced myself to the patient, provided my credentials, disclosed my location, and determined that, based on review of the patient's chart and discussion with the patient's primary team, telemedicine via a HIPAA compliant, real-time, face-to-face two-way, interactive audio and video platform is an appropriate and effective means of providing the service.    The patient/surrogate has a right to refuse this evaluation as they have been explained risks including potential loss of confidentiality, benefits, alternatives, and the potential need for subsequent face-to-face care. In this evaluation, we will be  providing recommendations only.  The ultimate decision to follow or not to follow these recommendations will be left to the bedside treating/requesting practitioner.    The patient/surrogate has been notified that other healthcare professionals including technical person may be involved in this A/V evaluation.  All laws concerning confidentiality and patient access to medical records and copies of medical records apply to telemedicine.  The patient/surrogate has received the originating site's Health Notice of Privacy Practices.    Andreea Arteaga MD

## 2024-02-23 NOTE — PROGRESS NOTES
Was called that patient more lethargic than earlier. Patient was not nearly as interactive as before. Took stumuli to weak her up and she would not stay awake. She would only respond verbally to pain, moving all extremities. Tracking with eyes. STAT ABG performed with planned STAT CT head w/o contrast. ABG revealed respiratory acidosis in setting of high O2. Decreased O2 while we placed patient on BIPAP 24/8 rate of 24, settings she responded well to last time. Will repeat blood gas in 1 hour. If gas improves but still lethargic, will still get STAT CT head.

## 2024-02-23 NOTE — H&P
[facility] Medicine Services  History & Physical    Patient Identification:  Name:  Brenda Munroe  Age:  73 y.o.  Sex:  female  :  1950  MRN:  [id number]   Visit Number:  [id number]  Admit Date: 2024   Primary Care Physician:  Bernadette Arevalo MD    Subjective     Chief complaint: fall    History of presenting illness:      Brenda Munroe is a 73 y.o. female who presented for further evaluation of fall, head injury.  Per report patient was found facedown at the nursing home today at 0955.  Per staff patient had been at her normal state of health during medication administration at 0800.  On exam patient noted with significant swelling to the right eye and surrounding face/scalp.  She was able to follow a few commands and state her name but answered no other questions.  She did move the bilateral upper extremities when asked.    Past medical history is significant for MDD, COPD, CAD, thyroid disease, hypertension, paroxysmal atrial fibrillation, bilateral carotid stenosis, dementia, GERD, PUD    Upon arrival to the ED, vital signs were temp 97, heart rate 111, respirations 20, BP 91/97, SpO2 100% on 4 L per nasal cannula.  HS troponin on arrival significantly elevated at 474 with repeat at 452.  CK is normal.  Sodium is 147.  Urine notable for specific gravity greater than 1.03 with moderate blood, 1+ leukocytes, 11-20 RBCs and 21-50 WBCs.  Imaging workup noted 4 mm left parafalcine subdural hematoma.  There is also a right nasal bone fracture.  EKG notes A-fib with right bundle branch block.    Emergency Department Room location at the time of my evaluation was 103.     ---------------------------------------------------------------------------------------------------------------------   Review of Systems   Reason unable to perform ROS: Altered mental status.         ---------------------------------------------------------------------------------------------------------------------   Past Medical History:   Diagnosis Date    Anxiety     Arthritis     Bell's palsy     Bladder prolapse, female, acquired     Carotid stenosis     COPD (chronic obstructive pulmonary disease)     COPD (chronic obstructive pulmonary disease)     Coronary artery disease     Dementia     Dementia     Depression     Difficulty walking     Disease of thyroid gland     Frequency of urination     GERD (gastroesophageal reflux disease)     Heart attack     Hyperlipidemia     Hypertension     Irregular heart beat     Peptic ulcer disease      Past Surgical History:   Procedure Laterality Date    CARDIAC CATHETERIZATION N/A 2/21/2023    Procedure: Left Heart Cath;  Surgeon: Tab Cifuentes MD;  Location: [facility] CATH INVASIVE LOCATION;  Service: Cardiology;  Laterality: N/A;    CARDIAC SURGERY      open heart  in 1999    CYSTOSCOPY N/A 11/8/2017    Procedure: CYSTOSCOPY;  Surgeon: Karl Nicolas DO;  Location: [facility] OR;  Service:     OTHER SURGICAL HISTORY      states she had fluid drained no surgery    VAGINAL HYSTERECTOMY W/ ANTERIOR AND POSTERIOR VAGINAL REPAIR N/A 11/8/2017    Procedure: VAGINAL HYSTERECTOMY WITH ANTERIOR, POSTERIOR, AND ENTEROCELE VAGINAL REPAIR;  Surgeon: Karl Nicolas DO;  Location: [facility] OR;  Service:     VAGINAL VAULT SUSPENSION N/A 11/8/2017    Procedure: VAGINAL VAULT SUSPENSION ;  Surgeon: Karl Nicolas DO;  Location: [facility] OR;  Service:      Family History   Problem Relation Age of Onset    Dementia Sister     Heart attack Mother     Tuberculosis Father     Breast cancer Neg Hx      Social History     Socioeconomic History    Marital status:     Number of children: 3   Tobacco Use    Smoking status: Every Day     Packs/day: 0.50     Years: 55.00     Additional pack years: 0.00     Total pack years: 27.50     Types: Cigarettes     Smokeless tobacco: Never   Vaping Use    Vaping Use: Never used   Substance and Sexual Activity    Alcohol use: No    Drug use: No    Sexual activity: Never     Comment: denies     ---------------------------------------------------------------------------------------------------------------------   Allergies:  Patient has no known allergies.  ---------------------------------------------------------------------------------------------------------------------   Home medications:    Medications below are reported home medications pulling from within the system; at this time, these medications have not been reconciled unless otherwise specified and are in the verification process for further verifcation as current home medications.  (Not in a hospital admission)      Hospital Scheduled Meds:    No current facility-administered medications for this encounter.      Current listed hospital scheduled medications may not yet reflect those currently placed in orders that are signed and held awaiting patient's arrival to floor.   ---------------------------------------------------------------------------------------------------------------------     Objective     Vital Signs:  Temp:  [97 °F (36.1 °C)] 97 °F (36.1 °C)  Heart Rate:  [] 98  Resp:  [20] 20  BP: ()/(57-91) 99/85      02/23/24  1048   Weight: 68.5 kg (151 lb)     Body mass index is 25.92 kg/m².  ---------------------------------------------------------------------------------------------------------------------       Physical Exam  Vitals and nursing note reviewed.   Constitutional:       General: She is not in acute distress.  HENT:      Head:      Comments: Diffuse swelling and bruising to the right face and anterior-right scalp  Eyes:      Comments: Significant right periorbital edema with ecchymosis   Cardiovascular:      Rate and Rhythm: Tachycardia present. Rhythm irregular.      Comments: DP pulses weak but present bilaterally  Pulmonary:       "Effort: Pulmonary effort is normal. No respiratory distress.      Breath sounds: Normal breath sounds.   Abdominal:      Palpations: Abdomen is soft.   Musculoskeletal:      Right lower leg: No edema.      Left lower leg: No edema.   Skin:     General: Skin is warm and dry.   Neurological:      Mental Status: She is alert. She is disoriented.         ---------------------------------------------------------------------------------------------------------------------  EKG:      I have personally looked at the EKG.  ---------------------------------------------------------------------------------------------------------------------   Results from last 7 days   Lab Units 02/23/24  1135   CRP mg/dL 0.42   LACTATE mmol/L 1.0   WBC 10*3/mm3 5.33   HEMOGLOBIN g/dL 11.1*   HEMATOCRIT % 39.0   MCV fL 100.8*   MCHC g/dL 28.5*   PLATELETS 10*3/mm3 233   INR  1.50*         Results from last 7 days   Lab Units 02/23/24  1135   SODIUM mmol/L 147*   POTASSIUM mmol/L 3.9   CHLORIDE mmol/L 105   CO2 mmol/L 40.1*   BUN mg/dL 17   CREATININE mg/dL 0.86   CALCIUM mg/dL 9.1   GLUCOSE mg/dL 100*   ALBUMIN g/dL 3.3*   BILIRUBIN mg/dL 0.3   ALK PHOS U/L 73   AST (SGOT) U/L 19   ALT (SGPT) U/L 15   Estimated Creatinine Clearance: 53.7 mL/min (by C-G formula based on SCr of 0.86 mg/dL).  No results found for: \"AMMONIA\"  Results from last 7 days   Lab Units 02/23/24  1340 02/23/24  1135   CK TOTAL U/L 89  --    HSTROP T ng/L 452* 474*         Lab Results   Component Value Date    HGBA1C 5.20 01/01/2024     Lab Results   Component Value Date    TSH 0.876 01/23/2024    FREET4 1.59 03/25/2021     No results found for: \"PREGTESTUR\", \"PREGSERUM\", \"HCG\", \"HCGQUANT\"  Pain Management Panel  More data exists         Latest Ref Rng & Units 2/23/2024 2/11/2024   Pain Management Panel   Amphetamine, Urine Qual Negative Negative  Negative    Barbiturates Screen, Urine Negative Negative  Negative    Benzodiazepine Screen, Urine Negative Positive  Positive  " "  Buprenorphine, Screen, Urine Negative Negative  Negative    Cocaine Screen, Urine Negative Negative  Negative    Fentanyl, Urine Negative Negative  Negative    Methadone Screen , Urine Negative Negative  Negative    Methamphetamine, Ur Negative Negative  Negative      No results found for: \"BLOODCX\"  No results found for: \"URINECX\"  No results found for: \"WOUNDCX\"  No results found for: \"STOOLCX\"      ---------------------------------------------------------------------------------------------------------------------  Imaging Results (Last 7 Days)       Procedure Component Value Units Date/Time    CT Abdomen Pelvis Without Contrast [[id number]] Collected: 02/23/24 1210     Updated: 02/23/24 1214    Narrative:      PROCEDURE: CT ABDOMEN PELVIS WO CONTRAST-     HISTORY: Unwitnessed fall, ALTERED, on anticoagulation superficial  bruising to the abdomen     COMPARISON: 1/9/2024.     PROCEDURE: Axial images were obtained from the lung bases through the  pubic symphysis without intravenous contrast. . This study was performed  with techniques to keep radiation doses as low as reasonably achievable  (ALARA). Individualized dose reduction techniques using automated  exposure control or adjustment of mA and/or kV according to the patient  size were employed.     FINDINGS:     ABDOMEN: The lung bases are clear. The heart size is normal. Noncontrast  images of the liver are unremarkable with no focal hepatic lesionsl. The  spleen is normal. There is a 1.6 cm left adrenal nodule which is  unchanged. The right adrenal gland is normal.  The pancreas has an  unremarkable unenhanced appearance.. The aorta is normal in caliber.  There is no significant free fluid or adenopathy. The left kidney is  atrophic. There is no nephrolithiasis. There is no hydronephrosis.  Limited noncontrast images of the bowel are unremarkable.     PELVIS: The appendix is not identified. There are colonic diverticula  without evidence of diverticulitis. " The patient is status post  hysterectomy. The urinary bladder is unremarkable. There is no  significant fluid or adenopathy. Bone windows reveal no acute osseous  abnormalities or lytic/destructive lesions. There is a fat-containing  periumbilical hernia.       Impression:      No acute intra-abdominal or pelvic process.              This report was finalized on 2/23/2024 12:12 PM by Lily Mullen M.D..       CT Thoracic Spine Without Contrast [[id number]] Collected: 02/23/24 1209     Updated: 02/23/24 1212    Narrative:      PROCEDURE: CT THORACIC SPINE WO CONTRAST-     HISTORY: Unwitnessed fall ;gross external trauma; blood thinners     TECHNIQUE: Multiple axial CT images were obtained through the thoracic  spine using bone window algorithims. Coronal and sagittal images were  reconstructed from the original axial data set. This study was performed  with techniques to keep radiation doses as low as reasonably achievable  (ALARA). Individualized dose reduction techniques using automated  exposure control or adjustment of mA and/or kV according to the patient  size were employed.     COMPARISON: None.     FINDINGS: There is S-shaped curvature of the thoracolumbar spine. There  are no fractures. There are diffuse degenerative changes with loss of  disc space height and anterior osteophyte formation. The paraspinal soft  tissues are unremarkable.       Impression:      No acute process.              This report was finalized on 2/23/2024 12:10 PM by Lily Mullen M.D..       CT Chest Without Contrast Diagnostic [[id number]] Collected: 02/23/24 1139     Updated: 02/23/24 1143    Narrative:      PROCEDURE: CT CHEST WO CONTRAST DIAGNOSTIC-     HISTORY: Unwitnessed fall gross external trauma blood thinners, masses  on abdomen     COMPARISON:  None .     PROCEDURE:  Multiple axial CT images were obtained from the thoracic  inlet through the upper abdomen without the use of contrast. This study  was performed with  techniques to keep radiation doses as low as  reasonably achievable (ALARA). Individualized dose reduction techniques  using automated exposure control or adjustment of mA and/or kV according  to the patient size were employed.     FINDINGS:  Soft tissue windows reveal no axillary, hilar or mediastinal adenopathy.  The thyroid gland is unremarkable. The heart is enlarged. The aorta is  normal in caliber. There are no pleural or pericardial effusions. Lung  windows reveal scarring in atelectasis in the left upper lobe. There is  no pneumothorax. The visualized upper abdomen is unremarkable. Bone  windows reveal no acute osseous abnormalities.       Impression:      No acute process.        This report was finalized on 2/23/2024 11:41 AM by Lily Mullen M.D..       XR Wrist 3+ View Right [[id number]] Collected: 02/23/24 1138     Updated: 02/23/24 1141    Narrative:      PROCEDURE: XR WRIST 3+ VW RIGHT-     History: Unwitnessed fall, ALTERED, on anticoagulation superficial  bruising to the abdomen, skin abrasion     COMPARISON:  None.     FINDINGS:  A 3 view exam demonstrates no acute fracture or dislocation.  There is joint space narrowing with osteophyte formation at the first  carpal metacarpal joint. No soft tissue abnormality is seen.       Impression:      No acute fracture.                 This report was finalized on 2/23/2024 11:39 AM by Lily Mullen M.D..       CT Lumbar Spine Without Contrast [[id number]] Collected: 02/23/24 1135     Updated: 02/23/24 1139    Narrative:      PROCEDURE: CT LUMBAR SPINE WO CONTRAST-     HISTORY: Low back pain, trauma     TECHNIQUE: Multiple axial CT images were obtained through the lumbar  spine using bone window algorithms. Coronal and sagittal images were  reconstructed from the original axial data set. This study was performed  with techniques to keep radiation doses as low as reasonably achievable  (ALARA). Individualized dose reduction techniques using  automated  exposure control or adjustment of mA and/or kV according to the patient  size were employed.     COMPARISON: None.     FINDINGS: The lumbar spine is well aligned with no acute fractures.  There are diffuse degenerative changes with loss of disc space height  and facet arthropathy. Evaluation of the paraspinal soft tissues reveals  an atrophic left kidney.       Impression:      No acute process.              This report was finalized on 2/23/2024 11:37 AM by Lily Mullen M.D..       CT Facial Bones Without Contrast [[id number]] Collected: 02/23/24 1133     Updated: 02/23/24 1137    Narrative:      PROCEDURE: CT FACIAL BONES WO CONTRAST-     HISTORY: Unwitnessed fall gross external trauma blood thinners     COMPARISON: None .     TECHNIQUE: Multiple axial CT sections were performed through the face  without contrast. Coronal reconstruction images were performed. This  study was performed with techniques to keep radiation doses as low as  reasonably achievable (ALARA). Individualized dose reduction techniques  using automated exposure control or adjustment of mA and/or kV according  to the patient size were employed.         FINDINGS: There is a right nasal bone fracture. No other fractures are  seen within the face.. The orbital floors are intact. There is an  air-fluid level in the right maxillary sinus. There is a right  periorbital soft tissue hematoma.       Impression:      Right nasal bone fracture.              This report was finalized on 2/23/2024 11:35 AM by Lily Mullen M.D..       CT Head Without Contrast [[id number]] Collected: 02/23/24 1129     Updated: 02/23/24 1135    Narrative:      PROCEDURE: CT HEAD WO CONTRAST-, CT CERVICAL SPINE WO CONTRAST-     HISTORY: Unwitnessed fall gross external trauma blood thinners     PROCEDURE: Axial images were obtained from the skull base to the  thoracic inlet by computed tomography. Multiple axial CT images were  performed from the foramen  magnum to the vertex without enhancement.   This study was performed with techniques to keep radiation doses as low  as reasonably achievable (ALARA). Individualized dose reduction  techniques using automated exposure control or adjustment of mA and/or  kV according to the patient size were employed.         C-SPINE:     FINDINGS: There are no acute fractures. There are diffuse degenerative  changes with loss of disc base height and facet arthropathy.  The  prevertebral soft tissues are normal.  Limited images of the lung apices  are unremarkable.     HEAD CT:     FINDINGS: There is generalized cerebral volume loss. Patchy  hypodensities in the periventricular white matter consistent with  chronic small vessel ischemic change. There is a subdural hematoma  overlying the left frontal lobe layering along the falx measuring up to  4 mm. There is no mass, mass effect or midline shift.  There is no  hydrocephalus. The paranasal sinuses are clear. Bone windows reveal no  acute osseous abnormalities. Note is made of a right periorbital  hematoma.       Impression:      1. 4 mm left parafalcine subdural hematoma.  2. No acute fracture or malalignment in the cervical spine.              Critical Finding     Critical report results have been called to Dr. Moran. Report called at  2/23/2024 11:33 AM.                       This report was finalized on 2/23/2024 11:33 AM by Lily Mullen M.D..       CT Cervical Spine Without Contrast [[id number]] Collected: 02/23/24 1129     Updated: 02/23/24 1135    Narrative:      PROCEDURE: CT HEAD WO CONTRAST-, CT CERVICAL SPINE WO CONTRAST-     HISTORY: Unwitnessed fall gross external trauma blood thinners     PROCEDURE: Axial images were obtained from the skull base to the  thoracic inlet by computed tomography. Multiple axial CT images were  performed from the foramen magnum to the vertex without enhancement.   This study was performed with techniques to keep radiation doses as  "low  as reasonably achievable (ALARA). Individualized dose reduction  techniques using automated exposure control or adjustment of mA and/or  kV according to the patient size were employed.         C-SPINE:     FINDINGS: There are no acute fractures. There are diffuse degenerative  changes with loss of disc base height and facet arthropathy.  The  prevertebral soft tissues are normal.  Limited images of the lung apices  are unremarkable.     HEAD CT:     FINDINGS: There is generalized cerebral volume loss. Patchy  hypodensities in the periventricular white matter consistent with  chronic small vessel ischemic change. There is a subdural hematoma  overlying the left frontal lobe layering along the falx measuring up to  4 mm. There is no mass, mass effect or midline shift.  There is no  hydrocephalus. The paranasal sinuses are clear. Bone windows reveal no  acute osseous abnormalities. Note is made of a right periorbital  hematoma.       Impression:      1. 4 mm left parafalcine subdural hematoma.  2. No acute fracture or malalignment in the cervical spine.              Critical Finding     Critical report results have been called to Dr. Moran. Report called at  2/23/2024 11:33 AM.                       This report was finalized on 2/23/2024 11:33 AM by Lily Mullen M.D..               Cultures:  No results found for: \"BLOODCX\", \"URINECX\", \"WOUNDCX\", \"MRSACX\", \"RESPCX\", \"STOOLCX\"    Last echocardiogram:  Results for orders placed during the hospital encounter of 01/01/24    Adult Transthoracic Echo Limited W/ Cont if Necessary Per Protocol    Interpretation Summary    Normal left ventricular cavity size and wall thickness noted. All left ventricular wall segments contract normally.    Left ventricular ejection fraction appears to be 61 - 65%.    There is a trivial pericardial effusion adjacent to the left ventricle.          I have personally reviewed the above radiology images and read the final radiology " report on 02/23/24  ---------------------------------------------------------------------------------------------------------------------  Assessment / Plan     Active Hospital Problems    Diagnosis  POA    **Subdural hematoma [S06.5XAA]  Yes       ASSESSMENT/PLAN:    Left parafalcine subdural hematoma secondary to unwitnessed fall  Encephalopathy, possibly secondary to above in the setting of baseline dementia  Right nasal bone fracture  NSTEMI, T1 versus T2  In the setting of known CAD  Complicated by atrial fibrillation requiring full anticoagulation  Patient presents from local SNF following unwitnessed fall.  Imaging workup noted 4 mm left parafalcine subdural hematoma.  HS troponin was also notably elevated on arrival at 474 with repeat at 452.  Neurology and neurosurgery evaluated the patient in the ED and have recommended repeat CT in 4 to 6 hours to monitor  Will admit to the CCU for further management  Hold any medications which could perpetuate bleeding  Consult cardiology for further assistance and recommendations regarding NSTEMI.  As above patient not a good candidate currently for heparinization.  She did have a stress test on 2/1/2024 with normal MPI no evidence of ischemia.  Neurochecks and NIHSS assessments every shift  Will remain n.p.o. pending improvement in cognition  Neurology has further recommended Keppra 500 mg twice daily x 7 days  Will continue to monitor closely and follow-up further neurology recommendations    Chronic:  MDD  COPD  Thyroid disease  Hypertension  Bilateral carotid stenosis  Dementia  GERD  PUD  Plan to resume home med regimen as indicated  Monitor vital signs closely  ----------  -DVT prophylaxis: SCDs  -Activity: Bedrest  -Expected length of stay: INPATIENT status due to the need for care which can only be reasonably provided in an hospital setting such as aggressive/expedited ancillary services and/or consultation services, the necessity for IV medications, close  physician monitoring and/or the possible need for procedures.  In such, I feel patient’s risk for adverse outcomes and need for care warrant INPATIENT evaluation and predict the patient’s care encounter to likely last beyond 2 midnights.   -Disposition pending course     High risk secondary to subdural hematoma    There are no questions and answers to display.       Darryl Monteiro PA-C   02/23/24  14:59 EST

## 2024-02-23 NOTE — ED PROVIDER NOTES
"Subjective   History of Present Illness  Patient is a 73-year-old female that was brought in by EMS after being found down at a local nursing home.  Nurse of the patient that took car of her states that she was in her normal state of health around 8:00 this morning when she gave her her routine prescribed medications.  She was then found at roughly 955 on the floor\" a pool of blood that appeared to be coming from her nose.\"       blunt facial trauma predominantly to the right side of her face, superficial bruising to the abdomen and skin laceration to the right wrist/forearm area.  Patient is anticoagulated, has a history of coronary artery disease, hypertension and hyperlipidemia.  This known well is not known at the current time.  No family is present to add to history of present illness.      Review of Systems    Past Medical History:   Diagnosis Date    Anxiety     Arthritis     Bell's palsy     Bladder prolapse, female, acquired     Carotid stenosis     COPD (chronic obstructive pulmonary disease)     COPD (chronic obstructive pulmonary disease)     Coronary artery disease     Dementia     Dementia     Depression     Difficulty walking     Disease of thyroid gland     Frequency of urination     GERD (gastroesophageal reflux disease)     Heart attack     Hyperlipidemia     Hypertension     Irregular heart beat     Peptic ulcer disease        No Known Allergies    Past Surgical History:   Procedure Laterality Date    CARDIAC CATHETERIZATION N/A 2/21/2023    Procedure: Left Heart Cath;  Surgeon: Tab Cifuentes MD;  Location: [facility] CATH INVASIVE LOCATION;  Service: Cardiology;  Laterality: N/A;    CARDIAC SURGERY      open heart  in 1999    CYSTOSCOPY N/A 11/8/2017    Procedure: CYSTOSCOPY;  Surgeon: Karl Nicolas DO;  Location: [facility] OR;  Service:     OTHER SURGICAL HISTORY      states she had fluid drained no surgery    VAGINAL HYSTERECTOMY W/ ANTERIOR AND POSTERIOR VAGINAL REPAIR N/A " 11/8/2017    Procedure: VAGINAL HYSTERECTOMY WITH ANTERIOR, POSTERIOR, AND ENTEROCELE VAGINAL REPAIR;  Surgeon: Karl Nicolas DO;  Location:  [facility] OR;  Service:     VAGINAL VAULT SUSPENSION N/A 11/8/2017    Procedure: VAGINAL VAULT SUSPENSION ;  Surgeon: Karl Nicolas DO;  Location:  [facility] OR;  Service:        Family History   Problem Relation Age of Onset    Dementia Sister     Heart attack Mother     Tuberculosis Father     Breast cancer Neg Hx        Social History     Socioeconomic History    Marital status:     Number of children: 3   Tobacco Use    Smoking status: Every Day     Packs/day: 0.50     Years: 55.00     Additional pack years: 0.00     Total pack years: 27.50     Types: Cigarettes    Smokeless tobacco: Never   Vaping Use    Vaping Use: Never used   Substance and Sexual Activity    Alcohol use: No    Drug use: No    Sexual activity: Never     Comment: denies           Objective   Physical Exam  Vitals and nursing note reviewed.   Constitutional:       Appearance: She is obese.   HENT:      Head:      Comments: Extensive facial trauma involving a large preorbital hematoma, localized swelling and edema involving the entire right orbit preventing patient from fully being able to open the right eye.    -Dried blood from the right nares is visualized but no active bleeding.  Oropharynx is clear.  Eyes:      Comments: Left eye is pinpoint and reactive to light  Right eye cannot be assessed to the severity of orbital hematoma   Cardiovascular:      Rate and Rhythm: Regular rhythm. Tachycardia present.   Abdominal:      General: Abdomen is flat.      Palpations: Abdomen is soft.      Comments: very faint bruising to the anterior abdominal wall   Musculoskeletal:         General: Normal range of motion.      Cervical back: Normal range of motion and neck supple.   Skin:     General: Skin is warm and dry.      Comments: Superficial skin tear no active bleeding to the dorsal aspect of  the right wrist.   Neurological:      Mental Status: She is disoriented.      GCS: GCS eye subscore is 4. GCS verbal subscore is 3. GCS motor subscore is 6.         Critical Care    Performed by: Debbie Moran DO  Authorized by: Debbie Moran DO    Critical care provider statement:     Critical care time (minutes):  60    Critical care time was exclusive of:  Separately billable procedures and treating other patients and teaching time    Critical care was necessary to treat or prevent imminent or life-threatening deterioration of the following conditions:  CNS failure or compromise, cardiac failure and circulatory failure    Critical care was time spent personally by me on the following activities:  Blood draw for specimens, development of treatment plan with patient or surrogate, discussions with consultants, ordering and performing treatments and interventions, ordering and review of laboratory studies, ordering and review of radiographic studies, pulse oximetry, re-evaluation of patient's condition, review of old charts, vascular access procedures, interpretation of cardiac output measurements, examination of patient, evaluation of patient's response to treatment and obtaining history from patient or surrogate    I assumed direction of critical care for this patient from another provider in my specialty: no      Care discussed with: admitting provider           Results for orders placed or performed during the hospital encounter of 02/23/24   Urine Culture - Urine, Urine, Catheter    Specimen: Urine, Catheter   Result Value Ref Range    Urine Culture <25,000 CFU/mL Mixed Angie Isolated    Comprehensive Metabolic Panel    Specimen: Blood   Result Value Ref Range    Glucose 100 (H) 65 - 99 mg/dL    BUN 17 8 - 23 mg/dL    Creatinine 0.86 0.57 - 1.00 mg/dL    Sodium 147 (H) 136 - 145 mmol/L    Potassium 3.9 3.5 - 5.2 mmol/L    Chloride 105 98 - 107 mmol/L    CO2 40.1 (H) 22.0 - 29.0 mmol/L    Calcium 9.1 8.6 - 10.5  mg/dL    Total Protein 5.4 (L) 6.0 - 8.5 g/dL    Albumin 3.3 (L) 3.5 - 5.2 g/dL    ALT (SGPT) 15 1 - 33 U/L    AST (SGOT) 19 1 - 32 U/L    Alkaline Phosphatase 73 39 - 117 U/L    Total Bilirubin 0.3 0.0 - 1.2 mg/dL    Globulin 2.1 gm/dL    A/G Ratio 1.6 g/dL    BUN/Creatinine Ratio 19.8 7.0 - 25.0    Anion Gap 1.9 (L) 5.0 - 15.0 mmol/L    eGFR 70.6 >60.0 mL/min/1.73   Protime-INR    Specimen: Arm, Left; Blood   Result Value Ref Range    Protime 18.8 (H) 12.1 - 14.7 Seconds    INR 1.50 (H) 0.90 - 1.10   aPTT    Specimen: Arm, Left; Blood   Result Value Ref Range    PTT 33.4 26.5 - 34.5 seconds   Urinalysis With Culture If Indicated - Urine, Catheter    Specimen: Urine, Catheter   Result Value Ref Range    Color, UA Dark Yellow (A) Yellow, Straw    Appearance, UA Cloudy (A) Clear    pH, UA 5.5 5.0 - 8.0    Specific Gravity, UA >1.030 (H) 1.005 - 1.030    Glucose, UA Negative Negative    Ketones, UA Negative Negative    Bilirubin, UA Small (1+) (A) Negative    Blood, UA Moderate (2+) (A) Negative    Protein,  mg/dL (2+) (A) Negative    Leuk Esterase, UA Small (1+) (A) Negative    Nitrite, UA Negative Negative    Urobilinogen, UA 1.0 E.U./dL 0.2 - 1.0 E.U./dL   High Sensitivity Troponin T    Specimen: Blood   Result Value Ref Range    HS Troponin T 474 (C) <14 ng/L   Lactic Acid, Plasma    Specimen: Blood   Result Value Ref Range    Lactate 1.0 0.5 - 2.0 mmol/L   C-reactive Protein    Specimen: Blood   Result Value Ref Range    C-Reactive Protein 0.42 0.00 - 0.50 mg/dL   Sedimentation Rate    Specimen: Blood   Result Value Ref Range    Sed Rate 8 0 - 30 mm/hr   Ethanol    Specimen: Blood   Result Value Ref Range    Ethanol <10 0 - 10 mg/dL    Ethanol % <0.010 %   Urine Drug Screen - Straight Cath    Specimen: Straight Cath; Urine   Result Value Ref Range    THC, Screen, Urine Negative Negative    Phencyclidine (PCP), Urine Negative Negative    Cocaine Screen, Urine Negative Negative    Methamphetamine, Ur Negative  Negative    Opiate Screen Negative Negative    Amphetamine Screen, Urine Negative Negative    Benzodiazepine Screen, Urine Positive (A) Negative    Tricyclic Antidepressants Screen Negative Negative    Methadone Screen, Urine Negative Negative    Barbiturates Screen, Urine Negative Negative    Oxycodone Screen, Urine Negative Negative    Buprenorphine, Screen, Urine Negative Negative   CBC Auto Differential    Specimen: Blood   Result Value Ref Range    WBC 5.33 3.40 - 10.80 10*3/mm3    RBC 3.87 3.77 - 5.28 10*6/mm3    Hemoglobin 11.1 (L) 12.0 - 15.9 g/dL    Hematocrit 39.0 34.0 - 46.6 %    .8 (H) 79.0 - 97.0 fL    MCH 28.7 26.6 - 33.0 pg    MCHC 28.5 (L) 31.5 - 35.7 g/dL    RDW 20.1 (H) 12.3 - 15.4 %    RDW-SD 74.3 (H) 37.0 - 54.0 fl    MPV 9.8 6.0 - 12.0 fL    Platelets 233 140 - 450 10*3/mm3    Neutrophil % 78.4 (H) 42.7 - 76.0 %    Lymphocyte % 12.4 (L) 19.6 - 45.3 %    Monocyte % 7.1 5.0 - 12.0 %    Eosinophil % 0.6 0.3 - 6.2 %    Basophil % 0.4 0.0 - 1.5 %    Immature Grans % 1.1 (H) 0.0 - 0.5 %    Neutrophils, Absolute 4.18 1.70 - 7.00 10*3/mm3    Lymphocytes, Absolute 0.66 (L) 0.70 - 3.10 10*3/mm3    Monocytes, Absolute 0.38 0.10 - 0.90 10*3/mm3    Eosinophils, Absolute 0.03 0.00 - 0.40 10*3/mm3    Basophils, Absolute 0.02 0.00 - 0.20 10*3/mm3    Immature Grans, Absolute 0.06 (H) 0.00 - 0.05 10*3/mm3    nRBC 0.0 0.0 - 0.2 /100 WBC   Scan Slide    Specimen: Blood   Result Value Ref Range    Anisocytosis Mod/2+ None Seen    Dacrocytes Slight/1+ None Seen    Hypochromia Slight/1+ None Seen    Ovalocytes Slight/1+ None Seen    Polychromasia Slight/1+ None Seen    Platelet Morphology Normal Normal   Urinalysis, Microscopic Only - Urine, Catheter    Specimen: Urine, Catheter   Result Value Ref Range    RBC, UA 11-20 (A) None Seen, 0-2 /HPF    WBC, UA 21-50 (A) None Seen, 0-2 /HPF    Bacteria, UA None Seen None Seen /HPF    Squamous Epithelial Cells, UA 0-2 None Seen, 0-2 /HPF    Hyaline Casts, UA None Seen  None Seen /LPF    Methodology Automated Microscopy    High Sensitivity Troponin T 2Hr    Specimen: Arm, Left; Blood   Result Value Ref Range    HS Troponin T 452 (C) <14 ng/L    Troponin T Delta -22 (L) >=-4 - <+4 ng/L   Fentanyl, Urine - Straight Cath    Specimen: Straight Cath; Urine   Result Value Ref Range    Fentanyl, Urine Negative Negative   CK    Specimen: Arm, Left; Blood   Result Value Ref Range    Creatine Kinase 89 20 - 180 U/L   Comprehensive Metabolic Panel    Specimen: Blood   Result Value Ref Range    Glucose 97 65 - 99 mg/dL    BUN 16 8 - 23 mg/dL    Creatinine 0.84 0.57 - 1.00 mg/dL    Sodium 148 (H) 136 - 145 mmol/L    Potassium 4.6 3.5 - 5.2 mmol/L    Chloride 105 98 - 107 mmol/L    CO2 35.8 (H) 22.0 - 29.0 mmol/L    Calcium 8.9 8.6 - 10.5 mg/dL    Total Protein 5.6 (L) 6.0 - 8.5 g/dL    Albumin 3.2 (L) 3.5 - 5.2 g/dL    ALT (SGPT) 13 1 - 33 U/L    AST (SGOT) 19 1 - 32 U/L    Alkaline Phosphatase 74 39 - 117 U/L    Total Bilirubin 0.3 0.0 - 1.2 mg/dL    Globulin 2.4 gm/dL    A/G Ratio 1.3 g/dL    BUN/Creatinine Ratio 19.0 7.0 - 25.0    Anion Gap 7.2 5.0 - 15.0 mmol/L    eGFR 72.6 >60.0 mL/min/1.73   CBC Auto Differential    Specimen: Blood   Result Value Ref Range    WBC 5.47 3.40 - 10.80 10*3/mm3    RBC 3.67 (L) 3.77 - 5.28 10*6/mm3    Hemoglobin 10.6 (L) 12.0 - 15.9 g/dL    Hematocrit 37.0 34.0 - 46.6 %    .8 (H) 79.0 - 97.0 fL    MCH 28.9 26.6 - 33.0 pg    MCHC 28.6 (L) 31.5 - 35.7 g/dL    RDW 20.4 (H) 12.3 - 15.4 %    RDW-SD 75.6 (H) 37.0 - 54.0 fl    MPV 10.1 6.0 - 12.0 fL    Platelets 225 140 - 450 10*3/mm3    Neutrophil % 77.3 (H) 42.7 - 76.0 %    Lymphocyte % 13.5 (L) 19.6 - 45.3 %    Monocyte % 7.7 5.0 - 12.0 %    Eosinophil % 0.4 0.3 - 6.2 %    Basophil % 0.4 0.0 - 1.5 %    Immature Grans % 0.7 (H) 0.0 - 0.5 %    Neutrophils, Absolute 4.23 1.70 - 7.00 10*3/mm3    Lymphocytes, Absolute 0.74 0.70 - 3.10 10*3/mm3    Monocytes, Absolute 0.42 0.10 - 0.90 10*3/mm3    Eosinophils,  Absolute 0.02 0.00 - 0.40 10*3/mm3    Basophils, Absolute 0.02 0.00 - 0.20 10*3/mm3    Immature Grans, Absolute 0.04 0.00 - 0.05 10*3/mm3    nRBC 0.0 0.0 - 0.2 /100 WBC   Blood Gas, Arterial With Co-Ox    Specimen: Arterial Blood   Result Value Ref Range    Site Left Brachial     Kendall's Test N/A     pH, Arterial 7.289 (L) 7.350 - 7.450 pH units    pCO2, Arterial 83.3 (C) 35.0 - 45.0 mm Hg    pO2, Arterial 194.0 (H) 83.0 - 108.0 mm Hg    HCO3, Arterial 40.0 (H) 20.0 - 26.0 mmol/L    Base Excess, Arterial 11.0 (H) 0.0 - 2.0 mmol/L    O2 Saturation, Arterial >99.2 (H) 94.0 - 99.0 %    Hemoglobin, Blood Gas 9.7 (L) 13.5 - 17.5 g/dL    Hematocrit, Blood Gas 29.8 (L) 38.0 - 51.0 %    Oxyhemoglobin 98.5 94 - 99 %    Methemoglobin 0.10 0.00 - 3.00 %    Carboxyhemoglobin 1.6 0 - 5 %    A-a DO2      CO2 Content 42.5 (H) 22 - 33 mmol/L    Barometric Pressure for Blood Gas 717 mmHg    Modality Nasal Cannula     FIO2 36 %    Flow Rate 4.0 lpm    Ventilator Mode NA     Notified Who DR CHAVEZ     Notified By [id number]     Notified Time 02/23/2024 17:10     Collected by [id number]     pH, Temp Corrected      pCO2, Temperature Corrected      pO2, Temperature Corrected     Scan Slide    Specimen: Blood   Result Value Ref Range    Anisocytosis Mod/2+ None Seen    Hypochromia Slight/1+ None Seen    Macrocytes Slight/1+ None Seen    Ovalocytes Slight/1+ None Seen    Polychromasia Slight/1+ None Seen    Platelet Morphology Normal Normal   Blood Gas, Arterial With Co-Ox    Specimen: Arterial Blood   Result Value Ref Range    Site Left Radial     Kendall's Test N/A     pH, Arterial 7.407 7.350 - 7.450 pH units    pCO2, Arterial 61.9 (H) 35.0 - 45.0 mm Hg    pO2, Arterial 72.7 (L) 83.0 - 108.0 mm Hg    HCO3, Arterial 38.9 (H) 20.0 - 26.0 mmol/L    Base Excess, Arterial 12.3 (H) 0.0 - 2.0 mmol/L    O2 Saturation, Arterial 95.9 94.0 - 99.0 %    Hemoglobin, Blood Gas 10.0 (L) 13.5 - 17.5 g/dL    Hematocrit, Blood Gas 30.7 (L) 38.0 - 51.0 %     Oxyhemoglobin 94.3 94 - 99 %    Methemoglobin <-0.10 (L) 0.00 - 3.00 %    Carboxyhemoglobin 1.9 0 - 5 %    A-a DO2 46.1 0.0 - 300.0 mmHg    CO2 Content 40.8 (H) 22 - 33 mmol/L    Barometric Pressure for Blood Gas 719 mmHg    Modality Nasal Cannula     FIO2 28 %    Flow Rate 2.0 lpm    Ventilator Mode NA     Notified Who DR CHAVEZ     Notified By [id number]     Notified Time 02/23/2024 20:11     Collected by [id number]     pH, Temp Corrected      pCO2, Temperature Corrected      pO2, Temperature Corrected     Hemoglobin A1c    Specimen: Blood   Result Value Ref Range    Hemoglobin A1C 5.00 4.80 - 5.60 %   Lipid Panel    Specimen: Blood   Result Value Ref Range    Total Cholesterol 99 0 - 200 mg/dL    Triglycerides 56 0 - 150 mg/dL    HDL Cholesterol 47 40 - 60 mg/dL    LDL Cholesterol  39 0 - 100 mg/dL    VLDL Cholesterol 13 5 - 40 mg/dL    LDL/HDL Ratio 0.87    Blood Gas, Arterial With Co-Ox    Specimen: Arterial Blood   Result Value Ref Range    Site Left Radial     Kendall's Test N/A     pH, Arterial 7.403 7.350 - 7.450 pH units    pCO2, Arterial 61.4 (H) 35.0 - 45.0 mm Hg    pO2, Arterial 80.8 (L) 83.0 - 108.0 mm Hg    HCO3, Arterial 38.3 (H) 20.0 - 26.0 mmol/L    Base Excess, Arterial 11.6 (H) 0.0 - 2.0 mmol/L    O2 Saturation, Arterial 97.0 94.0 - 99.0 %    Hemoglobin, Blood Gas 10.5 (L) 13.5 - 17.5 g/dL    Hematocrit, Blood Gas 32.1 (L) 38.0 - 51.0 %    Oxyhemoglobin 95.0 94 - 99 %    Methemoglobin 0.10 0.00 - 3.00 %    Carboxyhemoglobin 2.0 0 - 5 %    A-a DO2 38.6 0.0 - 300.0 mmHg    CO2 Content 40.2 (H) 22 - 33 mmol/L    Barometric Pressure for Blood Gas 718 mmHg    Modality BiPap     FIO2 28 %    Ventilator Mode BiPAP     Set Mech Resp Rate 24.0     Rate 26 Breaths/minute    IPAP 24     EPAP 8     Notified Who DR CAMPBELL     Notified By [id number]     Notified Time 02/24/2024 04:45     Collected by [id number]     pH, Temp Corrected      pCO2, Temperature Corrected      pO2, Temperature Corrected     Blood Gas, Arterial With  Co-Ox    Specimen: Arterial Blood   Result Value Ref Range    Site Right Brachial     Kendall's Test N/A     pH, Arterial 7.460 (H) 7.350 - 7.450 pH units    pCO2, Arterial 51.7 (H) 35.0 - 45.0 mm Hg    pO2, Arterial 61.0 (L) 83.0 - 108.0 mm Hg    HCO3, Arterial 36.7 (H) 20.0 - 26.0 mmol/L    Base Excess, Arterial 11.2 (H) 0.0 - 2.0 mmol/L    O2 Saturation, Arterial 93.0 (L) 94.0 - 99.0 %    Hemoglobin, Blood Gas 10.8 (L) 13.5 - 17.5 g/dL    Hematocrit, Blood Gas 33.0 (L) 38.0 - 51.0 %    Oxyhemoglobin 90.9 (L) 94 - 99 %    Methemoglobin 0.30 0.00 - 3.00 %    Carboxyhemoglobin 2.0 0 - 5 %    A-a DO2 69.7 0.0 - 300.0 mmHg    CO2 Content 38.3 (H) 22 - 33 mmol/L    Barometric Pressure for Blood Gas 720 mmHg    Modality BiPap     FIO2 28 %    Ventilator Mode NA     Set [facility] Resp Rate 24.0     IPAP 24     EPAP 8     Collected by [id number]     pH, Temp Corrected      pCO2, Temperature Corrected      pO2, Temperature Corrected     POC Glucose Once    Specimen: Blood   Result Value Ref Range    Glucose 79 70 - 130 mg/dL   POC Glucose Once    Specimen: Blood   Result Value Ref Range    Glucose 111 70 - 130 mg/dL   ECG 12 Lead Tachycardia   Result Value Ref Range    QT Interval 372 ms    QTC Interval 472 ms   Adult Transthoracic Echo Complete W/ Cont if Necessary Per Protocol   Result Value Ref Range    LVIDd 4.3 cm    LVIDs 3.4 cm    IVSd 1.11 cm    LVPWd 1.03 cm    FS 20.0 %    IVS/LVPW 1.07 cm    ESV(cubed) 40.2 ml    LV Sys Vol (BSA corrected) 18.0 cm2    EDV(cubed) 78.4 ml    LV Aguirre Vol (BSA corrected) 34.1 cm2    LV mass(C)d 155.8 grams    LVOT area 3.1 cm2    LVOT diam 2.00 cm    EDV(MOD-sp4) 59.1 ml    ESV(MOD-sp4) 31.1 ml    SV(MOD-sp4) 28.0 ml    SI(MOD-sp4) 16.2 ml/m2    EF(MOD-sp4) 47.4 %    MV E max dalton 141.0 cm/sec    MV A max dalton 27.7 cm/sec    MV E/A 5.1     LA ESV Index (BP) 30.1 ml/m2    Med Peak E' Dalton 6.7 cm/sec    Lat Peak E' Dalton 8.3 cm/sec    TR max dalton 276.0 cm/sec    Avg E/e' ratio 18.80     TAPSE (>1.6)  1.87 cm    LA dimension (2D)  5.0 cm    Ao pk dalton 127.0 cm/sec    Ao max PG 6.5 mmHg    Ao mean PG 3.0 mmHg    Ao V2 VTI 24.4 cm    TR max PG 30.5 mmHg    RVSP(TR) 40.5 mmHg    RAP systole 10.0 mmHg    PA acc time 0.11 sec    Ao root diam 3.1 cm    ACS 1.40 cm   Green Top (Gel)   Result Value Ref Range    Extra Tube Hold for add-ons.    Lavender Top   Result Value Ref Range    Extra Tube hold for add-on    Gold Top - SST   Result Value Ref Range    Extra Tube Hold for add-ons.    Light Blue Top   Result Value Ref Range    Extra Tube Hold for add-ons.        CT Head Without Contrast   Final Result   Intraventricular hemorrhage noted in the LEFT occipital lobe, new from   prior.           This report was finalized on 2/24/2024 9:50 AM by Dr. Loreto Jo MD.          XR Wrist 3+ View Right   Final Result   No acute fracture.                       This report was finalized on 2/23/2024 11:39 AM by Lily Mullen M.D..          CT Lumbar Spine Without Contrast   Final Result   No acute process.                   This report was finalized on 2/23/2024 11:37 AM by Lily Mullen M.D..          CT Abdomen Pelvis Without Contrast   Final Result   No acute intra-abdominal or pelvic process.                   This report was finalized on 2/23/2024 12:12 PM by Lily Mullen M.D..          CT Chest Without Contrast Diagnostic   Final Result   No acute process.           This report was finalized on 2/23/2024 11:41 AM by Lily Mullen M.D..          CT Thoracic Spine Without Contrast   Final Result   No acute process.                   This report was finalized on 2/23/2024 12:10 PM by Lily Mullen M.D..          CT Facial Bones Without Contrast   Final Result   Right nasal bone fracture.                   This report was finalized on 2/23/2024 11:35 AM by Lily Mullen M.D..          CT Cervical Spine Without Contrast   Final Result   1. 4 mm left parafalcine subdural hematoma.   2. No  acute fracture or malalignment in the cervical spine.                   Critical Finding       Critical report results have been called to Dr. Moran. Report called at   2/23/2024 11:33 AM.                               This report was finalized on 2/23/2024 11:33 AM by Lily Mullen M.D..          CT Head Without Contrast   Final Result   1. 4 mm left parafalcine subdural hematoma.   2. No acute fracture or malalignment in the cervical spine.                   Critical Finding       Critical report results have been called to Dr. Moran. Report called at   2/23/2024 11:33 AM.                               This report was finalized on 2/23/2024 11:33 AM by Lily Mullen M.D..          MRI Brain With & Without Contrast    (Results Pending)         ED Course  ED Course as of 02/24/24 1448   Fri Feb 23, 2024   1134 Spoke with radiologist on-call patient has a 4 mm subdural hemorrhage with no midline shifting.  Electronically signed by Debbie Moran DO, 02/23/24, 11:34 AM EST.   [LK]   1217   -Patient has a right nasal bone fracture with orbital contusion and hematoma [LK]   1220 Trop 474  EKG shows atrial fibrillation with right bundle branch block with inferior ST wave depressions with no acute ST elevation at this time.  Electronically signed by Debbie Moran DO, 02/23/24, 12:20 PM EST.   [LK]   1230 Review of medical record shows that she was seen in the ER roughly 12 days ago for reported hypoxia however saturations were appropriate on arrival and she was not hypoxic or altered at that time.  There were no acute gross abnormalities and she was stable to be discharged home.    Troponin at that time was minimally elevated at 30 [LK]   1231 Review of prior documentation shows that she is prescribed chronic benzos. [LK]   1248 Spoke with emergent on-call neurology Dr Salmon, who stated that patient is contraindicated for heparin given evidence of subdural bleed and facial contusion with hematoma.  Recommended  consulting neurosurgeon urgently to discuss further medical management given troponin at 474. [LK]   1251 Patient is on Plavix as well as Eliquis.  Will discuss giving Kcentra.   [LK]   1326 Currently still pending neurosurgery's return for consult on subdural hematoma on 8 patient anticoagulated on Eliquis and aspirin.  Decision regarding proceeding with intracranial bleed will determine ultimate disposition my cardiac status with an acute elevated troponin.  Electronically signed by Debbie Moran DO, 02/23/24, 1:26 PM EST.   [LK]   1342 Spoke with Dr. Aguilar at this time who agreed with my assessment due to the small area of bleeding a 4 mm recommended not reversing at this time and focusing on cardiology with significant bump in troponin.  Electronically signed by Debbie Moran DO, 02/23/24, 1:42 PM EST.   [LK]   1345 EKG is here to 2/11/2024 that is unchanged however troponin is profoundly elevated.  Patient does have dementia at baseline but continues to deny chest pain or shortness of breath.  Contacting Dr. Arambula at this time to discuss further recommendations repeat troponin is pending  Electronically signed by Debbie Moran DO, 02/23/24, 1:45 PM EST.   [LK]   1406 Dr. Turcios was familiar with the patient and states that she is a chronic vasculopath with known coronary artery disease of several lesions that cannot be stented.  At this time he recommended holding off on reversing Eliquis, repeat an H&H around 6 PM and we will reCT head at 6 PM and if no change reassessment of the patient will determine whether heparin will be started at that time.  A repeat troponin has decreased by 22 currently down to 452 from 474.  Once again EKGs were unchanged from roughly 2 weeks.  Electronically signed by Debbie Moran DO, 02/23/24, 2:07 PM EST.   [LK]   1800 Spoke with Dr. Dunham who accepted the patient for admission.  We both agreed at this time that heparin is contraindicated as she is on antiplatelet therapy as  well as Eliquis.  Patient will be placed in ICU for continuous observation. [LK]      ED Course User Index  [LK] Debbie Moran DO                HEART Score: 9           Total (NIH Stroke Scale): 4                  Medical Decision Making  Problems Addressed:  Chronic anticoagulation: complicated acute illness or injury  Closed fracture of nasal bone, initial encounter: complicated acute illness or injury  NSTEMI (non-ST elevated myocardial infarction): complicated acute illness or injury  Subdural hematoma: complicated acute illness or injury  Traumatic orbital hematoma, right, initial encounter: complicated acute illness or injury    Amount and/or Complexity of Data Reviewed  Labs: ordered.  Radiology: ordered.  ECG/medicine tests: ordered.    Risk  Decision regarding hospitalization.        Final diagnoses:   NSTEMI (non-ST elevated myocardial infarction)   Subdural hematoma   Chronic anticoagulation   Closed fracture of nasal bone, initial encounter   Traumatic orbital hematoma, right, initial encounter       ED Disposition  ED Disposition       ED Disposition   Decision to Admit    Condition   --    Comment   Level of Care: Critical Care [6]   Diagnosis: Subdural hematoma [372186]   Certification: I Certify That Inpatient Hospital Services Are Medically Necessary For Greater Than 2 Midnights                 No follow-up provider specified.       Medication List      No changes were made to your prescriptions during this visit.            Debbie Moran DO  02/24/24 [id number]

## 2024-02-23 NOTE — ED NOTES
"Spoke to clarita at [facility] [place], [place] at this time, clarita states this was \"unwitnessed fall\". Clarita states she was notified at 0955 that pt was face first in the floor. Clarita states she had given pt medication at 0800 and pt was alert and oriented to self in bed. Nursing home reports pt has not been complaining of chest pain or SOA.   "

## 2024-02-24 ENCOUNTER — APPOINTMENT (OUTPATIENT)
Dept: CT IMAGING | Facility: HOSPITAL | Age: 74
DRG: 082 | End: 2024-02-24
Payer: MEDICARE

## 2024-02-24 ENCOUNTER — APPOINTMENT (OUTPATIENT)
Dept: CARDIOLOGY | Facility: HOSPITAL | Age: 74
DRG: 082 | End: 2024-02-24
Payer: MEDICARE

## 2024-02-24 LAB
A-A DO2: 38.6 MMHG (ref 0–300)
A-A DO2: 69.7 MMHG (ref 0–300)
ANION GAP SERPL CALCULATED.3IONS-SCNC: 17.9 MMOL/L (ref 5–15)
ARTERIAL PATENCY WRIST A: ABNORMAL
ARTERIAL PATENCY WRIST A: ABNORMAL
ATMOSPHERIC PRESS: 718 MMHG
ATMOSPHERIC PRESS: 720 MMHG
BACTERIA SPEC AEROBE CULT: NORMAL
BASE EXCESS BLDA CALC-SCNC: 11.2 MMOL/L (ref 0–2)
BASE EXCESS BLDA CALC-SCNC: 11.6 MMOL/L (ref 0–2)
BDY SITE: ABNORMAL
BDY SITE: ABNORMAL
BH CV ECHO MEAS - ACS: 1.4 CM
BH CV ECHO MEAS - AO MAX PG: 6.5 MMHG
BH CV ECHO MEAS - AO MEAN PG: 3 MMHG
BH CV ECHO MEAS - AO ROOT DIAM: 3.1 CM
BH CV ECHO MEAS - AO V2 MAX: 127 CM/SEC
BH CV ECHO MEAS - AO V2 VTI: 24.4 CM
BH CV ECHO MEAS - EDV(CUBED): 78.4 ML
BH CV ECHO MEAS - EDV(MOD-SP4): 59.1 ML
BH CV ECHO MEAS - EF(MOD-SP4): 47.4 %
BH CV ECHO MEAS - ESV(CUBED): 40.2 ML
BH CV ECHO MEAS - ESV(MOD-SP4): 31.1 ML
BH CV ECHO MEAS - FS: 20 %
BH CV ECHO MEAS - IVS/LVPW: 1.07 CM
BH CV ECHO MEAS - IVSD: 1.11 CM
BH CV ECHO MEAS - LA DIMENSION: 5 CM
BH CV ECHO MEAS - LAT PEAK E' VEL: 8.3 CM/SEC
BH CV ECHO MEAS - LV DIASTOLIC VOL/BSA (35-75): 34.1 CM2
BH CV ECHO MEAS - LV MASS(C)D: 155.8 GRAMS
BH CV ECHO MEAS - LV SYSTOLIC VOL/BSA (12-30): 18 CM2
BH CV ECHO MEAS - LVIDD: 4.3 CM
BH CV ECHO MEAS - LVIDS: 3.4 CM
BH CV ECHO MEAS - LVOT AREA: 3.1 CM2
BH CV ECHO MEAS - LVOT DIAM: 2 CM
BH CV ECHO MEAS - LVPWD: 1.03 CM
BH CV ECHO MEAS - MED PEAK E' VEL: 6.7 CM/SEC
BH CV ECHO MEAS - MV A MAX VEL: 27.7 CM/SEC
BH CV ECHO MEAS - MV E MAX VEL: 141 CM/SEC
BH CV ECHO MEAS - MV E/A: 5.1
BH CV ECHO MEAS - PA ACC TIME: 0.11 SEC
BH CV ECHO MEAS - RAP SYSTOLE: 10 MMHG
BH CV ECHO MEAS - RVSP: 40.5 MMHG
BH CV ECHO MEAS - SI(MOD-SP4): 16.2 ML/M2
BH CV ECHO MEAS - SV(MOD-SP4): 28 ML
BH CV ECHO MEAS - TAPSE (>1.6): 1.87 CM
BH CV ECHO MEAS - TR MAX PG: 30.5 MMHG
BH CV ECHO MEAS - TR MAX VEL: 276 CM/SEC
BH CV ECHO MEASUREMENTS AVERAGE E/E' RATIO: 18.8
BUN SERPL-MCNC: 21 MG/DL (ref 8–23)
BUN/CREAT SERPL: 23.1 (ref 7–25)
CALCIUM SPEC-SCNC: 9.1 MG/DL (ref 8.6–10.5)
CHLORIDE SERPL-SCNC: 104 MMOL/L (ref 98–107)
CHOLEST SERPL-MCNC: 99 MG/DL (ref 0–200)
CO2 BLDA-SCNC: 38.3 MMOL/L (ref 22–33)
CO2 BLDA-SCNC: 40.2 MMOL/L (ref 22–33)
CO2 SERPL-SCNC: 29.1 MMOL/L (ref 22–29)
COHGB MFR BLD: 2 % (ref 0–5)
COHGB MFR BLD: 2 % (ref 0–5)
CREAT SERPL-MCNC: 0.91 MG/DL (ref 0.57–1)
EGFRCR SERPLBLD CKD-EPI 2021: 65.9 ML/MIN/1.73
EPAP: 8
EPAP: 8
GLUCOSE BLDC GLUCOMTR-MCNC: 111 MG/DL (ref 70–130)
GLUCOSE SERPL-MCNC: 120 MG/DL (ref 65–99)
HBA1C MFR BLD: 5 % (ref 4.8–5.6)
HCO3 BLDA-SCNC: 36.7 MMOL/L (ref 20–26)
HCO3 BLDA-SCNC: 38.3 MMOL/L (ref 20–26)
HCT VFR BLD CALC: 32.1 % (ref 38–51)
HCT VFR BLD CALC: 33 % (ref 38–51)
HDLC SERPL-MCNC: 47 MG/DL (ref 40–60)
HGB BLDA-MCNC: 10.5 G/DL (ref 13.5–17.5)
HGB BLDA-MCNC: 10.8 G/DL (ref 13.5–17.5)
INHALED O2 CONCENTRATION: 28 %
INHALED O2 CONCENTRATION: 28 %
IPAP: 24
IPAP: 24
LDLC SERPL CALC-MCNC: 39 MG/DL (ref 0–100)
LDLC/HDLC SERPL: 0.87 {RATIO}
LEFT ATRIUM VOLUME INDEX: 30.1 ML/M2
Lab: ABNORMAL
MAGNESIUM SERPL-MCNC: 2.3 MG/DL (ref 1.6–2.4)
METHGB BLD QL: 0.1 % (ref 0–3)
METHGB BLD QL: 0.3 % (ref 0–3)
MODALITY: ABNORMAL
MODALITY: ABNORMAL
NOTIFIED BY: ABNORMAL
NOTIFIED WHO: ABNORMAL
OXYHGB MFR BLDV: 90.9 % (ref 94–99)
OXYHGB MFR BLDV: 95 % (ref 94–99)
PCO2 BLDA: 51.7 MM HG (ref 35–45)
PCO2 BLDA: 61.4 MM HG (ref 35–45)
PCO2 TEMP ADJ BLD: ABNORMAL MM[HG]
PCO2 TEMP ADJ BLD: ABNORMAL MM[HG]
PH BLDA: 7.4 PH UNITS (ref 7.35–7.45)
PH BLDA: 7.46 PH UNITS (ref 7.35–7.45)
PH, TEMP CORRECTED: ABNORMAL
PH, TEMP CORRECTED: ABNORMAL
PO2 BLDA: 61 MM HG (ref 83–108)
PO2 BLDA: 80.8 MM HG (ref 83–108)
PO2 TEMP ADJ BLD: ABNORMAL MM[HG]
PO2 TEMP ADJ BLD: ABNORMAL MM[HG]
POTASSIUM SERPL-SCNC: 5.4 MMOL/L (ref 3.5–5.2)
SAO2 % BLDCOA: 93 % (ref 94–99)
SAO2 % BLDCOA: 97 % (ref 94–99)
SET MECH RESP RATE: 24
SET MECH RESP RATE: 24
SODIUM SERPL-SCNC: 151 MMOL/L (ref 136–145)
TOTAL RATE: 26 BREATHS/MINUTE
TRIGL SERPL-MCNC: 56 MG/DL (ref 0–150)
VENTILATOR MODE: ABNORMAL
VENTILATOR MODE: ABNORMAL
VLDLC SERPL-MCNC: 13 MG/DL (ref 5–40)

## 2024-02-24 PROCEDURE — 36600 WITHDRAWAL OF ARTERIAL BLOOD: CPT

## 2024-02-24 PROCEDURE — 94799 UNLISTED PULMONARY SVC/PX: CPT

## 2024-02-24 PROCEDURE — 82375 ASSAY CARBOXYHB QUANT: CPT

## 2024-02-24 PROCEDURE — 93306 TTE W/DOPPLER COMPLETE: CPT

## 2024-02-24 PROCEDURE — 99233 SBSQ HOSP IP/OBS HIGH 50: CPT | Performed by: INTERNAL MEDICINE

## 2024-02-24 PROCEDURE — 82948 REAGENT STRIP/BLOOD GLUCOSE: CPT

## 2024-02-24 PROCEDURE — 80048 BASIC METABOLIC PNL TOTAL CA: CPT | Performed by: INTERNAL MEDICINE

## 2024-02-24 PROCEDURE — 93010 ELECTROCARDIOGRAM REPORT: CPT | Performed by: INTERNAL MEDICINE

## 2024-02-24 PROCEDURE — 99232 SBSQ HOSP IP/OBS MODERATE 35: CPT | Performed by: PSYCHIATRY & NEUROLOGY

## 2024-02-24 PROCEDURE — 93005 ELECTROCARDIOGRAM TRACING: CPT | Performed by: INTERNAL MEDICINE

## 2024-02-24 PROCEDURE — 92610 EVALUATE SWALLOWING FUNCTION: CPT

## 2024-02-24 PROCEDURE — 25010000002 LEVETIRACETAM IN NACL 0.82% 500 MG/100ML SOLUTION: Performed by: INTERNAL MEDICINE

## 2024-02-24 PROCEDURE — 80061 LIPID PANEL: CPT | Performed by: INTERNAL MEDICINE

## 2024-02-24 PROCEDURE — 82805 BLOOD GASES W/O2 SATURATION: CPT

## 2024-02-24 PROCEDURE — 93306 TTE W/DOPPLER COMPLETE: CPT | Performed by: INTERNAL MEDICINE

## 2024-02-24 PROCEDURE — 25010000002 LACOSAMIDE 200 MG/20ML SOLUTION 20 ML VIAL

## 2024-02-24 PROCEDURE — C9254 INJECTION, LACOSAMIDE: HCPCS

## 2024-02-24 PROCEDURE — 25010000002 METHYLPREDNISOLONE PER 40 MG: Performed by: INTERNAL MEDICINE

## 2024-02-24 PROCEDURE — 25010000002 MAGNESIUM SULFATE 2 GM/50ML SOLUTION: Performed by: INTERNAL MEDICINE

## 2024-02-24 PROCEDURE — 70450 CT HEAD/BRAIN W/O DYE: CPT | Performed by: RADIOLOGY

## 2024-02-24 PROCEDURE — 94660 CPAP INITIATION&MGMT: CPT

## 2024-02-24 PROCEDURE — 83036 HEMOGLOBIN GLYCOSYLATED A1C: CPT | Performed by: INTERNAL MEDICINE

## 2024-02-24 PROCEDURE — 70450 CT HEAD/BRAIN W/O DYE: CPT

## 2024-02-24 PROCEDURE — 94761 N-INVAS EAR/PLS OXIMETRY MLT: CPT

## 2024-02-24 PROCEDURE — 99222 1ST HOSP IP/OBS MODERATE 55: CPT | Performed by: INTERNAL MEDICINE

## 2024-02-24 PROCEDURE — 83050 HGB METHEMOGLOBIN QUAN: CPT

## 2024-02-24 PROCEDURE — 83735 ASSAY OF MAGNESIUM: CPT | Performed by: INTERNAL MEDICINE

## 2024-02-24 PROCEDURE — 87102 FUNGUS ISOLATION CULTURE: CPT | Performed by: INTERNAL MEDICINE

## 2024-02-24 PROCEDURE — 94640 AIRWAY INHALATION TREATMENT: CPT

## 2024-02-24 RX ORDER — LAMOTRIGINE 100 MG/1
25 TABLET ORAL NIGHTLY
Status: DISCONTINUED | OUTPATIENT
Start: 2024-02-24 | End: 2024-02-28

## 2024-02-24 RX ORDER — TROLAMINE SALICYLATE 10 G/100G
1 CREAM TOPICAL EVERY 6 HOURS PRN
Status: DISCONTINUED | OUTPATIENT
Start: 2024-02-24 | End: 2024-03-09 | Stop reason: HOSPADM

## 2024-02-24 RX ORDER — ACETAMINOPHEN 500 MG
500 TABLET ORAL EVERY 4 HOURS PRN
Status: DISCONTINUED | OUTPATIENT
Start: 2024-02-24 | End: 2024-03-09 | Stop reason: HOSPADM

## 2024-02-24 RX ORDER — FERROUS SULFATE 325(65) MG
325 TABLET ORAL 2 TIMES DAILY WITH MEALS
Status: DISCONTINUED | OUTPATIENT
Start: 2024-02-24 | End: 2024-03-06

## 2024-02-24 RX ORDER — ROSUVASTATIN CALCIUM 20 MG/1
40 TABLET, COATED ORAL EVERY EVENING
Status: DISCONTINUED | OUTPATIENT
Start: 2024-02-24 | End: 2024-03-06

## 2024-02-24 RX ORDER — BACLOFEN 10 MG/1
5 TABLET ORAL 2 TIMES DAILY
Status: DISCONTINUED | OUTPATIENT
Start: 2024-02-24 | End: 2024-03-03

## 2024-02-24 RX ORDER — LEVOTHYROXINE SODIUM 0.07 MG/1
75 TABLET ORAL
Status: DISCONTINUED | OUTPATIENT
Start: 2024-02-24 | End: 2024-03-09 | Stop reason: HOSPADM

## 2024-02-24 RX ORDER — MAGNESIUM SULFATE HEPTAHYDRATE 40 MG/ML
2 INJECTION, SOLUTION INTRAVENOUS ONCE
Status: COMPLETED | OUTPATIENT
Start: 2024-02-24 | End: 2024-02-24

## 2024-02-24 RX ORDER — GUAIFENESIN 200 MG/1
400 TABLET ORAL EVERY 4 HOURS PRN
Status: DISCONTINUED | OUTPATIENT
Start: 2024-02-24 | End: 2024-03-09 | Stop reason: HOSPADM

## 2024-02-24 RX ORDER — PANTOPRAZOLE SODIUM 40 MG/1
40 TABLET, DELAYED RELEASE ORAL
Status: DISCONTINUED | OUTPATIENT
Start: 2024-02-24 | End: 2024-03-06

## 2024-02-24 RX ORDER — ALBUTEROL SULFATE 2.5 MG/3ML
2.5 SOLUTION RESPIRATORY (INHALATION) EVERY 6 HOURS PRN
Status: DISCONTINUED | OUTPATIENT
Start: 2024-02-24 | End: 2024-03-09 | Stop reason: HOSPADM

## 2024-02-24 RX ORDER — LORAZEPAM 0.5 MG/1
0.5 TABLET ORAL EVERY 12 HOURS PRN
Status: DISCONTINUED | OUTPATIENT
Start: 2024-02-24 | End: 2024-03-09 | Stop reason: HOSPADM

## 2024-02-24 RX ORDER — CARBOXYMETHYLCELLULOSE SODIUM 5 MG/ML
1 SOLUTION/ DROPS OPHTHALMIC 3 TIMES DAILY PRN
Status: DISCONTINUED | OUTPATIENT
Start: 2024-02-24 | End: 2024-03-09 | Stop reason: HOSPADM

## 2024-02-24 RX ORDER — METHYLPREDNISOLONE SODIUM SUCCINATE 40 MG/ML
40 INJECTION, POWDER, LYOPHILIZED, FOR SOLUTION INTRAMUSCULAR; INTRAVENOUS EVERY 12 HOURS
Status: DISCONTINUED | OUTPATIENT
Start: 2024-02-24 | End: 2024-02-29

## 2024-02-24 RX ORDER — BUDESONIDE AND FORMOTEROL FUMARATE DIHYDRATE 160; 4.5 UG/1; UG/1
2 AEROSOL RESPIRATORY (INHALATION)
Status: DISCONTINUED | OUTPATIENT
Start: 2024-02-24 | End: 2024-03-07

## 2024-02-24 RX ORDER — ISOSORBIDE MONONITRATE 30 MG/1
15 TABLET, EXTENDED RELEASE ORAL
Status: DISCONTINUED | OUTPATIENT
Start: 2024-02-24 | End: 2024-03-05

## 2024-02-24 RX ORDER — RISPERIDONE 0.25 MG/1
0.5 TABLET ORAL 2 TIMES DAILY
Status: DISCONTINUED | OUTPATIENT
Start: 2024-02-24 | End: 2024-03-09 | Stop reason: HOSPADM

## 2024-02-24 RX ORDER — NITROGLYCERIN 0.4 MG/1
0.4 TABLET SUBLINGUAL
Status: DISCONTINUED | OUTPATIENT
Start: 2024-02-24 | End: 2024-03-09 | Stop reason: HOSPADM

## 2024-02-24 RX ORDER — LAMOTRIGINE 100 MG/1
100 TABLET ORAL DAILY
Status: DISCONTINUED | OUTPATIENT
Start: 2024-02-24 | End: 2024-03-03

## 2024-02-24 RX ORDER — BUDESONIDE 0.5 MG/2ML
0.5 INHALANT ORAL
Status: DISCONTINUED | OUTPATIENT
Start: 2024-02-24 | End: 2024-02-24

## 2024-02-24 RX ORDER — DONEPEZIL HYDROCHLORIDE 5 MG/1
10 TABLET, FILM COATED ORAL NIGHTLY
Status: DISCONTINUED | OUTPATIENT
Start: 2024-02-24 | End: 2024-03-09 | Stop reason: HOSPADM

## 2024-02-24 RX ORDER — MEMANTINE HYDROCHLORIDE 10 MG/1
10 TABLET ORAL EVERY 12 HOURS SCHEDULED
Status: DISCONTINUED | OUTPATIENT
Start: 2024-02-24 | End: 2024-03-09 | Stop reason: HOSPADM

## 2024-02-24 RX ADMIN — METHYLPREDNISOLONE SODIUM SUCCINATE 40 MG: 40 INJECTION, POWDER, FOR SOLUTION INTRAMUSCULAR; INTRAVENOUS at 09:01

## 2024-02-24 RX ADMIN — LEVETIRACETAM 500 MG: 5 INJECTION INTRAVENOUS at 09:01

## 2024-02-24 RX ADMIN — Medication 10 ML: at 21:27

## 2024-02-24 RX ADMIN — LACOSAMIDE 50 MG: 10 INJECTION INTRAVENOUS at 15:28

## 2024-02-24 RX ADMIN — BUDESONIDE AND FORMOTEROL FUMARATE DIHYDRATE 2 PUFF: 160; 4.5 AEROSOL RESPIRATORY (INHALATION) at 18:44

## 2024-02-24 RX ADMIN — IPRATROPIUM BROMIDE 0.5 MG: 0.5 SOLUTION RESPIRATORY (INHALATION) at 12:52

## 2024-02-24 RX ADMIN — IPRATROPIUM BROMIDE 0.5 MG: 0.5 SOLUTION RESPIRATORY (INHALATION) at 07:22

## 2024-02-24 RX ADMIN — IPRATROPIUM BROMIDE 0.5 MG: 0.5 SOLUTION RESPIRATORY (INHALATION) at 18:44

## 2024-02-24 RX ADMIN — METHYLPREDNISOLONE SODIUM SUCCINATE 40 MG: 40 INJECTION, POWDER, FOR SOLUTION INTRAMUSCULAR; INTRAVENOUS at 21:26

## 2024-02-24 RX ADMIN — MAGNESIUM SULFATE HEPTAHYDRATE 2 G: 40 INJECTION, SOLUTION INTRAVENOUS at 15:26

## 2024-02-24 NOTE — PROGRESS NOTES
Repeat head CT this morning is reviewed.  There is no appreciable change in the hyperdensity seen along the falx.  This may represent a small subdural hematoma, or could also be benign/chronic thickening of the falx.  In any case, there has been no appreciable change and as such there is no need for continued neurosurgical monitoring.  Please reconsult us if any issues or questions arise.  Patient can follow-up with neurosurgery on an as-needed basis at this point.  I will defer the decision about reanticoagulating to the cardiac and medicine teams

## 2024-02-24 NOTE — PLAN OF CARE
Problem: Noninvasive Ventilation Acute  Goal: Effective Unassisted Ventilation and Oxygenation  Outcome: Ongoing, Progressing     Problem: Skin Injury Risk Increased  Goal: Skin Health and Integrity  Outcome: Ongoing, Progressing  Intervention: Optimize Skin Protection  Recent Flowsheet Documentation  Taken 2/24/2024 0000 by Kate Ocasio RN  Head of Bed (HOB) Positioning: HOB at 30-45 degrees  Taken 2/23/2024 2200 by Kate Ocasio RN  Head of Bed (HOB) Positioning: HOB at 30-45 degrees  Taken 2/23/2024 2000 by Kate Ocasio RN  Pressure Reduction Techniques:   heels elevated off bed   positioned off wounds   pressure points protected  Head of Bed (HOB) Positioning: HOB at 30-45 degrees  Pressure Reduction Devices:   heel offloading device utilized   positioning supports utilized  Skin Protection:   skin-to-device areas padded   skin-to-skin areas padded   transparent dressing maintained     Problem: Fall Injury Risk  Goal: Absence of Fall and Fall-Related Injury  Outcome: Ongoing, Progressing  Intervention: Identify and Manage Contributors  Recent Flowsheet Documentation  Taken 2/24/2024 0000 by Kate Ocasio RN  Medication Review/Management: medications reviewed  Taken 2/23/2024 2200 by Kate Ocasio RN  Medication Review/Management: medications reviewed  Taken 2/23/2024 2000 by Kate Ocasio RN  Medication Review/Management: medications reviewed  Intervention: Promote Injury-Free Environment  Recent Flowsheet Documentation  Taken 2/24/2024 0100 by Kate Ocasio RN  Safety Promotion/Fall Prevention: safety round/check completed  Taken 2/24/2024 0000 by Rosenbalm, Kate, RN  Safety Promotion/Fall Prevention: safety round/check completed  Taken 2/23/2024 2300 by Kate Ocasio RN  Safety Promotion/Fall Prevention: safety round/check completed  Taken 2/23/2024 2200 by Kate Ocasio RN  Safety Promotion/Fall Prevention: safety round/check  completed  Taken 2/23/2024 2100 by Kate Ocasio RN  Safety Promotion/Fall Prevention: safety round/check completed  Taken 2/23/2024 2000 by Rosenbalm, Kate, RN  Safety Promotion/Fall Prevention: safety round/check completed  Taken 2/23/2024 1900 by Rosenbalm, Kate, RN  Safety Promotion/Fall Prevention: safety round/check completed     Problem: Adult Inpatient Plan of Care  Goal: Plan of Care Review  Outcome: Ongoing, Progressing  Goal: Patient-Specific Goal (Individualized)  Outcome: Ongoing, Progressing  Goal: Absence of Hospital-Acquired Illness or Injury  Outcome: Ongoing, Progressing  Intervention: Identify and Manage Fall Risk  Recent Flowsheet Documentation  Taken 2/24/2024 0100 by Kate Ocasio RN  Safety Promotion/Fall Prevention: safety round/check completed  Taken 2/24/2024 0000 by Rosenbalm, Kate, RN  Safety Promotion/Fall Prevention: safety round/check completed  Taken 2/23/2024 2300 by Kate Ocasio RN  Safety Promotion/Fall Prevention: safety round/check completed  Taken 2/23/2024 2200 by Kate Ocasio RN  Safety Promotion/Fall Prevention: safety round/check completed  Taken 2/23/2024 2100 by Kate Ocasio RN  Safety Promotion/Fall Prevention: safety round/check completed  Taken 2/23/2024 2000 by Rosenbalm, Kate, RN  Safety Promotion/Fall Prevention: safety round/check completed  Taken 2/23/2024 1900 by Rosenbalm, Kate, RN  Safety Promotion/Fall Prevention: safety round/check completed  Intervention: Prevent Skin Injury  Recent Flowsheet Documentation  Taken 2/24/2024 0000 by Kate Ocasio RN  Body Position:   left   turned  Taken 2/23/2024 2200 by Kate Ocasio RN  Body Position:   right   turned  Taken 2/23/2024 2000 by Kate Ocasio RN  Body Position:   left   turned  Skin Protection:   skin-to-device areas padded   skin-to-skin areas padded   transparent dressing maintained  Intervention: Prevent and Manage VTE (Venous  Thromboembolism) Risk  Recent Flowsheet Documentation  Taken 2/23/2024 2000 by Kate Ocasio, RN  Activity Management: bedrest  Goal: Optimal Comfort and Wellbeing  Outcome: Ongoing, Progressing  Goal: Readiness for Transition of Care  Outcome: Ongoing, Progressing   Goal Outcome Evaluation:

## 2024-02-24 NOTE — PROGRESS NOTES
[facility] HOSPITALIST PROGRESS NOTE     Patient Identification:  Name:  Brenda Munroe  Age:  73 y.o.  Sex:  female  :  1950  MRN:  [id number]  Visit Number:  [id number]  ROOM: [address]     Primary Care Provider:  Bernadette Arevalo MD    Length of stay in inpatient status:  1    Subjective     Chief Compliant:    Chief Complaint   Patient presents with    Fall    Head Injury       History of Presenting Illness:    Initially on evaluation this morning, patient minimally responsive. She was wearing BiPAP and tolerating but had been maxed out on precedex. Precedex stopped and patient starting to wake up easier and be interactive. ABG again compensated and patient placed on NC with goal SPO2 88-92.     Patient's son came to visit with patient this AM. He noted his mom has been steadily declining at least the last year at nursing home. This is supported by documented multiple admission. He notes her breathing has been getting worse and she has been getting weaker. He reports there is two other children but they talk to him and agree with what he decides. He does not think his mom would want chest compressions or resuscitation if her heart stops or put on the ventilator. He noted her declining quality of life with her significant comorbidity including severe COPD from 60 year smoking history.     ROS:  Otherwise 10 point ROS negative other than documented above in HPI.     Objective     Current Hospital Meds:baclofen, 5 mg, Oral, BID  budesonide-formoterol, 2 puff, Inhalation, BID - RT   And  tiotropium bromide monohydrate, 2 puff, Inhalation, Daily - RT  [START ON 3/1/2024] cholecalciferol, 50,000 Units, Oral, Weekly  donepezil, 10 mg, Oral, Nightly  ferrous sulfate, 325 mg, Oral, BID With Meals  ipratropium, 0.5 mg, Nebulization, 4x Daily - RT  isosorbide mononitrate, 15 mg, Oral, Q24H  lamoTRIgine, 100 mg, Oral, Daily  lamoTRIgine, 25 mg, Oral, Nightly  levETIRAcetam, 500 mg, Intravenous,  Q12H  levothyroxine, 75 mcg, Oral, Q AM  memantine, 10 mg, Oral, Q12H  methylPREDNISolone sodium succinate, 40 mg, Intravenous, Q12H  pantoprazole, 40 mg, Oral, QAM AC  risperiDONE, 0.5 mg, Oral, BID  rosuvastatin, 40 mg, Oral, Q PM  senna-docusate sodium, 2 tablet, Oral, BID  sertraline, 100 mg, Oral, Daily  sodium chloride, 1,000 mL, Intravenous, Once  sodium chloride, 10 mL, Intravenous, Q12H  sodium chloride, 10 mL, Intravenous, Q12H    dexmedetomidine, 0.2-1.5 mcg/kg/hr, Last Rate: 1.5 mcg/kg/hr (02/24/24 0500)  phenylephrine, 0.5-3 mcg/kg/min        Current Antimicrobial Therapy:  Anti-Infectives (From admission, onward)      None          Current Diuretic Therapy:  Diuretics (From admission, onward)      None          ----------------------------------------------------------------------------------------------------------------------  Vital Signs:  Temp:  [97 °F (36.1 °C)-97.8 °F (36.6 °C)] 97.2 °F (36.2 °C)  Heart Rate:  [] 92  Resp:  [20-33] 24  BP: ()/() 137/96  SpO2:  [81 %-100 %] 98 %  on  Flow (L/min):  [2-4] 2;   Device (Oxygen Therapy): nasal cannula  Body mass index is 25.85 kg/m².    Wt Readings from Last 3 Encounters:   02/24/24 68.3 kg (150 lb 9.2 oz)   02/11/24 68.5 kg (151 lb)   02/02/24 68.9 kg (151 lb 14.4 oz)     Intake & Output (last 3 days)         02/21 0701 02/22 0700 02/22 0701 02/23 0700 02/23 0701 02/24 0700 02/24 0701 02/25 0700    I.V. (mL/kg)   195.6 (2.9)     Total Intake(mL/kg)   195.6 (2.9)     Net   +195.6             Urine Unmeasured Occurrence   2 x     Stool Unmeasured Occurrence   0 x     Emesis Unmeasured Occurrence   0 x           NPO Diet NPO Type: Strict NPO  ----------------------------------------------------------------------------------------------------------------------  Physical exam:  Constitutional:  Chronically ill appearingl.   HENT:  Head:  Normocephalic and atraumatic.  Mouth:  Moist mucous membranes.    Eyes:  both eyes with some  bruising and swelling. R>L, but right eye improved form yesterday. Pupils contracted.     Neck:  Neck supple.  No JVD present.    Cardiovascular:  Normal rate, regular rhythm and normal heart sounds with no murmur.  Pulmonary/Chest:  No respiratory distress, no wheezes, no crackles, with normal breath sounds and good air movement.  Abdominal:  Soft.  Bowel sounds are normal.  No distension and no tenderness.   Musculoskeletal:  No edema, no tenderness, and no deformity.  No red or swollen joints anywhere.    Neurological:  Not oriented but appears to still be waking up from discontinued sedation.   Skin:  Skin is warm and dry. No rash noted. No pallor.   Peripheral vascular:  Pulses in all 4 extremities with no clubbing, no cyanosis, no edema.  ----------------------------------------------------------------------------------------------------------------------  Tele:    ----------------------------------------------------------------------------------------------------------------------  Results from last 7 days   Lab Units 02/23/24  1739 02/23/24  1135   CRP mg/dL  --  0.42   LACTATE mmol/L  --  1.0   WBC 10*3/mm3 5.47 5.33   HEMOGLOBIN g/dL 10.6* 11.1*   HEMATOCRIT % 37.0 39.0   MCV fL 100.8* 100.8*   MCHC g/dL 28.6* 28.5*   PLATELETS 10*3/mm3 225 233   INR   --  1.50*     Results from last 7 days   Lab Units 02/24/24  0812   PH, ARTERIAL pH units 7.460*   PO2 ART mm Hg 61.0*   PCO2, ARTERIAL mm Hg 51.7*   HCO3 ART mmol/L 36.7*     Results from last 7 days   Lab Units 02/23/24  1739 02/23/24  1135   SODIUM mmol/L 148* 147*   POTASSIUM mmol/L 4.6 3.9   CHLORIDE mmol/L 105 105   CO2 mmol/L 35.8* 40.1*   BUN mg/dL 16 17   CREATININE mg/dL 0.84 0.86   CALCIUM mg/dL 8.9 9.1   GLUCOSE mg/dL 97 100*   ALBUMIN g/dL 3.2* 3.3*   BILIRUBIN mg/dL 0.3 0.3   ALK PHOS U/L 74 73   AST (SGOT) U/L 19 19   ALT (SGPT) U/L 13 15   Estimated Creatinine Clearance: 54.9 mL/min (by C-G formula based on SCr of 0.84 mg/dL).  No results  "found for: \"AMMONIA\"  Results from last 7 days   Lab Units 02/23/24  1340 02/23/24  1135   CK TOTAL U/L 89  --    HSTROP T ng/L 452* 474*         Results from last 7 days   Lab Units 02/24/24  0018   CHOLESTEROL mg/dL 99   TRIGLYCERIDES mg/dL 56   HDL CHOL mg/dL 47   LDL CHOL mg/dL 39     Hemoglobin A1C   Date/Time Value Ref Range Status   02/24/2024 0018 5.00 4.80 - 5.60 % Final     Glucose   Date/Time Value Ref Range Status   02/24/2024 0138 111 70 - 130 mg/dL Final   02/23/2024 1055 79 70 - 130 mg/dL Final     Lab Results   Component Value Date    TSH 0.876 01/23/2024    FREET4 1.59 03/25/2021     No results found for: \"PREGTESTUR\", \"PREGSERUM\", \"HCG\", \"HCGQUANT\"  Pain Management Panel  More data exists         Latest Ref Rng & Units 2/23/2024 2/11/2024   Pain Management Panel   Amphetamine, Urine Qual Negative Negative  Negative    Barbiturates Screen, Urine Negative Negative  Negative    Benzodiazepine Screen, Urine Negative Positive  Positive    Buprenorphine, Screen, Urine Negative Negative  Negative    Cocaine Screen, Urine Negative Negative  Negative    Fentanyl, Urine Negative Negative  Negative    Methadone Screen , Urine Negative Negative  Negative    Methamphetamine, Ur Negative Negative  Negative      Brief Urine Lab Results  (Last result in the past 365 days)        Color   Clarity   Blood   Leuk Est   Nitrite   Protein   CREAT   Urine HCG        02/23/24 1147 Dark Yellow   Cloudy   Moderate (2+)   Small (1+)   Negative   100 mg/dL (2+)                 No results found for: \"BLOODCX\"  No results found for: \"URINECX\"  No results found for: \"WOUNDCX\"  No results found for: \"STOOLCX\"  No results found for: \"RESPCX\"  No results found for: \"AFBCX\"  Results from last 7 days   Lab Units 02/23/24  1135   LACTATE mmol/L 1.0   SED RATE mm/hr 8   CRP mg/dL 0.42       I have personally looked at the labs and they are summarized " above.  ----------------------------------------------------------------------------------------------------------------------  Detailed radiology reports for the last 24 hours:    Imaging Results (Last 24 Hours)       Procedure Component Value Units Date/Time    CT Head Without Contrast [[id number]] Resulted: 02/24/24 0840     Updated: 02/24/24 0841    CT Abdomen Pelvis Without Contrast [[id number]] Collected: 02/23/24 1210     Updated: 02/23/24 1214    Narrative:      PROCEDURE: CT ABDOMEN PELVIS WO CONTRAST-     HISTORY: Unwitnessed fall, ALTERED, on anticoagulation superficial  bruising to the abdomen     COMPARISON: 1/9/2024.     PROCEDURE: Axial images were obtained from the lung bases through the  pubic symphysis without intravenous contrast. . This study was performed  with techniques to keep radiation doses as low as reasonably achievable  (ALARA). Individualized dose reduction techniques using automated  exposure control or adjustment of mA and/or kV according to the patient  size were employed.     FINDINGS:     ABDOMEN: The lung bases are clear. The heart size is normal. Noncontrast  images of the liver are unremarkable with no focal hepatic lesionsl. The  spleen is normal. There is a 1.6 cm left adrenal nodule which is  unchanged. The right adrenal gland is normal.  The pancreas has an  unremarkable unenhanced appearance.. The aorta is normal in caliber.  There is no significant free fluid or adenopathy. The left kidney is  atrophic. There is no nephrolithiasis. There is no hydronephrosis.  Limited noncontrast images of the bowel are unremarkable.     PELVIS: The appendix is not identified. There are colonic diverticula  without evidence of diverticulitis. The patient is status post  hysterectomy. The urinary bladder is unremarkable. There is no  significant fluid or adenopathy. Bone windows reveal no acute osseous  abnormalities or lytic/destructive lesions. There is a fat-containing  periumbilical  hernia.       Impression:      No acute intra-abdominal or pelvic process.              This report was finalized on 2/23/2024 12:12 PM by Lily Mlulen M.D..       CT Thoracic Spine Without Contrast [[id number]] Collected: 02/23/24 1209     Updated: 02/23/24 1212    Narrative:      PROCEDURE: CT THORACIC SPINE WO CONTRAST-     HISTORY: Unwitnessed fall ;gross external trauma; blood thinners     TECHNIQUE: Multiple axial CT images were obtained through the thoracic  spine using bone window algorithims. Coronal and sagittal images were  reconstructed from the original axial data set. This study was performed  with techniques to keep radiation doses as low as reasonably achievable  (ALARA). Individualized dose reduction techniques using automated  exposure control or adjustment of mA and/or kV according to the patient  size were employed.     COMPARISON: None.     FINDINGS: There is S-shaped curvature of the thoracolumbar spine. There  are no fractures. There are diffuse degenerative changes with loss of  disc space height and anterior osteophyte formation. The paraspinal soft  tissues are unremarkable.       Impression:      No acute process.              This report was finalized on 2/23/2024 12:10 PM by Lily Mullen M.D..       CT Chest Without Contrast Diagnostic [[id number]] Collected: 02/23/24 1139     Updated: 02/23/24 1143    Narrative:      PROCEDURE: CT CHEST WO CONTRAST DIAGNOSTIC-     HISTORY: Unwitnessed fall gross external trauma blood thinners, masses  on abdomen     COMPARISON:  None .     PROCEDURE:  Multiple axial CT images were obtained from the thoracic  inlet through the upper abdomen without the use of contrast. This study  was performed with techniques to keep radiation doses as low as  reasonably achievable (ALARA). Individualized dose reduction techniques  using automated exposure control or adjustment of mA and/or kV according  to the patient size were employed.     FINDINGS:  Soft  tissue windows reveal no axillary, hilar or mediastinal adenopathy.  The thyroid gland is unremarkable. The heart is enlarged. The aorta is  normal in caliber. There are no pleural or pericardial effusions. Lung  windows reveal scarring in atelectasis in the left upper lobe. There is  no pneumothorax. The visualized upper abdomen is unremarkable. Bone  windows reveal no acute osseous abnormalities.       Impression:      No acute process.        This report was finalized on 2/23/2024 11:41 AM by Lily Mullen M.D..       XR Wrist 3+ View Right [[id number]] Collected: 02/23/24 1138     Updated: 02/23/24 1141    Narrative:      PROCEDURE: XR WRIST 3+ VW RIGHT-     History: Unwitnessed fall, ALTERED, on anticoagulation superficial  bruising to the abdomen, skin abrasion     COMPARISON:  None.     FINDINGS:  A 3 view exam demonstrates no acute fracture or dislocation.  There is joint space narrowing with osteophyte formation at the first  carpal metacarpal joint. No soft tissue abnormality is seen.       Impression:      No acute fracture.                 This report was finalized on 2/23/2024 11:39 AM by Lily Mullen M.D..       CT Lumbar Spine Without Contrast [[id number]] Collected: 02/23/24 1135     Updated: 02/23/24 1139    Narrative:      PROCEDURE: CT LUMBAR SPINE WO CONTRAST-     HISTORY: Low back pain, trauma     TECHNIQUE: Multiple axial CT images were obtained through the lumbar  spine using bone window algorithms. Coronal and sagittal images were  reconstructed from the original axial data set. This study was performed  with techniques to keep radiation doses as low as reasonably achievable  (ALARA). Individualized dose reduction techniques using automated  exposure control or adjustment of mA and/or kV according to the patient  size were employed.     COMPARISON: None.     FINDINGS: The lumbar spine is well aligned with no acute fractures.  There are diffuse degenerative changes with loss of disc  space height  and facet arthropathy. Evaluation of the paraspinal soft tissues reveals  an atrophic left kidney.       Impression:      No acute process.              This report was finalized on 2/23/2024 11:37 AM by Lily Mullen M.D..       CT Facial Bones Without Contrast [[id number]] Collected: 02/23/24 1133     Updated: 02/23/24 1137    Narrative:      PROCEDURE: CT FACIAL BONES WO CONTRAST-     HISTORY: Unwitnessed fall gross external trauma blood thinners     COMPARISON: None .     TECHNIQUE: Multiple axial CT sections were performed through the face  without contrast. Coronal reconstruction images were performed. This  study was performed with techniques to keep radiation doses as low as  reasonably achievable (ALARA). Individualized dose reduction techniques  using automated exposure control or adjustment of mA and/or kV according  to the patient size were employed.         FINDINGS: There is a right nasal bone fracture. No other fractures are  seen within the face.. The orbital floors are intact. There is an  air-fluid level in the right maxillary sinus. There is a right  periorbital soft tissue hematoma.       Impression:      Right nasal bone fracture.              This report was finalized on 2/23/2024 11:35 AM by Lily Mullen M.D..       CT Head Without Contrast [[id number]] Collected: 02/23/24 1129     Updated: 02/23/24 1135    Narrative:      PROCEDURE: CT HEAD WO CONTRAST-, CT CERVICAL SPINE WO CONTRAST-     HISTORY: Unwitnessed fall gross external trauma blood thinners     PROCEDURE: Axial images were obtained from the skull base to the  thoracic inlet by computed tomography. Multiple axial CT images were  performed from the foramen magnum to the vertex without enhancement.   This study was performed with techniques to keep radiation doses as low  as reasonably achievable (ALARA). Individualized dose reduction  techniques using automated exposure control or adjustment of mA and/or  kV  according to the patient size were employed.         C-SPINE:     FINDINGS: There are no acute fractures. There are diffuse degenerative  changes with loss of disc base height and facet arthropathy.  The  prevertebral soft tissues are normal.  Limited images of the lung apices  are unremarkable.     HEAD CT:     FINDINGS: There is generalized cerebral volume loss. Patchy  hypodensities in the periventricular white matter consistent with  chronic small vessel ischemic change. There is a subdural hematoma  overlying the left frontal lobe layering along the falx measuring up to  4 mm. There is no mass, mass effect or midline shift.  There is no  hydrocephalus. The paranasal sinuses are clear. Bone windows reveal no  acute osseous abnormalities. Note is made of a right periorbital  hematoma.       Impression:      1. 4 mm left parafalcine subdural hematoma.  2. No acute fracture or malalignment in the cervical spine.              Critical Finding     Critical report results have been called to Dr. Moran. Report called at  2/23/2024 11:33 AM.                       This report was finalized on 2/23/2024 11:33 AM by Lily Mullen M.D..       CT Cervical Spine Without Contrast [[id number]] Collected: 02/23/24 1129     Updated: 02/23/24 1135    Narrative:      PROCEDURE: CT HEAD WO CONTRAST-, CT CERVICAL SPINE WO CONTRAST-     HISTORY: Unwitnessed fall gross external trauma blood thinners     PROCEDURE: Axial images were obtained from the skull base to the  thoracic inlet by computed tomography. Multiple axial CT images were  performed from the foramen magnum to the vertex without enhancement.   This study was performed with techniques to keep radiation doses as low  as reasonably achievable (ALARA). Individualized dose reduction  techniques using automated exposure control or adjustment of mA and/or  kV according to the patient size were employed.         C-SPINE:     FINDINGS: There are no acute fractures. There are  diffuse degenerative  changes with loss of disc base height and facet arthropathy.  The  prevertebral soft tissues are normal.  Limited images of the lung apices  are unremarkable.     HEAD CT:     FINDINGS: There is generalized cerebral volume loss. Patchy  hypodensities in the periventricular white matter consistent with  chronic small vessel ischemic change. There is a subdural hematoma  overlying the left frontal lobe layering along the falx measuring up to  4 mm. There is no mass, mass effect or midline shift.  There is no  hydrocephalus. The paranasal sinuses are clear. Bone windows reveal no  acute osseous abnormalities. Note is made of a right periorbital  hematoma.       Impression:      1. 4 mm left parafalcine subdural hematoma.  2. No acute fracture or malalignment in the cervical spine.              Critical Finding     Critical report results have been called to Dr. Moran. Report called at  2/23/2024 11:33 AM.                       This report was finalized on 2/23/2024 11:33 AM by Liyl Mullen M.D..             Assessment & Plan    #Traumatic left parafalcine 4 mm subdural   #Nasal fracture   #Home anticoagulation with apixiban  - CT on admission revealed 4 mm left parafalcine subdural hematoma   - Neurology and neurosurgery evaluated the patient in the ED   - Neurology recommended to start keppra and consult neurosurgery.   - Neurosurgery noted very unlikely to progress in size to clinically significant bleed and recommended to hold reversing anticoagulation and keep patient at our facility. Repeat CT head this morning does not appear much different on my comparison to CT yesterday, awaiting radiology STAT read   - Will also repeat CT head if any unexplained neurological decline  - Admit to CCU for close monitoring and frequent neuro checks  - Tele neurology to continue to follow. Appreciate recs. MRI pending.       #NSTEMI  - HS troponin was also notably elevated on arrival at [facility] with repeat  at 452. No significant changes appreciated on EKG.   - Stress test on 2/1/24 with no evidence of ischemia.   - No clear cause at this juncture, but lean toward type II event  - Will repeat echo  - Consult cardiology. Hold anticoagulation and antiplatelets for now given subdural hematoma discussed above.     #Acute hypercapnic respiratory failure   #Mild COPD exacerbation   - Recurrent issue the last year. Suspect exacerbated by elevated oxygen levels on 4L NC.   - Placed on BiPAP, was able to find and use mask without nasal component to help prevent pain  - SpO2 goal 88-92, PRN BIPAP   - Started solumedrol, nebs for possible COPD exacerbation.      #pAfib  - Rate controlled. Cardiology consulted as above.   - Hold anticoagulation for now      Chronic medical problems:  MDD  COPD- Hx of respiratory acidosis, will get ABG to make sure not cause of AMS  Thyroid disease  Hypertension  Bilateral carotid stenosis  Dementia  GERD  PUD     F: NPO pending SLP  A: PRN  S: None  T: SCDs  H: HOB elevated  U: PRN  G: PRN  S: N/A  B: PRN  I: PIV  D: None     Code status: GOC discussion with son on 2/24. DNR/DNI.      Dispo:Continue CCU level of care     VTE Prophylaxis:   Mechanical Order History:        Ordered        02/23/24 1523  Place Sequential Compression Device  Once            02/23/24 1523  Maintain Sequential Compression Device  Continuous                          Pharmalogical Order History:       None     Efren Dunham MD  [facility] Hospitalist  02/24/24  09:22 EST

## 2024-02-24 NOTE — THERAPY EVALUATION
"Acute Care - Speech Language Pathology   Swallow Initial Evaluation [facility]  CLINICAL DYSPHAGIA EVALUATION     Patient Name: Brenda Munroe  : 1950  MRN: [id number]  Today's Date: 2024     Admit Date: 2024    Brenda Munroe  was seen at bedside this am on CCU-5 to assess safety/efficacy of swallowing fnx, determine safest/least restrictive diet tolerance.     She has a medical hx significant for MDD, COPD, CAD, thyroid disease, hypertension, paroxysmal atrial fibrillation, bilateral carotid stenosis, dementia, GERD, and PUD.      She is distantly familiar to S[place] department from [facility] [place] at which time she was evidenced w/o aspiration across the evaluation.     Ms Munroe presented to [facility] ED [facility] following an unwitnessed fall and was found \"on the floor with blood around her head around 9:55 am\". ED workup revealed the following:  \"...vital signs were temp 97, heart rate 111, respirations 20, BP 91/97, SpO2 100% on 4 L per nasal cannula.  HS troponin on arrival significantly elevated at 474 with repeat at 452.  CK is normal.  Sodium is 147.  Urine notable for specific gravity greater than 1.03 with moderate blood, 1+ leukocytes, 11-20 RBCs and 21-50 WBCs.  Imaging workup noted 4 mm left parafalcine subdural hematoma.  There is also a right nasal bone fracture.  EKG notes A-fib with right bundle branch block\".     She is currently NPO per failed RN dysphagia screen (per SLP order) and is awaiting SLP evaluation.     Per discussion w/ RN prior to SLP entry, Ms Munroe has been intermittently on BiPAP and has been receiving precedex, though this has been recently stopped.     Social History     Socioeconomic History    Marital status:     Number of children: 3   Tobacco Use    Smoking status: Every Day     Packs/day: 0.50     Years: 55.00     Additional pack years: 0.00     Total pack years: 27.50     Types: Cigarettes    Smokeless tobacco: " Never   Vaping Use    Vaping Use: Never used   Substance and Sexual Activity    Alcohol use: No    Drug use: No    Sexual activity: Never     Comment: denies   Imaging:  CT Head Without Contrast [[id number]] Sam as Reviewed   Order Status: Completed Collected: 02/24/24 0942    Updated: 02/24/24 0952   Narrative:     CT brain without contrast compared to examination dated 1/31/2024.  Severe atrophy and small vessel disease.  There is hemorrhage in the LEFT occipital horn with focal surrounding  edema.  This appears to be new from prior study.  No shift of midline structures.  Large right-sided scalp hematoma.  Opacified RIGHT maxillary sinus, new from prior, likely related to  presence of blood or retained secretions.  No skull fracture.   Impression:     Intraventricular hemorrhage noted in the LEFT occipital lobe, new from  prior.  This report was finalized on 2/24/2024 9:50 AM by Dr. Loreto Jo MD.       XR Wrist 3+ View Right [[id number]] Sam as Reviewed   Order Status: Completed Collected: 02/23/24 1138    Updated: 02/23/24 1141   Narrative:     PROCEDURE: XR WRIST 3+ VW RIGHT-  History: Unwitnessed fall, ALTERED, on anticoagulation superficial  bruising to the abdomen, skin abrasion  COMPARISON:  None.  FINDINGS:  A 3 view exam demonstrates no acute fracture or dislocation.  There is joint space narrowing with osteophyte formation at the first  carpal metacarpal joint. No soft tissue abnormality is seen.   Impression:     No acute fracture.  This report was finalized on 2/23/2024 11:39 AM by Lily Mullen M.D..       CT Lumbar Spine Without Contrast [[id number]] Sam as Reviewed   Order Status: Completed Collected: 02/23/24 1135    Updated: 02/23/24 1139   Narrative:     PROCEDURE: CT LUMBAR SPINE WO CONTRAST-  HISTORY: Low back pain, trauma  TECHNIQUE: Multiple axial CT images were obtained through the lumbar  spine using bone window algorithms. Coronal and sagittal images were  reconstructed from the  original axial data set. This study was performed  with techniques to keep radiation doses as low as reasonably achievable  (ALARA). Individualized dose reduction techniques using automated  exposure control or adjustment of mA and/or kV according to the patient  size were employed.  COMPARISON: None.  FINDINGS: The lumbar spine is well aligned with no acute fractures.  There are diffuse degenerative changes with loss of disc space height  and facet arthropathy. Evaluation of the paraspinal soft tissues reveals  an atrophic left kidney.   Impression:     No acute process.  This report was finalized on 2/23/2024 11:37 AM by Lily Mullen M.D..       CT Abdomen Pelvis Without Contrast [[id number]] Sam as Reviewed   Order Status: Completed Collected: 02/23/24 1210    Updated: 02/23/24 1214   Narrative:     PROCEDURE: CT ABDOMEN PELVIS WO CONTRAST-  HISTORY: Unwitnessed fall, ALTERED, on anticoagulation superficial  bruising to the abdomen  COMPARISON: 1/9/2024.  PROCEDURE: Axial images were obtained from the lung bases through the  pubic symphysis without intravenous contrast. . This study was performed  with techniques to keep radiation doses as low as reasonably achievable  (ALARA). Individualized dose reduction techniques using automated  exposure control or adjustment of mA and/or kV according to the patient  size were employed.  FINDINGS:  ABDOMEN: The lung bases are clear. The heart size is normal. Noncontrast  images of the liver are unremarkable with no focal hepatic lesionsl. The  spleen is normal. There is a 1.6 cm left adrenal nodule which is  unchanged. The right adrenal gland is normal.  The pancreas has an  unremarkable unenhanced appearance.. The aorta is normal in caliber.  There is no significant free fluid or adenopathy. The left kidney is  atrophic. There is no nephrolithiasis. There is no hydronephrosis.  Limited noncontrast images of the bowel are unremarkable.  PELVIS: The appendix is not  identified. There are colonic diverticula  without evidence of diverticulitis. The patient is status post  hysterectomy. The urinary bladder is unremarkable. There is no  significant fluid or adenopathy. Bone windows reveal no acute osseous  abnormalities or lytic/destructive lesions. There is a fat-containing  periumbilical hernia.   Impression:     No acute intra-abdominal or pelvic process.  This report was finalized on 2/23/2024 12:12 PM by Lily Mullen M.D..       CT Chest Without Contrast Diagnostic [[id number]] Sam as Reviewed   Order Status: Completed Collected: 02/23/24 1139    Updated: 02/23/24 1143   Narrative:     PROCEDURE: CT CHEST WO CONTRAST DIAGNOSTIC-  HISTORY: Unwitnessed fall gross external trauma blood thinners, masses  on abdomen  COMPARISON:  None .  PROCEDURE:  Multiple axial CT images were obtained from the thoracic  inlet through the upper abdomen without the use of contrast. This study  was performed with techniques to keep radiation doses as low as  reasonably achievable (ALARA). Individualized dose reduction techniques  using automated exposure control or adjustment of mA and/or kV according  to the patient size were employed.  FINDINGS:  Soft tissue windows reveal no axillary, hilar or mediastinal adenopathy.  The thyroid gland is unremarkable. The heart is enlarged. The aorta is  normal in caliber. There are no pleural or pericardial effusions. Lung  windows reveal scarring in atelectasis in the left upper lobe. There is  no pneumothorax. The visualized upper abdomen is unremarkable. Bone  windows reveal no acute osseous abnormalities.   Impression:     No acute process.  This report was finalized on 2/23/2024 11:41 AM by Lily Mullen M.D..       CT Thoracic Spine Without Contrast [[id number]] Sam as Reviewed   Order Status: Completed Collected: 02/23/24 1209    Updated: 02/23/24 1212   Narrative:     PROCEDURE: CT THORACIC SPINE WO CONTRAST-  HISTORY: Unwitnessed fall  ;gross external trauma; blood thinners  TECHNIQUE: Multiple axial CT images were obtained through the thoracic  spine using bone window algorithims. Coronal and sagittal images were  reconstructed from the original axial data set. This study was performed  with techniques to keep radiation doses as low as reasonably achievable  (ALARA). Individualized dose reduction techniques using automated  exposure control or adjustment of mA and/or kV according to the patient  size were employed.  COMPARISON: None.  FINDINGS: There is S-shaped curvature of the thoracolumbar spine. There  are no fractures. There are diffuse degenerative changes with loss of  disc space height and anterior osteophyte formation. The paraspinal soft  tissues are unremarkable.   Impression:     No acute process.  This report was finalized on 2/23/2024 12:10 PM by Lily Mullen M.D..       CT Facial Bones Without Contrast [[id number]] Sam as Reviewed   Order Status: Completed Collected: 02/23/24 1133    Updated: 02/23/24 1137   Narrative:     PROCEDURE: CT FACIAL BONES WO CONTRAST-  HISTORY: Unwitnessed fall gross external trauma blood thinners  COMPARISON: None .  TECHNIQUE: Multiple axial CT sections were performed through the face  without contrast. Coronal reconstruction images were performed. This  study was performed with techniques to keep radiation doses as low as  reasonably achievable (ALARA). Individualized dose reduction techniques  using automated exposure control or adjustment of mA and/or kV according  to the patient size were employed.  FINDINGS: There is a right nasal bone fracture. No other fractures are  seen within the face.. The orbital floors are intact. There is an  air-fluid level in the right maxillary sinus. There is a right  periorbital soft tissue hematoma.   Impression:     Right nasal bone fracture.  This report was finalized on 2/23/2024 11:35 AM by Lily Mullen M.D..       CT Cervical Spine Without  Contrast [[id number]] Sam as Reviewed   Order Status: Completed Collected: 02/23/24 1129    Updated: 02/23/24 1135   Narrative:     PROCEDURE: CT HEAD WO CONTRAST-, CT CERVICAL SPINE WO CONTRAST-  HISTORY: Unwitnessed fall gross external trauma blood thinners  PROCEDURE: Axial images were obtained from the skull base to the  thoracic inlet by computed tomography. Multiple axial CT images were  performed from the foramen magnum to the vertex without enhancement.  This study was performed with techniques to keep radiation doses as low  as reasonably achievable (ALARA). Individualized dose reduction  techniques using automated exposure control or adjustment of mA and/or  kV according to the patient size were employed.  C-SPINE:  FINDINGS: There are no acute fractures. There are diffuse degenerative  changes with loss of disc base height and facet arthropathy.  The  prevertebral soft tissues are normal.  Limited images of the lung apices  are unremarkable.  HEAD CT:  FINDINGS: There is generalized cerebral volume loss. Patchy  hypodensities in the periventricular white matter consistent with  chronic small vessel ischemic change. There is a subdural hematoma  overlying the left frontal lobe layering along the falx measuring up to  4 mm. There is no mass, mass effect or midline shift.  There is no  hydrocephalus. The paranasal sinuses are clear. Bone windows reveal no  acute osseous abnormalities. Note is made of a right periorbital  hematoma.   Impression:     1. 4 mm left parafalcine subdural hematoma.  2. No acute fracture or malalignment in the cervical spine.  Critical Finding  Critical report results have been called to Dr. Moran. Report called at  2/23/2024 11:33 AM.  This report was finalized on 2/23/2024 11:33 AM by Lily Mullen M.D..       CT Head Without Contrast [[id number]] Sam as Reviewed   Order Status: Completed Collected: 02/23/24 1129    Updated: 02/23/24 1135   Narrative:     PROCEDURE: CT HEAD  WO CONTRAST-, CT CERVICAL SPINE WO CONTRAST-  HISTORY: Unwitnessed fall gross external trauma blood thinners  PROCEDURE: Axial images were obtained from the skull base to the  thoracic inlet by computed tomography. Multiple axial CT images were  performed from the foramen magnum to the vertex without enhancement.  This study was performed with techniques to keep radiation doses as low  as reasonably achievable (ALARA). Individualized dose reduction  techniques using automated exposure control or adjustment of mA and/or  kV according to the patient size were employed.  C-SPINE:  FINDINGS: There are no acute fractures. There are diffuse degenerative  changes with loss of disc base height and facet arthropathy.  The  prevertebral soft tissues are normal.  Limited images of the lung apices  are unremarkable.  HEAD CT:  FINDINGS: There is generalized cerebral volume loss. Patchy  hypodensities in the periventricular white matter consistent with  chronic small vessel ischemic change. There is a subdural hematoma  overlying the left frontal lobe layering along the falx measuring up to  4 mm. There is no mass, mass effect or midline shift.  There is no  hydrocephalus. The paranasal sinuses are clear. Bone windows reveal no  acute osseous abnormalities. Note is made of a right periorbital  hematoma.   Impression:     1. 4 mm left parafalcine subdural hematoma.  2. No acute fracture or malalignment in the cervical spine.  Critical Finding  Critical report results have been called to Dr. Moran. Report called at  2/23/2024 11:33 AM.  This report was finalized on 2/23/2024 11:33 AM by Lily Mullen M.D..     Labs:   Latest Reference Range & Units 02/23/24 11:35 02/23/24 17:39   WBC 3.40 - 10.80 10*3/mm3 5.33 5.47   RBC 3.77 - 5.28 10*6/mm3 3.87 3.67 (L)   Hemoglobin 12.0 - 15.9 g/dL 11.1 (L) 10.6 (L)   Hematocrit 34.0 - 46.6 % 39.0 37.0   Platelets 140 - 450 10*3/mm3 233 225   RDW 12.3 - 15.4 % 20.1 (H) 20.4 (H)   MCV 79.0  - 97.0 fL 100.8 (H) 100.8 (H)   MCH 26.6 - 33.0 pg 28.7 28.9   MCHC 31.5 - 35.7 g/dL 28.5 (L) 28.6 (L)   MPV 6.0 - 12.0 fL 9.8 10.1   RDW-SD 37.0 - 54.0 fl 74.3 (H) 75.6 (H)   (L): Data is abnormally low  (H): Data is abnormally high  Diet Orders (active) (From admission, onward)       Start     Ordered    02/24/24 0947  NPO Diet NPO Type: Strict NPO  Diet Effective Now        Comments: Should Remain in Effect Until a Complete SLP Evaluation Occurs & a Superceding Order is Received    02/24/24 0926                  Ms Munroe was observed on 2L nasal cannula w/o complications across this evaluation.     Upon SLP entry to room, pt's son (introduces self) is present and attentive in pt room. He reports she tolerates a regular diet and thin liquids w/ medications whole in puree at her current residence.     Ms Munroe is reclined in bed and sleeping. Son reports that she is currently unable to open right eye per swelling from fall.     She was positioned upright and centered in bed. Unable to arouse to self provide presentations. Verbal and tactile stimuli elicits some vocalizations, however no intelligible speech and she does not attempt to follow any directive or answer any questions. Ice chip placed to labial edge for stimuli results in lingual protrusion to labial edges and increased oral opening.     Ice chip placed into oral opening elicits an adequate labial seal and oral manipulation of single ice chip. This is prolonged. Pharyngeal swallow is elicited following oral manipulation w/o s/s concerning for aspiration.     Per significance of decreased a/a status, any further po presentations are deferred at this time.     Visit Dx:     ICD-10-CM ICD-9-CM   1. Subdural hematoma  S06.5XAA 432.1   2. NSTEMI (non-ST elevated myocardial infarction)  I21.4 410.70   3. Chronic anticoagulation  Z79.01 V58.61   4. Closed fracture of nasal bone, initial encounter  S02.2XXA 802.0   5. Traumatic orbital hematoma, right, initial  encounter  S05.11XA 921.2     Patient Active Problem List   Diagnosis    Psychosis    Severe recurrent major depression with psychotic features    COPD (chronic obstructive pulmonary disease)    Coronary artery disease    Disease of thyroid gland    Arthritis    Hypertension    Paroxysmal atrial fibrillation    Chronic headaches    Vision changes    Carotid stenosis, asymptomatic, bilateral    Bradycardia, sinus    Acute respiratory failure with hypoxia and hypercapnia    NSTEMI (non-ST elevated myocardial infarction)    A-fib    Hypercapnic respiratory failure    Subdural hematoma     Past Medical History:   Diagnosis Date    Anxiety     Arthritis     Bell's palsy     Bladder prolapse, female, acquired     Carotid stenosis     COPD (chronic obstructive pulmonary disease)     COPD (chronic obstructive pulmonary disease)     Coronary artery disease     Dementia     Dementia     Depression     Difficulty walking     Disease of thyroid gland     Frequency of urination     GERD (gastroesophageal reflux disease)     Heart attack     Hyperlipidemia     Hypertension     Irregular heart beat     Peptic ulcer disease      Past Surgical History:   Procedure Laterality Date    CARDIAC CATHETERIZATION N/A 2/21/2023    Procedure: Left Heart Cath;  Surgeon: Tab Cifuentes MD;  Location: [facility] CATH INVASIVE LOCATION;  Service: Cardiology;  Laterality: N/A;    CARDIAC SURGERY      open heart  in 1999    CYSTOSCOPY N/A 11/8/2017    Procedure: CYSTOSCOPY;  Surgeon: Karl Nicolas DO;  Location: [facility];  Service:     OTHER SURGICAL HISTORY      states she had fluid drained no surgery    VAGINAL HYSTERECTOMY W/ ANTERIOR AND POSTERIOR VAGINAL REPAIR N/A 11/8/2017    Procedure: VAGINAL HYSTERECTOMY WITH ANTERIOR, POSTERIOR, AND ENTEROCELE VAGINAL REPAIR;  Surgeon: Karl Nicolas DO;  Location: [facility];  Service:     VAGINAL VAULT SUSPENSION N/A 11/8/2017    Procedure: VAGINAL VAULT SUSPENSION ;   Surgeon: Karl Nicolas, ;  Location: [facility];  Service:      Impression:     Ms Munroe presented w/ prolonged oral manipulation of single ice chip presentation and no s/s aspiration evidenced w/ this single consistency and presentation. Per significance of decreased a/a status, she is felt to be unsafe for po intake at this time and is recommended to continue NPO pending improved a/a status to functionally participate in full dysphagia assessment.     SLP Recommendation and Plan      1. Continue NPO.    2. Ice chips to promote pt comfort/oral hydration and stimulate swallow when fully a/a.   3. YANETH precautions.  4. Oral care protocol per NPO status.     SLP to f/u for improved a/a status for functional participation in full dysphagia evaluation.     D/w patient results and recommendations. Pt's son reports verbal agreement.     D/w RN results and recommendations w/ verbal agreement.    Thank you for allowing me to participate in the care of your patient-  Juliana Bang M.S., CCC-SLP        EDUCATION  The patient has been educated in the following areas:   Dysphagia (Swallowing Impairment) Oral Care/Hydration NPO rationale.        Time Calculation:       Juliana Bang MS CCC-SLP  2/24/2024

## 2024-02-24 NOTE — CONSULTS
[facility]  CONSULT  NOTE      Patient information:  Date of Admit: 2/23/2024  Date of Consult: 02/24/24  Hospitalist/Referring MD:Efren Dunham MD;   PCP: Bernadette Arevalo MD  MRN:  [id number]  Visit Number:  [id number]    LOS: 1  CODE STATUS:  Code Status and Medical Interventions:   Ordered at: 02/24/24 0846     Medical Intervention Limits:    NO intubation (DNI)     Code Status (Patient has no pulse and is not breathing):    No CPR (Do Not Attempt to Resuscitate)     Medical Interventions (Patient has pulse or is breathing):    Limited Support       PROBLEM LIST: Principal Problem:    Subdural hematoma        Assessment    Acute intra cerebral hemorrhage, patient was on anticoagulation for history of atrial fibrillation  CAD with no known mid circumflex stenosis that was medically managed  Persistent A-fib not on anticoagulation currently due to intracerebral hemorrhage  Non-STEMI  Hypertension  Dyslipidemia          Recommendations   Due to patient's a presenting symptom of her intracerebral hemorrhage she is not a candidate for any invasive evaluation.  Will continue with current supportive care.  Refrain from using IV heparin or oral anticoagulation.  Continue with current supportive care.  Overall prognosis is guarded given her presentation and significant underlying history of CAD and not being able to tolerate antiplatelet or anticoagulation.  Will follow-up as needed        Reason for Cardiology consultation: Advise on anticoagulation, elevated troponin    Subjective Data   ADMISSION INFORMATION:  Chief Complaint   Patient presents with    Fall    Head Injury     Fall    Head Injury         Brenda Munroe is a 73 y.o. female with a past medical history significant for MDD, COPD, CAD, thyroid disease, hypertension, paroxysmal atrial fibrillation, carotid stenosis, dementia, GERD, PUD. Patient presented to [facility] ([facility] [place]) emergency room (ER) on  2/23/2024 for head injury after fall at nursing home.  Patient has a history of paroxysmal atrial fibrillation and was on Eliquis for anticoagulation.  Upon arrival to the emergency room high sensitive troponin was significantly elevated at 474 with repeat at 452. Cardiology has been consulted for further evaluation and management.       Patient is in room CCU 5 and was examined by Dr. Turcios.  Patient lying in bed.  No signs of acute distress.  Bedside monitor shows atrial fibrillation rate 100s.            Cardiac risk factors:arteriosclerotic heart disease and hypertension, hyperlipidemia      Last Echo: Results for orders placed during the hospital encounter of 02/23/24    Adult Transthoracic Echo Complete W/ Cont if Necessary Per Protocol    Interpretation Summary    Left ventricular systolic function is moderately decreased. Left ventricular ejection fraction appears to be 41 - 45%.    Left ventricular diastolic function was indeterminate.    Left atrial volume is mildly increased.    There is mild calcification of the aortic valve.    Estimated right ventricular systolic pressure from tricuspid regurgitation is mildly elevated (35-45 mmHg).    There is no evidence of pericardial effusion         Last Stress: Results for orders placed during the hospital encounter of 01/22/24    Stress Test With Myocardial Perfusion One Day    Interpretation Summary    Myocardial perfusion imaging indicates a normal myocardial perfusion study with no evidence of ischemia.    (Calculated EF = 66%).    Breast attenuation artifact is present.    TID 1.03.    Findings consistent with a normal ECG stress test.        Last Cath: Results for orders placed during the hospital encounter of 02/18/23    Cardiac Catheterization/Vascular Study    Conclusion  Images from the original result were not included.  CARDIAC CATHETERIZATION / INTERVENTION REPORT    DATE OF PROCEDURE: 2/21/2023    INDICATION FOR PROCEDURE: NSTEMI      PRE PROCEDURE  DIAGNOSIS:    Clinical frailty: 2- Well    ASA: 2=2- Mild to moderate systemic disease, medially well controlled, with no functional limitation    Mallampati: Class 2=2- Able to visualize the soft palate, fauces, uvula.  The anterior & posterior tonsilar pillars are hidden by the tongue.    POST PROCEDURE DIAGNOSIS:  CAD with patent LIMA graft ( well matured - no stenosis and supplies LAD and excellent collateral to RCA) Lcx mid had old dissection calcified 95% stenosis and two SVG grafts are chronically occluded    Face to face moderate conscious sedation time:      COMPLICATIONS : None    Specimens collected : None        PROCEDURE PERFORMED:    1. Selective right and left coronary Angiogram  2. Left heart catheterization  3, Selective LIMA angiogram  4. Selective SVG- RCA and OM angiography      PROCEDURE COMMENTS:    Prior to the procedure risks benefits alternatives and possible adverse events were discussed with the patient and informed consent was obtained.  Brenda Munroe was brought to the cath lab and placed on the cardiac catherization table in the postabsortive state. The patient was prepped and draped according to the cath lab protocol under strict aseptic and sterile condition. Patient's right femoral artery sight was prepped and draped. Under fluoroscopic guidance the right femoral artery was punctured using a Micropuncture gauge needle utilizing the modified Seldinger technique. 6 Chinese sheath was introduced over a wire. After aspirating for blood it was flushed with heparinized saline. Angio was performed to confirm the position of the sheath in  R CFA    We advanced Heraclio type diagnostic catheters over the wire. The  left and right coronary arteries  were selectively engaged. Left and right coronary angiogram were obtained in multiple orthogonal projections. 5 Bulgarian Pigtail catheter was used to cross across the AV retrograde into the LV cavity and resting LV pressures were recorded. Manual pull  back was used to record gradient across the Aortic valve.    After completion of the procedure the femoral sheath was removed in the cath lab and hemostasis was achieved by manual compression. The patient tolerated the procedure without complications.      Coronary anatomy findings:    Patient's native left main had no stenosis, LAD was  in the proximal, there is a vein graft that appears to be attached to a diagonal artery which has been chronically occluded with no other significant diagonals visualized.  The patient is native left circumflex artery appeared to be a large caliber vessel, there was a significant 99% calcified or dissected stenosis in the mid circumflex and beyond the stenosis that supplies 3 moderate-sized OM branches.  This vessel is not grafted.  The native RCA , there is 2 vein grafts which appear to be chronically occluded at this time, 1 possibly going to the RCA underwent likely going either to the circumflex OM or diagonal.  The LIMA graft was well matured with no significant stenosis    Large distal LAD notes excellent collaterals filling the RCA.            EBL: Less than 10cc        Final Impression:  Patient's angiogram did not demonstrate any acute thrombotic lesions that would explain for type I non-STEMI, her non-STEMI is likely a type II from her hypoxia in the setting of her older chronic lesion.  The mid circumflex stenosis is a very complex calcified old dissected lesion, stenosis MAYO-3 flow I would recommend only medical management at this time.  If the patient ever needs intervention estimated atherectomy based PCI.  Since patient denies any anginal symptoms I would favor to continue with current identity medical management for CAD.                  Tab Cifuentes MD, [facility]  Interventional Cardiology    02/21/23  14:29 EST                            Past Medical History:   Diagnosis Date    Anxiety     Arthritis     Bell's palsy     Bladder prolapse, female, acquired      Carotid stenosis     COPD (chronic obstructive pulmonary disease)     COPD (chronic obstructive pulmonary disease)     Coronary artery disease     Dementia     Dementia     Depression     Difficulty walking     Disease of thyroid gland     Frequency of urination     GERD (gastroesophageal reflux disease)     Heart attack     Hyperlipidemia     Hypertension     Irregular heart beat     Peptic ulcer disease      Past Surgical History:   Procedure Laterality Date    CARDIAC CATHETERIZATION N/A 2/21/2023    Procedure: Left Heart Cath;  Surgeon: Tab Cifuentes MD;  Location: [facility] CATH INVASIVE LOCATION;  Service: Cardiology;  Laterality: N/A;    CARDIAC SURGERY      open heart  in 1999    CYSTOSCOPY N/A 11/8/2017    Procedure: CYSTOSCOPY;  Surgeon: Karl Nicolas DO;  Location: [facility] OR;  Service:     OTHER SURGICAL HISTORY      states she had fluid drained no surgery    VAGINAL HYSTERECTOMY W/ ANTERIOR AND POSTERIOR VAGINAL REPAIR N/A 11/8/2017    Procedure: VAGINAL HYSTERECTOMY WITH ANTERIOR, POSTERIOR, AND ENTEROCELE VAGINAL REPAIR;  Surgeon: Karl Nicolas DO;  Location: [facility] OR;  Service:     VAGINAL VAULT SUSPENSION N/A 11/8/2017    Procedure: VAGINAL VAULT SUSPENSION ;  Surgeon: Karl Nicolas DO;  Location: [facility] OR;  Service:      Family History   Problem Relation Age of Onset    Dementia Sister     Heart attack Mother     Tuberculosis Father     Breast cancer Neg Hx      Social History     Tobacco Use    Smoking status: Every Day     Packs/day: 0.50     Years: 55.00     Additional pack years: 0.00     Total pack years: 27.50     Types: Cigarettes    Smokeless tobacco: Never   Vaping Use    Vaping Use: Never used   Substance Use Topics    Alcohol use: No    Drug use: No       Medications listed below are reported home medications pulling from within the system:  Medications Prior to Admission   Medication Sig Dispense Refill Last Dose    apixaban (ELIQUIS) 5 MG  tablet tablet Take 1 tablet by mouth 2 (Two) Times a Day.   2/23/2024    baclofen (LIORESAL) 10 MG tablet Take 0.5 tablets by mouth 2 (Two) Times a Day. Indications: Muscle Spasticity   2/23/2024    clopidogrel (PLAVIX) 75 MG tablet Take 1 tablet by mouth Daily. 30 tablet  2/23/2024    donepezil (ARICEPT) 10 MG tablet Take 1 tablet by mouth Every Night.   2/22/2024    ferrous sulfate 325 (65 FE) MG tablet Take 1 tablet by mouth 2 (Two) Times a Day. 60 tablet 5 2/23/2024    isosorbide mononitrate (IMDUR) 30 MG 24 hr tablet Take 0.5 tablets by mouth Daily.   2/23/2024    lamoTRIgine (LaMICtal) 100 MG tablet Take 1 tablet by mouth Daily.   2/23/2024    lamoTRIgine (LaMICtal) 25 MG tablet Take 1 tablet by mouth Every Night.   2/22/2024    levothyroxine (SYNTHROID, LEVOTHROID) 75 MCG tablet Take 1 tablet by mouth Daily.   2/23/2024    lisinopril (PRINIVIL,ZESTRIL) 10 MG tablet Take 1 tablet by mouth Daily.   2/23/2024    LORazepam (ATIVAN) 0.5 MG tablet Take 1 tablet by mouth Every 12 (Twelve) Hours As Needed for Anxiety. 6 tablet 0 Past Week    memantine (NAMENDA) 10 MG tablet Take 1 tablet by mouth 2 (Two) Times a Day.   2/23/2024    pantoprazole (PROTONIX) 40 MG EC tablet Take 1 tablet by mouth Daily. 10 tablet 0 2/23/2024    risperiDONE (risperDAL) 0.5 MG tablet Take 1 tablet by mouth 2 (Two) Times a Day.   2/23/2024    rosuvastatin (CRESTOR) 40 MG tablet Take 1 tablet by mouth Every Evening.   2/22/2024    sertraline (ZOLOFT) 100 MG tablet Take 1 tablet by mouth Daily.   2/23/2024    Trelegy Ellipta 100-62.5-25 MCG/INH inhaler Inhale 1 puff Daily.   2/23/2024    vitamin D (ERGOCALCIFEROL) 1.25 MG (99942 UT) capsule capsule Take 1 capsule by mouth 1 (One) Time Per Week.   2/23/2024    acetaminophen (TYLENOL) 500 MG tablet Take 1 tablet by mouth Every 4 (Four) Hours As Needed for Mild Pain.   Unknown    albuterol sulfate  (90 Base) MCG/ACT inhaler Inhale 2 puffs Every 4 (Four) Hours As Needed for Wheezing. 18 g  11 Not started yet    Dextran 70-Hypromellose (Lubricating Tears Eye Drops) 0.1-0.3 % solution Apply 1 drop to eye(s) as directed by provider Every 2 (Two) Hours As Needed (dry eyes). 30 mL 0 Unknown    guaiFENesin 200 MG tablet Take 2 tablets by mouth Every 4 (Four) Hours As Needed for Congestion.   Unknown    nitroglycerin (NITROSTAT) 0.4 MG SL tablet Place 1 tablet under the tongue Every 5 (Five) Minutes As Needed.   Unknown    phenol (Chloraseptic) 1.4 % liquid liquid Apply 1 spray to the mouth or throat Every 2 (Two) Hours As Needed (sore throat).   Unknown    senna (SENOKOT) 8.6 MG tablet Take 1 tablet by mouth Daily As Needed for Constipation.   Unknown    trolamine salicylate (ASPERCREME) 10 % cream Apply 1 Application topically to the appropriate area as directed Every 6 (Six) Hours As Needed for Muscle / Joint Pain.   Unknown     Allergies:  Patient has no known allergies.        Objective Data      Vital Signs  Temp:  [97.2 °F (36.2 °C)-97.8 °F (36.6 °C)] 97.2 °F (36.2 °C)  Heart Rate:  [] 84  Resp:  [24-33] 24  BP: ()/() 127/99  Flow (L/min):  [2-4] 2  Vital Signs (last 72 hrs)         02/21 0700  02/22 0659 02/22 0700  02/23 0659 02/23 0700  02/24 0659 02/24 0700  02/24 1303   Most Recent      Temp (°F)     97 -  97.8       97.2 (36.2) 02/23 2000    Heart Rate     62 -  125    69 -  102     84 02/24 1252    Resp     20 -  33      24     24 02/24 1252    BP     63/44 -  137/96    127/99 -  156/127     127/99 02/24 0930    SpO2 (%)     81 -  100    82 -  100     96 02/24 1252    Flow (L/min)     2 -  4      2     2 02/24 1252    Oxygen Concentration (%)       28      28     28 02/24 0813          Body mass index is 25.85 kg/m².    Intake/Output Summary (Last 24 hours) at 2/24/2024 1303  Last data filed at 2/24/2024 0500  Gross per 24 hour   Intake 195.62 ml   Output --   Net 195.62 ml         Physical Exam  Constitutional:       General: She is not in acute distress.     Appearance: She  is ill-appearing.   HENT:      Head: Normocephalic and atraumatic.   Eyes:      Comments: Ecchymosis and swelling noted around bilateral eyes   Neck:      Vascular: No JVD.   Cardiovascular:      Rate and Rhythm: Tachycardia present. Rhythm irregular.   Pulmonary:      Effort: Pulmonary effort is normal.      Breath sounds: Normal breath sounds and air entry. No wheezing, rhonchi or rales.   Musculoskeletal:      Cervical back: Neck supple.   Neurological:      Mental Status: She is lethargic.   Psychiatric:         Speech: She is noncommunicative.         Results review   Results Review:    I have reviewed the patient's new clinical results.  Results from last 7 days   Lab Units 02/23/24  1340 02/23/24  1135   CK TOTAL U/L 89  --    HSTROP T ng/L 452* 474*     Results from last 7 days   Lab Units 02/23/24  1739 02/23/24  1135   WBC 10*3/mm3 5.47 5.33   HEMOGLOBIN g/dL 10.6* 11.1*   PLATELETS 10*3/mm3 225 233     Results from last 7 days   Lab Units 02/23/24  1739 02/23/24  1135   SODIUM mmol/L 148* 147*   POTASSIUM mmol/L 4.6 3.9   CHLORIDE mmol/L 105 105   CO2 mmol/L 35.8* 40.1*   BUN mg/dL 16 17   CREATININE mg/dL 0.84 0.86   CALCIUM mg/dL 8.9 9.1   GLUCOSE mg/dL 97 100*   ALT (SGPT) U/L 13 15   AST (SGOT) U/L 19 19     Lab Results   Component Value Date    INR 1.50 (H) 02/23/2024    INR 1.18 (H) 02/11/2024    INR 1.32 (H) 01/31/2024    INR 1.55 (H) 01/22/2024    INR 1.04 09/17/2023    INR 1.16 (H) 02/18/2023    INR 0.97 03/12/2022         Lab Results   Component Value Date    MG 1.8 02/02/2024    MG 1.9 02/01/2024    MG 2.0 01/30/2024     Lab Results   Component Value Date    TSH 0.876 01/23/2024      Total Cholesterol   Date Value Ref Range Status   02/24/2024 99 0 - 200 mg/dL Final     Triglycerides   Date Value Ref Range Status   02/24/2024 56 0 - 150 mg/dL Final     HDL Cholesterol   Date Value Ref Range Status   02/24/2024 47 40 - 60 mg/dL Final     LDL Cholesterol    Date Value Ref Range Status  "  2024 39 0 - 100 mg/dL Final     Lab Results   Component Value Date    PROBNP 2,298.0 (H) 2024    PROBNP 4,452.0 (H) 2024    PROBNP 2,515.0 (H) 2023     No results found.  No results found for: \"URICACID\"  Pain Management Panel  More data exists         Latest Ref Rng & Units 2024   Pain Management Panel   Amphetamine, Urine Qual Negative Negative  Negative    Barbiturates Screen, Urine Negative Negative  Negative    Benzodiazepine Screen, Urine Negative Positive  Positive    Buprenorphine, Screen, Urine Negative Negative  Negative    Cocaine Screen, Urine Negative Negative  Negative    Fentanyl, Urine Negative Negative  Negative    Methadone Screen , Urine Negative Negative  Negative    Methamphetamine, Ur Negative Negative  Negative      Microbiology Results (last 10 days)       ** No results found for the last 240 hours. **           No results found for: \"BLOODCX\"        EC/23/2024 at 1142        ECG/EMG Results (last 24 hours)       Procedure Component Value Units Date/Time    Adult Transthoracic Echo Complete W/ Cont if Necessary Per Protocol [[id number]] Resulted: 24 1211     Updated: 24 1217     LVIDd 4.3 cm      LVIDs 3.4 cm      IVSd 1.11 cm      LVPWd 1.03 cm      FS 20.0 %      IVS/LVPW 1.07 cm      ESV(cubed) 40.2 ml      LV Sys Vol (BSA corrected) 18.0 cm2      EDV(cubed) 78.4 ml      LV Aguirre Vol (BSA corrected) 34.1 cm2      LV mass(C)d 155.8 grams      LVOT area 3.1 cm2      LVOT diam 2.00 cm      EDV(MOD-sp4) 59.1 ml      ESV(MOD-sp4) 31.1 ml      SV(MOD-sp4) 28.0 ml      SI(MOD-sp4) 16.2 ml/m2      EF(MOD-sp4) 47.4 %      MV E max buster 141.0 cm/sec      MV A max buster 27.7 cm/sec      MV E/A 5.1     LA ESV Index (BP) 30.1 ml/m2      Med Peak E' Buster 6.7 cm/sec      Lat Peak E' Buster 8.3 cm/sec      TR max buster 276.0 cm/sec      Avg E/e' ratio 18.80     TAPSE (>1.6) 1.87 cm      LA dimension (2D)  5.0 cm      Ao pk buster 127.0 cm/sec      Ao max PG " 6.5 mmHg      Ao mean PG 3.0 mmHg      Ao V2 VTI 24.4 cm      TR max PG 30.5 mmHg      RVSP(TR) 40.5 mmHg      RAP systole 10.0 mmHg      PA acc time 0.11 sec      Ao root diam 3.1 cm      ACS 1.40 cm     Narrative:        Left ventricular systolic function is moderately decreased. Left   ventricular ejection fraction appears to be 41 - 45%.    Left ventricular diastolic function was indeterminate.    Left atrial volume is mildly increased.    There is mild calcification of the aortic valve.    Estimated right ventricular systolic pressure from tricuspid   regurgitation is mildly elevated (35-45 mmHg).    There is no evidence of pericardial effusion              TELEMETRY:     Atrial fibrillation 80s      RADIOLOGY STUDIES:  Imaging Results (Last 72 Hours)       Procedure Component Value Units Date/Time    CT Head Without Contrast [[id number]] Collected: 02/24/24 0942     Updated: 02/24/24 0952    Narrative:      CT brain without contrast compared to examination dated 1/31/2024.     Severe atrophy and small vessel disease.  There is hemorrhage in the LEFT occipital horn with focal surrounding  edema.  This appears to be new from prior study.  No shift of midline structures.  Large right-sided scalp hematoma.  Opacified RIGHT maxillary sinus, new from prior, likely related to  presence of blood or retained secretions.  No skull fracture.       Impression:      Intraventricular hemorrhage noted in the LEFT occipital lobe, new from  prior.        This report was finalized on 2/24/2024 9:50 AM by Dr. Loreto Jo MD.       CT Abdomen Pelvis Without Contrast [[id number]] Collected: 02/23/24 1210     Updated: 02/23/24 1214    Narrative:      PROCEDURE: CT ABDOMEN PELVIS WO CONTRAST-     HISTORY: Unwitnessed fall, ALTERED, on anticoagulation superficial  bruising to the abdomen     COMPARISON: 1/9/2024.     PROCEDURE: Axial images were obtained from the lung bases through the  pubic symphysis without intravenous contrast. .  This study was performed  with techniques to keep radiation doses as low as reasonably achievable  (ALARA). Individualized dose reduction techniques using automated  exposure control or adjustment of mA and/or kV according to the patient  size were employed.     FINDINGS:     ABDOMEN: The lung bases are clear. The heart size is normal. Noncontrast  images of the liver are unremarkable with no focal hepatic lesionsl. The  spleen is normal. There is a 1.6 cm left adrenal nodule which is  unchanged. The right adrenal gland is normal.  The pancreas has an  unremarkable unenhanced appearance.. The aorta is normal in caliber.  There is no significant free fluid or adenopathy. The left kidney is  atrophic. There is no nephrolithiasis. There is no hydronephrosis.  Limited noncontrast images of the bowel are unremarkable.     PELVIS: The appendix is not identified. There are colonic diverticula  without evidence of diverticulitis. The patient is status post  hysterectomy. The urinary bladder is unremarkable. There is no  significant fluid or adenopathy. Bone windows reveal no acute osseous  abnormalities or lytic/destructive lesions. There is a fat-containing  periumbilical hernia.       Impression:      No acute intra-abdominal or pelvic process.              This report was finalized on 2/23/2024 12:12 PM by Lily Mullen M.D..       CT Thoracic Spine Without Contrast [[id number]] Collected: 02/23/24 1209     Updated: 02/23/24 1212    Narrative:      PROCEDURE: CT THORACIC SPINE WO CONTRAST-     HISTORY: Unwitnessed fall ;gross external trauma; blood thinners     TECHNIQUE: Multiple axial CT images were obtained through the thoracic  spine using bone window algorithims. Coronal and sagittal images were  reconstructed from the original axial data set. This study was performed  with techniques to keep radiation doses as low as reasonably achievable  (ALARA). Individualized dose reduction techniques using  automated  exposure control or adjustment of mA and/or kV according to the patient  size were employed.     COMPARISON: None.     FINDINGS: There is S-shaped curvature of the thoracolumbar spine. There  are no fractures. There are diffuse degenerative changes with loss of  disc space height and anterior osteophyte formation. The paraspinal soft  tissues are unremarkable.       Impression:      No acute process.              This report was finalized on 2/23/2024 12:10 PM by Lily Mullen M.D..       CT Chest Without Contrast Diagnostic [[id number]] Collected: 02/23/24 1139     Updated: 02/23/24 1143    Narrative:      PROCEDURE: CT CHEST WO CONTRAST DIAGNOSTIC-     HISTORY: Unwitnessed fall gross external trauma blood thinners, masses  on abdomen     COMPARISON:  None .     PROCEDURE:  Multiple axial CT images were obtained from the thoracic  inlet through the upper abdomen without the use of contrast. This study  was performed with techniques to keep radiation doses as low as  reasonably achievable (ALARA). Individualized dose reduction techniques  using automated exposure control or adjustment of mA and/or kV according  to the patient size were employed.     FINDINGS:  Soft tissue windows reveal no axillary, hilar or mediastinal adenopathy.  The thyroid gland is unremarkable. The heart is enlarged. The aorta is  normal in caliber. There are no pleural or pericardial effusions. Lung  windows reveal scarring in atelectasis in the left upper lobe. There is  no pneumothorax. The visualized upper abdomen is unremarkable. Bone  windows reveal no acute osseous abnormalities.       Impression:      No acute process.        This report was finalized on 2/23/2024 11:41 AM by Lily Mullen M.D..       XR Wrist 3+ View Right [[id number]] Collected: 02/23/24 1138     Updated: 02/23/24 1141    Narrative:      PROCEDURE: XR WRIST 3+ VW RIGHT-     History: Unwitnessed fall, ALTERED, on anticoagulation  superficial  bruising to the abdomen, skin abrasion     COMPARISON:  None.     FINDINGS:  A 3 view exam demonstrates no acute fracture or dislocation.  There is joint space narrowing with osteophyte formation at the first  carpal metacarpal joint. No soft tissue abnormality is seen.       Impression:      No acute fracture.                 This report was finalized on 2/23/2024 11:39 AM by Lily Mullen M.D..       CT Lumbar Spine Without Contrast [[id number]] Collected: 02/23/24 1135     Updated: 02/23/24 1139    Narrative:      PROCEDURE: CT LUMBAR SPINE WO CONTRAST-     HISTORY: Low back pain, trauma     TECHNIQUE: Multiple axial CT images were obtained through the lumbar  spine using bone window algorithms. Coronal and sagittal images were  reconstructed from the original axial data set. This study was performed  with techniques to keep radiation doses as low as reasonably achievable  (ALARA). Individualized dose reduction techniques using automated  exposure control or adjustment of mA and/or kV according to the patient  size were employed.     COMPARISON: None.     FINDINGS: The lumbar spine is well aligned with no acute fractures.  There are diffuse degenerative changes with loss of disc space height  and facet arthropathy. Evaluation of the paraspinal soft tissues reveals  an atrophic left kidney.       Impression:      No acute process.              This report was finalized on 2/23/2024 11:37 AM by Lily Mullen M.D..       CT Facial Bones Without Contrast [[id number]] Collected: 02/23/24 1133     Updated: 02/23/24 1137    Narrative:      PROCEDURE: CT FACIAL BONES WO CONTRAST-     HISTORY: Unwitnessed fall gross external trauma blood thinners     COMPARISON: None .     TECHNIQUE: Multiple axial CT sections were performed through the face  without contrast. Coronal reconstruction images were performed. This  study was performed with techniques to keep radiation doses as low as  reasonably  achievable (ALARA). Individualized dose reduction techniques  using automated exposure control or adjustment of mA and/or kV according  to the patient size were employed.         FINDINGS: There is a right nasal bone fracture. No other fractures are  seen within the face.. The orbital floors are intact. There is an  air-fluid level in the right maxillary sinus. There is a right  periorbital soft tissue hematoma.       Impression:      Right nasal bone fracture.              This report was finalized on 2/23/2024 11:35 AM by Lily Mullen M.D..       CT Head Without Contrast [[id number]] Collected: 02/23/24 1129     Updated: 02/23/24 1135    Narrative:      PROCEDURE: CT HEAD WO CONTRAST-, CT CERVICAL SPINE WO CONTRAST-     HISTORY: Unwitnessed fall gross external trauma blood thinners     PROCEDURE: Axial images were obtained from the skull base to the  thoracic inlet by computed tomography. Multiple axial CT images were  performed from the foramen magnum to the vertex without enhancement.   This study was performed with techniques to keep radiation doses as low  as reasonably achievable (ALARA). Individualized dose reduction  techniques using automated exposure control or adjustment of mA and/or  kV according to the patient size were employed.         C-SPINE:     FINDINGS: There are no acute fractures. There are diffuse degenerative  changes with loss of disc base height and facet arthropathy.  The  prevertebral soft tissues are normal.  Limited images of the lung apices  are unremarkable.     HEAD CT:     FINDINGS: There is generalized cerebral volume loss. Patchy  hypodensities in the periventricular white matter consistent with  chronic small vessel ischemic change. There is a subdural hematoma  overlying the left frontal lobe layering along the falx measuring up to  4 mm. There is no mass, mass effect or midline shift.  There is no  hydrocephalus. The paranasal sinuses are clear. Bone windows reveal  no  acute osseous abnormalities. Note is made of a right periorbital  hematoma.       Impression:      1. 4 mm left parafalcine subdural hematoma.  2. No acute fracture or malalignment in the cervical spine.              Critical Finding     Critical report results have been called to Dr. Moran. Report called at  2/23/2024 11:33 AM.                       This report was finalized on 2/23/2024 11:33 AM by Lily Mullen M.D..       CT Cervical Spine Without Contrast [[id number]] Collected: 02/23/24 1129     Updated: 02/23/24 1135    Narrative:      PROCEDURE: CT HEAD WO CONTRAST-, CT CERVICAL SPINE WO CONTRAST-     HISTORY: Unwitnessed fall gross external trauma blood thinners     PROCEDURE: Axial images were obtained from the skull base to the  thoracic inlet by computed tomography. Multiple axial CT images were  performed from the foramen magnum to the vertex without enhancement.   This study was performed with techniques to keep radiation doses as low  as reasonably achievable (ALARA). Individualized dose reduction  techniques using automated exposure control or adjustment of mA and/or  kV according to the patient size were employed.         C-SPINE:     FINDINGS: There are no acute fractures. There are diffuse degenerative  changes with loss of disc base height and facet arthropathy.  The  prevertebral soft tissues are normal.  Limited images of the lung apices  are unremarkable.     HEAD CT:     FINDINGS: There is generalized cerebral volume loss. Patchy  hypodensities in the periventricular white matter consistent with  chronic small vessel ischemic change. There is a subdural hematoma  overlying the left frontal lobe layering along the falx measuring up to  4 mm. There is no mass, mass effect or midline shift.  There is no  hydrocephalus. The paranasal sinuses are clear. Bone windows reveal no  acute osseous abnormalities. Note is made of a right periorbital  hematoma.       Impression:      1. 4 mm left  parafalcine subdural hematoma.  2. No acute fracture or malalignment in the cervical spine.              Critical Finding     Critical report results have been called to Dr. Moran. Report called at  2/23/2024 11:33 AM.                       This report was finalized on 2/23/2024 11:33 AM by Lily Mullen M.D..               CURRENT MEDICATIONS:  Current list of medications may not reflect those currently placed in orders that are not signed or are being held.     baclofen, 5 mg, Oral, BID  budesonide-formoterol, 2 puff, Inhalation, BID - RT   And  tiotropium bromide monohydrate, 2 puff, Inhalation, Daily - RT  [START ON 3/1/2024] cholecalciferol, 50,000 Units, Oral, Weekly  donepezil, 10 mg, Oral, Nightly  ferrous sulfate, 325 mg, Oral, BID With Meals  ipratropium, 0.5 mg, Nebulization, 4x Daily - RT  isosorbide mononitrate, 15 mg, Oral, Q24H  lamoTRIgine, 100 mg, Oral, Daily  lamoTRIgine, 25 mg, Oral, Nightly  levETIRAcetam, 500 mg, Intravenous, Q12H  levothyroxine, 75 mcg, Oral, Q AM  memantine, 10 mg, Oral, Q12H  methylPREDNISolone sodium succinate, 40 mg, Intravenous, Q12H  pantoprazole, 40 mg, Oral, QAM AC  risperiDONE, 0.5 mg, Oral, BID  rosuvastatin, 40 mg, Oral, Q PM  senna-docusate sodium, 2 tablet, Oral, BID  sertraline, 100 mg, Oral, Daily  sodium chloride, 1,000 mL, Intravenous, Once  sodium chloride, 10 mL, Intravenous, Q12H  sodium chloride, 10 mL, Intravenous, Q12H      dexmedetomidine, 0.2-1.5 mcg/kg/hr, Last Rate: 1.5 mcg/kg/hr (02/24/24 0500)  phenylephrine, 0.5-3 mcg/kg/min        acetaminophen    albuterol    senna-docusate sodium **AND** polyethylene glycol **AND** bisacodyl **AND** bisacodyl    carboxymethylcellulose    guaiFENesin    LORazepam    nitroglycerin    phenol    sodium chloride    sodium chloride    sodium chloride    sodium chloride    trolamine salicylate    I have discussed this patient with Dr. Turcios. Together, we have formed a plan of care for this patient.     Thank you  very much for asking us to be involved in this patient's care.  We will follow along with you.    I have discussed the patient's findings and my recommendations with patient.                  Electronically signed by PAULA Loera, 02/24/24, 1:24 PM EST.         Please note that portions of this note were copied and has been reviewed and is accurate as of 2/24/2024 .      Please note that portions of this note were completed with a voice recognition program.

## 2024-02-25 LAB
ALBUMIN SERPL-MCNC: 3 G/DL (ref 3.5–5.2)
ALBUMIN SERPL-MCNC: 3.2 G/DL (ref 3.5–5.2)
ALBUMIN/GLOB SERPL: 1.1 G/DL
ALBUMIN/GLOB SERPL: 1.2 G/DL
ALP SERPL-CCNC: 72 U/L (ref 39–117)
ALP SERPL-CCNC: 76 U/L (ref 39–117)
ALT SERPL W P-5'-P-CCNC: 11 U/L (ref 1–33)
ALT SERPL W P-5'-P-CCNC: 13 U/L (ref 1–33)
ANION GAP SERPL CALCULATED.3IONS-SCNC: 17.9 MMOL/L (ref 5–15)
ANION GAP SERPL CALCULATED.3IONS-SCNC: 19.3 MMOL/L (ref 5–15)
ANISOCYTOSIS BLD QL: NORMAL
ANISOCYTOSIS BLD QL: NORMAL
AST SERPL-CCNC: 25 U/L (ref 1–32)
AST SERPL-CCNC: 28 U/L (ref 1–32)
BASOPHILS # BLD AUTO: 0.01 10*3/MM3 (ref 0–0.2)
BASOPHILS # BLD AUTO: 0.01 10*3/MM3 (ref 0–0.2)
BASOPHILS NFR BLD AUTO: 0.1 % (ref 0–1.5)
BASOPHILS NFR BLD AUTO: 0.2 % (ref 0–1.5)
BILIRUB SERPL-MCNC: 0.4 MG/DL (ref 0–1.2)
BILIRUB SERPL-MCNC: 0.4 MG/DL (ref 0–1.2)
BUN SERPL-MCNC: 23 MG/DL (ref 8–23)
BUN SERPL-MCNC: 25 MG/DL (ref 8–23)
BUN/CREAT SERPL: 25.8 (ref 7–25)
BUN/CREAT SERPL: 27.1 (ref 7–25)
BURR CELLS BLD QL SMEAR: NORMAL
BURR CELLS BLD QL SMEAR: NORMAL
CALCIUM SPEC-SCNC: 8.9 MG/DL (ref 8.6–10.5)
CALCIUM SPEC-SCNC: 9.1 MG/DL (ref 8.6–10.5)
CHLORIDE SERPL-SCNC: 101 MMOL/L (ref 98–107)
CHLORIDE SERPL-SCNC: 102 MMOL/L (ref 98–107)
CO2 SERPL-SCNC: 22.1 MMOL/L (ref 22–29)
CO2 SERPL-SCNC: 22.7 MMOL/L (ref 22–29)
CREAT SERPL-MCNC: 0.85 MG/DL (ref 0.57–1)
CREAT SERPL-MCNC: 0.97 MG/DL (ref 0.57–1)
DACRYOCYTES BLD QL SMEAR: NORMAL
DACRYOCYTES BLD QL SMEAR: NORMAL
DEPRECATED RDW RBC AUTO: 76.5 FL (ref 37–54)
DEPRECATED RDW RBC AUTO: 77.7 FL (ref 37–54)
EGFRCR SERPLBLD CKD-EPI 2021: 61.1 ML/MIN/1.73
EGFRCR SERPLBLD CKD-EPI 2021: 71.5 ML/MIN/1.73
ELLIPTOCYTES BLD QL SMEAR: NORMAL
EOSINOPHIL # BLD AUTO: 0 10*3/MM3 (ref 0–0.4)
EOSINOPHIL # BLD AUTO: 0.01 10*3/MM3 (ref 0–0.4)
EOSINOPHIL NFR BLD AUTO: 0 % (ref 0.3–6.2)
EOSINOPHIL NFR BLD AUTO: 0.1 % (ref 0.3–6.2)
ERYTHROCYTE [DISTWIDTH] IN BLOOD BY AUTOMATED COUNT: 20.4 % (ref 12.3–15.4)
ERYTHROCYTE [DISTWIDTH] IN BLOOD BY AUTOMATED COUNT: 20.5 % (ref 12.3–15.4)
FOLATE SERPL-MCNC: 4.18 NG/ML (ref 4.78–24.2)
GLOBULIN UR ELPH-MCNC: 2.6 GM/DL
GLOBULIN UR ELPH-MCNC: 2.7 GM/DL
GLUCOSE SERPL-MCNC: 135 MG/DL (ref 65–99)
GLUCOSE SERPL-MCNC: 154 MG/DL (ref 65–99)
HCT VFR BLD AUTO: 39.4 % (ref 34–46.6)
HCT VFR BLD AUTO: 40.3 % (ref 34–46.6)
HGB BLD-MCNC: 11.3 G/DL (ref 12–15.9)
HGB BLD-MCNC: 11.5 G/DL (ref 12–15.9)
HYPOCHROMIA BLD QL: NORMAL
IMM GRANULOCYTES # BLD AUTO: 0.06 10*3/MM3 (ref 0–0.05)
IMM GRANULOCYTES # BLD AUTO: 0.08 10*3/MM3 (ref 0–0.05)
IMM GRANULOCYTES NFR BLD AUTO: 0.9 % (ref 0–0.5)
IMM GRANULOCYTES NFR BLD AUTO: 1 % (ref 0–0.5)
LARGE PLATELETS: NORMAL
LYMPHOCYTES # BLD AUTO: 0.57 10*3/MM3 (ref 0.7–3.1)
LYMPHOCYTES # BLD AUTO: 0.72 10*3/MM3 (ref 0.7–3.1)
LYMPHOCYTES NFR BLD AUTO: 7.7 % (ref 19.6–45.3)
LYMPHOCYTES NFR BLD AUTO: 9.3 % (ref 19.6–45.3)
MCH RBC QN AUTO: 29 PG (ref 26.6–33)
MCH RBC QN AUTO: 29.6 PG (ref 26.6–33)
MCHC RBC AUTO-ENTMCNC: 28 G/DL (ref 31.5–35.7)
MCHC RBC AUTO-ENTMCNC: 29.2 G/DL (ref 31.5–35.7)
MCV RBC AUTO: 101.5 FL (ref 79–97)
MCV RBC AUTO: 103.3 FL (ref 79–97)
MONOCYTES # BLD AUTO: 0.2 10*3/MM3 (ref 0.1–0.9)
MONOCYTES # BLD AUTO: 0.28 10*3/MM3 (ref 0.1–0.9)
MONOCYTES NFR BLD AUTO: 3 % (ref 5–12)
MONOCYTES NFR BLD AUTO: 3.2 % (ref 5–12)
NEUTROPHILS NFR BLD AUTO: 5.32 10*3/MM3 (ref 1.7–7)
NEUTROPHILS NFR BLD AUTO: 8.27 10*3/MM3 (ref 1.7–7)
NEUTROPHILS NFR BLD AUTO: 86.3 % (ref 42.7–76)
NEUTROPHILS NFR BLD AUTO: 88.2 % (ref 42.7–76)
NRBC BLD AUTO-RTO: 0 /100 WBC (ref 0–0.2)
NRBC BLD AUTO-RTO: 0 /100 WBC (ref 0–0.2)
OVALOCYTES BLD QL SMEAR: NORMAL
PLAT MORPH BLD: NORMAL
PLATELET # BLD AUTO: 243 10*3/MM3 (ref 140–450)
PLATELET # BLD AUTO: 260 10*3/MM3 (ref 140–450)
PMV BLD AUTO: 10.4 FL (ref 6–12)
PMV BLD AUTO: 11 FL (ref 6–12)
POTASSIUM SERPL-SCNC: 4.6 MMOL/L (ref 3.5–5.2)
POTASSIUM SERPL-SCNC: 5.2 MMOL/L (ref 3.5–5.2)
PROT SERPL-MCNC: 5.7 G/DL (ref 6–8.5)
PROT SERPL-MCNC: 5.8 G/DL (ref 6–8.5)
QT INTERVAL: 362 MS
QT INTERVAL: 372 MS
QTC INTERVAL: 472 MS
QTC INTERVAL: 522 MS
RBC # BLD AUTO: 3.88 10*6/MM3 (ref 3.77–5.28)
RBC # BLD AUTO: 3.9 10*6/MM3 (ref 3.77–5.28)
SODIUM SERPL-SCNC: 142 MMOL/L (ref 136–145)
SODIUM SERPL-SCNC: 143 MMOL/L (ref 136–145)
VIT B12 BLD-MCNC: 319 PG/ML (ref 211–946)
WBC NRBC COR # BLD AUTO: 6.16 10*3/MM3 (ref 3.4–10.8)
WBC NRBC COR # BLD AUTO: 9.37 10*3/MM3 (ref 3.4–10.8)

## 2024-02-25 PROCEDURE — 99232 SBSQ HOSP IP/OBS MODERATE 35: CPT | Performed by: PSYCHIATRY & NEUROLOGY

## 2024-02-25 PROCEDURE — 80053 COMPREHEN METABOLIC PANEL: CPT | Performed by: INTERNAL MEDICINE

## 2024-02-25 PROCEDURE — C9254 INJECTION, LACOSAMIDE: HCPCS

## 2024-02-25 PROCEDURE — 82607 VITAMIN B-12: CPT | Performed by: INTERNAL MEDICINE

## 2024-02-25 PROCEDURE — 94660 CPAP INITIATION&MGMT: CPT

## 2024-02-25 PROCEDURE — 25010000002 LABETALOL 5 MG/ML SOLUTION: Performed by: INTERNAL MEDICINE

## 2024-02-25 PROCEDURE — 94799 UNLISTED PULMONARY SVC/PX: CPT

## 2024-02-25 PROCEDURE — 25010000002 HYDRALAZINE PER 20 MG: Performed by: INTERNAL MEDICINE

## 2024-02-25 PROCEDURE — 82746 ASSAY OF FOLIC ACID SERUM: CPT | Performed by: INTERNAL MEDICINE

## 2024-02-25 PROCEDURE — 94664 DEMO&/EVAL PT USE INHALER: CPT

## 2024-02-25 PROCEDURE — 85007 BL SMEAR W/DIFF WBC COUNT: CPT | Performed by: INTERNAL MEDICINE

## 2024-02-25 PROCEDURE — 25010000002 LACOSAMIDE 200 MG/20ML SOLUTION 20 ML VIAL

## 2024-02-25 PROCEDURE — 85025 COMPLETE CBC W/AUTO DIFF WBC: CPT | Performed by: INTERNAL MEDICINE

## 2024-02-25 PROCEDURE — 99233 SBSQ HOSP IP/OBS HIGH 50: CPT | Performed by: INTERNAL MEDICINE

## 2024-02-25 PROCEDURE — 25010000002 METHYLPREDNISOLONE PER 40 MG: Performed by: INTERNAL MEDICINE

## 2024-02-25 PROCEDURE — 94761 N-INVAS EAR/PLS OXIMETRY MLT: CPT

## 2024-02-25 PROCEDURE — 25010000002 LORAZEPAM PER 2 MG: Performed by: INTERNAL MEDICINE

## 2024-02-25 RX ORDER — SODIUM CHLORIDE 450 MG/100ML
75 INJECTION, SOLUTION INTRAVENOUS CONTINUOUS
Status: DISCONTINUED | OUTPATIENT
Start: 2024-02-25 | End: 2024-02-28

## 2024-02-25 RX ORDER — LABETALOL HYDROCHLORIDE 5 MG/ML
10 INJECTION, SOLUTION INTRAVENOUS ONCE
Status: COMPLETED | OUTPATIENT
Start: 2024-02-25 | End: 2024-02-25

## 2024-02-25 RX ORDER — METOPROLOL TARTRATE 1 MG/ML
5 INJECTION, SOLUTION INTRAVENOUS ONCE
Status: COMPLETED | OUTPATIENT
Start: 2024-02-25 | End: 2024-02-25

## 2024-02-25 RX ORDER — HYDRALAZINE HYDROCHLORIDE 20 MG/ML
10 INJECTION INTRAMUSCULAR; INTRAVENOUS ONCE
Status: COMPLETED | OUTPATIENT
Start: 2024-02-25 | End: 2024-02-25

## 2024-02-25 RX ORDER — LORAZEPAM 2 MG/ML
1 INJECTION INTRAMUSCULAR ONCE
Status: COMPLETED | OUTPATIENT
Start: 2024-02-25 | End: 2024-02-25

## 2024-02-25 RX ADMIN — LABETALOL HYDROCHLORIDE 10 MG: 5 INJECTION, SOLUTION INTRAVENOUS at 03:07

## 2024-02-25 RX ADMIN — IPRATROPIUM BROMIDE 0.5 MG: 0.5 SOLUTION RESPIRATORY (INHALATION) at 19:24

## 2024-02-25 RX ADMIN — LACOSAMIDE 50 MG: 10 INJECTION INTRAVENOUS at 14:44

## 2024-02-25 RX ADMIN — LACOSAMIDE 50 MG: 10 INJECTION INTRAVENOUS at 03:16

## 2024-02-25 RX ADMIN — IPRATROPIUM BROMIDE 0.5 MG: 0.5 SOLUTION RESPIRATORY (INHALATION) at 06:39

## 2024-02-25 RX ADMIN — IPRATROPIUM BROMIDE 0.5 MG: 0.5 SOLUTION RESPIRATORY (INHALATION) at 12:55

## 2024-02-25 RX ADMIN — SODIUM CHLORIDE 15 MG/HR: 900 INJECTION, SOLUTION INTRAVENOUS at 17:53

## 2024-02-25 RX ADMIN — METOPROLOL TARTRATE 5 MG: 1 INJECTION, SOLUTION INTRAVENOUS at 09:17

## 2024-02-25 RX ADMIN — METHYLPREDNISOLONE SODIUM SUCCINATE 40 MG: 40 INJECTION, POWDER, FOR SOLUTION INTRAMUSCULAR; INTRAVENOUS at 20:35

## 2024-02-25 RX ADMIN — BUDESONIDE AND FORMOTEROL FUMARATE DIHYDRATE 2 PUFF: 160; 4.5 AEROSOL RESPIRATORY (INHALATION) at 19:24

## 2024-02-25 RX ADMIN — METHYLPREDNISOLONE SODIUM SUCCINATE 40 MG: 40 INJECTION, POWDER, FOR SOLUTION INTRAMUSCULAR; INTRAVENOUS at 09:18

## 2024-02-25 RX ADMIN — IPRATROPIUM BROMIDE 0.5 MG: 0.5 SOLUTION RESPIRATORY (INHALATION) at 00:15

## 2024-02-25 RX ADMIN — SODIUM CHLORIDE 75 ML/HR: 4.5 INJECTION, SOLUTION INTRAVENOUS at 06:03

## 2024-02-25 RX ADMIN — Medication 10 ML: at 09:18

## 2024-02-25 RX ADMIN — HYDRALAZINE HYDROCHLORIDE 10 MG: 20 INJECTION INTRAMUSCULAR; INTRAVENOUS at 06:02

## 2024-02-25 RX ADMIN — BUDESONIDE AND FORMOTEROL FUMARATE DIHYDRATE 2 PUFF: 160; 4.5 AEROSOL RESPIRATORY (INHALATION) at 07:44

## 2024-02-25 RX ADMIN — Medication 10 ML: at 23:53

## 2024-02-25 RX ADMIN — TIOTROPIUM BROMIDE INHALATION SPRAY 2 PUFF: 3.12 SPRAY, METERED RESPIRATORY (INHALATION) at 07:44

## 2024-02-25 RX ADMIN — SODIUM CHLORIDE 5 MG/HR: 900 INJECTION, SOLUTION INTRAVENOUS at 10:51

## 2024-02-25 RX ADMIN — LORAZEPAM 1 MG: 2 INJECTION INTRAMUSCULAR; INTRAVENOUS at 00:36

## 2024-02-25 RX ADMIN — FOLIC ACID 1 MG: 5 INJECTION, SOLUTION INTRAMUSCULAR; INTRAVENOUS; SUBCUTANEOUS at 16:20

## 2024-02-25 RX ADMIN — Medication 10 ML: at 23:54

## 2024-02-25 NOTE — PROGRESS NOTES
"DOS: 2024  NAME: Brenda Munroe   : 1950  PCP: Bernadette Arevalo MD  Chief Complaint   Patient presents with    Fall    Head Injury       Chief complaint: She is a lot more awake and alert compared to yesterday.  Subjective: She had gone into rapid ventricular response and had to be placed on the Cardizem drip.  She is currently off the intravenous Precedex drip.  She is establishing good eye contact and following one-step commands well.  She is able to keep her upper extremities in the air without dropping them for 10 seconds and the lower extremities in the air without dropping them for 5 seconds.  Hard to make her perform finger-to-nose or heel-to-shin testing because she tends to get distracted easily.  Not complaining of any headache so far.    Objective:  Vital signs: BP (!) 124/105   Pulse 92   Temp 97.3 °F (36.3 °C) (Axillary)   Resp 24   Ht 162.6 cm (64\")   Wt 68.3 kg (150 lb 9.2 oz)   SpO2 97%   BMI 25.85 kg/m²    Gen: NAD, vitals reviewed  MS: Improving but not back to baseline yet.  CN: Cranials 2-12: No focal deficits noted.  However she is still n.p.o. as she is not awake and alert enough to swallow things.  Her level of alertness has improved since yesterday.  Motor: Moving all extremities well.  Sensory: No sensory loss noted.  Coordination: Difficult to perform finger-to-nose coordination or heel-to-shin testing because she is not alert enough.  Gait: Could not be tested at this time.    ROS:  General: Had cardiac issues overnight which needed to be addressed with Cardizem drip.  Neurological: Has improved considerably since yesterday.    Laboratory results:  Lab Results   Component Value Date    GLUCOSE 154 (H) 2024    CALCIUM 8.9 2024     2024    K 4.6 2024    CO2 22.7 2024     2024    BUN 25 (H) 2024    CREATININE 0.97 2024    EGFRIFAFRI  2016      Comment:      <15 Indicative of kidney failure.    EGFRIFNONA " 75 03/25/2021    BCR 25.8 (H) 02/25/2024    ANIONGAP 19.3 (H) 02/25/2024     Lab Results   Component Value Date    WBC 6.16 02/25/2024    HGB 11.3 (L) 02/25/2024    HCT 40.3 02/25/2024    .3 (H) 02/25/2024     02/25/2024     Lab Results   Component Value Date    LDL 39 02/24/2024    LDL 46 01/01/2024    LDL CANCELED 10/09/2015    LDL CANCELED 10/09/2015    LDL CANCELED 10/09/2015         Lab 02/24/24  0018   HEMOGLOBIN A1C 5.00        Review of labs: The LDL is only 39.  Hemoglobin is low at 11.3 and the MCV is elevated at 103.3.  The serum folic acid level is low.     CMP:        Lab 02/25/24  0546 02/25/24  0021 02/24/24  1547 02/23/24  1739 02/23/24  1135   SODIUM 143 142 151* 148* 147*   POTASSIUM 4.6 5.2 5.4* 4.6 3.9   CHLORIDE 101 102 104 105 105   CO2 22.7 22.1 29.1* 35.8* 40.1*   ANION GAP 19.3* 17.9* 17.9* 7.2 1.9*   BUN 25* 23 21 16 17   CREATININE 0.97 0.85 0.91 0.84 0.86   EGFR 61.1 71.5 65.9 72.6 70.6   GLUCOSE 154* 135* 120* 97 100*   CALCIUM 8.9 9.1 9.1 8.9 9.1   MAGNESIUM  --   --  2.3  --   --    TOTAL PROTEIN 5.8* 5.7*  --  5.6* 5.4*   ALBUMIN 3.2* 3.0*  --  3.2* 3.3*   GLOBULIN 2.6 2.7  --  2.4 2.1   ALT (SGPT) 13 11  --  13 15   AST (SGOT) 25 28  --  19 19   BILIRUBIN 0.4 0.4  --  0.3 0.3   ALK PHOS 76 72  --  74 73        TSH          1/1/2024    10:59 1/23/2024    00:48   TSH   TSH 3.020  0.876         Lipid Panel          1/1/2024    13:04 2/24/2024    00:18   Lipid Panel   Total Cholesterol 119  99    Triglycerides 69  56    HDL Cholesterol 59  47    VLDL Cholesterol 14  13    LDL Cholesterol  46  39    LDL/HDL Ratio 0.78  0.87         Lab Results   Component Value Date    QOSYNNIF23 319 02/25/2024       Lab Results   Component Value Date    FOLATE 4.18 (L) 02/25/2024       Lab Results   Component Value Date    HGBA1C 5.00 02/24/2024           Review and interpretation of imaging: MRI FINDINGS: The patient had some difficulty holding still for the  exam. The images are somewhat  degraded by motion artifact. The  ventricular system and subarachnoid space showed blood changes of  diffuse generalized cerebral atrophy with white matter disease involving  both of the cerebral hemispheres. No focal brain parenchymal  abnormalities were demonstrated. The post contrasted images showed no  areas of breakdown of the blood-brain barrier or abnormal enhancement  within the brain parenchyma. No ischemic areas were demonstrated. There  was no evidence of subdural or epidural hematoma.     IMPRESSION:  Moderate amount of generalized white matter disease is noted  involving both cerebral hemispheres. This can be seen with vasculitis or  a type of collagen vascular disease. Clinical correlation is suggested.  No focal brain parenchymal abnormalities are demonstrated.    CT Head Without Contrast    Result Date: 2/24/2024  CT brain without contrast compared to examination dated 1/31/2024.  Severe atrophy and small vessel disease. There is hemorrhage in the LEFT occipital horn with focal surrounding edema.  This appears to be new from prior study. No shift of midline structures. Large right-sided scalp hematoma. Opacified RIGHT maxillary sinus, new from prior, likely related to presence of blood or retained secretions. No skull fracture.      Impression: Intraventricular hemorrhage noted in the LEFT occipital lobe, new from prior.   This report was finalized on 2/24/2024 9:50 AM by Dr. Loreto Jo MD.      CT Head Without Contrast    Result Date: 2/23/2024  PROCEDURE: CT HEAD WO CONTRAST-, CT CERVICAL SPINE WO CONTRAST-  HISTORY: Unwitnessed fall gross external trauma blood thinners  PROCEDURE: Axial images were obtained from the skull base to the thoracic inlet by computed tomography. Multiple axial CT images were performed from the foramen magnum to the vertex without enhancement. This study was performed with techniques to keep radiation doses as low as reasonably achievable (ALARA). Individualized dose  reduction techniques using automated exposure control or adjustment of mA and/or kV according to the patient size were employed.   C-SPINE:  FINDINGS: There are no acute fractures. There are diffuse degenerative changes with loss of disc base height and facet arthropathy.  The prevertebral soft tissues are normal.  Limited images of the lung apices are unremarkable.  HEAD CT:  FINDINGS: There is generalized cerebral volume loss. Patchy hypodensities in the periventricular white matter consistent with chronic small vessel ischemic change. There is a subdural hematoma overlying the left frontal lobe layering along the falx measuring up to 4 mm. There is no mass, mass effect or midline shift.  There is no hydrocephalus. The paranasal sinuses are clear. Bone windows reveal no acute osseous abnormalities. Note is made of a right periorbital hematoma.      Impression: 1. 4 mm left parafalcine subdural hematoma. 2. No acute fracture or malalignment in the cervical spine.     Critical Finding  Critical report results have been called to Dr. Moran. Report called at 2/23/2024 11:33 AM.        This report was finalized on 2/23/2024 11:33 AM by Lily Mullen M.D..              Workup to date: An EEG performed on January 24, 2024 showed the following:     Findings:     The patient is awake but confused.  The background shows diffuse medium amplitude 2-5 Hz intermixed delta and theta activity which is present symmetrically over both hemispheres.  A clear posterior rhythm is not seen.  EMG artifact is variably prominent over the frontotemporal leads.  The patient is occasionally restless and moaning during the study.  No focal features or epileptiform activity are present.  Photic stimulation does not change the background.  Hyperventilation is not performed.     Video: Available     Technical quality: Superior     EKG: Irregular, 70-90 bpm     SUMMARY:     Moderate generalized slow     No focal features or epileptiform  activity are seen     IMPRESSION:     Diffuse cerebral dysfunction of moderate degree, nonspecific     No evidence for epilepsy is seen    Results for orders placed during the hospital encounter of 02/23/24    Adult Transthoracic Echo Complete W/ Cont if Necessary Per Protocol    Interpretation Summary    Left ventricular systolic function is moderately decreased. Left ventricular ejection fraction appears to be 41 - 45%.    Left ventricular diastolic function was indeterminate.    Left atrial volume is mildly increased.    There is mild calcification of the aortic valve.    Estimated right ventricular systolic pressure from tricuspid regurgitation is mildly elevated (35-45 mmHg).    There is no evidence of pericardial effusion         Diagnoses: Patient with subdural hemorrhage in the Sub Falcine area currently improving.      Comment: After the Precedex drip has been stopped she is a lot more awake and alert.    Plan:  1.  To get an MRI of the brain with and without contrast once she is able to participate with the procedure.  2.  An EEG for tomorrow morning.  3.  Continue the low-dose Vimpat as before.  4.  Replace the folic acid with intravenous form as she is still not taking anything by mouth.    Discussed with ICU nurse and she will be followed up by our inpatient teleneurology service along with PAULA Jonas tomorrow    Spent a total of 30 minutes in face-to-face evaluation and management of the patient using the dedicated telemedicine device without any interruption with the help of the rounding nurse with the patient located at the [facility] and myself at a remote location.    Electronically signed by Mery Randle MD, 02/25/24, 1:37 PM EST.

## 2024-02-25 NOTE — PLAN OF CARE
Problem: Noninvasive Ventilation Acute  Goal: Effective Unassisted Ventilation and Oxygenation  Outcome: Ongoing, Progressing     Problem: Skin Injury Risk Increased  Goal: Skin Health and Integrity  Outcome: Ongoing, Progressing  Intervention: Optimize Skin Protection  Description: Perform a full pressure injury risk assessment, as indicated by screening, upon admission to care unit.  Reassess skin (injury risk, full inspection) frequently (e.g., scheduled interval, with change in condition) to provide optimal early detection and prevention.  Maintain adequate tissue perfusion (e.g., encourage fluid balance; avoid crossing legs, constrictive clothing or devices) to promote tissue oxygenation.  Maintain head of bed at lowest degree of elevation tolerated, considering medical condition and other restrictions.  Avoid positioning onto an area that remains reddened.  Minimize incontinence and moisture (e.g., toileting schedule; moisture-wicking pad, diaper or incontinence collection device; skin moisture barrier).  Cleanse skin promptly and gently when soiled utilizing a pH-balanced cleanser.  Relieve and redistribute pressure (e.g., scheduled position changes, weight shifts, use of support surface, medical device repositioning, protective dressing application, use of positioning device, microclimate control, use of pressure-injury-monitor  Encourage increased activity, such as sitting in a chair at the bedside or early mobilization, when able to tolerate.  Recent Flowsheet Documentation  Taken 2/25/2024 1400 by Remy Jade RN  Head of Bed (HOB) Positioning: HOB elevated  Taken 2/25/2024 1200 by Remy Jade RN  Head of Bed (HOB) Positioning: HOB elevated  Taken 2/25/2024 1000 by Remy Jade RN  Head of Bed (HOB) Positioning: HOB elevated  Taken 2/25/2024 0800 by Remy Jade, RN  Pressure Reduction Techniques:   weight shift assistance provided   pressure points protected   positioned off  wounds   heels elevated off bed  Head of Bed (HOB) Positioning: HOB at 20-30 degrees  Pressure Reduction Devices: positioning supports utilized  Skin Protection: adhesive use limited     Problem: Fall Injury Risk  Goal: Absence of Fall and Fall-Related Injury  Outcome: Ongoing, Progressing  Intervention: Identify and Manage Contributors  Description: Develop a fall prevention plan with the patient and caregiver/family.  Provide reorientation, appropriate sensory stimulation and routines with changes in mental status to decrease risk of fall.  Promote use of personal vision and auditory aids.  Assess assistance level required for safe and effective self-care; provide support as needed, such as toileting, mobilization. For age 65 and older, implement timed toileting with assistance.  Encourage physical activity, such as performance of mobility and self-care at highest level of patient ability, multicomponent exercise program and provision of appropriate assistive devices.  If fall occurs, assess the severity of injury; implement fall injury protocol. Determine the cause and revise fall injury prevention plan.  Regularly review medication contribution to fall risk; adjust medication administration times to minimize risk of falling.  Consider risk related to polypharmacy and age.  Balance adequate pain management with potential for oversedation.  Recent Flowsheet Documentation  Taken 2/25/2024 0800 by Remy Jade, RN  Medication Review/Management: medications reviewed  Intervention: Promote Injury-Free Environment  Description: Provide a safe, barrier-free environment that encourages independent activity.  Keep care area uncluttered and well-lighted.  Determine need for increased observation or monitoring.  Avoid use of devices that minimize mobility, such as restraints or indwelling urinary catheter.  Recent Flowsheet Documentation  Taken 2/25/2024 1500 by Remy Jade, RN  Safety Promotion/Fall  Prevention:   safety round/check completed   fall prevention program maintained  Taken 2/25/2024 1400 by Remy Jade, RN  Safety Promotion/Fall Prevention:   safety round/check completed   fall prevention program maintained  Taken 2/25/2024 1300 by Remy Jade RN  Safety Promotion/Fall Prevention:   safety round/check completed   fall prevention program maintained  Taken 2/25/2024 1200 by Remy Jade RN  Safety Promotion/Fall Prevention:   safety round/check completed   fall prevention program maintained  Taken 2/25/2024 1100 by Remy Jade RN  Safety Promotion/Fall Prevention:   safety round/check completed   fall prevention program maintained  Taken 2/25/2024 1000 by Remy Jade RN  Safety Promotion/Fall Prevention:   safety round/check completed   fall prevention program maintained  Taken 2/25/2024 0900 by Remy Jade RN  Safety Promotion/Fall Prevention:   safety round/check completed   fall prevention program maintained  Taken 2/25/2024 0800 by Remy Jade RN  Safety Promotion/Fall Prevention:   safety round/check completed   fall prevention program maintained  Taken 2/25/2024 0700 by Remy Jade RN  Safety Promotion/Fall Prevention:   safety round/check completed   fall prevention program maintained   Goal Outcome Evaluation:  Plan of Care Reviewed With: patient        Progress: improving  Outcome Evaluation: Patient is slowly improving and does respond to some command and answer yes or no to simple questions.

## 2024-02-25 NOTE — PLAN OF CARE
Goal Outcome Evaluation:  Plan of Care Reviewed With: patient        Progress: no change         Problem: Noninvasive Ventilation Acute  Goal: Effective Unassisted Ventilation and Oxygenation  Outcome: Ongoing, Progressing     Problem: Skin Injury Risk Increased  Goal: Skin Health and Integrity  Outcome: Ongoing, Progressing  Intervention: Optimize Skin Protection  Recent Flowsheet Documentation  Taken 2/25/2024 0300 by Stefany Ta RN  Head of Bed ([facility]) Positioning: HOB elevated  Taken 2/25/2024 0200 by Stefany Ta RN  Pressure Reduction Techniques:   weight shift assistance provided   pressure points protected   heels elevated off bed  Head of Bed (HOB) Positioning: HOB elevated  Pressure Reduction Devices:   pressure-redistributing mattress utilized   positioning supports utilized   heel offloading device utilized  Skin Protection:   tubing/devices free from skin contact   adhesive use limited  Taken 2/25/2024 0000 by Stefany Ta RN  Head of Bed ([facility]) Positioning: HOB elevated  Taken 2/24/2024 2200 by Stefany Ta RN  Head of Bed ([facility]) Positioning: HOB elevated  Taken 2/24/2024 2000 by Stefany Ta RN  Pressure Reduction Techniques:   weight shift assistance provided   pressure points protected   heels elevated off bed  Head of Bed (HOB) Positioning: HOB elevated  Pressure Reduction Devices:   pressure-redistributing mattress utilized   positioning supports utilized   heel offloading device utilized  Skin Protection:   tubing/devices free from skin contact   adhesive use limited     Problem: Fall Injury Risk  Goal: Absence of Fall and Fall-Related Injury  Outcome: Ongoing, Progressing  Intervention: Identify and Manage Contributors  Recent Flowsheet Documentation  Taken 2/25/2024 0300 by Stefany Ta RN  Medication Review/Management: medications reviewed  Taken 2/25/2024 0200 by Stefany Ta RN  Medication Review/Management: medications reviewed  Taken 2/25/2024 0100 by Stefany Ta  RN  Medication Review/Management: medications reviewed  Taken 2/25/2024 0000 by Stefany Ta RN  Medication Review/Management: medications reviewed  Taken 2/24/2024 2300 by Stefany Ta RN  Medication Review/Management: medications reviewed  Taken 2/24/2024 2200 by Stefany Ta RN  Medication Review/Management: medications reviewed  Taken 2/24/2024 2100 by Stefany Ta RN  Medication Review/Management: medications reviewed  Taken 2/24/2024 2000 by Stefany Ta RN  Medication Review/Management: medications reviewed  Taken 2/24/2024 1900 by Stefany Ta RN  Medication Review/Management: medications reviewed  Intervention: Promote Injury-Free Environment  Recent Flowsheet Documentation  Taken 2/25/2024 0300 by Stefany Ta RN  Safety Promotion/Fall Prevention:   safety round/check completed   fall prevention program maintained  Taken 2/25/2024 0200 by Stefany Ta RN  Safety Promotion/Fall Prevention:   safety round/check completed   fall prevention program maintained  Taken 2/25/2024 0100 by Stefany Ta RN  Safety Promotion/Fall Prevention:   safety round/check completed   fall prevention program maintained  Taken 2/25/2024 0000 by Stefany Ta RN  Safety Promotion/Fall Prevention:   safety round/check completed   fall prevention program maintained  Taken 2/24/2024 2300 by Stefany Ta RN  Safety Promotion/Fall Prevention:   safety round/check completed   fall prevention program maintained  Taken 2/24/2024 2200 by Stefany Ta RN  Safety Promotion/Fall Prevention:   safety round/check completed   fall prevention program maintained  Taken 2/24/2024 2100 by Stefany Ta RN  Safety Promotion/Fall Prevention:   safety round/check completed   fall prevention program maintained  Taken 2/24/2024 2000 by Stefany Ta RN  Safety Promotion/Fall Prevention:   safety round/check completed   fall prevention program maintained  Taken 2/24/2024 1900 by Stefany Ta RN  Safety  Promotion/Fall Prevention:   safety round/check completed   fall prevention program maintained     Problem: Adult Inpatient Plan of Care  Goal: Plan of Care Review  Outcome: Ongoing, Progressing  Flowsheets (Taken 2/25/2024 0337)  Progress: no change  Plan of Care Reviewed With: patient  Goal: Patient-Specific Goal (Individualized)  Outcome: Ongoing, Progressing  Goal: Absence of Hospital-Acquired Illness or Injury  Outcome: Ongoing, Progressing  Intervention: Identify and Manage Fall Risk  Recent Flowsheet Documentation  Taken 2/25/2024 0300 by Stefany Ta RN  Safety Promotion/Fall Prevention:   safety round/check completed   fall prevention program maintained  Taken 2/25/2024 0200 by Stefany Ta RN  Safety Promotion/Fall Prevention:   safety round/check completed   fall prevention program maintained  Taken 2/25/2024 0100 by Stefany Ta RN  Safety Promotion/Fall Prevention:   safety round/check completed   fall prevention program maintained  Taken 2/25/2024 0000 by Stefany Ta RN  Safety Promotion/Fall Prevention:   safety round/check completed   fall prevention program maintained  Taken 2/24/2024 2300 by Stefany Ta RN  Safety Promotion/Fall Prevention:   safety round/check completed   fall prevention program maintained  Taken 2/24/2024 2200 by Stefany Ta RN  Safety Promotion/Fall Prevention:   safety round/check completed   fall prevention program maintained  Taken 2/24/2024 2100 by Stefany Ta RN  Safety Promotion/Fall Prevention:   safety round/check completed   fall prevention program maintained  Taken 2/24/2024 2000 by Stefany Ta RN  Safety Promotion/Fall Prevention:   safety round/check completed   fall prevention program maintained  Taken 2/24/2024 1900 by Stefany Ta RN  Safety Promotion/Fall Prevention:   safety round/check completed   fall prevention program maintained  Intervention: Prevent Skin Injury  Recent Flowsheet Documentation  Taken 2/25/2024 0300 by Keyon  JOSE Mccall  Body Position:   turned   left  Taken 2/25/2024 0200 by Stefany Ta RN  Body Position:   turned   right  Skin Protection:   tubing/devices free from skin contact   adhesive use limited  Taken 2/25/2024 0000 by Stefany Ta RN  Body Position:   turned   left  Taken 2/24/2024 2200 by Stefany Ta RN  Body Position:   turned   right  Taken 2/24/2024 2000 by Stefany Ta RN  Body Position:   turned   left  Skin Protection:   tubing/devices free from skin contact   adhesive use limited  Intervention: Prevent and Manage VTE (Venous Thromboembolism) Risk  Recent Flowsheet Documentation  Taken 2/24/2024 2000 by Stefany Ta RN  Activity Management: bedrest  Range of Motion: ROM (range of motion) performed  Goal: Optimal Comfort and Wellbeing  Outcome: Ongoing, Progressing  Goal: Readiness for Transition of Care  Outcome: Ongoing, Progressing

## 2024-02-25 NOTE — PROGRESS NOTES
[facility] HOSPITALIST PROGRESS NOTE     Patient Identification:  Name:  Brenda Munroe  Age:  73 y.o.  Sex:  female  :  1950  MRN:  [id number]  Visit Number:  [id number]  ROOM: [address]     Primary Care Provider:  Bernadette Arevalo MD    Length of stay in inpatient status:  2    Subjective     Chief Compliant:    Chief Complaint   Patient presents with    Fall    Head Injury       History of Presenting Illness:    Patient drowsy this AM. Precedex has been stopped. Patient did receive PRN IV ativan overnight for agitation. She was able to wake up and look at me, track with eyes, follow commands but not consistently. Moving all extremities. No family bedside. Currently on BIPAP and had been on BIPAP on and off all night. O2 sensor was not picking up well through thick nail polish, recommended nursing staff try to place sensor on ear or nose to see if we can get an accurate pleth.     ROS:  Otherwise 10 point ROS negative other than documented above in HPI.     Objective     Current Hospital Meds:baclofen, 5 mg, Oral, BID  budesonide-formoterol, 2 puff, Inhalation, BID - RT   And  tiotropium bromide monohydrate, 2 puff, Inhalation, Daily - RT  [START ON 3/1/2024] cholecalciferol, 50,000 Units, Oral, Weekly  donepezil, 10 mg, Oral, Nightly  ferrous sulfate, 325 mg, Oral, BID With Meals  ipratropium, 0.5 mg, Nebulization, 4x Daily - RT  isosorbide mononitrate, 15 mg, Oral, Q24H  Lacosamide, 50 mg, Intravenous, Q12H  lamoTRIgine, 100 mg, Oral, Daily  lamoTRIgine, 25 mg, Oral, Nightly  levothyroxine, 75 mcg, Oral, Q AM  memantine, 10 mg, Oral, Q12H  methylPREDNISolone sodium succinate, 40 mg, Intravenous, Q12H  pantoprazole, 40 mg, Oral, QAM AC  risperiDONE, 0.5 mg, Oral, BID  rosuvastatin, 40 mg, Oral, Q PM  senna-docusate sodium, 2 tablet, Oral, BID  sertraline, 100 mg, Oral, Daily  sodium chloride, 1,000 mL, Intravenous, Once  sodium chloride, 10 mL, Intravenous, Q12H  sodium chloride, 10 mL,  Intravenous, Q12H    dexmedetomidine, 0.2-1.5 mcg/kg/hr, Last Rate: Stopped (02/25/24 0508)  phenylephrine, 0.5-3 mcg/kg/min  sodium chloride, 75 mL/hr, Last Rate: 75 mL/hr (02/25/24 0603)        Current Antimicrobial Therapy:  Anti-Infectives (From admission, onward)      None          Current Diuretic Therapy:  Diuretics (From admission, onward)      None          ----------------------------------------------------------------------------------------------------------------------  Vital Signs:  Temp:  [97.3 °F (36.3 °C)-98.4 °F (36.9 °C)] 97.3 °F (36.3 °C)  Heart Rate:  [] 94  Resp:  [20-32] 23  BP: (100-165)/() 121/95  SpO2:  [64 %-100 %] 92 %  on  Flow (L/min):  [1-2] 1;   Device (Oxygen Therapy): NPPV/NIV  Body mass index is 25.85 kg/m².    Wt Readings from Last 3 Encounters:   02/24/24 68.3 kg (150 lb 9.2 oz)   02/11/24 68.5 kg (151 lb)   02/02/24 68.9 kg (151 lb 14.4 oz)     Intake & Output (last 3 days)         02/22 0701 02/23 0700 02/23 0701 02/24 0700 02/24 0701 02/25 0700 02/25 0701 02/26 0700    I.V. (mL/kg)  195.6 (2.9)      Total Intake(mL/kg)  195.6 (2.9)      Net  +195.6              Urine Unmeasured Occurrence  2 x 2 x     Stool Unmeasured Occurrence  0 x      Emesis Unmeasured Occurrence  0 x            NPO Diet NPO Type: Strict NPO, Ice Chips  ----------------------------------------------------------------------------------------------------------------------  Physical exam:  Constitutional:  Chronically ill appearing.   HENT:  Head:  Normocephalic and atraumatic.  Mouth:  Moist mucous membranes.    Eyes:  Conjunctivae and EOM are normal. No scleral icterus.    Neck:  Neck supple.  No JVD present.    Cardiovascular:  Normal rate, regular rhythm and normal heart sounds with no murmur.  Pulmonary/Chest:  No respiratory distress, no wheezes, no crackles, with normal breath sounds and good air movement on BiPAP.  Abdominal:  Soft.  Bowel sounds are normal.  No distension and no  "tenderness.   Musculoskeletal:  No edema, no tenderness, and no deformity.  No red or swollen joints anywhere.    Neurological:  Alert but not oriented. Tracks with eyes and moving all extremities.   Skin:  Skin is warm and dry. No rash noted. No pallor.   Peripheral vascular:  Pulses in all 4 extremities with no clubbing, no cyanosis, no edema.  ----------------------------------------------------------------------------------------------------------------------  Tele:    ----------------------------------------------------------------------------------------------------------------------  Results from last 7 days   Lab Units 02/25/24  0546 02/25/24  0021 02/23/24  1739 02/23/24  1135   CRP mg/dL  --   --   --  0.42   LACTATE mmol/L  --   --   --  1.0   WBC 10*3/mm3 6.16 9.37 5.47 5.33   HEMOGLOBIN g/dL 11.3* 11.5* 10.6* 11.1*   HEMATOCRIT % 40.3 39.4 37.0 39.0   MCV fL 103.3* 101.5* 100.8* 100.8*   MCHC g/dL 28.0* 29.2* 28.6* 28.5*   PLATELETS 10*3/mm3 243 260 225 233   INR   --   --   --  1.50*     Results from last 7 days   Lab Units 02/24/24  0812   PH, ARTERIAL pH units 7.460*   PO2 ART mm Hg 61.0*   PCO2, ARTERIAL mm Hg 51.7*   HCO3 ART mmol/L 36.7*     Results from last 7 days   Lab Units 02/25/24  0546 02/25/24  0021 02/24/24  1547 02/23/24  1739   SODIUM mmol/L 143 142 151* 148*   POTASSIUM mmol/L 4.6 5.2 5.4* 4.6   MAGNESIUM mg/dL  --   --  2.3  --    CHLORIDE mmol/L 101 102 104 105   CO2 mmol/L 22.7 22.1 29.1* 35.8*   BUN mg/dL 25* 23 21 16   CREATININE mg/dL 0.97 0.85 0.91 0.84   CALCIUM mg/dL 8.9 9.1 9.1 8.9   GLUCOSE mg/dL 154* 135* 120* 97   ALBUMIN g/dL 3.2* 3.0*  --  3.2*   BILIRUBIN mg/dL 0.4 0.4  --  0.3   ALK PHOS U/L 76 72  --  74   AST (SGOT) U/L 25 28  --  19   ALT (SGPT) U/L 13 11  --  13   Estimated Creatinine Clearance: 47.5 mL/min (by C-G formula based on SCr of 0.97 mg/dL).  No results found for: \"AMMONIA\"  Results from last 7 days   Lab Units 02/23/24  1340 02/23/24  1135   CK TOTAL U/L " "89  --    HSTROP T ng/L 452* 474*         Results from last 7 days   Lab Units 02/24/24  0018   CHOLESTEROL mg/dL 99   TRIGLYCERIDES mg/dL 56   HDL CHOL mg/dL 47   LDL CHOL mg/dL 39     Hemoglobin A1C   Date/Time Value Ref Range Status   02/24/2024 0018 5.00 4.80 - 5.60 % Final     Glucose   Date/Time Value Ref Range Status   02/24/2024 0138 111 70 - 130 mg/dL Final   02/23/2024 1055 79 70 - 130 mg/dL Final     Lab Results   Component Value Date    TSH 0.876 01/23/2024    FREET4 1.59 03/25/2021     No results found for: \"PREGTESTUR\", \"PREGSERUM\", \"HCG\", \"HCGQUANT\"  Pain Management Panel  More data exists         Latest Ref Rng & Units 2/23/2024 2/11/2024   Pain Management Panel   Amphetamine, Urine Qual Negative Negative  Negative    Barbiturates Screen, Urine Negative Negative  Negative    Benzodiazepine Screen, Urine Negative Positive  Positive    Buprenorphine, Screen, Urine Negative Negative  Negative    Cocaine Screen, Urine Negative Negative  Negative    Fentanyl, Urine Negative Negative  Negative    Methadone Screen , Urine Negative Negative  Negative    Methamphetamine, Ur Negative Negative  Negative      Brief Urine Lab Results  (Last result in the past 365 days)        Color   Clarity   Blood   Leuk Est   Nitrite   Protein   CREAT   Urine HCG        02/23/24 1147 Dark Yellow   Cloudy   Moderate (2+)   Small (1+)   Negative   100 mg/dL (2+)                 No results found for: \"BLOODCX\"  Urine Culture   Date Value Ref Range Status   02/23/2024 <25,000 CFU/mL Mixed Angie Isolated  Final     No results found for: \"WOUNDCX\"  No results found for: \"STOOLCX\"  No results found for: \"RESPCX\"  No results found for: \"AFBCX\"  Results from last 7 days   Lab Units 02/23/24  1135   LACTATE mmol/L 1.0   SED RATE mm/hr 8   CRP mg/dL 0.42       I have personally looked at the labs and they are summarized " above.  ----------------------------------------------------------------------------------------------------------------------  Detailed radiology reports for the last 24 hours:    Imaging Results (Last 24 Hours)       Procedure Component Value Units Date/Time    CT Head Without Contrast [[id number]] Collected: 02/24/24 0942     Updated: 02/24/24 0952    Narrative:      CT brain without contrast compared to examination dated 1/31/2024.     Severe atrophy and small vessel disease.  There is hemorrhage in the LEFT occipital horn with focal surrounding  edema.  This appears to be new from prior study.  No shift of midline structures.  Large right-sided scalp hematoma.  Opacified RIGHT maxillary sinus, new from prior, likely related to  presence of blood or retained secretions.  No skull fracture.       Impression:      Intraventricular hemorrhage noted in the LEFT occipital lobe, new from  prior.        This report was finalized on 2/24/2024 9:50 AM by Dr. Loreto Jo MD.             Assessment & Plan    #Traumatic left parafalcine 4 mm subdural   #Nasal fracture   #Home anticoagulation with apixiban  - CT on admission revealed 4 mm left parafalcine subdural hematoma   - Neurology and neurosurgery evaluated the patient in the ED   - Neurology recommended to start keppra and consult neurosurgery.   - Neurosurgery noted very unlikely to progress in size to clinically significant bleed and recommended to hold reversing anticoagulation and keep patient at our facility. Repeat CT head 2/24 did ot appear much different on my or neurosurgery evaluation.  - Admitted to CCU for close monitoring and frequent neuro checks  - Tele neurology to continue to follow. Appreciate recs. MRI pending.    - MRI brain pending. Continue to hold anticoagulation for now. Neurology recommending pradaxa upon restart.      #NSTEMI  - HS troponin was also notably elevated on arrival at 474 with repeat at 452. No significant changes appreciated on  EKG.   - Stress test on 2/1/24 with no evidence of ischemia.   - No clear cause at this juncture, but lean toward type II event  - Repeated echo revealed LVEF dropped to 41-45%.   - Consulted cardiology. Hold anticoagulation and antiplatelets for now given subdural hematoma discussed above.      #Acute hypercapnic respiratory failure   #Mild COPD exacerbation   - Recurrent issue the last year. Suspect exacerbated by elevated oxygen levels on 4L NC.   - Placed on BiPAP, was able to find and use mask without nasal component to help prevent pain  - SpO2 goal 88-92, PRN BIPAP   - Started solumedrol, nebs for possible COPD exacerbation.   - Using precedex PRN, but restarting home benzo in PRN IV form.     #Acute metabolic encephalopathy   #Dementia w/ behavioral disturbance  - Suspect 2/2 above issues in setting of significant dementia. Has been an issue in previous admissions as well.   - As above, correcting metabolic causes and using precedex gtt if needed, but restarting home benzo in PRN IV form.      #pAfib f/ RVR  - Cardiology consulted as above.   - Hold anticoagulation for now as above  - Rate control complicated by NPO status. PRN IV BB, or CCB for rate control.     #Hypertensive urgency   - Better controlled this 's/80's after IV dose of labetalol and hydralazine this AM.     #Oliguria  - Given no oral intake, will start on mIVFs     Chronic medical problems:  MDD  COPD- Hx of respiratory acidosis, will get ABG to make sure not cause of AMS  Thyroid disease  Bilateral carotid stenosis  Dementia  GERD  PUD     F: NPO pending SLP, Will start mIVF with 75 cc/hr 1/2NS.   A: PRN  S: None  T: SCDs  H: HOB elevated  U: PRN  G: PRN  S: N/A  B: PRN  I: PIV  D: None     Code status: GOC discussion with son on 2/24. DNR/DNI.      Dispo:Continue CCU level of care        VTE Prophylaxis:   Mechanical Order History:        Ordered        02/23/24 1523  Place Sequential Compression Device  Once            02/23/24 1523   Maintain Sequential Compression Device  Continuous                          Pharmalogical Order History:       None              Efren Dunham MD  [facility]  02/25/24  07:30 EST

## 2024-02-26 ENCOUNTER — APPOINTMENT (OUTPATIENT)
Dept: GENERAL RADIOLOGY | Facility: HOSPITAL | Age: 74
DRG: 082 | End: 2024-02-26
Payer: MEDICARE

## 2024-02-26 ENCOUNTER — APPOINTMENT (OUTPATIENT)
Dept: RESPIRATORY THERAPY | Facility: HOSPITAL | Age: 74
DRG: 082 | End: 2024-02-26
Payer: MEDICARE

## 2024-02-26 LAB
ACANTHOCYTES BLD QL SMEAR: NORMAL
ALBUMIN SERPL-MCNC: 3.1 G/DL (ref 3.5–5.2)
ALBUMIN/GLOB SERPL: 1.4 G/DL
ALP SERPL-CCNC: 65 U/L (ref 39–117)
ALT SERPL W P-5'-P-CCNC: 10 U/L (ref 1–33)
ANION GAP SERPL CALCULATED.3IONS-SCNC: 12.4 MMOL/L (ref 5–15)
ANISOCYTOSIS BLD QL: NORMAL
AST SERPL-CCNC: 16 U/L (ref 1–32)
BASOPHILS # BLD AUTO: 0.03 10*3/MM3 (ref 0–0.2)
BASOPHILS NFR BLD AUTO: 0.3 % (ref 0–1.5)
BILIRUB SERPL-MCNC: 0.4 MG/DL (ref 0–1.2)
BUN SERPL-MCNC: 21 MG/DL (ref 8–23)
BUN/CREAT SERPL: 27.3 (ref 7–25)
CALCIUM SPEC-SCNC: 8.6 MG/DL (ref 8.6–10.5)
CHLORIDE SERPL-SCNC: 105 MMOL/L (ref 98–107)
CO2 SERPL-SCNC: 31.6 MMOL/L (ref 22–29)
CREAT SERPL-MCNC: 0.77 MG/DL (ref 0.57–1)
DEPRECATED RDW RBC AUTO: 77 FL (ref 37–54)
EGFRCR SERPLBLD CKD-EPI 2021: 80.6 ML/MIN/1.73
EOSINOPHIL # BLD AUTO: 0 10*3/MM3 (ref 0–0.4)
EOSINOPHIL NFR BLD AUTO: 0 % (ref 0.3–6.2)
ERYTHROCYTE [DISTWIDTH] IN BLOOD BY AUTOMATED COUNT: 21.2 % (ref 12.3–15.4)
GLOBULIN UR ELPH-MCNC: 2.2 GM/DL
GLUCOSE SERPL-MCNC: 105 MG/DL (ref 65–99)
HCT VFR BLD AUTO: 33.3 % (ref 34–46.6)
HGB BLD-MCNC: 9.8 G/DL (ref 12–15.9)
IMM GRANULOCYTES # BLD AUTO: 0.12 10*3/MM3 (ref 0–0.05)
IMM GRANULOCYTES NFR BLD AUTO: 1.3 % (ref 0–0.5)
LYMPHOCYTES # BLD AUTO: 0.77 10*3/MM3 (ref 0.7–3.1)
LYMPHOCYTES NFR BLD AUTO: 8.2 % (ref 19.6–45.3)
MACROCYTES BLD QL SMEAR: NORMAL
MAGNESIUM SERPL-MCNC: 2.1 MG/DL (ref 1.6–2.4)
MCH RBC QN AUTO: 29.3 PG (ref 26.6–33)
MCHC RBC AUTO-ENTMCNC: 29.4 G/DL (ref 31.5–35.7)
MCV RBC AUTO: 99.7 FL (ref 79–97)
MONOCYTES # BLD AUTO: 0.63 10*3/MM3 (ref 0.1–0.9)
MONOCYTES NFR BLD AUTO: 6.7 % (ref 5–12)
NEUTROPHILS NFR BLD AUTO: 7.85 10*3/MM3 (ref 1.7–7)
NEUTROPHILS NFR BLD AUTO: 83.5 % (ref 42.7–76)
NRBC BLD AUTO-RTO: 0 /100 WBC (ref 0–0.2)
PLATELET # BLD AUTO: 250 10*3/MM3 (ref 140–450)
PMV BLD AUTO: 9.7 FL (ref 6–12)
POTASSIUM SERPL-SCNC: 3.8 MMOL/L (ref 3.5–5.2)
PROT SERPL-MCNC: 5.3 G/DL (ref 6–8.5)
RBC # BLD AUTO: 3.34 10*6/MM3 (ref 3.77–5.28)
SMALL PLATELETS BLD QL SMEAR: ADEQUATE
SODIUM SERPL-SCNC: 149 MMOL/L (ref 136–145)
WBC NRBC COR # BLD AUTO: 9.4 10*3/MM3 (ref 3.4–10.8)

## 2024-02-26 PROCEDURE — 85007 BL SMEAR W/DIFF WBC COUNT: CPT | Performed by: INTERNAL MEDICINE

## 2024-02-26 PROCEDURE — 85025 COMPLETE CBC W/AUTO DIFF WBC: CPT | Performed by: INTERNAL MEDICINE

## 2024-02-26 PROCEDURE — C9254 INJECTION, LACOSAMIDE: HCPCS

## 2024-02-26 PROCEDURE — 94761 N-INVAS EAR/PLS OXIMETRY MLT: CPT

## 2024-02-26 PROCEDURE — 94799 UNLISTED PULMONARY SVC/PX: CPT

## 2024-02-26 PROCEDURE — 74230 X-RAY XM SWLNG FUNCJ C+: CPT

## 2024-02-26 PROCEDURE — 99233 SBSQ HOSP IP/OBS HIGH 50: CPT | Performed by: NURSE PRACTITIONER

## 2024-02-26 PROCEDURE — 25010000002 METHYLPREDNISOLONE PER 40 MG: Performed by: INTERNAL MEDICINE

## 2024-02-26 PROCEDURE — 97166 OT EVAL MOD COMPLEX 45 MIN: CPT

## 2024-02-26 PROCEDURE — 94660 CPAP INITIATION&MGMT: CPT

## 2024-02-26 PROCEDURE — 97162 PT EVAL MOD COMPLEX 30 MIN: CPT

## 2024-02-26 PROCEDURE — 99233 SBSQ HOSP IP/OBS HIGH 50: CPT | Performed by: INTERNAL MEDICINE

## 2024-02-26 PROCEDURE — 74230 X-RAY XM SWLNG FUNCJ C+: CPT | Performed by: RADIOLOGY

## 2024-02-26 PROCEDURE — 83735 ASSAY OF MAGNESIUM: CPT | Performed by: INTERNAL MEDICINE

## 2024-02-26 PROCEDURE — 25010000002 LACOSAMIDE 200 MG/20ML SOLUTION 20 ML VIAL

## 2024-02-26 PROCEDURE — 94664 DEMO&/EVAL PT USE INHALER: CPT

## 2024-02-26 PROCEDURE — 99232 SBSQ HOSP IP/OBS MODERATE 35: CPT | Performed by: INTERNAL MEDICINE

## 2024-02-26 PROCEDURE — 95819 EEG AWAKE AND ASLEEP: CPT | Performed by: PSYCHIATRY & NEUROLOGY

## 2024-02-26 PROCEDURE — 80053 COMPREHEN METABOLIC PANEL: CPT | Performed by: INTERNAL MEDICINE

## 2024-02-26 PROCEDURE — 95819 EEG AWAKE AND ASLEEP: CPT

## 2024-02-26 PROCEDURE — 92611 MOTION FLUOROSCOPY/SWALLOW: CPT

## 2024-02-26 PROCEDURE — C1751 CATH, INF, PER/CENT/MIDLINE: HCPCS

## 2024-02-26 PROCEDURE — 92610 EVALUATE SWALLOWING FUNCTION: CPT

## 2024-02-26 PROCEDURE — 36410 VNPNXR 3YR/> PHY/QHP DX/THER: CPT

## 2024-02-26 RX ORDER — SODIUM CHLORIDE 0.9 % (FLUSH) 0.9 %
10 SYRINGE (ML) INJECTION EVERY 12 HOURS SCHEDULED
Status: DISCONTINUED | OUTPATIENT
Start: 2024-02-26 | End: 2024-03-09 | Stop reason: HOSPADM

## 2024-02-26 RX ORDER — AMIODARONE HYDROCHLORIDE 200 MG/1
200 TABLET ORAL 2 TIMES DAILY WITH MEALS
Status: DISCONTINUED | OUTPATIENT
Start: 2024-02-26 | End: 2024-02-28

## 2024-02-26 RX ORDER — SODIUM CHLORIDE 0.9 % (FLUSH) 0.9 %
10 SYRINGE (ML) INJECTION AS NEEDED
Status: DISCONTINUED | OUTPATIENT
Start: 2024-02-26 | End: 2024-03-09 | Stop reason: HOSPADM

## 2024-02-26 RX ORDER — SODIUM CHLORIDE 9 MG/ML
40 INJECTION, SOLUTION INTRAVENOUS AS NEEDED
Status: DISCONTINUED | OUTPATIENT
Start: 2024-02-26 | End: 2024-03-09 | Stop reason: HOSPADM

## 2024-02-26 RX ADMIN — LACOSAMIDE 50 MG: 10 INJECTION INTRAVENOUS at 14:54

## 2024-02-26 RX ADMIN — BUDESONIDE AND FORMOTEROL FUMARATE DIHYDRATE 2 PUFF: 160; 4.5 AEROSOL RESPIRATORY (INHALATION) at 18:26

## 2024-02-26 RX ADMIN — Medication 10 ML: at 20:38

## 2024-02-26 RX ADMIN — RISPERIDONE 0.5 MG: 0.25 TABLET, FILM COATED ORAL at 20:37

## 2024-02-26 RX ADMIN — FOLIC ACID 1 MG: 5 INJECTION, SOLUTION INTRAMUSCULAR; INTRAVENOUS; SUBCUTANEOUS at 08:39

## 2024-02-26 RX ADMIN — TIOTROPIUM BROMIDE INHALATION SPRAY 2 PUFF: 3.12 SPRAY, METERED RESPIRATORY (INHALATION) at 07:25

## 2024-02-26 RX ADMIN — Medication 10 ML: at 08:39

## 2024-02-26 RX ADMIN — FERROUS SULFATE TAB 325 MG (65 MG ELEMENTAL FE) 325 MG: 325 (65 FE) TAB at 17:45

## 2024-02-26 RX ADMIN — ROSUVASTATIN CALCIUM 40 MG: 20 TABLET, FILM COATED ORAL at 17:45

## 2024-02-26 RX ADMIN — IPRATROPIUM BROMIDE 0.5 MG: 0.5 SOLUTION RESPIRATORY (INHALATION) at 07:25

## 2024-02-26 RX ADMIN — DILTIAZEM HYDROCHLORIDE 30 MG: 30 TABLET, FILM COATED ORAL at 17:45

## 2024-02-26 RX ADMIN — BACLOFEN 5 MG: 10 TABLET ORAL at 20:37

## 2024-02-26 RX ADMIN — DILTIAZEM HYDROCHLORIDE 30 MG: 30 TABLET, FILM COATED ORAL at 20:36

## 2024-02-26 RX ADMIN — Medication 10 ML: at 14:54

## 2024-02-26 RX ADMIN — MUPIROCIN 1 APPLICATION: 20 OINTMENT TOPICAL at 17:46

## 2024-02-26 RX ADMIN — IPRATROPIUM BROMIDE 0.5 MG: 0.5 SOLUTION RESPIRATORY (INHALATION) at 00:55

## 2024-02-26 RX ADMIN — SODIUM CHLORIDE 75 ML/HR: 4.5 INJECTION, SOLUTION INTRAVENOUS at 08:39

## 2024-02-26 RX ADMIN — LACOSAMIDE 50 MG: 10 INJECTION INTRAVENOUS at 03:03

## 2024-02-26 RX ADMIN — AMIODARONE HYDROCHLORIDE 200 MG: 200 TABLET ORAL at 17:45

## 2024-02-26 RX ADMIN — METHYLPREDNISOLONE SODIUM SUCCINATE 40 MG: 40 INJECTION, POWDER, FOR SOLUTION INTRAMUSCULAR; INTRAVENOUS at 20:37

## 2024-02-26 RX ADMIN — MUPIROCIN 1 APPLICATION: 20 OINTMENT TOPICAL at 20:38

## 2024-02-26 RX ADMIN — DONEPEZIL HYDROCHLORIDE 10 MG: 5 TABLET, FILM COATED ORAL at 20:36

## 2024-02-26 RX ADMIN — LAMOTRIGINE 25 MG: 100 TABLET ORAL at 20:37

## 2024-02-26 RX ADMIN — IPRATROPIUM BROMIDE 0.5 MG: 0.5 SOLUTION RESPIRATORY (INHALATION) at 13:28

## 2024-02-26 RX ADMIN — MEMANTINE HYDROCHLORIDE 10 MG: 10 TABLET, FILM COATED ORAL at 20:37

## 2024-02-26 RX ADMIN — DOCUSATE SODIUM 50 MG AND SENNOSIDES 8.6 MG 2 TABLET: 8.6; 5 TABLET, FILM COATED ORAL at 20:37

## 2024-02-26 RX ADMIN — METHYLPREDNISOLONE SODIUM SUCCINATE 40 MG: 40 INJECTION, POWDER, FOR SOLUTION INTRAMUSCULAR; INTRAVENOUS at 08:39

## 2024-02-26 RX ADMIN — BUDESONIDE AND FORMOTEROL FUMARATE DIHYDRATE 2 PUFF: 160; 4.5 AEROSOL RESPIRATORY (INHALATION) at 07:26

## 2024-02-26 RX ADMIN — SODIUM CHLORIDE 15 MG/HR: 900 INJECTION, SOLUTION INTRAVENOUS at 00:44

## 2024-02-26 RX ADMIN — SODIUM CHLORIDE 15 MG/HR: 900 INJECTION, SOLUTION INTRAVENOUS at 06:48

## 2024-02-26 RX ADMIN — IPRATROPIUM BROMIDE 0.5 MG: 0.5 SOLUTION RESPIRATORY (INHALATION) at 18:26

## 2024-02-26 NOTE — PROGRESS NOTES
Subjective      Chief Complaint  No chief complaint on file.    Subjective      History of Present Illness  Brenda Munroe is a 73 y.o. female who presents today to [facility] with past medical history of MDD, dementia, CAD, essential hypertension, paroxysmal atrial fibrillation, bilateral carotid artery stenosis, GERD, chronic hypoxic and hypercapnic respiratory failure, and COPD who presents today for No chief complaint on file.. This visit is a initial evaluation  appointment.     No chief complaint on file.:  Patient is a resident of [facility]. CNA is present during patient visit and provides majority of history. Patient is nonverbal during exam which is her reported baseline. Patient is currently on room air but CNA states that she has supplemental oxygen available at the nursing home. She also has a BiPAP available at the [facility] home per medical records but CNA unsure if she uses this at night. She is on Trelegy 100 mcg once daily. She doesn't have a rescue inhaler listed on her medication list from the nursing home.     No current facility-administered medications for this encounter.  No current outpatient medications on file.    Facility-Administered Medications Ordered in Other Encounters:     acetaminophen (TYLENOL) tablet 500 mg, 500 mg, Oral, Q4H PRN, Efren Dunham MD    albuterol (PROVENTIL) nebulizer solution 0.083% 2.5 mg/3mL, 2.5 mg, Nebulization, Q6H PRN, Efren Dunham MD    baclofen (LIORESAL) tablet 5 mg, 5 mg, Oral, BID, Efren Dunham MD    sennosides-docusate (PERICOLACE) 8.6-50 MG per tablet 2 tablet, 2 tablet, Oral, BID **AND** polyethylene glycol (MIRALAX) packet 17 g, 17 g, Oral, Daily PRN **AND** bisacodyl (DULCOLAX) EC tablet 5 mg, 5 mg, Oral, Daily PRN **AND** bisacodyl (DULCOLAX) suppository 10 mg, 10 mg, Rectal, Daily PRN, Efren Dunham MD    budesonide-formoterol (SYMBICORT) 160-4.5 MCG/ACT inhaler 2 puff, 2 puff, Inhalation, BID -  RT, 2 puff at 02/26/24 0726 **AND** tiotropium (SPIRIVA RESPIMAT) 2.5 mcg/act aerosol solution inhaler, 2 puff, Inhalation, Daily - RT, Efren Dunham MD, 2 puff at 02/26/24 0725    carboxymethylcellulose (REFRESH PLUS) 0.5 % ophthalmic solution 1 drop, 1 drop, Both Eyes, TID PRN, Efren Dunham MD    [START ON 3/1/2024] cholecalciferol (VITAMIN D3) capsule 50,000 Units, 50,000 Units, Oral, Weekly, Efren Dunham MD    DEXMEDETOMIDINE 1000 MCG/250ML INFUSION infusion, 0.2-1.5 mcg/kg/hr, Intravenous, Titrated, Efren Dunham MD, Stopped at 02/25/24 0508    dilTIAZem (CARDIZEM) 100 mg in 100 mL NS infusion (ADV), 5-15 mg/hr, Intravenous, Titrated, Efren Dunham MD, Last Rate: 15 mL/hr at 02/26/24 0648, 15 mg/hr at 02/26/24 0648    donepezil (ARICEPT) tablet 10 mg, 10 mg, Oral, Nightly, Efren Dunham MD    ferrous sulfate tablet 325 mg, 325 mg, Oral, BID With Meals, Efren Dunham MD    folic acid 1 mg in sodium chloride 0.9 % 50 mL IVPB, 1 mg, Intravenous, Daily, Mery Randle MD, Last Rate: 100 mL/hr at 02/26/24 0839, 1 mg at 02/26/24 0839    guaiFENesin tablet 400 mg, 400 mg, Oral, Q4H PRN, Efren Dunham MD    ipratropium (ATROVENT) nebulizer solution 0.5 mg, 0.5 mg, Nebulization, 4x Daily - RT, Efren Dunham MD, 0.5 mg at 02/26/24 1328    isosorbide mononitrate (IMDUR) 24 hr tablet 15 mg, 15 mg, Oral, Q24H, Efren Dunham MD    lacosamide (VIMPAT) 50 mg in sodium chloride 0.9 % 50 mL IVPB, 50 mg, Intravenous, Q12H, Dima Monroy, [facility], Last Rate: 110 mL/hr at 02/26/24 0303, 50 mg at 02/26/24 0303    lamoTRIgine (LaMICtal) tablet 100 mg, 100 mg, Oral, Daily, Efren Dunham MD    lamoTRIgine (LaMICtal) tablet 25 mg, 25 mg, Oral, Nightly, Efren Dunham MD    levothyroxine (SYNTHROID, LEVOTHROID) tablet 75 mcg, 75 mcg, Oral, Q AM, Efren Dunham MD    LORazepam (ATIVAN) tablet 0.5 mg, 0.5 mg, Oral, Q12H PRN, Efren Dunham MD    memantine (NAMENDA) tablet 10 mg, 10 mg, Oral, Q12H, Efren Dunham,  MD    methylPREDNISolone sodium succinate (SOLU-Medrol) injection 40 mg, 40 mg, Intravenous, Q12H, Efren Dunham MD, 40 mg at 02/26/24 0839    mupirocin (BACTROBAN) 2 % nasal ointment 1 Application, 1 application , Each Nare, BID, Prasad Romero DO    nitroglycerin (NITROSTAT) SL tablet 0.4 mg, 0.4 mg, Sublingual, Q5 Min PRN, Efren Dunham MD    pantoprazole (PROTONIX) EC tablet 40 mg, 40 mg, Oral, QAM AC, Efren Duhnam MD    phenol (CHLORASEPTIC) 1.4 % liquid 1 spray, 1 spray, Mouth/Throat, Q2H PRN, Efren Dunham MD    phenylephrine (AL-SYNEPHRINE) 50 mg in 250 mL NS infusion, 0.5-3 mcg/kg/min, Intravenous, Titrated, Efren Dunham MD    risperiDONE (risperDAL) tablet 0.5 mg, 0.5 mg, Oral, BID, Efren Dunham MD    rosuvastatin (CRESTOR) tablet 40 mg, 40 mg, Oral, Q PM, Efren Dunham MD    sertraline (ZOLOFT) tablet 100 mg, 100 mg, Oral, Daily, Efren Dunham MD    sodium chloride 0.45 % infusion, 75 mL/hr, Intravenous, Continuous, Efren Dunham MD, Last Rate: 75 mL/hr at 02/26/24 0839, 75 mL/hr at 02/26/24 0839    sodium chloride 0.9 % bolus 1,000 mL, 1,000 mL, Intravenous, Once, Efren Dunham MD    sodium chloride 0.9 % flush 10 mL, 10 mL, Intravenous, Q12H, Efren Dunham MD, 10 mL at 02/26/24 0839    sodium chloride 0.9 % flush 10 mL, 10 mL, Intravenous, PRN, Efren Dunham MD    sodium chloride 0.9 % flush 10 mL, 10 mL, Intravenous, Q12H, Efren Dunham MD, 10 mL at 02/26/24 0839    sodium chloride 0.9 % flush 10 mL, 10 mL, Intravenous, PRN, Efren Dunham MD    sodium chloride 0.9 % flush 10 mL, 10 mL, Intravenous, Q12H, Prasad Romero DO    sodium chloride 0.9 % flush 10 mL, 10 mL, Intravenous, PRN, Prasad Romero DO    sodium chloride 0.9 % infusion 40 mL, 40 mL, Intravenous, PRRoly CAPPS Bill J, MD    sodium chloride 0.9 % infusion 40 mL, 40 mL, Intravenous, PRNRoly Bill J, MD    sodium chloride 0.9 % infusion 40 mL, 40 mL, Intravenous, PRN, Heather,  "Prasad FREED DO    trolamine salicylate (ASPERCREME) 10 % cream 1 Application, 1 application , Topical, Q6H PRN, Efren Dunham MD      No Known Allergies    Objective     Objective   Vital Signs:  Pulse 99   SpO2 (!) 87%   Estimated body mass index is 24.98 kg/m² as calculated from the following:    Height as of 2/23/24: 162.6 cm (64\").    Weight as of 2/26/24: 66 kg (145 lb 8.1 oz).    BMI is within normal parameters. No other follow-up for BMI required.    Past Medical History:   Diagnosis Date    Anxiety     Arthritis     Bell's palsy     Bladder prolapse, female, acquired     Carotid stenosis     COPD (chronic obstructive pulmonary disease)     COPD (chronic obstructive pulmonary disease)     Coronary artery disease     Dementia     Dementia     Depression     Difficulty walking     Disease of thyroid gland     Frequency of urination     GERD (gastroesophageal reflux disease)     Heart attack     Hyperlipidemia     Hypertension     Irregular heart beat     Peptic ulcer disease      Past Surgical History:   Procedure Laterality Date    CARDIAC CATHETERIZATION N/A 2/21/2023    Procedure: Left Heart Cath;  Surgeon: Tab Cifuentes MD;  Location: [facility] CATH INVASIVE LOCATION;  Service: Cardiology;  Laterality: N/A;    CARDIAC SURGERY      open heart  in 1999    CYSTOSCOPY N/A 11/8/2017    Procedure: CYSTOSCOPY;  Surgeon: Karl Nicolas DO;  Location: [facility] OR;  Service:     OTHER SURGICAL HISTORY      states she had fluid drained no surgery    VAGINAL HYSTERECTOMY W/ ANTERIOR AND POSTERIOR VAGINAL REPAIR N/A 11/8/2017    Procedure: VAGINAL HYSTERECTOMY WITH ANTERIOR, POSTERIOR, AND ENTEROCELE VAGINAL REPAIR;  Surgeon: Karl Nicolas DO;  Location: [facility] OR;  Service:     VAGINAL VAULT SUSPENSION N/A 11/8/2017    Procedure: VAGINAL VAULT SUSPENSION ;  Surgeon: Karl Nicolas DO;  Location: [facility] OR;  Service:      Social History     Socioeconomic History    Marital " "status:     Number of children: 3   Tobacco Use    Smoking status: Every Day     Packs/day: 0.50     Years: 55.00     Additional pack years: 0.00     Total pack years: 27.50     Types: Cigarettes    Smokeless tobacco: Never   Vaping Use    Vaping Use: Never used   Substance and Sexual Activity    Alcohol use: No    Drug use: No    Sexual activity: Never     Comment: denies        Physical Exam  Constitutional:       General: She is awake.      Appearance: Normal appearance. She is normal weight.   HENT:      Head: Normocephalic and atraumatic.      Nose: Nose normal.      Mouth/Throat:      Mouth: Mucous membranes are moist.      Pharynx: Oropharynx is clear.   Eyes:      Conjunctiva/sclera: Conjunctivae normal.      Pupils: Pupils are equal, round, and reactive to light.   Cardiovascular:      Rate and Rhythm: Normal rate and regular rhythm.      Pulses: Normal pulses.      Heart sounds: Normal heart sounds. No murmur heard.     No friction rub. No gallop.   Pulmonary:      Effort: Pulmonary effort is normal. No tachypnea, accessory muscle usage or respiratory distress.      Breath sounds: Normal breath sounds. No decreased breath sounds, wheezing, rhonchi or rales.   Musculoskeletal:         General: Normal range of motion.      Cervical back: Full passive range of motion without pain and normal range of motion.      Comments: Ambulating by wheelchair during exam   Skin:     General: Skin is warm and dry.   Neurological:      General: No focal deficit present.      Mental Status: She is alert. Mental status is at baseline.   Psychiatric:         Speech: She is noncommunicative.      Comments: Patient nonverbal during exam. Difficult to assess.           Result Review :  The following labs and radiology results have been reviewed.    Lab Review:   No results found for: \"FEV1\", \"FVC\", \"UPB2EBO\", \"TLC\", \"DLCO\"  Admission on 02/11/2024, Discharged on 02/11/2024   Component Date Value Ref Range Status    " ADENOVIRUS, PCR 02/11/2024 Not Detected  Not Detected Final    Coronavirus 229E 02/11/2024 Not Detected  Not Detected Final    Coronavirus HKU1 02/11/2024 Not Detected  Not Detected Final    Coronavirus NL63 02/11/2024 Not Detected  Not Detected Final    Coronavirus OC43 02/11/2024 Not Detected  Not Detected Final    COVID19 02/11/2024 Not Detected  Not Detected - Ref. Range Final    Human Metapneumovirus 02/11/2024 Not Detected  Not Detected Final    Human Rhinovirus/Enterovirus 02/11/2024 Not Detected  Not Detected Final    Influenza A PCR 02/11/2024 Not Detected  Not Detected Final    Influenza B PCR 02/11/2024 Not Detected  Not Detected Final    Parainfluenza Virus 1 02/11/2024 Not Detected  Not Detected Final    Parainfluenza Virus 2 02/11/2024 Not Detected  Not Detected Final    Parainfluenza Virus 3 02/11/2024 Not Detected  Not Detected Final    Parainfluenza Virus 4 02/11/2024 Not Detected  Not Detected Final    RSV, PCR 02/11/2024 Not Detected  Not Detected Final    Bordetella pertussis pcr 02/11/2024 Not Detected  Not Detected Final    Bordetella parapertussis PCR 02/11/2024 Not Detected  Not Detected Final    Chlamydophila pneumoniae PCR 02/11/2024 Not Detected  Not Detected Final    Mycoplasma pneumo by PCR 02/11/2024 Not Detected  Not Detected Final    Glucose 02/11/2024 105 (H)  65 - 99 mg/dL Final    BUN 02/11/2024 16  8 - 23 mg/dL Final    Creatinine 02/11/2024 0.68  0.57 - 1.00 mg/dL Final    Sodium 02/11/2024 149 (H)  136 - 145 mmol/L Final    Potassium 02/11/2024 3.5  3.5 - 5.2 mmol/L Final    Chloride 02/11/2024 105  98 - 107 mmol/L Final    CO2 02/11/2024 36.5 (H)  22.0 - 29.0 mmol/L Final    Calcium 02/11/2024 9.1  8.6 - 10.5 mg/dL Final    Total Protein 02/11/2024 5.5 (L)  6.0 - 8.5 g/dL Final    Albumin 02/11/2024 3.5  3.5 - 5.2 g/dL Final    ALT (SGPT) 02/11/2024 16  1 - 33 U/L Final    AST (SGOT) 02/11/2024 15  1 - 32 U/L Final    Alkaline Phosphatase 02/11/2024 61  39 - 117 U/L Final     Total Bilirubin 02/11/2024 0.3  0.0 - 1.2 mg/dL Final    Globulin 02/11/2024 2.0  gm/dL Final    A/G Ratio 02/11/2024 1.8  g/dL Final    BUN/Creatinine Ratio 02/11/2024 23.5  7.0 - 25.0 Final    Anion Gap 02/11/2024 7.5  5.0 - 15.0 mmol/L Final    eGFR 02/11/2024 91.0  >60.0 mL/min/1.73 Final    Protime 02/11/2024 15.5 (H)  12.1 - 14.7 Seconds Final    INR 02/11/2024 1.18 (H)  0.90 - 1.10 Final    Color, UA 02/11/2024 Yellow  Yellow, Straw Final    Appearance, UA 02/11/2024 Clear  Clear Final    pH, UA 02/11/2024 5.5  5.0 - 8.0 Final    Specific Gravity, UA 02/11/2024 1.027  1.005 - 1.030 Final    Glucose, UA 02/11/2024 Negative  Negative Final    Ketones, UA 02/11/2024 Trace (A)  Negative Final    Bilirubin, UA 02/11/2024 Negative  Negative Final    Blood, UA 02/11/2024 Negative  Negative Final    Protein, UA 02/11/2024 100 mg/dL (2+) (A)  Negative Final    Leuk Esterase, UA 02/11/2024 Negative  Negative Final    Nitrite, UA 02/11/2024 Negative  Negative Final    Urobilinogen, UA 02/11/2024 0.2 E.U./dL  0.2 - 1.0 E.U./dL Final    proBNP 02/11/2024 2,298.0 (H)  0.0 - 1,800.0 pg/mL Final    HS Troponin T 02/11/2024 35 (H)  <14 ng/L Final    Lactate 02/11/2024 0.7  0.5 - 2.0 mmol/L Final    Procalcitonin 02/11/2024 0.05  0.00 - 0.25 ng/mL Final    THC, Screen, Urine 02/11/2024 Negative  Negative Final    Phencyclidine (PCP), Urine 02/11/2024 Negative  Negative Final    Cocaine Screen, Urine 02/11/2024 Negative  Negative Final    Methamphetamine, Ur 02/11/2024 Negative  Negative Final    Opiate Screen 02/11/2024 Negative  Negative Final    Amphetamine Screen, Urine 02/11/2024 Negative  Negative Final    Benzodiazepine Screen, Urine 02/11/2024 Positive (A)  Negative Final    Tricyclic Antidepressants Screen 02/11/2024 Negative  Negative Final    Methadone Screen, Urine 02/11/2024 Negative  Negative Final    Barbiturates Screen, Urine 02/11/2024 Negative  Negative Final    Oxycodone Screen, Urine 02/11/2024 Negative   Negative Final    Buprenorphine, Screen, Urine 02/11/2024 Negative  Negative Final    Ammonia 02/11/2024 21  11 - 51 umol/L Final    QT Interval 02/11/2024 428  ms Final    QTC Interval 02/11/2024 458  ms Final    WBC 02/11/2024 5.87  3.40 - 10.80 10*3/mm3 Final    RBC 02/11/2024 3.83  3.77 - 5.28 10*6/mm3 Final    Hemoglobin 02/11/2024 10.8 (L)  12.0 - 15.9 g/dL Final    Hematocrit 02/11/2024 37.8  34.0 - 46.6 % Final    MCV 02/11/2024 98.7 (H)  79.0 - 97.0 fL Final    MCH 02/11/2024 28.2  26.6 - 33.0 pg Final    MCHC 02/11/2024 28.6 (L)  31.5 - 35.7 g/dL Final    RDW 02/11/2024 20.1 (H)  12.3 - 15.4 % Final    RDW-SD 02/11/2024 73.2 (H)  37.0 - 54.0 fl Final    MPV 02/11/2024 11.0  6.0 - 12.0 fL Final    Platelets 02/11/2024 125 (L)  140 - 450 10*3/mm3 Final    Neutrophil % 02/11/2024 80.9 (H)  42.7 - 76.0 % Final    Lymphocyte % 02/11/2024 9.9 (L)  19.6 - 45.3 % Final    Monocyte % 02/11/2024 7.8  5.0 - 12.0 % Final    Eosinophil % 02/11/2024 0.2 (L)  0.3 - 6.2 % Final    Basophil % 02/11/2024 0.2  0.0 - 1.5 % Final    Immature Grans % 02/11/2024 1.0 (H)  0.0 - 0.5 % Final    Neutrophils, Absolute 02/11/2024 4.75  1.70 - 7.00 10*3/mm3 Final    Lymphocytes, Absolute 02/11/2024 0.58 (L)  0.70 - 3.10 10*3/mm3 Final    Monocytes, Absolute 02/11/2024 0.46  0.10 - 0.90 10*3/mm3 Final    Eosinophils, Absolute 02/11/2024 0.01  0.00 - 0.40 10*3/mm3 Final    Basophils, Absolute 02/11/2024 0.01  0.00 - 0.20 10*3/mm3 Final    Immature Grans, Absolute 02/11/2024 0.06 (H)  0.00 - 0.05 10*3/mm3 Final    nRBC 02/11/2024 0.0  0.0 - 0.2 /100 WBC Final    Extra Tube 02/11/2024 Hold for add-ons.   Final    Auto resulted.    Extra Tube 02/11/2024 hold for add-on   Final    Auto resulted    Extra Tube 02/11/2024 Hold for add-ons.   Final    Auto resulted.    Extra Tube 02/11/2024 Hold for add-ons.   Final    Auto resulted    Fentanyl, Urine 02/11/2024 Negative  Negative Final    RBC, UA 02/11/2024 None Seen  None Seen, 0-2 /HPF Final     WBC, UA 02/11/2024 0-2  None Seen, 0-2 /HPF Final    Urine culture not indicated.    Bacteria, UA 02/11/2024 None Seen  None Seen /HPF Final    Squamous Epithelial Cells, UA 02/11/2024 0-2  None Seen, 0-2 /HPF Final    Hyaline Casts, UA 02/11/2024 None Seen  None Seen /LPF Final    Methodology 02/11/2024 Manual Light Microscopy   Final    HS Troponin T 02/11/2024 34 (H)  <14 ng/L Final    Troponin T Delta 02/11/2024 -1  >=-4 - <+4 ng/L Final    Anisocytosis 02/11/2024 Mod/2+  None Seen Final    Dacrocytes 02/11/2024 Slight/1+  None Seen Final    Hypochromia 02/11/2024 Slight/1+  None Seen Final    Macrocytes 02/11/2024 Slight/1+  None Seen Final    Ovalocytes 02/11/2024 Slight/1+  None Seen Final    Platelet Estimate 02/11/2024 Decreased  Normal Final    Large Platelets 02/11/2024 Slight/1+  None Seen Final   No results displayed because visit has over 200 results.      No results displayed because visit has over 200 results.      Lab Requisition on 12/31/2023   Component Date Value Ref Range Status    Color, UA 12/31/2023 Dark Yellow (A)  Yellow, Straw Final    Appearance, UA 12/31/2023 Clear  Clear Final    pH, UA 12/31/2023 5.5  5.0 - 8.0 Final    Specific Gravity, UA 12/31/2023 1.022  1.005 - 1.030 Final    Glucose, UA 12/31/2023 Negative  Negative Final    Ketones, UA 12/31/2023 Negative  Negative Final    Bilirubin, UA 12/31/2023 Negative  Negative Final    Blood, UA 12/31/2023 Negative  Negative Final    Protein, UA 12/31/2023 Negative  Negative Final    Leuk Esterase, UA 12/31/2023 Negative  Negative Final    Nitrite, UA 12/31/2023 Negative  Negative Final    Urobilinogen, UA 12/31/2023 0.2 E.U./dL  0.2 - 1.0 E.U./dL Final   Admission on 12/27/2023, Discharged on 12/27/2023   Component Date Value Ref Range Status    Glucose 12/27/2023 118 (H)  65 - 99 mg/dL Final    BUN 12/27/2023 11  8 - 23 mg/dL Final    Creatinine 12/27/2023 0.88  0.57 - 1.00 mg/dL Final    Sodium 12/27/2023 145  136 - 145 mmol/L Final     Potassium 12/27/2023 4.5  3.5 - 5.2 mmol/L Final    Chloride 12/27/2023 101  98 - 107 mmol/L Final    CO2 12/27/2023 39.6 (H)  22.0 - 29.0 mmol/L Final    Calcium 12/27/2023 9.0  8.6 - 10.5 mg/dL Final    Total Protein 12/27/2023 5.7 (L)  6.0 - 8.5 g/dL Final    Albumin 12/27/2023 3.8  3.5 - 5.2 g/dL Final    ALT (SGPT) 12/27/2023 8  1 - 33 U/L Final    AST (SGOT) 12/27/2023 13  1 - 32 U/L Final    Alkaline Phosphatase 12/27/2023 65  39 - 117 U/L Final    Total Bilirubin 12/27/2023 0.2  0.0 - 1.2 mg/dL Final    Globulin 12/27/2023 1.9  gm/dL Final    A/G Ratio 12/27/2023 2.0  g/dL Final    BUN/Creatinine Ratio 12/27/2023 12.5  7.0 - 25.0 Final    Anion Gap 12/27/2023 4.4 (L)  5.0 - 15.0 mmol/L Final    eGFR 12/27/2023 68.6  >60.0 mL/min/1.73 Final    Color, UA 12/27/2023 Yellow  Yellow, Straw Final    Appearance, UA 12/27/2023 Clear  Clear Final    pH, UA 12/27/2023 5.5  5.0 - 8.0 Final    Specific Gravity, UA 12/27/2023 1.018  1.005 - 1.030 Final    Glucose, UA 12/27/2023 Negative  Negative Final    Ketones, UA 12/27/2023 Negative  Negative Final    Bilirubin, UA 12/27/2023 Negative  Negative Final    Blood, UA 12/27/2023 Negative  Negative Final    Protein, UA 12/27/2023 Negative  Negative Final    Leuk Esterase, UA 12/27/2023 Negative  Negative Final    Nitrite, UA 12/27/2023 Negative  Negative Final    Urobilinogen, UA 12/27/2023 0.2 E.U./dL  0.2 - 1.0 E.U./dL Final    THC, Screen, Urine 12/27/2023 Negative  Negative Final    Phencyclidine (PCP), Urine 12/27/2023 Negative  Negative Final    Cocaine Screen, Urine 12/27/2023 Negative  Negative Final    Methamphetamine, Ur 12/27/2023 Negative  Negative Final    Opiate Screen 12/27/2023 Negative  Negative Final    Amphetamine Screen, Urine 12/27/2023 Negative  Negative Final    Benzodiazepine Screen, Urine 12/27/2023 Negative  Negative Final    Tricyclic Antidepressants Screen 12/27/2023 Negative  Negative Final    Methadone Screen, Urine 12/27/2023 Negative   Negative Final    Barbiturates Screen, Urine 12/27/2023 Negative  Negative Final    Oxycodone Screen, Urine 12/27/2023 Negative  Negative Final    Buprenorphine, Screen, Urine 12/27/2023 Negative  Negative Final    Ethanol 12/27/2023 <10  0 - 10 mg/dL Final    Ethanol % 12/27/2023 <0.010  % Final    Magnesium 12/27/2023 2.1  1.6 - 2.4 mg/dL Final    WBC 12/27/2023 4.53  3.40 - 10.80 10*3/mm3 Final    RBC 12/27/2023 3.95  3.77 - 5.28 10*6/mm3 Final    Hemoglobin 12/27/2023 10.2 (L)  12.0 - 15.9 g/dL Final    Hematocrit 12/27/2023 37.6  34.0 - 46.6 % Final    MCV 12/27/2023 95.2  79.0 - 97.0 fL Final    MCH 12/27/2023 25.8 (L)  26.6 - 33.0 pg Final    MCHC 12/27/2023 27.1 (L)  31.5 - 35.7 g/dL Final    RDW 12/27/2023 23.7 (H)  12.3 - 15.4 % Final    RDW-SD 12/27/2023 80.7 (H)  37.0 - 54.0 fl Final    MPV 12/27/2023 10.7  6.0 - 12.0 fL Final    Platelets 12/27/2023 138 (L)  140 - 450 10*3/mm3 Final    Neutrophil % 12/27/2023 71.4  42.7 - 76.0 % Final    Lymphocyte % 12/27/2023 15.9 (L)  19.6 - 45.3 % Final    Monocyte % 12/27/2023 7.5  5.0 - 12.0 % Final    Eosinophil % 12/27/2023 3.8  0.3 - 6.2 % Final    Basophil % 12/27/2023 0.7  0.0 - 1.5 % Final    Immature Grans % 12/27/2023 0.7 (H)  0.0 - 0.5 % Final    Neutrophils, Absolute 12/27/2023 3.24  1.70 - 7.00 10*3/mm3 Final    Lymphocytes, Absolute 12/27/2023 0.72  0.70 - 3.10 10*3/mm3 Final    Monocytes, Absolute 12/27/2023 0.34  0.10 - 0.90 10*3/mm3 Final    Eosinophils, Absolute 12/27/2023 0.17  0.00 - 0.40 10*3/mm3 Final    Basophils, Absolute 12/27/2023 0.03  0.00 - 0.20 10*3/mm3 Final    Immature Grans, Absolute 12/27/2023 0.03  0.00 - 0.05 10*3/mm3 Final    nRBC 12/27/2023 0.0  0.0 - 0.2 /100 WBC Final    Extra Tube 12/27/2023 Hold for add-ons.   Final    Auto resulted.    Extra Tube 12/27/2023 hold for add-on   Final    Auto resulted    Extra Tube 12/27/2023 Hold for add-ons.   Final    Auto resulted.    Extra Tube 12/27/2023 Hold for add-ons.   Final    Auto  resulted    Anisocytosis 12/27/2023 Large/3+  None Seen Final    Hypochromia 12/27/2023 Large/3+  None Seen Final    Ovalocytes 12/27/2023 Slight/1+  None Seen Final    Poikilocytes 12/27/2023 Slight/1+  None Seen Final    Platelet Morphology 12/27/2023 Normal  Normal Final    Fentanyl, Urine 12/27/2023 Negative  Negative Final    Site 12/27/2023 Left Brachial   Final    Kendall's Test 12/27/2023 N/A   Final    pH, Arterial 12/27/2023 7.300 (L)  7.350 - 7.450 pH units Final    84 Value below reference range    pCO2, Arterial 12/27/2023 78.1 (C)  35.0 - 45.0 mm Hg Final    86 Value above critical limit    pO2, Arterial 12/27/2023 109.0 (H)  83.0 - 108.0 mm Hg Final    83 Value above reference range    HCO3, Arterial 12/27/2023 38.4 (H)  20.0 - 26.0 mmol/L Final    83 Value above reference range    Base Excess, Arterial 12/27/2023 9.7 (H)  0.0 - 2.0 mmol/L Final    O2 Saturation, Arterial 12/27/2023 98.3  94.0 - 99.0 % Final    Hemoglobin, Blood Gas 12/27/2023 10.3 (L)  13.5 - 17.5 g/dL Final    84 Value below reference range    Hematocrit, Blood Gas 12/27/2023 31.5 (L)  38.0 - 51.0 % Final    84 Value below reference range    Oxyhemoglobin 12/27/2023 95.8  94 - 99 % Final    Methemoglobin 12/27/2023 0.50  0.00 - 3.00 % Final    Carboxyhemoglobin 12/27/2023 2.0  0 - 5 % Final    A-a DO2 12/27/2023 21.1  0.0 - 300.0 mmHg Final    CO2 Content 12/27/2023 40.8 (H)  22 - 33 mmol/L Final    Barometric Pressure for Blood Gas 12/27/2023 725  mmHg Final    Modality 12/27/2023 Nasal Cannula   Final    FIO2 12/27/2023 32  % Final    Flow Rate 12/27/2023 3.0  lpm Final    Ventilator Mode 12/27/2023 NA   Final    Notified Who 12/27/2023 [facility] AND JOSE RN, ER   Final    Notified By 12/27/2023 [id number]   Final    Notified Time 12/27/2023 12/27/2023 10:14   Final    Collected by 12/27/2023 [id number]   Final    Meter: [id number]     :  [id number]   Consult on 12/06/2023   Component Date Value Ref Range Status    Glucose  2023 138 (H)  65 - 99 mg/dL Final    BUN 2023 13  8 - 23 mg/dL Final    Creatinine 2023 0.91  0.57 - 1.00 mg/dL Final    Sodium 2023 140  136 - 145 mmol/L Final    Potassium 2023 4.2  3.5 - 5.2 mmol/L Final    Chloride 2023 101  98 - 107 mmol/L Final    CO2 2023 33.9 (H)  22.0 - 29.0 mmol/L Final    Calcium 2023 8.7  8.6 - 10.5 mg/dL Final    Total Protein 2023 6.4  6.0 - 8.5 g/dL Final    Albumin 2023 4.2  3.5 - 5.2 g/dL Final    ALT (SGPT) 2023 10  1 - 33 U/L Final    AST (SGOT) 2023 16  1 - 32 U/L Final    Alkaline Phosphatase 2023 73  39 - 117 U/L Final    Total Bilirubin 2023 0.2  0.0 - 1.2 mg/dL Final    Globulin 2023 2.2  gm/dL Final    A/G Ratio 2023 1.9  g/dL Final    BUN/Creatinine Ratio 2023 14.3  7.0 - 25.0 Final    Anion Gap 2023 5.1  5.0 - 15.0 mmol/L Final    eGFR 2023 66.3  >60.0 mL/min/1.73 Final    Reference Lab Report 2023    Final                    Value:[facility]  [address] [place] [address]  Phone: [phone] or [phone]  Fax: [phone]  Leonard Goldsmith M.D., Medical Director    PATIENT NAME                                 LABORATORY NO.  [id number]   ALTA STEVENSON                              [id number]  [id number]  [facility]                          AGE                SEX     SSN            CLIENT REF #  [id number]       1950   F       [id number]    [id number]  [address] [place] [address]                               REQUESTING CLYDE RIOS M.D..        COPY TO..  ROGELIO TRAYLOR MARIA RACHAEL    DATE COLLECTED        DATE RECEIVED          DATE REPORTED  2023    DIAGNOSIS:  PERIPHERAL SMEAR  Normocytic, normochromic anemia with mild anisocytosis and polychromasia.  No increase in schistocytes population.  Normal WBC count and  differential. Granulocytes show normal morphology and  no                           circulating blasts identified.  Adequate platelets      CLINICAL HISTORY:  Normocytic anemia    CLINICAL LABORATORY DATA  WBC        4.65 x10/3/-L    RDW         16.6%  HGB        8.8 g/dl         PLT         177x10/3/-L  HCT        31.2%            LYMPHS      17.2%  MCV        83.2fL           NEUTS       73.6%  MONO        6.0%  EOS         2.4%  BASO        0.4%    PERIPHERAL SMEAR MICROSCOPIC DESCRIPTION:  Lewis stained smears are reviewed microscopically. See diagnosis for details.    Professional interpretation rendered by Imelda Patterson M.D., MPH at [facility], [address] [place] [address].    GROSS DESCRIPTION:  RECEIVED 2 SLIDES FOR REVIEW- LM 12/6/23    REVIEWED, DIAGNOSED AND ELECTRONICALLY  SIGNED BY:    Imelda Patterson M.D., MPH  CPT CODES:        42296      Iron 12/06/2023 21 (L)  37 - 145 mcg/dL Final    Iron Saturation (TSAT) 12/06/2023 4 (L)  20 - 50 % Final    Transferrin 12/06/2023 345  200 - 360 mg/dL Final    TIBC 12/06/2023 514  298 - 536 mcg/dL Final    Ferritin 12/06/2023 10.32 (L)  13.00 - 150.00 ng/mL Final    Vitamin B-12 12/06/2023 187 (L)  211 - 946 pg/mL Final    Folate 12/06/2023 4.68 (L)  4.78 - 24.20 ng/mL Final    Reticulocyte % 12/06/2023 1.46  0.70 - 1.90 % Final    Reticulocyte Absolute 12/06/2023 0.0546  0.0200 - 0.1300 10*6/mm3 Final    LDH 12/06/2023 203  135 - 214 U/L Final    Haptoglobin 12/06/2023 112  30 - 200 mg/dL Final    C-Reactive Protein 12/06/2023 <0.30  0.00 - 0.50 mg/dL Final    Sed Rate 12/06/2023 2  0 - 30 mm/hr Final    WBC 12/06/2023 4.65  3.40 - 10.80 10*3/mm3 Final    RBC 12/06/2023 3.75 (L)  3.77 - 5.28 10*6/mm3 Final    Hemoglobin 12/06/2023 8.8 (L)  12.0 - 15.9 g/dL Final    Hematocrit 12/06/2023 31.2 (L)  34.0 - 46.6 % Final    MCV 12/06/2023 83.2  79.0 - 97.0 fL Final    MCH 12/06/2023 23.5 (L)  26.6 - 33.0 pg Final    MCHC 12/06/2023 28.2 (L)  31.5 - 35.7  g/dL Final    RDW 12/06/2023 16.6 (H)  12.3 - 15.4 % Final    RDW-SD 12/06/2023 49.7  37.0 - 54.0 fl Final    MPV 12/06/2023 11.5  6.0 - 12.0 fL Final    Platelets 12/06/2023 177  140 - 450 10*3/mm3 Final    Neutrophil % 12/06/2023 73.6  42.7 - 76.0 % Final    Lymphocyte % 12/06/2023 17.2 (L)  19.6 - 45.3 % Final    Monocyte % 12/06/2023 6.0  5.0 - 12.0 % Final    Eosinophil % 12/06/2023 2.4  0.3 - 6.2 % Final    Basophil % 12/06/2023 0.4  0.0 - 1.5 % Final    Immature Grans % 12/06/2023 0.4  0.0 - 0.5 % Final    Neutrophils, Absolute 12/06/2023 3.42  1.70 - 7.00 10*3/mm3 Final    Lymphocytes, Absolute 12/06/2023 0.80  0.70 - 3.10 10*3/mm3 Final    Monocytes, Absolute 12/06/2023 0.28  0.10 - 0.90 10*3/mm3 Final    Eosinophils, Absolute 12/06/2023 0.11  0.00 - 0.40 10*3/mm3 Final    Basophils, Absolute 12/06/2023 0.02  0.00 - 0.20 10*3/mm3 Final    Immature Grans, Absolute 12/06/2023 0.02  0.00 - 0.05 10*3/mm3 Final    nRBC 12/06/2023 0.0  0.0 - 0.2 /100 WBC Final    Anisocytosis 12/06/2023 Slight/1+  None Seen Final    Dacrocytes 12/06/2023 Slight/1+  None Seen Final    Hypochromia 12/06/2023 Mod/2+  None Seen Final    Ovalocytes 12/06/2023 Slight/1+  None Seen Final    Platelet Morphology 12/06/2023 Normal  Normal Final     Assessment / Plan         Assessment   Diagnoses and all orders for this visit:    1. Chronic obstructive pulmonary disease, unspecified COPD type (Primary)  Patient unable to performed PFT/spirometry due to debility.   Continue Trelegy 100 mcg 1 puff daily.   Will order albuterol inhaler for as needed use.     -     albuterol sulfate  (90 Base) MCG/ACT inhaler; Inhale 2 puffs Every 4 (Four) Hours As Needed for Wheezing.  Dispense: 18 g; Refill: 11    2. Chronic respiratory failure with hypoxia and hypercapnia  Continue using supplemental oxygen as needed.   During exam saturations noted to be 87% on room air. Sent recommendations to nursing home to utilize supplemental oxygen to maintain  saturations 90-95%.   Also noted to have BiPAP at nursing home per medical record, but CNA unsure if patient utilizes this at night.     New Medications Ordered This Visit   Medications    albuterol sulfate  (90 Base) MCG/ACT inhaler     Sig: Inhale 2 puffs Every 4 (Four) Hours As Needed for Wheezing.     Dispense:  18 g     Refill:  11       I spent 35 minutes caring for Brenda on this date of service. This time includes time spent by me in the following activities:preparing for the visit, reviewing tests, obtaining and/or reviewing a separately obtained history, performing a medically appropriate examination and/or evaluation , counseling and educating the patient/family/caregiver, ordering medications, tests, or procedures, referring and communicating with other health care professionals , documenting information in the medical record, independently interpreting results and communicating that information with the patient/family/caregiver, and care coordination    Follow Up   Return if symptoms worsen or fail to improve.    Patient was given instructions and counseling regarding her condition or for health maintenance advice. Please see specific information pulled into the AVS if appropriate.       This document has been electronically signed by Tracee Munoz PA-C   February 26, 2024 13:54 EST    Dictated Utilizing Dragon Dictation: Part of this note may be an electronic transcription/translation of spoken language to printed text using the Dragon Dictation System.

## 2024-02-26 NOTE — THERAPY EVALUATION
Patient Name: Brenda Munroe  : 1950    MRN: [id number]                              Today's Date: 2024       Admit Date: 2024    Visit Dx:     ICD-10-CM ICD-9-CM   1. Subdural hematoma  S06.5XAA 432.1   2. NSTEMI (non-ST elevated myocardial infarction)  I21.4 410.70   3. Chronic anticoagulation  Z79.01 V58.61   4. Closed fracture of nasal bone, initial encounter  S02.2XXA 802.0   5. Traumatic orbital hematoma, right, initial encounter  S05.11XA 921.2     Patient Active Problem List   Diagnosis    Psychosis    Severe recurrent major depression with psychotic features    COPD (chronic obstructive pulmonary disease)    Coronary artery disease    Disease of thyroid gland    Arthritis    Hypertension    Paroxysmal atrial fibrillation    Chronic headaches    Vision changes    Carotid stenosis, asymptomatic, bilateral    Bradycardia, sinus    Acute respiratory failure with hypoxia and hypercapnia    NSTEMI (non-ST elevated myocardial infarction)    A-fib    Hypercapnic respiratory failure    Subdural hematoma     Past Medical History:   Diagnosis Date    Anxiety     Arthritis     Bell's palsy     Bladder prolapse, female, acquired     Carotid stenosis     COPD (chronic obstructive pulmonary disease)     COPD (chronic obstructive pulmonary disease)     Coronary artery disease     Dementia     Dementia     Depression     Difficulty walking     Disease of thyroid gland     Frequency of urination     GERD (gastroesophageal reflux disease)     Heart attack     Hyperlipidemia     Hypertension     Irregular heart beat     Peptic ulcer disease      Past Surgical History:   Procedure Laterality Date    CARDIAC CATHETERIZATION N/A 2023    Procedure: Left Heart Cath;  Surgeon: Tab Cifuentes MD;  Location: [facility] CATH INVASIVE LOCATION;  Service: Cardiology;  Laterality: N/A;    CARDIAC SURGERY      open heart  in     CYSTOSCOPY N/A 2017    Procedure: CYSTOSCOPY;  Surgeon: Karl Johnson  DO Fidencio;  Location: [facility] OR;  Service:     OTHER SURGICAL HISTORY      states she had fluid drained no surgery    VAGINAL HYSTERECTOMY W/ ANTERIOR AND POSTERIOR VAGINAL REPAIR N/A 11/8/2017    Procedure: VAGINAL HYSTERECTOMY WITH ANTERIOR, POSTERIOR, AND ENTEROCELE VAGINAL REPAIR;  Surgeon: Karl Nicolas DO;  Location: [facility] OR;  Service:     VAGINAL VAULT SUSPENSION N/A 11/8/2017    Procedure: VAGINAL VAULT SUSPENSION ;  Surgeon: Karl Nicolas DO;  Location: [facility] OR;  Service:       General Information       Row Name 02/26/24 1239          OT Time and Intention    Document Type evaluation  -LM     Mode of Treatment occupational therapy  -LM       Row Name 02/26/24 1239          General Information    Patient Profile Reviewed yes  -LM     Prior Level of Function --  unknown plof, snf resident, currently poor historian  -LM     Existing Precautions/Restrictions fall  -LM     Barriers to Rehab medically complex;previous functional deficit;cognitive status  -LM       Row Name 02/26/24 1239          Living Environment    People in Home facility resident  -LM       Row Name 02/26/24 1239          Cognition    Orientation Status (Cognition) disoriented to;place;situation;time  -LM       Row Name 02/26/24 1239          Safety Issues, Functional Mobility    Safety Issues Affecting Function (Mobility) problem-solving;insight into deficits/self-awareness;judgment;sequencing abilities  -LM     Impairments Affecting Function (Mobility) balance;cognition;coordination;endurance/activity tolerance  -LM     Cognitive Impairments, Mobility Safety/Performance insight into deficits/self-awareness;judgment;problem-solving/reasoning  -LM               User Key  (r) = Recorded By, (t) = Taken By, (c) = Cosigned By      Initials Name Provider Type    LM Bety Pittman OT Occupational Therapist                     Mobility/ADL's       Row Name 02/26/24 1248          Transfers    Comment, (Transfers) unable to  safely transfer  -LM       [facility] Name 02/26/24 1248          Activities of Daily Living    BADL Assessment/Intervention bathing;upper body dressing;lower body dressing;grooming;feeding;toileting  -LM       [facility] Name 02/26/24 1248          Bathing Assessment/Intervention    [place] Level (Bathing) dependent (less than 25% patient effort)  -LM       Row Name 02/26/24 1248          Lower Body Dressing Assessment/Training    [place] Level (Lower Body Dressing) dependent (less than 25% patient effort)  -LM       [facility] Name 02/26/24 1248          Grooming Assessment/Training    [place] Level (Grooming) dependent (less than 25% patient effort)  -LM       Row Name 02/26/24 1248          Self-Feeding Assessment/Training    [place] Level (Feeding) minimum assist (75% patient effort);moderate assist (50% patient effort)  -LM       Row Name 02/26/24 1248          Toileting Assessment/Training    [place] Level (Toileting) dependent (less than 25% patient effort)  -LM               User Key  (r) = Recorded By, (t) = Taken By, (c) = Cosigned By      Initials Name Provider Type    LM Bety Pittman, OT Occupational Therapist                   Obj/Interventions       Row Name 02/26/24 1250          Sensory Assessment (Somatosensory)    Sensory Assessment (Somatosensory) sensation intact  -LM       Row Name 02/26/24 1250          Vision Assessment/Intervention    Visual Impairment/Limitations WFL  -LM       Row Name 02/26/24 1250          Range of Motion Comprehensive    General Range of Motion upper extremity range of motion deficits identified  -LM     Comment, General Range of Motion BUE shoulders 1/2 arom  -LM       Row Name 02/26/24 1250          Strength Comprehensive (MMT)    General Manual Muscle Testing (MMT) Assessment upper extremity strength deficits identified  -LM     Comment, General Manual Muscle Testing (MMT) Assessment BUE shoulders 2+/5  -LM       Row Name 02/26/24 1250          Motor Skills     Motor Skills functional endurance;coordination  -LM     Coordination fine motor deficit;gross motor deficit;moderate impairment;severe impairment  -LM     Functional Endurance P+  -LM               User Key  (r) = Recorded By, (t) = Taken By, (c) = Cosigned By      Initials Name Provider Type    Bety Mathew, OT Occupational Therapist                   Goals/Plan       Row Name 02/26/24 1302          Transfer Goal 1 (OT)    Activity/Assistive Device (Transfer Goal 1, OT) transfers, all;commode, 3-in-1  -LM     [place] Level/Cues Needed (Transfer Goal 1, OT) moderate assist (50-74% patient effort);maximum assist (25-49% patient effort)  -LM     Time Frame (Transfer Goal 1, OT) by discharge  -LM       Row Name 02/26/24 1302          Problem Specific Goal 1 (OT)    Problem Specific Goal 1 (OT) increase fxl activity tolerance to F- to increase fxl mobility and BADL  -LM     Time Frame (Problem Specific Goal 1, OT) by discharge  -LM       Row Name 02/26/24 1302          Therapy Assessment/Plan (OT)    Planned Therapy Interventions (OT) activity tolerance training;adaptive equipment training;BADL retraining;transfer/mobility retraining;strengthening exercise;ROM/therapeutic exercise;patient/caregiver education/training  -LM               User Key  (r) = Recorded By, (t) = Taken By, (c) = Cosigned By      Initials Name Provider Type    Bety Mathew, OT Occupational Therapist                   Clinical Impression       Row Name 02/26/24 1259          Plan of Care Review    Plan of Care Reviewed With patient  -LM       Row Name 02/26/24 125          Therapy Assessment/Plan (OT)    Patient/Family Therapy Goal Statement (OT) unable to formulate  -LM     Rehab Potential (OT) fair, will monitor progress closely  -LM     Criteria for Skilled Therapeutic Interventions Met (OT) yes;meets criteria;skilled treatment is necessary  -LM     Therapy Frequency (OT) other (see comments)  prn and/or to monitor fxl progress   -LM       Row Name 02/26/24 1253          Therapy Plan Review/Discharge Plan (OT)    Anticipated Discharge Disposition (OT) skilled nursing facility  -       Row Name 02/26/24 1253          Positioning and Restraints    Post Treatment Position bed  -LM     In Bed with nsg;call light within reach;encouraged to call for assist;exit alarm on  -LM               User Key  (r) = Recorded By, (t) = Taken By, (c) = Cosigned By      Initials Name Provider Type     Bety Pittman, OT Occupational Therapist                   Outcome Measures       Row Name 02/26/24 0800          How much help from another person do you currently need...    Turning from your back to your side while in flat bed without using bedrails? 4  -MN     Moving from lying on back to sitting on the side of a flat bed without bedrails? 2  -MN     Moving to and from a bed to a chair (including a wheelchair)? 1  -MN     Standing up from a chair using your arms (e.g., wheelchair, bedside chair)? 1  -MN     Climbing 3-5 steps with a railing? 1  -MN     To walk in hospital room? 1  -MN     AM-PAC 6 Clicks Score (PT) 10  -MN     Highest Level of Mobility Goal 4 --> Transfer to chair/commode  -MN       Row Name 02/26/24 1305          Modified [place] Scale    Modified Maryse Scale 0 - No Symptoms at all.  -LM       Row Name 02/26/24 1305          Functional Assessment    Outcome Measure Options Modified Maryse  -               User Key  (r) = Recorded By, (t) = Taken By, (c) = Cosigned By      Initials Name Provider Type    Bety Mathew, OT Occupational Therapist    Dyllan Santillan, RN Registered Nurse                      OT Recommendation and Plan  Planned Therapy Interventions (OT): activity tolerance training, adaptive equipment training, BADL retraining, transfer/mobility retraining, strengthening exercise, ROM/therapeutic exercise, patient/caregiver education/training  Therapy Frequency (OT): other (see comments) (prn and/or to monitor fxl  progress)  Plan of Care Review  Plan of Care Reviewed With: patient     Time Calculation:          Therapy Charges for Today       Code Description Service Date Service Provider Modifiers Qty    08127887917 HC OT EVAL MOD COMPLEXITY 4 2/26/2024 Bety Pittman, OT GO 1                 Bety Pittman, OT  2/26/2024

## 2024-02-26 NOTE — PROGRESS NOTES
LOS: 3 days     Name: Brenda Munroe  Age/Sex: 73 y.o. female  :  1950        PCP: Bernadette Arevalo MD    Principal Problem:    Subdural hematoma      Admission Information: Brenda Munroe is a 73 y.o. female with  MDD, COPD, CAD, thyroid disease, hypertension, paroxysmal atrial fibrillation (status post cardioversion 2024), carotid stenosis, dementia, GERD, PUD.      Chief Complaint: Fall with head injury    Interval history: Has been in RVR and required Cardizem drip    Subjective   Patient is awake in bed and at baseline mentation.  She denies chest pain, but does not answer when I ask if she is short of breath or feeling palpitations.      Vital Signs  Vital Signs (last 72 hrs)          0700   0659  07 0659  07 0659  07 0920   Most Recent      Temp (°F) 97 -  97.8    97.3 -  98.4    97 -  99.4       97.7 (36.5)  0400    Heart Rate 62 -  125    69 -  152    88 -  158      112     112  0725    Resp 20 -  33    20 -  32    20 -  28      19     19  0725    BP 63/44 -  137/96    100/85 -  165/109    103/86 -  150/121       119/98  0648    SpO2 (%) 81 -  100    64 -  100    74 -  100      90     90  0725    Flow (L/min) 2 -  4    1 -  2    1 -  3      2     2  0725    Oxygen Concentration (%)   28    22 -  285         28  0639          Temp:  [97 °F (36.1 °C)-99.4 °F (37.4 °C)] 97.7 °F (36.5 °C)  Heart Rate:  [] 112  Resp:  [-] 19  BP: (103-150)/() 119/98  Body mass index is 24.98 kg/m².      Intake/Output Summary (Last 24 hours) at 2024 0920  Last data filed at 2024 0600  Gross per 24 hour   Intake 1235.63 ml   Output 0 ml   Net 1235.63 ml       Vitals and nursing note reviewed.   Constitutional:       General: Awake.      Appearance: Not in distress. Ill-appearing and chronically ill-appearing.      Interventions: Nasal cannula in place.      Comments: At 2 L/min   Eyes:       Conjunctiva/sclera:      Right eye: Hemorrhage present.      Comments: Small hematoma over right orbit.  Bilateral circumferential ecchymosis of eyes extending into cheeks   HENT:      Head: Contusion.   Pulmonary:      Effort: Pulmonary effort is normal.      Breath sounds: Normal breath sounds.   Cardiovascular:      Tachycardia present. Irregularly irregular rhythm.      Murmurs: There is no murmur.   Edema:     Peripheral edema absent.   Skin:     General: Skin is warm and dry.   Neurological:      Mental Status: Exhibits a cognitive deficit.      Comments: Moves all extremities.  Rabbit like movements of lower jaw and mouth         Telemetry: A-fib with RVR 120s     Results Review:     Results from last 7 days   Lab Units 02/26/24  0018 02/25/24  0546 02/25/24  0021 02/23/24  1739 02/23/24  1135   WBC 10*3/mm3 9.40 6.16 9.37 5.47 5.33   HEMOGLOBIN g/dL 9.8* 11.3* 11.5* 10.6* 11.1*   PLATELETS 10*3/mm3 250 243 260 225 233     Results from last 7 days   Lab Units 02/26/24  0015 02/25/24  0546 02/25/24  0021 02/24/24  1547 02/23/24  1739 02/23/24  1135   SODIUM mmol/L 149* 143 142 151* 148* 147*   POTASSIUM mmol/L 3.8 4.6 5.2 5.4* 4.6 3.9   CHLORIDE mmol/L 105 101 102 104 105 105   CO2 mmol/L 31.6* 22.7 22.1 29.1* 35.8* 40.1*   BUN mg/dL 21 25* 23 21 16 17   CREATININE mg/dL 0.77 0.97 0.85 0.91 0.84 0.86   CALCIUM mg/dL 8.6 8.9 9.1 9.1 8.9 9.1   GLUCOSE mg/dL 105* 154* 135* 120* 97 100*     Results from last 7 days   Lab Units 02/23/24  1340 02/23/24  1135   CK TOTAL U/L 89  --    HSTROP T ng/L 452* 474*       Results from last 7 days   Lab Units 02/23/24  1135   INR  1.50*       I reviewed the patient's new clinical results.  I reviewed the patient's new imaging results and agree with the interpretation.  I personally viewed and interpreted the patient's EKG/Telemetry data      Medication Review:   baclofen, 5 mg, Oral, BID  budesonide-formoterol, 2 puff, Inhalation, BID - RT   And  tiotropium bromide  monohydrate, 2 puff, Inhalation, Daily - RT  [START ON 3/1/2024] cholecalciferol, 50,000 Units, Oral, Weekly  donepezil, 10 mg, Oral, Nightly  ferrous sulfate, 325 mg, Oral, BID With Meals  folic acid 1 mg in sodium chloride 0.9 % 50 mL IVPB, 1 mg, Intravenous, Daily  ipratropium, 0.5 mg, Nebulization, 4x Daily - RT  isosorbide mononitrate, 15 mg, Oral, Q24H  Lacosamide, 50 mg, Intravenous, Q12H  lamoTRIgine, 100 mg, Oral, Daily  lamoTRIgine, 25 mg, Oral, Nightly  levothyroxine, 75 mcg, Oral, Q AM  memantine, 10 mg, Oral, Q12H  methylPREDNISolone sodium succinate, 40 mg, Intravenous, Q12H  pantoprazole, 40 mg, Oral, QAM AC  risperiDONE, 0.5 mg, Oral, BID  rosuvastatin, 40 mg, Oral, Q PM  senna-docusate sodium, 2 tablet, Oral, BID  sertraline, 100 mg, Oral, Daily  sodium chloride, 1,000 mL, Intravenous, Once  sodium chloride, 10 mL, Intravenous, Q12H  sodium chloride, 10 mL, Intravenous, Q12H      dexmedetomidine, 0.2-1.5 mcg/kg/hr, Last Rate: Stopped (02/25/24 0508)  dilTIAZem, 5-15 mg/hr, Last Rate: 15 mg/hr (02/26/24 0648)  phenylephrine, 0.5-3 mcg/kg/min  sodium chloride, 75 mL/hr, Last Rate: 75 mL/hr (02/26/24 0839)        Assessment:  Acute intra cerebral hemorrhage, patient was on anticoagulation for history of atrial fibrillation  CAD with known mid circumflex stenosis that was medically managed  Persistent A-fib not on anticoagulation currently due to intracerebral hemorrhage  Non-STEMI  Hypertension  Dyslipidemia      Recommendations:  Unsure of patient's ability to swallow secondary to dementia in combination with ICH.  Hospitalist has ordered swallow eval.  Medication choice will be dictated based on those results.  Unable to anticoagulate due to ICH.  Continue with Cardizem drip for rate control.      Further decision making based on Dr. Alfonso's assessment and recommendations      Electronically signed by PAULA Crocker, 02/26/24, 9:41 AM EST.    Patient undergoing swallow evaluation at the time of  examination.  Will consider IV amiodarone versus p.o. amiodarone along with Cardizem for better rate control.  Patient not a candidate for anticoagulation due to intracerebral bleed.  Will follow closely  .Electronically signed by Jasmina Alfonso MD, 02/26/24, 11:09 AM EST.

## 2024-02-26 NOTE — CASE MANAGEMENT/SOCIAL WORK
Discharge Planning Assessment  [facility]     Patient Name: Brenda Munroe  MRN: [id number]  Today's Date: 2/26/2024    Admit Date: 2/23/2024    Plan: SS received physician consult for stroke. Pt is a resident of [facility], pt has a 10 day bed hold. SS following.   Discharge Needs Assessment       Row Name 02/26/24 0909       Living Environment    People in Home facility resident    Current Living Arrangements extended care facility            Discharge Plan       Row Name 02/26/24 0910       Plan    Plan SS received physician consult for stroke. Pt is a resident of [facility], pt has a 10 day bed hold. SS following.              Destination       Service Provider Request Status Selected Services Address Phone Fax Patient Preferred    [facility] Pending - No Request Sent N/A [address] [id number] [phone] --              Demographic Summary       Row Name 02/26/24 0907       General Information    Admission Type inpatient    Arrived From emergency department    Referral Source physician    Reason for Consult --  stroke    Preferred Language English               JORGE Bowers

## 2024-02-26 NOTE — PLAN OF CARE
Problem: Noninvasive Ventilation Acute  Goal: Effective Unassisted Ventilation and Oxygenation  Outcome: Ongoing, Progressing     Problem: Skin Injury Risk Increased  Goal: Skin Health and Integrity  Outcome: Ongoing, Progressing  Intervention: Optimize Skin Protection  Recent Flowsheet Documentation  Taken 2/26/2024 1800 by Dyllan Eubanks RN  Head of Bed (HOB) Positioning: HOB at 30-45 degrees  Taken 2/26/2024 1600 by Dyllan Eubanks RN  Head of Bed (HOB) Positioning: HOB at 30-45 degrees  Taken 2/26/2024 1400 by Dyllan Eubanks RN  Head of Bed (HOB) Positioning: HOB at 30-45 degrees  Taken 2/26/2024 1348 by Dyllan Eubanks RN  Head of Bed (HOB) Positioning: HOB at 30-45 degrees  Taken 2/26/2024 1200 by Dyllan Eubanks RN  Head of Bed (HOB) Positioning: HOB at 30-45 degrees  Taken 2/26/2024 1000 by Dyllan Eubanks RN  Head of Bed (HOB) Positioning: HOB at 30-45 degrees  Taken 2/26/2024 0800 by Dyllan Eubanks RN  Pressure Reduction Techniques:   heels elevated off bed   weight shift assistance provided  Head of Bed (HOB) Positioning: HOB at 30-45 degrees  Pressure Reduction Devices:   heel offloading device utilized   positioning supports utilized   pressure-redistributing mattress utilized   specialty bed utilized  Skin Protection:   adhesive use limited   incontinence pads utilized   skin-to-skin areas padded   transparent dressing maintained   tubing/devices free from skin contact     Problem: Fall Injury Risk  Goal: Absence of Fall and Fall-Related Injury  Outcome: Ongoing, Progressing  Intervention: Identify and Manage Contributors  Recent Flowsheet Documentation  Taken 2/26/2024 1348 by Dyllan Eubanks RN  Medication Review/Management: medications reviewed  Taken 2/26/2024 0800 by Dyllan Eubanks RN  Medication Review/Management: medications reviewed  Intervention: Promote Injury-Free Environment  Recent Flowsheet Documentation  Taken 2/26/2024 1800 by Dyllan Eubanks RN  Safety Promotion/Fall Prevention:   fall  prevention program maintained   safety round/check completed  Taken 2/26/2024 1700 by Dyllan Eubanks, RN  Safety Promotion/Fall Prevention:   fall prevention program maintained   safety round/check completed  Taken 2/26/2024 1600 by Dyllan Eubanks, RN  Safety Promotion/Fall Prevention:   fall prevention program maintained   safety round/check completed  Taken 2/26/2024 1500 by Dyllan Eubanks, RN  Safety Promotion/Fall Prevention:   fall prevention program maintained   safety round/check completed  Taken 2/26/2024 1400 by Dyllan Eubanks, RN  Safety Promotion/Fall Prevention:   fall prevention program maintained   safety round/check completed  Taken 2/26/2024 1348 by Dyllan Eubanks RN  Safety Promotion/Fall Prevention:   fall prevention program maintained   safety round/check completed  Taken 2/26/2024 1300 by Dyllan Eubanks, RN  Safety Promotion/Fall Prevention:   fall prevention program maintained   safety round/check completed  Taken 2/26/2024 1200 by Dyllan Eubanks RN  Safety Promotion/Fall Prevention:   fall prevention program maintained   safety round/check completed  Taken 2/26/2024 1100 by Dyllan Eubanks, RN  Safety Promotion/Fall Prevention:   fall prevention program maintained   safety round/check completed  Taken 2/26/2024 1000 by Dyllan Eubanks RN  Safety Promotion/Fall Prevention:   fall prevention program maintained   safety round/check completed  Taken 2/26/2024 0900 by Dyllan Eubanks, RN  Safety Promotion/Fall Prevention:   fall prevention program maintained   safety round/check completed  Taken 2/26/2024 0800 by Dyllan Eubanks RN  Safety Promotion/Fall Prevention:   fall prevention program maintained   safety round/check completed  Taken 2/26/2024 0700 by Dyllan Eubanks, RN  Safety Promotion/Fall Prevention:   fall prevention program maintained   safety round/check completed     Problem: Adult Inpatient Plan of Care  Goal: Plan of Care Review  Outcome: Ongoing, Progressing  Flowsheets  Taken 2/26/2024 1817 by  Dyllan Eubanks, RN  Progress: no change  Outcome Evaluation: Patient had a speech eval today, diet order placed and patient appears to be tolerating well, family at bedside at this time encouraging patient to eat dinner, bed in lowest position, call light in reach, bed alarm set, VSS, WCTM  Taken 2/26/2024 1253 by Bety Pittman OT  Plan of Care Reviewed With: patient  Goal: Patient-Specific Goal (Individualized)  Outcome: Ongoing, Progressing  Goal: Absence of Hospital-Acquired Illness or Injury  Outcome: Ongoing, Progressing  Intervention: Identify and Manage Fall Risk  Recent Flowsheet Documentation  Taken 2/26/2024 1800 by Dyllan Eubanks, RN  Safety Promotion/Fall Prevention:   fall prevention program maintained   safety round/check completed  Taken 2/26/2024 1700 by Dyllan Eubanks RN  Safety Promotion/Fall Prevention:   fall prevention program maintained   safety round/check completed  Taken 2/26/2024 1600 by Dyllan Eubanks RN  Safety Promotion/Fall Prevention:   fall prevention program maintained   safety round/check completed  Taken 2/26/2024 1500 by Dyllan Eubanks RN  Safety Promotion/Fall Prevention:   fall prevention program maintained   safety round/check completed  Taken 2/26/2024 1400 by Dyllan Eubanks RN  Safety Promotion/Fall Prevention:   fall prevention program maintained   safety round/check completed  Taken 2/26/2024 1348 by Dyllan Eubanks RN  Safety Promotion/Fall Prevention:   fall prevention program maintained   safety round/check completed  Taken 2/26/2024 1300 by Dyllan Eubanks RN  Safety Promotion/Fall Prevention:   fall prevention program maintained   safety round/check completed  Taken 2/26/2024 1200 by Dyllan Eubanks, RN  Safety Promotion/Fall Prevention:   fall prevention program maintained   safety round/check completed  Taken 2/26/2024 1100 by Dyllan Eubanks RN  Safety Promotion/Fall Prevention:   fall prevention program maintained   safety round/check completed  Taken 2/26/2024 1000 by  Raimundo, Dyllan, RN  Safety Promotion/Fall Prevention:   fall prevention program maintained   safety round/check completed  Taken 2/26/2024 0900 by Dyllan Eubanks RN  Safety Promotion/Fall Prevention:   fall prevention program maintained   safety round/check completed  Taken 2/26/2024 0800 by Dyllan Eubanks RN  Safety Promotion/Fall Prevention:   fall prevention program maintained   safety round/check completed  Taken 2/26/2024 0700 by Dyllan Eubanks RN  Safety Promotion/Fall Prevention:   fall prevention program maintained   safety round/check completed  Intervention: Prevent Skin Injury  Recent Flowsheet Documentation  Taken 2/26/2024 1800 by Dyllan Eubanks RN  Body Position: position changed independently  Taken 2/26/2024 1600 by Dyllan Eubanks RN  Body Position: position changed independently  Taken 2/26/2024 1400 by Dyllan Eubanks RN  Body Position: position changed independently  Taken 2/26/2024 1348 by Dyllan Eubanks RN  Body Position: position changed independently  Taken 2/26/2024 1200 by Dyllan Eubanks RN  Body Position: position changed independently  Taken 2/26/2024 1000 by Dyllan Eubanks RN  Body Position: position changed independently  Taken 2/26/2024 0800 by Dyllan Eubanks RN  Body Position: position changed independently  Skin Protection:   adhesive use limited   incontinence pads utilized   skin-to-skin areas padded   transparent dressing maintained   tubing/devices free from skin contact  Intervention: Prevent and Manage VTE (Venous Thromboembolism) Risk  Recent Flowsheet Documentation  Taken 2/26/2024 1348 by Dyllan Eubanks RN  Activity Management: bedrest  Taken 2/26/2024 0800 by Dyllan Eubanks RN  Activity Management: bedrest  Range of Motion:   active ROM (range of motion) encouraged   ROM (range of motion) performed  Intervention: Prevent Infection  Recent Flowsheet Documentation  Taken 2/26/2024 1348 by Dyllan Eubanks RN  Infection Prevention:   hand hygiene promoted   equipment surfaces  disinfected   rest/sleep promoted   personal protective equipment utilized   single patient room provided   visitors restricted/screened  Taken 2/26/2024 0800 by Dyllan Eubanks, RN  Infection Prevention:   equipment surfaces disinfected   hand hygiene promoted   personal protective equipment utilized   rest/sleep promoted   single patient room provided   visitors restricted/screened  Goal: Optimal Comfort and Wellbeing  Outcome: Ongoing, Progressing  Intervention: Monitor Pain and Promote Comfort  Recent Flowsheet Documentation  Taken 2/26/2024 0800 by Dyllan Eubanks, RN  Pain Management Interventions:   care clustered   pillow support provided   position adjusted   quiet environment facilitated   see MAR  Intervention: Provide Person-Centered Care  Recent Flowsheet Documentation  Taken 2/26/2024 0800 by Dyllan Eubakns, RN  Trust Relationship/Rapport:   care explained   choices provided   emotional support provided   empathic listening provided   questions answered   questions encouraged   reassurance provided   thoughts/feelings acknowledged  Goal: Readiness for Transition of Care  Outcome: Ongoing, Progressing   Goal Outcome Evaluation:           Progress: no change  Outcome Evaluation: Patient had a speech eval today, diet order placed and patient appears to be tolerating well, family at bedside at this time encouraging patient to eat dinner, bed in lowest position, call light in reach, bed alarm set, VSS, WCTM

## 2024-02-26 NOTE — PLAN OF CARE
Goal Outcome Evaluation:  Plan of Care Reviewed With: patient        Progress: improving         Problem: Noninvasive Ventilation Acute  Goal: Effective Unassisted Ventilation and Oxygenation  Outcome: Ongoing, Progressing     Problem: Skin Injury Risk Increased  Goal: Skin Health and Integrity  Outcome: Ongoing, Progressing  Intervention: Optimize Skin Protection  Recent Flowsheet Documentation  Taken 2/26/2024 0400 by Stefany Ta RN  Head of Bed ([facility]) Positioning: HOB elevated  Taken 2/26/2024 0200 by Stefany Ta RN  Pressure Reduction Techniques:   weight shift assistance provided   frequent weight shift encouraged   pressure points protected   heels elevated off bed  Head of Bed (HOB) Positioning: HOB elevated  Pressure Reduction Devices:   pressure-redistributing mattress utilized   positioning supports utilized   heel offloading device utilized  Skin Protection:   tubing/devices free from skin contact   adhesive use limited  Taken 2/26/2024 0000 by Stefany Ta RN  Head of Bed (HOB) Positioning: HOB elevated  Taken 2/25/2024 2200 by Stefany Ta RN  Head of Bed (HOB) Positioning: HOB elevated  Taken 2/25/2024 2030 by Stefany Ta RN  Pressure Reduction Techniques:   weight shift assistance provided   pressure points protected   heels elevated off bed  Head of Bed (HOB) Positioning: HOB elevated  Pressure Reduction Devices:   pressure-redistributing mattress utilized   positioning supports utilized   heel offloading device utilized  Skin Protection:   tubing/devices free from skin contact   adhesive use limited     Problem: Fall Injury Risk  Goal: Absence of Fall and Fall-Related Injury  Outcome: Ongoing, Progressing  Intervention: Identify and Manage Contributors  Recent Flowsheet Documentation  Taken 2/26/2024 0400 by Stefany Ta RN  Medication Review/Management: medications reviewed  Taken 2/26/2024 0300 by Stefany Ta RN  Medication Review/Management: medications reviewed  Taken  2/26/2024 0200 by Stefany Ta RN  Medication Review/Management: medications reviewed  Taken 2/26/2024 0100 by Stefany Ta RN  Medication Review/Management: medications reviewed  Taken 2/26/2024 0000 by Stefany Ta RN  Medication Review/Management: medications reviewed  Taken 2/25/2024 2300 by Stefany Ta RN  Medication Review/Management: medications reviewed  Taken 2/25/2024 2200 by Stefany Ta RN  Medication Review/Management: medications reviewed  Taken 2/25/2024 2100 by Stefany Ta RN  Medication Review/Management: medications reviewed  Taken 2/25/2024 2030 by Stefany Ta RN  Medication Review/Management: medications reviewed  Intervention: Promote Injury-Free Environment  Recent Flowsheet Documentation  Taken 2/26/2024 0400 by Stefany Ta RN  Safety Promotion/Fall Prevention:   safety round/check completed   fall prevention program maintained  Taken 2/26/2024 0300 by Stefany Ta RN  Safety Promotion/Fall Prevention:   safety round/check completed   fall prevention program maintained  Taken 2/26/2024 0200 by Stefany Ta RN  Safety Promotion/Fall Prevention:   safety round/check completed   fall prevention program maintained  Taken 2/26/2024 0100 by Stefany Ta RN  Safety Promotion/Fall Prevention:   safety round/check completed   fall prevention program maintained  Taken 2/26/2024 0000 by Stefany Ta RN  Safety Promotion/Fall Prevention:   safety round/check completed   fall prevention program maintained  Taken 2/25/2024 2300 by Stefany Ta RN  Safety Promotion/Fall Prevention:   safety round/check completed   fall prevention program maintained  Taken 2/25/2024 2200 by Stefany Ta RN  Safety Promotion/Fall Prevention:   safety round/check completed   fall prevention program maintained  Taken 2/25/2024 2100 by Stefany Ta RN  Safety Promotion/Fall Prevention:   safety round/check completed   fall prevention program maintained  Taken 2/25/2024 2030 by  Ta, Stefany, RN  Safety Promotion/Fall Prevention:   safety round/check completed   fall prevention program maintained     Problem: Adult Inpatient Plan of Care  Goal: Plan of Care Review  Outcome: Ongoing, Progressing  Flowsheets (Taken 2/26/2024 0423)  Progress: improving  Plan of Care Reviewed With: patient  Goal: Patient-Specific Goal (Individualized)  Outcome: Ongoing, Progressing  Goal: Absence of Hospital-Acquired Illness or Injury  Outcome: Ongoing, Progressing  Intervention: Identify and Manage Fall Risk  Recent Flowsheet Documentation  Taken 2/26/2024 0400 by Stefany Ta RN  Safety Promotion/Fall Prevention:   safety round/check completed   fall prevention program maintained  Taken 2/26/2024 0300 by Stefany Ta RN  Safety Promotion/Fall Prevention:   safety round/check completed   fall prevention program maintained  Taken 2/26/2024 0200 by Stefany Ta RN  Safety Promotion/Fall Prevention:   safety round/check completed   fall prevention program maintained  Taken 2/26/2024 0100 by Stefany Ta RN  Safety Promotion/Fall Prevention:   safety round/check completed   fall prevention program maintained  Taken 2/26/2024 0000 by Stefany Ta RN  Safety Promotion/Fall Prevention:   safety round/check completed   fall prevention program maintained  Taken 2/25/2024 2300 by Stefany Ta RN  Safety Promotion/Fall Prevention:   safety round/check completed   fall prevention program maintained  Taken 2/25/2024 2200 by Stefany Ta RN  Safety Promotion/Fall Prevention:   safety round/check completed   fall prevention program maintained  Taken 2/25/2024 2100 by Stefany Ta RN  Safety Promotion/Fall Prevention:   safety round/check completed   fall prevention program maintained  Taken 2/25/2024 2030 by Stefany Ta RN  Safety Promotion/Fall Prevention:   safety round/check completed   fall prevention program maintained  Intervention: Prevent Skin Injury  Recent Flowsheet  Documentation  Taken 2/26/2024 0400 by Stefany Ta RN  Body Position: position changed independently  Taken 2/26/2024 0200 by Stefany Ta RN  Body Position: position changed independently  Skin Protection:   tubing/devices free from skin contact   adhesive use limited  Taken 2/26/2024 0000 by Stefany Ta RN  Body Position: position changed independently  Taken 2/25/2024 2200 by Stefany Ta RN  Body Position: position changed independently  Taken 2/25/2024 2030 by Stefany Ta RN  Body Position: position changed independently  Skin Protection:   tubing/devices free from skin contact   adhesive use limited  Intervention: Prevent and Manage VTE (Venous Thromboembolism) Risk  Recent Flowsheet Documentation  Taken 2/25/2024 2030 by Stefany Ta RN  Activity Management: bedrest  Goal: Optimal Comfort and Wellbeing  Outcome: Ongoing, Progressing  Goal: Readiness for Transition of Care  Outcome: Ongoing, Progressing

## 2024-02-26 NOTE — CONSULTS
Assessment:  Diagnosis: subdural hematoma    No Known Allergies    Order Date/Time: 2/26/2024 0854  Indications: multi-meds; critical illness; difficult access  LABS:  Lab Results   Component Value Date    INR 1.50 (H) 02/23/2024    PROTIME 18.8 (H) 02/23/2024     Lab Results   Component Value Date    PTT 33.4 02/23/2024     Lab Results   Component Value Date    WBC 9.40 02/26/2024    HGB 9.8 (L) 02/26/2024    HCT 33.3 (L) 02/26/2024    MCV 99.7 (H) 02/26/2024     02/26/2024     Lab Results   Component Value Date    BUN 21 02/26/2024     Lab Results   Component Value Date    CREATININE 0.77 02/26/2024     Lab Results   Component Value Date    EGFRIFNONA 75 03/25/2021    EGFRIFAFRI  09/24/2016      Comment:      <15 Indicative of kidney failure.     Labs Reviewed: all labs reviewed    Contraindications for PICC/Midline:  No contraindications noted  Pt nurse requested US PIV in addition to midline    Recommendations:  PowerGlide Pro Midline Catheter; 18 g; 10 cm  Upper Left Basilic    Introcan Safety IV Catheter 20 g; 1.75 in  Upper Left Cephalic    Procedure Time Out:  Time out Time: 1155  Correct Patient Identity: Yes  Correct Surgical Side and Site Are Marked: Yes  Agreement on Procedure to be done: Yes  Antibiotic Given: N/A  RN: MARIBEL Mann RN    PowerGlide Pro Midline Catheter placed with ultrasound guidance and verified by blood return. Minimal blood loss noted. Catheter flushes easily. Blood return noted.     Introcan Safety IV Catheter placed with ultrasound guidance and verified by blood return. Minimal blood loss noted. Catheter flushes easily. Blood return noted.     Patient nurse, Dyllan GARCIA, made aware that midline and US PIV are in upper L arm and ready for use.    Radha Mann, JOSE

## 2024-02-26 NOTE — PROGRESS NOTES
[facility] HOSPITALIST PROGRESS NOTE    Subjective     History:   Brenda Munroe is a 73 y.o. female admitted on 2/23/2024 secondary to Subdural hematoma     Procedures: None    CC: Follow up subdural hematoma     Patient seen and examined with JOSE Mijares. Awake and alert but confused. Able to follow a few simple commands. Unable to provide reliable history. Remains on Cardizem gtt. No acute events overnight per RN.     History taken from: chart, and RN.      Objective     Vital Signs  Temp:  [97 °F (36.1 °C)-99.4 °F (37.4 °C)] 97.7 °F (36.5 °C)  Heart Rate:  [] 112  Resp:  [19-26] 19  BP: (103-141)/() 119/98    Intake/Output Summary (Last 24 hours) at 2/26/2024 1134  Last data filed at 2/26/2024 0600  Gross per 24 hour   Intake 1235.63 ml   Output 0 ml   Net 1235.63 ml         Physical Exam:  General:    Awake, alert but confused, in no acute distress, chronically ill appearing, (+) facial ecchymoses    Heart:      Normal S1 and S2. Irregularly irregular. Tachycardic.    Lungs:     Respirations regular, even and unlabored. Lungs clear to auscultation B/L. No wheezes, rales or rhonchi.   Abdomen:   Soft and nontender. No guarding, rebound tenderness or  organomegaly noted. Bowel sounds present x 4.   Extremities:  No clubbing, cyanosis or edema noted. Moves UE and LE equally B/L.     Results Review:    Results from last 7 days   Lab Units 02/26/24  0018 02/25/24  0546 02/25/24  0021 02/23/24  1739 02/23/24  1135   WBC 10*3/mm3 9.40 6.16 9.37 5.47 5.33   HEMOGLOBIN g/dL 9.8* 11.3* 11.5* 10.6* 11.1*   PLATELETS 10*3/mm3 250 243 260 225 233     Results from last 7 days   Lab Units 02/26/24  0015 02/25/24  0546 02/25/24  0021 02/24/24  1547 02/23/24  1739 02/23/24  1135   SODIUM mmol/L 149* 143 142 151* 148* 147*   POTASSIUM mmol/L 3.8 4.6 5.2 5.4* 4.6 3.9   CHLORIDE mmol/L 105 101 102 104 105 105   CO2 mmol/L 31.6* 22.7 22.1 29.1* 35.8* 40.1*   BUN mg/dL 21 25* 23 21 16 17   CREATININE mg/dL  0.77 0.97 0.85 0.91 0.84 0.86   CALCIUM mg/dL 8.6 8.9 9.1 9.1 8.9 9.1   GLUCOSE mg/dL 105* 154* 135* 120* 97 100*     Results from last 7 days   Lab Units 02/26/24  0015 02/25/24  0546 02/25/24  0021 02/23/24  1739 02/23/24  1135   BILIRUBIN mg/dL 0.4 0.4 0.4 0.3 0.3   ALK PHOS U/L 65 76 72 74 73   AST (SGOT) U/L 16 25 28 19 19   ALT (SGPT) U/L 10 13 11 13 15     Results from last 7 days   Lab Units 02/26/24  0015 02/24/24  1547   MAGNESIUM mg/dL 2.1 2.3     Results from last 7 days   Lab Units 02/23/24  1135   INR  1.50*     Results from last 7 days   Lab Units 02/23/24  1340 02/23/24  1135   CK TOTAL U/L 89  --    HSTROP T ng/L 452* 474*       Imaging Results (Last 24 Hours)       Procedure Component Value Units Date/Time    FL Video Swallow Single Contrast [[id number]] Collected: 02/26/24 1111     Updated: 02/26/24 1113    Narrative:      EXAMINATION: FL VIDEO SWALLOW SINGLE-CONTRAST-      CLINICAL INDICATION:     aspiration; S06.5XAA-Traumatic subdural  hemorrhage with loss of consciousness status unknown, initial encounter;  I21.4-Non-ST elevation (NSTEMI) myocardial infarction; Z79.01-Long term  (current) use of anticoagulants; S02.2XXA-Fracture of nasal bones,  initial encounter for closed fracture; S05.11XA-Contusion of eyeball and  orbital tissues, right eye, initial encounter     TECHNIQUE: Modified barium swallow was performed utilizing video  fluoroscopy in conjunction with the Speech Pathology team. The patient  ingested multiple barium media including thin barium, nectar, and honey  liquid as well as barium pudding and a barium pudding coated cracker.      FLUOROSCOPY TIME: 1.4 minutes     COMPARISON: 11/19/2021     FINDINGS:   Premature loss is noted with vallecular pooling.  No episodes of aspiration or penetration identified with all tested  consistencies.          Impression:      1.  No aspiration.  2. Please see dysphasia notes for further details.        This report was finalized on 2/26/2024  11:11 AM by Dr. Erik Jones MD.                 Medications:  baclofen, 5 mg, Oral, BID  budesonide-formoterol, 2 puff, Inhalation, BID - RT   And  tiotropium bromide monohydrate, 2 puff, Inhalation, Daily - RT  [START ON 3/1/2024] cholecalciferol, 50,000 Units, Oral, Weekly  donepezil, 10 mg, Oral, Nightly  ferrous sulfate, 325 mg, Oral, BID With Meals  folic acid 1 mg in sodium chloride 0.9 % 50 mL IVPB, 1 mg, Intravenous, Daily  ipratropium, 0.5 mg, Nebulization, 4x Daily - RT  isosorbide mononitrate, 15 mg, Oral, Q24H  Lacosamide, 50 mg, Intravenous, Q12H  lamoTRIgine, 100 mg, Oral, Daily  lamoTRIgine, 25 mg, Oral, Nightly  levothyroxine, 75 mcg, Oral, Q AM  memantine, 10 mg, Oral, Q12H  methylPREDNISolone sodium succinate, 40 mg, Intravenous, Q12H  pantoprazole, 40 mg, Oral, QAM AC  risperiDONE, 0.5 mg, Oral, BID  rosuvastatin, 40 mg, Oral, Q PM  senna-docusate sodium, 2 tablet, Oral, BID  sertraline, 100 mg, Oral, Daily  sodium chloride, 1,000 mL, Intravenous, Once  sodium chloride, 10 mL, Intravenous, Q12H  sodium chloride, 10 mL, Intravenous, Q12H      dexmedetomidine, 0.2-1.5 mcg/kg/hr, Last Rate: Stopped (02/25/24 0508)  dilTIAZem, 5-15 mg/hr, Last Rate: 15 mg/hr (02/26/24 0648)  phenylephrine, 0.5-3 mcg/kg/min  sodium chloride, 75 mL/hr, Last Rate: 75 mL/hr (02/26/24 0839)            Assessment & Plan   Traumatic subdural hematoma: S/P recent fall PTA. CT head revealed a 4 mm left parafalcine subdural hematoma. Neurology consulted and neurosurgery notified. Upon review, neurosurgery recommended conservative management with no reversal agent and management at our facility. Repeat CT head stable. MRI brain ordered. Cont close monitoring. Neurology and neurosurgery input appreciated.     Persistent Afib with RVR: Chronically anticoagulated with Eliquis which was held on admission in the setting of above. Currently on Cardizem gtt. Plan to start PO meds once able to safely tolerate PO. Monitor on  telemetry. Cardiology input appreciated.      Acute COPD exacerbation: Cont steroids, nebs and inhaler regimen.     Acute hypercapnic respiratory failure: Likely 2/2 COPD exacerbation and elevated O2 levels on supplemental O2. Improved with BiPAP.     Acute metabolic encephalopathy superimposed on baseline dementia: Likely 2/2 hypercapnia and hospitalization. Previously required Precedex. Overall improved. Cont treatment as outlined above and supportive treatment.     Hypertensive urgency: Improved with IV labetalol and hydralazine. Currently on Cardizem gtt in the setting of above. Cont to monitor.     NSTEMI in the setting of known CAD: Possibly type II in the setting of above. Echo reveals an EF of 41-45%. No antiplatelet therapy in the setting of subdural hematoma.     Right nasal bone fracture: S/P fall. Cont supportive treatment.     GERD: Cont PPI.     DVT PPX: SCD's. No pharmacologic DVT PPX 2/2 subdural hematoma.     Disposition Pending clinical course. Likely return to SNF when medically stable.     Prasad Romero,   02/26/24  11:34 EST

## 2024-02-26 NOTE — PLAN OF CARE
Goal Outcome Evaluation:  Plan of Care Reviewed With: patient        Progress: no change  Outcome Evaluation: Pt presents w/ decreased bed mobility, transfers, and gait. Pt would benefit from skilled PT. Anticipate d/c back to SNF.      Anticipated Discharge Disposition (PT): skilled nursing facility

## 2024-02-26 NOTE — THERAPY EVALUATION
Acute Care - Physical Therapy Initial Evaluation   [place]     Patient Name: Brenda Munroe  : 1950  MRN: [id number]  Today's Date: 2024   Onset of Illness/Injury or Date of Surgery: 24  Visit Dx:     ICD-10-CM ICD-9-CM   1. Subdural hematoma  S06.5XAA 432.1   2. NSTEMI (non-ST elevated myocardial infarction)  I21.4 410.70   3. Chronic anticoagulation  Z79.01 V58.61   4. Closed fracture of nasal bone, initial encounter  S02.2XXA 802.0   5. Traumatic orbital hematoma, right, initial encounter  S05.11XA 921.2     Patient Active Problem List   Diagnosis    Psychosis    Severe recurrent major depression with psychotic features    COPD (chronic obstructive pulmonary disease)    Coronary artery disease    Disease of thyroid gland    Arthritis    Hypertension    Paroxysmal atrial fibrillation    Chronic headaches    Vision changes    Carotid stenosis, asymptomatic, bilateral    Bradycardia, sinus    Acute respiratory failure with hypoxia and hypercapnia    NSTEMI (non-ST elevated myocardial infarction)    A-fib    Hypercapnic respiratory failure    Subdural hematoma     Past Medical History:   Diagnosis Date    Anxiety     Arthritis     Bell's palsy     Bladder prolapse, female, acquired     Carotid stenosis     COPD (chronic obstructive pulmonary disease)     COPD (chronic obstructive pulmonary disease)     Coronary artery disease     Dementia     Dementia     Depression     Difficulty walking     Disease of thyroid gland     Frequency of urination     GERD (gastroesophageal reflux disease)     Heart attack     Hyperlipidemia     Hypertension     Irregular heart beat     Peptic ulcer disease      Past Surgical History:   Procedure Laterality Date    CARDIAC CATHETERIZATION N/A 2023    Procedure: Left Heart Cath;  Surgeon: Tab Cifuentes MD;  Location: [facility] INVASIVE LOCATION;  Service: Cardiology;  Laterality: N/A;    CARDIAC SURGERY      open heart  in     CYSTOSCOPY N/A  11/8/2017    Procedure: CYSTOSCOPY;  Surgeon: Karl Nicolas DO;  Location: [facility] OR;  Service:     OTHER SURGICAL HISTORY      states she had fluid drained no surgery    VAGINAL HYSTERECTOMY W/ ANTERIOR AND POSTERIOR VAGINAL REPAIR N/A 11/8/2017    Procedure: VAGINAL HYSTERECTOMY WITH ANTERIOR, POSTERIOR, AND ENTEROCELE VAGINAL REPAIR;  Surgeon: Karl Nicolas DO;  Location: [facility] OR;  Service:     VAGINAL VAULT SUSPENSION N/A 11/8/2017    Procedure: VAGINAL VAULT SUSPENSION ;  Surgeon: Karl Nicolas DO;  Location: [facility] OR;  Service:      PT Assessment (last 12 hours)       PT Evaluation and Treatment       Row Name 02/26/24 1147          Physical Therapy Time and Intention    Subjective Information no complaints  -CS     Document Type evaluation  -CS     Mode of Treatment physical therapy  -CS     Patient Effort adequate  -CS     Comment Pt seen bedside for evaluation this AM. Pt well-known from prior admissions. Pt fell at NH and does not recall events. Pt agreed to evaluation.  -CS       Row Name 02/26/24 1147          General Information    Patient Profile Reviewed yes  -CS     Onset of Illness/Injury or Date of Surgery 02/23/24  -CS     Referring Physician Heather  -CS     Patient Observations alert;cooperative;agree to therapy  -CS     General Observations of Patient Pt supine, all CCU lines intact  -CS     Risks Reviewed patient:;LOB;nausea/vomiting;dizziness;increased discomfort;change in vital signs;increased drainage;lines disloged  -CS     Benefits Reviewed patient:;improve function;increase independence;increase strength;increase balance;decrease pain;decrease risk of DVT;improve skin integrity;increase knowledge  -CS       Row Name 02/26/24 1147          Pain    Pre/Posttreatment Pain Comment no c/o  -CS       Row Name 02/26/24 1147          Cognition    Orientation Status (Cognition) oriented to;person  -CS       Row Name 02/26/24 1147          Range of Motion (ROM)     Range of Motion bilateral lower extremities;ROM is WFL  -       Row Name 02/26/24 1147          Strength Comprehensive (MMT)    Comment, General Manual Muscle Testing (MMT) Assessment (B)LE WFL  -       Row Name 02/26/24 1147          Bed Mobility    Bed Mobility bed mobility (all) activities  -     All Activities, [place] (Bed Mobility) minimum assist (75% patient effort);moderate assist (50% patient effort)  -     Assistive Device (Bed Mobility) bed rails;draw sheet;head of bed elevated  -[facility] Name 02/26/24 1147          Transfers    Transfers sit-stand transfer;stand-sit transfer  -       Row Name 02/26/24 1147          Sit-Stand Transfer    Sit-Stand [place] (Transfers) moderate assist (50% patient effort)  -CS     Assistive Device (Sit-Stand Transfers) --  assist of therapist  -[facility] Name 02/26/24 1147          Stand-Sit Transfer    Stand-Sit [place] (Transfers) moderate assist (50% patient effort)  -     Assistive Device (Stand-Sit Transfers) --  assist of therapist  -[facility] Name 02/26/24 1147          Gait/Stairs (Locomotion)    Patient was able to Ambulate no, other medical factors prevent ambulation  -     Reason Patient was unable to Ambulate Excessive Weakness  -       Row Name 02/26/24 1147          Plan of Care Review    Plan of Care Reviewed With patient  -CS     Progress no change  -     Outcome Evaluation Pt presents w/ decreased bed mobility, transfers, and gait. Pt would benefit from skilled PT. Anticipate d/c back to SNF.  -       Row Name 02/26/24 1147          Positioning and Restraints    Pre-Treatment Position in bed  -     Post Treatment Position bed  -CS     In Bed supine;call light within reach;encouraged to call for assist;exit alarm on  -       Row Name 02/26/24 1147          Therapy Assessment/Plan (PT)    Functional Level at Time of Evaluation (PT) mod/max(A)  -     PT Diagnosis (PT) impaired functional mobility  -      Rehab Potential (PT) good, to achieve stated therapy goals  -CS     Criteria for Skilled Interventions Met (PT) yes;meets criteria;skilled treatment is necessary  -CS     Therapy Frequency (PT) 2 times/wk  2-5x/week  -CS     Predicted Duration of Therapy Intervention (PT) while in house  -CS     Problem List (PT) problems related to;balance;mobility;strength  -CS     Activity Limitations Related to Problem List (PT) unable to ambulate safely;unable to transfer safely  -CS     Comment, Therapy Assessment/Plan (PT) Pt presents w/ decreased bed mobility, transfers, and gait. Pt would benefit from skilled PT. D/C disposition depends on progress while in house.  -CS       Row Name 02/26/24 1141          PT Evaluation Complexity    History, PT Evaluation Complexity 3 or more personal factors and/or comorbidities  -CS     Examination of Body Systems (PT Eval Complexity) total of 4 or more elements  -CS     Clinical Presentation (PT Evaluation Complexity) evolving  -CS     Clinical Decision Making (PT Evaluation Complexity) moderate complexity  -CS     Overall Complexity (PT Evaluation Complexity) moderate complexity  -CS       Row Name 02/26/24 1141          Therapy Plan Review/Discharge Plan (PT)    Therapy Plan Review (PT) evaluation/treatment results reviewed;care plan/treatment goals reviewed;risks/benefits reviewed;current/potential barriers reviewed;participants voiced agreement with care plan;participants included;patient  -CS       Row Name 02/26/24 1146          Physical Therapy Goals    Bed Mobility Goal Selection (PT) bed mobility, PT goal 1  -CS     Transfer Goal Selection (PT) transfer, PT goal 1  -CS     Gait Training Goal Selection (PT) gait training, PT goal 1  -CS       Row Name 02/26/24 1143          Bed Mobility Goal 1 (PT)    Activity/Assistive Device (Bed Mobility Goal 1, PT) bed mobility activities, all  -CS     [place] Level/Cues Needed (Bed Mobility Goal 1, PT) standby assist  -CS     Time  Frame (Bed Mobility Goal 1, PT) long term goal (LTG);by discharge  -       Row Name 02/26/24 1147          Transfer Goal 1 (PT)    Activity/Assistive Device (Transfer Goal 1, PT) transfers, all  -CS     [place] Level/Cues Needed (Transfer Goal 1, PT) standby assist  -CS     Time Frame (Transfer Goal 1, PT) long term goal (LTG);by discharge  -       Row Name 02/26/24 1147          Gait Training Goal 1 (PT)    Activity/Assistive Device (Gait Training Goal 1, PT) gait (walking locomotion);assistive device use  -CS     [place] Level (Gait Training Goal 1, PT) standby assist  -CS     Distance (Gait Training Goal 1, PT) 50  -CS     Time Frame (Gait Training Goal 1, PT) long term goal (LTG);by discharge  -               User Key  (r) = Recorded By, (t) = Taken By, (c) = Cosigned By      Initials Name Provider Type    Ortiz Roy, PT Physical Therapist                    Physical Therapy Education       Title: PT OT SLP Therapies (Done)       Topic: Physical Therapy (Done)       Point: Mobility training (Done)       Learning Progress Summary             Patient Acceptance, E,TB, VU by  at 2/26/2024 1152                         Point: Home exercise program (Done)       Learning Progress Summary             Patient Acceptance, E,TB, VU by  at 2/26/2024 1152                         Point: Body mechanics (Done)       Learning Progress Summary             Patient Acceptance, E,TB, VU by  at 2/26/2024 1152                         Point: Precautions (Done)       Learning Progress Summary             Patient Acceptance, E,TB, VU by  at 2/26/2024 1152                                         User Key       Initials Effective Dates Name Provider Type [facility] 05/31/23 -  Ortiz Peters, PT Physical Therapist PT                  PT Recommendation and Plan  Anticipated Discharge Disposition (PT): skilled nursing facility  Planned Therapy Interventions (PT): balance training, bed mobility training,  gait training, home exercise program, neuromuscular re-education, patient/family education, strengthening, transfer training  Therapy Frequency (PT): 2 times/wk (2-5x/week)  Plan of Care Reviewed With: patient  Progress: no change  Outcome Evaluation: Pt presents w/ decreased bed mobility, transfers, and gait. Pt would benefit from skilled PT. Anticipate d/c back to SNF.       Time Calculation:    PT Charges       Row Name 02/26/24 1156             Time Calculation    Start Time 0930  -CS      PT Received On 02/26/24  -      PT Goal Re-Cert Due Date 03/11/24  -                User Key  (r) = Recorded By, (t) = Taken By, (c) = Cosigned By      Initials Name Provider Type    CS Ortiz Peters, PT Physical Therapist                  Therapy Charges for Today       Code Description Service Date Service Provider Modifiers Qty    04387360025 HC PT EVAL MOD COMPLEXITY 4 2/26/2024 Ortiz Peters, PT GP 1            PT G-Codes  AM-PAC 6 Clicks Score (PT): 9    Ortiz Peters PT  2/26/2024

## 2024-02-26 NOTE — CONSULTS
Diabetes Education    Patient Name:  Brenda Munroe  YOB: 1950  MRN: [id number]  Admit Date:  2/23/2024      Per stroke protocol, A1C 5.0, BG WNL, no history of diabetes.   Patient is resident of a nursing home where they provide all medications, diet, blood glucose monitoring. Please contact me if any questions or concerns.        Electronically signed,  Florencia Beck RN  02/26/24 09:39 EST      Electronically signed by:  Florencia Beck RN  02/26/24 09:39 EST

## 2024-02-26 NOTE — MBS/VFSS/FEES
Acute Care - Speech Language Pathology   Swallow Re-Evaluation  [facility]zy  MODIFIED BARIUM SWALLOW STUDY     Patient Name: Brenda Munroe  : 1950  MRN: [id number]  Today's Date: 2024             Admit Date: 2024    Visit Dx:     ICD-10-CM ICD-9-CM   1. Subdural hematoma  S06.5XAA 432.1   2. NSTEMI (non-ST elevated myocardial infarction)  I21.4 410.70   3. Chronic anticoagulation  Z79.01 V58.61   4. Closed fracture of nasal bone, initial encounter  S02.2XXA 802.0   5. Traumatic orbital hematoma, right, initial encounter  S05.11XA 921.2     Patient Active Problem List   Diagnosis    Psychosis    Severe recurrent major depression with psychotic features    COPD (chronic obstructive pulmonary disease)    Coronary artery disease    Disease of thyroid gland    Arthritis    Hypertension    Paroxysmal atrial fibrillation    Chronic headaches    Vision changes    Carotid stenosis, asymptomatic, bilateral    Bradycardia, sinus    Acute respiratory failure with hypoxia and hypercapnia    NSTEMI (non-ST elevated myocardial infarction)    A-fib    Hypercapnic respiratory failure    Subdural hematoma     Past Medical History:   Diagnosis Date    Anxiety     Arthritis     Bell's palsy     Bladder prolapse, female, acquired     Carotid stenosis     COPD (chronic obstructive pulmonary disease)     COPD (chronic obstructive pulmonary disease)     Coronary artery disease     Dementia     Dementia     Depression     Difficulty walking     Disease of thyroid gland     Frequency of urination     GERD (gastroesophageal reflux disease)     Heart attack     Hyperlipidemia     Hypertension     Irregular heart beat     Peptic ulcer disease      Past Surgical History:   Procedure Laterality Date    CARDIAC CATHETERIZATION N/A 2023    Procedure: Left Heart Cath;  Surgeon: Tab Cifuentes MD;  Location:  [facility];  Service: Cardiology;  Laterality: N/A;    CARDIAC SURGERY      open heart  in  1999    CYSTOSCOPY N/A 11/8/2017    Procedure: CYSTOSCOPY;  Surgeon: Karl Nicolas DO;  Location:  [facility] OR;  Service:     OTHER SURGICAL HISTORY      states she had fluid drained no surgery    VAGINAL HYSTERECTOMY W/ ANTERIOR AND POSTERIOR VAGINAL REPAIR N/A 11/8/2017    Procedure: VAGINAL HYSTERECTOMY WITH ANTERIOR, POSTERIOR, AND ENTEROCELE VAGINAL REPAIR;  Surgeon: Karl Nicolas DO;  Location: [facility] OR;  Service:     VAGINAL VAULT SUSPENSION N/A 11/8/2017    Procedure: VAGINAL VAULT SUSPENSION ;  Surgeon: Karl Nicolas DO;  Location: [facility] OR;  Service:      Brenda LOURDES Luwdig  presented to the radiology suite this am from CCU to participate in an instrumental MBS to objectively re-evaluate safety/efficacy of swallowing fnx, determine safest/least restrictive diet. She was accompanied by her RN, Dyllan.     She presented to [facility] ED 2/23/24 for further evaluation of fall, head injury. Per report patient was found facedown at the nursing home on day of admission. Per staff patient had been at her normal state of health during medication administration at 0800. On exam patient noted with significant swelling to the right eye and surrounding face/scalp.  She was able to follow a few commands and state her name but answered no other questions. She did move the bilateral upper extremities when asked.     PMH is significant for MDD, COPD, CAD, thyroid disease, hypertension, paroxysmal atrial fibrillation, bilateral carotid stenosis, dementia, GERD, PUD     Upon arrival to the ED, vital signs were temp 97, heart rate 111, respirations 20, BP 91/97, SpO2 100% on 4 L per nasal cannula.  HS troponin on arrival significantly elevated at 474 with repeat at 452.  CK is normal.  Sodium was 147.  Urine notable for specific gravity greater than 1.03 with moderate blood, 1+ leukocytes, 11-20 RBCs and 21-50 WBCs.  Imaging workup noted 4 mm left parafalcine subdural hematoma.  There was also a right nasal  bone fracture.  EKG notes A-fib with right bundle branch block.     She was referred per findings of SDH s/p fall to r/o aspiration. She was initially assessed 2/24/24 w/ no overt s/s aspiration, but recommendation for continued NPO status per decreased a/a status, ams. She participated in Clinical Dysphagia Reassessment this am w/ recommendation for MBS.      Ms. Munroe is familiar to SLP from previous outpatient MBS on 9/2/21, Clinical Dysphagia Assessment 3/9/22 during prior admission w/ no evidence or h/o aspiration, WFL oropharyngeal swallow. She was recommended least restrictive regular consistency po diet, thin liquids.       Social History     Socioeconomic History    Marital status:     Number of children: 3   Tobacco Use    Smoking status: Every Day     Packs/day: 0.50     Years: 55.00     Additional pack years: 0.00     Total pack years: 27.50     Types: Cigarettes    Smokeless tobacco: Never   Vaping Use    Vaping Use: Never used   Substance and Sexual Activity    Alcohol use: No    Drug use: No    Sexual activity: Never     Comment: denies      Imaging:  Narrative & Impression   PROCEDURE: CT CHEST WO CONTRAST DIAGNOSTIC-     HISTORY: Unwitnessed fall gross external trauma blood thinners, masses  on abdomen     COMPARISON:  None .     PROCEDURE:  Multiple axial CT images were obtained from the thoracic  inlet through the upper abdomen without the use of contrast. This study  was performed with techniques to keep radiation doses as low as  reasonably achievable (ALARA). Individualized dose reduction techniques  using automated exposure control or adjustment of mA and/or kV according  to the patient size were employed.     FINDINGS:  Soft tissue windows reveal no axillary, hilar or mediastinal adenopathy.  The thyroid gland is unremarkable. The heart is enlarged. The aorta is  normal in caliber. There are no pleural or pericardial effusions. Lung  windows reveal scarring in atelectasis in the left  upper lobe. There is  no pneumothorax. The visualized upper abdomen is unremarkable. Bone  windows reveal no acute osseous abnormalities.     IMPRESSION:  No acute process.        This report was finalized on 2/23/2024 11:41 AM by Lily Mullen M.D..     Labs:  Sodium  149  02/26 0015  Potassium  3.8  02/26 0015  Chloride  105  02/26 0015  CO2  31.6  02/26 0015  BUN  21  02/26 0015  Creatinine  0.77  02/26 0015  Glucose  105  02/26 0015  Hemoglobin  9.8  02/26 0018  Hematocrit  33.3  02/26 0018  WBC  9.40  02/26 0018  Platelets  250  02/26 0018     Diet Orders (active) (From admission, onward)       Start     Ordered    02/24/24 1055  NPO Diet NPO Type: Strict NPO, Ice Chips  Diet Effective Now        Comments: Should Remain in Effect Until a Complete SLP Evaluation Occurs & a Superceding Order is Received  Ice chips when fully a/a    02/24/24 1055                  She was observed on 2L O2 via NC w/o complications.     Risks and benefits of the procedure were explained w/ patient verbalizing understanding/agreement to participate. Proceed per protocol.     Patient was positioned upright and centered in a soft strap supportive chair to accept multiple po presentations of solid cracker, puree, thin liquids via spoon, cup and straw, along w/ whole placebo pill in puree. She did not self provide po trials.      All views are from the lateral plane.     Facial/oral structures revealed bilateral R>L bruising of/around her eyes, extending down to face and neck on R, w/ diffuse edema R>L as well. Structures were otherwise symmetrical upon observation. Lingual protrusion revealed no deviation. Oral mucosa were mildly xerostomic, pink, and clean. Secretions were clear, thin, and well controlled. OROM/ROSE MARIE were mildly weak and delayed to imitate oral postures. She was edentulous, she did state that she has dentures, however, she did not respond to further questions regarding dentures/use w/ po intake/location of  dentures. Dentures not visibly present at bedside today. Gag was not assessed. Volitional cough was intact w/ adequate intensity, slightly congestive in quality, non-productive. Voice was adequate in intensity, clear in quality w/ intelligible speech. Processing appeared delayed. Verbal responses were delayed and inconsistent. She was at times able to answer simple y/n questions and respond w/ 1-4 word phrases. She was able to intermittently follow simple one step directive w/ cues. She was able to state her name, but was otherwise disoriented. She was eager for po intake.      Upon po presentations, adequate bolus anticipation w/ good labial seal for bolus clearance via spoon bowl, cup rim stability and suction via straw.  Bolus formation, manipulation, and control were mildly weak in general, intermittent anterior oral holding across consistencies, moderately increased oral prep time w/ solids w/ mixed phasic/rotary mastication pattern. A-p transit was mildly delayed w/o significant oral residue. Tongue base retraction and linguavelar seal were adequate, vallecular pooling noted w/ solids, No significant premature spillage. No laryngeal penetration or aspiration evidenced before the swallow.     Pharyngeal swallow was timely w/ adequate hyolaryngeal elevation and epiglottic inversion. Transient laryngeal penetration occurred during the swallow w/ thin liquid via cup, clearing upon completion of the swallow w/o significant residue. Pharyngeal contraction was adequate w/ trace diffuse pharyngeal residue w/ thin liquids. No other laryngeal penetration or aspiration evidenced during or after the swallow.     She was able to manipulate and swallow whole placebo pill in puree, mildly-moderately delayed transit but no other significant difficulty.                Penetration-Aspiration Scale Scoring  Consistency: Teaspoon Bolus Cup Bolus Straw Bolus   Puree 1     Honey      Nectar      Thin 1 2 1   Solid 1        *Reference*      Full esophageal sweep revealed no obvious mucosal abnormalities. Motility appeared adequate w/o retrograde flow noted.      Impression: Per this evaluation, Ms. Munroe presented w/a moderate oral dysphagia, felt to most likely be related to ams, wfl pharyngeal swallow w/o evidence of aspiration across this evaluation.     She is felt to most benefit from modified po diet of puree consistency, thin liquids, 1:1 feeding assistance, meds whole in puree or crushed prn. SLP to f/u for diet safety/assessment for possible upgrade.     SLP Recommendation and Plan  1. Puree consistency, thin liquids.   2. Medications whole in puree/crushed prn.   3. 1:1 feeding assistance.   4. Upright and centered for all po intake.   5. Universal aspiration precautions.   6. YANETH precautions.   7. Oral care protocol.   SLP to f/u for diet safety/assessment for possible upgrade, s/l cognitive assessment.     D/w patient results and recommendations w/ verbal understanding and agreement. Question retention 2/2 ams.     D/w RN results and recommendations w/ verbal understanding and agreement.     Thank you for allowing me to participate in the care of your patient-  Ana Curtis M.A., CCC-SLP      EDUCATION  The patient has been educated in the following areas:   Dysphagia (Swallowing Impairment) Modified Diet Instruction.      Time Calculation:    Time Calculation- SLP       Row Name 02/26/24 1108             Time Calculation- SLP    SLP - Next Appointment 02/27/24  -                User Key  (r) = Recorded By, (t) = Taken By, (c) = Cosigned By      Initials Name Provider Type     Ana Curtis MA,CCC-SLP Speech and Language Pathologist                  Therapy Charges for Today       Code Description Service Date Service Provider Modifiers Qty    43483161730 HC ST EVAL ORAL PHARYNG SWALLOW 4 2/26/2024 Ana Curtis MA,CCC-SLP GN 1    12961417595 HC ST MOTION FLUORO EVAL SWALLOW 8 2/26/2024  Ever, Ana Snyder MA,[facility]-SLP GN 1          Ana Curtis MA,GERHARD-SLP  2/26/2024

## 2024-02-26 NOTE — PROGRESS NOTES
"DOS: 2024  NAME: Brenda Munroe   : 1950  PCP: Bernadette Arevalo MD  Chief Complaint   Patient presents with    Fall    Head Injury       Chief complaint: Head injury  Subjective: No acute events overnight.  Denies any new complaints.  Remains on Cardizem drip for A-fib with RVR.  She is alert, oriented to self.  Moving all extremities, follows simple commands, IDed couple common objects objects.      Objective:  Vital signs: BP 97/68   Pulse (!) 125   Temp 98 °F (36.7 °C) (Axillary)   Resp 18   Ht 162.6 cm (64\")   Wt 66 kg (145 lb 8.1 oz)   SpO2 91%   BMI 24.98 kg/m²    Gen: NAD, vitals reviewed  MS: Improving but not back to baseline yet. Id'd common objects.  CN: Cranials 2-12: No focal deficits noted.  However she is still n.p.o., getting swallow study today.    Motor: Moving all extremities well. Follows simple commands  Sensory: No sensory loss noted.  Coordination: Difficult to assess  Gait: Could not be tested at this time.    Laboratory results:  Lab Results   Component Value Date    GLUCOSE 105 (H) 2024    CALCIUM 8.6 2024     (H) 2024    K 3.8 2024    CO2 31.6 (H) 2024     2024    BUN 21 2024    CREATININE 0.77 2024    EGFRIFAFRI  2016      Comment:      <15 Indicative of kidney failure.    EGFRIFNONA 75 2021    BCR 27.3 (H) 2024    ANIONGAP 12.4 2024     Lab Results   Component Value Date    WBC 9.40 2024    HGB 9.8 (L) 2024    HCT 33.3 (L) 2024    MCV 99.7 (H) 2024     2024     Lab Results   Component Value Date    LDL 39 2024    LDL 46 2024    LDL CANCELED 10/09/2015    LDL CANCELED 10/09/2015    LDL CANCELED 10/09/2015         Lab 24  0018   HEMOGLOBIN A1C 5.00        Review of labs: The LDL is only 39.  Hemoglobin is low at 9.8 and the MCV is elevated at 99.7.  The serum folic acid level is low.    CMP:        Lab 24  0015 24  0546 " 02/25/24  0021 02/24/24  1547 02/23/24  1739 02/23/24  1135   SODIUM 149* 143 142 151* 148* 147*   POTASSIUM 3.8 4.6 5.2 5.4* 4.6 3.9   CHLORIDE 105 101 102 104 105 105   CO2 31.6* 22.7 22.1 29.1* 35.8* 40.1*   ANION GAP 12.4 19.3* 17.9* 17.9* 7.2 1.9*   BUN 21 25* 23 21 16 17   CREATININE 0.77 0.97 0.85 0.91 0.84 0.86   EGFR 80.6 61.1 71.5 65.9 72.6 70.6   GLUCOSE 105* 154* 135* 120* 97 100*   CALCIUM 8.6 8.9 9.1 9.1 8.9 9.1   MAGNESIUM 2.1  --   --  2.3  --   --    TOTAL PROTEIN 5.3* 5.8* 5.7*  --  5.6* 5.4*   ALBUMIN 3.1* 3.2* 3.0*  --  3.2* 3.3*   GLOBULIN 2.2 2.6 2.7  --  2.4 2.1   ALT (SGPT) 10 13 11  --  13 15   AST (SGOT) 16 25 28  --  19 19   BILIRUBIN 0.4 0.4 0.4  --  0.3 0.3   ALK PHOS 65 76 72  --  74 73        TSH          1/1/2024    10:59 1/23/2024    00:48   TSH   TSH 3.020  0.876         Lipid Panel          1/1/2024    13:04 2/24/2024    00:18   Lipid Panel   Total Cholesterol 119  99    Triglycerides 69  56    HDL Cholesterol 59  47    VLDL Cholesterol 14  13    LDL Cholesterol  46  39    LDL/HDL Ratio 0.78  0.87         Lab Results   Component Value Date    LEFPYJFL14 319 02/25/2024       Lab Results   Component Value Date    FOLATE 4.18 (L) 02/25/2024       Lab Results   Component Value Date    HGBA1C 5.00 02/24/2024           Review and interpretation of imaging:   MRI FINDINGS: The patient had some difficulty holding still for the  exam. The images are somewhat degraded by motion artifact. The  ventricular system and subarachnoid space showed blood changes of  diffuse generalized cerebral atrophy with white matter disease involving  both of the cerebral hemispheres. No focal brain parenchymal  abnormalities were demonstrated. The post contrasted images showed no  areas of breakdown of the blood-brain barrier or abnormal enhancement  within the brain parenchyma. No ischemic areas were demonstrated. There  was no evidence of subdural or epidural hematoma.     IMPRESSION:  Moderate amount of  generalized white matter disease is noted  involving both cerebral hemispheres. This can be seen with vasculitis or  a type of collagen vascular disease. Clinical correlation is suggested.  No focal brain parenchymal abnormalities are demonstrated.    CT Head Without Contrast    Result Date: 2/24/2024  CT brain without contrast compared to examination dated 1/31/2024.  Severe atrophy and small vessel disease. There is hemorrhage in the LEFT occipital horn with focal surrounding edema.  This appears to be new from prior study. No shift of midline structures. Large right-sided scalp hematoma. Opacified RIGHT maxillary sinus, new from prior, likely related to presence of blood or retained secretions. No skull fracture.      Impression: Intraventricular hemorrhage noted in the LEFT occipital lobe, new from prior.   This report was finalized on 2/24/2024 9:50 AM by Dr. Loreto Jo MD.      CT Head Without Contrast    Result Date: 2/23/2024  PROCEDURE: CT HEAD WO CONTRAST-, CT CERVICAL SPINE WO CONTRAST-  HISTORY: Unwitnessed fall gross external trauma blood thinners  PROCEDURE: Axial images were obtained from the skull base to the thoracic inlet by computed tomography. Multiple axial CT images were performed from the foramen magnum to the vertex without enhancement. This study was performed with techniques to keep radiation doses as low as reasonably achievable (ALARA). Individualized dose reduction techniques using automated exposure control or adjustment of mA and/or kV according to the patient size were employed.   C-SPINE:  FINDINGS: There are no acute fractures. There are diffuse degenerative changes with loss of disc base height and facet arthropathy.  The prevertebral soft tissues are normal.  Limited images of the lung apices are unremarkable.  HEAD CT:  FINDINGS: There is generalized cerebral volume loss. Patchy hypodensities in the periventricular white matter consistent with chronic small vessel ischemic  change. There is a subdural hematoma overlying the left frontal lobe layering along the falx measuring up to 4 mm. There is no mass, mass effect or midline shift.  There is no hydrocephalus. The paranasal sinuses are clear. Bone windows reveal no acute osseous abnormalities. Note is made of a right periorbital hematoma.      Impression: 1. 4 mm left parafalcine subdural hematoma. 2. No acute fracture or malalignment in the cervical spine.     Critical Finding  Critical report results have been called to Dr. Moran. Report called at 2/23/2024 11:33 AM.        This report was finalized on 2/23/2024 11:33 AM by Lily Mullen M.D..              Workup to date:  An EEG performed on January 24, 2024 showed the following:     Findings:     The patient is awake but confused.  The background shows diffuse medium amplitude 2-5 Hz intermixed delta and theta activity which is present symmetrically over both hemispheres.  A clear posterior rhythm is not seen.  EMG artifact is variably prominent over the frontotemporal leads.  The patient is occasionally restless and moaning during the study.  No focal features or epileptiform activity are present.  Photic stimulation does not change the background.  Hyperventilation is not performed.     Video: Available     Technical quality: Superior     EKG: Irregular, 70-90 bpm     SUMMARY:     Moderate generalized slow     No focal features or epileptiform activity are seen     IMPRESSION:     Diffuse cerebral dysfunction of moderate degree, nonspecific     No evidence for epilepsy is seen    Results for orders placed during the hospital encounter of 02/23/24    Adult Transthoracic Echo Complete W/ Cont if Necessary Per Protocol    Interpretation Summary    Left ventricular systolic function is moderately decreased. Left ventricular ejection fraction appears to be 41 - 45%.    Left ventricular diastolic function was indeterminate.    Left atrial volume is mildly increased.    There is  mild calcification of the aortic valve.    Estimated right ventricular systolic pressure from tricuspid regurgitation is mildly elevated (35-45 mmHg).    There is no evidence of pericardial effusion         Diagnoses: Patient with subdural hemorrhage in the Sub Falcine area currently improving.      Comment: Still on Cardizem drip, MBS planned for today    Plan:  1.  To get an MRI of the brain with and without contrast once she is able to participate with the procedure. Hopefully this can be done once she is taking medicines PO  2.  Continue Vimpat 50 mg IV BID  3.  Continue folic acid replacement    This was an audio and video enabled telemedicine encounter.  Dr. Randle present for encounter via telemedicine.  Verbal consent taken.    PAULA Prasad

## 2024-02-26 NOTE — THERAPY RE-EVALUATION
Acute Care - Speech Language Pathology   Swallow Re-Assessment  [facility]  CLINICAL DYSPHAGIA REASSESSMENT     Patient Name: Brenda Munroe  : 1950  MRN: [id number]  Today's Date: 2024             Admit Date: 2024    Visit Dx:     ICD-10-CM ICD-9-CM   1. Subdural hematoma  S06.5XAA 432.1   2. NSTEMI (non-ST elevated myocardial infarction)  I21.4 410.70   3. Chronic anticoagulation  Z79.01 V58.61   4. Closed fracture of nasal bone, initial encounter  S02.2XXA 802.0   5. Traumatic orbital hematoma, right, initial encounter  S05.11XA 921.2     Patient Active Problem List   Diagnosis    Psychosis    Severe recurrent major depression with psychotic features    COPD (chronic obstructive pulmonary disease)    Coronary artery disease    Disease of thyroid gland    Arthritis    Hypertension    Paroxysmal atrial fibrillation    Chronic headaches    Vision changes    Carotid stenosis, asymptomatic, bilateral    Bradycardia, sinus    Acute respiratory failure with hypoxia and hypercapnia    NSTEMI (non-ST elevated myocardial infarction)    A-fib    Hypercapnic respiratory failure    Subdural hematoma     Past Medical History:   Diagnosis Date    Anxiety     Arthritis     Bell's palsy     Bladder prolapse, female, acquired     Carotid stenosis     COPD (chronic obstructive pulmonary disease)     COPD (chronic obstructive pulmonary disease)     Coronary artery disease     Dementia     Dementia     Depression     Difficulty walking     Disease of thyroid gland     Frequency of urination     GERD (gastroesophageal reflux disease)     Heart attack     Hyperlipidemia     Hypertension     Irregular heart beat     Peptic ulcer disease      Past Surgical History:   Procedure Laterality Date    CARDIAC CATHETERIZATION N/A 2023    Procedure: Left Heart Cath;  Surgeon: Tab Cifuentes MD;  Location: [facility] CATH INVASIVE LOCATION;  Service: Cardiology;  Laterality: N/A;    CARDIAC SURGERY      open heart   in 1999    CYSTOSCOPY N/A 11/8/2017    Procedure: CYSTOSCOPY;  Surgeon: Karl Nicolas DO;  Location:  [facility] OR;  Service:     OTHER SURGICAL HISTORY      states she had fluid drained no surgery    VAGINAL HYSTERECTOMY W/ ANTERIOR AND POSTERIOR VAGINAL REPAIR N/A 11/8/2017    Procedure: VAGINAL HYSTERECTOMY WITH ANTERIOR, POSTERIOR, AND ENTEROCELE VAGINAL REPAIR;  Surgeon: Karl Nicolas DO;  Location:  [facility] OR;  Service:     VAGINAL VAULT SUSPENSION N/A 11/8/2017    Procedure: VAGINAL VAULT SUSPENSION ;  Surgeon: Karl Nicolas DO;  Location: [facility] OR;  Service:      Brenda FREED Ludwig was seen at bedside this am on CCU to assess safety/efficacy of swallowing fnx, determine safest/least restrictive diet tolerance, candidacy for po intake. PT was present during this assessment.     She presented to [facility] ED 2/23/24 for further evaluation of fall, head injury. Per report patient was found facedown at the nursing home on day of admission. Per staff patient had been at her normal state of health during medication administration at 0800. On exam patient noted with significant swelling to the right eye and surrounding face/scalp.  She was able to follow a few commands and state her name but answered no other questions. She did move the bilateral upper extremities when asked.     PMH is significant for MDD, COPD, CAD, thyroid disease, hypertension, paroxysmal atrial fibrillation, bilateral carotid stenosis, dementia, GERD, PUD     Upon arrival to the ED, vital signs were temp 97, heart rate 111, respirations 20, BP 91/97, SpO2 100% on 4 L per nasal cannula.  HS troponin on arrival significantly elevated at 474 with repeat at 452.  CK is normal.  Sodium was 147.  Urine notable for specific gravity greater than 1.03 with moderate blood, 1+ leukocytes, 11-20 RBCs and 21-50 WBCs.  Imaging workup noted 4 mm left parafalcine subdural hematoma.  There was also a right nasal bone fracture.  EKG notes A-fib with  right bundle branch block.    She was referred per findings of SDH s/p fall to r/o aspiration. She was initially assessed 2/24/24 w/ no overt s/s aspiration, but recommendation for continued NPO status per decreased a/a status, ams. She was improved in a/a status today, RN reported pt has been requesting po intake. She continued confused but was alert and cooperative.     Ms. Munroe is familiar to SLP from previous outpatient MBS on 9/2/21, Clinical Dysphagia Assessment 3/9/22 during prior admission w/ no evidence or h/o aspiration, WFL oropharyngeal swallow. She was recommended least restrictive regular consistency po diet, thin liquids.     Social History     Socioeconomic History    Marital status:     Number of children: 3   Tobacco Use    Smoking status: Every Day     Packs/day: 0.50     Years: 55.00     Additional pack years: 0.00     Total pack years: 27.50     Types: Cigarettes    Smokeless tobacco: Never   Vaping Use    Vaping Use: Never used   Substance and Sexual Activity    Alcohol use: No    Drug use: No    Sexual activity: Never     Comment: denies      Imaging:  Narrative & Impression   PROCEDURE: CT CHEST WO CONTRAST DIAGNOSTIC-     HISTORY: Unwitnessed fall gross external trauma blood thinners, masses  on abdomen     COMPARISON:  None .     PROCEDURE:  Multiple axial CT images were obtained from the thoracic  inlet through the upper abdomen without the use of contrast. This study  was performed with techniques to keep radiation doses as low as  reasonably achievable (ALARA). Individualized dose reduction techniques  using automated exposure control or adjustment of mA and/or kV according  to the patient size were employed.     FINDINGS:  Soft tissue windows reveal no axillary, hilar or mediastinal adenopathy.  The thyroid gland is unremarkable. The heart is enlarged. The aorta is  normal in caliber. There are no pleural or pericardial effusions. Lung  windows reveal scarring in atelectasis in  the left upper lobe. There is  no pneumothorax. The visualized upper abdomen is unremarkable. Bone  windows reveal no acute osseous abnormalities.     IMPRESSION:  No acute process.        This report was finalized on 2/23/2024 11:41 AM by Lily Mullen M.D..     Labs:  Sodium  149  02/26 0015  Potassium  3.8  02/26 0015  Chloride  105  02/26 0015  CO2  31.6  02/26 0015  BUN  21  02/26 0015  Creatinine  0.77  02/26 0015  Glucose  105  02/26 0015  Hemoglobin  9.8  02/26 0018  Hematocrit  33.3  02/26 0018  WBC  9.40  02/26 0018  Platelets  250  02/26 0018  PTT  33.4  02/23 1135  Protime  18.8  02/23 1135  INR  1.50  02/23 1135     Diet Orders (active) (From admission, onward)       Start     Ordered    02/24/24 1055  NPO Diet NPO Type: Strict NPO, Ice Chips  Diet Effective Now        Comments: Should Remain in Effect Until a Complete SLP Evaluation Occurs & a Superceding Order is Received  Ice chips when fully a/a    02/24/24 1055                  She was observed on 2L O2 via NC w/o complications.     Patient was positioned upright and centered on edge bed (previously positioned by PT) to accept multiple po presentations of ice chips and thin water via spoon, cup, and straw. She did not self provide po trials.     Facial/oral structures revealed bilateral R>L bruising of/around her eyes, extending down to face and neck on R, w/ diffuse edema R>L as well. Structures were otherwise symmetrical upon observation. Lingual protrusion revealed no deviation. Oral mucosa were mildly xerostomic, pink, and clean. Secretions were clear, thin, and well controlled. OROM/ROSE MARIE were mildly weak and delayed to imitate oral postures. She was edentulous, she did state that she has dentures, however, she did not respond to further questions regarding dentures/use w/ po intake/location of dentures. Dentures not visibly present in room at that time. Gag was not assessed. Volitional cough was intact w/ adequate intensity, slightly  congestive in quality, non-productive. Voice was adequate in intensity, clear in quality w/ intelligible speech. Processing appeared delayed. Verbal responses were delayed and inconsistent. She was at times able to answer simple y/n questions and respond w/ 1-4 word phrases. She was able to intermittently follow simple one step directive w/ cues. She was able to state her name, but was otherwise disoriented. She did ask for coffee, was eager for po intake.     Upon po presentations, adequate bolus anticipation and acceptance w/ good labial seal for bolus clearance via spoon bowl, cup rim stability and suction via straw. Bolus formation, manipulation and control were mildly weak in general w/ lingual pumping of ice chips. A-p transit was slightly delayed w/o significant oral residue appreciated. No overt s/s aspiration before the swallow.      Pharyngeal swallow was slightly delayed w/ slightly weak hyolaryngeal elevation per palpation. No overt s/s aspiration evidenced across this assessment. No obvious silent aspiration suspected.    Impression: Per this assessment, Ms. Munroe presented w/ a mild oral dysphagia, suspect possible trace-mild pharyngeal dysphagia, although no overt s/s aspiration or obvious s/s indicative of silent aspiration w/ ice chip and thin water trials.    Per improved a/a status and ams, along w/ consideration of her neuro status and ams, she is felt to most benefit from instrumental MBS to objectively evaluate swallowing fnx, determine safest/least restrictive po diet.     SLP Recommendation and Plan  1. NPO pending MBS.    2. Medications via non-oral method.   3. Universal aspiration precautions.   4. YANETH precautions.  5. Oral care protocol.  SLP to f/u for MBS tentatively today w/ further recs pending.     D/w patient results and recommendations w/ verbal agreement.    D/w RN results and recommendations w/ verbal agreement.    Thank you for allowing me to participate in the care of your  patient-  Ana Curtis M.A., CCC-SLP      EDUCATION  The patient has been educated in the following areas:   Dysphagia (Swallowing Impairment) Oral Care/Hydration NPO rationale.     Time Calculation:     Therapy Charges for Today       Code Description Service Date Service Provider Modifiers Qty    [id number]  ST EVAL ORAL PHARYNG SWALLOW 4 2/26/2024 Ana Curtis MA,CCC-SLP GN 1          Ana Curtis MA,CCC-SLP  2/26/2024

## 2024-02-27 ENCOUNTER — APPOINTMENT (OUTPATIENT)
Dept: MRI IMAGING | Facility: HOSPITAL | Age: 74
DRG: 082 | End: 2024-02-27
Payer: MEDICARE

## 2024-02-27 LAB
ALBUMIN SERPL-MCNC: 3.5 G/DL (ref 3.5–5.2)
ALBUMIN/GLOB SERPL: 1.5 G/DL
ALP SERPL-CCNC: 68 U/L (ref 39–117)
ALT SERPL W P-5'-P-CCNC: 13 U/L (ref 1–33)
ANION GAP SERPL CALCULATED.3IONS-SCNC: 12.6 MMOL/L (ref 5–15)
ANISOCYTOSIS BLD QL: NORMAL
AST SERPL-CCNC: 15 U/L (ref 1–32)
BASOPHILS # BLD AUTO: 0.01 10*3/MM3 (ref 0–0.2)
BASOPHILS NFR BLD AUTO: 0.1 % (ref 0–1.5)
BILIRUB SERPL-MCNC: 0.5 MG/DL (ref 0–1.2)
BUN SERPL-MCNC: 19 MG/DL (ref 8–23)
BUN/CREAT SERPL: 24.7 (ref 7–25)
CALCIUM SPEC-SCNC: 8.7 MG/DL (ref 8.6–10.5)
CHLORIDE SERPL-SCNC: 103 MMOL/L (ref 98–107)
CO2 SERPL-SCNC: 29.4 MMOL/L (ref 22–29)
CREAT SERPL-MCNC: 0.77 MG/DL (ref 0.57–1)
DACRYOCYTES BLD QL SMEAR: NORMAL
DEPRECATED RDW RBC AUTO: 76.6 FL (ref 37–54)
EGFRCR SERPLBLD CKD-EPI 2021: 80.6 ML/MIN/1.73
ELLIPTOCYTES BLD QL SMEAR: NORMAL
EOSINOPHIL # BLD AUTO: 0 10*3/MM3 (ref 0–0.4)
EOSINOPHIL NFR BLD AUTO: 0 % (ref 0.3–6.2)
ERYTHROCYTE [DISTWIDTH] IN BLOOD BY AUTOMATED COUNT: 21.2 % (ref 12.3–15.4)
GLOBULIN UR ELPH-MCNC: 2.3 GM/DL
GLUCOSE SERPL-MCNC: 150 MG/DL (ref 65–99)
HCT VFR BLD AUTO: 34.2 % (ref 34–46.6)
HGB BLD-MCNC: 10.1 G/DL (ref 12–15.9)
HYPOCHROMIA BLD QL: NORMAL
IMM GRANULOCYTES # BLD AUTO: 0.16 10*3/MM3 (ref 0–0.05)
IMM GRANULOCYTES NFR BLD AUTO: 2.1 % (ref 0–0.5)
LYMPHOCYTES # BLD AUTO: 0.3 10*3/MM3 (ref 0.7–3.1)
LYMPHOCYTES NFR BLD AUTO: 3.9 % (ref 19.6–45.3)
MCH RBC QN AUTO: 29 PG (ref 26.6–33)
MCHC RBC AUTO-ENTMCNC: 29.5 G/DL (ref 31.5–35.7)
MCV RBC AUTO: 98.3 FL (ref 79–97)
MONOCYTES # BLD AUTO: 0.17 10*3/MM3 (ref 0.1–0.9)
MONOCYTES NFR BLD AUTO: 2.2 % (ref 5–12)
NEUTROPHILS NFR BLD AUTO: 7.13 10*3/MM3 (ref 1.7–7)
NEUTROPHILS NFR BLD AUTO: 91.7 % (ref 42.7–76)
NRBC BLD AUTO-RTO: 0.3 /100 WBC (ref 0–0.2)
PLAT MORPH BLD: NORMAL
PLATELET # BLD AUTO: 231 10*3/MM3 (ref 140–450)
PMV BLD AUTO: 9.5 FL (ref 6–12)
POTASSIUM SERPL-SCNC: 3.9 MMOL/L (ref 3.5–5.2)
PROT SERPL-MCNC: 5.8 G/DL (ref 6–8.5)
QT INTERVAL: 328 MS
QTC INTERVAL: 484 MS
RBC # BLD AUTO: 3.48 10*6/MM3 (ref 3.77–5.28)
SODIUM SERPL-SCNC: 145 MMOL/L (ref 136–145)
WBC NRBC COR # BLD AUTO: 7.77 10*3/MM3 (ref 3.4–10.8)

## 2024-02-27 PROCEDURE — 80053 COMPREHEN METABOLIC PANEL: CPT | Performed by: INTERNAL MEDICINE

## 2024-02-27 PROCEDURE — C9254 INJECTION, LACOSAMIDE: HCPCS

## 2024-02-27 PROCEDURE — 93010 ELECTROCARDIOGRAM REPORT: CPT | Performed by: SPECIALIST

## 2024-02-27 PROCEDURE — 25010000002 METHYLPREDNISOLONE PER 40 MG: Performed by: INTERNAL MEDICINE

## 2024-02-27 PROCEDURE — 94799 UNLISTED PULMONARY SVC/PX: CPT

## 2024-02-27 PROCEDURE — A9577 INJ MULTIHANCE: HCPCS | Performed by: INTERNAL MEDICINE

## 2024-02-27 PROCEDURE — 94761 N-INVAS EAR/PLS OXIMETRY MLT: CPT

## 2024-02-27 PROCEDURE — 99233 SBSQ HOSP IP/OBS HIGH 50: CPT | Performed by: INTERNAL MEDICINE

## 2024-02-27 PROCEDURE — 94664 DEMO&/EVAL PT USE INHALER: CPT

## 2024-02-27 PROCEDURE — 93005 ELECTROCARDIOGRAM TRACING: CPT | Performed by: HOSPITALIST

## 2024-02-27 PROCEDURE — 99232 SBSQ HOSP IP/OBS MODERATE 35: CPT | Performed by: NURSE PRACTITIONER

## 2024-02-27 PROCEDURE — 92610 EVALUATE SWALLOWING FUNCTION: CPT

## 2024-02-27 PROCEDURE — 85007 BL SMEAR W/DIFF WBC COUNT: CPT | Performed by: INTERNAL MEDICINE

## 2024-02-27 PROCEDURE — 85025 COMPLETE CBC W/AUTO DIFF WBC: CPT | Performed by: INTERNAL MEDICINE

## 2024-02-27 PROCEDURE — 25010000002 LACOSAMIDE 200 MG/20ML SOLUTION 20 ML VIAL

## 2024-02-27 PROCEDURE — 92523 SPEECH SOUND LANG COMPREHEN: CPT

## 2024-02-27 PROCEDURE — 70553 MRI BRAIN STEM W/O & W/DYE: CPT

## 2024-02-27 PROCEDURE — 70553 MRI BRAIN STEM W/O & W/DYE: CPT | Performed by: RADIOLOGY

## 2024-02-27 PROCEDURE — 0 GADOBENATE DIMEGLUMINE 529 MG/ML SOLUTION: Performed by: INTERNAL MEDICINE

## 2024-02-27 RX ORDER — DILTIAZEM HYDROCHLORIDE 60 MG/1
60 TABLET, FILM COATED ORAL EVERY 8 HOURS SCHEDULED
Status: DISCONTINUED | OUTPATIENT
Start: 2024-02-27 | End: 2024-02-28

## 2024-02-27 RX ADMIN — FOLIC ACID 1 MG: 5 INJECTION, SOLUTION INTRAMUSCULAR; INTRAVENOUS; SUBCUTANEOUS at 09:59

## 2024-02-27 RX ADMIN — DOCUSATE SODIUM 50 MG AND SENNOSIDES 8.6 MG 2 TABLET: 8.6; 5 TABLET, FILM COATED ORAL at 10:01

## 2024-02-27 RX ADMIN — ISOSORBIDE MONONITRATE 15 MG: 30 TABLET, EXTENDED RELEASE ORAL at 10:00

## 2024-02-27 RX ADMIN — Medication 10 ML: at 10:03

## 2024-02-27 RX ADMIN — AMIODARONE HYDROCHLORIDE 200 MG: 200 TABLET ORAL at 10:00

## 2024-02-27 RX ADMIN — MUPIROCIN 1 APPLICATION: 20 OINTMENT TOPICAL at 10:03

## 2024-02-27 RX ADMIN — LAMOTRIGINE 25 MG: 100 TABLET ORAL at 20:35

## 2024-02-27 RX ADMIN — ROSUVASTATIN CALCIUM 40 MG: 20 TABLET, FILM COATED ORAL at 18:17

## 2024-02-27 RX ADMIN — TIOTROPIUM BROMIDE INHALATION SPRAY 2 PUFF: 3.12 SPRAY, METERED RESPIRATORY (INHALATION) at 06:24

## 2024-02-27 RX ADMIN — MEMANTINE HYDROCHLORIDE 10 MG: 10 TABLET, FILM COATED ORAL at 10:02

## 2024-02-27 RX ADMIN — Medication 10 ML: at 20:36

## 2024-02-27 RX ADMIN — BUDESONIDE AND FORMOTEROL FUMARATE DIHYDRATE 2 PUFF: 160; 4.5 AEROSOL RESPIRATORY (INHALATION) at 19:00

## 2024-02-27 RX ADMIN — LAMOTRIGINE 100 MG: 100 TABLET ORAL at 10:00

## 2024-02-27 RX ADMIN — LEVOTHYROXINE SODIUM 75 MCG: 0.07 TABLET ORAL at 05:33

## 2024-02-27 RX ADMIN — GADOBENATE DIMEGLUMINE 13 ML: 529 INJECTION, SOLUTION INTRAVENOUS at 13:51

## 2024-02-27 RX ADMIN — MUPIROCIN 1 APPLICATION: 20 OINTMENT TOPICAL at 20:36

## 2024-02-27 RX ADMIN — FERROUS SULFATE TAB 325 MG (65 MG ELEMENTAL FE) 325 MG: 325 (65 FE) TAB at 10:02

## 2024-02-27 RX ADMIN — RISPERIDONE 0.5 MG: 0.25 TABLET, FILM COATED ORAL at 20:36

## 2024-02-27 RX ADMIN — DILTIAZEM HYDROCHLORIDE 60 MG: 60 TABLET, FILM COATED ORAL at 15:24

## 2024-02-27 RX ADMIN — LACOSAMIDE 50 MG: 10 INJECTION INTRAVENOUS at 04:33

## 2024-02-27 RX ADMIN — IPRATROPIUM BROMIDE 0.5 MG: 0.5 SOLUTION RESPIRATORY (INHALATION) at 19:00

## 2024-02-27 RX ADMIN — PANTOPRAZOLE SODIUM 40 MG: 40 TABLET, DELAYED RELEASE ORAL at 10:01

## 2024-02-27 RX ADMIN — BUDESONIDE AND FORMOTEROL FUMARATE DIHYDRATE 2 PUFF: 160; 4.5 AEROSOL RESPIRATORY (INHALATION) at 06:24

## 2024-02-27 RX ADMIN — DILTIAZEM HYDROCHLORIDE 60 MG: 60 TABLET, FILM COATED ORAL at 21:12

## 2024-02-27 RX ADMIN — METHYLPREDNISOLONE SODIUM SUCCINATE 40 MG: 40 INJECTION, POWDER, FOR SOLUTION INTRAMUSCULAR; INTRAVENOUS at 09:59

## 2024-02-27 RX ADMIN — SERTRALINE 100 MG: 50 TABLET, FILM COATED ORAL at 10:02

## 2024-02-27 RX ADMIN — DONEPEZIL HYDROCHLORIDE 10 MG: 5 TABLET, FILM COATED ORAL at 20:35

## 2024-02-27 RX ADMIN — BACLOFEN 5 MG: 10 TABLET ORAL at 10:02

## 2024-02-27 RX ADMIN — AMIODARONE HYDROCHLORIDE 200 MG: 200 TABLET ORAL at 18:18

## 2024-02-27 RX ADMIN — BACLOFEN 5 MG: 10 TABLET ORAL at 20:35

## 2024-02-27 RX ADMIN — DILTIAZEM HYDROCHLORIDE 30 MG: 30 TABLET, FILM COATED ORAL at 05:33

## 2024-02-27 RX ADMIN — METHYLPREDNISOLONE SODIUM SUCCINATE 40 MG: 40 INJECTION, POWDER, FOR SOLUTION INTRAMUSCULAR; INTRAVENOUS at 20:35

## 2024-02-27 RX ADMIN — MEMANTINE HYDROCHLORIDE 10 MG: 10 TABLET, FILM COATED ORAL at 20:35

## 2024-02-27 RX ADMIN — FERROUS SULFATE TAB 325 MG (65 MG ELEMENTAL FE) 325 MG: 325 (65 FE) TAB at 18:17

## 2024-02-27 RX ADMIN — IPRATROPIUM BROMIDE 0.5 MG: 0.5 SOLUTION RESPIRATORY (INHALATION) at 00:23

## 2024-02-27 RX ADMIN — DOCUSATE SODIUM 50 MG AND SENNOSIDES 8.6 MG 2 TABLET: 8.6; 5 TABLET, FILM COATED ORAL at 20:35

## 2024-02-27 RX ADMIN — RISPERIDONE 0.5 MG: 0.25 TABLET, FILM COATED ORAL at 10:01

## 2024-02-27 RX ADMIN — LACOSAMIDE 50 MG: 10 INJECTION INTRAVENOUS at 15:24

## 2024-02-27 RX ADMIN — SODIUM CHLORIDE 75 ML/HR: 4.5 INJECTION, SOLUTION INTRAVENOUS at 15:28

## 2024-02-27 NOTE — THERAPY RE-EVALUATION
Acute Care - Speech Language Pathology Initial Evaluation   [place]  SPEECH LANGUAGE COGNITIVE ASSESSMENT     Patient Name: Brenda Munroe  : 1950  MRN: [id number]  Today's Date: 2024  Onset of Illness/Injury or Date of Surgery: 24     Referring Physician: Heather      Admit Date: 2024     Visit Dx:    ICD-10-CM ICD-9-CM   1. Subdural hematoma  S06.5XAA 432.1   2. NSTEMI (non-ST elevated myocardial infarction)  I21.4 410.70   3. Chronic anticoagulation  Z79.01 V58.61   4. Closed fracture of nasal bone, initial encounter  S02.2XXA 802.0   5. Traumatic orbital hematoma, right, initial encounter  S05.11XA 921.2     Patient Active Problem List   Diagnosis    Psychosis    Severe recurrent major depression with psychotic features    COPD (chronic obstructive pulmonary disease)    Coronary artery disease    Disease of thyroid gland    Arthritis    Hypertension    Paroxysmal atrial fibrillation    Chronic headaches    Vision changes    Carotid stenosis, asymptomatic, bilateral    Bradycardia, sinus    Acute respiratory failure with hypoxia and hypercapnia    NSTEMI (non-ST elevated myocardial infarction)    A-fib    Hypercapnic respiratory failure    Subdural hematoma     Past Medical History:   Diagnosis Date    Anxiety     Arthritis     Bell's palsy     Bladder prolapse, female, acquired     Carotid stenosis     COPD (chronic obstructive pulmonary disease)     COPD (chronic obstructive pulmonary disease)     Coronary artery disease     Dementia     Dementia     Depression     Difficulty walking     Disease of thyroid gland     Frequency of urination     GERD (gastroesophageal reflux disease)     Heart attack     Hyperlipidemia     Hypertension     Irregular heart beat     Peptic ulcer disease      Past Surgical History:   Procedure Laterality Date    CARDIAC CATHETERIZATION N/A 2023    Procedure: Left Heart Cath;  Surgeon: Tab Cifuentes MD;  Location: [facility] INVASIVE  LOCATION;  Service: Cardiology;  Laterality: N/A;    CARDIAC SURGERY      open heart  in 1999    CYSTOSCOPY N/A 11/8/2017    Procedure: CYSTOSCOPY;  Surgeon: Karl Nicolas DO;  Location: [facility] OR;  Service:     OTHER SURGICAL HISTORY      states she had fluid drained no surgery    VAGINAL HYSTERECTOMY W/ ANTERIOR AND POSTERIOR VAGINAL REPAIR N/A 11/8/2017    Procedure: VAGINAL HYSTERECTOMY WITH ANTERIOR, POSTERIOR, AND ENTEROCELE VAGINAL REPAIR;  Surgeon: Karl Nicolas DO;  Location: [facility] OR;  Service:     VAGINAL VAULT SUSPENSION N/A 11/8/2017    Procedure: VAGINAL VAULT SUSPENSION ;  Surgeon: Karl Nicolas DO;  Location: [facility] OR;  Service:      Brenda Munroe was seen a bedside this am on CCU to participate in s/l and cognitive assessment for safety/function in adls. Patient was positioned upright and centered to participate. All results and observations of this evaluation are felt to be representative of patient's current functional status, although participation was limited today.     Ms. Munroe presented to [facility] ED 2/23/24 for further evaluation of fall, head injury. Per report patient was found facedown at the nursing home on day of admission. Per staff patient had been at her normal state of health during medication administration at 0800. On exam patient noted with significant swelling to the right eye and surrounding face/scalp.  She was able to follow a few commands and state her name but answered no other questions. She did move the bilateral upper extremities when asked.     PMH is significant for MDD, COPD, CAD, thyroid disease, hypertension, paroxysmal atrial fibrillation, bilateral carotid stenosis, dementia, GERD, PUD     Upon arrival to the ED, vital signs were temp 97, heart rate 111, respirations 20, BP 91/97, SpO2 100% on 4 L per nasal cannula.  HS troponin on arrival significantly elevated at 474 with repeat at 452.  CK is normal.  Sodium was 147.  Urine notable for  specific gravity greater than 1.03 with moderate blood, 1+ leukocytes, 11-20 RBCs and 21-50 WBCs.  Imaging workup noted 4 mm left parafalcine subdural hematoma.  There was also a right nasal bone fracture.  EKG notes A-fib with right bundle branch block.     She was referred per findings of SDH s/p fall. She participated in MBS 2/26/24 w/ no evidence of aspiration, recommendation for modified po diet per ams of puree consistencies, thin liquids.      Social History     Socioeconomic History    Marital status:     Number of children: 3   Tobacco Use    Smoking status: Every Day     Packs/day: 0.50     Years: 55.00     Additional pack years: 0.00     Total pack years: 27.50     Types: Cigarettes    Smokeless tobacco: Never   Vaping Use    Vaping Use: Never used   Substance and Sexual Activity    Alcohol use: No    Drug use: No    Sexual activity: Never     Comment: denies      Narrative & Impression   EXAM:    MR Head Without and With Intravenous Contrast     EXAM DATE:    2/27/2024 1:30 PM     CLINICAL HISTORY:    Trauma protocol; S06.5XAA-Traumatic subdural hemorrhage with loss of  consciousness status unknown, initial encounter; I21.4-Non-ST elevation  (NSTEMI) myocardial infarction; Z79.01-Long term (current) use of  anticoagulants; S02.2XXA-Fracture of nasal bones, initial encounter for  closed fracture; S05.11XA-Contusion of eyeball and orbital tissues,  right eye, initial encounter     TECHNIQUE:    Magnetic resonance images of the head/brain without and with  intravenous contrast in multiple planes.     COMPARISON:    CT 2/24/2024     FINDINGS:    Brain and extra-axial spaces:  Layering blood products now in the  chronic phase in the left greater than right dependent portions of the  occipital horns of the lateral ventricles.  Left cerebral hemispheric  subdural hematoma is noted with a maximal thickness of approximately  10.5 mm. No midline shift.  Fairly extensive and confluent T2 signal  abnormality  "in the periventricular and subcortical white matter  consistent with advanced chronic small vessel ischemic disease.  No  hydrocephalus.  No evidence of acute infarct.    Bones/joints:  Unremarkable as visualized.  No acute fracture.    Soft tissues:  Right frontal scalp hematoma is noted.  No MRI evidence  of acute phase blood products.    Sinuses:  Unremarkable as visualized.  No acute sinusitis.    Mastoid air cells:  Unremarkable as visualized.  No mastoid effusion.    Orbits:  Unremarkable as visualized.     IMPRESSION:  1.  Layering blood products now in the more chronic phase in the left  greater than right dependent portions of the occipital horns of the  lateral ventricles. No hydrocephalus.  2.  Left cerebral hemispheric subdural hematoma is noted with a maximal  thickness of approximately 10.5 mm. No midline shift.  3.  No MRI evidence of acute phase blood products.  4.  No evidence of acute infarct.  5.  Right frontal scalp hematoma is noted.  6.  Fairly extensive and confluent T2 signal abnormality in the  periventricular and subcortical white matter consistent with advanced  chronic small vessel ischemic disease.        This report was finalized on 2/27/2024 2:44 PM by Dr. Erik Jones MD.     Diet Orders (active) (From admission, onward)       Start     Ordered    02/26/24 1111  Diet: Regular/House Diet; Feeding Assistance - Nursing; Texture: Pureed (NDD 1); Fluid Consistency: Thin (IDDSI 0)  Diet Effective Now         02/26/24 1110                  She was observed on O2 via NC w/o complications.     Upon SLP entry, Ms. Munroe was a/a, upright and centered in bed. She verbally responded to SLP greeting, and when asked how she was feeling today she stated \"I think I feel a little better today.\" She participated in severely simple verbal exchanges, however, upon SLP presentation of s/l cognitive assessment stimuli, she limitedly responded/participated in tasks, often turning head away from SLP and " "gazing out the window. She was able to be redirected w/ mod cues to attend to SLP, however, elicitation of participation in targeted tasks was limited. Suspect this may have been elective/pt choice to some degree.     Receptive language skills appeared grossly intact. Patient was able to id common objects and personal body parts intermittently, follow simple one step directives intermittently, when she elected to respond.     Expressive language skills were intact. Patient was able to intermittently confrontation name common items and participate in simple conversational exchanges when she elected to respond. She did not attempt to complete simple oe sentences, repetition tasks. No obvious aphasia, anomia or verbal apraxia evidenced.    Patient was oriented to person only (name only). She was unable to provide any information regarding place and time. She stated \"let me think a minute\" and did not further respond.     Unable to adequate assess memory, thought organization, processing, and problem solving at this time pt pt's limited participation.     Facial/oral structures revealed bilateral R>L bruising of/around her eyes, extending down to face and neck on R, w/ diffuse edema R>L as well. Structures were otherwise symmetrical upon observation. Lingual protrusion revealed no deviation. Oral mucosa were mildly xerostomic, pink, and clean. Secretions were clear, thin, and well controlled. OROM/ROSE MARIE were mildly weak and delayed to imitate oral postures. Speech intensity, clarity, and intelligibility were WFL w/o dysarthria or oral apraxia noted.    Graphic and reading skills could not be adequately assessed today per pt's limited participation.     Pragmatic skills were unable to be fully assessed per pt's limited participation, although she was able to make eye contact and visually track SLP intermittently. No obvious neglect evidenced. Affect was somewhat flat. She did not attempt to interact w/ SLP w/o SLP " initiation.     Impression: Per this limited assessment, Ms. Munroe presented w/ generalized ams/confusion, limited participation in this assessment. SLP to continue to f/u for reassessment of s/l cognitive skills w/ further recommendations and poc pending improved status/participation.     SLP Recommendation and Plan  SLP to f/u, continued s/l cognitive tx reassessment pending improvement in pt status to functionally participate.     D/w patient results and recommendations w/ verbal understanding and agreement, question retention 2/2 ams.     D/w RN results and recommendations w/ verbal understanding and agreement.     Thank you for allowing me to participate in the care of your patient-  Ana Curtis M.A., CCC-SLP      EDUCATION  The patient has been educated in the following areas:   Cognitive Impairment Communication Impairment.    Time Calculation:      Time Calculation- SLP       Row Name 24 1235             Time Calculation- SLP    SLP - Next Appointment 24  -                User Key  (r) = Recorded By, (t) = Taken By, (c) = Cosigned By      Initials Name Provider Type     Ana Curtis MA,CCC-SLP Speech and Language Pathologist                  Therapy Charges for Today       Code Description Service Date Service Provider Modifiers Qty    39854039478 HC ST EVAL ORAL PHARYNG SWALLOW 4 2024 Ana Curtis MA,CCC-SLP GN 1    19641376925 HC ST MOTION FLUORO EVAL SWALLOW 8 2024 Ana Curtis MA,CCC-SLP GN 1    [id number] HC ST EVAL SPEECH AND PROD W LANG  2 2024 Ana Curtis MA,CCC-SLP GN 1    [id number] HC ST EVAL ORAL PHARYNG SWALLOW 4 2024 Ana Curtis MA,CCC-SLP GN 1            Ana Curtis MA,CCC-SLP  2024   and Acute Care - Speech Language Pathology   Swallow Re-Assessment  [place]  CLINICAL DYSPHAGIA REASSESSMENT     Patient Name: Brenda Munroe  : 1950  MRN: [id number]  Today's Date:  2/27/2024  Onset of Illness/Injury or Date of Surgery: 02/23/24     Referring Physician: Heather      Admit Date: 2/23/2024    Visit Dx:     ICD-10-CM ICD-9-CM   1. Subdural hematoma  S06.5XAA 432.1   2. NSTEMI (non-ST elevated myocardial infarction)  I21.4 410.70   3. Chronic anticoagulation  Z79.01 V58.61   4. Closed fracture of nasal bone, initial encounter  S02.2XXA 802.0   5. Traumatic orbital hematoma, right, initial encounter  S05.11XA 921.2     Patient Active Problem List   Diagnosis    Psychosis    Severe recurrent major depression with psychotic features    COPD (chronic obstructive pulmonary disease)    Coronary artery disease    Disease of thyroid gland    Arthritis    Hypertension    Paroxysmal atrial fibrillation    Chronic headaches    Vision changes    Carotid stenosis, asymptomatic, bilateral    Bradycardia, sinus    Acute respiratory failure with hypoxia and hypercapnia    NSTEMI (non-ST elevated myocardial infarction)    A-fib    Hypercapnic respiratory failure    Subdural hematoma     Past Medical History:   Diagnosis Date    Anxiety     Arthritis     Bell's palsy     Bladder prolapse, female, acquired     Carotid stenosis     COPD (chronic obstructive pulmonary disease)     COPD (chronic obstructive pulmonary disease)     Coronary artery disease     Dementia     Dementia     Depression     Difficulty walking     Disease of thyroid gland     Frequency of urination     GERD (gastroesophageal reflux disease)     Heart attack     Hyperlipidemia     Hypertension     Irregular heart beat     Peptic ulcer disease      Past Surgical History:   Procedure Laterality Date    CARDIAC CATHETERIZATION N/A 2/21/2023    Procedure: Left Heart Cath;  Surgeon: Tab Cifuentes MD;  Location: [facility] CATH INVASIVE LOCATION;  Service: Cardiology;  Laterality: N/A;    CARDIAC SURGERY      open heart  in 1999    CYSTOSCOPY N/A 11/8/2017    Procedure: CYSTOSCOPY;  Surgeon: Karl Nicolas DO;   Location:  [facility] OR;  Service:     OTHER SURGICAL HISTORY      states she had fluid drained no surgery    VAGINAL HYSTERECTOMY W/ ANTERIOR AND POSTERIOR VAGINAL REPAIR N/A 11/8/2017    Procedure: VAGINAL HYSTERECTOMY WITH ANTERIOR, POSTERIOR, AND ENTEROCELE VAGINAL REPAIR;  Surgeon: Karl Nicolas DO;  Location:  [facility] OR;  Service:     VAGINAL VAULT SUSPENSION N/A 11/8/2017    Procedure: VAGINAL VAULT SUSPENSION ;  Surgeon: Karl Nicolas DO;  Location:  [facility] OR;  Service:      Ms. Munroe was seen at bedside this am on CCU for diet safety/reassessment. She is s/p MBS 2/26/24 which revealed a moderate oral dysphagia, felt to most likely be related to ams, wfl pharyngeal swallow w/o evidence of aspiration. She recommended modified po diet of puree consistency, thin liquids.     She was observed on O2 via NC w/o complications. WBC 7.77, she was afebrile.     She was a/a this am, upright and centered in bed. She was agreeable to accept multiple po presentations of thin water via straw. She refused all other po presentations/consistencies.     Upon po presentations, adequate bolus anticipation w/ good labial seal. Bolus formation, manipulation, and control were mildly weak in general. A-p transit was mildly delayed w/o significant oral residue. No overt s/s aspiration evidenced before the swallow.      Pharyngeal swallow was timely w/ adequate hyolaryngeal elevation per palpation. No overt s/s aspiration evidenced. No obvious silent aspiration suspected.     Impression: Per this reassessment, Ms. Munroe continued w/ a moderate oral dysphagia, wfl pharyngeal swallow w/o evidence of aspiration across this evaluation. She refused solid consistencies today to determine candidacy for diet upgrade.      She is felt to most benefit from continuing modified po diet of puree consistency, thin liquids, 1:1 feeding assistance, meds whole in puree or crushed prn. SLP to f/u for continued diet safety/assessment for  possible upgrade.     SLP Recommendation and Plan  1. Puree consistency, thin liquids.   2. Medications whole in puree/crushed prn.   3. 1:1 feeding assistance.   4. Upright and centered for all po intake.   5. Universal aspiration precautions.   6. YANETH precautions.   7. Oral care protocol.   SLP to f/u for continued diet safety/assessment for possible upgrade.      D/w patient results and recommendations w/ verbal understanding and agreement. Question retention 2/2 ams.      D/w RN results and recommendations w/ verbal understanding and agreement. RN denied pt w/ any overt s/s aspiration w/ current modified po diet.      Thank you for allowing me to participate in the care of your patient-  Ana Curtis M.A., CCC-SLP      EDUCATION  The patient has been educated in the following areas:   Dysphagia (Swallowing Impairment) Modified Diet Instruction.     Time Calculation:    Time Calculation- SLP       Row Name 02/27/24 1235             Time Calculation- SLP    SLP - Next Appointment 02/28/24  -                User Key  (r) = Recorded By, (t) = Taken By, (c) = Cosigned By      Initials Name Provider Type    Ana Dai MA,CCC-SLP Speech and Language Pathologist                  Therapy Charges for Today       Code Description Service Date Service Provider Modifiers Qty    33792053437 HC ST EVAL ORAL PHARYNG SWALLOW 4 2/26/2024 Ana Curtis MA,CCC-SLP GN 1    [id number] HC ST MOTION FLUORO EVAL SWALLOW 8 2/26/2024 Ana Curtis MA,CCC-SLP GN 1    [id number] HC ST EVAL SPEECH AND PROD W LANG  2 2/27/2024 Ana Curtis MA,CCC-SLP GN 1    [id number] HC ST EVAL ORAL PHARYNG SWALLOW 4 2/27/2024 Ana Curtis MA,CCC-SLP GN 1          Ana Curtis MA,CCC-SLP  2/27/2024

## 2024-02-27 NOTE — PROGRESS NOTES
LOS: 4 days     Name: Brenda Munroe  Age/Sex: 73 y.o. female  :  1950        PCP: Bernadette Arevalo MD    Principal Problem:    Subdural hematoma      Admission Information: Brenda Munroe is a 73 y.o. female with MDD, COPD, CAD, thyroid disease, hypertension, paroxysmal atrial fibrillation (status post cardioversion 2024), carotid stenosis, dementia, GERD, PUD.       Chief Complaint: Fall with head injury    Interval history: Transitioned from Cardizem drip to oral medications.  Amiodarone initiated.  Still not rate controlled.    Subjective     Patient opens her eyes to verbal and tactile stimulus, but she does not answer my questions today.    Vital Signs  Vital Signs (last 72 hrs)          0700   0659  07 0659  0700   0659  0700   1236   Most Recent      Temp (°F) 97.3 -  98.4    97 -  99.4    97 -  98.2      98.2     98.2 (36.8)  0800    Heart Rate 69 -  152    88 -  158    88 -  170    102 -  129     122  1100    Resp 20 -  32    20 -  28    16 -  26      18     18  1100    /85 -  165/109    103/86 -  150/121    97/68 -  145/92    111/78 -  142/99     131/77  1100    SpO2 (%) 64 -  100    74 -  100    84 -  98    90 -  98     98  1100    Flow (L/min) 1 -  2    1 -  3    2 -  3      3     3  0800    Oxygen Concentration (%) 22 -  285        32       32  0023          Temp:  [97 °F (36.1 °C)-98.2 °F (36.8 °C)] 98.2 °F (36.8 °C)  Heart Rate:  [] 122  Resp:  [-26] 18  BP: (103-145)/() 131/77  Body mass index is 26.22 kg/m².      Intake/Output Summary (Last 24 hours) at 2024 1236  Last data filed at 2024 0945  Gross per 24 hour   Intake 1447.5 ml   Output 650 ml   Net 797.5 ml       Vitals and nursing note reviewed.   Constitutional:       General: Awake.      Appearance: Not in distress. Ill-appearing and chronically ill-appearing.      Interventions: Nasal cannula in place.      Comments:  At 2 L/min   Eyes:      Conjunctiva/sclera:      Right eye: Hemorrhage present.      Comments: Small hematoma over right orbit.  Bilateral circumferential ecchymosis of eyes extending into cheeks and the right side of her neck   HENT:      Head: Contusion.   Pulmonary:      Effort: Pulmonary effort is normal.      Breath sounds: Normal breath sounds.   Cardiovascular:      Tachycardia present. Irregularly irregular rhythm.      Murmurs: There is no murmur.   Edema:     Peripheral edema absent.   Skin:     General: Skin is warm and dry.   Neurological:      Mental Status: Exhibits a cognitive deficit.      Comments: Moves all extremities.  Follows commands to squeeze hands and wiggle toes         Telemetry: A-fib 120s     Results Review:     Results from last 7 days   Lab Units 02/27/24  0030 02/26/24  0018 02/25/24  0546 02/25/24  0021 02/23/24  1739 02/23/24  1135   WBC 10*3/mm3 7.77 9.40 6.16 9.37 5.47 5.33   HEMOGLOBIN g/dL 10.1* 9.8* 11.3* 11.5* 10.6* 11.1*   PLATELETS 10*3/mm3 231 250 243 260 225 233     Results from last 7 days   Lab Units 02/27/24  0030 02/26/24  0015 02/25/24  0546 02/25/24  0021 02/24/24  1547 02/23/24  1739 02/23/24  1135   SODIUM mmol/L 145 149* 143 142 151* 148* 147*   POTASSIUM mmol/L 3.9 3.8 4.6 5.2 5.4* 4.6 3.9   CHLORIDE mmol/L 103 105 101 102 104 105 105   CO2 mmol/L 29.4* 31.6* 22.7 22.1 29.1* 35.8* 40.1*   BUN mg/dL 19 21 25* 23 21 16 17   CREATININE mg/dL 0.77 0.77 0.97 0.85 0.91 0.84 0.86   CALCIUM mg/dL 8.7 8.6 8.9 9.1 9.1 8.9 9.1   GLUCOSE mg/dL 150* 105* 154* 135* 120* 97 100*     Results from last 7 days   Lab Units 02/23/24  1340 02/23/24  1135   CK TOTAL U/L 89  --    HSTROP T ng/L 452* 474*       Results from last 7 days   Lab Units 02/23/24  1135   INR  1.50*       I reviewed the patient's new clinical results.  I reviewed the patient's new imaging results and agree with the interpretation.  I personally viewed and interpreted the patient's EKG/Telemetry  data      Medication Review:   amiodarone, 200 mg, Oral, BID With Meals  baclofen, 5 mg, Oral, BID  budesonide-formoterol, 2 puff, Inhalation, BID - RT   And  tiotropium bromide monohydrate, 2 puff, Inhalation, Daily - RT  [START ON 3/1/2024] cholecalciferol, 50,000 Units, Oral, Weekly  dilTIAZem, 60 mg, Oral, Q8H  donepezil, 10 mg, Oral, Nightly  ferrous sulfate, 325 mg, Oral, BID With Meals  folic acid 1 mg in sodium chloride 0.9 % 50 mL IVPB, 1 mg, Intravenous, Daily  ipratropium, 0.5 mg, Nebulization, 4x Daily - RT  isosorbide mononitrate, 15 mg, Oral, Q24H  Lacosamide, 50 mg, Intravenous, Q12H  lamoTRIgine, 100 mg, Oral, Daily  lamoTRIgine, 25 mg, Oral, Nightly  levothyroxine, 75 mcg, Oral, Q AM  memantine, 10 mg, Oral, Q12H  methylPREDNISolone sodium succinate, 40 mg, Intravenous, Q12H  mupirocin, 1 application , Each Nare, BID  pantoprazole, 40 mg, Oral, QAM AC  risperiDONE, 0.5 mg, Oral, BID  rosuvastatin, 40 mg, Oral, Q PM  senna-docusate sodium, 2 tablet, Oral, BID  sertraline, 100 mg, Oral, Daily  sodium chloride, 1,000 mL, Intravenous, Once  sodium chloride, 10 mL, Intravenous, Q12H  sodium chloride, 10 mL, Intravenous, Q12H  sodium chloride, 10 mL, Intravenous, Q12H      dexmedetomidine, 0.2-1.5 mcg/kg/hr, Last Rate: Stopped (02/25/24 3119)  phenylephrine, 0.5-3 mcg/kg/min  sodium chloride, 75 mL/hr, Last Rate: 75 mL/hr (02/26/24 0839)        Assessment:  Acute intra cerebral hemorrhage, patient was on anticoagulation for history of atrial fibrillation  CAD with known mid circumflex stenosis that was medically managed  Persistent A-fib not on anticoagulation currently due to intracerebral hemorrhage  Non-STEMI  Hypertension  Dyslipidemia      Recommendations:  Cardizem dose increased.  If rate control continues to be a problem, will increase amiodarone tomorrow.  Continue to hold anticoagulation due to intracranial hemorrhage.  All other issues managed by hospitalist and neurology    Case discussed with  Dr. Alfonso and plan of care reflects his recommendations.      Electronically signed by PAULA Crocker, 02/27/24, 12:41 PM EST.    .Electronically signed by Jasmina Alfonso MD, 02/27/24, 4:53 PM EST.

## 2024-02-27 NOTE — PLAN OF CARE
Problem: Noninvasive Ventilation Acute  Goal: Effective Unassisted Ventilation and Oxygenation  Outcome: Ongoing, Progressing     Problem: Skin Injury Risk Increased  Goal: Skin Health and Integrity  Outcome: Ongoing, Progressing  Intervention: Optimize Skin Protection  Recent Flowsheet Documentation  Taken 2/27/2024 1800 by Dyllan Eubanks RN  Head of Bed ([facility]) Positioning: HOB at 30-45 degrees  Taken 2/27/2024 1600 by Dyllan Eubanks RN  Head of Bed ([facility]) Positioning: HOB at 30-45 degrees  Taken 2/27/2024 1400 by Dyllan Eubanks RN  Pressure Reduction Techniques:   heels elevated off bed   weight shift assistance provided  Head of Bed ([facility]) Positioning: HOB at 30-45 degrees  Pressure Reduction Devices:   heel offloading device utilized   positioning supports utilized   pressure-redistributing mattress utilized   specialty bed utilized  Skin Protection:   adhesive use limited   incontinence pads utilized   skin-to-skin areas padded   transparent dressing maintained   tubing/devices free from skin contact  Taken 2/27/2024 1200 by Dyllan Eubanks RN  Head of Bed ([facility]) Positioning: HOB at 30-45 degrees  Taken 2/27/2024 1000 by Dyllan Eubanks RN  Head of Bed ([facility]) Positioning: HOB at 30-45 degrees  Taken 2/27/2024 0800 by Dyllan Eubanks RN  Pressure Reduction Techniques:   heels elevated off bed   weight shift assistance provided  Head of Bed ([facility]) Positioning: HOB at 30-45 degrees  Pressure Reduction Devices:   heel offloading device utilized   positioning supports utilized   specialty bed utilized   pressure-redistributing mattress utilized  Skin Protection:   adhesive use limited   incontinence pads utilized   skin-to-device areas padded   skin-to-skin areas padded   transparent dressing maintained   tubing/devices free from skin contact     Problem: Fall Injury Risk  Goal: Absence of Fall and Fall-Related Injury  Outcome: Ongoing, Progressing  Intervention: Identify and Manage Contributors  Recent Flowsheet  Documentation  Taken 2/27/2024 1400 by Dyllan Eubanks, RN  Medication Review/Management: medications reviewed  Taken 2/27/2024 0800 by Dyllan Eubanks, RN  Medication Review/Management: medications reviewed  Intervention: Promote Injury-Free Environment  Recent Flowsheet Documentation  Taken 2/27/2024 1800 by Dyllan Eubanks, RN  Safety Promotion/Fall Prevention:   fall prevention program maintained   safety round/check completed  Taken 2/27/2024 1700 by Dyllan Eubanks, RN  Safety Promotion/Fall Prevention:   fall prevention program maintained   safety round/check completed  Taken 2/27/2024 1600 by Dyllan Eubanks RN  Safety Promotion/Fall Prevention:   fall prevention program maintained   safety round/check completed  Taken 2/27/2024 1500 by Dyllan Eubanks, RN  Safety Promotion/Fall Prevention:   fall prevention program maintained   safety round/check completed  Taken 2/27/2024 1400 by Dyllan Eubanks RN  Safety Promotion/Fall Prevention:   fall prevention program maintained   safety round/check completed  Taken 2/27/2024 1300 by Dyllan Eubanks, RN  Safety Promotion/Fall Prevention:   fall prevention program maintained   safety round/check completed  Taken 2/27/2024 1200 by Dyllan Eubanks RN  Safety Promotion/Fall Prevention:   fall prevention program maintained   safety round/check completed  Taken 2/27/2024 1100 by Dyllan Eubanks, RN  Safety Promotion/Fall Prevention:   fall prevention program maintained   safety round/check completed  Taken 2/27/2024 1000 by Dyllan Eubanks, RN  Safety Promotion/Fall Prevention:   fall prevention program maintained   safety round/check completed  Taken 2/27/2024 0900 by Dyllan Eubnaks, RN  Safety Promotion/Fall Prevention:   fall prevention program maintained   safety round/check completed  Taken 2/27/2024 0800 by Dyllan Eubanks, RN  Safety Promotion/Fall Prevention:   fall prevention program maintained   safety round/check completed  Taken 2/27/2024 0700 by Dyllan Eubanks, RN  Safety  Promotion/Fall Prevention:   fall prevention program maintained   safety round/check completed     Problem: Adult Inpatient Plan of Care  Goal: Plan of Care Review  Outcome: Ongoing, Progressing  Flowsheets  Taken 2/27/2024 1849 by Dyllan Eubanks, RN  Progress: no change  Outcome Evaluation: patient alert, oriented to self, disoriented to all other orientation questions. reorientation provided. bed in lowest position, call light in reach, bed alarm set, VSS, WCTM  Taken 2/26/2024 1253 by Bety Pittman OT  Plan of Care Reviewed With: patient  Goal: Patient-Specific Goal (Individualized)  Outcome: Ongoing, Progressing  Goal: Absence of Hospital-Acquired Illness or Injury  Outcome: Ongoing, Progressing  Intervention: Identify and Manage Fall Risk  Recent Flowsheet Documentation  Taken 2/27/2024 1800 by Dyllan Eubanks, RN  Safety Promotion/Fall Prevention:   fall prevention program maintained   safety round/check completed  Taken 2/27/2024 1700 by Dyllan Eubanks, RN  Safety Promotion/Fall Prevention:   fall prevention program maintained   safety round/check completed  Taken 2/27/2024 1600 by Dyllan Eubanks, RN  Safety Promotion/Fall Prevention:   fall prevention program maintained   safety round/check completed  Taken 2/27/2024 1500 by Dyllan Eubanks, RN  Safety Promotion/Fall Prevention:   fall prevention program maintained   safety round/check completed  Taken 2/27/2024 1400 by Dyllan Eubanks, RN  Safety Promotion/Fall Prevention:   fall prevention program maintained   safety round/check completed  Taken 2/27/2024 1300 by Dyllan Eubanks, RN  Safety Promotion/Fall Prevention:   fall prevention program maintained   safety round/check completed  Taken 2/27/2024 1200 by Dyllan Eubanks, RN  Safety Promotion/Fall Prevention:   fall prevention program maintained   safety round/check completed  Taken 2/27/2024 1100 by Dyllan Eubanks, RN  Safety Promotion/Fall Prevention:   fall prevention program maintained   safety round/check  completed  Taken 2/27/2024 1000 by Dyllan Eubanks RN  Safety Promotion/Fall Prevention:   fall prevention program maintained   safety round/check completed  Taken 2/27/2024 0900 by Dyllan Eubanks RN  Safety Promotion/Fall Prevention:   fall prevention program maintained   safety round/check completed  Taken 2/27/2024 0800 by Dyllan Eubanks RN  Safety Promotion/Fall Prevention:   fall prevention program maintained   safety round/check completed  Taken 2/27/2024 0700 by Dyllan Eubanks RN  Safety Promotion/Fall Prevention:   fall prevention program maintained   safety round/check completed  Intervention: Prevent Skin Injury  Recent Flowsheet Documentation  Taken 2/27/2024 1800 by Dyllan Eubanks RN  Body Position:   turned   left   side-lying  Taken 2/27/2024 1600 by Dyllan Eubanks RN  Body Position:   turned   right   side-lying  Taken 2/27/2024 1400 by Dyllan Eubanks RN  Body Position:   turned   left   side-lying  Skin Protection:   adhesive use limited   incontinence pads utilized   skin-to-skin areas padded   transparent dressing maintained   tubing/devices free from skin contact  Taken 2/27/2024 1200 by Dyllan Eubanks RN  Body Position:   turned   right   side-lying  Taken 2/27/2024 1000 by Dyllan Eubanks RN  Body Position:   turned   left   side-lying  Taken 2/27/2024 0800 by Dyllan Eubanks RN  Body Position:   turned   right   side-lying  Skin Protection:   adhesive use limited   incontinence pads utilized   skin-to-device areas padded   skin-to-skin areas padded   transparent dressing maintained   tubing/devices free from skin contact  Intervention: Prevent and Manage VTE (Venous Thromboembolism) Risk  Recent Flowsheet Documentation  Taken 2/27/2024 1400 by Dyllan Eubanks RN  Activity Management: bedrest  Range of Motion: active ROM (range of motion) encouraged  Taken 2/27/2024 0800 by Dyllan Eubanks RN  Activity Management: bedrest  VTE Prevention/Management: (see MAR/orders) --  Range of Motion:   active ROM  (range of motion) encouraged   ROM (range of motion) performed  Intervention: Prevent Infection  Recent Flowsheet Documentation  Taken 2/27/2024 1400 by Dyllan Eubanks, RN  Infection Prevention:   equipment surfaces disinfected   hand hygiene promoted   personal protective equipment utilized   single patient room provided   rest/sleep promoted   visitors restricted/screened  Taken 2/27/2024 0800 by Dyllan Eubanks RN  Infection Prevention:   hand hygiene promoted   equipment surfaces disinfected   personal protective equipment utilized   rest/sleep promoted   visitors restricted/screened   single patient room provided  Goal: Optimal Comfort and Wellbeing  Outcome: Ongoing, Progressing  Intervention: Monitor Pain and Promote Comfort  Recent Flowsheet Documentation  Taken 2/27/2024 1400 by Dyllan Eubanks, RN  Pain Management Interventions:   care clustered   pillow support provided   position adjusted   quiet environment facilitated   see MAR  Taken 2/27/2024 0800 by Dyllan Eubanks RN  Pain Management Interventions:   care clustered   pillow support provided   position adjusted   quiet environment facilitated   see MAR  Intervention: Provide Person-Centered Care  Recent Flowsheet Documentation  Taken 2/27/2024 1400 by Dyllan Eubanks, RN  Trust Relationship/Rapport:   care explained   choices provided   emotional support provided   empathic listening provided   questions encouraged   questions answered   thoughts/feelings acknowledged   reassurance provided  Taken 2/27/2024 0800 by Dyllan Eubanks, RN  Trust Relationship/Rapport:   care explained   choices provided   emotional support provided   questions answered   empathic listening provided   questions encouraged   reassurance provided  Goal: Readiness for Transition of Care  Outcome: Ongoing, Progressing   Goal Outcome Evaluation:           Progress: no change  Outcome Evaluation: patient alert, oriented to self, disoriented to all other orientation questions.  reorientation provided. bed in lowest position, call light in reach, bed alarm set, VSS, WCTM

## 2024-02-27 NOTE — PLAN OF CARE
Problem: Noninvasive Ventilation Acute  Goal: Effective Unassisted Ventilation and Oxygenation  Outcome: Ongoing, Progressing     Problem: Skin Injury Risk Increased  Goal: Skin Health and Integrity  Outcome: Ongoing, Progressing  Intervention: Optimize Skin Protection  Recent Flowsheet Documentation  Taken 2/27/2024 0000 by Kate Ocasio RN  Head of Bed ([facility]) Positioning: HOB at 30-45 degrees  Taken 2/26/2024 2200 by Kate Ocasio RN  Head of Bed ([facility]) Positioning: HOB elevated  Taken 2/26/2024 2000 by Kate Ocasio RN  Head of Bed ([facility]) Positioning: HOB at 30-45 degrees     Problem: Fall Injury Risk  Goal: Absence of Fall and Fall-Related Injury  Outcome: Ongoing, Progressing  Intervention: Identify and Manage Contributors  Recent Flowsheet Documentation  Taken 2/27/2024 0000 by Kate Ocasio RN  Medication Review/Management: medications reviewed  Taken 2/26/2024 2200 by Kate Ocasio RN  Medication Review/Management: medications reviewed  Taken 2/26/2024 2000 by Kate Ocasoi RN  Medication Review/Management: medications reviewed  Intervention: Promote Injury-Free Environment  Recent Flowsheet Documentation  Taken 2/27/2024 0100 by Kate Ocasio RN  Safety Promotion/Fall Prevention: safety round/check completed  Taken 2/27/2024 0000 by Rosenbalm, Kate, RN  Safety Promotion/Fall Prevention: safety round/check completed  Taken 2/26/2024 2300 by Kate Ocasio RN  Safety Promotion/Fall Prevention: safety round/check completed  Taken 2/26/2024 2200 by Kate Ocasio RN  Safety Promotion/Fall Prevention: safety round/check completed  Taken 2/26/2024 2100 by Kate Ocasio RN  Safety Promotion/Fall Prevention: safety round/check completed  Taken 2/26/2024 2000 by Rosenbalm, Kate, RN  Safety Promotion/Fall Prevention: safety round/check completed  Taken 2/26/2024 1900 by Rosenbalm, Kate, RN  Safety Promotion/Fall Prevention: safety  round/check completed     Problem: Adult Inpatient Plan of Care  Goal: Plan of Care Review  Outcome: Ongoing, Progressing  Goal: Patient-Specific Goal (Individualized)  Outcome: Ongoing, Progressing  Goal: Absence of Hospital-Acquired Illness or Injury  Outcome: Ongoing, Progressing  Intervention: Identify and Manage Fall Risk  Recent Flowsheet Documentation  Taken 2/27/2024 0100 by Kate Ocasio RN  Safety Promotion/Fall Prevention: safety round/check completed  Taken 2/27/2024 0000 by Rosenbalm, Kate, RN  Safety Promotion/Fall Prevention: safety round/check completed  Taken 2/26/2024 2300 by Kate Ocasio RN  Safety Promotion/Fall Prevention: safety round/check completed  Taken 2/26/2024 2200 by Kate Ocasio RN  Safety Promotion/Fall Prevention: safety round/check completed  Taken 2/26/2024 2100 by Kate Ocasio RN  Safety Promotion/Fall Prevention: safety round/check completed  Taken 2/26/2024 2000 by Rosenbalm, Kate, RN  Safety Promotion/Fall Prevention: safety round/check completed  Taken 2/26/2024 1900 by Rosenbalm, Kate, RN  Safety Promotion/Fall Prevention: safety round/check completed  Intervention: Prevent Skin Injury  Recent Flowsheet Documentation  Taken 2/27/2024 0000 by Kate Ocasio RN  Body Position:   position changed independently   right   side-lying  Taken 2/26/2024 2200 by Kate Ocasio RN  Body Position:   position changed independently   left   side-lying  Taken 2/26/2024 2000 by Kate Ocasio RN  Body Position:   position changed independently   right   side-lying  Intervention: Prevent and Manage VTE (Venous Thromboembolism) Risk  Recent Flowsheet Documentation  Taken 2/26/2024 2000 by Kate Ocasio RN  Activity Management: bedrest  Goal: Optimal Comfort and Wellbeing  Outcome: Ongoing, Progressing  Intervention: Provide Person-Centered Care  Recent Flowsheet Documentation  Taken 2/26/2024 2000 by Kate Ocasio  RN  Trust Relationship/Rapport: care explained  Goal: Readiness for Transition of Care  Outcome: Ongoing, Progressing   Goal Outcome Evaluation:

## 2024-02-27 NOTE — PROGRESS NOTES
"DOS: 2024  NAME: Brenda Munroe   : 1950  PCP: Bernadette Arevalo MD  Chief Complaint   Patient presents with    Fall    Head Injury       Chief complaint: Head injury  Subjective: No acute events overnight.  Denies any new complaints, reports feeling \"okay\".  No longer on Cardizem drip, she is taking meds p.o.  She is alert, oriented to self.  Moving all extremities, follows simple commands, she was able to correctly identify a pen, but had trouble with other common objects.    Objective:  Vital signs: /73   Pulse 106   Temp 97 °F (36.1 °C) (Axillary)   Resp 18   Ht 162.6 cm (64\")   Wt 69.3 kg (152 lb 12.5 oz)   SpO2 96%   BMI 26.22 kg/m²      Gen: NAD, vitals reviewed  MS: Improving but not back to baseline yet.  Oriented to self, told her she was at [facility].  Id'd some common objects.  CN: Cranials 2-12: No focal deficits noted.  However she is still n.p.o., getting swallow study today.    Motor: Moving all extremities well. Follows simple commands  Sensory: No sensory loss noted.  Coordination: Difficult to assess  Gait: Could not be tested at this time.    Laboratory results:  Lab Results   Component Value Date    GLUCOSE 150 (H) 2024    CALCIUM 8.7 2024     2024    K 3.9 2024    CO2 29.4 (H) 2024     2024    BUN 19 2024    CREATININE 0.77 2024    EGFRIFAFRI  2016      Comment:      <15 Indicative of kidney failure.    EGFRIFNONA 75 2021    BCR 24.7 2024    ANIONGAP 12.6 2024     Lab Results   Component Value Date    WBC 7.77 2024    HGB 10.1 (L) 2024    HCT 34.2 2024    MCV 98.3 (H) 2024     2024     Lab Results   Component Value Date    LDL 39 2024    LDL 46 2024    LDL CANCELED 10/09/2015    LDL CANCELED 10/09/2015    LDL CANCELED 10/09/2015         Lab 24  0018   HEMOGLOBIN A1C 5.00        Review of labs: The LDL is only 39.  " Hemoglobin is low at 10.1 and the MCV is elevated at 98.3.  The serum folic acid level was low.    CMP:        Lab 02/27/24  0030 02/26/24  0015 02/25/24  0546 02/25/24  0021 02/24/24  1547 02/23/24  1739   SODIUM 145 149* 143 142 151* 148*   POTASSIUM 3.9 3.8 4.6 5.2 5.4* 4.6   CHLORIDE 103 105 101 102 104 105   CO2 29.4* 31.6* 22.7 22.1 29.1* 35.8*   ANION GAP 12.6 12.4 19.3* 17.9* 17.9* 7.2   BUN 19 21 25* 23 21 16   CREATININE 0.77 0.77 0.97 0.85 0.91 0.84   EGFR 80.6 80.6 61.1 71.5 65.9 72.6   GLUCOSE 150* 105* 154* 135* 120* 97   CALCIUM 8.7 8.6 8.9 9.1 9.1 8.9   MAGNESIUM  --  2.1  --   --  2.3  --    TOTAL PROTEIN 5.8* 5.3* 5.8* 5.7*  --  5.6*   ALBUMIN 3.5 3.1* 3.2* 3.0*  --  3.2*   GLOBULIN 2.3 2.2 2.6 2.7  --  2.4   ALT (SGPT) 13 10 13 11  --  13   AST (SGOT) 15 16 25 28  --  19   BILIRUBIN 0.5 0.4 0.4 0.4  --  0.3   ALK PHOS 68 65 76 72  --  74        TSH          1/1/2024    10:59 1/23/2024    00:48   TSH   TSH 3.020  0.876         Lipid Panel          1/1/2024    13:04 2/24/2024    00:18   Lipid Panel   Total Cholesterol 119  99    Triglycerides 69  56    HDL Cholesterol 59  47    VLDL Cholesterol 14  13    LDL Cholesterol  46  39    LDL/HDL Ratio 0.78  0.87         Lab Results   Component Value Date    SGCGQZSP57 319 02/25/2024       Lab Results   Component Value Date    FOLATE 4.18 (L) 02/25/2024       Lab Results   Component Value Date    HGBA1C 5.00 02/24/2024           Review and interpretation of imaging:   MRI FINDINGS: The patient had some difficulty holding still for the  exam. The images are somewhat degraded by motion artifact. The  ventricular system and subarachnoid space showed blood changes of  diffuse generalized cerebral atrophy with white matter disease involving  both of the cerebral hemispheres. No focal brain parenchymal  abnormalities were demonstrated. The post contrasted images showed no  areas of breakdown of the blood-brain barrier or abnormal enhancement  within the brain  parenchyma. No ischemic areas were demonstrated. There  was no evidence of subdural or epidural hematoma.     IMPRESSION:  Moderate amount of generalized white matter disease is noted  involving both cerebral hemispheres. This can be seen with vasculitis or  a type of collagen vascular disease. Clinical correlation is suggested.  No focal brain parenchymal abnormalities are demonstrated.    CT Head Without Contrast    Result Date: 2/24/2024  CT brain without contrast compared to examination dated 1/31/2024.  Severe atrophy and small vessel disease. There is hemorrhage in the LEFT occipital horn with focal surrounding edema.  This appears to be new from prior study. No shift of midline structures. Large right-sided scalp hematoma. Opacified RIGHT maxillary sinus, new from prior, likely related to presence of blood or retained secretions. No skull fracture.      Impression: Intraventricular hemorrhage noted in the LEFT occipital lobe, new from prior.   This report was finalized on 2/24/2024 9:50 AM by Dr. Loreto Jo MD.      CT Head Without Contrast    Result Date: 2/23/2024  PROCEDURE: CT HEAD WO CONTRAST-, CT CERVICAL SPINE WO CONTRAST-  HISTORY: Unwitnessed fall gross external trauma blood thinners  PROCEDURE: Axial images were obtained from the skull base to the thoracic inlet by computed tomography. Multiple axial CT images were performed from the foramen magnum to the vertex without enhancement. This study was performed with techniques to keep radiation doses as low as reasonably achievable (ALARA). Individualized dose reduction techniques using automated exposure control or adjustment of mA and/or kV according to the patient size were employed.   C-SPINE:  FINDINGS: There are no acute fractures. There are diffuse degenerative changes with loss of disc base height and facet arthropathy.  The prevertebral soft tissues are normal.  Limited images of the lung apices are unremarkable.  HEAD CT:  FINDINGS: There  is generalized cerebral volume loss. Patchy hypodensities in the periventricular white matter consistent with chronic small vessel ischemic change. There is a subdural hematoma overlying the left frontal lobe layering along the falx measuring up to 4 mm. There is no mass, mass effect or midline shift.  There is no hydrocephalus. The paranasal sinuses are clear. Bone windows reveal no acute osseous abnormalities. Note is made of a right periorbital hematoma.      Impression: 1. 4 mm left parafalcine subdural hematoma. 2. No acute fracture or malalignment in the cervical spine.     Critical Finding  Critical report results have been called to Dr. Moran. Report called at 2/23/2024 11:33 AM.        This report was finalized on 2/23/2024 11:33 AM by Lily Mullen M.D..              Workup to date:  An EEG performed on January 24, 2024 showed the following:     Findings:     The patient is awake but confused.  The background shows diffuse medium amplitude 2-5 Hz intermixed delta and theta activity which is present symmetrically over both hemispheres.  A clear posterior rhythm is not seen.  EMG artifact is variably prominent over the frontotemporal leads.  The patient is occasionally restless and moaning during the study.  No focal features or epileptiform activity are present.  Photic stimulation does not change the background.  Hyperventilation is not performed.     Video: Available     Technical quality: Superior     EKG: Irregular, 70-90 bpm     SUMMARY:     Moderate generalized slow     No focal features or epileptiform activity are seen     IMPRESSION:     Diffuse cerebral dysfunction of moderate degree, nonspecific     No evidence for epilepsy is seen    Results for orders placed during the hospital encounter of 02/23/24    Adult Transthoracic Echo Complete W/ Cont if Necessary Per Protocol    Interpretation Summary    Left ventricular systolic function is moderately decreased. Left ventricular ejection fraction  appears to be 41 - 45%.    Left ventricular diastolic function was indeterminate.    Left atrial volume is mildly increased.    There is mild calcification of the aortic valve.    Estimated right ventricular systolic pressure from tricuspid regurgitation is mildly elevated (35-45 mmHg).    There is no evidence of pericardial effusion       Diagnoses: Patient with subdural hemorrhage in the Sub Falcine area currently improving.      Comment: She is off Cardizem drip, accepting PO medications    Plan:  1.  MRI of the brain with and without contrast   2.  Continue Vimpat 50 mg IV BID  3.  Continue folic acid replacement    This was an audio and video enabled telemedicine encounter.  Dr. Randle present for encounter via telemedicine.  Verbal consent taken.    PAULA Prasad

## 2024-02-27 NOTE — PROGRESS NOTES
[facility] HOSPITALIST PROGRESS NOTE    Subjective     History:   Brenda Munroe is a 73 y.o. female admitted on 2/23/2024 secondary to Subdural hematoma     Procedures: None    CC: Follow up subdural hematoma     Patient seen and examined. Awake and alert but remains confused. Able to follow a few simple commands. Unable to provide reliable history. Passed swallow eval and now taking PO. No acute events overnight per RN.     History taken from: chart, and RN.      Objective     Vital Signs  Temp:  [97 °F (36.1 °C)-98.2 °F (36.8 °C)] 98.2 °F (36.8 °C)  Heart Rate:  [] 122  Resp:  [16-26] 18  BP: (103-145)/() 131/77    Intake/Output Summary (Last 24 hours) at 2/27/2024 1147  Last data filed at 2/27/2024 0945  Gross per 24 hour   Intake 1597.5 ml   Output 650 ml   Net 947.5 ml         Physical Exam:   General:    Awake, alert but confused, follows a few simple commands, in no acute distress, chronically ill appearing, (+) facial ecchymoses    Heart:      Normal S1 and S2. Irregularly irregular. Tachycardic.    Lungs:     Respirations regular, even and unlabored. Lungs clear to auscultation B/L. No wheezes, rales or rhonchi.   Abdomen:   Soft and nontender. No guarding, rebound tenderness or  organomegaly noted. Bowel sounds present x 4.   Extremities:  No clubbing, cyanosis or edema noted. Moves UE and LE equally B/L.     Results Review:    Results from last 7 days   Lab Units 02/27/24  0030 02/26/24  0018 02/25/24  0546 02/25/24  0021 02/23/24  1739 02/23/24  1135   WBC 10*3/mm3 7.77 9.40 6.16 9.37 5.47 5.33   HEMOGLOBIN g/dL 10.1* 9.8* 11.3* 11.5* 10.6* 11.1*   PLATELETS 10*3/mm3 231 250 243 260 225 233     Results from last 7 days   Lab Units 02/27/24  0030 02/26/24  0015 02/25/24  0546 02/25/24  0021 02/24/24  1547 02/23/24  1739 02/23/24  1135   SODIUM mmol/L 145 149* 143 142 151* 148* 147*   POTASSIUM mmol/L 3.9 3.8 4.6 5.2 5.4* 4.6 3.9   CHLORIDE mmol/L 103 105 101 102 104 105 105    CO2 mmol/L 29.4* 31.6* 22.7 22.1 29.1* 35.8* 40.1*   BUN mg/dL 19 21 25* 23 21 16 17   CREATININE mg/dL 0.77 0.77 0.97 0.85 0.91 0.84 0.86   CALCIUM mg/dL 8.7 8.6 8.9 9.1 9.1 8.9 9.1   GLUCOSE mg/dL 150* 105* 154* 135* 120* 97 100*     Results from last 7 days   Lab Units 02/27/24  0030 02/26/24  0015 02/25/24  0546 02/25/24  0021 02/23/24  1739 02/23/24  1135   BILIRUBIN mg/dL 0.5 0.4 0.4 0.4 0.3 0.3   ALK PHOS U/L 68 65 76 72 74 73   AST (SGOT) U/L 15 16 25 28 19 19   ALT (SGPT) U/L 13 10 13 11 13 15     Results from last 7 days   Lab Units 02/26/24  0015 02/24/24  1547   MAGNESIUM mg/dL 2.1 2.3     Results from last 7 days   Lab Units 02/23/24  1135   INR  1.50*     Results from last 7 days   Lab Units 02/23/24  1340 02/23/24  1135   CK TOTAL U/L 89  --    HSTROP T ng/L 452* 474*       Imaging Results (Last 24 Hours)       ** No results found for the last 24 hours. **              Medications:  amiodarone, 200 mg, Oral, BID With Meals  baclofen, 5 mg, Oral, BID  budesonide-formoterol, 2 puff, Inhalation, BID - RT   And  tiotropium bromide monohydrate, 2 puff, Inhalation, Daily - RT  [START ON 3/1/2024] cholecalciferol, 50,000 Units, Oral, Weekly  dilTIAZem, 60 mg, Oral, Q8H  donepezil, 10 mg, Oral, Nightly  ferrous sulfate, 325 mg, Oral, BID With Meals  folic acid 1 mg in sodium chloride 0.9 % 50 mL IVPB, 1 mg, Intravenous, Daily  ipratropium, 0.5 mg, Nebulization, 4x Daily - RT  isosorbide mononitrate, 15 mg, Oral, Q24H  Lacosamide, 50 mg, Intravenous, Q12H  lamoTRIgine, 100 mg, Oral, Daily  lamoTRIgine, 25 mg, Oral, Nightly  levothyroxine, 75 mcg, Oral, Q AM  memantine, 10 mg, Oral, Q12H  methylPREDNISolone sodium succinate, 40 mg, Intravenous, Q12H  mupirocin, 1 application , Each Nare, BID  pantoprazole, 40 mg, Oral, QAM AC  risperiDONE, 0.5 mg, Oral, BID  rosuvastatin, 40 mg, Oral, Q PM  senna-docusate sodium, 2 tablet, Oral, BID  sertraline, 100 mg, Oral, Daily  sodium chloride, 1,000 mL, Intravenous,  Once  sodium chloride, 10 mL, Intravenous, Q12H  sodium chloride, 10 mL, Intravenous, Q12H  sodium chloride, 10 mL, Intravenous, Q12H      dexmedetomidine, 0.2-1.5 mcg/kg/hr, Last Rate: Stopped (02/25/24 0508)  phenylephrine, 0.5-3 mcg/kg/min  sodium chloride, 75 mL/hr, Last Rate: 75 mL/hr (02/26/24 0839)            Assessment & Plan   Traumatic subdural hematoma: S/P recent fall PTA. CT head revealed a 4 mm left parafalcine subdural hematoma. Neurology consulted and neurosurgery notified. Upon review, neurosurgery recommended conservative management with no reversal agent and management at our facility. Repeat CT head stable. MRI brain ordered. Cont close monitoring. Neurology and neurosurgery input appreciated.     Persistent Afib with RVR: Chronically anticoagulated with Eliquis which was held on admission in the setting of above. Transitioned off Cardizem gtt to PO amiodarone and Cardizem once able to tolerate PO intake. Monitor on telemetry. Cardiology input appreciated.      Acute COPD exacerbation: Cont steroids, nebs and inhaler regimen.     Acute hypercapnic respiratory failure: Likely 2/2 COPD exacerbation and elevated O2 levels on supplemental O2. Improved with BiPAP.     Acute metabolic encephalopathy superimposed on baseline dementia: Likely 2/2 hypercapnia and hospitalization. Previously required Precedex. Overall improved. Cont treatment as outlined above and supportive treatment.     Hypertensive urgency: Improved with IV labetalol and hydralazine with some fluctuation at present. Cont PO Cardizem and nitrate. Cont to monitor.     NSTEMI in the setting of known CAD: Possibly type II in the setting of above. Echo reveals an EF of 41-45%. No antiplatelet therapy in the setting of subdural hematoma.     Right nasal bone fracture: S/P fall. Cont supportive treatment.     GERD: Cont PPI.     DVT PPX: SCD's. No pharmacologic DVT PPX 2/2 subdural hematoma.     Disposition Pending clinical course. Likely  return to SNF when medically stable.     Prasad Romero,   02/27/24  11:47 EST

## 2024-02-28 LAB
A-A DO2: 72.3 MMHG (ref 0–300)
ANION GAP SERPL CALCULATED.3IONS-SCNC: 7 MMOL/L (ref 5–15)
ANISOCYTOSIS BLD QL: NORMAL
ARTERIAL PATENCY WRIST A: POSITIVE
ATMOSPHERIC PRESS: 717 MMHG
BASE EXCESS BLDA CALC-SCNC: 7.3 MMOL/L (ref 0–2)
BASOPHILS # BLD AUTO: 0.01 10*3/MM3 (ref 0–0.2)
BASOPHILS NFR BLD AUTO: 0.1 % (ref 0–1.5)
BDY SITE: ABNORMAL
BUN SERPL-MCNC: 20 MG/DL (ref 8–23)
BUN/CREAT SERPL: 28.6 (ref 7–25)
CALCIUM SPEC-SCNC: 8.4 MG/DL (ref 8.6–10.5)
CHLORIDE SERPL-SCNC: 102 MMOL/L (ref 98–107)
CO2 BLDA-SCNC: 35.8 MMOL/L (ref 22–33)
CO2 SERPL-SCNC: 32 MMOL/L (ref 22–29)
COHGB MFR BLD: 1.7 % (ref 0–5)
CREAT SERPL-MCNC: 0.7 MG/DL (ref 0.57–1)
DACRYOCYTES BLD QL SMEAR: NORMAL
DEPRECATED RDW RBC AUTO: 77.3 FL (ref 37–54)
EGFRCR SERPLBLD CKD-EPI 2021: 90.3 ML/MIN/1.73
EOSINOPHIL # BLD AUTO: 0 10*3/MM3 (ref 0–0.4)
EOSINOPHIL NFR BLD AUTO: 0 % (ref 0.3–6.2)
EPAP: 8
ERYTHROCYTE [DISTWIDTH] IN BLOOD BY AUTOMATED COUNT: 20.8 % (ref 12.3–15.4)
GLUCOSE SERPL-MCNC: 172 MG/DL (ref 65–99)
HCO3 BLDA-SCNC: 34 MMOL/L (ref 20–26)
HCT VFR BLD AUTO: 32.1 % (ref 34–46.6)
HCT VFR BLD CALC: 29.3 % (ref 38–51)
HGB BLD-MCNC: 9.4 G/DL (ref 12–15.9)
HGB BLDA-MCNC: 9.5 G/DL (ref 13.5–17.5)
HYPOCHROMIA BLD QL: NORMAL
IMM GRANULOCYTES # BLD AUTO: 0.09 10*3/MM3 (ref 0–0.05)
IMM GRANULOCYTES NFR BLD AUTO: 1.3 % (ref 0–0.5)
INHALED O2 CONCENTRATION: 32 %
IPAP: 24
LYMPHOCYTES # BLD AUTO: 0.28 10*3/MM3 (ref 0.7–3.1)
LYMPHOCYTES NFR BLD AUTO: 4.1 % (ref 19.6–45.3)
Lab: ABNORMAL
MACROCYTES BLD QL SMEAR: NORMAL
MCH RBC QN AUTO: 29.6 PG (ref 26.6–33)
MCHC RBC AUTO-ENTMCNC: 29.3 G/DL (ref 31.5–35.7)
MCV RBC AUTO: 100.9 FL (ref 79–97)
METHGB BLD QL: <-0.1 % (ref 0–3)
MODALITY: ABNORMAL
MONOCYTES # BLD AUTO: 0.2 10*3/MM3 (ref 0.1–0.9)
MONOCYTES NFR BLD AUTO: 2.9 % (ref 5–12)
NEUTROPHILS NFR BLD AUTO: 6.3 10*3/MM3 (ref 1.7–7)
NEUTROPHILS NFR BLD AUTO: 91.6 % (ref 42.7–76)
NRBC BLD AUTO-RTO: 0 /100 WBC (ref 0–0.2)
OVALOCYTES BLD QL SMEAR: NORMAL
OXYHGB MFR BLDV: 94.2 % (ref 94–99)
PCO2 BLDA: 59.3 MM HG (ref 35–45)
PCO2 TEMP ADJ BLD: ABNORMAL MM[HG]
PH BLDA: 7.37 PH UNITS (ref 7.35–7.45)
PH, TEMP CORRECTED: ABNORMAL
PLAT MORPH BLD: NORMAL
PLATELET # BLD AUTO: 182 10*3/MM3 (ref 140–450)
PMV BLD AUTO: 10.3 FL (ref 6–12)
PO2 BLDA: 76.1 MM HG (ref 83–108)
PO2 TEMP ADJ BLD: ABNORMAL MM[HG]
POTASSIUM SERPL-SCNC: 3.7 MMOL/L (ref 3.5–5.2)
RBC # BLD AUTO: 3.18 10*6/MM3 (ref 3.77–5.28)
SAO2 % BLDCOA: 95.6 % (ref 94–99)
SET MECH RESP RATE: 24
SODIUM SERPL-SCNC: 141 MMOL/L (ref 136–145)
VENTILATOR MODE: ABNORMAL
WBC NRBC COR # BLD AUTO: 6.88 10*3/MM3 (ref 3.4–10.8)

## 2024-02-28 PROCEDURE — 99232 SBSQ HOSP IP/OBS MODERATE 35: CPT | Performed by: NURSE PRACTITIONER

## 2024-02-28 PROCEDURE — 94761 N-INVAS EAR/PLS OXIMETRY MLT: CPT

## 2024-02-28 PROCEDURE — 80048 BASIC METABOLIC PNL TOTAL CA: CPT | Performed by: INTERNAL MEDICINE

## 2024-02-28 PROCEDURE — 83050 HGB METHEMOGLOBIN QUAN: CPT

## 2024-02-28 PROCEDURE — 82375 ASSAY CARBOXYHB QUANT: CPT

## 2024-02-28 PROCEDURE — 99233 SBSQ HOSP IP/OBS HIGH 50: CPT | Performed by: NURSE PRACTITIONER

## 2024-02-28 PROCEDURE — C9254 INJECTION, LACOSAMIDE: HCPCS

## 2024-02-28 PROCEDURE — 25010000002 METHYLPREDNISOLONE PER 40 MG: Performed by: INTERNAL MEDICINE

## 2024-02-28 PROCEDURE — 94660 CPAP INITIATION&MGMT: CPT

## 2024-02-28 PROCEDURE — 94799 UNLISTED PULMONARY SVC/PX: CPT

## 2024-02-28 PROCEDURE — 36600 WITHDRAWAL OF ARTERIAL BLOOD: CPT

## 2024-02-28 PROCEDURE — 85025 COMPLETE CBC W/AUTO DIFF WBC: CPT | Performed by: INTERNAL MEDICINE

## 2024-02-28 PROCEDURE — 82805 BLOOD GASES W/O2 SATURATION: CPT

## 2024-02-28 PROCEDURE — 99233 SBSQ HOSP IP/OBS HIGH 50: CPT | Performed by: INTERNAL MEDICINE

## 2024-02-28 PROCEDURE — 94664 DEMO&/EVAL PT USE INHALER: CPT

## 2024-02-28 PROCEDURE — 85007 BL SMEAR W/DIFF WBC COUNT: CPT | Performed by: INTERNAL MEDICINE

## 2024-02-28 PROCEDURE — 25010000002 LACOSAMIDE 200 MG/20ML SOLUTION 20 ML VIAL

## 2024-02-28 RX ORDER — FOLIC ACID 1 MG/1
1 TABLET ORAL DAILY
Status: DISCONTINUED | OUTPATIENT
Start: 2024-02-28 | End: 2024-03-06

## 2024-02-28 RX ORDER — LAMOTRIGINE 100 MG/1
100 TABLET ORAL NIGHTLY
Status: DISCONTINUED | OUTPATIENT
Start: 2024-02-28 | End: 2024-03-03

## 2024-02-28 RX ORDER — AMIODARONE HYDROCHLORIDE 200 MG/1
400 TABLET ORAL 2 TIMES DAILY WITH MEALS
Status: DISCONTINUED | OUTPATIENT
Start: 2024-02-28 | End: 2024-03-06

## 2024-02-28 RX ORDER — METOPROLOL SUCCINATE 25 MG/1
12.5 TABLET, EXTENDED RELEASE ORAL
Status: DISCONTINUED | OUTPATIENT
Start: 2024-02-28 | End: 2024-02-29

## 2024-02-28 RX ADMIN — MEMANTINE HYDROCHLORIDE 10 MG: 10 TABLET, FILM COATED ORAL at 08:30

## 2024-02-28 RX ADMIN — METOPROLOL SUCCINATE 12.5 MG: 25 TABLET, EXTENDED RELEASE ORAL at 11:02

## 2024-02-28 RX ADMIN — FOLIC ACID 1 MG: 5 INJECTION, SOLUTION INTRAMUSCULAR; INTRAVENOUS; SUBCUTANEOUS at 08:27

## 2024-02-28 RX ADMIN — Medication 10 ML: at 20:37

## 2024-02-28 RX ADMIN — IPRATROPIUM BROMIDE 0.5 MG: 0.5 SOLUTION RESPIRATORY (INHALATION) at 00:11

## 2024-02-28 RX ADMIN — PANTOPRAZOLE SODIUM 40 MG: 40 TABLET, DELAYED RELEASE ORAL at 08:28

## 2024-02-28 RX ADMIN — IPRATROPIUM BROMIDE 0.5 MG: 0.5 SOLUTION RESPIRATORY (INHALATION) at 18:27

## 2024-02-28 RX ADMIN — FERROUS SULFATE TAB 325 MG (65 MG ELEMENTAL FE) 325 MG: 325 (65 FE) TAB at 18:40

## 2024-02-28 RX ADMIN — Medication 10 ML: at 08:28

## 2024-02-28 RX ADMIN — IPRATROPIUM BROMIDE 0.5 MG: 0.5 SOLUTION RESPIRATORY (INHALATION) at 06:50

## 2024-02-28 RX ADMIN — LAMOTRIGINE 100 MG: 100 TABLET ORAL at 08:28

## 2024-02-28 RX ADMIN — METHYLPREDNISOLONE SODIUM SUCCINATE 40 MG: 40 INJECTION, POWDER, FOR SOLUTION INTRAMUSCULAR; INTRAVENOUS at 20:36

## 2024-02-28 RX ADMIN — Medication 1 MG: at 11:02

## 2024-02-28 RX ADMIN — MEMANTINE HYDROCHLORIDE 10 MG: 10 TABLET, FILM COATED ORAL at 20:36

## 2024-02-28 RX ADMIN — BACLOFEN 5 MG: 10 TABLET ORAL at 20:36

## 2024-02-28 RX ADMIN — DILTIAZEM HYDROCHLORIDE 60 MG: 60 TABLET, FILM COATED ORAL at 06:32

## 2024-02-28 RX ADMIN — MUPIROCIN 1 APPLICATION: 20 OINTMENT TOPICAL at 20:37

## 2024-02-28 RX ADMIN — MUPIROCIN 1 APPLICATION: 20 OINTMENT TOPICAL at 08:28

## 2024-02-28 RX ADMIN — LAMOTRIGINE 100 MG: 100 TABLET ORAL at 20:37

## 2024-02-28 RX ADMIN — ISOSORBIDE MONONITRATE 15 MG: 30 TABLET, EXTENDED RELEASE ORAL at 08:30

## 2024-02-28 RX ADMIN — BUDESONIDE AND FORMOTEROL FUMARATE DIHYDRATE 2 PUFF: 160; 4.5 AEROSOL RESPIRATORY (INHALATION) at 18:27

## 2024-02-28 RX ADMIN — RISPERIDONE 0.5 MG: 0.25 TABLET, FILM COATED ORAL at 20:36

## 2024-02-28 RX ADMIN — METHYLPREDNISOLONE SODIUM SUCCINATE 40 MG: 40 INJECTION, POWDER, FOR SOLUTION INTRAMUSCULAR; INTRAVENOUS at 08:25

## 2024-02-28 RX ADMIN — LACOSAMIDE 50 MG: 10 INJECTION INTRAVENOUS at 02:22

## 2024-02-28 RX ADMIN — RISPERIDONE 0.5 MG: 0.25 TABLET, FILM COATED ORAL at 08:30

## 2024-02-28 RX ADMIN — ROSUVASTATIN CALCIUM 40 MG: 20 TABLET, FILM COATED ORAL at 18:39

## 2024-02-28 RX ADMIN — DILTIAZEM HYDROCHLORIDE 30 MG: 60 TABLET, FILM COATED ORAL at 20:36

## 2024-02-28 RX ADMIN — AMIODARONE HYDROCHLORIDE 200 MG: 200 TABLET ORAL at 08:30

## 2024-02-28 RX ADMIN — FERROUS SULFATE TAB 325 MG (65 MG ELEMENTAL FE) 325 MG: 325 (65 FE) TAB at 08:25

## 2024-02-28 RX ADMIN — DONEPEZIL HYDROCHLORIDE 10 MG: 5 TABLET, FILM COATED ORAL at 20:36

## 2024-02-28 RX ADMIN — AMIODARONE HYDROCHLORIDE 400 MG: 200 TABLET ORAL at 18:41

## 2024-02-28 RX ADMIN — LEVOTHYROXINE SODIUM 75 MCG: 0.07 TABLET ORAL at 06:33

## 2024-02-28 RX ADMIN — BACLOFEN 5 MG: 10 TABLET ORAL at 08:28

## 2024-02-28 RX ADMIN — SERTRALINE 100 MG: 50 TABLET, FILM COATED ORAL at 08:29

## 2024-02-28 RX ADMIN — IPRATROPIUM BROMIDE 0.5 MG: 0.5 SOLUTION RESPIRATORY (INHALATION) at 12:50

## 2024-02-28 NOTE — PROGRESS NOTES
[facility] HOSPITALIST PROGRESS NOTE    Subjective     History:   Brenda Munroe is a 73 y.o. female admitted on 2/23/2024 secondary to Subdural hematoma     Procedures: None    CC: Follow up subdural hematoma     Patient seen and examined. Awake and alert but remains confused. Oriented to person only and able to follow a few simple commands. Unable to provide reliable history. RN reports was lethargic this AM after not wearing BiPAP overnight but improved with BiPAP this AM. No acute events overnight per RN.     History taken from: chart, and RN.      Objective     Vital Signs  Temp:  [97.2 °F (36.2 °C)-98.8 °F (37.1 °C)] 97.9 °F (36.6 °C)  Heart Rate:  [] 105  Resp:  [16-25] 22  BP: (101-157)/() 119/86    Intake/Output Summary (Last 24 hours) at 2/28/2024 1019  Last data filed at 2/28/2024 0946  Gross per 24 hour   Intake 1828 ml   Output 0 ml   Net 1828 ml         Physical Exam:   General:    Awake, alert but confused (oriented to person only), follows a few simple commands, in no acute distress, chronically ill appearing, (+) facial ecchymoses    Heart:      Normal S1 and S2. Irregularly irregular. Tachycardic.    Lungs:     Respirations regular, even and unlabored. Lungs clear to auscultation B/L. No wheezes, rales or rhonchi.   Abdomen:   Soft and nontender. No guarding, rebound tenderness or  organomegaly noted. Bowel sounds present x 4.   Extremities:  No clubbing, cyanosis or edema noted. Moves UE and LE equally B/L.     Results Review:    Results from last 7 days   Lab Units 02/28/24  0031 02/27/24  0030 02/26/24  0018 02/25/24  0546 02/25/24  0021 02/23/24  1739 02/23/24  1135   WBC 10*3/mm3 6.88 7.77 9.40 6.16 9.37 5.47 5.33   HEMOGLOBIN g/dL 9.4* 10.1* 9.8* 11.3* 11.5* 10.6* 11.1*   PLATELETS 10*3/mm3 182 231 250 243 260 225 233     Results from last 7 days   Lab Units 02/28/24  0031 02/27/24  0030 02/26/24  0015 02/25/24  0546 02/25/24  0021 02/24/24  1547 02/23/24  1739    SODIUM mmol/L 141 145 149* 143 142 151* 148*   POTASSIUM mmol/L 3.7 3.9 3.8 4.6 5.2 5.4* 4.6   CHLORIDE mmol/L 102 103 105 101 102 104 105   CO2 mmol/L 32.0* 29.4* 31.6* 22.7 22.1 29.1* 35.8*   BUN mg/dL 20 19 21 25* 23 21 16   CREATININE mg/dL 0.70 0.77 0.77 0.97 0.85 0.91 0.84   CALCIUM mg/dL 8.4* 8.7 8.6 8.9 9.1 9.1 8.9   GLUCOSE mg/dL 172* 150* 105* 154* 135* 120* 97     Results from last 7 days   Lab Units 02/27/24  0030 02/26/24  0015 02/25/24  0546 02/25/24  0021 02/23/24  1739 02/23/24  1135   BILIRUBIN mg/dL 0.5 0.4 0.4 0.4 0.3 0.3   ALK PHOS U/L 68 65 76 72 74 73   AST (SGOT) U/L 15 16 25 28 19 19   ALT (SGPT) U/L 13 10 13 11 13 15     Results from last 7 days   Lab Units 02/26/24  0015 02/24/24  1547   MAGNESIUM mg/dL 2.1 2.3     Results from last 7 days   Lab Units 02/23/24  1135   INR  1.50*     Results from last 7 days   Lab Units 02/23/24  1340 02/23/24  1135   CK TOTAL U/L 89  --    HSTROP T ng/L 452* 474*       Imaging Results (Last 24 Hours)       Procedure Component Value Units Date/Time    MRI Brain With & Without Contrast [[id number]] Collected: 02/27/24 1440     Updated: 02/27/24 1446    Narrative:      EXAM:    MR Head Without and With Intravenous Contrast     EXAM DATE:    2/27/2024 1:30 PM     CLINICAL HISTORY:    Trauma protocol; S06.5XAA-Traumatic subdural hemorrhage with loss of  consciousness status unknown, initial encounter; I21.4-Non-ST elevation  (NSTEMI) myocardial infarction; Z79.01-Long term (current) use of  anticoagulants; S02.2XXA-Fracture of nasal bones, initial encounter for  closed fracture; S05.11XA-Contusion of eyeball and orbital tissues,  right eye, initial encounter     TECHNIQUE:    Magnetic resonance images of the head/brain without and with  intravenous contrast in multiple planes.     COMPARISON:    CT 2/24/2024     FINDINGS:    Brain and extra-axial spaces:  Layering blood products now in the  chronic phase in the left greater than right dependent portions of  the  occipital horns of the lateral ventricles.  Left cerebral hemispheric  subdural hematoma is noted with a maximal thickness of approximately  10.5 mm. No midline shift.  Fairly extensive and confluent T2 signal  abnormality in the periventricular and subcortical white matter  consistent with advanced chronic small vessel ischemic disease.  No  hydrocephalus.  No evidence of acute infarct.    Bones/joints:  Unremarkable as visualized.  No acute fracture.    Soft tissues:  Right frontal scalp hematoma is noted.  No MRI evidence  of acute phase blood products.    Sinuses:  Unremarkable as visualized.  No acute sinusitis.    Mastoid air cells:  Unremarkable as visualized.  No mastoid effusion.    Orbits:  Unremarkable as visualized.       Impression:      1.  Layering blood products now in the more chronic phase in the left  greater than right dependent portions of the occipital horns of the  lateral ventricles. No hydrocephalus.  2.  Left cerebral hemispheric subdural hematoma is noted with a maximal  thickness of approximately 10.5 mm. No midline shift.  3.  No MRI evidence of acute phase blood products.  4.  No evidence of acute infarct.  5.  Right frontal scalp hematoma is noted.  6.  Fairly extensive and confluent T2 signal abnormality in the  periventricular and subcortical white matter consistent with advanced  chronic small vessel ischemic disease.        This report was finalized on 2/27/2024 2:44 PM by Dr. Erik Jones MD.                 Medications:  amiodarone, 200 mg, Oral, BID With Meals  baclofen, 5 mg, Oral, BID  budesonide-formoterol, 2 puff, Inhalation, BID - RT   And  tiotropium bromide monohydrate, 2 puff, Inhalation, Daily - RT  [START ON 3/1/2024] cholecalciferol, 50,000 Units, Oral, Weekly  dilTIAZem, 60 mg, Oral, Q8H  donepezil, 10 mg, Oral, Nightly  ferrous sulfate, 325 mg, Oral, BID With Meals  folic acid, 1 mg, Oral, Daily  ipratropium, 0.5 mg, Nebulization, 4x Daily - RT  isosorbide  mononitrate, 15 mg, Oral, Q24H  lamoTRIgine, 100 mg, Oral, Daily  lamoTRIgine, 100 mg, Oral, Nightly  levothyroxine, 75 mcg, Oral, Q AM  memantine, 10 mg, Oral, Q12H  methylPREDNISolone sodium succinate, 40 mg, Intravenous, Q12H  metoprolol succinate XL, 12.5 mg, Oral, Q24H  mupirocin, 1 application , Each Nare, BID  pantoprazole, 40 mg, Oral, QAM AC  risperiDONE, 0.5 mg, Oral, BID  rosuvastatin, 40 mg, Oral, Q PM  senna-docusate sodium, 2 tablet, Oral, BID  sertraline, 100 mg, Oral, Daily  sodium chloride, 1,000 mL, Intravenous, Once  sodium chloride, 10 mL, Intravenous, Q12H  sodium chloride, 10 mL, Intravenous, Q12H  sodium chloride, 10 mL, Intravenous, Q12H                 Assessment & Plan   Traumatic subdural hematoma: S/P recent fall PTA. CT head revealed a 4 mm left parafalcine subdural hematoma. Neurology consulted and neurosurgery notified. Upon review, neurosurgery recommended conservative management with no reversal agent and management at our facility. Repeat CT head stable. MRI brain reveals layering blood products now in the more chronic phase in the L>R dependent portions of the occipital horns of the lateral ventricles, left cerebral hemispheric subdural hematoma with a maximal thickness of approx 10.5 mm, right frontal scalp hematoma and findings consistent with fairly extensive advanced chronic small vessel ischemic disease. Cont close monitoring. Neurology and neurosurgery input appreciated.     Persistent Afib with RVR: Chronically anticoagulated with Eliquis which was held on admission in the setting of above. Transitioned off Cardizem gtt to PO amiodarone and Cardizem once able to tolerate PO intake. Overall improved. Titrating PO Cardizem and restarting metoprolol. Monitor on telemetry. Cardiology input appreciated.      Acute COPD exacerbation: Cont steroids, nebs and inhaler regimen.     Acute hypercapnic respiratory failure: Likely 2/2 COPD exacerbation and elevated O2 levels on  supplemental O2. Overall improved with BiPAP. Cont to encourage consistent use of BiPAP at HS as pt was lethargic this AM.     Acute metabolic encephalopathy superimposed on baseline dementia: Likely 2/2 hypercapnia and hospitalization. Previously required Precedex. Overall improved. Cont treatment as outlined above and supportive treatment.     Hypertensive urgency: Improved with IV labetalol and hydralazine with some fluctuation at present. Cont PO Cardizem and nitrate. Restarting metoprolol. Cont to monitor.     NSTEMI in the setting of known CAD: Possibly type II in the setting of above. Echo reveals an EF of 41-45%. No antiplatelet therapy in the setting of subdural hematoma.     Right nasal bone fracture: S/P fall. Cont supportive treatment.     GERD: Cont PPI.     DVT PPX: SCD's. No pharmacologic DVT PPX 2/2 subdural hematoma.     Discussed with Dr. Randle.     Transfer to PCU.     Disposition Pending clinical course. Likely return to SNF when medically stable.     Prasad Romero DO  02/28/24  10:19 EST

## 2024-02-28 NOTE — CASE MANAGEMENT/SOCIAL WORK
Discharge Planning Assessment  [facility]     Patient Name: Brenda Munroe  MRN: [id number]  Today's Date: 2/28/2024    Admit Date: 2/23/2024       Discharge Plan       Row Name 02/28/24 1258       Plan    Plan Pt admitted on 2/23/24. Pt is a resident of [facility]. Per Phoebe, pt had a 10 day bed hold up on admission. SS following.           Destination       Service Provider Request Status Selected Services Address Phone Fax Patient Preferred    [facility] Pending - No Request Sent N/A [id number] [facility] [id number] [phone] --               JORGE Bowers

## 2024-02-28 NOTE — PLAN OF CARE
Problem: Noninvasive Ventilation Acute  Goal: Effective Unassisted Ventilation and Oxygenation  Outcome: Ongoing, Progressing     Problem: Skin Injury Risk Increased  Goal: Skin Health and Integrity  Outcome: Ongoing, Progressing  Intervention: Optimize Skin Protection  Recent Flowsheet Documentation  Taken 2/28/2024 0400 by Kate Ocasio RN  Head of Bed (HOB) Positioning: HOB at 30-45 degrees  Taken 2/28/2024 0200 by Kate Ocasio RN  Head of Bed (HOB) Positioning: HOB at 30-45 degrees  Taken 2/28/2024 0000 by Kate Ocasio RN  Head of Bed (HOB) Positioning: HOB at 30-45 degrees  Taken 2/27/2024 2200 by Kate Ocasio RN  Head of Bed (HOB) Positioning: HOB at 30-45 degrees  Taken 2/27/2024 2000 by Kate Ocasio RN  Head of Bed (HOB) Positioning: HOB at 30-45 degrees     Problem: Fall Injury Risk  Goal: Absence of Fall and Fall-Related Injury  Outcome: Ongoing, Progressing  Intervention: Identify and Manage Contributors  Recent Flowsheet Documentation  Taken 2/28/2024 0400 by Kate Ocasio RN  Medication Review/Management: medications reviewed  Taken 2/28/2024 0200 by Kate Ocasio RN  Medication Review/Management: medications reviewed  Taken 2/28/2024 0000 by Kaet Ocasio RN  Medication Review/Management: medications reviewed  Taken 2/27/2024 2200 by Kate Ocasio RN  Medication Review/Management: medications reviewed  Taken 2/27/2024 2000 by Kate Ocasio RN  Medication Review/Management: medications reviewed  Intervention: Promote Injury-Free Environment  Recent Flowsheet Documentation  Taken 2/28/2024 0400 by Kate Ocasio RN  Safety Promotion/Fall Prevention: safety round/check completed  Taken 2/28/2024 0300 by Kate Ocasio RN  Safety Promotion/Fall Prevention: safety round/check completed  Taken 2/28/2024 0200 by Kate Ocasio RN  Safety Promotion/Fall Prevention: safety round/check completed  Taken 2/28/2024 0100 by  Rosenbalm, Kaet, RN  Safety Promotion/Fall Prevention: safety round/check completed  Taken 2/28/2024 0000 by Rosenbalm, Kate, RN  Safety Promotion/Fall Prevention: safety round/check completed  Taken 2/27/2024 2300 by Kate Ocasio RN  Safety Promotion/Fall Prevention: safety round/check completed  Taken 2/27/2024 2200 by Kate Ocasio RN  Safety Promotion/Fall Prevention: safety round/check completed  Taken 2/27/2024 2100 by Kate Ocasio RN  Safety Promotion/Fall Prevention: safety round/check completed  Taken 2/27/2024 2000 by Rosenbalm, Kate, RN  Safety Promotion/Fall Prevention: safety round/check completed  Taken 2/27/2024 1900 by Rosenbalm, Kate, RN  Safety Promotion/Fall Prevention: safety round/check completed     Problem: Adult Inpatient Plan of Care  Goal: Plan of Care Review  Outcome: Ongoing, Progressing  Goal: Patient-Specific Goal (Individualized)  Outcome: Ongoing, Progressing  Goal: Absence of Hospital-Acquired Illness or Injury  Outcome: Ongoing, Progressing  Intervention: Identify and Manage Fall Risk  Recent Flowsheet Documentation  Taken 2/28/2024 0400 by Kate Ocasio RN  Safety Promotion/Fall Prevention: safety round/check completed  Taken 2/28/2024 0300 by Kate Ocasio RN  Safety Promotion/Fall Prevention: safety round/check completed  Taken 2/28/2024 0200 by Kate Ocasio RN  Safety Promotion/Fall Prevention: safety round/check completed  Taken 2/28/2024 0100 by Kate Ocasio RN  Safety Promotion/Fall Prevention: safety round/check completed  Taken 2/28/2024 0000 by Rosenbalm, Kate, RN  Safety Promotion/Fall Prevention: safety round/check completed  Taken 2/27/2024 2300 by Kate Ocasio RN  Safety Promotion/Fall Prevention: safety round/check completed  Taken 2/27/2024 2200 by Kate Ocasio RN  Safety Promotion/Fall Prevention: safety round/check completed  Taken 2/27/2024 2100 by Kate Ocasio  RN  Safety Promotion/Fall Prevention: safety round/check completed  Taken 2/27/2024 2000 by Rosenbalm, Kate, RN  Safety Promotion/Fall Prevention: safety round/check completed  Taken 2/27/2024 1900 by Rosenbalm, Kate, RN  Safety Promotion/Fall Prevention: safety round/check completed  Intervention: Prevent Skin Injury  Recent Flowsheet Documentation  Taken 2/28/2024 0400 by Kate Ocasio RN  Body Position:   right   turned  Taken 2/28/2024 0200 by Kate Ocasio RN  Body Position:   left   turned  Taken 2/28/2024 0000 by Kate Ocasio RN  Body Position:   right   turned  Taken 2/27/2024 2200 by Kate Ocasio RN  Body Position:   left   turned  Taken 2/27/2024 2000 by Kate Ocasio RN  Body Position:   right   turned  Intervention: Prevent and Manage VTE (Venous Thromboembolism) Risk  Recent Flowsheet Documentation  Taken 2/28/2024 0200 by Kate Ocasio RN  Activity Management: bedrest  Taken 2/27/2024 2000 by Kate Ocasio RN  Activity Management: bedrest  Goal: Optimal Comfort and Wellbeing  Outcome: Ongoing, Progressing  Intervention: Provide Person-Centered Care  Recent Flowsheet Documentation  Taken 2/28/2024 0200 by Kate Ocasio RN  Trust Relationship/Rapport: care explained  Taken 2/27/2024 2000 by Kate Ocasio RN  Trust Relationship/Rapport: care explained  Goal: Readiness for Transition of Care  Outcome: Ongoing, Progressing   Goal Outcome Evaluation:

## 2024-02-28 NOTE — PROGRESS NOTES
"   LOS: 5 days     Name: Brenda Munroe  Age/Sex: 73 y.o. female  :  1950        PCP: Bernadette Arevalo MD    Principal Problem:    Subdural hematoma      Admission Information: Brenda Munroe is a 73 y.o. female with MDD, COPD, CAD, thyroid disease, hypertension, paroxysmal atrial fibrillation (status post cardioversion 2024), carotid stenosis, dementia, GERD, PUD.        Chief Complaint: Fall with head injury    Interval history: Heart rate is better in range, but still greater than 100    Subjective   Patient is more alert today.  She is able to tell me her name.  When I asked her where she was she replied, \"I forget.\"  She denies pain.  She specifically denies a headache.      Vital Signs  Vital Signs (last 72 hrs)          0700   0659  0700   0659  0700   0659  0700   1020   Most Recent      Temp (°F) 97 -  99.4    97 -  98.2    97.2 -  98.8      97.9     97.9 (36.6)  0700    Heart Rate 88 -  158    88 -  170    80 -  129    101 -  115     105  1000    Resp  -      16 -  26    16 -        22     22  1000    /86 -  150/121    97/68 -  145/92    109/79 -  143/94    101/80 -  157/82     119/86  1000    SpO2 (%) 74 -  100    84 -  98    90 -  100    90 -  99     99  1000    Flow (L/min) 1 -  3    2 -  3      3      3     3  0823    Oxygen Concentration (%)     32      32       32  0650          Temp:  [97.2 °F (36.2 °C)-98.8 °F (37.1 °C)] 97.9 °F (36.6 °C)  Heart Rate:  [] 105  Resp:  [-] 22  BP: (101-157)/() 119/86  Body mass index is 26.72 kg/m².      Intake/Output Summary (Last 24 hours) at 2024 1020  Last data filed at 2024 0946  Gross per 24 hour   Intake 1828 ml   Output 0 ml   Net 1828 ml       Vitals and nursing note reviewed.   Constitutional:       General: Awake.      Appearance: Not in distress. Chronically ill-appearing.      Interventions: Nasal cannula in place.      " Comments: At 3 L/min   Eyes:      Conjunctiva/sclera:      Right eye: Hemorrhage present.      Comments: Small hematoma over right orbit.  Bilateral circumferential ecchymosis of eyes extending into cheeks and the right side of her neck   HENT:      Head: Contusion.   Pulmonary:      Effort: Pulmonary effort is normal.      Breath sounds: Normal breath sounds.   Cardiovascular:      Tachycardia present. Irregularly irregular rhythm.      Murmurs: There is no murmur.   Edema:     Peripheral edema absent.   Skin:     General: Skin is warm and dry.   Neurological:      Mental Status: Exhibits a cognitive deficit.      Comments: Moves all extremities.  Follows commands to squeeze hands and wiggle toes         Telemetry: Atrial fibrillation 90s to 110s     Results Review:     Results from last 7 days   Lab Units 02/28/24  0031 02/27/24  0030 02/26/24  0018 02/25/24  0546 02/25/24  0021 02/23/24  1739 02/23/24  1135   WBC 10*3/mm3 6.88 7.77 9.40 6.16 9.37 5.47 5.33   HEMOGLOBIN g/dL 9.4* 10.1* 9.8* 11.3* 11.5* 10.6* 11.1*   PLATELETS 10*3/mm3 182 231 250 243 260 225 233     Results from last 7 days   Lab Units 02/28/24  0031 02/27/24  0030 02/26/24  0015 02/25/24  0546 02/25/24  0021 02/24/24  1547 02/23/24  1739   SODIUM mmol/L 141 145 149* 143 142 151* 148*   POTASSIUM mmol/L 3.7 3.9 3.8 4.6 5.2 5.4* 4.6   CHLORIDE mmol/L 102 103 105 101 102 104 105   CO2 mmol/L 32.0* 29.4* 31.6* 22.7 22.1 29.1* 35.8*   BUN mg/dL 20 19 21 25* 23 21 16   CREATININE mg/dL 0.70 0.77 0.77 0.97 0.85 0.91 0.84   CALCIUM mg/dL 8.4* 8.7 8.6 8.9 9.1 9.1 8.9   GLUCOSE mg/dL 172* 150* 105* 154* 135* 120* 97     Results from last 7 days   Lab Units 02/23/24  1340 02/23/24  1135   CK TOTAL U/L 89  --    HSTROP T ng/L 452* 474*       Results from last 7 days   Lab Units 02/23/24  1135   INR  1.50*     Most Recent Echo Result    Adult Transthoracic Echo Complete W/ Cont if Necessary Per Protocol 2/24/2024 12:11 PM    Interpretation Summary    Left  ventricular systolic function is moderately decreased. Left ventricular ejection fraction appears to be 41 - 45%.    Left ventricular diastolic function was indeterminate.    Left atrial volume is mildly increased.    There is mild calcification of the aortic valve.    Estimated right ventricular systolic pressure from tricuspid regurgitation is mildly elevated (35-45 mmHg).    There is no evidence of pericardial effusion    Signed by: Tab Cifuentes MD on 2/24/2024 12:17 PM      I reviewed the patient's new clinical results.  I reviewed the patient's new imaging results and agree with the interpretation.  I personally viewed and interpreted the patient's EKG/Telemetry data      Medication Review:   amiodarone, 200 mg, Oral, BID With Meals  baclofen, 5 mg, Oral, BID  budesonide-formoterol, 2 puff, Inhalation, BID - RT   And  tiotropium bromide monohydrate, 2 puff, Inhalation, Daily - RT  [START ON 3/1/2024] cholecalciferol, 50,000 Units, Oral, Weekly  dilTIAZem, 60 mg, Oral, Q8H  donepezil, 10 mg, Oral, Nightly  ferrous sulfate, 325 mg, Oral, BID With Meals  folic acid, 1 mg, Oral, Daily  ipratropium, 0.5 mg, Nebulization, 4x Daily - RT  isosorbide mononitrate, 15 mg, Oral, Q24H  lamoTRIgine, 100 mg, Oral, Daily  lamoTRIgine, 100 mg, Oral, Nightly  levothyroxine, 75 mcg, Oral, Q AM  memantine, 10 mg, Oral, Q12H  methylPREDNISolone sodium succinate, 40 mg, Intravenous, Q12H  metoprolol succinate XL, 12.5 mg, Oral, Q24H  mupirocin, 1 application , Each Nare, BID  pantoprazole, 40 mg, Oral, QAM AC  risperiDONE, 0.5 mg, Oral, BID  rosuvastatin, 40 mg, Oral, Q PM  senna-docusate sodium, 2 tablet, Oral, BID  sertraline, 100 mg, Oral, Daily  sodium chloride, 1,000 mL, Intravenous, Once  sodium chloride, 10 mL, Intravenous, Q12H  sodium chloride, 10 mL, Intravenous, Q12H  sodium chloride, 10 mL, Intravenous, Q12H           Assessment - cardiac:  Persistent A-fib not on anticoagulation currently due to  intracerebral hemorrhage  CAD with known mid circumflex stenosis that was medically managed  Hypertension  Dyslipidemia  HFrEF      Recommendations:  As she is not completely rate controlled, and has newly decreased EF, we will be initiating beta-blocker therapy with hold parameters.  She did not receive a loading dose of amiodarone, so we will increase that.  EKG ordered for tomorrow morning for QTc measurement.  Decreasing diltiazem to try to prevent iatrogenic bradycardia/pauses.  Continuing to hold anticoagulation until neurology approves resumption.  Continue high intensity statin and Imdur.  Holding DAPT secondary to intracranial hemorrhage  Has been elevated.  Starting beta-blocker.  Potentially starting heart failure meds tomorrow that would also lower blood pressure  Beta-blocker added today.  Will cautiously add other agents for GDMT while monitoring blood pressure and renal response.    Case discussed with Dr. Alfonso and plan of care reflects his recommendations.        Electronically signed by PAULA Crocker, 02/28/24, 11:03 AM EST.    .Electronically signed by Jasmina Alfonso MD, 02/28/24, 5:52 PM EST.

## 2024-02-28 NOTE — PROGRESS NOTES
"DOS: 2024  NAME: Brenda Munroe   : 1950  PCP: Bernadette Arevalo MD  Chief Complaint   Patient presents with    Fall    Head Injury       Chief complaint: Head injury  Subjective: No acute events overnight.  Told us \"good morning\" upon entering the room, oriented to self, and recognized a visitor by name.  Unable to answer other orientation questions.  She was picking at a spot on her bedside table. Moving all extremities, follows some simple commands.     Objective:  Vital signs: /87   Pulse 93   Temp 97.9 °F (36.6 °C) (Axillary)   Resp 22   Ht 162.6 cm (64\")   Wt 70.6 kg (155 lb 10.3 oz)   SpO2 99%   BMI 26.72 kg/m²      Gen: NAD, vitals reviewed  MS: Improving but not back to baseline yet.  Oriented to self only, but did recognize a visitor by name this morning  CN: Cranials 2-12: No focal deficits noted. Cleared for PO meds  Motor: Moving all extremities. Follows some simple commands  Sensory: No sensory loss noted.  Coordination: Difficult to assess  Gait: Could not be tested at this time.    Laboratory results:  Lab Results   Component Value Date    GLUCOSE 172 (H) 2024    CALCIUM 8.4 (L) 2024     2024    K 3.7 2024    CO2 32.0 (H) 2024     2024    BUN 20 2024    CREATININE 0.70 2024    EGFRIFAFRI  2016      Comment:      <15 Indicative of kidney failure.    EGFRIFNONA 75 2021    BCR 28.6 (H) 2024    ANIONGAP 7.0 2024     Lab Results   Component Value Date    WBC 6.88 2024    HGB 9.4 (L) 2024    HCT 32.1 (L) 2024    .9 (H) 2024     2024     Lab Results   Component Value Date    LDL 39 2024    LDL 46 2024    LDL CANCELED 10/09/2015    LDL CANCELED 10/09/2015    LDL CANCELED 10/09/2015         Lab 24  0018   HEMOGLOBIN A1C 5.00        Review of labs: The LDL is only 39.  Hemoglobin is low at 9.4 and the MCV is elevated at 100.9.  The serum " folic acid level was low.    CMP:        Lab 02/28/24  0031 02/27/24  0030 02/26/24  0015 02/25/24  0546 02/25/24  0021 02/24/24  1547 02/23/24  1739   SODIUM 141 145 149* 143 142 151* 148*   POTASSIUM 3.7 3.9 3.8 4.6 5.2 5.4* 4.6   CHLORIDE 102 103 105 101 102 104 105   CO2 32.0* 29.4* 31.6* 22.7 22.1 29.1* 35.8*   ANION GAP 7.0 12.6 12.4 19.3* 17.9* 17.9* 7.2   BUN 20 19 21 25* 23 21 16   CREATININE 0.70 0.77 0.77 0.97 0.85 0.91 0.84   EGFR 90.3 80.6 80.6 61.1 71.5 65.9 72.6   GLUCOSE 172* 150* 105* 154* 135* 120* 97   CALCIUM 8.4* 8.7 8.6 8.9 9.1 9.1 8.9   MAGNESIUM  --   --  2.1  --   --  2.3  --    TOTAL PROTEIN  --  5.8* 5.3* 5.8* 5.7*  --  5.6*   ALBUMIN  --  3.5 3.1* 3.2* 3.0*  --  3.2*   GLOBULIN  --  2.3 2.2 2.6 2.7  --  2.4   ALT (SGPT)  --  13 10 13 11  --  13   AST (SGOT)  --  15 16 25 28  --  19   BILIRUBIN  --  0.5 0.4 0.4 0.4  --  0.3   ALK PHOS  --  68 65 76 72  --  74        TSH          1/1/2024    10:59 1/23/2024    00:48   TSH   TSH 3.020  0.876         Lipid Panel          1/1/2024    13:04 2/24/2024    00:18   Lipid Panel   Total Cholesterol 119  99    Triglycerides 69  56    HDL Cholesterol 59  47    VLDL Cholesterol 14  13    LDL Cholesterol  46  39    LDL/HDL Ratio 0.78  0.87         Lab Results   Component Value Date    HOAMUMIP41 319 02/25/2024       Lab Results   Component Value Date    FOLATE 4.18 (L) 02/25/2024       Lab Results   Component Value Date    HGBA1C 5.00 02/24/2024           Review and interpretation of imaging:     CT Head Without Contrast    Result Date: 2/24/2024  CT brain without contrast compared to examination dated 1/31/2024.  Severe atrophy and small vessel disease. There is hemorrhage in the LEFT occipital horn with focal surrounding edema.  This appears to be new from prior study. No shift of midline structures. Large right-sided scalp hematoma. Opacified RIGHT maxillary sinus, new from prior, likely related to presence of blood or retained secretions. No skull  fracture.      Impression: Intraventricular hemorrhage noted in the LEFT occipital lobe, new from prior.   This report was finalized on 2/24/2024 9:50 AM by Dr. Loreto Jo MD.      CT Head Without Contrast    Result Date: 2/23/2024  PROCEDURE: CT HEAD WO CONTRAST-, CT CERVICAL SPINE WO CONTRAST-  HISTORY: Unwitnessed fall gross external trauma blood thinners  PROCEDURE: Axial images were obtained from the skull base to the thoracic inlet by computed tomography. Multiple axial CT images were performed from the foramen magnum to the vertex without enhancement. This study was performed with techniques to keep radiation doses as low as reasonably achievable (ALARA). Individualized dose reduction techniques using automated exposure control or adjustment of mA and/or kV according to the patient size were employed.   C-SPINE:  FINDINGS: There are no acute fractures. There are diffuse degenerative changes with loss of disc base height and facet arthropathy.  The prevertebral soft tissues are normal.  Limited images of the lung apices are unremarkable.  HEAD CT:  FINDINGS: There is generalized cerebral volume loss. Patchy hypodensities in the periventricular white matter consistent with chronic small vessel ischemic change. There is a subdural hematoma overlying the left frontal lobe layering along the falx measuring up to 4 mm. There is no mass, mass effect or midline shift.  There is no hydrocephalus. The paranasal sinuses are clear. Bone windows reveal no acute osseous abnormalities. Note is made of a right periorbital hematoma.      Impression: 1. 4 mm left parafalcine subdural hematoma. 2. No acute fracture or malalignment in the cervical spine.     Critical Finding  Critical report results have been called to Dr. Moran. Report called at 2/23/2024 11:33 AM.        This report was finalized on 2/23/2024 11:33 AM by Lily Mullen M.D..        MRI Brain With & Without Contrast    Result Date: 2/27/2024  EXAM:   MR  Head Without and With Intravenous Contrast  EXAM DATE:   2/27/2024 1:30 PM  CLINICAL HISTORY:   Trauma protocol; S06.5XAA-Traumatic subdural hemorrhage with loss of consciousness status unknown, initial encounter; I21.4-Non-ST elevation (NSTEMI) myocardial infarction; Z79.01-Long term (current) use of anticoagulants; S02.2XXA-Fracture of nasal bones, initial encounter for closed fracture; S05.11XA-Contusion of eyeball and orbital tissues, right eye, initial encounter  TECHNIQUE:   Magnetic resonance images of the head/brain without and with intravenous contrast in multiple planes.  COMPARISON:   CT 2/24/2024  FINDINGS:   Brain and extra-axial spaces:  Layering blood products now in the chronic phase in the left greater than right dependent portions of the occipital horns of the lateral ventricles.  Left cerebral hemispheric subdural hematoma is noted with a maximal thickness of approximately 10.5 mm. No midline shift.  Fairly extensive and confluent T2 signal abnormality in the periventricular and subcortical white matter consistent with advanced chronic small vessel ischemic disease.  No hydrocephalus.  No evidence of acute infarct.   Bones/joints:  Unremarkable as visualized.  No acute fracture.   Soft tissues:  Right frontal scalp hematoma is noted.  No MRI evidence of acute phase blood products.   Sinuses:  Unremarkable as visualized.  No acute sinusitis.   Mastoid air cells:  Unremarkable as visualized.  No mastoid effusion.   Orbits:  Unremarkable as visualized.      Impression: 1.  Layering blood products now in the more chronic phase in the left greater than right dependent portions of the occipital horns of the lateral ventricles. No hydrocephalus. 2.  Left cerebral hemispheric subdural hematoma is noted with a maximal thickness of approximately 10.5 mm. No midline shift. 3.  No MRI evidence of acute phase blood products. 4.  No evidence of acute infarct. 5.  Right frontal scalp hematoma is noted. 6.   Fairly extensive and confluent T2 signal abnormality in the periventricular and subcortical white matter consistent with advanced chronic small vessel ischemic disease.   This report was finalized on 2/27/2024 2:44 PM by Dr. Erik Jones MD.        Workup to date:  An EEG performed on January 24, 2024 showed the following:     Findings:     The patient is awake but confused.  The background shows diffuse medium amplitude 2-5 Hz intermixed delta and theta activity which is present symmetrically over both hemispheres.  A clear posterior rhythm is not seen.  EMG artifact is variably prominent over the frontotemporal leads.  The patient is occasionally restless and moaning during the study.  No focal features or epileptiform activity are present.  Photic stimulation does not change the background.  Hyperventilation is not performed.     Video: Available     Technical quality: Superior     EKG: Irregular, 70-90 bpm     SUMMARY:     Moderate generalized slow     No focal features or epileptiform activity are seen     IMPRESSION:     Diffuse cerebral dysfunction of moderate degree, nonspecific     No evidence for epilepsy is seen    Results for orders placed during the hospital encounter of 02/23/24    Adult Transthoracic Echo Complete W/ Cont if Necessary Per Protocol    Interpretation Summary    Left ventricular systolic function is moderately decreased. Left ventricular ejection fraction appears to be 41 - 45%.    Left ventricular diastolic function was indeterminate.    Left atrial volume is mildly increased.    There is mild calcification of the aortic valve.    Estimated right ventricular systolic pressure from tricuspid regurgitation is mildly elevated (35-45 mmHg).    There is no evidence of pericardial effusion       Diagnoses: Patient with subdural hemorrhage in the Sub Falcine area mental status currently improving.  Was on anticoagulation for a-fib, has been held since admission.      Plan:  1.  MRI of the  brain with and without contrast showed chronic blood products in bilateral occipital horns of lateral ventricles, left sided SDH along the falx 10.5 mm maximal thickness. There was also noted hemosiderin staining and left occipital lobe.   2.  We recommend holding anticoagulation indefinitely in setting of multiple falls and recent bleed.  Possibly a candidate for Watchman device.  Would be okay to start aspirin 81 mg daily in 6 weeks after repeat CT head  3.  Continue Vimpat 50 mg IV BID  4.  Folic acid changed to PO  5.  Increased evening dose of Lamictal to 100 mg PO    This was an audio and video enabled telemedicine encounter.  Dr. Randle present for encounter via telemedicine.  Verbal consent taken.    Erik Pinedo, APRN

## 2024-02-28 NOTE — PLAN OF CARE
Problem: Noninvasive Ventilation Acute  Goal: Effective Unassisted Ventilation and Oxygenation  Outcome: Ongoing, Progressing     Problem: Skin Injury Risk Increased  Goal: Skin Health and Integrity  Outcome: Ongoing, Progressing  Intervention: Optimize Skin Protection  Recent Flowsheet Documentation  Taken 2/28/2024 1400 by Dyllan Eubanks RN  Pressure Reduction Techniques:   heels elevated off bed   weight shift assistance provided  Head of Bed (HOB) Positioning: HOB at 30-45 degrees  Pressure Reduction Devices:   heel offloading device utilized   positioning supports utilized   pressure-redistributing mattress utilized   specialty bed utilized  Skin Protection:   adhesive use limited   incontinence pads utilized   skin-to-device areas padded   skin-to-skin areas padded   transparent dressing maintained   tubing/devices free from skin contact  Taken 2/28/2024 1200 by Dyllan Eubanks RN  Head of Bed (HOB) Positioning: HOB at 30-45 degrees  Taken 2/28/2024 1000 by Dyllan Eubanks RN  Head of Bed (HOB) Positioning: HOB at 30-45 degrees  Taken 2/28/2024 0800 by Dyllan Eubanks RN  Pressure Reduction Techniques:   heels elevated off bed   weight shift assistance provided  Head of Bed (HOB) Positioning: HOB at 30-45 degrees  Pressure Reduction Devices:   heel offloading device utilized   positioning supports utilized   pressure-redistributing mattress utilized   specialty bed utilized  Skin Protection: adhesive use limited   Goal Outcome Evaluation:           Progress: no change  Outcome Evaluation: patient alert, oriented to self, disoriented to all other orientation questions. reorientation provided. bed in lowest position, call light in reach, bed alarm set, VSS,WCTM

## 2024-02-29 ENCOUNTER — APPOINTMENT (OUTPATIENT)
Dept: CT IMAGING | Facility: HOSPITAL | Age: 74
DRG: 082 | End: 2024-02-29
Payer: MEDICARE

## 2024-02-29 LAB
ALBUMIN SERPL-MCNC: 3.3 G/DL (ref 3.5–5.2)
ALBUMIN/GLOB SERPL: 1.7 G/DL
ALP SERPL-CCNC: 63 U/L (ref 39–117)
ALT SERPL W P-5'-P-CCNC: 15 U/L (ref 1–33)
ANION GAP SERPL CALCULATED.3IONS-SCNC: 7.3 MMOL/L (ref 5–15)
ANISOCYTOSIS BLD QL: NORMAL
AST SERPL-CCNC: 16 U/L (ref 1–32)
BACTERIA ISLT: NORMAL
BASOPHILS # BLD AUTO: 0.01 10*3/MM3 (ref 0–0.2)
BASOPHILS NFR BLD AUTO: 0.1 % (ref 0–1.5)
BILIRUB SERPL-MCNC: 0.3 MG/DL (ref 0–1.2)
BUN SERPL-MCNC: 24 MG/DL (ref 8–23)
BUN/CREAT SERPL: 33.3 (ref 7–25)
CALCIUM SPEC-SCNC: 8.6 MG/DL (ref 8.6–10.5)
CHLORIDE SERPL-SCNC: 103 MMOL/L (ref 98–107)
CO2 SERPL-SCNC: 30.7 MMOL/L (ref 22–29)
CREAT SERPL-MCNC: 0.72 MG/DL (ref 0.57–1)
DACRYOCYTES BLD QL SMEAR: NORMAL
DEPRECATED RDW RBC AUTO: 76 FL (ref 37–54)
EGFRCR SERPLBLD CKD-EPI 2021: 87.3 ML/MIN/1.73
EOSINOPHIL # BLD AUTO: 0 10*3/MM3 (ref 0–0.4)
EOSINOPHIL NFR BLD AUTO: 0 % (ref 0.3–6.2)
ERYTHROCYTE [DISTWIDTH] IN BLOOD BY AUTOMATED COUNT: 20.7 % (ref 12.3–15.4)
GLOBULIN UR ELPH-MCNC: 1.9 GM/DL
GLUCOSE SERPL-MCNC: 156 MG/DL (ref 65–99)
HCT VFR BLD AUTO: 34.3 % (ref 34–46.6)
HGB BLD-MCNC: 10.1 G/DL (ref 12–15.9)
HYPOCHROMIA BLD QL: NORMAL
IMM GRANULOCYTES # BLD AUTO: 0.1 10*3/MM3 (ref 0–0.05)
IMM GRANULOCYTES NFR BLD AUTO: 1.2 % (ref 0–0.5)
LYMPHOCYTES # BLD AUTO: 0.21 10*3/MM3 (ref 0.7–3.1)
LYMPHOCYTES NFR BLD AUTO: 2.5 % (ref 19.6–45.3)
MACROCYTES BLD QL SMEAR: NORMAL
MCH RBC QN AUTO: 29.4 PG (ref 26.6–33)
MCHC RBC AUTO-ENTMCNC: 29.4 G/DL (ref 31.5–35.7)
MCV RBC AUTO: 99.7 FL (ref 79–97)
MONOCYTES # BLD AUTO: 0.31 10*3/MM3 (ref 0.1–0.9)
MONOCYTES NFR BLD AUTO: 3.7 % (ref 5–12)
NEUTROPHILS NFR BLD AUTO: 7.78 10*3/MM3 (ref 1.7–7)
NEUTROPHILS NFR BLD AUTO: 92.5 % (ref 42.7–76)
NRBC BLD AUTO-RTO: 0.4 /100 WBC (ref 0–0.2)
OVALOCYTES BLD QL SMEAR: NORMAL
PLAT MORPH BLD: NORMAL
PLATELET # BLD AUTO: 156 10*3/MM3 (ref 140–450)
PMV BLD AUTO: 10.4 FL (ref 6–12)
POTASSIUM SERPL-SCNC: 4.5 MMOL/L (ref 3.5–5.2)
PROT SERPL-MCNC: 5.2 G/DL (ref 6–8.5)
QT INTERVAL: 384 MS
QTC INTERVAL: 522 MS
RBC # BLD AUTO: 3.44 10*6/MM3 (ref 3.77–5.28)
SODIUM SERPL-SCNC: 141 MMOL/L (ref 136–145)
WBC NRBC COR # BLD AUTO: 8.41 10*3/MM3 (ref 3.4–10.8)

## 2024-02-29 PROCEDURE — 94664 DEMO&/EVAL PT USE INHALER: CPT

## 2024-02-29 PROCEDURE — 85007 BL SMEAR W/DIFF WBC COUNT: CPT | Performed by: INTERNAL MEDICINE

## 2024-02-29 PROCEDURE — 94799 UNLISTED PULMONARY SVC/PX: CPT

## 2024-02-29 PROCEDURE — 94660 CPAP INITIATION&MGMT: CPT

## 2024-02-29 PROCEDURE — 92610 EVALUATE SWALLOWING FUNCTION: CPT

## 2024-02-29 PROCEDURE — 99232 SBSQ HOSP IP/OBS MODERATE 35: CPT | Performed by: NURSE PRACTITIONER

## 2024-02-29 PROCEDURE — 93005 ELECTROCARDIOGRAM TRACING: CPT | Performed by: NURSE PRACTITIONER

## 2024-02-29 PROCEDURE — 70450 CT HEAD/BRAIN W/O DYE: CPT | Performed by: RADIOLOGY

## 2024-02-29 PROCEDURE — 94761 N-INVAS EAR/PLS OXIMETRY MLT: CPT

## 2024-02-29 PROCEDURE — 25010000002 METHYLPREDNISOLONE PER 40 MG: Performed by: INTERNAL MEDICINE

## 2024-02-29 PROCEDURE — 70450 CT HEAD/BRAIN W/O DYE: CPT

## 2024-02-29 PROCEDURE — 80053 COMPREHEN METABOLIC PANEL: CPT | Performed by: INTERNAL MEDICINE

## 2024-02-29 PROCEDURE — 85025 COMPLETE CBC W/AUTO DIFF WBC: CPT | Performed by: INTERNAL MEDICINE

## 2024-02-29 PROCEDURE — 99233 SBSQ HOSP IP/OBS HIGH 50: CPT | Performed by: INTERNAL MEDICINE

## 2024-02-29 PROCEDURE — 93010 ELECTROCARDIOGRAM REPORT: CPT | Performed by: SPECIALIST

## 2024-02-29 RX ORDER — METHYLPREDNISOLONE SODIUM SUCCINATE 40 MG/ML
20 INJECTION, POWDER, LYOPHILIZED, FOR SOLUTION INTRAMUSCULAR; INTRAVENOUS EVERY 12 HOURS
Status: DISCONTINUED | OUTPATIENT
Start: 2024-02-29 | End: 2024-03-09 | Stop reason: HOSPADM

## 2024-02-29 RX ORDER — METOPROLOL SUCCINATE 25 MG/1
12.5 TABLET, EXTENDED RELEASE ORAL ONCE
Status: COMPLETED | OUTPATIENT
Start: 2024-02-29 | End: 2024-02-29

## 2024-02-29 RX ORDER — METOPROLOL SUCCINATE 25 MG/1
25 TABLET, EXTENDED RELEASE ORAL
Status: DISCONTINUED | OUTPATIENT
Start: 2024-03-01 | End: 2024-03-01

## 2024-02-29 RX ADMIN — BACLOFEN 5 MG: 10 TABLET ORAL at 08:16

## 2024-02-29 RX ADMIN — AMIODARONE HYDROCHLORIDE 400 MG: 200 TABLET ORAL at 08:17

## 2024-02-29 RX ADMIN — MEMANTINE HYDROCHLORIDE 10 MG: 10 TABLET, FILM COATED ORAL at 08:15

## 2024-02-29 RX ADMIN — METOPROLOL SUCCINATE 12.5 MG: 25 TABLET, EXTENDED RELEASE ORAL at 10:49

## 2024-02-29 RX ADMIN — TIOTROPIUM BROMIDE INHALATION SPRAY 2 PUFF: 3.12 SPRAY, METERED RESPIRATORY (INHALATION) at 06:50

## 2024-02-29 RX ADMIN — Medication 10 ML: at 20:50

## 2024-02-29 RX ADMIN — MEMANTINE HYDROCHLORIDE 10 MG: 10 TABLET, FILM COATED ORAL at 20:51

## 2024-02-29 RX ADMIN — RISPERIDONE 0.5 MG: 0.25 TABLET, FILM COATED ORAL at 20:49

## 2024-02-29 RX ADMIN — IPRATROPIUM BROMIDE 0.5 MG: 0.5 SOLUTION RESPIRATORY (INHALATION) at 12:48

## 2024-02-29 RX ADMIN — LAMOTRIGINE 100 MG: 100 TABLET ORAL at 20:49

## 2024-02-29 RX ADMIN — IPRATROPIUM BROMIDE 0.5 MG: 0.5 SOLUTION RESPIRATORY (INHALATION) at 18:34

## 2024-02-29 RX ADMIN — ISOSORBIDE MONONITRATE 15 MG: 30 TABLET, EXTENDED RELEASE ORAL at 08:17

## 2024-02-29 RX ADMIN — FERROUS SULFATE TAB 325 MG (65 MG ELEMENTAL FE) 325 MG: 325 (65 FE) TAB at 18:09

## 2024-02-29 RX ADMIN — LEVOTHYROXINE SODIUM 75 MCG: 0.07 TABLET ORAL at 06:09

## 2024-02-29 RX ADMIN — LAMOTRIGINE 100 MG: 100 TABLET ORAL at 08:15

## 2024-02-29 RX ADMIN — BUDESONIDE AND FORMOTEROL FUMARATE DIHYDRATE 2 PUFF: 160; 4.5 AEROSOL RESPIRATORY (INHALATION) at 18:34

## 2024-02-29 RX ADMIN — Medication 10 ML: at 08:18

## 2024-02-29 RX ADMIN — MUPIROCIN 1 APPLICATION: 20 OINTMENT TOPICAL at 20:51

## 2024-02-29 RX ADMIN — Medication 1 MG: at 08:16

## 2024-02-29 RX ADMIN — BACLOFEN 5 MG: 10 TABLET ORAL at 20:48

## 2024-02-29 RX ADMIN — RISPERIDONE 0.5 MG: 0.25 TABLET, FILM COATED ORAL at 08:16

## 2024-02-29 RX ADMIN — Medication 10 ML: at 20:49

## 2024-02-29 RX ADMIN — METOPROLOL SUCCINATE 12.5 MG: 25 TABLET, EXTENDED RELEASE ORAL at 08:16

## 2024-02-29 RX ADMIN — IPRATROPIUM BROMIDE 0.5 MG: 0.5 SOLUTION RESPIRATORY (INHALATION) at 06:50

## 2024-02-29 RX ADMIN — SERTRALINE 100 MG: 50 TABLET, FILM COATED ORAL at 08:16

## 2024-02-29 RX ADMIN — IPRATROPIUM BROMIDE 0.5 MG: 0.5 SOLUTION RESPIRATORY (INHALATION) at 00:20

## 2024-02-29 RX ADMIN — DOCUSATE SODIUM 50 MG AND SENNOSIDES 8.6 MG 2 TABLET: 8.6; 5 TABLET, FILM COATED ORAL at 08:15

## 2024-02-29 RX ADMIN — DONEPEZIL HYDROCHLORIDE 10 MG: 5 TABLET, FILM COATED ORAL at 20:47

## 2024-02-29 RX ADMIN — AMIODARONE HYDROCHLORIDE 400 MG: 200 TABLET ORAL at 18:47

## 2024-02-29 RX ADMIN — Medication 10 ML: at 08:17

## 2024-02-29 RX ADMIN — METHYLPREDNISOLONE SODIUM SUCCINATE 40 MG: 40 INJECTION, POWDER, FOR SOLUTION INTRAMUSCULAR; INTRAVENOUS at 08:15

## 2024-02-29 RX ADMIN — BUDESONIDE AND FORMOTEROL FUMARATE DIHYDRATE 2 PUFF: 160; 4.5 AEROSOL RESPIRATORY (INHALATION) at 06:50

## 2024-02-29 RX ADMIN — ROSUVASTATIN CALCIUM 40 MG: 20 TABLET, FILM COATED ORAL at 18:06

## 2024-02-29 RX ADMIN — DILTIAZEM HYDROCHLORIDE 30 MG: 60 TABLET, FILM COATED ORAL at 08:17

## 2024-02-29 RX ADMIN — FERROUS SULFATE TAB 325 MG (65 MG ELEMENTAL FE) 325 MG: 325 (65 FE) TAB at 08:16

## 2024-02-29 RX ADMIN — METHYLPREDNISOLONE SODIUM SUCCINATE 20 MG: 40 INJECTION, POWDER, FOR SOLUTION INTRAMUSCULAR; INTRAVENOUS at 20:50

## 2024-02-29 RX ADMIN — DILTIAZEM HYDROCHLORIDE 30 MG: 60 TABLET, FILM COATED ORAL at 20:48

## 2024-02-29 RX ADMIN — DOCUSATE SODIUM 50 MG AND SENNOSIDES 8.6 MG 2 TABLET: 8.6; 5 TABLET, FILM COATED ORAL at 20:51

## 2024-02-29 RX ADMIN — MUPIROCIN 1 APPLICATION: 20 OINTMENT TOPICAL at 08:17

## 2024-02-29 RX ADMIN — PANTOPRAZOLE SODIUM 40 MG: 40 TABLET, DELAYED RELEASE ORAL at 08:20

## 2024-02-29 NOTE — THERAPY RE-EVALUATION
Acute Care - Speech Language Pathology   Swallow Re-Assessment  Fate     Patient Name: Brenda Munroe  : 1950  MRN: [id number]  Today's Date: 2024  Onset of Illness/Injury or Date of Surgery: 24     Referring Physician: Heather      Admit Date: 2024    Visit Dx:     ICD-10-CM ICD-9-CM   1. Subdural hematoma  S06.5XAA 432.1   2. NSTEMI (non-ST elevated myocardial infarction)  I21.4 410.70   3. Chronic anticoagulation  Z79.01 V58.61   4. Closed fracture of nasal bone, initial encounter  S02.2XXA 802.0   5. Traumatic orbital hematoma, right, initial encounter  S05.11XA 921.2     Patient Active Problem List   Diagnosis    Psychosis    Severe recurrent major depression with psychotic features    COPD (chronic obstructive pulmonary disease)    Coronary artery disease    Disease of thyroid gland    Arthritis    Hypertension    Paroxysmal atrial fibrillation    Chronic headaches    Vision changes    Carotid stenosis, asymptomatic, bilateral    Bradycardia, sinus    Acute respiratory failure with hypoxia and hypercapnia    NSTEMI (non-ST elevated myocardial infarction)    A-fib    Hypercapnic respiratory failure    Subdural hematoma     Past Medical History:   Diagnosis Date    Anxiety     Arthritis     Bell's palsy     Bladder prolapse, female, acquired     Carotid stenosis     COPD (chronic obstructive pulmonary disease)     COPD (chronic obstructive pulmonary disease)     Coronary artery disease     Dementia     Dementia     Depression     Difficulty walking     Disease of thyroid gland     Frequency of urination     GERD (gastroesophageal reflux disease)     Heart attack     Hyperlipidemia     Hypertension     Irregular heart beat     Peptic ulcer disease      Past Surgical History:   Procedure Laterality Date    CARDIAC CATHETERIZATION N/A 2023    Procedure: Left Heart Cath;  Surgeon: Tab Cifuentes MD;  Location: [facility] INVASIVE LOCATION;  Service: Cardiology;   Laterality: N/A;    CARDIAC SURGERY      open heart  in 1999    CYSTOSCOPY N/A 11/8/2017    Procedure: CYSTOSCOPY;  Surgeon: Karl Nicolas DO;  Location:  [facility] OR;  Service:     OTHER SURGICAL HISTORY      states she had fluid drained no surgery    VAGINAL HYSTERECTOMY W/ ANTERIOR AND POSTERIOR VAGINAL REPAIR N/A 11/8/2017    Procedure: VAGINAL HYSTERECTOMY WITH ANTERIOR, POSTERIOR, AND ENTEROCELE VAGINAL REPAIR;  Surgeon: Karl Nicolas DO;  Location:  [facility] OR;  Service:     VAGINAL VAULT SUSPENSION N/A 11/8/2017    Procedure: VAGINAL VAULT SUSPENSION ;  Surgeon: Karl Nicolas DO;  Location:  [facility];  Service:        Ms. Munroe was seen at bedside this am on CCU for continued diet safety/reassessment. She is s/p MBS 2/26/24 which revealed a moderate oral dysphagia, felt to most likely be related to ams, wfl pharyngeal swallow w/o evidence of aspiration. She was recommended modified po diet of puree consistency, thin liquids.     She was reassessed at bedside 2/27/24, however, she declined to accept solid consistencies at that time to reassess candidacy for upgrade, was recommended to continue modified po diet of puree consistencies, thin liquids.      She was observed on 3L O2 via NC w/o complications. WBC 8.41, she was afebrile.      She was resting upon SLP entry, easily awakened to verbal stimuli. She was positioned upright and centered in bed to accept multiple po presentations of solid cracker, applesauce via spoon and thin water via straw. She was noted w/ improved interaction/participation and overall cognitive status today in comparison to previous SLP encounters. She verbally/appropriately responded to simple y/n and oe questions, followed simple directives. She was able to confrontation name common tangible objectives, count 1-5, identify body parts.      Upon po presentations, adequate bolus anticipation w/ good labial seal for bolus clearance via spoon bowl and suction via  straw. Bolus formation, manipulation, and control were mildly weak in general, moderately increased oral prep time w/ solid w/ mixed phasic/rotary mastication pattern. A-p transit was mildly delayed w/o significant oral residue. No overt s/s aspiration evidenced before the swallow.      Pharyngeal swallow was timely w/ adequate hyolaryngeal elevation per palpation. No overt s/s aspiration evidenced. No obvious silent aspiration suspected.      Impression: Per this reassessment, Ms. Munroe presented w/ a mild-moderate oral dysphagia, mildly improved from previous assessments, wfl pharyngeal swallow w/o overt s/s of aspiration across this assessment.       Based on this reassessment, she may upgrade to least restrictive mechanical soft consistencies, ground meats, continue thin liquids. However, d/w her diet upgrade/recommendations, she did verbalize preference to continue current puree consistencies at this time. Please continue 1:1 feeding assistance, meds whole in puree or crushed prn.      SLP Recommendation and Plan  1. Puree consistency, thin liquids.   2. Medications whole in puree/crushed prn.   3. 1:1 feeding assistance.   4. Upright and centered for all po intake.   5. Universal aspiration precautions.   6. YANETH precautions.   7. Oral care protocol.   SLP to f/u for continued diet safety/assessment for possible upgrade, s/l cognitive status.      D/w patient results and recommendations w/ verbal understanding and agreement. Question retention 2/2 ams.      D/w RN results and recommendations w/ verbal understanding and agreement. RN reported pt w/ good acceptance/intake of puree consistencies.      Thank you for allowing me to participate in the care of your patient-  Ana Curtis M.A., CCC-SLP      EDUCATION  The patient has been educated in the following areas:   Dysphagia (Swallowing Impairment) Modified Diet Instruction.      Time Calculation:    Time Calculation- SLP       Row Name 02/29/24 0932              Time Calculation- SLP    SLP - Next Appointment 03/01/24  -MIAN                User Key  (r) = Recorded By, (t) = Taken By, (c) = Cosigned By      Initials Name Provider Type    Ana Dai MA,CCC-SLP Speech and Language Pathologist                  Therapy Charges for Today       Code Description Service Date Service Provider Modifiers Qty    [id number] HC ST EVAL ORAL PHARYNG SWALLOW 4 2/29/2024 Ana Curtis MA,CCC-SLP GN 1          Ana Curtis MA,GERHARD-SLP  2/29/2024

## 2024-02-29 NOTE — PLAN OF CARE
Problem: Noninvasive Ventilation Acute  Goal: Effective Unassisted Ventilation and Oxygenation  Outcome: Ongoing, Progressing     Problem: Skin Injury Risk Increased  Goal: Skin Health and Integrity  Outcome: Ongoing, Progressing  Intervention: Optimize Skin Protection  Recent Flowsheet Documentation  Taken 2/29/2024 0400 by Kate Ocasio RN  Head of Bed (HOB) Positioning: HOB at 30-45 degrees  Taken 2/29/2024 0200 by Kate Ocasio RN  Head of Bed (HOB) Positioning: HOB at 30-45 degrees  Taken 2/29/2024 0000 by Kate Ocasio RN  Head of Bed (HOB) Positioning: HOB at 30-45 degrees  Taken 2/28/2024 2200 by Kate Ocasio RN  Head of Bed (HOB) Positioning: HOB at 30-45 degrees  Taken 2/28/2024 2000 by Kate Ocasio RN  Pressure Reduction Techniques:   heels elevated off bed   positioned off wounds   pressure points protected  Head of Bed (HOB) Positioning: HOB at 30-45 degrees  Pressure Reduction Devices: positioning supports utilized     Problem: Fall Injury Risk  Goal: Absence of Fall and Fall-Related Injury  Outcome: Ongoing, Progressing  Intervention: Identify and Manage Contributors  Recent Flowsheet Documentation  Taken 2/29/2024 0400 by Kate Ocasio RN  Medication Review/Management: medications reviewed  Taken 2/29/2024 0200 by Kate Ocasio RN  Medication Review/Management: medications reviewed  Taken 2/29/2024 0000 by Kate Ocasio RN  Medication Review/Management: medications reviewed  Taken 2/28/2024 2200 by Kate Ocasio RN  Medication Review/Management: medications reviewed  Taken 2/28/2024 2000 by Kate Ocasio RN  Medication Review/Management: medications reviewed  Intervention: Promote Injury-Free Environment  Recent Flowsheet Documentation  Taken 2/29/2024 0500 by Kate Ocasio RN  Safety Promotion/Fall Prevention: safety round/check completed  Taken 2/29/2024 0400 by Kate Ocasio RN  Safety Promotion/Fall  Prevention: safety round/check completed  Taken 2/29/2024 0300 by Kate Ocasio RN  Safety Promotion/Fall Prevention: safety round/check completed  Taken 2/29/2024 0200 by Kate Ocasio RN  Safety Promotion/Fall Prevention: safety round/check completed  Taken 2/29/2024 0100 by Kate Ocasio RN  Safety Promotion/Fall Prevention: safety round/check completed  Taken 2/29/2024 0000 by Rosenbalm, Akte, RN  Safety Promotion/Fall Prevention: safety round/check completed  Taken 2/28/2024 2300 by Kate Ocasio RN  Safety Promotion/Fall Prevention: safety round/check completed  Taken 2/28/2024 2200 by Kate Ocasio RN  Safety Promotion/Fall Prevention: safety round/check completed  Taken 2/28/2024 2100 by Kate Ocasio RN  Safety Promotion/Fall Prevention: safety round/check completed  Taken 2/28/2024 2000 by Rosenbalm, Kate, RN  Safety Promotion/Fall Prevention: safety round/check completed  Taken 2/28/2024 1900 by Rosenbalm, Kate, RN  Safety Promotion/Fall Prevention: safety round/check completed     Problem: Adult Inpatient Plan of Care  Goal: Plan of Care Review  Outcome: Ongoing, Progressing  Goal: Patient-Specific Goal (Individualized)  Outcome: Ongoing, Progressing  Goal: Absence of Hospital-Acquired Illness or Injury  Outcome: Ongoing, Progressing  Intervention: Identify and Manage Fall Risk  Recent Flowsheet Documentation  Taken 2/29/2024 0500 by Kate Ocasio RN  Safety Promotion/Fall Prevention: safety round/check completed  Taken 2/29/2024 0400 by Kate Ocasio RN  Safety Promotion/Fall Prevention: safety round/check completed  Taken 2/29/2024 0300 by Kate Ocasio RN  Safety Promotion/Fall Prevention: safety round/check completed  Taken 2/29/2024 0200 by Kate Ocasio RN  Safety Promotion/Fall Prevention: safety round/check completed  Taken 2/29/2024 0100 by Kate Ocasio RN  Safety Promotion/Fall Prevention: safety  round/check completed  Taken 2/29/2024 0000 by Rosenbalm, Kate, RN  Safety Promotion/Fall Prevention: safety round/check completed  Taken 2/28/2024 2300 by Kate Ocasio RN  Safety Promotion/Fall Prevention: safety round/check completed  Taken 2/28/2024 2200 by Kate Ocasio RN  Safety Promotion/Fall Prevention: safety round/check completed  Taken 2/28/2024 2100 by Kate Ocasio RN  Safety Promotion/Fall Prevention: safety round/check completed  Taken 2/28/2024 2000 by Rosenbalm, Kate, RN  Safety Promotion/Fall Prevention: safety round/check completed  Taken 2/28/2024 1900 by Rosenbalm, Kate, RN  Safety Promotion/Fall Prevention: safety round/check completed  Intervention: Prevent Skin Injury  Recent Flowsheet Documentation  Taken 2/29/2024 0400 by Kate Ocasio RN  Body Position:   right   turned  Taken 2/29/2024 0200 by Kate Ocasio RN  Body Position:   left   turned  Taken 2/29/2024 0000 by Kate Ocasio RN  Body Position:   right   turned  Taken 2/28/2024 2200 by Kate Ocasio RN  Body Position:   turned   left  Taken 2/28/2024 2000 by Kate Ocasio RN  Body Position:   right   turned  Intervention: Prevent and Manage VTE (Venous Thromboembolism) Risk  Recent Flowsheet Documentation  Taken 2/29/2024 0200 by Kate Ocasio RN  Activity Management: bedrest  Taken 2/28/2024 2000 by Kate Ocasio RN  Activity Management: bedrest  Goal: Optimal Comfort and Wellbeing  Outcome: Ongoing, Progressing  Intervention: Provide Person-Centered Care  Recent Flowsheet Documentation  Taken 2/29/2024 0200 by Kate Ocasio RN  Trust Relationship/Rapport: care explained  Taken 2/28/2024 2000 by Kate Ocasio RN  Trust Relationship/Rapport: care explained  Goal: Readiness for Transition of Care  Outcome: Ongoing, Progressing   Goal Outcome Evaluation:

## 2024-02-29 NOTE — PLAN OF CARE
Problem: Fall Injury Risk  Goal: Absence of Fall and Fall-Related Injury  Outcome: Ongoing, Progressing  Intervention: Promote Injury-Free Environment  Recent Flowsheet Documentation  Taken 2/29/2024 1700 by Janeen Junior RN  Safety Promotion/Fall Prevention:   activity supervised   assistive device/personal items within reach   clutter free environment maintained   fall prevention program maintained   safety round/check completed  Taken 2/29/2024 1500 by Janeen Junior RN  Safety Promotion/Fall Prevention:   activity supervised   assistive device/personal items within reach   clutter free environment maintained   fall prevention program maintained   safety round/check completed     Problem: Skin Injury Risk Increased  Goal: Skin Health and Integrity  Outcome: Ongoing, Progressing  Intervention: Optimize Skin Protection  Recent Flowsheet Documentation  Taken 2/29/2024 1530 by Janeen Junior RN  Pressure Reduction Techniques:   heels elevated off bed   weight shift assistance provided  Pressure Reduction Devices: pressure-redistributing mattress utilized  Skin Protection:   adhesive use limited   transparent dressing maintained   tubing/devices free from skin contact   Goal Outcome Evaluation:           Progress: improving  Outcome Evaluation: Pt transferred to PCU this shift, alert and disoriented to place and situation, on 3L NC. NIH and q 4hr neuro checks completed this shift. Call light within reach. Bed in lowest position with bed alarm on. Will continue to follow plan of care throughout shift until 7p.

## 2024-02-29 NOTE — PROGRESS NOTES
"   LOS: 6 days     Name: Brenda Munroe  Age/Sex: 73 y.o. female  :  1950        PCP: Bernadette Arevalo MD    Principal Problem:    Subdural hematoma      Admission Information: Brenda Munroe is a 73 y.o. female with MDD, COPD, CAD, thyroid disease, hypertension, paroxysmal atrial fibrillation (status post cardioversion 2024), carotid stenosis, dementia, GERD, PUD.         Chief Complaint: Fall with head injury    Interval history: Has had some mild hypotension.  Heart rate still greater than 100    Subjective     Ms. Munroe awakens to verbal and tactile stimulus.  She says that she is \"all right.\"  She denies chest pain or palpitations.  Again her baseline mental status does not lend itself to a reliable history    Vital Signs  Vital Signs (last 72 hrs)          0700   0659  0700   0659  0700   0659  0700   1444   Most Recent      Temp (°F) 97 -  98.2    97.2 -  98.8    97.8 -  98    98.2 -  98.4     98.4 (36.9)  1200    Heart Rate 88 -  170    80 -  129    79 -  133    85 -  128     90  1400    Resp  -      16 -       20  1400    BP 97/68 -  145/92    109/79 -  143/94    91/68 -  157/82    92/61 -  140/112     130/98  1400    SpO2 (%) 84 -  98    90 -  100    90 -  100    94 -  100     96  1400    Flow (L/min) 2 -  3      3      3      3     3  Comment: pt placed back on nasal canula; continues to pull bipap off.  1218    Oxygen Concentration (%)   32      32      32      32     32  1248          Temp:  [97.8 °F (36.6 °C)-98.4 °F (36.9 °C)] 98.4 °F (36.9 °C)  Heart Rate:  [] 90  Resp:  [] 20  BP: ()/() 130/98  Body mass index is 26.72 kg/m².      Intake/Output Summary (Last 24 hours) at 2024 1444  Last data filed at 2024 1249  Gross per 24 hour   Intake 495 ml   Output 0 ml   Net 495 ml       Vitals and nursing note reviewed.   Constitutional:       General: " [address]  [phone]    Colonoscopy Procedure Note    Name: Mike Xie     Date: 12/2/2019  Med Record Number: [id number]   Age: 46 y.o. Sex: male   Procedure: Colonoscopy with polypectomy (snare cautery), Endoscopic Mucosal Resection (EMR) of large broad based polyp, submucosal injection (SMI) of [address] [place]too  Pre-operative Diagnosis:  Initial screen for colon cancer  Post-operative Diagnosis: colon polyps (8 total from ascending, transverse, descending, sigmoid including large high risk broad based polyp in R colon)  Indications: screening for colon cancer  Anesthesia/Sedation: MAC IV MAC anesthesia Propofol  Procedure Details:    Informed consent was obtained for the procedure, including sedation. Risks of perforation, hemorrhage, adverse drug reaction and aspiration were discussed. The patient was placed in the left lateral decubitus position. Based on the pre-procedure assessment, including review of the patient's medical history, medications, allergies, and review of systems, he had been deemed to be an appropriate candidate for sedation by the Anesthesia Dept. The patient was monitored continuously with ECG tracing, pulse oximetry, blood pressure monitoring, and direct observations. A time out was performed. Once sedation was adequate, a rectal examination was performed. The [id number] was inserted into the rectum and advanced under direct vision to the cecum, which was identified by the ileocecal valve and appendiceal orifice. The quality of the colonic preparation was excellent. A careful inspection was made as the colonoscope was withdrawn, including a retroflexed view of the rectum; findings and interventions are described below. Appropriate photodocumentation was obtained.     Findings: ANUS: Anal exam reveals no masses or hemorrhoids, sphincter tone is normal.   RECTUM: Rectal exam reveals no masses or Awake.      Appearance: Not in distress. Chronically ill-appearing.      Interventions: Nasal cannula in place.      Comments: At 3 L/min   Eyes:      Conjunctiva/sclera:      Right eye: Hemorrhage present.      Comments: Small hematoma over right orbit.  Bilateral circumferential ecchymosis of eyes extending into cheeks and the right side of her neck   HENT:      Head: Contusion.   Pulmonary:      Effort: Pulmonary effort is normal.      Breath sounds: Normal breath sounds.   Cardiovascular:      Tachycardia present. Irregularly irregular rhythm.      Murmurs: There is no murmur.   Edema:     Peripheral edema absent.   Skin:     General: Skin is warm and dry.   Neurological:      Mental Status: Exhibits a cognitive deficit.      Comments: Moves all extremities.  Has difficulty consistently following single step commands this morning         Telemetry:   A-fib 100s     Results Review:     Results from last 7 days   Lab Units 02/29/24  0014 02/28/24  0031 02/27/24  0030 02/26/24  0018 02/25/24  0546 02/25/24  0021 02/23/24  1739   WBC 10*3/mm3 8.41 6.88 7.77 9.40 6.16 9.37 5.47   HEMOGLOBIN g/dL 10.1* 9.4* 10.1* 9.8* 11.3* 11.5* 10.6*   PLATELETS 10*3/mm3 156 182 231 250 243 260 225     Results from last 7 days   Lab Units 02/29/24  0014 02/28/24  0031 02/27/24  0030 02/26/24  0015 02/25/24  0546 02/25/24  0021 02/24/24  1547   SODIUM mmol/L 141 141 145 149* 143 142 151*   POTASSIUM mmol/L 4.5 3.7 3.9 3.8 4.6 5.2 5.4*   CHLORIDE mmol/L 103 102 103 105 101 102 104   CO2 mmol/L 30.7* 32.0* 29.4* 31.6* 22.7 22.1 29.1*   BUN mg/dL 24* 20 19 21 25* 23 21   CREATININE mg/dL 0.72 0.70 0.77 0.77 0.97 0.85 0.91   CALCIUM mg/dL 8.6 8.4* 8.7 8.6 8.9 9.1 9.1   GLUCOSE mg/dL 156* 172* 150* 105* 154* 135* 120*     Results from last 7 days   Lab Units 02/23/24  1340 02/23/24  1135   CK TOTAL U/L 89  --    HSTROP T ng/L 452* 474*       Results from last 7 days   Lab Units 02/23/24  1135   INR  1.50*       I reviewed the patient's  hemorrhoids. SIGMOID COLON: The mucosa is normal with good vascular pattern and without ulcers and diverticula. - Protruding lesions: -Pedunculated Polyp(s) size 8 mm removed by polypectomy (snare cautery)  DESCENDING COLON: The mucosa is normal with good vascular pattern and without ulcers and diverticula. - Protruding lesions: -Pedunculated Polyp(s) size 8 mm removed by polypectomy (snare cautery)  SPLENIC FLEXURE: The splenic flexure is normal.   TRANSVERSE COLON: The mucosa is normal with good vascular pattern and without ulcers and diverticula. - Protruding lesions: -Pedunculated Polyp(s) size 8 mm removed by polypectomy (snare cautery)  HEPATIC FLEXURE: The hepatic flexure is normal.   ASCENDING COLON: The mucosa is normal with good vascular pattern and without ulcers and diverticula. - Protruding lesions: -Pedunculated and Sessile Polyp(s) size 8, 8, 8, 8, 8, and >15 mm removed by polypectomy (snare cautery) and EMR following lift with O-rise. Edges additionally fulgurated. Site marked with 2 distal SMI's of [address]: The appendiceal orifice appears normal. The ileocecal valve appears normal.   TERMINAL ILEUM: The terminal ileum was not entered. Specimens: R colon polyps, Large R colon polyp and T-colon polyp, D-colon polyp, Sigmoid polyp    Estimated Blood Loss:  0-minimum           Complications: None; patient tolerated the procedure well. Attending Attestation: I performed the procedure. Impression:  See post-procedure diagnoses    Recommendations:-Await pathology. , -Follow up with me., -post polypectomy precautions. , Will need short interval surveillance of large polypectomy site (3 mos - 1 year pending path).     Wanda Yu MD, FACS new clinical results.  I reviewed the patient's new imaging results and agree with the interpretation.  I personally viewed and interpreted the patient's EKG/Telemetry data      Medication Review:   amiodarone, 400 mg, Oral, BID With Meals  baclofen, 5 mg, Oral, BID  budesonide-formoterol, 2 puff, Inhalation, BID - RT   And  tiotropium bromide monohydrate, 2 puff, Inhalation, Daily - RT  [START ON 3/1/2024] cholecalciferol, 50,000 Units, Oral, Weekly  dilTIAZem, 30 mg, Oral, Q12H  donepezil, 10 mg, Oral, Nightly  ferrous sulfate, 325 mg, Oral, BID With Meals  folic acid, 1 mg, Oral, Daily  ipratropium, 0.5 mg, Nebulization, 4x Daily - RT  isosorbide mononitrate, 15 mg, Oral, Q24H  lamoTRIgine, 100 mg, Oral, Daily  lamoTRIgine, 100 mg, Oral, Nightly  levothyroxine, 75 mcg, Oral, Q AM  memantine, 10 mg, Oral, Q12H  methylPREDNISolone sodium succinate, 20 mg, Intravenous, Q12H  [START ON 3/1/2024] metoprolol succinate XL, 25 mg, Oral, Q24H  mupirocin, 1 application , Each Nare, BID  pantoprazole, 40 mg, Oral, QAM AC  risperiDONE, 0.5 mg, Oral, BID  rosuvastatin, 40 mg, Oral, Q PM  senna-docusate sodium, 2 tablet, Oral, BID  sertraline, 100 mg, Oral, Daily  sodium chloride, 10 mL, Intravenous, Q12H  sodium chloride, 10 mL, Intravenous, Q12H  sodium chloride, 10 mL, Intravenous, Q12H           Assessment:  Persistent A-fib not on anticoagulation currently due to intracerebral hemorrhage  CAD with known mid circumflex stenosis that was medically managed  Hypertension  Dyslipidemia  HFrEF  Qtc prolongation in the setting of RBBB      Recommendations:  Continuing to work towards rate control.  Beta-blocker increased today.  Per neurology's note, preference would be to not resume anticoagulation due to her fall risk, mental status, and history of ICH  Continuing high intensity statin.  Would resume aspirin 81 mg after 6 weeks have passed since her fall and a repeat CT has been performed  At goal  At goal  Would add HF meds,  but waiting to achieve rate control before titrating multiple antihypertensive medications simultaneously  QTc corrected is 487 (at 25%) or 489 (23%).  Will continue to monitor    Case discussed with Dr. Alfonso and recommendations represent his plan of care.      Electronically signed by PAULA Crocker, 02/29/24, 3:22 PM EST.    .Electronically signed by Jasmina Alfonso MD, 02/29/24, 4:47 PM EST.                   Advancement-Rotation Flap Text: The defect edges were debeveled with a #15 scalpel blade. Given the location of the defect, shape of the defect and the proximity to free margins an advancement-rotation flap was deemed most appropriate. Using a sterile surgical marker, an appropriate flap was drawn incorporating the defect and placing the expected incisions within the relaxed skin tension lines where possible. The area thus outlined was incised deep to adipose tissue with a #15 scalpel blade. The skin margins were undermined to an appropriate distance in all directions utilizing iris scissors. Following this, the designed flap was carried over into the primary defect and sutured into place.

## 2024-02-29 NOTE — PROGRESS NOTES
"DOS: 2024  NAME: Brenda Munroe   : 1950  PCP: Bernadette Arevalo MD  Chief Complaint   Patient presents with    Fall    Head Injury       Chief complaint: Head injury  Subjective: No acute events overnight.  She was initially a little more drowsy this am but eventually woke up and became somewhat interactvie.  She complained of a headache and pain in the back of her legs.  Also requested a drink of her soda at bedside.  She was moving all extremities, generally weak but no focal motor deficit.  She was oriented to self, thought she was at [place].      Objective:  Vital signs: /71   Pulse 101   Temp 98.2 °F (36.8 °C) (Axillary)   Resp 27   Ht 162.6 cm (64\")   Wt 70.6 kg (155 lb 10.3 oz)   SpO2 98%   BMI 26.72 kg/m²      Gen: NAD, vitals reviewed  MS: Somewhat drowsy this am, but woke up and interacted. Oriented to self only, thought she was at nursing home  CN: Cranials 2-12: No focal deficits noted.   Motor: Moving all extremities. Follows some simple commands  Sensory: No sensory loss noted.  Coordination: Difficult to assess  Gait: Could not be tested at this time.    Laboratory results:  Lab Results   Component Value Date    GLUCOSE 156 (H) 2024    CALCIUM 8.6 2024     2024    K 4.5 2024    CO2 30.7 (H) 2024     2024    BUN 24 (H) 2024    CREATININE 0.72 2024    EGFRIFAFRI  2016      Comment:      <15 Indicative of kidney failure.    EGFRIFNONA 75 2021    BCR 33.3 (H) 2024    ANIONGAP 7.3 2024     Lab Results   Component Value Date    WBC 8.41 2024    HGB 10.1 (L) 2024    HCT 34.3 2024    MCV 99.7 (H) 2024     2024     Lab Results   Component Value Date    LDL 39 2024    LDL 46 2024    LDL CANCELED 10/09/2015    LDL CANCELED 10/09/2015    LDL CANCELED 10/09/2015         Lab 24  0018   HEMOGLOBIN A1C 5.00        Review of labs: The LDL is only 39.  " Hemoglobin is low at 10.1 and the MCV is elevated at 99.7.  The serum folic acid level was low.    CMP:        Lab 02/29/24  0014 02/28/24  0031 02/27/24  0030 02/26/24  0015 02/25/24  0546 02/25/24  0021 02/24/24  1547   SODIUM 141 141 145 149* 143 142 151*   POTASSIUM 4.5 3.7 3.9 3.8 4.6 5.2 5.4*   CHLORIDE 103 102 103 105 101 102 104   CO2 30.7* 32.0* 29.4* 31.6* 22.7 22.1 29.1*   ANION GAP 7.3 7.0 12.6 12.4 19.3* 17.9* 17.9*   BUN 24* 20 19 21 25* 23 21   CREATININE 0.72 0.70 0.77 0.77 0.97 0.85 0.91   EGFR 87.3 90.3 80.6 80.6 61.1 71.5 65.9   GLUCOSE 156* 172* 150* 105* 154* 135* 120*   CALCIUM 8.6 8.4* 8.7 8.6 8.9 9.1 9.1   MAGNESIUM  --   --   --  2.1  --   --  2.3   TOTAL PROTEIN 5.2*  --  5.8* 5.3* 5.8* 5.7*  --    ALBUMIN 3.3*  --  3.5 3.1* 3.2* 3.0*  --    GLOBULIN 1.9  --  2.3 2.2 2.6 2.7  --    ALT (SGPT) 15  --  13 10 13 11  --    AST (SGOT) 16  --  15 16 25 28  --    BILIRUBIN 0.3  --  0.5 0.4 0.4 0.4  --    ALK PHOS 63  --  68 65 76 72  --         TSH          1/1/2024    10:59 1/23/2024    00:48   TSH   TSH 3.020  0.876         Lipid Panel          1/1/2024    13:04 2/24/2024    00:18   Lipid Panel   Total Cholesterol 119  99    Triglycerides 69  56    HDL Cholesterol 59  47    VLDL Cholesterol 14  13    LDL Cholesterol  46  39    LDL/HDL Ratio 0.78  0.87         Lab Results   Component Value Date    MGAPILKA41 319 02/25/2024       Lab Results   Component Value Date    FOLATE 4.18 (L) 02/25/2024       Lab Results   Component Value Date    HGBA1C 5.00 02/24/2024           Review and interpretation of imaging:     CT Head Without Contrast    Result Date: 2/24/2024  CT brain without contrast compared to examination dated 1/31/2024.  Severe atrophy and small vessel disease. There is hemorrhage in the LEFT occipital horn with focal surrounding edema.  This appears to be new from prior study. No shift of midline structures. Large right-sided scalp hematoma. Opacified RIGHT maxillary sinus, new from prior,  likely related to presence of blood or retained secretions. No skull fracture.      Impression: Intraventricular hemorrhage noted in the LEFT occipital lobe, new from prior.   This report was finalized on 2/24/2024 9:50 AM by Dr. Loreto Jo MD.      CT Head Without Contrast    Result Date: 2/23/2024  PROCEDURE: CT HEAD WO CONTRAST-, CT CERVICAL SPINE WO CONTRAST-  HISTORY: Unwitnessed fall gross external trauma blood thinners  PROCEDURE: Axial images were obtained from the skull base to the thoracic inlet by computed tomography. Multiple axial CT images were performed from the foramen magnum to the vertex without enhancement. This study was performed with techniques to keep radiation doses as low as reasonably achievable (ALARA). Individualized dose reduction techniques using automated exposure control or adjustment of mA and/or kV according to the patient size were employed.   C-SPINE:  FINDINGS: There are no acute fractures. There are diffuse degenerative changes with loss of disc base height and facet arthropathy.  The prevertebral soft tissues are normal.  Limited images of the lung apices are unremarkable.  HEAD CT:  FINDINGS: There is generalized cerebral volume loss. Patchy hypodensities in the periventricular white matter consistent with chronic small vessel ischemic change. There is a subdural hematoma overlying the left frontal lobe layering along the falx measuring up to 4 mm. There is no mass, mass effect or midline shift.  There is no hydrocephalus. The paranasal sinuses are clear. Bone windows reveal no acute osseous abnormalities. Note is made of a right periorbital hematoma.      Impression: 1. 4 mm left parafalcine subdural hematoma. 2. No acute fracture or malalignment in the cervical spine.     Critical Finding  Critical report results have been called to Dr. Moran. Report called at 2/23/2024 11:33 AM.        This report was finalized on 2/23/2024 11:33 AM by Lily Mullen M.D..        MRI  Brain With & Without Contrast    Result Date: 2/27/2024  EXAM:   MR Head Without and With Intravenous Contrast  EXAM DATE:   2/27/2024 1:30 PM  CLINICAL HISTORY:   Trauma protocol; S06.5XAA-Traumatic subdural hemorrhage with loss of consciousness status unknown, initial encounter; I21.4-Non-ST elevation (NSTEMI) myocardial infarction; Z79.01-Long term (current) use of anticoagulants; S02.2XXA-Fracture of nasal bones, initial encounter for closed fracture; S05.11XA-Contusion of eyeball and orbital tissues, right eye, initial encounter  TECHNIQUE:   Magnetic resonance images of the head/brain without and with intravenous contrast in multiple planes.  COMPARISON:   CT 2/24/2024  FINDINGS:   Brain and extra-axial spaces:  Layering blood products now in the chronic phase in the left greater than right dependent portions of the occipital horns of the lateral ventricles.  Left cerebral hemispheric subdural hematoma is noted with a maximal thickness of approximately 10.5 mm. No midline shift.  Fairly extensive and confluent T2 signal abnormality in the periventricular and subcortical white matter consistent with advanced chronic small vessel ischemic disease.  No hydrocephalus.  No evidence of acute infarct.   Bones/joints:  Unremarkable as visualized.  No acute fracture.   Soft tissues:  Right frontal scalp hematoma is noted.  No MRI evidence of acute phase blood products.   Sinuses:  Unremarkable as visualized.  No acute sinusitis.   Mastoid air cells:  Unremarkable as visualized.  No mastoid effusion.   Orbits:  Unremarkable as visualized.      Impression: 1.  Layering blood products now in the more chronic phase in the left greater than right dependent portions of the occipital horns of the lateral ventricles. No hydrocephalus. 2.  Left cerebral hemispheric subdural hematoma is noted with a maximal thickness of approximately 10.5 mm. No midline shift. 3.  No MRI evidence of acute phase blood products. 4.  No evidence of  acute infarct. 5.  Right frontal scalp hematoma is noted. 6.  Fairly extensive and confluent T2 signal abnormality in the periventricular and subcortical white matter consistent with advanced chronic small vessel ischemic disease.   This report was finalized on 2/27/2024 2:44 PM by Dr. Erik Jones MD.        Workup to date:  An EEG performed on January 24, 2024 showed the following:     Findings:     The patient is awake but confused.  The background shows diffuse medium amplitude 2-5 Hz intermixed delta and theta activity which is present symmetrically over both hemispheres.  A clear posterior rhythm is not seen.  EMG artifact is variably prominent over the frontotemporal leads.  The patient is occasionally restless and moaning during the study.  No focal features or epileptiform activity are present.  Photic stimulation does not change the background.  Hyperventilation is not performed.     Video: Available     Technical quality: Superior     EKG: Irregular, 70-90 bpm     SUMMARY:     Moderate generalized slow     No focal features or epileptiform activity are seen     IMPRESSION:     Diffuse cerebral dysfunction of moderate degree, nonspecific     No evidence for epilepsy is seen    Results for orders placed during the hospital encounter of 02/23/24    Adult Transthoracic Echo Complete W/ Cont if Necessary Per Protocol    Interpretation Summary    Left ventricular systolic function is moderately decreased. Left ventricular ejection fraction appears to be 41 - 45%.    Left ventricular diastolic function was indeterminate.    Left atrial volume is mildly increased.    There is mild calcification of the aortic valve.    Estimated right ventricular systolic pressure from tricuspid regurgitation is mildly elevated (35-45 mmHg).    There is no evidence of pericardial effusion       Diagnoses: Patient with subdural hemorrhage in the Sub Falcine area mental status currently stable.  Was on anticoagulation for a-fib,  has been held since admission due to bleed    Plan:  1.  MRI of the brain with and without contrast showed chronic blood products in bilateral occipital horns of lateral ventricles, left sided SDH along the falx 10.5 mm maximal thickness. There was also noted hemosiderin staining and left occipital lobe.   2.  We recommend holding anticoagulation/antiplatelets for at least 6 weeks in setting of multiple falls and recent bleed.  Possibly a candidate for Watchman device. Dr. Randle recommends repeating CT head in 6 weeks before restarting any antiplatelets, or possibly a reversible anticoagulant like Pradaxa.   5.  Continue Lamictal 100 mg PO BID    This was an audio and video enabled telemedicine encounter.  Dr. Randle present for encounter via telemedicine.  Verbal consent taken.    PAULA Prasad

## 2024-02-29 NOTE — PROGRESS NOTES
[facility] HOSPITALIST PROGRESS NOTE    Subjective     History:   Brenda Munroe is a 73 y.o. female admitted on 2/23/2024 secondary to Subdural hematoma     Procedures: None    CC: Follow up subdural hematoma     Patient seen and examined. Awake and alert but remains confused. Oriented to person only and able to follow a few simple commands. Unable to provide reliable history but more interactive today and states she feels better. BP and HR have fluctuated. No acute events overnight per RN.     History taken from: chart, and RN.      Objective     Vital Signs  Temp:  [97.8 °F (36.6 °C)-98.2 °F (36.8 °C)] 98.2 °F (36.8 °C)  Heart Rate:  [] 101  Resp:  [16-29] 27  BP: ()/() 104/71    Intake/Output Summary (Last 24 hours) at 2/29/2024 1100  Last data filed at 2/28/2024 1900  Gross per 24 hour   Intake 377 ml   Output 0 ml   Net 377 ml         Physical Exam:   General:    Awake, alert but confused (oriented to person only), follows a few simple commands, more interactive today, in no acute distress, chronically ill appearing, (+) facial ecchymoses    Heart:      Normal S1 and S2. Irregularly irregular. Tachycardic.    Lungs:     Respirations regular, even and unlabored. Lungs clear to auscultation B/L. No wheezes, rales or rhonchi.   Abdomen:   Soft and nontender. No guarding, rebound tenderness or  organomegaly noted. Bowel sounds present x 4.   Extremities:  No clubbing, cyanosis or edema noted. Moves UE and LE equally B/L.     Results Review:    Results from last 7 days   Lab Units 02/29/24  0014 02/28/24  0031 02/27/24  0030 02/26/24  0018 02/25/24  0546 02/25/24  0021 02/23/24  1739   WBC 10*3/mm3 8.41 6.88 7.77 9.40 6.16 9.37 5.47   HEMOGLOBIN g/dL 10.1* 9.4* 10.1* 9.8* 11.3* 11.5* 10.6*   PLATELETS 10*3/mm3 156 182 231 250 243 260 225     Results from last 7 days   Lab Units 02/29/24  0014 02/28/24  0031 02/27/24  0030 02/26/24  0015 02/25/24  0546 02/25/24  0021 02/24/24  1547    SODIUM mmol/L 141 141 145 149* 143 142 151*   POTASSIUM mmol/L 4.5 3.7 3.9 3.8 4.6 5.2 5.4*   CHLORIDE mmol/L 103 102 103 105 101 102 104   CO2 mmol/L 30.7* 32.0* 29.4* 31.6* 22.7 22.1 29.1*   BUN mg/dL 24* 20 19 21 25* 23 21   CREATININE mg/dL 0.72 0.70 0.77 0.77 0.97 0.85 0.91   CALCIUM mg/dL 8.6 8.4* 8.7 8.6 8.9 9.1 9.1   GLUCOSE mg/dL 156* 172* 150* 105* 154* 135* 120*     Results from last 7 days   Lab Units 02/29/24  0014 02/27/24  0030 02/26/24  0015 02/25/24  0546 02/25/24  0021 02/23/24  1739 02/23/24  1135   BILIRUBIN mg/dL 0.3 0.5 0.4 0.4 0.4 0.3 0.3   ALK PHOS U/L 63 68 65 76 72 74 73   AST (SGOT) U/L 16 15 16 25 28 19 19   ALT (SGPT) U/L 15 13 10 13 11 13 15     Results from last 7 days   Lab Units 02/26/24  0015 02/24/24  1547   MAGNESIUM mg/dL 2.1 2.3     Results from last 7 days   Lab Units 02/23/24  1135   INR  1.50*     Results from last 7 days   Lab Units 02/23/24  1340 02/23/24  1135   CK TOTAL U/L 89  --    HSTROP T ng/L 452* 474*       Imaging Results (Last 24 Hours)       ** No results found for the last 24 hours. **              Medications:  amiodarone, 400 mg, Oral, BID With Meals  baclofen, 5 mg, Oral, BID  budesonide-formoterol, 2 puff, Inhalation, BID - RT   And  tiotropium bromide monohydrate, 2 puff, Inhalation, Daily - RT  [START ON 3/1/2024] cholecalciferol, 50,000 Units, Oral, Weekly  dilTIAZem, 30 mg, Oral, Q12H  donepezil, 10 mg, Oral, Nightly  ferrous sulfate, 325 mg, Oral, BID With Meals  folic acid, 1 mg, Oral, Daily  ipratropium, 0.5 mg, Nebulization, 4x Daily - RT  isosorbide mononitrate, 15 mg, Oral, Q24H  lamoTRIgine, 100 mg, Oral, Daily  lamoTRIgine, 100 mg, Oral, Nightly  levothyroxine, 75 mcg, Oral, Q AM  memantine, 10 mg, Oral, Q12H  methylPREDNISolone sodium succinate, 40 mg, Intravenous, Q12H  [START ON 3/1/2024] metoprolol succinate XL, 25 mg, Oral, Q24H  mupirocin, 1 application , Each Nare, BID  pantoprazole, 40 mg, Oral, QAM AC  risperiDONE, 0.5 mg, Oral,  BID  rosuvastatin, 40 mg, Oral, Q PM  senna-docusate sodium, 2 tablet, Oral, BID  sertraline, 100 mg, Oral, Daily  sodium chloride, 10 mL, Intravenous, Q12H  sodium chloride, 10 mL, Intravenous, Q12H  sodium chloride, 10 mL, Intravenous, Q12H                 Assessment & Plan   Traumatic subdural hematoma: S/P recent fall PTA. CT head revealed a 4 mm left parafalcine subdural hematoma. Neurology consulted and neurosurgery notified. Upon review, neurosurgery recommended conservative management with no reversal agent and management at our facility. Repeat CT head stable. MRI brain reveals layering blood products now in the more chronic phase in the L>R dependent portions of the occipital horns of the lateral ventricles, left cerebral hemispheric subdural hematoma with a maximal thickness of approx 10.5 mm, right frontal scalp hematoma and findings consistent with fairly extensive advanced chronic small vessel ischemic disease. Neurology recommending repeat imaging in 6 weeks. Cont close monitoring. Neurology and neurosurgery input appreciated.     Persistent Afib with RVR: Chronically anticoagulated with Eliquis which was held on admission in the setting of above. Transitioned off Cardizem gtt to PO amiodarone and Cardizem once able to tolerate PO intake. PO metoprolol restarted. Overall improved.  Monitor on telemetry. Cardiology input appreciated.      Acute COPD exacerbation: Cont steroids, nebs and inhaler regimen. Decrease steroids today.    Acute hypercapnic respiratory failure: Likely 2/2 COPD exacerbation and elevated O2 levels on supplemental O2. Overall improved with BiPAP. Cont to encourage consistent use of BiPAP at HS.     Acute metabolic encephalopathy superimposed on baseline dementia: Likely 2/2 hypercapnia and hospitalization. Previously required Precedex. Overall improved. Cont treatment as outlined above and supportive treatment.     Hypertensive urgency: Improved with IV labetalol and hydralazine  with some fluctuation at present. Cont PO Cardizem, metoprolol and nitrate. Cont to monitor.     NSTEMI in the setting of known CAD: Possibly type II in the setting of above. Echo reveals an EF of 41-45%. No antiplatelet therapy in the setting of subdural hematoma. Plans to reconsider antiplatelet therapy in 6 weeks following repeat imaging. Cont to monitor on telemetry.     Right nasal bone fracture: S/P fall. Cont supportive treatment.     GERD: Cont PPI.     DVT PPX: SCD's. No pharmacologic DVT PPX 2/2 subdural hematoma.     Discussed with cardiology APRN.     Awaiting transfer to PCU.     Disposition Pending clinical course. Likely return to SNF when medically stable.     Prasad Romero,   02/29/24  11:00 EST

## 2024-03-01 LAB
ANION GAP SERPL CALCULATED.3IONS-SCNC: 9.6 MMOL/L (ref 5–15)
ANISOCYTOSIS BLD QL: NORMAL
BASOPHILS # BLD AUTO: 0.01 10*3/MM3 (ref 0–0.2)
BASOPHILS NFR BLD AUTO: 0.1 % (ref 0–1.5)
BUN SERPL-MCNC: 22 MG/DL (ref 8–23)
BUN/CREAT SERPL: 29.7 (ref 7–25)
CALCIUM SPEC-SCNC: 8.4 MG/DL (ref 8.6–10.5)
CHLORIDE SERPL-SCNC: 102 MMOL/L (ref 98–107)
CO2 SERPL-SCNC: 28.4 MMOL/L (ref 22–29)
CREAT SERPL-MCNC: 0.74 MG/DL (ref 0.57–1)
DACRYOCYTES BLD QL SMEAR: NORMAL
DEPRECATED RDW RBC AUTO: 74.7 FL (ref 37–54)
EGFRCR SERPLBLD CKD-EPI 2021: 84.5 ML/MIN/1.73
ELLIPTOCYTES BLD QL SMEAR: NORMAL
EOSINOPHIL # BLD AUTO: 0.01 10*3/MM3 (ref 0–0.4)
EOSINOPHIL NFR BLD AUTO: 0.1 % (ref 0.3–6.2)
ERYTHROCYTE [DISTWIDTH] IN BLOOD BY AUTOMATED COUNT: 20.2 % (ref 12.3–15.4)
GLUCOSE SERPL-MCNC: 168 MG/DL (ref 65–99)
HCT VFR BLD AUTO: 33.6 % (ref 34–46.6)
HGB BLD-MCNC: 9.8 G/DL (ref 12–15.9)
HYPOCHROMIA BLD QL: NORMAL
IMM GRANULOCYTES # BLD AUTO: 0.17 10*3/MM3 (ref 0–0.05)
IMM GRANULOCYTES NFR BLD AUTO: 2 % (ref 0–0.5)
LYMPHOCYTES # BLD AUTO: 0.23 10*3/MM3 (ref 0.7–3.1)
LYMPHOCYTES NFR BLD AUTO: 2.7 % (ref 19.6–45.3)
MACROCYTES BLD QL SMEAR: NORMAL
MCH RBC QN AUTO: 29 PG (ref 26.6–33)
MCHC RBC AUTO-ENTMCNC: 29.2 G/DL (ref 31.5–35.7)
MCV RBC AUTO: 99.4 FL (ref 79–97)
MONOCYTES # BLD AUTO: 0.2 10*3/MM3 (ref 0.1–0.9)
MONOCYTES NFR BLD AUTO: 2.3 % (ref 5–12)
NEUTROPHILS NFR BLD AUTO: 8.01 10*3/MM3 (ref 1.7–7)
NEUTROPHILS NFR BLD AUTO: 92.8 % (ref 42.7–76)
NRBC BLD AUTO-RTO: 0 /100 WBC (ref 0–0.2)
PLAT MORPH BLD: NORMAL
PLATELET # BLD AUTO: 147 10*3/MM3 (ref 140–450)
PMV BLD AUTO: 10.2 FL (ref 6–12)
POTASSIUM SERPL-SCNC: 4.3 MMOL/L (ref 3.5–5.2)
RBC # BLD AUTO: 3.38 10*6/MM3 (ref 3.77–5.28)
SODIUM SERPL-SCNC: 140 MMOL/L (ref 136–145)
WBC NRBC COR # BLD AUTO: 8.63 10*3/MM3 (ref 3.4–10.8)

## 2024-03-01 PROCEDURE — 94799 UNLISTED PULMONARY SVC/PX: CPT

## 2024-03-01 PROCEDURE — 85007 BL SMEAR W/DIFF WBC COUNT: CPT | Performed by: INTERNAL MEDICINE

## 2024-03-01 PROCEDURE — 80048 BASIC METABOLIC PNL TOTAL CA: CPT | Performed by: INTERNAL MEDICINE

## 2024-03-01 PROCEDURE — 94660 CPAP INITIATION&MGMT: CPT

## 2024-03-01 PROCEDURE — 85025 COMPLETE CBC W/AUTO DIFF WBC: CPT | Performed by: INTERNAL MEDICINE

## 2024-03-01 PROCEDURE — 99232 SBSQ HOSP IP/OBS MODERATE 35: CPT | Performed by: INTERNAL MEDICINE

## 2024-03-01 PROCEDURE — 99232 SBSQ HOSP IP/OBS MODERATE 35: CPT | Performed by: NURSE PRACTITIONER

## 2024-03-01 PROCEDURE — 94761 N-INVAS EAR/PLS OXIMETRY MLT: CPT

## 2024-03-01 PROCEDURE — 25010000002 METHYLPREDNISOLONE PER 40 MG: Performed by: INTERNAL MEDICINE

## 2024-03-01 PROCEDURE — 94664 DEMO&/EVAL PT USE INHALER: CPT

## 2024-03-01 PROCEDURE — 92610 EVALUATE SWALLOWING FUNCTION: CPT

## 2024-03-01 RX ORDER — METOPROLOL SUCCINATE 50 MG/1
50 TABLET, EXTENDED RELEASE ORAL
Status: DISCONTINUED | OUTPATIENT
Start: 2024-03-02 | End: 2024-03-06

## 2024-03-01 RX ORDER — METOPROLOL SUCCINATE 25 MG/1
25 TABLET, EXTENDED RELEASE ORAL ONCE
Qty: 1 TABLET | Refills: 0 | Status: COMPLETED | OUTPATIENT
Start: 2024-03-01 | End: 2024-03-01

## 2024-03-01 RX ADMIN — BUDESONIDE AND FORMOTEROL FUMARATE DIHYDRATE 2 PUFF: 160; 4.5 AEROSOL RESPIRATORY (INHALATION) at 18:14

## 2024-03-01 RX ADMIN — RISPERIDONE 0.5 MG: 0.25 TABLET, FILM COATED ORAL at 08:31

## 2024-03-01 RX ADMIN — METOPROLOL SUCCINATE 25 MG: 25 TABLET, EXTENDED RELEASE ORAL at 14:46

## 2024-03-01 RX ADMIN — TROLAMINE SALICYLATE 1 APPLICATION: 100 CREAM TOPICAL at 08:29

## 2024-03-01 RX ADMIN — RISPERIDONE 0.5 MG: 0.25 TABLET, FILM COATED ORAL at 21:36

## 2024-03-01 RX ADMIN — OFLOXACIN 50000 UNITS: 300 TABLET, COATED ORAL at 08:28

## 2024-03-01 RX ADMIN — Medication 10 ML: at 08:30

## 2024-03-01 RX ADMIN — Medication 10 ML: at 21:37

## 2024-03-01 RX ADMIN — MEMANTINE HYDROCHLORIDE 10 MG: 10 TABLET, FILM COATED ORAL at 21:36

## 2024-03-01 RX ADMIN — LAMOTRIGINE 100 MG: 100 TABLET ORAL at 21:36

## 2024-03-01 RX ADMIN — ISOSORBIDE MONONITRATE 15 MG: 30 TABLET, EXTENDED RELEASE ORAL at 08:29

## 2024-03-01 RX ADMIN — MUPIROCIN 1 APPLICATION: 20 OINTMENT TOPICAL at 21:37

## 2024-03-01 RX ADMIN — DOCUSATE SODIUM 50 MG AND SENNOSIDES 8.6 MG 2 TABLET: 8.6; 5 TABLET, FILM COATED ORAL at 08:29

## 2024-03-01 RX ADMIN — METHYLPREDNISOLONE SODIUM SUCCINATE 20 MG: 40 INJECTION, POWDER, FOR SOLUTION INTRAMUSCULAR; INTRAVENOUS at 21:36

## 2024-03-01 RX ADMIN — METOPROLOL SUCCINATE 25 MG: 25 TABLET, EXTENDED RELEASE ORAL at 08:28

## 2024-03-01 RX ADMIN — AMIODARONE HYDROCHLORIDE 400 MG: 200 TABLET ORAL at 18:02

## 2024-03-01 RX ADMIN — DILTIAZEM HYDROCHLORIDE 30 MG: 60 TABLET, FILM COATED ORAL at 08:28

## 2024-03-01 RX ADMIN — IPRATROPIUM BROMIDE 0.5 MG: 0.5 SOLUTION RESPIRATORY (INHALATION) at 00:36

## 2024-03-01 RX ADMIN — Medication 1 MG: at 08:28

## 2024-03-01 RX ADMIN — Medication 10 ML: at 08:29

## 2024-03-01 RX ADMIN — BACLOFEN 5 MG: 10 TABLET ORAL at 08:28

## 2024-03-01 RX ADMIN — TIOTROPIUM BROMIDE INHALATION SPRAY 2 PUFF: 3.12 SPRAY, METERED RESPIRATORY (INHALATION) at 06:46

## 2024-03-01 RX ADMIN — BUDESONIDE AND FORMOTEROL FUMARATE DIHYDRATE 2 PUFF: 160; 4.5 AEROSOL RESPIRATORY (INHALATION) at 06:46

## 2024-03-01 RX ADMIN — SERTRALINE 100 MG: 50 TABLET, FILM COATED ORAL at 08:29

## 2024-03-01 RX ADMIN — AMIODARONE HYDROCHLORIDE 400 MG: 200 TABLET ORAL at 08:29

## 2024-03-01 RX ADMIN — DONEPEZIL HYDROCHLORIDE 10 MG: 5 TABLET, FILM COATED ORAL at 21:36

## 2024-03-01 RX ADMIN — FERROUS SULFATE TAB 325 MG (65 MG ELEMENTAL FE) 325 MG: 325 (65 FE) TAB at 18:02

## 2024-03-01 RX ADMIN — MUPIROCIN 1 APPLICATION: 20 OINTMENT TOPICAL at 08:29

## 2024-03-01 RX ADMIN — IPRATROPIUM BROMIDE 0.5 MG: 0.5 SOLUTION RESPIRATORY (INHALATION) at 13:40

## 2024-03-01 RX ADMIN — PANTOPRAZOLE SODIUM 40 MG: 40 TABLET, DELAYED RELEASE ORAL at 06:49

## 2024-03-01 RX ADMIN — LEVOTHYROXINE SODIUM 75 MCG: 0.07 TABLET ORAL at 06:48

## 2024-03-01 RX ADMIN — MEMANTINE HYDROCHLORIDE 10 MG: 10 TABLET, FILM COATED ORAL at 10:15

## 2024-03-01 RX ADMIN — IPRATROPIUM BROMIDE 0.5 MG: 0.5 SOLUTION RESPIRATORY (INHALATION) at 06:46

## 2024-03-01 RX ADMIN — DOCUSATE SODIUM 50 MG AND SENNOSIDES 8.6 MG 2 TABLET: 8.6; 5 TABLET, FILM COATED ORAL at 21:36

## 2024-03-01 RX ADMIN — BACLOFEN 5 MG: 10 TABLET ORAL at 21:36

## 2024-03-01 RX ADMIN — LAMOTRIGINE 100 MG: 100 TABLET ORAL at 08:29

## 2024-03-01 RX ADMIN — IPRATROPIUM BROMIDE 0.5 MG: 0.5 SOLUTION RESPIRATORY (INHALATION) at 18:14

## 2024-03-01 RX ADMIN — FERROUS SULFATE TAB 325 MG (65 MG ELEMENTAL FE) 325 MG: 325 (65 FE) TAB at 08:29

## 2024-03-01 RX ADMIN — ROSUVASTATIN CALCIUM 40 MG: 20 TABLET, FILM COATED ORAL at 18:02

## 2024-03-01 RX ADMIN — METHYLPREDNISOLONE SODIUM SUCCINATE 20 MG: 40 INJECTION, POWDER, FOR SOLUTION INTRAMUSCULAR; INTRAVENOUS at 08:28

## 2024-03-01 NOTE — PLAN OF CARE
Goal Outcome Evaluation:        Problem: Noninvasive Ventilation Acute  Goal: Effective Unassisted Ventilation and Oxygenation  Outcome: Ongoing, Progressing     Problem: Skin Injury Risk Increased  Goal: Skin Health and Integrity  Outcome: Ongoing, Progressing  Intervention: Optimize Skin Protection  Description: Perform a full pressure injury risk assessment, as indicated by screening, upon admission to care unit.  Reassess skin (injury risk, full inspection) frequently (e.g., scheduled interval, with change in condition) to provide optimal early detection and prevention.  Maintain adequate tissue perfusion (e.g., encourage fluid balance; avoid crossing legs, constrictive clothing or devices) to promote tissue oxygenation.  Maintain head of bed at lowest degree of elevation tolerated, considering medical condition and other restrictions.  Avoid positioning onto an area that remains reddened.  Minimize incontinence and moisture (e.g., toileting schedule; moisture-wicking pad, diaper or incontinence collection device; skin moisture barrier).  Cleanse skin promptly and gently when soiled utilizing a pH-balanced cleanser.  Relieve and redistribute pressure (e.g., scheduled position changes, weight shifts, use of support surface, medical device repositioning, protective dressing application, use of positioning device, microclimate control, use of pressure-injury-monitor  Encourage increased activity, such as sitting in a chair at the bedside or early mobilization, when able to tolerate.  Recent Flowsheet Documentation  Taken 3/1/2024 0200 by Amber Bourne RN  Pressure Reduction Techniques:   frequent weight shift encouraged   heels elevated off bed  Pressure Reduction Devices: pressure-redistributing mattress utilized  Taken 2/29/2024 2000 by Amber Bourne RN  Pressure Reduction Techniques:   frequent weight shift encouraged   heels elevated off bed  Pressure Reduction Devices: pressure-redistributing mattress  utilized     Problem: Fall Injury Risk  Goal: Absence of Fall and Fall-Related Injury  Outcome: Ongoing, Progressing  Intervention: Identify and Manage Contributors  Description: Develop a fall prevention plan with the patient and caregiver/family.  Provide reorientation, appropriate sensory stimulation and routines with changes in mental status to decrease risk of fall.  Promote use of personal vision and auditory aids.  Assess assistance level required for safe and effective self-care; provide support as needed, such as toileting, mobilization. For age 65 and older, implement timed toileting with assistance.  Encourage physical activity, such as performance of mobility and self-care at highest level of patient ability, multicomponent exercise program and provision of appropriate assistive devices.  If fall occurs, assess the severity of injury; implement fall injury protocol. Determine the cause and revise fall injury prevention plan.  Regularly review medication contribution to fall risk; adjust medication administration times to minimize risk of falling.  Consider risk related to polypharmacy and age.  Balance adequate pain management with potential for oversedation.  Recent Flowsheet Documentation  Taken 3/1/2024 0300 by Amber Bourne RN  Medication Review/Management: medications reviewed  Taken 3/1/2024 0200 by Amber Bourne RN  Medication Review/Management: medications reviewed  Taken 3/1/2024 0100 by Amber Bourne RN  Medication Review/Management: medications reviewed  Taken 2/29/2024 2300 by Amber Bourne RN  Medication Review/Management: medications reviewed  Taken 2/29/2024 2100 by Amber Bourne RN  Medication Review/Management: medications reviewed  Taken 2/29/2024 1900 by Amber Bourne RN  Medication Review/Management: medications reviewed  Intervention: Promote Injury-Free Environment  Description: Provide a safe, barrier-free environment that encourages independent activity.  Keep  care area uncluttered and well-lighted.  Determine need for increased observation or monitoring.  Avoid use of devices that minimize mobility, such as restraints or indwelling urinary catheter.  Recent Flowsheet Documentation  Taken 3/1/2024 0300 by Amber Bourne RN  Safety Promotion/Fall Prevention: safety round/check completed  Taken 3/1/2024 0200 by Amber Bourne RN  Safety Promotion/Fall Prevention: safety round/check completed  Taken 3/1/2024 0100 by Amber Bourne RN  Safety Promotion/Fall Prevention: safety round/check completed  Taken 2/29/2024 2300 by Amber Bourne RN  Safety Promotion/Fall Prevention: safety round/check completed  Taken 2/29/2024 2100 by Amber Bourne RN  Safety Promotion/Fall Prevention: safety round/check completed  Taken 2/29/2024 1900 by Amber Bourne RN  Safety Promotion/Fall Prevention: safety round/check completed     Problem: Adult Inpatient Plan of Care  Goal: Plan of Care Review  Outcome: Ongoing, Progressing  Goal: Patient-Specific Goal (Individualized)  Outcome: Ongoing, Progressing  Goal: Absence of Hospital-Acquired Illness or Injury  Outcome: Ongoing, Progressing  Intervention: Identify and Manage Fall Risk  Description: Perform standard risk assessment on admission using a validated tool or comprehensive approach appropriate to the patient; reassess fall risk frequently, with change in status or transfer to another level of care.  Communicate fall injury risk to interprofessional healthcare team.  Determine need for increased observation, equipment and environmental modification, such as low bed, signage and supportive, nonskid footwear.  Adjust safety measures to individual developmental age, stage and identified risk factors.  Reinforce the importance of safety and physical activity with patient and family.  Perform regular intentional rounding to assess need for position change, pain assessment and personal needs, including assistance with  toileting.  Recent Flowsheet Documentation  Taken 3/1/2024 0300 by Amber Bourne RN  Safety Promotion/Fall Prevention: safety round/check completed  Taken 3/1/2024 0200 by Amber Bourne RN  Safety Promotion/Fall Prevention: safety round/check completed  Taken 3/1/2024 0100 by Amber Bourne RN  Safety Promotion/Fall Prevention: safety round/check completed  Taken 2/29/2024 2300 by Amber Bourne RN  Safety Promotion/Fall Prevention: safety round/check completed  Taken 2/29/2024 2100 by Amber Bourne RN  Safety Promotion/Fall Prevention: safety round/check completed  Taken 2/29/2024 1900 by Amber Bourne RN  Safety Promotion/Fall Prevention: safety round/check completed  Intervention: Prevent and Manage VTE (Venous Thromboembolism) Risk  Description: Assess for VTE (venous thromboembolism) risk.  Encourage and assist with early ambulation.  Initiate and maintain compression or other therapy, as indicated, based on identified risk in accordance with organizational protocol and provider order.  Encourage both active and passive leg exercises while in bed, if unable to ambulate.  Recent Flowsheet Documentation  Taken 3/1/2024 0200 by Amber Bourne RN  VTE Prevention/Management:   bilateral   sequential compression devices on  Taken 2/29/2024 2000 by Amber Bourne RN  VTE Prevention/Management:   bilateral   sequential compression devices on  Goal: Optimal Comfort and Wellbeing  Outcome: Ongoing, Progressing  Intervention: Provide Person-Centered Care  Description: Use a family-focused approach to care.  Develop trust and rapport by proactively providing information, encouraging questions, addressing concerns and offering reassurance.  Acknowledge emotional response to hospitalization.  Recognize and utilize personal coping strategies.  Honor spiritual and cultural preferences.  Recent Flowsheet Documentation  Taken 2/29/2024 2000 by Amber Bourne RN  Trust Relationship/Rapport:   care  explained   choices provided   questions answered   reassurance provided   thoughts/feelings acknowledged  Goal: Readiness for Transition of Care  Outcome: Ongoing, Progressing

## 2024-03-01 NOTE — PROGRESS NOTES
[facility] HOSPITALIST PROGRESS NOTE    Subjective     History:   Brenda Munroe is a 73 y.o. female admitted on 2/23/2024 secondary to Subdural hematoma     Procedures: None    CC: Follow up subdural hematoma     Patient seen and examined with JOSE Ruvalcaba. Awake and alert but remains confused. Oriented to person only but is interactive and follows commands. States she feels better overall. BP and HR continue to fluctuate. No acute events overnight per RN.     History taken from: chart, and RN.      Objective     Vital Signs  Temp:  [97.5 °F (36.4 °C)-98.9 °F (37.2 °C)] 98.7 °F (37.1 °C)  Heart Rate:  [] 90  Resp:  [11-28] 22  BP: (106-145)/() 134/107    Intake/Output Summary (Last 24 hours) at 3/1/2024 1501  Last data filed at 3/1/2024 1300  Gross per 24 hour   Intake 25 ml   Output 550 ml   Net -525 ml         Physical Exam:   General:    Awake, alert but confused (oriented to person only), interactive, follows simple commands, in no acute distress, chronically ill appearing, (+) facial ecchymoses    Heart:      Normal S1 and S2. Irregularly irregular. Tachycardic.    Lungs:     Respirations regular, even and unlabored. Lungs clear to auscultation B/L. No wheezes, rales or rhonchi.   Abdomen:   Soft and nontender. No guarding, rebound tenderness or  organomegaly noted. Bowel sounds present x 4.   Extremities:  No clubbing, cyanosis or edema noted. Moves UE and LE equally B/L.     Results Review:    Results from last 7 days   Lab Units 03/01/24  0115 02/29/24  0014 02/28/24  0031 02/27/24  0030 02/26/24  0018 02/25/24  0546 02/25/24  0021   WBC 10*3/mm3 8.63 8.41 6.88 7.77 9.40 6.16 9.37   HEMOGLOBIN g/dL 9.8* 10.1* 9.4* 10.1* 9.8* 11.3* 11.5*   PLATELETS 10*3/mm3 147 156 182 231 250 243 260     Results from last 7 days   Lab Units 03/01/24  0115 02/29/24  0014 02/28/24  0031 02/27/24  0030 02/26/24  0015 02/25/24  0546 02/25/24  0021   SODIUM mmol/L 140 141 141 145 149* 143 142    POTASSIUM mmol/L 4.3 4.5 3.7 3.9 3.8 4.6 5.2   CHLORIDE mmol/L 102 103 102 103 105 101 102   CO2 mmol/L 28.4 30.7* 32.0* 29.4* 31.6* 22.7 22.1   BUN mg/dL 22 24* 20 19 21 25* 23   CREATININE mg/dL 0.74 0.72 0.70 0.77 0.77 0.97 0.85   CALCIUM mg/dL 8.4* 8.6 8.4* 8.7 8.6 8.9 9.1   GLUCOSE mg/dL 168* 156* 172* 150* 105* 154* 135*     Results from last 7 days   Lab Units 02/29/24  0014 02/27/24  0030 02/26/24  0015 02/25/24  0546 02/25/24  0021 02/23/24  1739   BILIRUBIN mg/dL 0.3 0.5 0.4 0.4 0.4 0.3   ALK PHOS U/L 63 68 65 76 72 74   AST (SGOT) U/L 16 15 16 25 28 19   ALT (SGPT) U/L 15 13 10 13 11 13     Results from last 7 days   Lab Units 02/26/24  0015 02/24/24  1547   MAGNESIUM mg/dL 2.1 2.3                   Imaging Results (Last 24 Hours)       Procedure Component Value Units Date/Time    CT Head Without Contrast [[id number]] Collected: 02/29/24 1455     Updated: 02/29/24 1501    Narrative:      EXAM:    CT Head Without Intravenous Contrast     EXAM DATE:    2/29/2024 2:33 PM     CLINICAL HISTORY:    evolution of the hemorrhage; S06.5XAA-Traumatic subdural hemorrhage  with loss of consciousness status unknown, initial encounter;  I21.4-Non-ST elevation (NSTEMI) myocardial infarction; Z79.01-Long term  (current) use of anticoagulants; S02.2XXA-Fracture of nasal bones,  initial encounter for closed fracture; S05.11XA-Contusion of eyeball and  orbital tissues, right eye, initial encounter     TECHNIQUE:    Axial computed tomography images of the head/brain without intravenous  contrast.  Sagittal and coronal reformatted images were created and  reviewed.  This CT exam was performed using one or more of the following  dose reduction techniques:  automated exposure control, adjustment of  the mA and/or kV according to patient size, and/or use of iterative  reconstruction technique.     COMPARISON:    MRI 2/27/2024     FINDINGS:    Brain and extra-axial spaces:  Persistent hyperdense left cerebral  hemispheric  subdural hematoma is noted with a maximal short axis  thickness at the level of the anterior falx of approximately 10.8 mm not  significantly changed from previous MRI.  Layering blood products within  the left lateral ventricle posterior horn with punctate layering blood  product within the posterior horn of the right lateral ventricle.   Extensive confluent low-density changes cerebral white matter consistent  with advanced chronic small vessel ischemic disease.  No parenchymal  hemorrhages have developed.    Bones/joints:  Unremarkable as visualized.  No acute fracture.    Soft tissues:  Right supraorbital scalp hematoma is noted.    Sinuses: Acute on chronic right maxillary sinusitis.      Mastoid air cells:  Unremarkable as visualized.  No mastoid effusion.       Impression:      1.  Persistent hyperdense left cerebral hemispheric subdural hematoma is  noted with a maximal short axis thickness at the level of the anterior  falx of approximately 10.8 mm not significantly changed from previous  MRI.  2.  Layering blood products within the left lateral ventricle posterior  horn with punctate layering blood product within the posterior horn of  the right lateral ventricle.  3.  No parenchymal hemorrhages have developed.  4. Advanced chronic small vessel ischemic disease again noted.  5. Right frontal scalp hematoma.  6. Acute on chronic right maxillary sinusitis.        This report was finalized on 2/29/2024 2:59 PM by Dr. Erik Jones MD.                 Medications:  amiodarone, 400 mg, Oral, BID With Meals  baclofen, 5 mg, Oral, BID  budesonide-formoterol, 2 puff, Inhalation, BID - RT   And  tiotropium bromide monohydrate, 2 puff, Inhalation, Daily - RT  cholecalciferol, 50,000 Units, Oral, Weekly  dilTIAZem, 30 mg, Oral, Q12H  donepezil, 10 mg, Oral, Nightly  ferrous sulfate, 325 mg, Oral, BID With Meals  folic acid, 1 mg, Oral, Daily  ipratropium, 0.5 mg, Nebulization, 4x Daily - RT  isosorbide mononitrate,  15 mg, Oral, Q24H  lamoTRIgine, 100 mg, Oral, Daily  lamoTRIgine, 100 mg, Oral, Nightly  levothyroxine, 75 mcg, Oral, Q AM  memantine, 10 mg, Oral, Q12H  methylPREDNISolone sodium succinate, 20 mg, Intravenous, Q12H  [START ON 3/2/2024] metoprolol succinate XL, 50 mg, Oral, Q24H  mupirocin, 1 application , Each Nare, BID  pantoprazole, 40 mg, Oral, QAM AC  risperiDONE, 0.5 mg, Oral, BID  rosuvastatin, 40 mg, Oral, Q PM  senna-docusate sodium, 2 tablet, Oral, BID  sertraline, 100 mg, Oral, Daily  sodium chloride, 10 mL, Intravenous, Q12H  sodium chloride, 10 mL, Intravenous, Q12H  sodium chloride, 10 mL, Intravenous, Q12H                 Assessment & Plan   Traumatic subdural hematoma: S/P recent fall PTA. CT head revealed a 4 mm left parafalcine subdural hematoma. Neurology consulted and neurosurgery notified. Upon review, neurosurgery recommended conservative management with no reversal agent and management at our facility. Repeat CT head stable. MRI brain reveals layering blood products now in the more chronic phase in the L>R dependent portions of the occipital horns of the lateral ventricles, left cerebral hemispheric subdural hematoma with a maximal thickness of approx 10.5 mm, right frontal scalp hematoma and findings consistent with fairly extensive advanced chronic small vessel ischemic disease. Neurology recommending repeat imaging in 6 weeks. Cont close monitoring. Neurology and neurosurgery input appreciated.     Persistent Afib with RVR: Chronically anticoagulated with Eliquis which was held on admission in the setting of above. Transitioned off Cardizem gtt to PO amiodarone and Cardizem once able to tolerate PO intake. PO metoprolol restarted and I have titrated upward today. Monitor on telemetry. Cardiology input appreciated.      Acute COPD exacerbation: Cont steroids, nebs and inhaler regimen.     Acute hypercapnic respiratory failure: Likely 2/2 COPD exacerbation and elevated O2 levels on  supplemental O2. Overall improved with BiPAP. Cont to encourage consistent use of BiPAP at HS.     Acute metabolic encephalopathy superimposed on baseline dementia: Likely 2/2 hypercapnia and hospitalization. Previously required Precedex. Overall improved. Cont treatment as outlined above and supportive treatment.     Hypertensive urgency: Improved with IV labetalol and hydralazine with some fluctuation at present. Cont PO Cardizem, metoprolol and nitrate. Metoprolol titrated upward today. Cont to monitor.     NSTEMI in the setting of known CAD: Possibly type II in the setting of above. Echo reveals an EF of 41-45%. No antiplatelet therapy in the setting of subdural hematoma. Plans to reconsider antiplatelet therapy in 6 weeks following repeat imaging. Cont to monitor on telemetry.     Right nasal bone fracture: S/P fall. Cont supportive treatment.     GERD: Cont PPI.     Debility: PT/OT     DVT PPX: SCD's. No pharmacologic DVT PPX 2/2 subdural hematoma.     Discussed with cardiology APRN.     Disposition Pending clinical course. Likely return to SNF when medically stable.     Prasad Romero,   03/01/24  15:01 EST

## 2024-03-01 NOTE — THERAPY RE-EVALUATION
Acute Care - Speech Language Pathology   Swallow Re-Assessment  [place]     Patient Name: Brenda Munroe  : 1950  MRN: [id number]  Today's Date: 3/1/2024  Onset of Illness/Injury or Date of Surgery: 24     Referring Physician: Heather      Admit Date: 2024    Visit Dx:     ICD-10-CM ICD-9-CM   1. Subdural hematoma  S06.5XAA 432.1   2. NSTEMI (non-ST elevated myocardial infarction)  I21.4 410.70   3. Chronic anticoagulation  Z79.01 V58.61   4. Closed fracture of nasal bone, initial encounter  S02.2XXA 802.0   5. Traumatic orbital hematoma, right, initial encounter  S05.11XA 921.2     Patient Active Problem List   Diagnosis    Psychosis    Severe recurrent major depression with psychotic features    COPD (chronic obstructive pulmonary disease)    Coronary artery disease    Disease of thyroid gland    Arthritis    Hypertension    Paroxysmal atrial fibrillation    Chronic headaches    Vision changes    Carotid stenosis, asymptomatic, bilateral    Bradycardia, sinus    Acute respiratory failure with hypoxia and hypercapnia    NSTEMI (non-ST elevated myocardial infarction)    A-fib    Hypercapnic respiratory failure    Subdural hematoma     Past Medical History:   Diagnosis Date    Anxiety     Arthritis     Bell's palsy     Bladder prolapse, female, acquired     Carotid stenosis     COPD (chronic obstructive pulmonary disease)     COPD (chronic obstructive pulmonary disease)     Coronary artery disease     Dementia     Dementia     Depression     Difficulty walking     Disease of thyroid gland     Frequency of urination     GERD (gastroesophageal reflux disease)     Heart attack     Hyperlipidemia     Hypertension     Irregular heart beat     Peptic ulcer disease      Past Surgical History:   Procedure Laterality Date    CARDIAC CATHETERIZATION N/A 2023    Procedure: Left Heart Cath;  Surgeon: Tab Cifuentes MD;  Location: [facility] INVASIVE LOCATION;  Service: Cardiology;   Laterality: N/A;    CARDIAC SURGERY      open heart  in 1999    CYSTOSCOPY N/A 11/8/2017    Procedure: CYSTOSCOPY;  Surgeon: Karl Nicolas DO;  Location:  [facility] OR;  Service:     OTHER SURGICAL HISTORY      states she had fluid drained no surgery    VAGINAL HYSTERECTOMY W/ ANTERIOR AND POSTERIOR VAGINAL REPAIR N/A 11/8/2017    Procedure: VAGINAL HYSTERECTOMY WITH ANTERIOR, POSTERIOR, AND ENTEROCELE VAGINAL REPAIR;  Surgeon: Karl Nicolas DO;  Location:  [facility] OR;  Service:     VAGINAL VAULT SUSPENSION N/A 11/8/2017    Procedure: VAGINAL VAULT SUSPENSION ;  Surgeon: Karl Nicolas DO;  Location:  [facility] OR;  Service:      Ms. Munroe was seen at bedside this pm on PCU for continued diet safety/reassessment. She is s/p MBS 2/26/24 which revealed a moderate oral dysphagia, felt to most likely be related to ams, wfl pharyngeal swallow w/o evidence of aspiration. She was recommended modified po diet of puree consistency, thin liquids.     She participated in clinical dysphagia reassessment 2/29/24 w/ recommendation for upgrade to least restrictive mechanical soft consistencies, ground meats, continue thin liquids. However, she declined upgrade, verbalized preference to continue puree consistencies.      She was observed on 3L O2 via NC w/o complications. WBC 8.63, she was afebrile.      She was resting upon SLP entry, easily awakened to verbal stimuli but continued moderately fatigued today. She continued w/ overall improvements in ams, interaction and cognitive status. She was pleasant, oriented to person, followed simple directives and participated in simple conversational exchanges.     She was positioned upright and centered in bed to accept multiple po presentations of thin liquids via straw. She declined further consistencies today 2/2 fatigue.      Upon po presentations, adequate bolus anticipation w/ good labial seal. Bolus formation, manipulation, and control were mildly weak in general.  A-p transit was mildly delayed w/o significant oral residue. No overt s/s aspiration evidenced before the swallow.      Pharyngeal swallow was timely. No overt s/s aspiration evidenced. No obvious silent aspiration suspected.      Impression: Per this reassessment, Ms. Munroe continued w/ a mild-moderate oral dysphagia, wfl pharyngeal swallow w/o overt s/s of aspiration across limited po presentations today.      D/w her again upgrade to least restrictive mechanical soft consistencies, ground meats, however, she elected to continue puree consistencies at this time. Please continue 1:1 feeding assistance, meds whole in puree or crushed prn.      SLP Recommendation and Plan  1. Puree consistency, thin liquids.   2. Medications whole in puree/crushed prn.   3. 1:1 feeding assistance.   4. Upright and centered for all po intake.   5. Universal aspiration precautions.   6. YANETH precautions.   7. Oral care protocol.   SLP to f/u for continued diet safety/assessment for possible upgrade, s/l cognitive status.      D/w patient results and recommendations w/ verbal understanding and agreement. Question retention 2/2 ams.      Thank you for allowing me to participate in the care of your patient-  Ana Curtis M.A., CCC-SLP      EDUCATION  The patient has been educated in the following areas:   Dysphagia (Swallowing Impairment) Modified Diet Instruction.      Time Calculation:       Therapy Charges for Today       Code Description Service Date Service Provider Modifiers Qty    [id number]  ST EVAL ORAL PHARYNG SWALLOW 4 2/29/2024 Ana Curtis MA,CCC-SLP GN 1    [id number]  ST EVAL ORAL PHARYNG SWALLOW 1 3/1/2024 Ana Curtis MA,CCC-SLP GN 1          Ana Curtis MA,CCC-SLP  3/1/2024

## 2024-03-01 NOTE — PLAN OF CARE
Problem: Noninvasive Ventilation Acute  Goal: Effective Unassisted Ventilation and Oxygenation  Outcome: Ongoing, Progressing     Problem: Skin Injury Risk Increased  Goal: Skin Health and Integrity  Outcome: Ongoing, Progressing  Intervention: Optimize Skin Protection  Recent Flowsheet Documentation  Taken 3/1/2024 1400 by Peg Lugo RN  Pressure Reduction Techniques:   weight shift assistance provided   heels elevated off bed   positioned off wounds  Pressure Reduction Devices: pressure-redistributing mattress utilized  Skin Protection:   adhesive use limited   incontinence pads utilized   transparent dressing maintained  Taken 3/1/2024 0830 by Peg Lugo RN  Pressure Reduction Techniques:   weight shift assistance provided   heels elevated off bed   positioned off wounds  Pressure Reduction Devices: pressure-redistributing mattress utilized  Skin Protection:   adhesive use limited   incontinence pads utilized   transparent dressing maintained     Problem: Fall Injury Risk  Goal: Absence of Fall and Fall-Related Injury  Outcome: Ongoing, Progressing  Intervention: Promote Injury-Free Environment  Recent Flowsheet Documentation  Taken 3/1/2024 1700 by Peg Lugo RN  Safety Promotion/Fall Prevention:   activity supervised   assistive device/personal items within reach   clutter free environment maintained   fall prevention program maintained   nonskid shoes/slippers when out of bed   room organization consistent   safety round/check completed  Taken 3/1/2024 1500 by Peg Lugo RN  Safety Promotion/Fall Prevention:   activity supervised   assistive device/personal items within reach   clutter free environment maintained   fall prevention program maintained   nonskid shoes/slippers when out of bed   room organization consistent   safety round/check completed  Taken 3/1/2024 1300 by Peg Lugo RN  Safety Promotion/Fall Prevention:   activity supervised   assistive  device/personal items within reach   clutter free environment maintained   fall prevention program maintained   nonskid shoes/slippers when out of bed   room organization consistent   safety round/check completed  Taken 3/1/2024 1100 by Peg Lugo RN  Safety Promotion/Fall Prevention:   activity supervised   assistive device/personal items within reach   clutter free environment maintained   fall prevention program maintained   nonskid shoes/slippers when out of bed   room organization consistent   safety round/check completed  Taken 3/1/2024 0900 by Peg Lugo RN  Safety Promotion/Fall Prevention:   activity supervised   assistive device/personal items within reach   clutter free environment maintained   fall prevention program maintained   nonskid shoes/slippers when out of bed   room organization consistent   safety round/check completed  Taken 3/1/2024 0700 by Peg Lugo RN  Safety Promotion/Fall Prevention:   activity supervised   assistive device/personal items within reach   clutter free environment maintained   fall prevention program maintained   nonskid shoes/slippers when out of bed   room organization consistent   safety round/check completed     Problem: Adult Inpatient Plan of Care  Goal: Plan of Care Review  Outcome: Ongoing, Progressing  Flowsheets  Taken 3/1/2024 1816 by Peg Lugo RN  Progress: improving  Taken 2/26/2024 1253 by Bety Pittman OT  Plan of Care Reviewed With: patient  Goal: Patient-Specific Goal (Individualized)  Outcome: Ongoing, Progressing  Goal: Absence of Hospital-Acquired Illness or Injury  Outcome: Ongoing, Progressing  Intervention: Identify and Manage Fall Risk  Recent Flowsheet Documentation  Taken 3/1/2024 1700 by Peg Lugo RN  Safety Promotion/Fall Prevention:   activity supervised   assistive device/personal items within reach   clutter free environment maintained   fall prevention program maintained   nonskid shoes/slippers  when out of bed   room organization consistent   safety round/check completed  Taken 3/1/2024 1500 by Peg Lugo, RN  Safety Promotion/Fall Prevention:   activity supervised   assistive device/personal items within reach   clutter free environment maintained   fall prevention program maintained   nonskid shoes/slippers when out of bed   room organization consistent   safety round/check completed  Taken 3/1/2024 1300 by Peg Lugo, RN  Safety Promotion/Fall Prevention:   activity supervised   assistive device/personal items within reach   clutter free environment maintained   fall prevention program maintained   nonskid shoes/slippers when out of bed   room organization consistent   safety round/check completed  Taken 3/1/2024 1100 by Peg Lugo, RN  Safety Promotion/Fall Prevention:   activity supervised   assistive device/personal items within reach   clutter free environment maintained   fall prevention program maintained   nonskid shoes/slippers when out of bed   room organization consistent   safety round/check completed  Taken 3/1/2024 0900 by Peg Lugo, RN  Safety Promotion/Fall Prevention:   activity supervised   assistive device/personal items within reach   clutter free environment maintained   fall prevention program maintained   nonskid shoes/slippers when out of bed   room organization consistent   safety round/check completed  Taken 3/1/2024 0700 by Peg Lugo, RN  Safety Promotion/Fall Prevention:   activity supervised   assistive device/personal items within reach   clutter free environment maintained   fall prevention program maintained   nonskid shoes/slippers when out of bed   room organization consistent   safety round/check completed  Intervention: Prevent Skin Injury  Recent Flowsheet Documentation  Taken 3/1/2024 1400 by Peg Lugo, RN  Skin Protection:   adhesive use limited   incontinence pads utilized   transparent dressing maintained  Taken  3/1/2024 0830 by Peg Lugo RN  Skin Protection:   adhesive use limited   incontinence pads utilized   transparent dressing maintained  Intervention: Prevent and Manage VTE (Venous Thromboembolism) Risk  Recent Flowsheet Documentation  Taken 3/1/2024 0830 by Peg Lugo RN  VTE Prevention/Management:   bilateral   sequential compression devices on  Intervention: Prevent Infection  Recent Flowsheet Documentation  Taken 3/1/2024 1700 by Peg Lugo RN  Infection Prevention: rest/sleep promoted  Taken 3/1/2024 1500 by Peg Lugo RN  Infection Prevention: rest/sleep promoted  Taken 3/1/2024 1300 by Peg Lugo RN  Infection Prevention: rest/sleep promoted  Taken 3/1/2024 1100 by Peg Lugo RN  Infection Prevention: rest/sleep promoted  Taken 3/1/2024 0900 by Peg Lugo RN  Infection Prevention: rest/sleep promoted  Taken 3/1/2024 0700 by Peg Lugo RN  Infection Prevention: rest/sleep promoted  Goal: Optimal Comfort and Wellbeing  Outcome: Ongoing, Progressing  Intervention: Monitor Pain and Promote Comfort  Recent Flowsheet Documentation  Taken 3/1/2024 1400 by Peg Lugo RN  Pain Management Interventions:   care clustered   relaxation techniques promoted  Taken 3/1/2024 0830 by Peg Lugo RN  Pain Management Interventions:   care clustered   relaxation techniques promoted  Intervention: Provide Person-Centered Care  Recent Flowsheet Documentation  Taken 3/1/2024 1400 by Peg Lugo RN  Trust Relationship/Rapport:   care explained   choices provided   thoughts/feelings acknowledged  Taken 3/1/2024 0830 by Peg Lugo RN  Trust Relationship/Rapport:   care explained   choices provided   thoughts/feelings acknowledged  Goal: Readiness for Transition of Care  Outcome: Ongoing, Progressing   Goal Outcome Evaluation:           Progress: improving

## 2024-03-01 NOTE — PROGRESS NOTES
"DOS: 3/1/2024  NAME: Brenda Munroe   : 1950  PCP: Bernadette Arevalo MD  Chief Complaint   Patient presents with    Fall    Head Injury       Chief complaint: Head injury  Subjective: No acute events overnight.  More alert and interactive today, she was making attempts at humor.  Denies headache today, complains of pain in legs.  Repeat CT head performed yesterday, no significant change since previous MRI.    Objective:  Vital signs: /86   Pulse 90   Temp 98 °F (36.7 °C) (Oral)   Resp 11   Ht 162.6 cm (64\")   Wt 71 kg (156 lb 8.4 oz)   SpO2 98%   BMI 26.87 kg/m²      Gen: NAD, vitals reviewed  MS: Awake and alert. Oriented to self only, when asked about location and time she replied \"I don't know\"  CN: Cranials 2-12: No focal deficits noted.   Motor: Moving all extremities. Follows some simple commands.  Was able to sit up to side of bed with moderate assist, was hesitant at first  Sensory: No sensory loss noted.  Coordination: Difficult to assess  Gait: Could not be tested at this time.    Laboratory results:  Lab Results   Component Value Date    GLUCOSE 168 (H) 2024    CALCIUM 8.4 (L) 2024     2024    K 4.3 2024    CO2 28.4 2024     2024    BUN 22 2024    CREATININE 0.74 2024    EGFRIFAFRI  2016      Comment:      <15 Indicative of kidney failure.    EGFRIFNONA 75 2021    BCR 29.7 (H) 2024    ANIONGAP 9.6 2024     Lab Results   Component Value Date    WBC 8.63 2024    HGB 9.8 (L) 2024    HCT 33.6 (L) 2024    MCV 99.4 (H) 2024     2024     Lab Results   Component Value Date    LDL 39 2024    LDL 46 2024    LDL CANCELED 10/09/2015    LDL CANCELED 10/09/2015    LDL CANCELED 10/09/2015         Lab 24  0018   HEMOGLOBIN A1C 5.00        Labs reviewed:     CMP:        Lab 24  0115 24  0014 24  0031 24  0030 24  0015 24  0546 " 02/25/24  0021 02/24/24  1547   SODIUM 140 141 141 145 149* 143 142 151*   POTASSIUM 4.3 4.5 3.7 3.9 3.8 4.6 5.2 5.4*   CHLORIDE 102 103 102 103 105 101 102 104   CO2 28.4 30.7* 32.0* 29.4* 31.6* 22.7 22.1 29.1*   ANION GAP 9.6 7.3 7.0 12.6 12.4 19.3* 17.9* 17.9*   BUN 22 24* 20 19 21 25* 23 21   CREATININE 0.74 0.72 0.70 0.77 0.77 0.97 0.85 0.91   EGFR 84.5 87.3 90.3 80.6 80.6 61.1 71.5 65.9   GLUCOSE 168* 156* 172* 150* 105* 154* 135* 120*   CALCIUM 8.4* 8.6 8.4* 8.7 8.6 8.9 9.1 9.1   MAGNESIUM  --   --   --   --  2.1  --   --  2.3   TOTAL PROTEIN  --  5.2*  --  5.8* 5.3* 5.8* 5.7*  --    ALBUMIN  --  3.3*  --  3.5 3.1* 3.2* 3.0*  --    GLOBULIN  --  1.9  --  2.3 2.2 2.6 2.7  --    ALT (SGPT)  --  15  --  13 10 13 11  --    AST (SGOT)  --  16  --  15 16 25 28  --    BILIRUBIN  --  0.3  --  0.5 0.4 0.4 0.4  --    ALK PHOS  --  63  --  68 65 76 72  --         TSH          1/1/2024    10:59 1/23/2024    00:48   TSH   TSH 3.020  0.876         Lipid Panel          1/1/2024    13:04 2/24/2024    00:18   Lipid Panel   Total Cholesterol 119  99    Triglycerides 69  56    HDL Cholesterol 59  47    VLDL Cholesterol 14  13    LDL Cholesterol  46  39    LDL/HDL Ratio 0.78  0.87         Lab Results   Component Value Date    HCIVTEDO31 319 02/25/2024       Lab Results   Component Value Date    FOLATE 4.18 (L) 02/25/2024       Lab Results   Component Value Date    HGBA1C 5.00 02/24/2024           Review and interpretation of imaging:     CT Head Without Contrast    Result Date: 2/29/2024  EXAM:   CT Head Without Intravenous Contrast  EXAM DATE:   2/29/2024 2:33 PM  CLINICAL HISTORY:   evolution of the hemorrhage; S06.5XAA-Traumatic subdural hemorrhage with loss of consciousness status unknown, initial encounter; I21.4-Non-ST elevation (NSTEMI) myocardial infarction; Z79.01-Long term (current) use of anticoagulants; S02.2XXA-Fracture of nasal bones, initial encounter for closed fracture; S05.11XA-Contusion of eyeball and orbital  tissues, right eye, initial encounter  TECHNIQUE:   Axial computed tomography images of the head/brain without intravenous contrast.  Sagittal and coronal reformatted images were created and reviewed.  This CT exam was performed using one or more of the following dose reduction techniques:  automated exposure control, adjustment of the mA and/or kV according to patient size, and/or use of iterative reconstruction technique.  COMPARISON:   MRI 2/27/2024  FINDINGS:   Brain and extra-axial spaces:  Persistent hyperdense left cerebral hemispheric subdural hematoma is noted with a maximal short axis thickness at the level of the anterior falx of approximately 10.8 mm not significantly changed from previous MRI.  Layering blood products within the left lateral ventricle posterior horn with punctate layering blood product within the posterior horn of the right lateral ventricle. Extensive confluent low-density changes cerebral white matter consistent with advanced chronic small vessel ischemic disease.  No parenchymal hemorrhages have developed.   Bones/joints:  Unremarkable as visualized.  No acute fracture.   Soft tissues:  Right supraorbital scalp hematoma is noted.   Sinuses: Acute on chronic right maxillary sinusitis.    Mastoid air cells:  Unremarkable as visualized.  No mastoid effusion.      Impression: 1.  Persistent hyperdense left cerebral hemispheric subdural hematoma is noted with a maximal short axis thickness at the level of the anterior falx of approximately 10.8 mm not significantly changed from previous MRI. 2.  Layering blood products within the left lateral ventricle posterior horn with punctate layering blood product within the posterior horn of the right lateral ventricle. 3.  No parenchymal hemorrhages have developed. 4. Advanced chronic small vessel ischemic disease again noted. 5. Right frontal scalp hematoma. 6. Acute on chronic right maxillary sinusitis.   This report was finalized on 2/29/2024  2:59 PM by Dr. Erik Jones MD.      CT Head Without Contrast    Result Date: 2/24/2024  CT brain without contrast compared to examination dated 1/31/2024.  Severe atrophy and small vessel disease. There is hemorrhage in the LEFT occipital horn with focal surrounding edema.  This appears to be new from prior study. No shift of midline structures. Large right-sided scalp hematoma. Opacified RIGHT maxillary sinus, new from prior, likely related to presence of blood or retained secretions. No skull fracture.      Impression: Intraventricular hemorrhage noted in the LEFT occipital lobe, new from prior.   This report was finalized on 2/24/2024 9:50 AM by Dr. Loreto Jo MD.      CT Head Without Contrast    Result Date: 2/23/2024  PROCEDURE: CT HEAD WO CONTRAST-, CT CERVICAL SPINE WO CONTRAST-  HISTORY: Unwitnessed fall gross external trauma blood thinners  PROCEDURE: Axial images were obtained from the skull base to the thoracic inlet by computed tomography. Multiple axial CT images were performed from the foramen magnum to the vertex without enhancement. This study was performed with techniques to keep radiation doses as low as reasonably achievable (ALARA). Individualized dose reduction techniques using automated exposure control or adjustment of mA and/or kV according to the patient size were employed.   C-SPINE:  FINDINGS: There are no acute fractures. There are diffuse degenerative changes with loss of disc base height and facet arthropathy.  The prevertebral soft tissues are normal.  Limited images of the lung apices are unremarkable.  HEAD CT:  FINDINGS: There is generalized cerebral volume loss. Patchy hypodensities in the periventricular white matter consistent with chronic small vessel ischemic change. There is a subdural hematoma overlying the left frontal lobe layering along the falx measuring up to 4 mm. There is no mass, mass effect or midline shift.  There is no hydrocephalus. The paranasal sinuses are  clear. Bone windows reveal no acute osseous abnormalities. Note is made of a right periorbital hematoma.      Impression: 1. 4 mm left parafalcine subdural hematoma. 2. No acute fracture or malalignment in the cervical spine.     Critical Finding  Critical report results have been called to Dr. Moran. Report called at 2/23/2024 11:33 AM.        This report was finalized on 2/23/2024 11:33 AM by Lily Mullen M.D..        MRI Brain With & Without Contrast    Result Date: 2/27/2024  EXAM:   MR Head Without and With Intravenous Contrast  EXAM DATE:   2/27/2024 1:30 PM  CLINICAL HISTORY:   Trauma protocol; S06.5XAA-Traumatic subdural hemorrhage with loss of consciousness status unknown, initial encounter; I21.4-Non-ST elevation (NSTEMI) myocardial infarction; Z79.01-Long term (current) use of anticoagulants; S02.2XXA-Fracture of nasal bones, initial encounter for closed fracture; S05.11XA-Contusion of eyeball and orbital tissues, right eye, initial encounter  TECHNIQUE:   Magnetic resonance images of the head/brain without and with intravenous contrast in multiple planes.  COMPARISON:   CT 2/24/2024  FINDINGS:   Brain and extra-axial spaces:  Layering blood products now in the chronic phase in the left greater than right dependent portions of the occipital horns of the lateral ventricles.  Left cerebral hemispheric subdural hematoma is noted with a maximal thickness of approximately 10.5 mm. No midline shift.  Fairly extensive and confluent T2 signal abnormality in the periventricular and subcortical white matter consistent with advanced chronic small vessel ischemic disease.  No hydrocephalus.  No evidence of acute infarct.   Bones/joints:  Unremarkable as visualized.  No acute fracture.   Soft tissues:  Right frontal scalp hematoma is noted.  No MRI evidence of acute phase blood products.   Sinuses:  Unremarkable as visualized.  No acute sinusitis.   Mastoid air cells:  Unremarkable as visualized.  No mastoid  effusion.   Orbits:  Unremarkable as visualized.      Impression: 1.  Layering blood products now in the more chronic phase in the left greater than right dependent portions of the occipital horns of the lateral ventricles. No hydrocephalus. 2.  Left cerebral hemispheric subdural hematoma is noted with a maximal thickness of approximately 10.5 mm. No midline shift. 3.  No MRI evidence of acute phase blood products. 4.  No evidence of acute infarct. 5.  Right frontal scalp hematoma is noted. 6.  Fairly extensive and confluent T2 signal abnormality in the periventricular and subcortical white matter consistent with advanced chronic small vessel ischemic disease.   This report was finalized on 2/27/2024 2:44 PM by Dr. Erik Jones MD.        Workup to date:  An EEG performed on January 24, 2024 showed the following:     Findings:     The patient is awake but confused.  The background shows diffuse medium amplitude 2-5 Hz intermixed delta and theta activity which is present symmetrically over both hemispheres.  A clear posterior rhythm is not seen.  EMG artifact is variably prominent over the frontotemporal leads.  The patient is occasionally restless and moaning during the study.  No focal features or epileptiform activity are present.  Photic stimulation does not change the background.  Hyperventilation is not performed.     Video: Available     Technical quality: Superior     EKG: Irregular, 70-90 bpm     SUMMARY:     Moderate generalized slow     No focal features or epileptiform activity are seen     IMPRESSION:     Diffuse cerebral dysfunction of moderate degree, nonspecific     No evidence for epilepsy is seen    Results for orders placed during the hospital encounter of 02/23/24    Adult Transthoracic Echo Complete W/ Cont if Necessary Per Protocol    Interpretation Summary    Left ventricular systolic function is moderately decreased. Left ventricular ejection fraction appears to be 41 - 45%.    Left  ventricular diastolic function was indeterminate.    Left atrial volume is mildly increased.    There is mild calcification of the aortic valve.    Estimated right ventricular systolic pressure from tricuspid regurgitation is mildly elevated (35-45 mmHg).    There is no evidence of pericardial effusion       Diagnoses: Patient with subdural hemorrhage in the Sub Falcine area mental status currently stable.  Was on anticoagulation for a-fib, has been held since admission due to ICH    Plan:  1.  MRI of the brain with and without contrast showed chronic blood products in bilateral occipital horns of lateral ventricles, left sided SDH along the falx 10.5 mm maximal thickness. There was also noted hemosiderin staining and left occipital lobe. Repeat CT head on 02/29/2024 without any significant change  2.  We recommend holding anticoagulation/antiplatelets for at least 6 weeks in setting of multiple falls and recent bleed.  Possibly a candidate for Watchman device. Dr. Randle recommends repeating CT head in 6 weeks before restarting any antiplatelets, or possibly a reversible anticoagulant like Pradaxa but will still be high risk for bleeds due to falls  3.  Continue Lamictal 100 mg PO BID  4.  Okay to work with therapies    This was an audio and video enabled telemedicine encounter.  Dr. Randle present for encounter via telemedicine.  Verbal consent taken.    PAULA Prasad

## 2024-03-01 NOTE — PROGRESS NOTES
LOS: 7 days     Name: Brenda Munroe  Age/Sex: 73 y.o. female  :  1950        PCP: Bernadette Arevalo MD    Principal Problem:    Subdural hematoma      Admission Information: Brenda Munroe is a 73 y.o. female with coronary artery disease, hypertension, paroxysmal atrial fibrillation (status post cardioversion 2024), carotid stenosis, MDD, dementia, GERD, and PAD    Chief Complaint: Fall with head injury    Interval history: Patient was seen and examined.  She is confused and her speech is sometimes garbled.  She does however interact, following commands.    Subjective         Vital Signs  Vital Signs (last 72 hrs)          0700   0659  07 0659  07 0659  0700   1535   Most Recent      Temp (°F) 97.2 -  98.8    97.8 -  98    97.5 -  98.9    98 -  98.7     98.7 (37.1)  1200    Heart Rate 80 -  129    79 -  133    83 -  128    84 -  122     90 03 1446    Resp  -   -       22  1351    /79 -  143/94    91/68 -  157/82    92/61 -  145/107    116/86 -  139/100     134/107  1400    SpO2 (%) 90 -  100    90 -  100    85 -  100    90 -  100     99 03/ 1351    Flow (L/min)   3      3      3      3     3  1340    Oxygen Concentration (%)   32      32    32 -  40       32  0511          Temp:  [97.5 °F (36.4 °C)-98.9 °F (37.2 °C)] 98.7 °F (37.1 °C)  Heart Rate:  [] 90  Resp:  [] 22  BP: (106-145)/() 134/107  Body mass index is 26.87 kg/m².      Intake/Output Summary (Last 24 hours) at 3/1/2024 1535  Last data filed at 3/1/2024 1300  Gross per 24 hour   Intake 25 ml   Output 550 ml   Net -525 ml       Vitals reviewed.   Constitutional:       Appearance: Frail.   Eyes:      Comments: Bilateral orbital contusions left worse than right.  Right eye with conjunctival hemorrhage.   Pulmonary:      Effort: Pulmonary effort is normal. No respiratory distress.      Breath sounds: Normal  breath sounds. No wheezing. No rales.   Cardiovascular:      Normal rate. Irregular rhythm.      Comments: No lower extremity edema  Skin:     General: Skin is warm and dry.   Neurological:      Mental Status: Alert and oriented to person, place, and time.         Telemetry: Atrial flutter 100s.       Results Review:     Results from last 7 days   Lab Units 03/01/24  0115 02/29/24  0014 02/28/24  0031 02/27/24  0030 02/26/24  0018 02/25/24  0546 02/25/24  0021   WBC 10*3/mm3 8.63 8.41 6.88 7.77 9.40 6.16 9.37   HEMOGLOBIN g/dL 9.8* 10.1* 9.4* 10.1* 9.8* 11.3* 11.5*   PLATELETS 10*3/mm3 147 156 182 231 250 243 260     Results from last 7 days   Lab Units 03/01/24  0115 02/29/24  0014 02/28/24  0031 02/27/24  0030 02/26/24  0015 02/25/24  0546 02/25/24  0021   SODIUM mmol/L 140 141 141 145 149* 143 142   POTASSIUM mmol/L 4.3 4.5 3.7 3.9 3.8 4.6 5.2   CHLORIDE mmol/L 102 103 102 103 105 101 102   CO2 mmol/L 28.4 30.7* 32.0* 29.4* 31.6* 22.7 22.1   BUN mg/dL 22 24* 20 19 21 25* 23   CREATININE mg/dL 0.74 0.72 0.70 0.77 0.77 0.97 0.85   CALCIUM mg/dL 8.4* 8.6 8.4* 8.7 8.6 8.9 9.1   GLUCOSE mg/dL 168* 156* 172* 150* 105* 154* 135*                 I reviewed the patient's new clinical results.  I reviewed the patient's new imaging results and agree with the interpretation.  I personally viewed and interpreted the patient's EKG/Telemetry data      Medication Review:   amiodarone, 400 mg, Oral, BID With Meals  baclofen, 5 mg, Oral, BID  budesonide-formoterol, 2 puff, Inhalation, BID - RT   And  tiotropium bromide monohydrate, 2 puff, Inhalation, Daily - RT  cholecalciferol, 50,000 Units, Oral, Weekly  dilTIAZem, 30 mg, Oral, Q12H  donepezil, 10 mg, Oral, Nightly  ferrous sulfate, 325 mg, Oral, BID With Meals  folic acid, 1 mg, Oral, Daily  ipratropium, 0.5 mg, Nebulization, 4x Daily - RT  isosorbide mononitrate, 15 mg, Oral, Q24H  lamoTRIgine, 100 mg, Oral, Daily  lamoTRIgine, 100 mg, Oral, Nightly  levothyroxine, 75 mcg,  Oral, Q AM  memantine, 10 mg, Oral, Q12H  methylPREDNISolone sodium succinate, 20 mg, Intravenous, Q12H  [START ON 3/2/2024] metoprolol succinate XL, 50 mg, Oral, Q24H  mupirocin, 1 application , Each Nare, BID  pantoprazole, 40 mg, Oral, QAM AC  risperiDONE, 0.5 mg, Oral, BID  rosuvastatin, 40 mg, Oral, Q PM  senna-docusate sodium, 2 tablet, Oral, BID  sertraline, 100 mg, Oral, Daily  sodium chloride, 10 mL, Intravenous, Q12H  sodium chloride, 10 mL, Intravenous, Q12H  sodium chloride, 10 mL, Intravenous, Q12H           Assessment:  Paroxysmal atrial fibrillation not on anticoagulation due to intracerebral hemorrhage  History of stroke  Coronary artery disease s/p cath with circumflex lesion  Hypertension  Hyperlipidemia  6.  Cardiomyopathy with decreased ejection fraction    Recommendations:  Cardiac.  Patient with history of atrial fibrillation.  Rate controlled with amiodarone.  Patient is on low-dose diltiazem for now.  Will consider changing to Lopressor and get her off the diltiazem.  Cardiomyopathy with decreased ejection fraction.  Beta-blockers to be started.  Will consider Farxiga also  Hyperlipidemia.  Will continue crestor    I discussed the patients findings and my recommendations with patient and family    Electronically signed by PAULA Birmingham, 03/01/24, 3:41 PM EST.    .Electronically signed by Jasmina Alfonso MD, 03/01/24, 4:24 PM EST.

## 2024-03-02 ENCOUNTER — APPOINTMENT (OUTPATIENT)
Dept: CT IMAGING | Facility: HOSPITAL | Age: 74
DRG: 082 | End: 2024-03-02
Payer: MEDICARE

## 2024-03-02 LAB
A-A DO2: 64.7 MMHG (ref 0–300)
A-A DO2: 92 MMHG (ref 0–300)
AMMONIA BLD-SCNC: 52 UMOL/L (ref 11–51)
ANION GAP SERPL CALCULATED.3IONS-SCNC: 7.4 MMOL/L (ref 5–15)
ANISOCYTOSIS BLD QL: NORMAL
ARTERIAL PATENCY WRIST A: POSITIVE
ARTERIAL PATENCY WRIST A: POSITIVE
ATMOSPHERIC PRESS: 729 MMHG
ATMOSPHERIC PRESS: 729 MMHG
BASE EXCESS BLDA CALC-SCNC: 9.3 MMOL/L (ref 0–2)
BASE EXCESS BLDA CALC-SCNC: 9.8 MMOL/L (ref 0–2)
BASOPHILS # BLD AUTO: 0.03 10*3/MM3 (ref 0–0.2)
BASOPHILS NFR BLD AUTO: 0.3 % (ref 0–1.5)
BDY SITE: ABNORMAL
BDY SITE: ABNORMAL
BUN SERPL-MCNC: 20 MG/DL (ref 8–23)
BUN/CREAT SERPL: 28.2 (ref 7–25)
CALCIUM SPEC-SCNC: 8.8 MG/DL (ref 8.6–10.5)
CHLORIDE SERPL-SCNC: 103 MMOL/L (ref 98–107)
CO2 BLDA-SCNC: 37.7 MMOL/L (ref 22–33)
CO2 BLDA-SCNC: 38.7 MMOL/L (ref 22–33)
CO2 SERPL-SCNC: 31.6 MMOL/L (ref 22–29)
COHGB MFR BLD: 1.9 % (ref 0–5)
COHGB MFR BLD: 2.1 % (ref 0–5)
CREAT SERPL-MCNC: 0.71 MG/DL (ref 0.57–1)
DACRYOCYTES BLD QL SMEAR: NORMAL
DEPRECATED RDW RBC AUTO: 77 FL (ref 37–54)
EGFRCR SERPLBLD CKD-EPI 2021: 88.8 ML/MIN/1.73
ELLIPTOCYTES BLD QL SMEAR: NORMAL
EOSINOPHIL # BLD AUTO: 0 10*3/MM3 (ref 0–0.4)
EOSINOPHIL NFR BLD AUTO: 0 % (ref 0.3–6.2)
ERYTHROCYTE [DISTWIDTH] IN BLOOD BY AUTOMATED COUNT: 20.4 % (ref 12.3–15.4)
GAS FLOW AIRWAY: 3 LPM
GLUCOSE SERPL-MCNC: 142 MG/DL (ref 65–99)
HCO3 BLDA-SCNC: 35.9 MMOL/L (ref 20–26)
HCO3 BLDA-SCNC: 36.8 MMOL/L (ref 20–26)
HCT VFR BLD AUTO: 36.2 % (ref 34–46.6)
HCT VFR BLD CALC: 31.4 % (ref 38–51)
HCT VFR BLD CALC: 32.2 % (ref 38–51)
HGB BLD-MCNC: 10.3 G/DL (ref 12–15.9)
HGB BLDA-MCNC: 10.3 G/DL (ref 13.5–17.5)
HGB BLDA-MCNC: 10.5 G/DL (ref 13.5–17.5)
HYPOCHROMIA BLD QL: NORMAL
IMM GRANULOCYTES # BLD AUTO: 0.22 10*3/MM3 (ref 0–0.05)
IMM GRANULOCYTES NFR BLD AUTO: 2.4 % (ref 0–0.5)
INHALED O2 CONCENTRATION: 32 %
INHALED O2 CONCENTRATION: 32 %
LYMPHOCYTES # BLD AUTO: 0.33 10*3/MM3 (ref 0.7–3.1)
LYMPHOCYTES NFR BLD AUTO: 3.6 % (ref 19.6–45.3)
Lab: ABNORMAL
MACROCYTES BLD QL SMEAR: NORMAL
MCH RBC QN AUTO: 28.9 PG (ref 26.6–33)
MCHC RBC AUTO-ENTMCNC: 28.5 G/DL (ref 31.5–35.7)
MCV RBC AUTO: 101.7 FL (ref 79–97)
METHGB BLD QL: 0 % (ref 0–3)
METHGB BLD QL: <-0.1 % (ref 0–3)
MODALITY: ABNORMAL
MODALITY: ABNORMAL
MONOCYTES # BLD AUTO: 0.3 10*3/MM3 (ref 0.1–0.9)
MONOCYTES NFR BLD AUTO: 3.3 % (ref 5–12)
NEUTROPHILS NFR BLD AUTO: 8.35 10*3/MM3 (ref 1.7–7)
NEUTROPHILS NFR BLD AUTO: 90.4 % (ref 42.7–76)
NOTIFIED BY: ABNORMAL
NOTIFIED WHO: ABNORMAL
NRBC BLD AUTO-RTO: 0.3 /100 WBC (ref 0–0.2)
OXYHGB MFR BLDV: 87.2 % (ref 94–99)
OXYHGB MFR BLDV: 95.8 % (ref 94–99)
PCO2 BLDA: 59.2 MM HG (ref 35–45)
PCO2 BLDA: 62.2 MM HG (ref 35–45)
PCO2 TEMP ADJ BLD: ABNORMAL MM[HG]
PCO2 TEMP ADJ BLD: ABNORMAL MM[HG]
PH BLDA: 7.38 PH UNITS (ref 7.35–7.45)
PH BLDA: 7.39 PH UNITS (ref 7.35–7.45)
PH, TEMP CORRECTED: ABNORMAL
PH, TEMP CORRECTED: ABNORMAL
PLAT MORPH BLD: NORMAL
PLATELET # BLD AUTO: 149 10*3/MM3 (ref 140–450)
PMV BLD AUTO: 11.4 FL (ref 6–12)
PO2 BLDA: 57 MM HG (ref 83–108)
PO2 BLDA: 87.6 MM HG (ref 83–108)
PO2 TEMP ADJ BLD: ABNORMAL MM[HG]
PO2 TEMP ADJ BLD: ABNORMAL MM[HG]
POTASSIUM SERPL-SCNC: 4.3 MMOL/L (ref 3.5–5.2)
RBC # BLD AUTO: 3.56 10*6/MM3 (ref 3.77–5.28)
SAO2 % BLDCOA: 88.9 % (ref 94–99)
SAO2 % BLDCOA: 97.6 % (ref 94–99)
SODIUM SERPL-SCNC: 142 MMOL/L (ref 136–145)
VENTILATOR MODE: ABNORMAL
VENTILATOR MODE: ABNORMAL
WBC NRBC COR # BLD AUTO: 9.23 10*3/MM3 (ref 3.4–10.8)

## 2024-03-02 PROCEDURE — 99233 SBSQ HOSP IP/OBS HIGH 50: CPT | Performed by: INTERNAL MEDICINE

## 2024-03-02 PROCEDURE — 36600 WITHDRAWAL OF ARTERIAL BLOOD: CPT

## 2024-03-02 PROCEDURE — 94799 UNLISTED PULMONARY SVC/PX: CPT

## 2024-03-02 PROCEDURE — 25010000002 METHYLPREDNISOLONE PER 40 MG: Performed by: INTERNAL MEDICINE

## 2024-03-02 PROCEDURE — 99232 SBSQ HOSP IP/OBS MODERATE 35: CPT | Performed by: INTERNAL MEDICINE

## 2024-03-02 PROCEDURE — 85007 BL SMEAR W/DIFF WBC COUNT: CPT | Performed by: INTERNAL MEDICINE

## 2024-03-02 PROCEDURE — 70450 CT HEAD/BRAIN W/O DYE: CPT | Performed by: RADIOLOGY

## 2024-03-02 PROCEDURE — 80048 BASIC METABOLIC PNL TOTAL CA: CPT | Performed by: INTERNAL MEDICINE

## 2024-03-02 PROCEDURE — 70450 CT HEAD/BRAIN W/O DYE: CPT

## 2024-03-02 PROCEDURE — 94664 DEMO&/EVAL PT USE INHALER: CPT

## 2024-03-02 PROCEDURE — 94761 N-INVAS EAR/PLS OXIMETRY MLT: CPT

## 2024-03-02 PROCEDURE — 82805 BLOOD GASES W/O2 SATURATION: CPT

## 2024-03-02 PROCEDURE — 83050 HGB METHEMOGLOBIN QUAN: CPT

## 2024-03-02 PROCEDURE — 82375 ASSAY CARBOXYHB QUANT: CPT

## 2024-03-02 PROCEDURE — 99233 SBSQ HOSP IP/OBS HIGH 50: CPT | Performed by: STUDENT IN AN ORGANIZED HEALTH CARE EDUCATION/TRAINING PROGRAM

## 2024-03-02 PROCEDURE — 85025 COMPLETE CBC W/AUTO DIFF WBC: CPT | Performed by: INTERNAL MEDICINE

## 2024-03-02 PROCEDURE — 82140 ASSAY OF AMMONIA: CPT | Performed by: INTERNAL MEDICINE

## 2024-03-02 RX ORDER — LACTULOSE 10 G/15ML
300 SOLUTION ORAL ONCE
Status: COMPLETED | OUTPATIENT
Start: 2024-03-02 | End: 2024-03-02

## 2024-03-02 RX ADMIN — IPRATROPIUM BROMIDE 0.5 MG: 0.5 SOLUTION RESPIRATORY (INHALATION) at 00:16

## 2024-03-02 RX ADMIN — Medication 10 ML: at 20:43

## 2024-03-02 RX ADMIN — LAMOTRIGINE 100 MG: 100 TABLET ORAL at 20:42

## 2024-03-02 RX ADMIN — ISOSORBIDE MONONITRATE 15 MG: 30 TABLET, EXTENDED RELEASE ORAL at 08:26

## 2024-03-02 RX ADMIN — Medication 1 MG: at 08:26

## 2024-03-02 RX ADMIN — EMPAGLIFLOZIN 10 MG: 10 TABLET, FILM COATED ORAL at 17:14

## 2024-03-02 RX ADMIN — IPRATROPIUM BROMIDE 0.5 MG: 0.5 SOLUTION RESPIRATORY (INHALATION) at 06:42

## 2024-03-02 RX ADMIN — RISPERIDONE 0.5 MG: 0.25 TABLET, FILM COATED ORAL at 08:25

## 2024-03-02 RX ADMIN — BACLOFEN 5 MG: 10 TABLET ORAL at 08:26

## 2024-03-02 RX ADMIN — DONEPEZIL HYDROCHLORIDE 10 MG: 5 TABLET, FILM COATED ORAL at 20:43

## 2024-03-02 RX ADMIN — SERTRALINE 100 MG: 50 TABLET, FILM COATED ORAL at 08:25

## 2024-03-02 RX ADMIN — LACTULOSE 300 ML: 10 SOLUTION ORAL at 18:55

## 2024-03-02 RX ADMIN — MEMANTINE HYDROCHLORIDE 10 MG: 10 TABLET, FILM COATED ORAL at 20:43

## 2024-03-02 RX ADMIN — METOPROLOL SUCCINATE 50 MG: 50 TABLET, EXTENDED RELEASE ORAL at 08:26

## 2024-03-02 RX ADMIN — METHYLPREDNISOLONE SODIUM SUCCINATE 20 MG: 40 INJECTION, POWDER, FOR SOLUTION INTRAMUSCULAR; INTRAVENOUS at 08:25

## 2024-03-02 RX ADMIN — SACUBITRIL AND VALSARTAN 0.5 TABLET: 24; 26 TABLET, FILM COATED ORAL at 20:43

## 2024-03-02 RX ADMIN — ROSUVASTATIN CALCIUM 40 MG: 20 TABLET, FILM COATED ORAL at 17:14

## 2024-03-02 RX ADMIN — FERROUS SULFATE TAB 325 MG (65 MG ELEMENTAL FE) 325 MG: 325 (65 FE) TAB at 17:14

## 2024-03-02 RX ADMIN — Medication 10 ML: at 08:28

## 2024-03-02 RX ADMIN — IPRATROPIUM BROMIDE 0.5 MG: 0.5 SOLUTION RESPIRATORY (INHALATION) at 18:37

## 2024-03-02 RX ADMIN — IPRATROPIUM BROMIDE 0.5 MG: 0.5 SOLUTION RESPIRATORY (INHALATION) at 12:03

## 2024-03-02 RX ADMIN — TROLAMINE SALICYLATE 1 APPLICATION: 100 CREAM TOPICAL at 08:27

## 2024-03-02 RX ADMIN — DOCUSATE SODIUM 50 MG AND SENNOSIDES 8.6 MG 2 TABLET: 8.6; 5 TABLET, FILM COATED ORAL at 20:43

## 2024-03-02 RX ADMIN — LAMOTRIGINE 100 MG: 100 TABLET ORAL at 08:26

## 2024-03-02 RX ADMIN — RISPERIDONE 0.5 MG: 0.25 TABLET, FILM COATED ORAL at 20:43

## 2024-03-02 RX ADMIN — AMIODARONE HYDROCHLORIDE 400 MG: 200 TABLET ORAL at 17:14

## 2024-03-02 RX ADMIN — METHYLPREDNISOLONE SODIUM SUCCINATE 20 MG: 40 INJECTION, POWDER, FOR SOLUTION INTRAMUSCULAR; INTRAVENOUS at 20:42

## 2024-03-02 RX ADMIN — PANTOPRAZOLE SODIUM 40 MG: 40 TABLET, DELAYED RELEASE ORAL at 06:38

## 2024-03-02 RX ADMIN — DOCUSATE SODIUM 50 MG AND SENNOSIDES 8.6 MG 2 TABLET: 8.6; 5 TABLET, FILM COATED ORAL at 08:26

## 2024-03-02 RX ADMIN — FERROUS SULFATE TAB 325 MG (65 MG ELEMENTAL FE) 325 MG: 325 (65 FE) TAB at 08:25

## 2024-03-02 RX ADMIN — AMIODARONE HYDROCHLORIDE 400 MG: 200 TABLET ORAL at 08:26

## 2024-03-02 RX ADMIN — BACLOFEN 5 MG: 10 TABLET ORAL at 20:43

## 2024-03-02 RX ADMIN — LEVOTHYROXINE SODIUM 75 MCG: 0.07 TABLET ORAL at 06:11

## 2024-03-02 RX ADMIN — MEMANTINE HYDROCHLORIDE 10 MG: 10 TABLET, FILM COATED ORAL at 08:26

## 2024-03-02 RX ADMIN — BUDESONIDE AND FORMOTEROL FUMARATE DIHYDRATE 2 PUFF: 160; 4.5 AEROSOL RESPIRATORY (INHALATION) at 18:36

## 2024-03-02 NOTE — PROGRESS NOTES
[facility] HOSPITALIST PROGRESS NOTE    Subjective     History:   Brenda Munroe is a 73 y.o. female admitted on 2/23/2024 secondary to Subdural hematoma     Procedures: None    CC: Follow up subdural hematoma     Patient seen and examined with JOSE Ruvalcaba. Appears much more lethargic today. Does not answer any questions. Follows a few simple commands inconsistently. Overall noncompliant with BiPAP overnight with stable ABG this AM. No acute events overnight per RN.     History taken from: chart, and RN.      Objective     Vital Signs  Temp:  [97.3 °F (36.3 °C)-98.7 °F (37.1 °C)] 98.1 °F (36.7 °C)  Heart Rate:  [] 95  Resp:  [16-28] 18  BP: (122-140)/() 127/100    Intake/Output Summary (Last 24 hours) at 3/2/2024 1144  Last data filed at 3/2/2024 0500  Gross per 24 hour   Intake 0 ml   Output 450 ml   Net -450 ml         Physical Exam:   General:    Appears more lethargic today, does not answer any questions, follows a few simple commands inconsistently, in no acute distress, chronically ill appearing, (+) facial ecchymoses    Heart:      Normal S1 and S2. Irregularly irregular. Slightly tachycardic.    Lungs:     Respirations regular, even and unlabored. Lungs clear to auscultation B/L. No wheezes, rales or rhonchi.   Abdomen:   Soft and nontender. No guarding, rebound tenderness or  organomegaly noted. Bowel sounds present x 4.   Extremities:  No clubbing, cyanosis or edema noted.      Results Review:    Results from last 7 days   Lab Units 03/02/24  0346 03/01/24  0115 02/29/24  0014 02/28/24  0031 02/27/24  0030 02/26/24  0018 02/25/24  0546   WBC 10*3/mm3 9.23 8.63 8.41 6.88 7.77 9.40 6.16   HEMOGLOBIN g/dL 10.3* 9.8* 10.1* 9.4* 10.1* 9.8* 11.3*   PLATELETS 10*3/mm3 149 147 156 182 231 250 243     Results from last 7 days   Lab Units 03/02/24  0346 03/01/24  0115 02/29/24  0014 02/28/24  0031 02/27/24  0030 02/26/24  0015 02/25/24  0546   SODIUM mmol/L 142 140 141 141 145 149* 143    POTASSIUM mmol/L 4.3 4.3 4.5 3.7 3.9 3.8 4.6   CHLORIDE mmol/L 103 102 103 102 103 105 101   CO2 mmol/L 31.6* 28.4 30.7* 32.0* 29.4* 31.6* 22.7   BUN mg/dL 20 22 24* 20 19 21 25*   CREATININE mg/dL 0.71 0.74 0.72 0.70 0.77 0.77 0.97   CALCIUM mg/dL 8.8 8.4* 8.6 8.4* 8.7 8.6 8.9   GLUCOSE mg/dL 142* 168* 156* 172* 150* 105* 154*     Results from last 7 days   Lab Units 02/29/24  0014 02/27/24  0030 02/26/24  0015 02/25/24  0546 02/25/24  0021   BILIRUBIN mg/dL 0.3 0.5 0.4 0.4 0.4   ALK PHOS U/L 63 68 65 76 72   AST (SGOT) U/L 16 15 16 25 28   ALT (SGPT) U/L 15 13 10 13 11     Results from last 7 days   Lab Units 02/26/24  0015 02/24/24  1547   MAGNESIUM mg/dL 2.1 2.3                   Imaging Results (Last 24 Hours)       Procedure Component Value Units Date/Time    CT Head Without Contrast [[id number]] Collected: 03/02/24 0941     Updated: 03/02/24 0946    Narrative:      EXAM:    CT Head Without Intravenous Contrast     EXAM DATE:    3/2/2024 9:35 AM     CLINICAL HISTORY:    Worsening AMS, recent fall with subdural hematoma; S06.5XAA-Traumatic  subdural hemorrhage with loss of consciousness status unknown, initial  encounter; I21.4-Non-ST elevation (NSTEMI) myocardial infarction;  Z79.01-Long term (current) use of anticoagulants; S02.2XXA-Fracture of  nasal bones, initial encounter for closed fracture; S05.11XA-Contusion  of eyeball and orbital tissues, right eye, initial     TECHNIQUE:    Axial computed tomography images of the head/brain without intravenous  contrast.  Sagittal and coronal reformatted images were created and  reviewed.  This CT exam was performed using one or more of the following  dose reduction techniques:  automated exposure control, adjustment of  the mA and/or kV according to patient size, and/or use of iterative  reconstruction technique.     COMPARISON:    2/29/2024     FINDINGS:    Brain and extra-axial spaces:  In multiple planes, there is  approximately 1 mm increased thickness of left  cerebral hemispheric  subdural hematoma when measured at its maximal thickness in the anterior  falcine region now approximately 12 mm in greatest thickness and  previously 11 mm in greatest thickness.  There remains no evidence of  midline shift.  Advanced chronic small vessel ischemic disease, stable.   No hydrocephalus.  No parenchymal hemorrhages have developed. No new  extra-axial hemorrhage is identified.    Bones/joints:  Unremarkable as visualized.  No acute fracture.    Soft tissues:  Right supraorbital frontal scalp hematoma appears  stable.    Sinuses: Acute right maxillary sinusitis is noted.    Mastoid air cells:  Unremarkable as visualized.  No mastoid effusion.       Impression:      1.  In multiple planes, there is approximately 1 mm increased thickness  of left cerebral hemispheric subdural hematoma when measured at its  maximal thickness in the anterior falcine region now approximately 12 mm  in greatest thickness and previously 11 mm in greatest thickness.  2.  No hydrocephalus.  3.  No parenchymal hemorrhages have developed. No new extra-axial  hemorrhage is identified.  4.  Right supraorbital frontal scalp hematoma appears stable.  5.  There remains no evidence of midline shift.  6.  Advanced chronic small vessel ischemic disease, stable.        This report was finalized on 3/2/2024 9:44 AM by Dr. Erik Jones MD.                 Medications:  amiodarone, 400 mg, Oral, BID With Meals  baclofen, 5 mg, Oral, BID  budesonide-formoterol, 2 puff, Inhalation, BID - RT   And  tiotropium bromide monohydrate, 2 puff, Inhalation, Daily - RT  cholecalciferol, 50,000 Units, Oral, Weekly  donepezil, 10 mg, Oral, Nightly  ferrous sulfate, 325 mg, Oral, BID With Meals  folic acid, 1 mg, Oral, Daily  ipratropium, 0.5 mg, Nebulization, 4x Daily - RT  isosorbide mononitrate, 15 mg, Oral, Q24H  lamoTRIgine, 100 mg, Oral, Daily  lamoTRIgine, 100 mg, Oral, Nightly  levothyroxine, 75 mcg, Oral, Q AM  memantine, 10  mg, Oral, Q12H  methylPREDNISolone sodium succinate, 20 mg, Intravenous, Q12H  metoprolol succinate XL, 50 mg, Oral, Q24H  pantoprazole, 40 mg, Oral, QAM AC  risperiDONE, 0.5 mg, Oral, BID  rosuvastatin, 40 mg, Oral, Q PM  senna-docusate sodium, 2 tablet, Oral, BID  sertraline, 100 mg, Oral, Daily  sodium chloride, 10 mL, Intravenous, Q12H  sodium chloride, 10 mL, Intravenous, Q12H  sodium chloride, 10 mL, Intravenous, Q12H                 Assessment & Plan   Traumatic subdural hematoma: S/P recent fall PTA. CT head on admission revealed a 4 mm left parafalcine subdural hematoma. Neurology consulted and neurosurgery notified. Upon review, neurosurgery recommended conservative management with no reversal agent and management at our facility. Repeat CT head stable and pt was admitted to our facility. MRI brain reveals layering blood products now in the more chronic phase in the L>R dependent portions of the occipital horns of the lateral ventricles, left cerebral hemispheric subdural hematoma with a maximal thickness of approx 10.5 mm, right frontal scalp hematoma and findings consistent with fairly extensive advanced chronic small vessel ischemic disease. Obtained repeat CT head today as pt is lethargic with CT revealing approx. 12 mm subdural hematoma at greatest thickness compared to previous 11 mm. Neurology recommending repeat imaging in 6 weeks. Cont close monitoring. Neurology and neurosurgery input appreciated.     Persistent Afib with RVR: Chronically anticoagulated with Eliquis which was held on admission in the setting of above. Transitioned off Cardizem gtt to PO amiodarone and Cardizem once able to tolerate PO intake. PO metoprolol restarted and has been titrated upward. PO Cardizem now D/C'd in the setting of reduced EF. Monitor on telemetry. Cardiology input appreciated.      Acute COPD exacerbation: Cont steroids, nebs and inhaler regimen.     Acute hypercapnic respiratory failure: Likely 2/2 COPD  exacerbation and elevated O2 levels on supplemental O2. Overall improved with BiPAP. Cont to encourage consistent use of BiPAP at HS.     Acute metabolic encephalopathy superimposed on baseline dementia: Likely 2/2 hypercapnia and hospitalization. Previously required Precedex. Overall improved but continues to have episodes of increased confusion and lethargy. Cont treatment as outlined above and supportive treatment.     Hypertensive urgency: Improved with IV labetalol and hydralazine with some fluctuation but overall improved. Cont PO metoprolol and nitrate. Cont to monitor.     NSTEMI in the setting of known CAD: Possibly type II in the setting of above. Echo reveals an EF of 41-45%. No antiplatelet therapy in the setting of subdural hematoma. Plans to reconsider antiplatelet therapy in 6 weeks following repeat imaging. Cont to monitor on telemetry.     Right nasal bone fracture: S/P fall. Cont supportive treatment.     GERD: Cont PPI.     Debility: PT/OT     Goals of Care: Pt is DNR/DNI. Consult palliative care for additional support.     DVT PPX: SCD's. No pharmacologic DVT PPX 2/2 subdural hematoma.     Disposition Pending clinical course. Likely return to SNF when medically stable.     Prasad Romero,   03/02/24  11:44 EST

## 2024-03-02 NOTE — PROGRESS NOTES
.Patient Identification:  Name:  Brenda Munroe  Age:  73 y.o.  Sex:  female  :  1950  MRN:  [id number]  Visit Number:  [id number]    Chief Complaint:   Atrial fibrillation    Subjective:    Patient is a little female lying in bed.  Sleepy, no distress  ----------------------------------------------------------------------------------------------------------------------  Current Hospital Meds:  amiodarone, 400 mg, Oral, BID With Meals  baclofen, 5 mg, Oral, BID  budesonide-formoterol, 2 puff, Inhalation, BID - RT   And  tiotropium bromide monohydrate, 2 puff, Inhalation, Daily - RT  cholecalciferol, 50,000 Units, Oral, Weekly  donepezil, 10 mg, Oral, Nightly  ferrous sulfate, 325 mg, Oral, BID With Meals  folic acid, 1 mg, Oral, Daily  ipratropium, 0.5 mg, Nebulization, 4x Daily - RT  isosorbide mononitrate, 15 mg, Oral, Q24H  lamoTRIgine, 100 mg, Oral, Daily  lamoTRIgine, 100 mg, Oral, Nightly  levothyroxine, 75 mcg, Oral, Q AM  memantine, 10 mg, Oral, Q12H  methylPREDNISolone sodium succinate, 20 mg, Intravenous, Q12H  metoprolol succinate XL, 50 mg, Oral, Q24H  pantoprazole, 40 mg, Oral, QAM AC  risperiDONE, 0.5 mg, Oral, BID  rosuvastatin, 40 mg, Oral, Q PM  senna-docusate sodium, 2 tablet, Oral, BID  sertraline, 100 mg, Oral, Daily  sodium chloride, 10 mL, Intravenous, Q12H  sodium chloride, 10 mL, Intravenous, Q12H  sodium chloride, 10 mL, Intravenous, Q12H         ----------------------------------------------------------------------------------------------------------------------  Vital Signs:  Temp:  [97.3 °F (36.3 °C)-98.6 °F (37 °C)] 98.6 °F (37 °C)  Heart Rate:  [] (P) 101  Resp:  [16-28] (P) 20  BP: (122-140)/() (P) 149/102      24  0200 24  0400 24  0400   Weight: 70.6 kg (155 lb 10.3 oz) 71 kg (156 lb 8.4 oz) 80.9 kg (178 lb 5.6 oz)     Body mass index is 30.61 kg/m².    Intake/Output Summary (Last 24 hours) at 3/2/2024 1543  Last data filed at 3/2/2024  1200  Gross per 24 hour   Intake 0 ml   Output 300 ml   Net -300 ml     Diet: Regular/House Diet; Feeding Assistance - Nursing; Texture: Pureed (NDD 1); Fluid Consistency: Thin (IDDSI 0)  ----------------------------------------------------------------------------------------------------------------------  Physical exam:  Constitutional:    HENT:  Head: Ecchymosis around eyes  Eyes: Sleep  Neck:  Neck supple.  No JVD present.    Cardiovascular: Normal rate, regular rhythm, S1 S2+, NO S3 / S4  Pulmonary/Chest:  Vesicular breath sounds B/L  Abdominal:  Soft.  Bowel sounds are normal.  No distension and no tenderness.      Neurological:  Alert and oriented to person, place, and time. No focal deficits.  Skin:  Skin is warm and dry. No rash noted. No pallor.   Musculoskeletal:  No edema, no tenderness, and no deformity.  No red or swollen joints anywhere.   Peripheral vascular:  2+ Pulses B/L DP  ----------------------------------------------------------------------------------------------------------------------    ----------------------------------------------------------------------------------------------------------------------      Results from last 7 days   Lab Units 03/02/24  0346 03/01/24  0115 02/29/24  0014   WBC 10*3/mm3 9.23 8.63 8.41   HEMOGLOBIN g/dL 10.3* 9.8* 10.1*   HEMATOCRIT % 36.2 33.6* 34.3   MCV fL 101.7* 99.4* 99.7*   MCHC g/dL 28.5* 29.2* 29.4*   PLATELETS 10*3/mm3 149 147 156     Results from last 7 days   Lab Units 03/02/24  0417   PH, ARTERIAL pH units 7.391   PO2 ART mm Hg 87.6   PCO2, ARTERIAL mm Hg 59.2*   HCO3 ART mmol/L 35.9*     Results from last 7 days   Lab Units 03/02/24  0346 03/01/24  0115 02/29/24  0014 02/28/24  0031 02/27/24  0030 02/26/24  0015 02/25/24  0021 02/24/24  1547   SODIUM mmol/L 142 140 141   < > 145 149*   < > 151*   POTASSIUM mmol/L 4.3 4.3 4.5   < > 3.9 3.8   < > 5.4*   MAGNESIUM mg/dL  --   --   --   --   --  2.1  --  2.3   CHLORIDE mmol/L 103 102 103   < > 103  "105   < > 104   CO2 mmol/L 31.6* 28.4 30.7*   < > 29.4* 31.6*   < > 29.1*   BUN mg/dL 20 22 24*   < > 19 21   < > 21   CREATININE mg/dL 0.71 0.74 0.72   < > 0.77 0.77   < > 0.91   CALCIUM mg/dL 8.8 8.4* 8.6   < > 8.7 8.6   < > 9.1   GLUCOSE mg/dL 142* 168* 156*   < > 150* 105*   < > 120*   ALBUMIN g/dL  --   --  3.3*  --  3.5 3.1*   < >  --    BILIRUBIN mg/dL  --   --  0.3  --  0.5 0.4   < >  --    ALK PHOS U/L  --   --  63  --  68 65   < >  --    AST (SGOT) U/L  --   --  16  --  15 16   < >  --    ALT (SGPT) U/L  --   --  15  --  13 10   < >  --     < > = values in this interval not displayed.   Estimated Creatinine Clearance: 70.5 mL/min (by C-G formula based on SCr of 0.71 mg/dL).    Ammonia   Date Value Ref Range Status   03/02/2024 52 (H) 11 - 51 umol/L Final         No results found for: \"BLOODCX\"  No results found for: \"URINECX\"  No results found for: \"WOUNDCX\"  No results found for: \"STOOLCX\"  Echo:  Results for orders placed during the hospital encounter of 02/23/24    Adult Transthoracic Echo Complete W/ Cont if Necessary Per Protocol    Interpretation Summary    Left ventricular systolic function is moderately decreased. Left ventricular ejection fraction appears to be 41 - 45%.    Left ventricular diastolic function was indeterminate.    Left atrial volume is mildly increased.    There is mild calcification of the aortic valve.    Estimated right ventricular systolic pressure from tricuspid regurgitation is mildly elevated (35-45 mmHg).    There is no evidence of pericardial effusion    @EKG: From February 29, 2024 showed A-fib with a ventricular rate around 111  Telemetry shows A-fib with rate in the 80s        I have personally looked at the labs and they are summarized above.  ----------------------------------------------------------------------------------------------------------------------  Imaging Results (Last 24 Hours)       Procedure Component Value Units Date/Time    CT Head Without Contrast " [[id number]] Collected: 03/02/24 0941     Updated: 03/02/24 0946    Narrative:      EXAM:    CT Head Without Intravenous Contrast     EXAM DATE:    3/2/2024 9:35 AM     CLINICAL HISTORY:    Worsening AMS, recent fall with subdural hematoma; S06.5XAA-Traumatic  subdural hemorrhage with loss of consciousness status unknown, initial  encounter; I21.4-Non-ST elevation (NSTEMI) myocardial infarction;  Z79.01-Long term (current) use of anticoagulants; S02.2XXA-Fracture of  nasal bones, initial encounter for closed fracture; S05.11XA-Contusion  of eyeball and orbital tissues, right eye, initial     TECHNIQUE:    Axial computed tomography images of the head/brain without intravenous  contrast.  Sagittal and coronal reformatted images were created and  reviewed.  This CT exam was performed using one or more of the following  dose reduction techniques:  automated exposure control, adjustment of  the mA and/or kV according to patient size, and/or use of iterative  reconstruction technique.     COMPARISON:    2/29/2024     FINDINGS:    Brain and extra-axial spaces:  In multiple planes, there is  approximately 1 mm increased thickness of left cerebral hemispheric  subdural hematoma when measured at its maximal thickness in the anterior  falcine region now approximately 12 mm in greatest thickness and  previously 11 mm in greatest thickness.  There remains no evidence of  midline shift.  Advanced chronic small vessel ischemic disease, stable.   No hydrocephalus.  No parenchymal hemorrhages have developed. No new  extra-axial hemorrhage is identified.    Bones/joints:  Unremarkable as visualized.  No acute fracture.    Soft tissues:  Right supraorbital frontal scalp hematoma appears  stable.    Sinuses: Acute right maxillary sinusitis is noted.    Mastoid air cells:  Unremarkable as visualized.  No mastoid effusion.       Impression:      1.  In multiple planes, there is approximately 1 mm increased thickness  of left cerebral  hemispheric subdural hematoma when measured at its  maximal thickness in the anterior falcine region now approximately 12 mm  in greatest thickness and previously 11 mm in greatest thickness.  2.  No hydrocephalus.  3.  No parenchymal hemorrhages have developed. No new extra-axial  hemorrhage is identified.  4.  Right supraorbital frontal scalp hematoma appears stable.  5.  There remains no evidence of midline shift.  6.  Advanced chronic small vessel ischemic disease, stable.        This report was finalized on 3/2/2024 9:44 AM by Dr. Erik Jones MD.             ----------------------------------------------------------------------------------------------------------------------    Assessment:  Paroxysmal atrial fibrillation.  Not a candidate for anticoagulation  Intracerebral bleed  Coronary artery disease s/p PCI to circumflex in past  Hypertension  Hyperlipidemia  Cardiomyopathy with decreased ejection fraction    Plan:  #1 cardiac.  Patient with history of cardiomyopathy with decreased ejection fraction.  Will continue beta-blocker, can consider low-dose Entresto, Farxiga or Jardiance can be started.  #2 A-fib.  Patient is rate controlled with beta-blocker.  Will continue amiodarone also for now.  After a week will cut back on the dose to 400 once daily.  Not a candidate for anticoagulation due to spontaneous cerebral bleed        This document has been electronically signed by Jasmina Alfonso MD [facility], Interventional Cardiology  March 2, 2024 15:45 EST

## 2024-03-02 NOTE — PLAN OF CARE
Problem: Noninvasive Ventilation Acute  Goal: Effective Unassisted Ventilation and Oxygenation  Outcome: Ongoing, Progressing     Problem: Skin Injury Risk Increased  Goal: Skin Health and Integrity  Outcome: Ongoing, Progressing     Problem: Fall Injury Risk  Goal: Absence of Fall and Fall-Related Injury  Outcome: Ongoing, Progressing  Intervention: Promote Injury-Free Environment  Recent Flowsheet Documentation  Taken 3/2/2024 1300 by Peg Lugo, RN  Safety Promotion/Fall Prevention:   activity supervised   assistive device/personal items within reach   clutter free environment maintained   fall prevention program maintained   nonskid shoes/slippers when out of bed   room organization consistent   safety round/check completed  Taken 3/2/2024 1100 by Peg Lugo, RN  Safety Promotion/Fall Prevention:   activity supervised   assistive device/personal items within reach   clutter free environment maintained   fall prevention program maintained   nonskid shoes/slippers when out of bed   room organization consistent   safety round/check completed  Taken 3/2/2024 0700 by Peg Lugo, RN  Safety Promotion/Fall Prevention:   activity supervised   assistive device/personal items within reach   clutter free environment maintained   fall prevention program maintained   nonskid shoes/slippers when out of bed   room organization consistent   safety round/check completed     Problem: Adult Inpatient Plan of Care  Goal: Plan of Care Review  Outcome: Ongoing, Progressing  Goal: Patient-Specific Goal (Individualized)  Outcome: Ongoing, Progressing  Goal: Absence of Hospital-Acquired Illness or Injury  Outcome: Ongoing, Progressing  Intervention: Identify and Manage Fall Risk  Recent Flowsheet Documentation  Taken 3/2/2024 1300 by Peg Lugo, RN  Safety Promotion/Fall Prevention:   activity supervised   assistive device/personal items within reach   clutter free environment maintained   fall prevention  program maintained   nonskid shoes/slippers when out of bed   room organization consistent   safety round/check completed  Taken 3/2/2024 1100 by Peg Lugo, RN  Safety Promotion/Fall Prevention:   activity supervised   assistive device/personal items within reach   clutter free environment maintained   fall prevention program maintained   nonskid shoes/slippers when out of bed   room organization consistent   safety round/check completed  Taken 3/2/2024 0700 by Peg Lugo, RN  Safety Promotion/Fall Prevention:   activity supervised   assistive device/personal items within reach   clutter free environment maintained   fall prevention program maintained   nonskid shoes/slippers when out of bed   room organization consistent   safety round/check completed  Intervention: Prevent Infection  Recent Flowsheet Documentation  Taken 3/2/2024 1300 by Peg Lugo, RN  Infection Prevention: rest/sleep promoted  Taken 3/2/2024 1100 by Peg Lugo, RN  Infection Prevention: rest/sleep promoted  Taken 3/2/2024 0700 by Peg Lugo, RN  Infection Prevention: rest/sleep promoted  Goal: Optimal Comfort and Wellbeing  Outcome: Ongoing, Progressing  Goal: Readiness for Transition of Care  Outcome: Ongoing, Progressing   Goal Outcome Evaluation:           Progress: improving

## 2024-03-02 NOTE — NURSING NOTE
Patient will not cooperate with leaving bipap mask on despite redirection.  Patient is confused and keeps tearing mask off or pulls the mask down off her mouth and nose.  Constantly pulling and tugging at the mask causing bipap to leak and alarm, or removes completely.  Patient placed back on NC at this time.  RT and MD made aware.

## 2024-03-02 NOTE — PROGRESS NOTES
Stroke Progress Note       Chief Complaint:  confusion    Subjective    Subjective     Subjective:Disoriented at baseline  Very lethargic - CT head showed mild increase in subdural   Waxes and wanes on her exam         Objective      Temp:  [97.3 °F (36.3 °C)-98.3 °F (36.8 °C)] 98.1 °F (36.7 °C)  Heart Rate:  [] 100  Resp:  [16-28] 18  BP: (122-140)/() 127/100          Squeezer her hands  Does not wiggle her toes   Getting breathing treatments     Results Review:    I reviewed the patient's new clinical results.    Lab Results (last 24 hours)       Procedure Component Value Units Date/Time    Ammonia [[id number]]  (Abnormal) Collected: 03/02/24 0907    Specimen: Blood Updated: 03/02/24 0931     Ammonia 52 umol/L     CBC & Differential [[id number]]  (Abnormal) Collected: 03/02/24 0346    Specimen: Blood Updated: 03/02/24 0458    Narrative:      The following orders were created for panel order CBC & Differential.  Procedure                               Abnormality         Status                     ---------                               -----------         ------                     CBC Auto Differential[[id number]]        Abnormal            Final result               Scan Slide[[id number]]                                       Final result                 Please view results for these tests on the individual orders.    CBC Auto Differential [[id number]]  (Abnormal) Collected: 03/02/24 0346    Specimen: Blood Updated: 03/02/24 0458     WBC 9.23 10*3/mm3      RBC 3.56 10*6/mm3      Hemoglobin 10.3 g/dL      Hematocrit 36.2 %      .7 fL      MCH 28.9 pg      MCHC 28.5 g/dL      RDW 20.4 %      RDW-SD 77.0 fl      MPV 11.4 fL      Platelets 149 10*3/mm3      Neutrophil % 90.4 %      Lymphocyte % 3.6 %      Monocyte % 3.3 %      Eosinophil % 0.0 %      Basophil % 0.3 %      Immature Grans % 2.4 %      Neutrophils, Absolute 8.35 10*3/mm3      Lymphocytes, Absolute 0.33 10*3/mm3      Monocytes, Absolute  0.30 10*3/mm3      Eosinophils, Absolute 0.00 10*3/mm3      Basophils, Absolute 0.03 10*3/mm3      Immature Grans, Absolute 0.22 10*3/mm3      nRBC 0.3 /100 WBC     Scan Slide [[id number]] Collected: 03/02/24 0346    Specimen: Blood Updated: 03/02/24 0458     Anisocytosis Large/3+     Dacrocytes Slight/1+     Elliptocytes Slight/1+     Hypochromia Mod/2+     Macrocytes Slight/1+     Platelet Morphology Normal    Basic Metabolic Panel [[id number]]  (Abnormal) Collected: 03/02/24 0346    Specimen: Blood Updated: 03/02/24 0453     Glucose 142 mg/dL      BUN 20 mg/dL      Creatinine 0.71 mg/dL      Sodium 142 mmol/L      Potassium 4.3 mmol/L      Chloride 103 mmol/L      CO2 31.6 mmol/L      Calcium 8.8 mg/dL      BUN/Creatinine Ratio 28.2     Anion Gap 7.4 mmol/L      eGFR 88.8 mL/min/1.73     Narrative:      GFR Normal >60  Chronic Kidney Disease <60  Kidney Failure <15    The GFR formula is only valid for adults with stable renal function between ages 18 and 70.    Blood Gas, Arterial With Co-Ox [[id number]]  (Abnormal) Collected: 03/02/24 0417    Specimen: Arterial Blood Updated: 03/02/24 0420     Site Right Brachial     Kendall's Test Positive     pH, Arterial 7.391 pH units      pCO2, Arterial 59.2 mm Hg      Comment: 83 Value above reference range        pO2, Arterial 87.6 mm Hg      HCO3, Arterial 35.9 mmol/L      Comment: 83 Value above reference range        Base Excess, Arterial 9.3 mmol/L      O2 Saturation, Arterial 97.6 %      Hemoglobin, Blood Gas 10.3 g/dL      Comment: 84 Value below reference range        Hematocrit, Blood Gas 31.4 %      Comment: 84 Value below reference range        Oxyhemoglobin 95.8 %      Methemoglobin 0.00 %      Comment: 84 Value below reference range        Carboxyhemoglobin 1.9 %      A-a DO2 64.7 mmHg      CO2 Content 37.7 mmol/L      Barometric Pressure for Blood Gas 729 mmHg      Modality Nasal Cannula     FIO2 32 %      Flow Rate 3.0 lpm      Ventilator Mode NA     Collected by  [id number]     Comment: Meter: [id number]     :  [id number]        pH, Temp Corrected --     pCO2, Temperature Corrected --     pO2, Temperature Corrected --          CT Head Without Contrast    Result Date: 3/2/2024  1.  In multiple planes, there is approximately 1 mm increased thickness of left cerebral hemispheric subdural hematoma when measured at its maximal thickness in the anterior falcine region now approximately 12 mm in greatest thickness and previously 11 mm in greatest thickness. 2.  No hydrocephalus. 3.  No parenchymal hemorrhages have developed. No new extra-axial hemorrhage is identified. 4.  Right supraorbital frontal scalp hematoma appears stable. 5.  There remains no evidence of midline shift. 6.  Advanced chronic small vessel ischemic disease, stable.   This report was finalized on 3/2/2024 9:44 AM by Dr. Erik Jones MD.      CT Head Without Contrast    Result Date: 2/29/2024  1.  Persistent hyperdense left cerebral hemispheric subdural hematoma is noted with a maximal short axis thickness at the level of the anterior falx of approximately 10.8 mm not significantly changed from previous MRI. 2.  Layering blood products within the left lateral ventricle posterior horn with punctate layering blood product within the posterior horn of the right lateral ventricle. 3.  No parenchymal hemorrhages have developed. 4. Advanced chronic small vessel ischemic disease again noted. 5. Right frontal scalp hematoma. 6. Acute on chronic right maxillary sinusitis.   This report was finalized on 2/29/2024 2:59 PM by Dr. Erik Jones MD.     Results for orders placed during the hospital encounter of 02/23/24    Adult Transthoracic Echo Complete W/ Cont if Necessary Per Protocol    Interpretation Summary    Left ventricular systolic function is moderately decreased. Left ventricular ejection fraction appears to be 41 - 45%.    Left ventricular diastolic function was indeterminate.    Left atrial volume is  mildly increased.    There is mild calcification of the aortic valve.    Estimated right ventricular systolic pressure from tricuspid regurgitation is mildly elevated (35-45 mmHg).    There is no evidence of pericardial effusion            Assessment/Plan     Assessment/Plan:    Traumatic Subdural hematoma.   Encephalopathy secondary to #1          Diagnostics   1. Repeat CT head 3/2- slight increase in subdural without any clinical significance.    Plan  If we were to consider aggressive care, the patient would need evaluation for Watchman by Cardiology and middle meningeal artery embolization by a neurosurgeon in the future. However, considering her comorbid conditions, I do not believe she would be a suitable candidate for these procedures. Therefore, I recommend conservative medical management for now.    Furthermore, I suggest a palliative care consultation to ensure comprehensive support for the patient and her family during this challenging time.    Regarding medication, continue with Lamictal 100 mg twice daily for possible seizure management. However, it's essential to reevaluate the indication for Lamictal in the context of the patient's current condition.      Stroke team will continue to follow the patient.                Nick Gregory MD  03/02/24  12:06 EST

## 2024-03-02 NOTE — PLAN OF CARE
Patient remains on 2L/NC, VSS at this time. Alert and oriented to self at this time. Patient educated on plan of care. Bed low and locked, bed alarm set, call light within reach. Plan of care ongoing.

## 2024-03-02 NOTE — PLAN OF CARE
Goal Outcome Evaluation:  Plan of Care Reviewed With: patient         VSS, afebrile.  Call light within reach.  Bed locked and in lowest position.  Care ongoing.        Problem: Noninvasive Ventilation Acute  Goal: Effective Unassisted Ventilation and Oxygenation  Outcome: Ongoing, Not Progressing     Problem: Skin Injury Risk Increased  Goal: Skin Health and Integrity  Outcome: Ongoing, Not Progressing  Intervention: Optimize Skin Protection  Description: Perform a full pressure injury risk assessment, as indicated by screening, upon admission to care unit.  Reassess skin (injury risk, full inspection) frequently (e.g., scheduled interval, with change in condition) to provide optimal early detection and prevention.  Maintain adequate tissue perfusion (e.g., encourage fluid balance; avoid crossing legs, constrictive clothing or devices) to promote tissue oxygenation.  Maintain head of bed at lowest degree of elevation tolerated, considering medical condition and other restrictions.  Avoid positioning onto an area that remains reddened.  Minimize incontinence and moisture (e.g., toileting schedule; moisture-wicking pad, diaper or incontinence collection device; skin moisture barrier).  Cleanse skin promptly and gently when soiled utilizing a pH-balanced cleanser.  Relieve and redistribute pressure (e.g., scheduled position changes, weight shifts, use of support surface, medical device repositioning, protective dressing application, use of positioning device, microclimate control, use of pressure-injury-monitor  Encourage increased activity, such as sitting in a chair at the bedside or early mobilization, when able to tolerate.  Recent Flowsheet Documentation  Taken 3/2/2024 0210 by Amber Bourne RN  Pressure Reduction Techniques:   frequent weight shift encouraged   weight shift assistance provided   heels elevated off bed  Pressure Reduction Devices: pressure-redistributing mattress utilized  Taken 3/1/2024 2010  by Amber Bourne RN  Pressure Reduction Techniques:   frequent weight shift encouraged   heels elevated off bed   weight shift assistance provided  Pressure Reduction Devices: pressure-redistributing mattress utilized     Problem: Fall Injury Risk  Goal: Absence of Fall and Fall-Related Injury  Outcome: Ongoing, Not Progressing  Intervention: Identify and Manage Contributors  Description: Develop a fall prevention plan with the patient and caregiver/family.  Provide reorientation, appropriate sensory stimulation and routines with changes in mental status to decrease risk of fall.  Promote use of personal vision and auditory aids.  Assess assistance level required for safe and effective self-care; provide support as needed, such as toileting, mobilization. For age 65 and older, implement timed toileting with assistance.  Encourage physical activity, such as performance of mobility and self-care at highest level of patient ability, multicomponent exercise program and provision of appropriate assistive devices.  If fall occurs, assess the severity of injury; implement fall injury protocol. Determine the cause and revise fall injury prevention plan.  Regularly review medication contribution to fall risk; adjust medication administration times to minimize risk of falling.  Consider risk related to polypharmacy and age.  Balance adequate pain management with potential for oversedation.  Recent Flowsheet Documentation  Taken 3/2/2024 0210 by Amber Bourne RN  Medication Review/Management: medications reviewed  Taken 3/1/2024 2300 by Amber Bourne RN  Medication Review/Management: medications reviewed  Taken 3/1/2024 2100 by Amber Bourne RN  Medication Review/Management: medications reviewed  Taken 3/1/2024 2010 by Amber Bourne RN  Medication Review/Management: medications reviewed  Taken 3/1/2024 1900 by Amber Bourne RN  Medication Review/Management: medications reviewed  Intervention: Promote  Injury-Free Environment  Description: Provide a safe, barrier-free environment that encourages independent activity.  Keep care area uncluttered and well-lighted.  Determine need for increased observation or monitoring.  Avoid use of devices that minimize mobility, such as restraints or indwelling urinary catheter.  Recent Flowsheet Documentation  Taken 3/2/2024 0210 by Amber Bourne RN  Safety Promotion/Fall Prevention: safety round/check completed  Taken 3/1/2024 2300 by Amber Bourne RN  Safety Promotion/Fall Prevention: safety round/check completed  Taken 3/1/2024 2100 by Amber Bourne RN  Safety Promotion/Fall Prevention: safety round/check completed  Taken 3/1/2024 2010 by Amber Bourne RN  Safety Promotion/Fall Prevention: safety round/check completed  Taken 3/1/2024 1900 by Amber Bourne RN  Safety Promotion/Fall Prevention: safety round/check completed     Problem: Adult Inpatient Plan of Care  Goal: Plan of Care Review  Outcome: Ongoing, Not Progressing  Flowsheets (Taken 3/2/2024 0259)  Plan of Care Reviewed With: patient  Goal: Patient-Specific Goal (Individualized)  Outcome: Ongoing, Not Progressing  Goal: Absence of Hospital-Acquired Illness or Injury  Outcome: Ongoing, Not Progressing  Intervention: Identify and Manage Fall Risk  Description: Perform standard risk assessment on admission using a validated tool or comprehensive approach appropriate to the patient; reassess fall risk frequently, with change in status or transfer to another level of care.  Communicate fall injury risk to interprofessional healthcare team.  Determine need for increased observation, equipment and environmental modification, such as low bed, signage and supportive, nonskid footwear.  Adjust safety measures to individual developmental age, stage and identified risk factors.  Reinforce the importance of safety and physical activity with patient and family.  Perform regular intentional rounding to assess need  for position change, pain assessment and personal needs, including assistance with toileting.  Recent Flowsheet Documentation  Taken 3/2/2024 0210 by Amber Bourne RN  Safety Promotion/Fall Prevention: safety round/check completed  Taken 3/1/2024 2300 by Amber Bourne RN  Safety Promotion/Fall Prevention: safety round/check completed  Taken 3/1/2024 2100 by Amber Bourne RN  Safety Promotion/Fall Prevention: safety round/check completed  Taken 3/1/2024 2010 by Amber Bourne RN  Safety Promotion/Fall Prevention: safety round/check completed  Taken 3/1/2024 1900 by Amber Bourne RN  Safety Promotion/Fall Prevention: safety round/check completed  Intervention: Prevent and Manage VTE (Venous Thromboembolism) Risk  Description: Assess for VTE (venous thromboembolism) risk.  Encourage and assist with early ambulation.  Initiate and maintain compression or other therapy, as indicated, based on identified risk in accordance with organizational protocol and provider order.  Encourage both active and passive leg exercises while in bed, if unable to ambulate.  Recent Flowsheet Documentation  Taken 3/2/2024 0210 by Amber Bourne RN  VTE Prevention/Management:   bilateral   sequential compression devices on  Taken 3/1/2024 2010 by Amber Bourne RN  VTE Prevention/Management:   bilateral   sequential compression devices on  Goal: Optimal Comfort and Wellbeing  Outcome: Ongoing, Not Progressing  Intervention: Provide Person-Centered Care  Description: Use a family-focused approach to care.  Develop trust and rapport by proactively providing information, encouraging questions, addressing concerns and offering reassurance.  Acknowledge emotional response to hospitalization.  Recognize and utilize personal coping strategies.  Honor spiritual and cultural preferences.  Recent Flowsheet Documentation  Taken 3/2/2024 0210 by Amber Bourne RN  Trust Relationship/Rapport:   care explained   choices provided    questions answered   reassurance provided   thoughts/feelings acknowledged  Goal: Readiness for Transition of Care  Outcome: Ongoing, Not Progressing

## 2024-03-03 ENCOUNTER — APPOINTMENT (OUTPATIENT)
Dept: GENERAL RADIOLOGY | Facility: HOSPITAL | Age: 74
DRG: 082 | End: 2024-03-03
Payer: MEDICARE

## 2024-03-03 LAB
AMMONIA BLD-SCNC: 29 UMOL/L (ref 11–51)
ANION GAP SERPL CALCULATED.3IONS-SCNC: 9.4 MMOL/L (ref 5–15)
ANISOCYTOSIS BLD QL: NORMAL
BACTERIA UR QL AUTO: ABNORMAL /HPF
BASOPHILS # BLD AUTO: 0.04 10*3/MM3 (ref 0–0.2)
BASOPHILS NFR BLD AUTO: 0.4 % (ref 0–1.5)
BILIRUB UR QL STRIP: NEGATIVE
BUN SERPL-MCNC: 21 MG/DL (ref 8–23)
BUN/CREAT SERPL: 29.6 (ref 7–25)
CALCIUM SPEC-SCNC: 8.5 MG/DL (ref 8.6–10.5)
CHLORIDE SERPL-SCNC: 104 MMOL/L (ref 98–107)
CLARITY UR: ABNORMAL
CO2 SERPL-SCNC: 27.6 MMOL/L (ref 22–29)
COLOR UR: ABNORMAL
CREAT SERPL-MCNC: 0.71 MG/DL (ref 0.57–1)
D-LACTATE SERPL-SCNC: 0.9 MMOL/L (ref 0.5–2)
DACRYOCYTES BLD QL SMEAR: NORMAL
DEPRECATED RDW RBC AUTO: 76 FL (ref 37–54)
EGFRCR SERPLBLD CKD-EPI 2021: 88.8 ML/MIN/1.73
ELLIPTOCYTES BLD QL SMEAR: NORMAL
EOSINOPHIL # BLD AUTO: 0.01 10*3/MM3 (ref 0–0.4)
EOSINOPHIL NFR BLD AUTO: 0.1 % (ref 0.3–6.2)
ERYTHROCYTE [DISTWIDTH] IN BLOOD BY AUTOMATED COUNT: 20.1 % (ref 12.3–15.4)
GLUCOSE SERPL-MCNC: 119 MG/DL (ref 65–99)
GLUCOSE UR STRIP-MCNC: ABNORMAL MG/DL
HCT VFR BLD AUTO: 37.3 % (ref 34–46.6)
HGB BLD-MCNC: 10.8 G/DL (ref 12–15.9)
HGB UR QL STRIP.AUTO: ABNORMAL
HYALINE CASTS UR QL AUTO: ABNORMAL /LPF
HYPOCHROMIA BLD QL: NORMAL
IMM GRANULOCYTES # BLD AUTO: 0.24 10*3/MM3 (ref 0–0.05)
IMM GRANULOCYTES NFR BLD AUTO: 2.6 % (ref 0–0.5)
KETONES UR QL STRIP: NEGATIVE
LEUKOCYTE ESTERASE UR QL STRIP.AUTO: ABNORMAL
LYMPHOCYTES # BLD AUTO: 0.3 10*3/MM3 (ref 0.7–3.1)
LYMPHOCYTES NFR BLD AUTO: 3.3 % (ref 19.6–45.3)
MCH RBC QN AUTO: 29.3 PG (ref 26.6–33)
MCHC RBC AUTO-ENTMCNC: 29 G/DL (ref 31.5–35.7)
MCV RBC AUTO: 101.1 FL (ref 79–97)
MONOCYTES # BLD AUTO: 0.44 10*3/MM3 (ref 0.1–0.9)
MONOCYTES NFR BLD AUTO: 4.8 % (ref 5–12)
NEUTROPHILS NFR BLD AUTO: 8.19 10*3/MM3 (ref 1.7–7)
NEUTROPHILS NFR BLD AUTO: 88.8 % (ref 42.7–76)
NITRITE UR QL STRIP: NEGATIVE
NRBC BLD AUTO-RTO: 0.2 /100 WBC (ref 0–0.2)
PH UR STRIP.AUTO: 7.5 [PH] (ref 5–8)
PLAT MORPH BLD: NORMAL
PLATELET # BLD AUTO: 126 10*3/MM3 (ref 140–450)
PMV BLD AUTO: 10.9 FL (ref 6–12)
POTASSIUM SERPL-SCNC: 4.3 MMOL/L (ref 3.5–5.2)
PROCALCITONIN SERPL-MCNC: 0.55 NG/ML (ref 0–0.25)
PROT UR QL STRIP: ABNORMAL
RBC # BLD AUTO: 3.69 10*6/MM3 (ref 3.77–5.28)
RBC # UR STRIP: ABNORMAL /HPF
REF LAB TEST METHOD: ABNORMAL
SODIUM SERPL-SCNC: 141 MMOL/L (ref 136–145)
SP GR UR STRIP: 1.03 (ref 1–1.03)
SQUAMOUS #/AREA URNS HPF: ABNORMAL /HPF
TRI-PHOS CRY URNS QL MICRO: ABNORMAL /HPF
UROBILINOGEN UR QL STRIP: ABNORMAL
WBC # UR STRIP: ABNORMAL /HPF
WBC NRBC COR # BLD AUTO: 9.22 10*3/MM3 (ref 3.4–10.8)

## 2024-03-03 PROCEDURE — 94799 UNLISTED PULMONARY SVC/PX: CPT

## 2024-03-03 PROCEDURE — 87077 CULTURE AEROBIC IDENTIFY: CPT | Performed by: INTERNAL MEDICINE

## 2024-03-03 PROCEDURE — 94761 N-INVAS EAR/PLS OXIMETRY MLT: CPT

## 2024-03-03 PROCEDURE — 84145 PROCALCITONIN (PCT): CPT | Performed by: INTERNAL MEDICINE

## 2024-03-03 PROCEDURE — 99232 SBSQ HOSP IP/OBS MODERATE 35: CPT | Performed by: INTERNAL MEDICINE

## 2024-03-03 PROCEDURE — 71045 X-RAY EXAM CHEST 1 VIEW: CPT | Performed by: RADIOLOGY

## 2024-03-03 PROCEDURE — 80048 BASIC METABOLIC PNL TOTAL CA: CPT | Performed by: INTERNAL MEDICINE

## 2024-03-03 PROCEDURE — 94664 DEMO&/EVAL PT USE INHALER: CPT

## 2024-03-03 PROCEDURE — 87154 CUL TYP ID BLD PTHGN 6+ TRGT: CPT | Performed by: INTERNAL MEDICINE

## 2024-03-03 PROCEDURE — 85007 BL SMEAR W/DIFF WBC COUNT: CPT | Performed by: INTERNAL MEDICINE

## 2024-03-03 PROCEDURE — 25010000002 METHYLPREDNISOLONE PER 40 MG: Performed by: INTERNAL MEDICINE

## 2024-03-03 PROCEDURE — 87040 BLOOD CULTURE FOR BACTERIA: CPT | Performed by: INTERNAL MEDICINE

## 2024-03-03 PROCEDURE — 87186 SC STD MICRODIL/AGAR DIL: CPT | Performed by: INTERNAL MEDICINE

## 2024-03-03 PROCEDURE — 81001 URINALYSIS AUTO W/SCOPE: CPT | Performed by: INTERNAL MEDICINE

## 2024-03-03 PROCEDURE — 85025 COMPLETE CBC W/AUTO DIFF WBC: CPT | Performed by: INTERNAL MEDICINE

## 2024-03-03 PROCEDURE — 82140 ASSAY OF AMMONIA: CPT | Performed by: INTERNAL MEDICINE

## 2024-03-03 PROCEDURE — 94660 CPAP INITIATION&MGMT: CPT

## 2024-03-03 PROCEDURE — 99233 SBSQ HOSP IP/OBS HIGH 50: CPT | Performed by: STUDENT IN AN ORGANIZED HEALTH CARE EDUCATION/TRAINING PROGRAM

## 2024-03-03 PROCEDURE — 83605 ASSAY OF LACTIC ACID: CPT | Performed by: INTERNAL MEDICINE

## 2024-03-03 PROCEDURE — 71045 X-RAY EXAM CHEST 1 VIEW: CPT

## 2024-03-03 RX ADMIN — AMIODARONE HYDROCHLORIDE 400 MG: 200 TABLET ORAL at 09:06

## 2024-03-03 RX ADMIN — Medication 10 ML: at 08:29

## 2024-03-03 RX ADMIN — Medication 10 ML: at 20:22

## 2024-03-03 RX ADMIN — METHYLPREDNISOLONE SODIUM SUCCINATE 20 MG: 40 INJECTION, POWDER, FOR SOLUTION INTRAMUSCULAR; INTRAVENOUS at 08:29

## 2024-03-03 RX ADMIN — FERROUS SULFATE TAB 325 MG (65 MG ELEMENTAL FE) 325 MG: 325 (65 FE) TAB at 17:21

## 2024-03-03 RX ADMIN — BUDESONIDE AND FORMOTEROL FUMARATE DIHYDRATE 2 PUFF: 160; 4.5 AEROSOL RESPIRATORY (INHALATION) at 18:21

## 2024-03-03 RX ADMIN — LEVOTHYROXINE SODIUM 75 MCG: 0.07 TABLET ORAL at 06:33

## 2024-03-03 RX ADMIN — BUDESONIDE AND FORMOTEROL FUMARATE DIHYDRATE 2 PUFF: 160; 4.5 AEROSOL RESPIRATORY (INHALATION) at 06:46

## 2024-03-03 RX ADMIN — LAMOTRIGINE 100 MG: 100 TABLET ORAL at 09:05

## 2024-03-03 RX ADMIN — SERTRALINE 100 MG: 50 TABLET, FILM COATED ORAL at 09:06

## 2024-03-03 RX ADMIN — TIOTROPIUM BROMIDE INHALATION SPRAY 2 PUFF: 3.12 SPRAY, METERED RESPIRATORY (INHALATION) at 06:45

## 2024-03-03 RX ADMIN — METOPROLOL SUCCINATE 50 MG: 50 TABLET, EXTENDED RELEASE ORAL at 09:06

## 2024-03-03 RX ADMIN — ISOSORBIDE MONONITRATE 15 MG: 30 TABLET, EXTENDED RELEASE ORAL at 09:05

## 2024-03-03 RX ADMIN — FERROUS SULFATE TAB 325 MG (65 MG ELEMENTAL FE) 325 MG: 325 (65 FE) TAB at 09:05

## 2024-03-03 RX ADMIN — ROSUVASTATIN CALCIUM 40 MG: 20 TABLET, FILM COATED ORAL at 17:21

## 2024-03-03 RX ADMIN — IPRATROPIUM BROMIDE 0.5 MG: 0.5 SOLUTION RESPIRATORY (INHALATION) at 12:09

## 2024-03-03 RX ADMIN — IPRATROPIUM BROMIDE 0.5 MG: 0.5 SOLUTION RESPIRATORY (INHALATION) at 18:21

## 2024-03-03 RX ADMIN — Medication 1 MG: at 09:06

## 2024-03-03 RX ADMIN — PANTOPRAZOLE SODIUM 40 MG: 40 TABLET, DELAYED RELEASE ORAL at 06:33

## 2024-03-03 RX ADMIN — SACUBITRIL AND VALSARTAN 0.5 TABLET: 24; 26 TABLET, FILM COATED ORAL at 09:06

## 2024-03-03 RX ADMIN — EMPAGLIFLOZIN 10 MG: 10 TABLET, FILM COATED ORAL at 09:05

## 2024-03-03 RX ADMIN — AMIODARONE HYDROCHLORIDE 400 MG: 200 TABLET ORAL at 17:21

## 2024-03-03 RX ADMIN — MEMANTINE HYDROCHLORIDE 10 MG: 10 TABLET, FILM COATED ORAL at 20:18

## 2024-03-03 RX ADMIN — METHYLPREDNISOLONE SODIUM SUCCINATE 20 MG: 40 INJECTION, POWDER, FOR SOLUTION INTRAMUSCULAR; INTRAVENOUS at 20:18

## 2024-03-03 RX ADMIN — IPRATROPIUM BROMIDE 0.5 MG: 0.5 SOLUTION RESPIRATORY (INHALATION) at 06:46

## 2024-03-03 RX ADMIN — DONEPEZIL HYDROCHLORIDE 10 MG: 5 TABLET, FILM COATED ORAL at 20:21

## 2024-03-03 RX ADMIN — Medication 10 ML: at 20:21

## 2024-03-03 RX ADMIN — MEMANTINE HYDROCHLORIDE 10 MG: 10 TABLET, FILM COATED ORAL at 09:05

## 2024-03-03 RX ADMIN — SACUBITRIL AND VALSARTAN 0.5 TABLET: 24; 26 TABLET, FILM COATED ORAL at 20:19

## 2024-03-03 RX ADMIN — BACLOFEN 5 MG: 10 TABLET ORAL at 09:05

## 2024-03-03 RX ADMIN — RISPERIDONE 0.5 MG: 0.25 TABLET, FILM COATED ORAL at 20:21

## 2024-03-03 RX ADMIN — RISPERIDONE 0.5 MG: 0.25 TABLET, FILM COATED ORAL at 09:06

## 2024-03-03 RX ADMIN — IPRATROPIUM BROMIDE 0.5 MG: 0.5 SOLUTION RESPIRATORY (INHALATION) at 00:27

## 2024-03-03 RX ADMIN — DOCUSATE SODIUM 50 MG AND SENNOSIDES 8.6 MG 2 TABLET: 8.6; 5 TABLET, FILM COATED ORAL at 20:21

## 2024-03-03 NOTE — PLAN OF CARE
Goal Outcome Evaluation:  Plan of Care Reviewed With: patient           Outcome Evaluation: VSS on 3L NC. Pt has not answered any questions this shift, and has only follow commands by squeezing hands, Dr. Romero aware and assessed patient at bedside. Safety maintained, call light in reach.

## 2024-03-03 NOTE — PLAN OF CARE
Goal Outcome Evaluation:  Plan of Care Reviewed With: patient        Progress: no change  Outcome Evaluation: Pt intermittently alert to self, disoriented to place, time, and situation. 3L NC and bipap when sleeping. No additional distress noted and no other needs requested. POC ongoing.

## 2024-03-03 NOTE — PROGRESS NOTES
.Patient Identification:  Name:  Brenda Munroe  Age:  73 y.o.  Sex:  female  :  1950  MRN:  [id number]  Visit Number:  [id number]    Chief Complaint:   Atrial fibrillation    Subjective:    Patient laying in bed.  Sleepy.  Telemetry shows atrial fibrillation with controlled rate  ----------------------------------------------------------------------------------------------------------------------  Current Hospital Meds:  amiodarone, 400 mg, Oral, BID With Meals  baclofen, 5 mg, Oral, BID  budesonide-formoterol, 2 puff, Inhalation, BID - RT   And  tiotropium bromide monohydrate, 2 puff, Inhalation, Daily - RT  cholecalciferol, 50,000 Units, Oral, Weekly  donepezil, 10 mg, Oral, Nightly  empagliflozin, 10 mg, Oral, Daily  ferrous sulfate, 325 mg, Oral, BID With Meals  folic acid, 1 mg, Oral, Daily  ipratropium, 0.5 mg, Nebulization, 4x Daily - RT  isosorbide mononitrate, 15 mg, Oral, Q24H  levothyroxine, 75 mcg, Oral, Q AM  memantine, 10 mg, Oral, Q12H  methylPREDNISolone sodium succinate, 20 mg, Intravenous, Q12H  metoprolol succinate XL, 50 mg, Oral, Q24H  pantoprazole, 40 mg, Oral, QAM AC  risperiDONE, 0.5 mg, Oral, BID  rosuvastatin, 40 mg, Oral, Q PM  sacubitril-valsartan, 0.5 tablet, Oral, Q12H  senna-docusate sodium, 2 tablet, Oral, BID  sertraline, 100 mg, Oral, Daily  sodium chloride, 10 mL, Intravenous, Q12H  sodium chloride, 10 mL, Intravenous, Q12H  sodium chloride, 10 mL, Intravenous, Q12H         ----------------------------------------------------------------------------------------------------------------------  Vital Signs:  Temp:  [97.3 °F (36.3 °C)-98.9 °F (37.2 °C)] 98.9 °F (37.2 °C)  Heart Rate:  [] 90  Resp:  [16-28] 22  BP: ()/() 104/70      24  0400 24  0400 24  0400   Weight: 71 kg (156 lb 8.4 oz) 80.9 kg (178 lb 5.6 oz) 81 kg (178 lb 9.2 oz)     Body mass index is 30.65 kg/m².    Intake/Output Summary (Last 24 hours) at 3/3/2024 1311  Last data  filed at 3/3/2024 1200  Gross per 24 hour   Intake 0 ml   Output 1175 ml   Net -1175 ml     Diet: Regular/House Diet; Feeding Assistance - Nursing; Texture: Pureed (NDD 1); Fluid Consistency: Thin (IDDSI 0)  ----------------------------------------------------------------------------------------------------------------------  Physical exam:  Constitutional:    HENT:  Head:  Normocephalic and atraumatic.    Eyes:  Conjunctivae and EOM are normal.  Ecchymosis noted around the eyes neck:  Neck supple.  No JVD present.    Cardiovascular: Normal rate, irregular rhythm, S1 S2+, NO S3 / S4  Pulmonary/Chest:  Vesicular breath sounds B/L  Abdominal:  Soft.  Bowel sounds are normal.  No distension and no tenderness.      Neurological: Sleepy, sensorimotor system not assessed  Skin:  Skin is warm and dry. No rash noted. No pallor.   Musculoskeletal:  No edema, no tenderness, and no deformity.  No red or swollen joints anywhere.   Peripheral vascular: Palpable pulses  ----------------------------------------------------------------------------------------------------------------------    ----------------------------------------------------------------------------------------------------------------------      Results from last 7 days   Lab Units 03/03/24 0037 03/02/24 0346 03/01/24  0115   WBC 10*3/mm3 9.22 9.23 8.63   HEMOGLOBIN g/dL 10.8* 10.3* 9.8*   HEMATOCRIT % 37.3 36.2 33.6*   MCV fL 101.1* 101.7* 99.4*   MCHC g/dL 29.0* 28.5* 29.2*   PLATELETS 10*3/mm3 126* 149 147     Results from last 7 days   Lab Units 03/02/24  2303   PH, ARTERIAL pH units 7.380   PO2 ART mm Hg 57.0*   PCO2, ARTERIAL mm Hg 62.2*   HCO3 ART mmol/L 36.8*     Results from last 7 days   Lab Units 03/03/24  0037 03/02/24 0346 03/01/24  0115 02/29/24  0014 02/28/24  0031 02/27/24  0030 02/26/24  0015   SODIUM mmol/L 141 142 140 141   < > 145 149*   POTASSIUM mmol/L 4.3 4.3 4.3 4.5   < > 3.9 3.8   MAGNESIUM mg/dL  --   --   --   --   --   --  2.1  "  CHLORIDE mmol/L 104 103 102 103   < > 103 105   CO2 mmol/L 27.6 31.6* 28.4 30.7*   < > 29.4* 31.6*   BUN mg/dL 21 20 22 24*   < > 19 21   CREATININE mg/dL 0.71 0.71 0.74 0.72   < > 0.77 0.77   CALCIUM mg/dL 8.5* 8.8 8.4* 8.6   < > 8.7 8.6   GLUCOSE mg/dL 119* 142* 168* 156*   < > 150* 105*   ALBUMIN g/dL  --   --   --  3.3*  --  3.5 3.1*   BILIRUBIN mg/dL  --   --   --  0.3  --  0.5 0.4   ALK PHOS U/L  --   --   --  63  --  68 65   AST (SGOT) U/L  --   --   --  16  --  15 16   ALT (SGPT) U/L  --   --   --  15  --  13 10    < > = values in this interval not displayed.   Estimated Creatinine Clearance: 70.5 mL/min (by C-G formula based on SCr of 0.71 mg/dL).    Ammonia   Date Value Ref Range Status   03/03/2024 29 11 - 51 umol/L Final   03/02/2024 52 (H) 11 - 51 umol/L Final         No results found for: \"BLOODCX\"  No results found for: \"URINECX\"  No results found for: \"WOUNDCX\"  No results found for: \"STOOLCX\"  Echo:  Results for orders placed during the hospital encounter of 02/23/24    Adult Transthoracic Echo Complete W/ Cont if Necessary Per Protocol    Interpretation Summary    Left ventricular systolic function is moderately decreased. Left ventricular ejection fraction appears to be 41 - 45%.    Left ventricular diastolic function was indeterminate.    Left atrial volume is mildly increased.    There is mild calcification of the aortic valve.    Estimated right ventricular systolic pressure from tricuspid regurgitation is mildly elevated (35-45 mmHg).    There is no evidence of pericardial effusion    @EKG: From 2/29/2024 showed A-fib@111, right bundle branch block, cannot rule out inferior infarct  @Cardiolite (Tc-99m Sestamibi) stress test@       I have personally looked at the labs and they are summarized above.  ----------------------------------------------------------------------------------------------------------------------  Imaging Results (Last 24 Hours)       ** No results found for the last 24 " hours. **          ----------------------------------------------------------------------------------------------------------------------    Assessment:  Persistent A-fib not candidate for anticoagulation due to spontaneous bleed in the brain  History of coronary disease with PCI to circumflex in the past  Hypertension  Hyperlipidemia  Cardiomyopathy with mild decreased LV ejection fraction    Plan:  #1 Cardiac. patient with history of coronary artery disease with circumflex stent in the past.  Has history of cardiomyopathy with decreased ejection fraction EF 41 to 45%.  Patient started on low-dose Entresto that she is tolerating.  On beta-blockers and Jardiance which will be continued.  #2 A-fib.  Patient is on amiodarone and beta-blocker.  Rate is controlled.  Will consider cutting back on amiodarone and for 5 days.  Patient not a candidate for anticoagulation due to cerebral bleed.        This document has been electronically signed by Jasmina Alfonso MD [facility], Interventional Cardiology  March 3, 2024 13:11 EST

## 2024-03-03 NOTE — PROGRESS NOTES
Stroke Progress Note       Chief Complaint:  confusion    Subjective    Subjective     Subjective:  Unchanged exam   Disoriented at baseline  Waxes and wanes on her exam         Objective      Temp:  [97.3 °F (36.3 °C)-98.3 °F (36.8 °C)] 97.5 °F (36.4 °C)  Heart Rate:  [] 90  Resp:  [16-28] 22  BP: ()/() 105/72          Squeezer her hands  Does not wiggle her toes   Getting breathing treatments     Results Review:    I reviewed the patient's new clinical results.    Lab Results (last 24 hours)       Procedure Component Value Units Date/Time    CBC & Differential [[id number]]  (Abnormal) Collected: 03/03/24 0037    Specimen: Blood Updated: 03/03/24 0129    Narrative:      The following orders were created for panel order CBC & Differential.  Procedure                               Abnormality         Status                     ---------                               -----------         ------                     CBC Auto Differential[[id number]]        Abnormal            Final result               Scan Slide[[id number]]                                       Final result                 Please view results for these tests on the individual orders.    CBC Auto Differential [[id number]]  (Abnormal) Collected: 03/03/24 0037    Specimen: Blood Updated: 03/03/24 0129     WBC 9.22 10*3/mm3      RBC 3.69 10*6/mm3      Hemoglobin 10.8 g/dL      Hematocrit 37.3 %      .1 fL      MCH 29.3 pg      MCHC 29.0 g/dL      RDW 20.1 %      RDW-SD 76.0 fl      MPV 10.9 fL      Platelets 126 10*3/mm3      Neutrophil % 88.8 %      Lymphocyte % 3.3 %      Monocyte % 4.8 %      Eosinophil % 0.1 %      Basophil % 0.4 %      Immature Grans % 2.6 %      Neutrophils, Absolute 8.19 10*3/mm3      Lymphocytes, Absolute 0.30 10*3/mm3      Monocytes, Absolute 0.44 10*3/mm3      Eosinophils, Absolute 0.01 10*3/mm3      Basophils, Absolute 0.04 10*3/mm3      Immature Grans, Absolute 0.24 10*3/mm3      nRBC 0.2 /100 WBC     Scan  Slide [[id number]] Collected: 03/03/24 0037    Specimen: Blood Updated: 03/03/24 0129     Anisocytosis Large/3+     Dacrocytes Slight/1+     Elliptocytes Slight/1+     Hypochromia Slight/1+     Platelet Morphology Normal    Basic Metabolic Panel [[id number]]  (Abnormal) Collected: 03/03/24 0037    Specimen: Blood Updated: 03/03/24 0115     Glucose 119 mg/dL      BUN 21 mg/dL      Creatinine 0.71 mg/dL      Sodium 141 mmol/L      Potassium 4.3 mmol/L      Comment: Slight hemolysis detected by analyzer. Result may be falsely elevated.        Chloride 104 mmol/L      CO2 27.6 mmol/L      Calcium 8.5 mg/dL      BUN/Creatinine Ratio 29.6     Anion Gap 9.4 mmol/L      eGFR 88.8 mL/min/1.73     Narrative:      GFR Normal >60  Chronic Kidney Disease <60  Kidney Failure <15    The GFR formula is only valid for adults with stable renal function between ages 18 and 70.    Ammonia [[id number]]  (Normal) Collected: 03/03/24 0037    Specimen: Blood Updated: 03/03/24 0115     Ammonia 29 umol/L     Blood Gas, Arterial With Co-Ox [[id number]]  (Abnormal) Collected: 03/02/24 2303    Specimen: Arterial Blood Updated: 03/02/24 2312     Site Left Radial     Kendall's Test Positive     pH, Arterial 7.380 pH units      pCO2, Arterial 62.2 mm Hg      Comment: 86 Value above critical limit        pO2, Arterial 57.0 mm Hg      Comment: 84 Value below reference range        HCO3, Arterial 36.8 mmol/L      Comment: 83 Value above reference range        Base Excess, Arterial 9.8 mmol/L      O2 Saturation, Arterial 88.9 %      Comment: 84 Value below reference range        Hemoglobin, Blood Gas 10.5 g/dL      Comment: 84 Value below reference range        Hematocrit, Blood Gas 32.2 %      Comment: 84 Value below reference range        Oxyhemoglobin 87.2 %      Comment: 84 Value below reference range        Methemoglobin <-0.10 %      Comment: 94 Value below reportable range < _0.1        Carboxyhemoglobin 2.1 %      A-a DO2 92.0 mmHg      CO2 Content  38.7 mmol/L      Barometric Pressure for Blood Gas 729 mmHg      Modality Nasal Cannula     FIO2 32 %      Ventilator Mode NA     Notified Who DR. stearns     Notified By [id number]     Notified Time 03/02/2024 23:15     Collected by [id number]     Comment: Meter: [id number]     :  [id number]        pH, Temp Corrected --     pCO2, Temperature Corrected --     pO2, Temperature Corrected --          CT Head Without Contrast    Result Date: 3/2/2024  1.  In multiple planes, there is approximately 1 mm increased thickness of left cerebral hemispheric subdural hematoma when measured at its maximal thickness in the anterior falcine region now approximately 12 mm in greatest thickness and previously 11 mm in greatest thickness. 2.  No hydrocephalus. 3.  No parenchymal hemorrhages have developed. No new extra-axial hemorrhage is identified. 4.  Right supraorbital frontal scalp hematoma appears stable. 5.  There remains no evidence of midline shift. 6.  Advanced chronic small vessel ischemic disease, stable.   This report was finalized on 3/2/2024 9:44 AM by Dr. Erik Jones MD.     Results for orders placed during the hospital encounter of 02/23/24    Adult Transthoracic Echo Complete W/ Cont if Necessary Per Protocol    Interpretation Summary    Left ventricular systolic function is moderately decreased. Left ventricular ejection fraction appears to be 41 - 45%.    Left ventricular diastolic function was indeterminate.    Left atrial volume is mildly increased.    There is mild calcification of the aortic valve.    Estimated right ventricular systolic pressure from tricuspid regurgitation is mildly elevated (35-45 mmHg).    There is no evidence of pericardial effusion            Assessment/Plan     Assessment/Plan:    Traumatic Subdural hematoma.   Encephalopathy secondary to #1          Diagnostics   1. Repeat CT head 3/2- slight increase in subdural without any clinical significance.    Plan  If we were to consider  aggressive care, the patient would need evaluation for Watchman by Cardiology and middle meningeal artery embolization by a neurosurgeon in the future. However, considering her comorbid conditions, I do not believe she would be a suitable candidate for these procedures. Therefore, I recommend conservative medical management for now.    Furthermore, I suggest a palliative care consultation to ensure comprehensive support for the patient and her family during this challenging time.    I would discontinue Lamictal on her, as it takes time of titration and there is no clear indication at this time. Lamictal can cause dizziness and falls in elderly.     Stroke team will continue to follow the patient.                Nick Gregory MD  03/03/24  12:21 EST

## 2024-03-03 NOTE — PROGRESS NOTES
[facility] HOSPITALIST PROGRESS NOTE    Subjective     History:   Brenda Munroe is a 73 y.o. female admitted on 2/23/2024 secondary to Subdural hematoma     Procedures: None    CC: Follow up subdural hematoma     Patient seen and examined with JOSE Javier. Awakens to stimuli. Follows a few simple commands but does not answer questions. Fluctuating mentation reported again overnight with intermittent noncompliance with BiPAP. RN reports pt was lethargic this AM prior to our encounter.     History taken from: chart, and RN.      Objective     Vital Signs  Temp:  [97.3 °F (36.3 °C)-98.6 °F (37 °C)] 97.5 °F (36.4 °C)  Heart Rate:  [] 92  Resp:  [16-28] 25  BP: ()/() 105/72    Intake/Output Summary (Last 24 hours) at 3/3/2024 1157  Last data filed at 3/3/2024 0900  Gross per 24 hour   Intake 0 ml   Output 1175 ml   Net -1175 ml         Physical Exam:   General:    Awakens to stimuli, does not answer any questions, follows a few simple commands inconsistently, in no acute distress, chronically ill appearing, (+) facial ecchymoses    Heart:      Normal S1 and S2. Irregularly irregular.     Lungs:     Respirations regular, even and unlabored. Lungs clear to auscultation B/L. No wheezes, rales or rhonchi.   Abdomen:   Soft and nontender. No guarding, rebound tenderness or  organomegaly noted. Bowel sounds present x 4.   Extremities:  No clubbing, cyanosis or edema noted.      Results Review:    Results from last 7 days   Lab Units 03/03/24  0037 03/02/24  0346 03/01/24  0115 02/29/24  0014 02/28/24  0031 02/27/24  0030 02/26/24  0018   WBC 10*3/mm3 9.22 9.23 8.63 8.41 6.88 7.77 9.40   HEMOGLOBIN g/dL 10.8* 10.3* 9.8* 10.1* 9.4* 10.1* 9.8*   PLATELETS 10*3/mm3 126* 149 147 156 182 231 250     Results from last 7 days   Lab Units 03/03/24  0037 03/02/24  0346 03/01/24  0115 02/29/24  0014 02/28/24  0031 02/27/24  0030 02/26/24  0015   SODIUM mmol/L 141 142 140 141 141 145 149*   POTASSIUM  mmol/L 4.3 4.3 4.3 4.5 3.7 3.9 3.8   CHLORIDE mmol/L 104 103 102 103 102 103 105   CO2 mmol/L 27.6 31.6* 28.4 30.7* 32.0* 29.4* 31.6*   BUN mg/dL 21 20 22 24* 20 19 21   CREATININE mg/dL 0.71 0.71 0.74 0.72 0.70 0.77 0.77   CALCIUM mg/dL 8.5* 8.8 8.4* 8.6 8.4* 8.7 8.6   GLUCOSE mg/dL 119* 142* 168* 156* 172* 150* 105*     Results from last 7 days   Lab Units 02/29/24  0014 02/27/24  0030 02/26/24  0015   BILIRUBIN mg/dL 0.3 0.5 0.4   ALK PHOS U/L 63 68 65   AST (SGOT) U/L 16 15 16   ALT (SGPT) U/L 15 13 10     Results from last 7 days   Lab Units 02/26/24  0015   MAGNESIUM mg/dL 2.1                   Imaging Results (Last 24 Hours)       ** No results found for the last 24 hours. **              Medications:  amiodarone, 400 mg, Oral, BID With Meals  baclofen, 5 mg, Oral, BID  budesonide-formoterol, 2 puff, Inhalation, BID - RT   And  tiotropium bromide monohydrate, 2 puff, Inhalation, Daily - RT  cholecalciferol, 50,000 Units, Oral, Weekly  donepezil, 10 mg, Oral, Nightly  empagliflozin, 10 mg, Oral, Daily  ferrous sulfate, 325 mg, Oral, BID With Meals  folic acid, 1 mg, Oral, Daily  ipratropium, 0.5 mg, Nebulization, 4x Daily - RT  isosorbide mononitrate, 15 mg, Oral, Q24H  lamoTRIgine, 100 mg, Oral, Daily  lamoTRIgine, 100 mg, Oral, Nightly  levothyroxine, 75 mcg, Oral, Q AM  memantine, 10 mg, Oral, Q12H  methylPREDNISolone sodium succinate, 20 mg, Intravenous, Q12H  metoprolol succinate XL, 50 mg, Oral, Q24H  pantoprazole, 40 mg, Oral, QAM AC  risperiDONE, 0.5 mg, Oral, BID  rosuvastatin, 40 mg, Oral, Q PM  sacubitril-valsartan, 0.5 tablet, Oral, Q12H  senna-docusate sodium, 2 tablet, Oral, BID  sertraline, 100 mg, Oral, Daily  sodium chloride, 10 mL, Intravenous, Q12H  sodium chloride, 10 mL, Intravenous, Q12H  sodium chloride, 10 mL, Intravenous, Q12H                 Assessment & Plan   Traumatic subdural hematoma: S/P recent fall PTA. CT head on admission revealed a 4 mm left parafalcine subdural hematoma.  Neurology consulted and neurosurgery notified. Upon review, neurosurgery recommended conservative management with no reversal agent and management at our facility. Repeat CT head stable and pt was admitted to our facility. MRI brain reveals layering blood products now in the more chronic phase in the L>R dependent portions of the occipital horns of the lateral ventricles, left cerebral hemispheric subdural hematoma with a maximal thickness of approx 10.5 mm, right frontal scalp hematoma and findings consistent with fairly extensive advanced chronic small vessel ischemic disease. Obtained repeat CT head on 3/2 as pt was more lethargic with CT revealing approx. 12 mm subdural hematoma at greatest thickness compared to previous 11 mm. Neurology recommending repeat imaging in 6 weeks. Cont close monitoring. Neurology and neurosurgery input appreciated.     Persistent Afib with RVR: Chronically anticoagulated with Eliquis which was held on admission in the setting of above. Transitioned off Cardizem gtt to PO amiodarone and Cardizem once able to tolerate PO intake. PO metoprolol restarted and has been titrated upward. PO Cardizem now D/C'd in the setting of reduced EF. Monitor on telemetry. Cardiology input appreciated.      Acute COPD exacerbation: Cont steroids, nebs and inhaler regimen.     Acute hypercapnic respiratory failure: Likely 2/2 COPD exacerbation and elevated O2 levels on supplemental O2. Overall improved with BiPAP. Cont to encourage consistent use of BiPAP at HS.     Acute metabolic encephalopathy superimposed on baseline dementia: Likely 2/2 hypercapnia and hospitalization. Previously required Precedex. Continues to have episodes of increased confusion and lethargy. Cont treatment as outlined above and supportive treatment.     Hypertensive urgency: Improved with IV labetalol and hydralazine with some fluctuation but overall improved. Cont PO metoprolol and nitrate. Cont to monitor.     NSTEMI in the  setting of known CAD: Possibly type II in the setting of above. Echo reveals an EF of 41-45%. No antiplatelet therapy in the setting of subdural hematoma. Plans to reconsider antiplatelet therapy in 6 weeks following repeat imaging. Cont to monitor on telemetry.     Right nasal bone fracture: S/P fall. Cont supportive treatment.     GERD: Cont PPI.     Debility: PT/OT     Goals of Care: Pt is DNR/DNI. Consult palliative care for additional support with ongoing GOC discussions in the setting of lack of significant clinical improvement.     DVT PPX: SCD's. No pharmacologic DVT PPX 2/2 subdural hematoma.     Disposition Pending clinical course and further GOC discussions.      Prasad Romero,   03/03/24  11:57 EST

## 2024-03-04 LAB
ALBUMIN SERPL-MCNC: 2.8 G/DL (ref 3.5–5.2)
ALBUMIN/GLOB SERPL: 1.4 G/DL
ALP SERPL-CCNC: 54 U/L (ref 39–117)
ALT SERPL W P-5'-P-CCNC: 10 U/L (ref 1–33)
ANION GAP SERPL CALCULATED.3IONS-SCNC: 10.1 MMOL/L (ref 5–15)
ANISOCYTOSIS BLD QL: NORMAL
AST SERPL-CCNC: 12 U/L (ref 1–32)
BACTERIA BLD CULT: ABNORMAL
BASOPHILS # BLD AUTO: 0.03 10*3/MM3 (ref 0–0.2)
BASOPHILS NFR BLD AUTO: 0.3 % (ref 0–1.5)
BILIRUB SERPL-MCNC: 0.4 MG/DL (ref 0–1.2)
BOTTLE TYPE: ABNORMAL
BUN SERPL-MCNC: 19 MG/DL (ref 8–23)
BUN/CREAT SERPL: 24.7 (ref 7–25)
CALCIUM SPEC-SCNC: 8.4 MG/DL (ref 8.6–10.5)
CHLORIDE SERPL-SCNC: 103 MMOL/L (ref 98–107)
CO2 SERPL-SCNC: 29.9 MMOL/L (ref 22–29)
CREAT SERPL-MCNC: 0.77 MG/DL (ref 0.57–1)
DACRYOCYTES BLD QL SMEAR: NORMAL
DEPRECATED RDW RBC AUTO: 73.5 FL (ref 37–54)
EGFRCR SERPLBLD CKD-EPI 2021: 80.6 ML/MIN/1.73
ELLIPTOCYTES BLD QL SMEAR: NORMAL
EOSINOPHIL # BLD AUTO: 0 10*3/MM3 (ref 0–0.4)
EOSINOPHIL NFR BLD AUTO: 0 % (ref 0.3–6.2)
ERYTHROCYTE [DISTWIDTH] IN BLOOD BY AUTOMATED COUNT: 20 % (ref 12.3–15.4)
GLOBULIN UR ELPH-MCNC: 2 GM/DL
GLUCOSE SERPL-MCNC: 130 MG/DL (ref 65–99)
HCT VFR BLD AUTO: 38.7 % (ref 34–46.6)
HGB BLD-MCNC: 11.3 G/DL (ref 12–15.9)
HYPOCHROMIA BLD QL: NORMAL
IMM GRANULOCYTES # BLD AUTO: 0.29 10*3/MM3 (ref 0–0.05)
IMM GRANULOCYTES NFR BLD AUTO: 2.9 % (ref 0–0.5)
LYMPHOCYTES # BLD AUTO: 0.37 10*3/MM3 (ref 0.7–3.1)
LYMPHOCYTES NFR BLD AUTO: 3.7 % (ref 19.6–45.3)
MCH RBC QN AUTO: 28.7 PG (ref 26.6–33)
MCHC RBC AUTO-ENTMCNC: 29.2 G/DL (ref 31.5–35.7)
MCV RBC AUTO: 98.2 FL (ref 79–97)
MONOCYTES # BLD AUTO: 0.36 10*3/MM3 (ref 0.1–0.9)
MONOCYTES NFR BLD AUTO: 3.6 % (ref 5–12)
NEUTROPHILS NFR BLD AUTO: 8.97 10*3/MM3 (ref 1.7–7)
NEUTROPHILS NFR BLD AUTO: 89.5 % (ref 42.7–76)
NRBC BLD AUTO-RTO: 0.2 /100 WBC (ref 0–0.2)
PLAT MORPH BLD: NORMAL
PLATELET # BLD AUTO: 155 10*3/MM3 (ref 140–450)
PMV BLD AUTO: 11.1 FL (ref 6–12)
POTASSIUM SERPL-SCNC: 4 MMOL/L (ref 3.5–5.2)
PROT SERPL-MCNC: 4.8 G/DL (ref 6–8.5)
RBC # BLD AUTO: 3.94 10*6/MM3 (ref 3.77–5.28)
SODIUM SERPL-SCNC: 143 MMOL/L (ref 136–145)
WBC NRBC COR # BLD AUTO: 10.02 10*3/MM3 (ref 3.4–10.8)

## 2024-03-04 PROCEDURE — 94799 UNLISTED PULMONARY SVC/PX: CPT

## 2024-03-04 PROCEDURE — 94664 DEMO&/EVAL PT USE INHALER: CPT

## 2024-03-04 PROCEDURE — 99233 SBSQ HOSP IP/OBS HIGH 50: CPT | Performed by: INTERNAL MEDICINE

## 2024-03-04 PROCEDURE — 94660 CPAP INITIATION&MGMT: CPT

## 2024-03-04 PROCEDURE — 25010000002 CEFTRIAXONE PER 250 MG: Performed by: INTERNAL MEDICINE

## 2024-03-04 PROCEDURE — 97167 OT EVAL HIGH COMPLEX 60 MIN: CPT

## 2024-03-04 PROCEDURE — 85007 BL SMEAR W/DIFF WBC COUNT: CPT | Performed by: INTERNAL MEDICINE

## 2024-03-04 PROCEDURE — 85025 COMPLETE CBC W/AUTO DIFF WBC: CPT | Performed by: INTERNAL MEDICINE

## 2024-03-04 PROCEDURE — 94761 N-INVAS EAR/PLS OXIMETRY MLT: CPT

## 2024-03-04 PROCEDURE — 92610 EVALUATE SWALLOWING FUNCTION: CPT

## 2024-03-04 PROCEDURE — 99232 SBSQ HOSP IP/OBS MODERATE 35: CPT | Performed by: NURSE PRACTITIONER

## 2024-03-04 PROCEDURE — 80053 COMPREHEN METABOLIC PANEL: CPT | Performed by: INTERNAL MEDICINE

## 2024-03-04 PROCEDURE — 25010000002 METHYLPREDNISOLONE PER 40 MG: Performed by: INTERNAL MEDICINE

## 2024-03-04 RX ADMIN — IPRATROPIUM BROMIDE 0.5 MG: 0.5 SOLUTION RESPIRATORY (INHALATION) at 06:54

## 2024-03-04 RX ADMIN — AMIODARONE HYDROCHLORIDE 400 MG: 200 TABLET ORAL at 08:43

## 2024-03-04 RX ADMIN — Medication 10 ML: at 20:43

## 2024-03-04 RX ADMIN — ROSUVASTATIN CALCIUM 40 MG: 20 TABLET, FILM COATED ORAL at 17:40

## 2024-03-04 RX ADMIN — LEVOTHYROXINE SODIUM 75 MCG: 0.07 TABLET ORAL at 06:34

## 2024-03-04 RX ADMIN — MEMANTINE HYDROCHLORIDE 10 MG: 10 TABLET, FILM COATED ORAL at 20:40

## 2024-03-04 RX ADMIN — FERROUS SULFATE TAB 325 MG (65 MG ELEMENTAL FE) 325 MG: 325 (65 FE) TAB at 08:43

## 2024-03-04 RX ADMIN — Medication 1 MG: at 08:43

## 2024-03-04 RX ADMIN — SERTRALINE 100 MG: 50 TABLET, FILM COATED ORAL at 08:43

## 2024-03-04 RX ADMIN — Medication 10 ML: at 08:47

## 2024-03-04 RX ADMIN — BUDESONIDE AND FORMOTEROL FUMARATE DIHYDRATE 2 PUFF: 160; 4.5 AEROSOL RESPIRATORY (INHALATION) at 18:47

## 2024-03-04 RX ADMIN — METHYLPREDNISOLONE SODIUM SUCCINATE 20 MG: 40 INJECTION, POWDER, FOR SOLUTION INTRAMUSCULAR; INTRAVENOUS at 20:41

## 2024-03-04 RX ADMIN — AMIODARONE HYDROCHLORIDE 400 MG: 200 TABLET ORAL at 17:39

## 2024-03-04 RX ADMIN — RISPERIDONE 0.5 MG: 0.25 TABLET, FILM COATED ORAL at 08:43

## 2024-03-04 RX ADMIN — BUDESONIDE AND FORMOTEROL FUMARATE DIHYDRATE 2 PUFF: 160; 4.5 AEROSOL RESPIRATORY (INHALATION) at 07:05

## 2024-03-04 RX ADMIN — METOPROLOL SUCCINATE 50 MG: 50 TABLET, EXTENDED RELEASE ORAL at 08:43

## 2024-03-04 RX ADMIN — ISOSORBIDE MONONITRATE 15 MG: 30 TABLET, EXTENDED RELEASE ORAL at 08:43

## 2024-03-04 RX ADMIN — SACUBITRIL AND VALSARTAN 0.5 TABLET: 24; 26 TABLET, FILM COATED ORAL at 08:43

## 2024-03-04 RX ADMIN — RISPERIDONE 0.5 MG: 0.25 TABLET, FILM COATED ORAL at 20:41

## 2024-03-04 RX ADMIN — FERROUS SULFATE TAB 325 MG (65 MG ELEMENTAL FE) 325 MG: 325 (65 FE) TAB at 17:39

## 2024-03-04 RX ADMIN — TIOTROPIUM BROMIDE INHALATION SPRAY 2 PUFF: 3.12 SPRAY, METERED RESPIRATORY (INHALATION) at 07:05

## 2024-03-04 RX ADMIN — DOXYCYCLINE 100 MG: 100 INJECTION, POWDER, LYOPHILIZED, FOR SOLUTION INTRAVENOUS at 08:46

## 2024-03-04 RX ADMIN — DONEPEZIL HYDROCHLORIDE 10 MG: 5 TABLET, FILM COATED ORAL at 20:40

## 2024-03-04 RX ADMIN — Medication 10 ML: at 08:48

## 2024-03-04 RX ADMIN — IPRATROPIUM BROMIDE 0.5 MG: 0.5 SOLUTION RESPIRATORY (INHALATION) at 12:41

## 2024-03-04 RX ADMIN — SODIUM CHLORIDE 2000 MG: 9 INJECTION, SOLUTION INTRAVENOUS at 08:46

## 2024-03-04 RX ADMIN — DOCUSATE SODIUM 50 MG AND SENNOSIDES 8.6 MG 2 TABLET: 8.6; 5 TABLET, FILM COATED ORAL at 20:41

## 2024-03-04 RX ADMIN — DOXYCYCLINE 100 MG: 100 INJECTION, POWDER, LYOPHILIZED, FOR SOLUTION INTRAVENOUS at 20:36

## 2024-03-04 RX ADMIN — IPRATROPIUM BROMIDE 0.5 MG: 0.5 SOLUTION RESPIRATORY (INHALATION) at 00:13

## 2024-03-04 RX ADMIN — IPRATROPIUM BROMIDE 0.5 MG: 0.5 SOLUTION RESPIRATORY (INHALATION) at 18:47

## 2024-03-04 RX ADMIN — METHYLPREDNISOLONE SODIUM SUCCINATE 20 MG: 40 INJECTION, POWDER, FOR SOLUTION INTRAMUSCULAR; INTRAVENOUS at 08:43

## 2024-03-04 RX ADMIN — EMPAGLIFLOZIN 10 MG: 10 TABLET, FILM COATED ORAL at 08:43

## 2024-03-04 RX ADMIN — SACUBITRIL AND VALSARTAN 0.5 TABLET: 24; 26 TABLET, FILM COATED ORAL at 20:36

## 2024-03-04 RX ADMIN — MEMANTINE HYDROCHLORIDE 10 MG: 10 TABLET, FILM COATED ORAL at 08:43

## 2024-03-04 RX ADMIN — PANTOPRAZOLE SODIUM 40 MG: 40 TABLET, DELAYED RELEASE ORAL at 08:43

## 2024-03-04 NOTE — THERAPY RE-EVALUATION
Acute Care - Speech Language Pathology   Swallow Re-Assessment  [place]     Patient Name: Brenda Munroe  : 1950  MRN: [id number]  Today's Date: 3/4/2024  Onset of Illness/Injury or Date of Surgery: 24     Referring Physician: Heather      Admit Date: 2024    Visit Dx:     ICD-10-CM ICD-9-CM   1. Subdural hematoma  S06.5XAA 432.1   2. NSTEMI (non-ST elevated myocardial infarction)  I21.4 410.70   3. Chronic anticoagulation  Z79.01 V58.61   4. Closed fracture of nasal bone, initial encounter  S02.2XXA 802.0   5. Traumatic orbital hematoma, right, initial encounter  S05.11XA 921.2     Patient Active Problem List   Diagnosis    Psychosis    Severe recurrent major depression with psychotic features    COPD (chronic obstructive pulmonary disease)    Coronary artery disease    Disease of thyroid gland    Arthritis    Hypertension    Paroxysmal atrial fibrillation    Chronic headaches    Vision changes    Carotid stenosis, asymptomatic, bilateral    Bradycardia, sinus    Acute respiratory failure with hypoxia and hypercapnia    NSTEMI (non-ST elevated myocardial infarction)    A-fib    Hypercapnic respiratory failure    Subdural hematoma     Past Medical History:   Diagnosis Date    Anxiety     Arthritis     Bell's palsy     Bladder prolapse, female, acquired     Carotid stenosis     COPD (chronic obstructive pulmonary disease)     COPD (chronic obstructive pulmonary disease)     Coronary artery disease     Dementia     Dementia     Depression     Difficulty walking     Disease of thyroid gland     Frequency of urination     GERD (gastroesophageal reflux disease)     Heart attack     Hyperlipidemia     Hypertension     Irregular heart beat     Peptic ulcer disease      Past Surgical History:   Procedure Laterality Date    CARDIAC CATHETERIZATION N/A 2023    Procedure: Left Heart Cath;  Surgeon: Tab Cifuentes MD;  Location: [facility] INVASIVE LOCATION;  Service: Cardiology;   Laterality: N/A;    CARDIAC SURGERY      open heart  in 1999    CYSTOSCOPY N/A 11/8/2017    Procedure: CYSTOSCOPY;  Surgeon: Karl Nicolas DO;  Location:  [facility] OR;  Service:     OTHER SURGICAL HISTORY      states she had fluid drained no surgery    VAGINAL HYSTERECTOMY W/ ANTERIOR AND POSTERIOR VAGINAL REPAIR N/A 11/8/2017    Procedure: VAGINAL HYSTERECTOMY WITH ANTERIOR, POSTERIOR, AND ENTEROCELE VAGINAL REPAIR;  Surgeon: Karl Nicolas DO;  Location:  [facility] OR;  Service:     VAGINAL VAULT SUSPENSION N/A 11/8/2017    Procedure: VAGINAL VAULT SUSPENSION ;  Surgeon: Karl Nicolas DO;  Location:  [facility] OR;  Service:      Ms. Munroe was seen at bedside this am on PCU for continued diet safety/reassessment. She is s/p MBS 2/26/24 which revealed a moderate oral dysphagia, felt to most likely be related to ams, wfl pharyngeal swallow w/o evidence of aspiration. She was recommended modified po diet of puree consistency, thin liquids. She has requested to continue this modified po diet thus far.     She was observed on 3L O2 via NC w/o complications. WBC 10.02, she was afebrile.      She was resting upon SLP entry, easily awakened to verbal stimuli. She was noted w/ waxing/waning mentation over the weekend. She was arousable this am but continued moderately fatigued and was limitedly interactive today.      She was positioned upright and centered in bed to accept multiple po presentations of thin liquids via straw. She declined further presentations and consistencies today despite coaxing and encouragement.      Upon po presentations, adequate bolus anticipation w/ good labial seal. Bolus formation, manipulation, and control were mildly weak in general. A-p transit was mildly delayed w/o significant oral residue. No overt s/s aspiration evidenced before the swallow.      Pharyngeal swallow was timely. No overt s/s aspiration evidenced. No obvious silent aspiration suspected.      Impression: Ms.  Ludwig is accepting what is felt to be her least restrictive/preferred modified po diet at this time w/o overt s/s aspiration.      She does evidence w/ waxing and waning ams and a/a status. Recommend continued puree consistencies at this time. Please continue 1:1 feeding assistance, meds whole in puree or crushed prn, fully a/a for all po intake.      SLP Recommendation and Plan  1. Puree consistency, thin liquids.   2. Medications whole in puree/crushed prn.   3. 1:1 feeding assistance.   4. Upright and centered for all po intake.   5. Universal aspiration precautions.   6. YANETH precautions.   7. Oral care protocol.   SLP to f/u for continued diet safety/assessment for possible upgrade, s/l cognitive status.      D/w patient results and recommendations w/ verbal understanding and agreement. Question retention 2/2 ams.      Thank you for allowing me to participate in the care of your patient-  Ana Curtis M.A., CCC-SLP      EDUCATION  The patient has been educated in the following areas:   Dysphagia (Swallowing Impairment) Modified Diet Instruction.      Time Calculation:    Time Calculation- SLP       Row Name 03/04/24 1220             Time Calculation- SLP    SLP - Next Appointment 03/05/24  -MIAN                User Key  (r) = Recorded By, (t) = Taken By, (c) = Cosigned By      Initials Name Provider Type    Ana Dai MA,CCC-SLP Speech and Language Pathologist                    Therapy Charges for Today       Code Description Service Date Service Provider Modifiers Qty    [id number]  ST EVAL ORAL PHARYNG SWALLOW 4 3/4/2024 Ana Curtis MA,CCC-SLP GN 1          Ana Curtis MA,CCC-SLP  3/4/2024

## 2024-03-04 NOTE — PROGRESS NOTES
[facility] HOSPITALIST PROGRESS NOTE     Patient Identification:  Name:  Brenda Munroe  Age:  73 y.o.  Sex:  female  :  1950  MRN:  [id number]  Visit Number:  [id number]  ROOM: [address]     Primary Care Provider:  Bernadette Arevalo MD    Length of stay in inpatient status:  10    Subjective     Chief Compliant:    Chief Complaint   Patient presents with    Fall    Head Injury       History of Presenting Illness:    Patient awake and alert this AM. She was able to more consistently follow commands than previously documented but did not respond to every question. She thought she was currently in [place]. Patient was on tachycardic side and appeared to have mild dyspnea. Frequent nonproductive cough while I was bedside. No family bedside.     ROS:  Otherwise 10 point ROS negative other than documented above in HPI.     Objective     Current Hospital Meds:amiodarone, 400 mg, Oral, BID With Meals  budesonide-formoterol, 2 puff, Inhalation, BID - RT   And  tiotropium bromide monohydrate, 2 puff, Inhalation, Daily - RT  cefTRIAXone, 2,000 mg, Intravenous, Q24H  cholecalciferol, 50,000 Units, Oral, Weekly  donepezil, 10 mg, Oral, Nightly  doxycycline, 100 mg, Intravenous, Q12H  empagliflozin, 10 mg, Oral, Daily  ferrous sulfate, 325 mg, Oral, BID With Meals  folic acid, 1 mg, Oral, Daily  ipratropium, 0.5 mg, Nebulization, 4x Daily - RT  isosorbide mononitrate, 15 mg, Oral, Q24H  levothyroxine, 75 mcg, Oral, Q AM  memantine, 10 mg, Oral, Q12H  methylPREDNISolone sodium succinate, 20 mg, Intravenous, Q12H  metoprolol succinate XL, 50 mg, Oral, Q24H  pantoprazole, 40 mg, Oral, QAM AC  risperiDONE, 0.5 mg, Oral, BID  rosuvastatin, 40 mg, Oral, Q PM  sacubitril-valsartan, 0.5 tablet, Oral, Q12H  senna-docusate sodium, 2 tablet, Oral, BID  sertraline, 100 mg, Oral, Daily  sodium chloride, 10 mL, Intravenous, Q12H  sodium chloride, 10 mL, Intravenous, Q12H  sodium chloride, 10 mL, Intravenous, Q12H          Current Antimicrobial Therapy:  Anti-Infectives (From admission, onward)      Ordered     Dose/Rate Route Frequency Start Stop    03/04/24 0735  doxycycline (VIBRAMYCIN) 100 mg in sodium chloride 0.9 % 100 mL IVPB-VTB        Ordering Provider: Efren Dunham MD    100 mg  over 60 Minutes Intravenous Every 12 Hours 03/04/24 0900 03/09/24 0859    03/04/24 0735  cefTRIAXone (ROCEPHIN) 2,000 mg in sodium chloride 0.9 % 100 mL IVPB-VTB        Ordering Provider: Efren Dunham MD    2,000 mg  200 mL/hr over 30 Minutes Intravenous Every 24 Hours 03/04/24 0800 03/09/24 0759          Current Diuretic Therapy:  Diuretics (From admission, onward)      None          ----------------------------------------------------------------------------------------------------------------------  Vital Signs:  Temp:  [96.9 °F (36.1 °C)-98.9 °F (37.2 °C)] 96.9 °F (36.1 °C)  Heart Rate:  [] 96  Resp:  [20-26] 22  BP: ()/(58-87) 117/76  SpO2:  [90 %-100 %] 100 %  on  Flow (L/min):  [3] 3;   Device (Oxygen Therapy): nasal cannula  Body mass index is 30.88 kg/m².    Wt Readings from Last 3 Encounters:   03/04/24 81.6 kg (179 lb 14.3 oz)   02/11/24 68.5 kg (151 lb)   02/02/24 68.9 kg (151 lb 14.4 oz)     Intake & Output (last 3 days)         03/01 0701 03/02 0700 03/02 0701  03/03 0700 03/03 0701 03/04 0700 03/04 0701 03/05 0700    P.O. 25 0 100 240    Total Intake(mL/kg) 25 (0.3) 0 (0) 100 (1.2) 240 (2.9)    Urine (mL/kg/hr) 550 (0.3) 1175 (0.6) 600 (0.3)     Stool 0 0 0     Total Output 550 1175 600     Net -525 -1175 -500 +240            Urine Unmeasured Occurrence 2 x 1 x 2 x     Stool Unmeasured Occurrence 2 x 1 x 4 x           Diet: Regular/House Diet; Feeding Assistance - Nursing; Texture: Pureed (NDD 1); Fluid Consistency: Thin (IDDSI 0)  ----------------------------------------------------------------------------------------------------------------------  Physical exam:  Constitutional:  Well-developed and  well-nourished.  HENT:  Head:  Normocephalic and atraumatic.  Mouth:  Moist mucous membranes.    Eyes:  Conjunctivae and EOM are normal. No scleral icterus.    Neck:  Neck supple.  No JVD present.    Cardiovascular:  Normal rate, regular rhythm and normal heart sounds with no murmur.  Pulmonary/Chest:  Coarse to decreased breath sounds on right.   Abdominal:  Soft.  Bowel sounds are normal.  No distension and no tenderness.   Musculoskeletal:  No edema, no tenderness, and no deformity.  No red or swollen joints anywhere.    Neurological:  Alert and oriented to person only. Moving all extremities.   Skin:  Skin is warm and dry. No rash noted. No pallor.   Peripheral vascular:  Pulses in all 4 extremities with no clubbing, no cyanosis, no edema.  ----------------------------------------------------------------------------------------------------------------------  Tele:    ----------------------------------------------------------------------------------------------------------------------  Results from last 7 days   Lab Units 03/04/24  0050 03/03/24  1829 03/03/24  0037 03/02/24  0346   LACTATE mmol/L  --  0.9  --   --    WBC 10*3/mm3 10.02  --  9.22 9.23   HEMOGLOBIN g/dL 11.3*  --  10.8* 10.3*   HEMATOCRIT % 38.7  --  37.3 36.2   MCV fL 98.2*  --  101.1* 101.7*   MCHC g/dL 29.2*  --  29.0* 28.5*   PLATELETS 10*3/mm3 155  --  126* 149     Results from last 7 days   Lab Units 03/02/24  2303   PH, ARTERIAL pH units 7.380   PO2 ART mm Hg 57.0*   PCO2, ARTERIAL mm Hg 62.2*   HCO3 ART mmol/L 36.8*     Results from last 7 days   Lab Units 03/04/24  0050 03/03/24  0037 03/02/24  0346 03/01/24  0115 02/29/24  0014 02/28/24  0031 02/27/24  0030   SODIUM mmol/L 143 141 142   < > 141   < > 145   POTASSIUM mmol/L 4.0 4.3 4.3   < > 4.5   < > 3.9   CHLORIDE mmol/L 103 104 103   < > 103   < > 103   CO2 mmol/L 29.9* 27.6 31.6*   < > 30.7*   < > 29.4*   BUN mg/dL 19 21 20   < > 24*   < > 19   CREATININE mg/dL 0.77 0.71 0.71   < >  "0.72   < > 0.77   CALCIUM mg/dL 8.4* 8.5* 8.8   < > 8.6   < > 8.7   GLUCOSE mg/dL 130* 119* 142*   < > 156*   < > 150*   ALBUMIN g/dL 2.8*  --   --   --  3.3*  --  3.5   BILIRUBIN mg/dL 0.4  --   --   --  0.3  --  0.5   ALK PHOS U/L 54  --   --   --  63  --  68   AST (SGOT) U/L 12  --   --   --  16  --  15   ALT (SGPT) U/L 10  --   --   --  15  --  13    < > = values in this interval not displayed.   Estimated Creatinine Clearance: 65.3 mL/min (by C-G formula based on SCr of 0.77 mg/dL).  Ammonia   Date Value Ref Range Status   03/03/2024 29 11 - 51 umol/L Final   03/02/2024 52 (H) 11 - 51 umol/L Final                 No results found for: \"HGBA1C\", \"POCGLU\"  Lab Results   Component Value Date    TSH 0.876 01/23/2024    FREET4 1.59 03/25/2021     No results found for: \"PREGTESTUR\", \"PREGSERUM\", \"HCG\", \"HCGQUANT\"  Pain Management Panel  More data exists         Latest Ref Rng & Units 2/23/2024 2/11/2024   Pain Management Panel   Amphetamine, Urine Qual Negative Negative  Negative    Barbiturates Screen, Urine Negative Negative  Negative    Benzodiazepine Screen, Urine Negative Positive  Positive    Buprenorphine, Screen, Urine Negative Negative  Negative    Cocaine Screen, Urine Negative Negative  Negative    Fentanyl, Urine Negative Negative  Negative    Methadone Screen , Urine Negative Negative  Negative    Methamphetamine, Ur Negative Negative  Negative      Brief Urine Lab Results  (Last result in the past 365 days)        Color   Clarity   Blood   Leuk Est   Nitrite   Protein   CREAT   Urine HCG        03/03/24 1826 Dark Yellow   Turbid   Moderate (2+)   Trace   Negative   30 mg/dL (1+)                 No results found for: \"BLOODCX\"  No results found for: \"URINECX\"  No results found for: \"WOUNDCX\"  No results found for: \"STOOLCX\"  No results found for: \"RESPCX\"  No results found for: \"AFBCX\"  Results from last 7 days   Lab Units 03/03/24  1829   PROCALCITONIN ng/mL 0.55*   LACTATE mmol/L 0.9       I have " personally looked at the labs and they are summarized above.  ----------------------------------------------------------------------------------------------------------------------  Detailed radiology reports for the last 24 hours:    Imaging Results (Last 24 Hours)       Procedure Component Value Units Date/Time    XR Chest 1 View [[id number]] Collected: 03/03/24 1951     Updated: 03/03/24 1953    Narrative:      INDICATION: Cough.     TECHNIQUE: Frontal radiograph of the chest.     COMPARISON: 2/11/2024     FINDINGS:   Cardiomegaly. Pulmonary vasculature appears within normal limits. Small  to moderate volume left pleural effusion with atelectasis/airspace  disease. No pneumothorax. No acute fracture.       Impression:      Small to moderate volume left pleural effusion with atelectasis/airspace  disease.        This report was finalized on 3/3/2024 7:51 PM by Alex Pallas, DO.             Assessment & Plan    #Traumatic left parafalcine 4 mm subdural hematoma   #Nasal fracture   #Home anticoagulation with apixiban  - CT on admission revealed 4 mm left parafalcine subdural hematoma   - Neurology and neurosurgery evaluated the patient in the ED   - Neurosurgery noted very unlikely to progress in size to clinically significant bleed and recommended to hold reversing anticoagulation and keep patient at our facility. Repeat CT head 2/24 did ot appear much different on my or neurosurgery evaluation.  - Repeat CT head on 3/2 as pt was more lethargic with CT revealing approx. 12 mm subdural hematoma at greatest thickness compared to previous 11 mm. Neurology recommending repeat imaging in 6 weeks. Cont close monitoring. Neurology and neurosurgery input appreciated.   - Neurology recommending to continue lamictal 100 mg BID.      #NSTEMI  - HS troponin was also notably elevated on arrival at 474 with repeat at 452. No significant changes appreciated on EKG.   - Stress test on 2/1/24 with no evidence of ischemia.   - No  clear cause at this juncture, but lean toward type II event  - Repeated echo revealed LVEF dropped to 41-45%.   - Consulted cardiology. Hold anticoagulation and antiplatelets for now given subdural hematoma discussed above.      #Acute hypercapnic respiratory failure   #Mild COPD exacerbation   - Recurrent issue the last year. Suspect exacerbated by elevated oxygen levels on 4L NC.   - Placed on BiPAP, was able to find and use mask without nasal component to help prevent pain  - SpO2 goal 88-92, PRN and QHSBIPAP      #L sided pneumonia and small to moderate pleural effusion  #Acute metabolic encephalopathy   #Dementia w/ behavioral disturbance  - Suspect 2/2 above issues in setting of significant dementia. Has been an issue in previous admissions as well.   - Fluctuating, likely hospital delirium component.   - Procal elevated      #pAfib f/ RVR  - Cardiology consulted as above.   - Hold anticoagulation for now as above  - Continue metoprolol      #Hypertensive urgency   - Continue PO metoprolol and nitrate. Well controlled.      Chronic medical problems:  MDD  COPD- Hx of respiratory acidosis. ABG compensated. Continue to offer QHS BiPAP. Goal SPO2 88-92%.   Thyroid disease  Bilateral carotid stenosis  Dementia  GERD  PUD     F: As per SLP.   A: PRN  S: None  T: SCDs  H: HOB elevated  U: PRN  G: PRN  S: N/A  B: PRN  I: PIV  D: Ceftriaxone, doxycyline      Code status: DNR/DNI. Guarded overall prognosis. Palliative care consulted.      Dispo:Continue PCU level of care        VTE Prophylaxis:   Mechanical Order History:        Ordered        02/23/24 1523  Place Sequential Compression Device  Once            02/23/24 1523  Maintain Sequential Compression Device  Continuous                          Pharmalogical Order History:       None            Efren Dunham MD  [facility] Hospitalist  03/04/24  09:42 EST

## 2024-03-04 NOTE — THERAPY EVALUATION
Acute Care - Occupational Therapy Initial Evaluation   [place]     Patient Name: Brenda Munroe  : 1950  MRN: [id number]  Today's Date: 3/4/2024  Onset of Illness/Injury or Date of Surgery: 24     Referring Physician: Heather    Admit Date: 2024       ICD-10-CM ICD-9-CM   1. Subdural hematoma  S06.5XAA 432.1   2. NSTEMI (non-ST elevated myocardial infarction)  I21.4 410.70   3. Chronic anticoagulation  Z79.01 V58.61   4. Closed fracture of nasal bone, initial encounter  S02.2XXA 802.0   5. Traumatic orbital hematoma, right, initial encounter  S05.11XA 921.2     Patient Active Problem List   Diagnosis    Psychosis    Severe recurrent major depression with psychotic features    COPD (chronic obstructive pulmonary disease)    Coronary artery disease    Disease of thyroid gland    Arthritis    Hypertension    Paroxysmal atrial fibrillation    Chronic headaches    Vision changes    Carotid stenosis, asymptomatic, bilateral    Bradycardia, sinus    Acute respiratory failure with hypoxia and hypercapnia    NSTEMI (non-ST elevated myocardial infarction)    A-fib    Hypercapnic respiratory failure    Subdural hematoma     Past Medical History:   Diagnosis Date    Anxiety     Arthritis     Bell's palsy     Bladder prolapse, female, acquired     Carotid stenosis     COPD (chronic obstructive pulmonary disease)     COPD (chronic obstructive pulmonary disease)     Coronary artery disease     Dementia     Dementia     Depression     Difficulty walking     Disease of thyroid gland     Frequency of urination     GERD (gastroesophageal reflux disease)     Heart attack     Hyperlipidemia     Hypertension     Irregular heart beat     Peptic ulcer disease      Past Surgical History:   Procedure Laterality Date    CARDIAC CATHETERIZATION N/A 2023    Procedure: Left Heart Cath;  Surgeon: Tab Cifuentes MD;  Location: Loc[facility] INVASIVE LOCATION;  Service: Cardiology;  Laterality: N/A;    CARDIAC  SURGERY      open heart  in 1999    CYSTOSCOPY N/A 11/8/2017    Procedure: CYSTOSCOPY;  Surgeon: Karl Nicolas DO;  Location: [facility] OR;  Service:     OTHER SURGICAL HISTORY      states she had fluid drained no surgery    VAGINAL HYSTERECTOMY W/ ANTERIOR AND POSTERIOR VAGINAL REPAIR N/A 11/8/2017    Procedure: VAGINAL HYSTERECTOMY WITH ANTERIOR, POSTERIOR, AND ENTEROCELE VAGINAL REPAIR;  Surgeon: Karl Nicolas DO;  Location: [facility] OR;  Service:     VAGINAL VAULT SUSPENSION N/A 11/8/2017    Procedure: VAGINAL VAULT SUSPENSION ;  Surgeon: Karl Nicolas DO;  Location: [facility] OR;  Service:          OT ASSESSMENT FLOWSHEET (Last 12 Hours)       OT Evaluation and Treatment       Row Name 03/04/24 1117                   OT Time and Intention    Document Type evaluation  -KR        Mode of Treatment occupational therapy  -KR        Patient Effort fair  -KR           General Information    General Observations of Patient alert/pleasant but unable to consistently follow fxl commands  -KR           Living Environment    Current Living Arrangements residential facility  -KR        People in Home facility resident  -KR           Cognition    Affect/Mental Status (Cognition) confused  -KR        Orientation Status (Cognition) unable/difficult to assess  -KR        Follows Commands (Cognition) increased processing time needed;delayed response/completion;physical/tactile prompts required;verbal cues/prompting required  -KR           Range of Motion Comprehensive    Comment, General Range of Motion RUE observed WFL for manipulation of bed linen/reaching. LUE not observed for ROM performance/unable to follow commands for ROM assessment  -KR           Strength Comprehensive (MMT)    Comment, General Manual Muscle Testing (MMT) Assessment BUE unable/difficult to assess  -KR           Bathing Assessment/Intervention    [place] Level (Bathing) maximum assist (25% patient effort);dependent (less than 25%  patient effort)  -KR           Lower Body Dressing Assessment/Training    [place] Level (Lower Body Dressing) maximum assist (25% patient effort);dependent (less than 25% patient effort)  -KR           Grooming Assessment/Training    [place] Level (Grooming) maximum assist (25% patient effort);dependent (less than 25% patient effort)  -KR           Self-Feeding Assessment/Training    [place] Level (Feeding) maximum assist (25% patient effort);dependent (less than 25% patient effort)  -KR           Toileting Assessment/Training    [place] Level (Toileting) maximum assist (25% patient effort);dependent (less than 25% patient effort)  -KR           Plan of Care Review    Plan of Care Reviewed With patient  Pt alert but unable to express comprehension of treatment plan  -KR           Therapy Assessment/Plan (OT)    Planned Therapy Interventions (OT) activity tolerance training  -KR           Therapy Plan Review/Discharge Plan (OT)    Anticipated Discharge Disposition (OT) skilled nursing facility  -KR           OT Goals    Activity Tolerance Goal Selection (OT) activity tolerance, OT goal 1  -KR            Activity Tolerance Goal 1 (OT)    Activity Tolerance Goal 1 (OT) Increase to enhance ability to assist caregiver in ADL performance  -KR        Activity Level (Endurance Goal 1, OT) 10 min activity  -KR        Time Frame (Activity Tolerance Goal 1, OT) by discharge  -KR                  User Key  (r) = Recorded By, (t) = Taken By, (c) = Cosigned By      Initials Name Effective Dates    KR Erik Carrillo, OT 06/16/21 -                            OT Recommendation and Plan  Planned Therapy Interventions (OT): activity tolerance training  Plan of Care Review  Plan of Care Reviewed With: patient (Pt alert but unable to express comprehension of treatment plan)  Plan of Care Reviewed With: patient (Pt alert but unable to express comprehension of treatment plan)        Time Calculation:     Therapy Charges  for Today       Code Description Service Date Service Provider Modifiers Qty    [id number] HC OT EVAL HIGH COMPLEXITY 4 3/4/2024 Erik Carrillo, OT GO 1                 Erik Carrillo OT  3/4/2024

## 2024-03-04 NOTE — PLAN OF CARE
Goal Outcome Evaluation:  Plan of Care Reviewed With: patient        Progress: no change  Outcome Evaluation: VSS on 3L NC and bipap when sleeping; Pt has not answered any questions this shift; pt will only squeeze with her right hand; providers aware; safety maintained; No additional distress noted and no other needs requested at this time.

## 2024-03-04 NOTE — CASE MANAGEMENT/SOCIAL WORK
Discharge Planning Assessment  [facility]     Patient Name: Brenda Munroe  MRN: [id number]  Today's Date: 3/4/2024    Admit Date: 2/23/2024     Discharge Plan       Row Name 03/04/24 1331       Plan    Plan Pt is a resident of [facility]. Per Phoebe, pt had a 10 day bed hold up on admission. Pt bed hold expires on this date. SS contacted [facility]& per Phoebe who states facility will accept pt back after bed hold expires. Palliative Care following for GOC. SS to follow.                Destination       Service Provider Request Status Selected Services Address Phone Fax Patient Preferred    [facility] Pending - No Request Sent N/A [address] [id number] [phone] --               JORGE Bowers

## 2024-03-04 NOTE — PLAN OF CARE
Goal Outcome Evaluation:  Plan of Care Reviewed With: patient           Outcome Evaluation: VSS on 3L NC. Plan of care continued. Safety maintained, call light in reach.

## 2024-03-04 NOTE — CONSULTS
Palliative Care Initial Consult     Attending Physician: Efren Dunham MD  Referring Provider: Dr. Romero     assistance with advance directives, assistance with clarification of goals of care, and psychosocial support  Code Status:   Code Status and Medical Interventions:   Ordered at: 02/24/24 0846     Medical Intervention Limits:    NO intubation (DNI)     Code Status (Patient has no pulse and is not breathing):    No CPR (Do Not Attempt to Resuscitate)     Medical Interventions (Patient has pulse or is breathing):    Limited Support      Advanced Directives: Advance Directive Status: Patient has advance directive, copy in chart   Healthcare surrogate: Edi Munroe - Son , Fabien Munroe -Son   Goals of Care: DNR/DNI     HPI:  Brenda Munroe is a 73 y.o. female admitted on 2/23/2024 for a subdural hematoma. She has a past medical history of MDD, COPD, CAD, thyroid disease, hypertension, paroxysmal atrial fibrillation, bilateral carotid stenosis, dementia, GERD, and PUD . She was found in the floor at the local nursing home after an unwitnessed fall. She has dementia and is a very poor historian. During visit today, she was only able to shake her head yes and no to questions. She is alert to self only today and staff report increased confusion and delirium. Her initial CT scan showed a Traumatic left parafalcine 4 mm subdural . Neurosurgery originally noted very unlikely to progress in size to clinically significant bleed and recommended to hold reversing anticoagulation and keep patient at our facility. She also had an elevated HS troponin 474 and repeat was 452. Her initial ED vital signs were temp 97, heart rate 111, respirations 20, BP 91/97, SpO2 100% on 4 L per nasal cannula.  HS troponin on arrival significantly elevated at 474 with repeat at 452.  CK is normal.  Sodium is 147.  Urine notable for specific gravity greater than 1.03 with moderate blood, 1+ leukocytes, 11-20 RBCs and 21-50 WBCs.  Imaging  workup noted 4 mm left parafalcine subdural hematoma.  There is also a right nasal bone fracture.  EKG notes A-fib with right bundle branch block. Her most recent labs   3/4/2024- sodium 143, calcium 8.4, total protein 4.8, albumin 2.8, procalcitonin 0.55, H&H 11.3/38.7. Her UA from 3/3/2024 is dark yellow in color, turbin appearance, bacteria 1+ and BCID, PCR was + for klebsiella pneumoniae group. Her CXR also showed small to moderate volume left pleural effusion with atelectasis/airspace disease. She remains hypotensive bp 87/57 hr 94 irregular , afib on monitor, rr 20 sat 98% 02@3lpm n/c         ROS: Unable to obtain due to mental status      Past Medical History:   Diagnosis Date    Anxiety     Arthritis     Bell's palsy     Bladder prolapse, female, acquired     Carotid stenosis     COPD (chronic obstructive pulmonary disease)     COPD (chronic obstructive pulmonary disease)     Coronary artery disease     Dementia     Dementia     Depression     Difficulty walking     Disease of thyroid gland     Frequency of urination     GERD (gastroesophageal reflux disease)     Heart attack     Hyperlipidemia     Hypertension     Irregular heart beat     Peptic ulcer disease      Past Surgical History:   Procedure Laterality Date    CARDIAC CATHETERIZATION N/A 2/21/2023    Procedure: Left Heart Cath;  Surgeon: Tab Cifuentes MD;  Location: [facility] CATH INVASIVE LOCATION;  Service: Cardiology;  Laterality: N/A;    CARDIAC SURGERY      open heart  in 1999    CYSTOSCOPY N/A 11/8/2017    Procedure: CYSTOSCOPY;  Surgeon: Karl Nicolas DO;  Location: [facility] OR;  Service:     OTHER SURGICAL HISTORY      states she had fluid drained no surgery    VAGINAL HYSTERECTOMY W/ ANTERIOR AND POSTERIOR VAGINAL REPAIR N/A 11/8/2017    Procedure: VAGINAL HYSTERECTOMY WITH ANTERIOR, POSTERIOR, AND ENTEROCELE VAGINAL REPAIR;  Surgeon: Karl Nicolas DO;  Location: [facility] OR;  Service:     VAGINAL VAULT SUSPENSION  N/A 11/8/2017    Procedure: VAGINAL VAULT SUSPENSION ;  Surgeon: Karl Nicolas DO;  Location: [facility] OR;  Service:      Social History     Socioeconomic History    Marital status:     Number of children: 3   Tobacco Use    Smoking status: Every Day     Current packs/day: 0.50     Average packs/day: 0.5 packs/day for 55.0 years (27.5 ttl pk-yrs)     Types: Cigarettes    Smokeless tobacco: Never   Vaping Use    Vaping status: Never Used   Substance and Sexual Activity    Alcohol use: No    Drug use: No    Sexual activity: Never     Comment: denies     Family History   Problem Relation Age of Onset    Dementia Sister     Heart attack Mother     Tuberculosis Father     Breast cancer Neg Hx        No Known Allergies    Current Facility-Administered Medications   Medication Dose Route Frequency Provider Last Rate Last Admin    acetaminophen (TYLENOL) tablet 500 mg  500 mg Oral Q4H PRN Prasad Romero DO        albuterol (PROVENTIL) nebulizer solution 0.083% 2.5 mg/3mL  2.5 mg Nebulization Q6H PRN Prasad Romero DO        amiodarone (PACERONE) tablet 400 mg  400 mg Oral BID With Meals Radha Haas, APRN   400 mg at 03/04/24 0843    sennosides-docusate (PERICOLACE) 8.6-50 MG per tablet 2 tablet  2 tablet Oral BID Prasad Romero DO   2 tablet at 03/03/24 2021    And    polyethylene glycol (MIRALAX) packet 17 g  17 g Oral Daily PRN Prasad Romero DO        And    bisacodyl (DULCOLAX) EC tablet 5 mg  5 mg Oral Daily PRN Prasad Romero DO        And    bisacodyl (DULCOLAX) suppository 10 mg  10 mg Rectal Daily PRN Prasad Romero DO        budesonide-formoterol (SYMBICORT) 160-4.5 MCG/ACT inhaler 2 puff  2 puff Inhalation BID - RT Prasad Romero DO   2 puff at 03/04/24 0705    And    tiotropium (SPIRIVA RESPIMAT) 2.5 mcg/act aerosol solution inhaler  2 puff Inhalation Daily - RT Prasad Romero DO   2 puff at 03/04/24 0705     carboxymethylcellulose (REFRESH PLUS) 0.5 % ophthalmic solution 1 drop  1 drop Both Eyes TID PRN Prasad Romero DO        cefTRIAXone (ROCEPHIN) 2,000 mg in sodium chloride 0.9 % 100 mL IVPB-VTB  2,000 mg Intravenous Q24H Efren Dunham  mL/hr at 03/04/24 0846 2,000 mg at 03/04/24 0846    cholecalciferol (VITAMIN D3) capsule 50,000 Units  50,000 Units Oral Weekly Prasad Romero DO   50,000 Units at 03/01/24 0828    donepezil (ARICEPT) tablet 10 mg  10 mg Oral Nightly Prasad Romero DO   10 mg at 03/03/24 2021    doxycycline (VIBRAMYCIN) 100 mg in sodium chloride 0.9 % 100 mL IVPB-VTB  100 mg Intravenous Q12H Efren Dunham MD   100 mg at 03/04/24 0846    empagliflozin (JARDIANCE) tablet 10 mg  10 mg Oral Daily Jasmina Alfonso MD   10 mg at 03/04/24 0843    ferrous sulfate tablet 325 mg  325 mg Oral BID With Meals Prasad Romero DO   325 mg at 03/04/24 0843    folic acid (FOLVITE) tablet 1 mg  1 mg Oral Daily Prasad Rmoero DO   1 mg at 03/04/24 0843    guaiFENesin tablet 400 mg  400 mg Oral Q4H PRN Prasad Romero DO        ipratropium (ATROVENT) nebulizer solution 0.5 mg  0.5 mg Nebulization 4x Daily - RT Prasad Romero DO   0.5 mg at 03/04/24 0654    isosorbide mononitrate (IMDUR) 24 hr tablet 15 mg  15 mg Oral Q24H Prasad Romero DO   15 mg at 03/04/24 0843    levothyroxine (SYNTHROID, LEVOTHROID) tablet 75 mcg  75 mcg Oral Q AM Prasad Romero DO   75 mcg at 03/04/24 0634    LORazepam (ATIVAN) tablet 0.5 mg  0.5 mg Oral Q12H PRN Prasad Romero DO        memantine (NAMENDA) tablet 10 mg  10 mg Oral Q12H Prasad Romero DO   10 mg at 03/04/24 0843    methylPREDNISolone sodium succinate (SOLU-Medrol) injection 20 mg  20 mg Intravenous Q12H Prasad Romero DO   20 mg at 03/04/24 0843    metoprolol succinate XL (TOPROL-XL) 24 hr tablet 50 mg  50 mg Oral Q24H Prasad Romero DO   50 mg at 03/04/24  0843    nitroglycerin (NITROSTAT) SL tablet 0.4 mg  0.4 mg Sublingual Q5 Min PRN HeatherPrasad wallace A, DO        pantoprazole (PROTONIX) EC tablet 40 mg  40 mg Oral QAM AC HeatherPrasad diallo, DO   40 mg at 03/04/24 0843    phenol (CHLORASEPTIC) 1.4 % liquid 1 spray  1 spray Mouth/Throat Q2H PRN HeatherPrasad wallace DO        risperiDONE (risperDAL) tablet 0.5 mg  0.5 mg Oral BID HeatherPrasad diallo, DO   0.5 mg at 03/04/24 0843    rosuvastatin (CRESTOR) tablet 40 mg  40 mg Oral Q PM HeatherPrasad diallo, DO   40 mg at 03/03/24 1721    sacubitril-valsartan (ENTRESTO) 24-26 MG tablet 0.5 tablet  0.5 tablet Oral Q12H Jasmina Alfonso MD   0.5 tablet at 03/04/24 0843    sertraline (ZOLOFT) tablet 100 mg  100 mg Oral Daily HeatherPrasad diallo, DO   100 mg at 03/04/24 0843    sodium chloride 0.9 % flush 10 mL  10 mL Intravenous Q12H HeatherPrasad diallo, DO   10 mL at 03/04/24 0848    sodium chloride 0.9 % flush 10 mL  10 mL Intravenous PRN HeatherPrasad diallo, DO        sodium chloride 0.9 % flush 10 mL  10 mL Intravenous Q12H HeatherPrasad diallo A, DO   10 mL at 03/04/24 0848    sodium chloride 0.9 % flush 10 mL  10 mL Intravenous PRN Heather, Prasad A, DO        sodium chloride 0.9 % flush 10 mL  10 mL Intravenous Q12H HeatherPrasad diallo A, DO   10 mL at 03/04/24 0847    sodium chloride 0.9 % flush 10 mL  10 mL Intravenous PRN HeatherPrasad diallo A, DO        sodium chloride 0.9 % infusion 40 mL  40 mL Intravenous PRN HeatherPrasad diallo A, DO        sodium chloride 0.9 % infusion 40 mL  40 mL Intravenous PRN HeatherPrasad A, DO        sodium chloride 0.9 % infusion 40 mL  40 mL Intravenous PRN Prasad Romero DO        trolamine salicylate (ASPERCREME) 10 % cream 1 Application  1 application  Topical Q6H PRN Prasad Romero DO   1 Application at 03/02/24 0827          acetaminophen    albuterol    senna-docusate sodium **AND** polyethylene glycol **AND** bisacodyl  "**AND** bisacodyl    carboxymethylcellulose    guaiFENesin    LORazepam    nitroglycerin    phenol    sodium chloride    sodium chloride    sodium chloride    sodium chloride    sodium chloride    sodium chloride    trolamine salicylate    Current medication reviewed for route, type, dose and frequency and are current per MAR.    Palliative Performance Scale Score:     BP (!) 87/57   Pulse 94   Temp 96.9 °F (36.1 °C) (Axillary) Comment (Src): Disposable thermometer.  Resp 20   Ht 162.6 cm (64\")   Wt 81.6 kg (179 lb 14.3 oz)   SpO2 98%   BMI 30.88 kg/m²     Intake/Output Summary (Last 24 hours) at 3/4/2024 1202  Last data filed at 3/4/2024 0800  Gross per 24 hour   Intake 340 ml   Output 600 ml   Net -260 ml       PE:  General Appearance:    Chronically ill appearing, alert, cooperative, NAD   HEENT:    NC/AT, without obvious abnormality, EOMI, anicteric , extensive facial bruising , right eye - subconjunctival hemorrhage    Neck:   supple, trachea midline, no JVD   Lungs:     Coarse breath sounds noted to bilateral lung fields    Heart:    Tachycardia, irregular, normal S1 and S2, no M/R/G   Abdomen:     Soft, NT, ND, NABS    Extremities:   Moves all extremities, no edema   Pulses:   Pulses palpable and equal bilaterally   Skin:   Warm, dry, extensive bruising noted to facial (worse on right side) bilateral eyes - purple/blue/yellow discoloration    Neurologic:   Alert to self only, confused, disoriented    Psych:   Calm, flat affect      Labs:   Results from last 7 days   Lab Units 03/04/24  0050   WBC 10*3/mm3 10.02   HEMOGLOBIN g/dL 11.3*   HEMATOCRIT % 38.7   PLATELETS 10*3/mm3 155     Results from last 7 days   Lab Units 03/04/24  0050   SODIUM mmol/L 143   POTASSIUM mmol/L 4.0   CHLORIDE mmol/L 103   CO2 mmol/L 29.9*   BUN mg/dL 19   CREATININE mg/dL 0.77   GLUCOSE mg/dL 130*   CALCIUM mg/dL 8.4*     Results from last 7 days   Lab Units 03/04/24  0050   SODIUM mmol/L 143   POTASSIUM mmol/L 4.0 "   CHLORIDE mmol/L 103   CO2 mmol/L 29.9*   BUN mg/dL 19   CREATININE mg/dL 0.77   CALCIUM mg/dL 8.4*   BILIRUBIN mg/dL 0.4   ALK PHOS U/L 54   ALT (SGPT) U/L 10   AST (SGOT) U/L 12   GLUCOSE mg/dL 130*     Imaging Results (Last 72 Hours)       Procedure Component Value Units Date/Time    XR Chest 1 View [[id number]] Collected: 03/03/24 1951     Updated: 03/03/24 1953    Narrative:      INDICATION: Cough.     TECHNIQUE: Frontal radiograph of the chest.     COMPARISON: 2/11/2024     FINDINGS:   Cardiomegaly. Pulmonary vasculature appears within normal limits. Small  to moderate volume left pleural effusion with atelectasis/airspace  disease. No pneumothorax. No acute fracture.       Impression:      Small to moderate volume left pleural effusion with atelectasis/airspace  disease.        This report was finalized on 3/3/2024 7:51 PM by Alex Pallas, DO.       CT Head Without Contrast [[id number]] Collected: 03/02/24 0941     Updated: 03/02/24 0946    Narrative:      EXAM:    CT Head Without Intravenous Contrast     EXAM DATE:    3/2/2024 9:35 AM     CLINICAL HISTORY:    Worsening AMS, recent fall with subdural hematoma; S06.5XAA-Traumatic  subdural hemorrhage with loss of consciousness status unknown, initial  encounter; I21.4-Non-ST elevation (NSTEMI) myocardial infarction;  Z79.01-Long term (current) use of anticoagulants; S02.2XXA-Fracture of  nasal bones, initial encounter for closed fracture; S05.11XA-Contusion  of eyeball and orbital tissues, right eye, initial     TECHNIQUE:    Axial computed tomography images of the head/brain without intravenous  contrast.  Sagittal and coronal reformatted images were created and  reviewed.  This CT exam was performed using one or more of the following  dose reduction techniques:  automated exposure control, adjustment of  the mA and/or kV according to patient size, and/or use of iterative  reconstruction technique.     COMPARISON:    2/29/2024     FINDINGS:    Brain and  extra-axial spaces:  In multiple planes, there is  approximately 1 mm increased thickness of left cerebral hemispheric  subdural hematoma when measured at its maximal thickness in the anterior  falcine region now approximately 12 mm in greatest thickness and  previously 11 mm in greatest thickness.  There remains no evidence of  midline shift.  Advanced chronic small vessel ischemic disease, stable.   No hydrocephalus.  No parenchymal hemorrhages have developed. No new  extra-axial hemorrhage is identified.    Bones/joints:  Unremarkable as visualized.  No acute fracture.    Soft tissues:  Right supraorbital frontal scalp hematoma appears  stable.    Sinuses: Acute right maxillary sinusitis is noted.    Mastoid air cells:  Unremarkable as visualized.  No mastoid effusion.       Impression:      1.  In multiple planes, there is approximately 1 mm increased thickness  of left cerebral hemispheric subdural hematoma when measured at its  maximal thickness in the anterior falcine region now approximately 12 mm  in greatest thickness and previously 11 mm in greatest thickness.  2.  No hydrocephalus.  3.  No parenchymal hemorrhages have developed. No new extra-axial  hemorrhage is identified.  4.  Right supraorbital frontal scalp hematoma appears stable.  5.  There remains no evidence of midline shift.  6.  Advanced chronic small vessel ischemic disease, stable.        This report was finalized on 3/2/2024 9:44 AM by Dr. Erik Jones MD.               Diagnostics: Reviewed    A: Brenda Munroe is a 73 y.o. female admitted on 2/23/2024 for a subdural hematoma. She has a past medical history of MDD, COPD, CAD, thyroid disease, hypertension, paroxysmal atrial fibrillation, bilateral carotid stenosis, dementia, GERD, and PUD . She was found in the floor at the local nursing home after an unwitnessed fall. She has dementia and is a very poor historian. During visit today, she was only able to shake her head yes and no to  questions. She is alert to self only today and staff report increased confusion and delirium. Her initial CT scan showed a Traumatic left parafalcine 4 mm subdural . Neurosurgery originally noted very unlikely to progress in size to clinically significant bleed and recommended to hold reversing anticoagulation and keep patient at our facility. She also had an elevated HS troponin 474 and repeat was 452. Her initial ED vital signs were temp 97, heart rate 111, respirations 20, BP 91/97, SpO2 100% on 4 L per nasal cannula.  HS troponin on arrival significantly elevated at 474 with repeat at 452.  CK is normal.  Sodium is 147.  Urine notable for specific gravity greater than 1.03 with moderate blood, 1+ leukocytes, 11-20 RBCs and 21-50 WBCs.  Imaging workup noted 4 mm left parafalcine subdural hematoma.  There is also a right nasal bone fracture.  EKG notes A-fib with right bundle branch block. Her most recent labs 3/4/2024- sodium 143, calcium 8.4, total protein 4.8, albumin 2.8, procalcitonin 0.55, H&H 11.3/38.7. Her UA from 3/3/2024 is dark yellow in color, turbin appearance, bacteria 1+ and BCID, PCR was + for klebsiella pneumoniae group. Her CXR also showed small to moderate volume left pleural effusion with atelectasis/airspace disease. She remains hypotensive bp 87/57 hr 94 irregular , afib on monitor, rr 20 sat 98% 02@3lpm n/c          P: Spoke with son Edi Munroe, who reports that is the primary contact for Mrs. Munroe, he reports that there are 2 other brothers however they are not involved in patients care. After speaking with Dr. Dunham, he plans to discuss with son about prognosis, treatment options and about possibly transitioning to comfort care. Edi provides cell phone number [phone] for Dr. Dunham to use as a contact number. Will continue to provide symptomatic and supportive palliative care for both patient and family.     We appreciate the consult and the opportunity to participate in Brenda FREED  Ludwig's care. We will continue to follow along. Please do not hesitate to contact us regarding further symptom management or goals of care needs, including after hours or on weekends via our on call provider at [phone].     Time: 60 minutes spent reviewing medical and medication records, assessing and examining patient, discussing with family, answering questions, providing some guidance about a plan and documentation of care, and coordinating care with other healthcare members, with > 50% time spent face to face.     Clara Jensen, APRN    3/4/2024

## 2024-03-04 NOTE — PROGRESS NOTES
Palliative Care Initial Consult     Attending Physician: Efren Dunham MD  Referring Provider: Dr. Romero     assistance with advance directives, assistance with clarification of goals of care, and psychosocial support  Code Status:   Code Status and Medical Interventions:   Ordered at: 02/24/24 0846     Medical Intervention Limits:    NO intubation (DNI)     Code Status (Patient has no pulse and is not breathing):    No CPR (Do Not Attempt to Resuscitate)     Medical Interventions (Patient has pulse or is breathing):    Limited Support      Advanced Directives: Advance Directive Status: Patient has advance directive, copy in chart   Healthcare surrogate: Edi Munroe - Son , Fabien Munroe -Son   Goals of Care: DNR/DNI     HPI:  Brenda Munroe is a 73 y.o. female admitted on 2/23/2024 for a subdural hematoma. She has a past medical history of MDD, COPD, CAD, thyroid disease, hypertension, paroxysmal atrial fibrillation, bilateral carotid stenosis, dementia, GERD, and PUD . She was found in the floor at the local nursing home after an unwitnessed fall. She has dementia and is a very poor historian. During visit today, she was only able to shake her head yes and no to questions. She is alert to self only today and staff report increased confusion and delirium. Her initial CT scan showed a Traumatic left parafalcine 4 mm subdural . Neurosurgery originally noted very unlikely to progress in size to clinically significant bleed and recommended to hold reversing anticoagulation and keep patient at our facility. She also had an elevated HS troponin 474 and repeat was 452. Her initial ED vital signs were temp 97, heart rate 111, respirations 20, BP 91/97, SpO2 100% on 4 L per nasal cannula.  HS troponin on arrival significantly elevated at 474 with repeat at 452.  CK is normal.  Sodium is 147.  Urine notable for specific gravity greater than 1.03 with moderate blood, 1+ leukocytes, 11-20 RBCs and 21-50 WBCs.  Imaging  workup noted 4 mm left parafalcine subdural hematoma.  There is also a right nasal bone fracture.  EKG notes A-fib with right bundle branch block. Her most recent labs   3/4/2024- sodium 143, calcium 8.4, total protein 4.8, albumin 2.8, procalcitonin 0.55, H&H 11.3/38.7. Her UA from 3/3/2024 is dark yellow in color, turbin appearance, bacteria 1+ and BCID, PCR was + for klebsiella pneumoniae group. Her CXR also showed small to moderate volume left pleural effusion with atelectasis/airspace disease. She remains hypotensive bp 87/57 hr 94 irregular , afib on monitor, rr 20 sat 98% 02@3lpm n/c         ROS: Unable to obtain due to mental status      Past Medical History:   Diagnosis Date    Anxiety     Arthritis     Bell's palsy     Bladder prolapse, female, acquired     Carotid stenosis     COPD (chronic obstructive pulmonary disease)     COPD (chronic obstructive pulmonary disease)     Coronary artery disease     Dementia     Dementia     Depression     Difficulty walking     Disease of thyroid gland     Frequency of urination     GERD (gastroesophageal reflux disease)     Heart attack     Hyperlipidemia     Hypertension     Irregular heart beat     Peptic ulcer disease      Past Surgical History:   Procedure Laterality Date    CARDIAC CATHETERIZATION N/A 2/21/2023    Procedure: Left Heart Cath;  Surgeon: Tab Cifuentes MD;  Location: [facility] CATH INVASIVE LOCATION;  Service: Cardiology;  Laterality: N/A;    CARDIAC SURGERY      open heart  in 1999    CYSTOSCOPY N/A 11/8/2017    Procedure: CYSTOSCOPY;  Surgeon: Karl Nicolas DO;  Location: [facility] OR;  Service:     OTHER SURGICAL HISTORY      states she had fluid drained no surgery    VAGINAL HYSTERECTOMY W/ ANTERIOR AND POSTERIOR VAGINAL REPAIR N/A 11/8/2017    Procedure: VAGINAL HYSTERECTOMY WITH ANTERIOR, POSTERIOR, AND ENTEROCELE VAGINAL REPAIR;  Surgeon: Karl Nicolas DO;  Location: [facility] OR;  Service:     VAGINAL VAULT SUSPENSION  N/A 11/8/2017    Procedure: VAGINAL VAULT SUSPENSION ;  Surgeon: Karl Nicolas DO;  Location: [facility] OR;  Service:      Social History     Socioeconomic History    Marital status:     Number of children: 3   Tobacco Use    Smoking status: Every Day     Current packs/day: 0.50     Average packs/day: 0.5 packs/day for 55.0 years (27.5 ttl pk-yrs)     Types: Cigarettes    Smokeless tobacco: Never   Vaping Use    Vaping status: Never Used   Substance and Sexual Activity    Alcohol use: No    Drug use: No    Sexual activity: Never     Comment: denies     Family History   Problem Relation Age of Onset    Dementia Sister     Heart attack Mother     Tuberculosis Father     Breast cancer Neg Hx        No Known Allergies    Current Facility-Administered Medications   Medication Dose Route Frequency Provider Last Rate Last Admin    acetaminophen (TYLENOL) tablet 500 mg  500 mg Oral Q4H PRN Prasad Romero DO        albuterol (PROVENTIL) nebulizer solution 0.083% 2.5 mg/3mL  2.5 mg Nebulization Q6H PRN Prasad Romero DO        amiodarone (PACERONE) tablet 400 mg  400 mg Oral BID With Meals Radha Haas, APRN   400 mg at 03/04/24 0843    sennosides-docusate (PERICOLACE) 8.6-50 MG per tablet 2 tablet  2 tablet Oral BID Prasad Romero DO   2 tablet at 03/03/24 2021    And    polyethylene glycol (MIRALAX) packet 17 g  17 g Oral Daily PRN Prasad Romero DO        And    bisacodyl (DULCOLAX) EC tablet 5 mg  5 mg Oral Daily PRN Prasad Romero DO        And    bisacodyl (DULCOLAX) suppository 10 mg  10 mg Rectal Daily PRN Prasad Romero DO        budesonide-formoterol (SYMBICORT) 160-4.5 MCG/ACT inhaler 2 puff  2 puff Inhalation BID - RT Prasad Romero DO   2 puff at 03/04/24 0705    And    tiotropium (SPIRIVA RESPIMAT) 2.5 mcg/act aerosol solution inhaler  2 puff Inhalation Daily - RT Prasad Romero DO   2 puff at 03/04/24 0705     carboxymethylcellulose (REFRESH PLUS) 0.5 % ophthalmic solution 1 drop  1 drop Both Eyes TID PRN Prasad Romero DO        cefTRIAXone (ROCEPHIN) 2,000 mg in sodium chloride 0.9 % 100 mL IVPB-VTB  2,000 mg Intravenous Q24H Efren Dunham  mL/hr at 03/04/24 0846 2,000 mg at 03/04/24 0846    cholecalciferol (VITAMIN D3) capsule 50,000 Units  50,000 Units Oral Weekly Prasad Romero DO   50,000 Units at 03/01/24 0828    donepezil (ARICEPT) tablet 10 mg  10 mg Oral Nightly Prasad Romero DO   10 mg at 03/03/24 2021    doxycycline (VIBRAMYCIN) 100 mg in sodium chloride 0.9 % 100 mL IVPB-VTB  100 mg Intravenous Q12H Efren Dunham MD   100 mg at 03/04/24 0846    empagliflozin (JARDIANCE) tablet 10 mg  10 mg Oral Daily Jasmina Alfonso MD   10 mg at 03/04/24 0843    ferrous sulfate tablet 325 mg  325 mg Oral BID With Meals Prasad Romero DO   325 mg at 03/04/24 0843    folic acid (FOLVITE) tablet 1 mg  1 mg Oral Daily Prasad Romero DO   1 mg at 03/04/24 0843    guaiFENesin tablet 400 mg  400 mg Oral Q4H PRN Prasad Romero DO        ipratropium (ATROVENT) nebulizer solution 0.5 mg  0.5 mg Nebulization 4x Daily - RT Prasad Romero DO   0.5 mg at 03/04/24 0654    isosorbide mononitrate (IMDUR) 24 hr tablet 15 mg  15 mg Oral Q24H Prasad Romero DO   15 mg at 03/04/24 0843    levothyroxine (SYNTHROID, LEVOTHROID) tablet 75 mcg  75 mcg Oral Q AM Prasad Romero DO   75 mcg at 03/04/24 0634    LORazepam (ATIVAN) tablet 0.5 mg  0.5 mg Oral Q12H PRN Prasad Romero DO        memantine (NAMENDA) tablet 10 mg  10 mg Oral Q12H Prasad Romero DO   10 mg at 03/04/24 0843    methylPREDNISolone sodium succinate (SOLU-Medrol) injection 20 mg  20 mg Intravenous Q12H Prasad Roemro DO   20 mg at 03/04/24 0843    metoprolol succinate XL (TOPROL-XL) 24 hr tablet 50 mg  50 mg Oral Q24H Prasad Romero DO   50 mg at 03/04/24  0843    nitroglycerin (NITROSTAT) SL tablet 0.4 mg  0.4 mg Sublingual Q5 Min PRN HeatherPrasad wallace A, DO        pantoprazole (PROTONIX) EC tablet 40 mg  40 mg Oral QAM AC HeatherPrasad dilalo, DO   40 mg at 03/04/24 0843    phenol (CHLORASEPTIC) 1.4 % liquid 1 spray  1 spray Mouth/Throat Q2H PRN HeatherPrasad wallace DO        risperiDONE (risperDAL) tablet 0.5 mg  0.5 mg Oral BID HeatherPrasad diallo, DO   0.5 mg at 03/04/24 0843    rosuvastatin (CRESTOR) tablet 40 mg  40 mg Oral Q PM HeatherPrasad diallo, DO   40 mg at 03/03/24 1721    sacubitril-valsartan (ENTRESTO) 24-26 MG tablet 0.5 tablet  0.5 tablet Oral Q12H Jasmina Alfonso MD   0.5 tablet at 03/04/24 0843    sertraline (ZOLOFT) tablet 100 mg  100 mg Oral Daily HeatherPrasad diallo, DO   100 mg at 03/04/24 0843    sodium chloride 0.9 % flush 10 mL  10 mL Intravenous Q12H HeatherPrasad diallo, DO   10 mL at 03/04/24 0848    sodium chloride 0.9 % flush 10 mL  10 mL Intravenous PRN HeatherPrasad diallo, DO        sodium chloride 0.9 % flush 10 mL  10 mL Intravenous Q12H HeatherPrasad diallo A, DO   10 mL at 03/04/24 0848    sodium chloride 0.9 % flush 10 mL  10 mL Intravenous PRN Heather, Prasad A, DO        sodium chloride 0.9 % flush 10 mL  10 mL Intravenous Q12H HeatherPrasad diallo A, DO   10 mL at 03/04/24 0847    sodium chloride 0.9 % flush 10 mL  10 mL Intravenous PRN HeatherPrasad diallo A, DO        sodium chloride 0.9 % infusion 40 mL  40 mL Intravenous PRN HeatherPrasad diallo A, DO        sodium chloride 0.9 % infusion 40 mL  40 mL Intravenous PRN HeatherPrasad A, DO        sodium chloride 0.9 % infusion 40 mL  40 mL Intravenous PRN Prasad Romero DO        trolamine salicylate (ASPERCREME) 10 % cream 1 Application  1 application  Topical Q6H PRN Prasad Romero DO   1 Application at 03/02/24 0827          acetaminophen    albuterol    senna-docusate sodium **AND** polyethylene glycol **AND** bisacodyl  "**AND** bisacodyl    carboxymethylcellulose    guaiFENesin    LORazepam    nitroglycerin    phenol    sodium chloride    sodium chloride    sodium chloride    sodium chloride    sodium chloride    sodium chloride    trolamine salicylate    Current medication reviewed for route, type, dose and frequency and are current per MAR.    Palliative Performance Scale Score:     BP (!) 87/57   Pulse 94   Temp 96.9 °F (36.1 °C) (Axillary) Comment (Src): Disposable thermometer.  Resp 20   Ht 162.6 cm (64\")   Wt 81.6 kg (179 lb 14.3 oz)   SpO2 98%   BMI 30.88 kg/m²     Intake/Output Summary (Last 24 hours) at 3/4/2024 1202  Last data filed at 3/4/2024 0800  Gross per 24 hour   Intake 340 ml   Output 600 ml   Net -260 ml       PE:  General Appearance:    Chronically ill appearing, alert, cooperative, NAD   HEENT:    NC/AT, without obvious abnormality, EOMI, anicteric , extensive facial bruising , right eye - subconjunctival hemorrhage    Neck:   supple, trachea midline, no JVD   Lungs:     Coarse breath sounds noted to bilateral lung fields    Heart:    Tachycardia, irregular, normal S1 and S2, no M/R/G   Abdomen:     Soft, NT, ND, NABS    Extremities:   Moves all extremities, no edema   Pulses:   Pulses palpable and equal bilaterally   Skin:   Warm, dry, extensive bruising noted to facial (worse on right side) bilateral eyes - purple/blue/yellow discoloration    Neurologic:   Alert to self only, confused, disoriented    Psych:   Calm, flat affect      Labs:   Results from last 7 days   Lab Units 03/04/24  0050   WBC 10*3/mm3 10.02   HEMOGLOBIN g/dL 11.3*   HEMATOCRIT % 38.7   PLATELETS 10*3/mm3 155     Results from last 7 days   Lab Units 03/04/24  0050   SODIUM mmol/L 143   POTASSIUM mmol/L 4.0   CHLORIDE mmol/L 103   CO2 mmol/L 29.9*   BUN mg/dL 19   CREATININE mg/dL 0.77   GLUCOSE mg/dL 130*   CALCIUM mg/dL 8.4*     Results from last 7 days   Lab Units 03/04/24  0050   SODIUM mmol/L 143   POTASSIUM mmol/L 4.0 "   CHLORIDE mmol/L 103   CO2 mmol/L 29.9*   BUN mg/dL 19   CREATININE mg/dL 0.77   CALCIUM mg/dL 8.4*   BILIRUBIN mg/dL 0.4   ALK PHOS U/L 54   ALT (SGPT) U/L 10   AST (SGOT) U/L 12   GLUCOSE mg/dL 130*     Imaging Results (Last 72 Hours)       Procedure Component Value Units Date/Time    XR Chest 1 View [[id number]] Collected: 03/03/24 1951     Updated: 03/03/24 1953    Narrative:      INDICATION: Cough.     TECHNIQUE: Frontal radiograph of the chest.     COMPARISON: 2/11/2024     FINDINGS:   Cardiomegaly. Pulmonary vasculature appears within normal limits. Small  to moderate volume left pleural effusion with atelectasis/airspace  disease. No pneumothorax. No acute fracture.       Impression:      Small to moderate volume left pleural effusion with atelectasis/airspace  disease.        This report was finalized on 3/3/2024 7:51 PM by Alex Pallas, DO.       CT Head Without Contrast [[id number]] Collected: 03/02/24 0941     Updated: 03/02/24 0946    Narrative:      EXAM:    CT Head Without Intravenous Contrast     EXAM DATE:    3/2/2024 9:35 AM     CLINICAL HISTORY:    Worsening AMS, recent fall with subdural hematoma; S06.5XAA-Traumatic  subdural hemorrhage with loss of consciousness status unknown, initial  encounter; I21.4-Non-ST elevation (NSTEMI) myocardial infarction;  Z79.01-Long term (current) use of anticoagulants; S02.2XXA-Fracture of  nasal bones, initial encounter for closed fracture; S05.11XA-Contusion  of eyeball and orbital tissues, right eye, initial     TECHNIQUE:    Axial computed tomography images of the head/brain without intravenous  contrast.  Sagittal and coronal reformatted images were created and  reviewed.  This CT exam was performed using one or more of the following  dose reduction techniques:  automated exposure control, adjustment of  the mA and/or kV according to patient size, and/or use of iterative  reconstruction technique.     COMPARISON:    2/29/2024     FINDINGS:    Brain and  extra-axial spaces:  In multiple planes, there is  approximately 1 mm increased thickness of left cerebral hemispheric  subdural hematoma when measured at its maximal thickness in the anterior  falcine region now approximately 12 mm in greatest thickness and  previously 11 mm in greatest thickness.  There remains no evidence of  midline shift.  Advanced chronic small vessel ischemic disease, stable.   No hydrocephalus.  No parenchymal hemorrhages have developed. No new  extra-axial hemorrhage is identified.    Bones/joints:  Unremarkable as visualized.  No acute fracture.    Soft tissues:  Right supraorbital frontal scalp hematoma appears  stable.    Sinuses: Acute right maxillary sinusitis is noted.    Mastoid air cells:  Unremarkable as visualized.  No mastoid effusion.       Impression:      1.  In multiple planes, there is approximately 1 mm increased thickness  of left cerebral hemispheric subdural hematoma when measured at its  maximal thickness in the anterior falcine region now approximately 12 mm  in greatest thickness and previously 11 mm in greatest thickness.  2.  No hydrocephalus.  3.  No parenchymal hemorrhages have developed. No new extra-axial  hemorrhage is identified.  4.  Right supraorbital frontal scalp hematoma appears stable.  5.  There remains no evidence of midline shift.  6.  Advanced chronic small vessel ischemic disease, stable.        This report was finalized on 3/2/2024 9:44 AM by Dr. Erik Jones MD.               Diagnostics: Reviewed    A: Brenda Munroe is a 73 y.o. female admitted on 2/23/2024 for a subdural hematoma. She has a past medical history of MDD, COPD, CAD, thyroid disease, hypertension, paroxysmal atrial fibrillation, bilateral carotid stenosis, dementia, GERD, and PUD . She was found in the floor at the local nursing home after an unwitnessed fall. She has dementia and is a very poor historian. During visit today, she was only able to shake her head yes and no to  questions. She is alert to self only today and staff report increased confusion and delirium. Her initial CT scan showed a Traumatic left parafalcine 4 mm subdural . Neurosurgery originally noted very unlikely to progress in size to clinically significant bleed and recommended to hold reversing anticoagulation and keep patient at our facility. She also had an elevated HS troponin 474 and repeat was 452. Her initial ED vital signs were temp 97, heart rate 111, respirations 20, BP 91/97, SpO2 100% on 4 L per nasal cannula.  HS troponin on arrival significantly elevated at 474 with repeat at 452.  CK is normal.  Sodium is 147.  Urine notable for specific gravity greater than 1.03 with moderate blood, 1+ leukocytes, 11-20 RBCs and 21-50 WBCs.  Imaging workup noted 4 mm left parafalcine subdural hematoma.  There is also a right nasal bone fracture.  EKG notes A-fib with right bundle branch block. Her most recent labs 3/4/2024- sodium 143, calcium 8.4, total protein 4.8, albumin 2.8, procalcitonin 0.55, H&H 11.3/38.7. Her UA from 3/3/2024 is dark yellow in color, turbin appearance, bacteria 1+ and BCID, PCR was + for klebsiella pneumoniae group. Her CXR also showed small to moderate volume left pleural effusion with atelectasis/airspace disease. She remains hypotensive bp 87/57 hr 94 irregular , afib on monitor, rr 20 sat 98% 02@3lpm n/c          P: Spoke with son Edi Munroe, who reports that is the primary contact for Mrs. Munroe, he reports that there are 2 other brothers however they are not involved in patients care. After speaking with Dr. Dunham, he plans to discuss with son about prognosis, treatment options and about possibly transitioning to comfort care. Edi provides cell phone number [phone] for Dr. Dunham to use as a contact number. Will continue to provide symptomatic and supportive palliative care for both patient and family.     We appreciate the consult and the opportunity to participate in Brenda FREED  Ludwig's care. We will continue to follow along. Please do not hesitate to contact us regarding further symptom management or goals of care needs, including after hours or on weekends via our on call provider at [phone].     Time: 60 minutes spent reviewing medical and medication records, assessing and examining patient, discussing with family, answering questions, providing some guidance about a plan and documentation of care, and coordinating care with other healthcare members, with > 50% time spent face to face.     Clara Jensen, APRN    3/4/2024

## 2024-03-04 NOTE — PROGRESS NOTES
"Stroke Progress Note       Chief Complaint:  confusion    Subjective    Subjective     Subjective:  No acute events overnight.  Denies new complaints.  Had some waxing/waning mental status exam over weekend.  Repeat CT head 3/2/24 showed slight increase in SDH.    Objective      Temp:  [96.9 °F (36.1 °C)-98.9 °F (37.2 °C)] 96.9 °F (36.1 °C)  Heart Rate:  [] 112  Resp:  [12-25] 24  BP: ()/(58-87) 101/76        Alert, oriented to self only.  Told me \"I don't know\" when asked location  Follows simple commands in all 4 ext  No focal deficits noted, generally weak    Results Review:    I reviewed the patient's new clinical results.    Lab Results (last 24 hours)       Procedure Component Value Units Date/Time    Blood Culture ID, PCR - Blood, Arm, Right [[id number]] Collected: 03/03/24 1829    Specimen: Blood from Arm, Right Updated: 03/04/24 0939    CBC & Differential [[id number]]  (Abnormal) Collected: 03/04/24 0050    Specimen: Blood Updated: 03/04/24 0127    Narrative:      The following orders were created for panel order CBC & Differential.  Procedure                               Abnormality         Status                     ---------                               -----------         ------                     CBC Auto Differential[[id number]]        Abnormal            Final result               Scan Slide[[id number]]                                       Final result                 Please view results for these tests on the individual orders.    CBC Auto Differential [[id number]]  (Abnormal) Collected: 03/04/24 0050    Specimen: Blood Updated: 03/04/24 0127     WBC 10.02 10*3/mm3      RBC 3.94 10*6/mm3      Hemoglobin 11.3 g/dL      Hematocrit 38.7 %      MCV 98.2 fL      MCH 28.7 pg      MCHC 29.2 g/dL      RDW 20.0 %      RDW-SD 73.5 fl      MPV 11.1 fL      Platelets 155 10*3/mm3      Neutrophil % 89.5 %      Lymphocyte % 3.7 %      Monocyte % 3.6 %      Eosinophil % 0.0 %      Basophil % 0.3 %      " Immature Grans % 2.9 %      Neutrophils, Absolute 8.97 10*3/mm3      Lymphocytes, Absolute 0.37 10*3/mm3      Monocytes, Absolute 0.36 10*3/mm3      Eosinophils, Absolute 0.00 10*3/mm3      Basophils, Absolute 0.03 10*3/mm3      Immature Grans, Absolute 0.29 10*3/mm3      nRBC 0.2 /100 WBC     Scan Slide [[id number]] Collected: 03/04/24 0050    Specimen: Blood Updated: 03/04/24 0127     Anisocytosis Mod/2+     Dacrocytes Slight/1+     Elliptocytes Slight/1+     Hypochromia Slight/1+     Platelet Morphology Normal    Comprehensive Metabolic Panel [[id number]]  (Abnormal) Collected: 03/04/24 0050    Specimen: Blood Updated: 03/04/24 0127     Glucose 130 mg/dL      BUN 19 mg/dL      Creatinine 0.77 mg/dL      Sodium 143 mmol/L      Potassium 4.0 mmol/L      Comment: Slight hemolysis detected by analyzer. Result may be falsely elevated.        Chloride 103 mmol/L      CO2 29.9 mmol/L      Calcium 8.4 mg/dL      Total Protein 4.8 g/dL      Albumin 2.8 g/dL      ALT (SGPT) 10 U/L      AST (SGOT) 12 U/L      Alkaline Phosphatase 54 U/L      Total Bilirubin 0.4 mg/dL      Globulin 2.0 gm/dL      A/G Ratio 1.4 g/dL      BUN/Creatinine Ratio 24.7     Anion Gap 10.1 mmol/L      eGFR 80.6 mL/min/1.73     Narrative:      GFR Normal >60  Chronic Kidney Disease <60  Kidney Failure <15    The GFR formula is only valid for adults with stable renal function between ages 18 and 70.    Procalcitonin [[id number]]  (Abnormal) Collected: 03/03/24 1829    Specimen: Blood Updated: 03/03/24 1909     Procalcitonin 0.55 ng/mL     Narrative:      As a Marker for Sepsis (Non-Neonates):    1. <0.5 ng/mL represents a low risk of severe sepsis and/or septic shock.  2. >2 ng/mL represents a high risk of severe sepsis and/or septic shock.    As a Marker for Lower Respiratory Tract Infections that require antibiotic therapy:    PCT on Admission    Antibiotic Therapy       6-12 Hrs later    >0.5                Strongly Recommended  >0.25 - <0.5         "Recommended   0.1 - 0.25          Discouraged              Remeasure/reassess PCT  <0.1                Strongly Discouraged     Remeasure/reassess PCT    As 28 day mortality risk marker: \"Change in Procalcitonin Result\" (>80% or <=80%) if Day 0 (or Day 1) and Day 4 values are available. Refer to http://[email or web address]-pct-calculator.com    Change in PCT <=80%  A decrease of PCT levels below or equal to 80% defines a positive change in PCT test result representing a higher risk for 28-day all-cause mortality of patients diagnosed with severe sepsis for septic shock.    Change in PCT >80%  A decrease of PCT levels of more than 80% defines a negative change in PCT result representing a lower risk for 28-day all-cause mortality of patients diagnosed with severe sepsis or septic shock.       Lactic Acid, Plasma [[id number]]  (Normal) Collected: 03/03/24 1829    Specimen: Blood Updated: 03/03/24 1858     Lactate 0.9 mmol/L     Urinalysis, Microscopic Only - Urine, Clean Catch [[id number]]  (Abnormal) Collected: 03/03/24 1826    Specimen: Urine, Clean Catch Updated: 03/03/24 1853     RBC, UA 6-10 /HPF      WBC, UA 3-5 /HPF      Comment: Urine culture not indicated.        Bacteria, UA 1+ /HPF      Squamous Epithelial Cells, UA 0-2 /HPF      Hyaline Casts, UA None Seen /LPF      Triple Phosphate Crystals, UA Large/3+ /HPF      Methodology Manual Light Microscopy    Blood Culture - Blood, Wrist, Right [[id number]] Collected: 03/03/24 1830    Specimen: Blood from Wrist, Right Updated: 03/03/24 1838    Blood Culture - Blood, Arm, Right [[id number]] Collected: 03/03/24 1829    Specimen: Blood from Arm, Right Updated: 03/03/24 1838    Urinalysis With Culture If Indicated - Urine, Clean Catch [[id number]]  (Abnormal) Collected: 03/03/24 1826    Specimen: Urine, Clean Catch Updated: 03/03/24 1835     Color, UA Dark Yellow     Appearance, UA Turbid     pH, UA 7.5     Specific Gravity, UA 1.027     Glucose, UA >=1000 mg/dL (3+)     Ketones, " UA Negative     Bilirubin, UA Negative     Blood, UA Moderate (2+)     Protein, UA 30 mg/dL (1+)     Leuk Esterase, UA Trace     Nitrite, UA Negative     Urobilinogen, UA 1.0 E.U./dL    Narrative:      In absence of clinical symptoms, the presence of pyuria, bacteria, and/or nitrites on the urinalysis result does not correlate with infection.          XR Chest 1 View    Result Date: 3/3/2024  Small to moderate volume left pleural effusion with atelectasis/airspace disease.   This report was finalized on 3/3/2024 7:51 PM by Alex Pallas, DO.     Results for orders placed during the hospital encounter of 02/23/24    Adult Transthoracic Echo Complete W/ Cont if Necessary Per Protocol    Interpretation Summary    Left ventricular systolic function is moderately decreased. Left ventricular ejection fraction appears to be 41 - 45%.    Left ventricular diastolic function was indeterminate.    Left atrial volume is mildly increased.    There is mild calcification of the aortic valve.    Estimated right ventricular systolic pressure from tricuspid regurgitation is mildly elevated (35-45 mmHg).    There is no evidence of pericardial effusion            Assessment/Plan     Assessment/Plan:    Traumatic Subdural hematoma.   Encephalopathy secondary to #1  A-fib w/RVR  Pneumonia/COPD exacerbation  Hx of dementia          Diagnostics   1. Repeat CT head 3/2- slight increase in subdural without any clinical significance.    Plan  If we were to consider aggressive care, the patient would need evaluation for Watchman by Cardiology and middle meningeal artery embolization by a neurosurgeon in the future. However, considering her comorbid conditions, I do not believe she would be a suitable candidate for these procedures. Therefore, we continue to recommend conservative medical management for now.    Palliative care has been consulted    Lamictal was discontinued at Dr. Gregory's recommendation, as it takes time of titration and  there is no clear indication at this time. Lamictal can cause dizziness and falls in elderly.     Stroke team will continue to follow the patient.     The case was discussed with the patient, Dr. Dunham, and Dr. Gregory.    Erik Pinedo, PAULA  03/04/24  11:14 EST

## 2024-03-05 LAB
ALBUMIN SERPL-MCNC: 2.7 G/DL (ref 3.5–5.2)
ALBUMIN/GLOB SERPL: 1.6 G/DL
ALP SERPL-CCNC: 59 U/L (ref 39–117)
ALT SERPL W P-5'-P-CCNC: 18 U/L (ref 1–33)
ANION GAP SERPL CALCULATED.3IONS-SCNC: 7.3 MMOL/L (ref 5–15)
ANISOCYTOSIS BLD QL: NORMAL
AST SERPL-CCNC: 15 U/L (ref 1–32)
BASOPHILS # BLD AUTO: 0.03 10*3/MM3 (ref 0–0.2)
BASOPHILS NFR BLD AUTO: 0.3 % (ref 0–1.5)
BILIRUB SERPL-MCNC: 0.3 MG/DL (ref 0–1.2)
BUN SERPL-MCNC: 21 MG/DL (ref 8–23)
BUN/CREAT SERPL: 28.4 (ref 7–25)
CALCIUM SPEC-SCNC: 8.5 MG/DL (ref 8.6–10.5)
CHLORIDE SERPL-SCNC: 105 MMOL/L (ref 98–107)
CO2 SERPL-SCNC: 32.7 MMOL/L (ref 22–29)
CREAT SERPL-MCNC: 0.74 MG/DL (ref 0.57–1)
DACRYOCYTES BLD QL SMEAR: NORMAL
DEPRECATED RDW RBC AUTO: 71.3 FL (ref 37–54)
EGFRCR SERPLBLD CKD-EPI 2021: 84.5 ML/MIN/1.73
EOSINOPHIL # BLD AUTO: 0.06 10*3/MM3 (ref 0–0.4)
EOSINOPHIL NFR BLD AUTO: 0.6 % (ref 0.3–6.2)
ERYTHROCYTE [DISTWIDTH] IN BLOOD BY AUTOMATED COUNT: 19.8 % (ref 12.3–15.4)
GLOBULIN UR ELPH-MCNC: 1.7 GM/DL
GLUCOSE SERPL-MCNC: 172 MG/DL (ref 65–99)
HCT VFR BLD AUTO: 37.6 % (ref 34–46.6)
HGB BLD-MCNC: 11.3 G/DL (ref 12–15.9)
HYPOCHROMIA BLD QL: NORMAL
IMM GRANULOCYTES # BLD AUTO: 0.35 10*3/MM3 (ref 0–0.05)
IMM GRANULOCYTES NFR BLD AUTO: 3.7 % (ref 0–0.5)
LYMPHOCYTES # BLD AUTO: 0.39 10*3/MM3 (ref 0.7–3.1)
LYMPHOCYTES NFR BLD AUTO: 4.1 % (ref 19.6–45.3)
MCH RBC QN AUTO: 29.2 PG (ref 26.6–33)
MCHC RBC AUTO-ENTMCNC: 30.1 G/DL (ref 31.5–35.7)
MCV RBC AUTO: 97.2 FL (ref 79–97)
MONOCYTES # BLD AUTO: 0.35 10*3/MM3 (ref 0.1–0.9)
MONOCYTES NFR BLD AUTO: 3.7 % (ref 5–12)
NEUTROPHILS NFR BLD AUTO: 8.4 10*3/MM3 (ref 1.7–7)
NEUTROPHILS NFR BLD AUTO: 87.6 % (ref 42.7–76)
NRBC BLD AUTO-RTO: 0 /100 WBC (ref 0–0.2)
PLATELET # BLD AUTO: 141 10*3/MM3 (ref 140–450)
PMV BLD AUTO: 11.5 FL (ref 6–12)
POTASSIUM SERPL-SCNC: 3.7 MMOL/L (ref 3.5–5.2)
PROT SERPL-MCNC: 4.4 G/DL (ref 6–8.5)
RBC # BLD AUTO: 3.87 10*6/MM3 (ref 3.77–5.28)
SMALL PLATELETS BLD QL SMEAR: ADEQUATE
SODIUM SERPL-SCNC: 145 MMOL/L (ref 136–145)
WBC NRBC COR # BLD AUTO: 9.58 10*3/MM3 (ref 3.4–10.8)

## 2024-03-05 PROCEDURE — 80053 COMPREHEN METABOLIC PANEL: CPT | Performed by: INTERNAL MEDICINE

## 2024-03-05 PROCEDURE — 85025 COMPLETE CBC W/AUTO DIFF WBC: CPT | Performed by: INTERNAL MEDICINE

## 2024-03-05 PROCEDURE — 94664 DEMO&/EVAL PT USE INHALER: CPT

## 2024-03-05 PROCEDURE — 97162 PT EVAL MOD COMPLEX 30 MIN: CPT

## 2024-03-05 PROCEDURE — 85007 BL SMEAR W/DIFF WBC COUNT: CPT | Performed by: INTERNAL MEDICINE

## 2024-03-05 PROCEDURE — 25010000002 CEFTRIAXONE PER 250 MG: Performed by: INTERNAL MEDICINE

## 2024-03-05 PROCEDURE — 94799 UNLISTED PULMONARY SVC/PX: CPT

## 2024-03-05 PROCEDURE — 99232 SBSQ HOSP IP/OBS MODERATE 35: CPT | Performed by: NURSE PRACTITIONER

## 2024-03-05 PROCEDURE — 94761 N-INVAS EAR/PLS OXIMETRY MLT: CPT

## 2024-03-05 PROCEDURE — 25810000003 SODIUM CHLORIDE 0.9 % SOLUTION: Performed by: INTERNAL MEDICINE

## 2024-03-05 PROCEDURE — 92610 EVALUATE SWALLOWING FUNCTION: CPT

## 2024-03-05 PROCEDURE — 99232 SBSQ HOSP IP/OBS MODERATE 35: CPT | Performed by: INTERNAL MEDICINE

## 2024-03-05 PROCEDURE — 25010000002 METHYLPREDNISOLONE PER 40 MG: Performed by: INTERNAL MEDICINE

## 2024-03-05 RX ORDER — AMIODARONE HYDROCHLORIDE 200 MG/1
200 TABLET ORAL EVERY 12 HOURS SCHEDULED
Status: DISCONTINUED | OUTPATIENT
Start: 2024-03-06 | End: 2024-03-06

## 2024-03-05 RX ADMIN — SERTRALINE 100 MG: 50 TABLET, FILM COATED ORAL at 08:48

## 2024-03-05 RX ADMIN — Medication 10 ML: at 08:49

## 2024-03-05 RX ADMIN — MEMANTINE HYDROCHLORIDE 10 MG: 10 TABLET, FILM COATED ORAL at 20:44

## 2024-03-05 RX ADMIN — DOXYCYCLINE 100 MG: 100 INJECTION, POWDER, LYOPHILIZED, FOR SOLUTION INTRAVENOUS at 09:02

## 2024-03-05 RX ADMIN — DOCUSATE SODIUM 50 MG AND SENNOSIDES 8.6 MG 2 TABLET: 8.6; 5 TABLET, FILM COATED ORAL at 08:47

## 2024-03-05 RX ADMIN — LEVOTHYROXINE SODIUM 75 MCG: 0.07 TABLET ORAL at 06:15

## 2024-03-05 RX ADMIN — AMIODARONE HYDROCHLORIDE 400 MG: 200 TABLET ORAL at 17:52

## 2024-03-05 RX ADMIN — LORAZEPAM 0.5 MG: 0.5 TABLET ORAL at 02:06

## 2024-03-05 RX ADMIN — BUDESONIDE AND FORMOTEROL FUMARATE DIHYDRATE 2 PUFF: 160; 4.5 AEROSOL RESPIRATORY (INHALATION) at 07:12

## 2024-03-05 RX ADMIN — MEMANTINE HYDROCHLORIDE 10 MG: 10 TABLET, FILM COATED ORAL at 08:48

## 2024-03-05 RX ADMIN — ISOSORBIDE MONONITRATE 15 MG: 30 TABLET, EXTENDED RELEASE ORAL at 08:48

## 2024-03-05 RX ADMIN — ROSUVASTATIN CALCIUM 40 MG: 20 TABLET, FILM COATED ORAL at 17:27

## 2024-03-05 RX ADMIN — RISPERIDONE 0.5 MG: 0.25 TABLET, FILM COATED ORAL at 20:44

## 2024-03-05 RX ADMIN — AMIODARONE HYDROCHLORIDE 400 MG: 200 TABLET ORAL at 08:48

## 2024-03-05 RX ADMIN — DOCUSATE SODIUM 50 MG AND SENNOSIDES 8.6 MG 2 TABLET: 8.6; 5 TABLET, FILM COATED ORAL at 20:44

## 2024-03-05 RX ADMIN — DOXYCYCLINE 100 MG: 100 INJECTION, POWDER, LYOPHILIZED, FOR SOLUTION INTRAVENOUS at 20:43

## 2024-03-05 RX ADMIN — Medication 10 ML: at 20:44

## 2024-03-05 RX ADMIN — SACUBITRIL AND VALSARTAN 0.5 TABLET: 24; 26 TABLET, FILM COATED ORAL at 08:48

## 2024-03-05 RX ADMIN — IPRATROPIUM BROMIDE 0.5 MG: 0.5 SOLUTION RESPIRATORY (INHALATION) at 00:00

## 2024-03-05 RX ADMIN — IPRATROPIUM BROMIDE 0.5 MG: 0.5 SOLUTION RESPIRATORY (INHALATION) at 18:45

## 2024-03-05 RX ADMIN — METOPROLOL SUCCINATE 50 MG: 50 TABLET, EXTENDED RELEASE ORAL at 08:48

## 2024-03-05 RX ADMIN — SODIUM CHLORIDE 2000 MG: 9 INJECTION, SOLUTION INTRAVENOUS at 08:47

## 2024-03-05 RX ADMIN — IPRATROPIUM BROMIDE 0.5 MG: 0.5 SOLUTION RESPIRATORY (INHALATION) at 07:13

## 2024-03-05 RX ADMIN — RISPERIDONE 0.5 MG: 0.25 TABLET, FILM COATED ORAL at 08:48

## 2024-03-05 RX ADMIN — METHYLPREDNISOLONE SODIUM SUCCINATE 20 MG: 40 INJECTION, POWDER, FOR SOLUTION INTRAMUSCULAR; INTRAVENOUS at 20:43

## 2024-03-05 RX ADMIN — TIOTROPIUM BROMIDE INHALATION SPRAY 2 PUFF: 3.12 SPRAY, METERED RESPIRATORY (INHALATION) at 07:12

## 2024-03-05 RX ADMIN — IPRATROPIUM BROMIDE 0.5 MG: 0.5 SOLUTION RESPIRATORY (INHALATION) at 13:12

## 2024-03-05 RX ADMIN — FERROUS SULFATE TAB 325 MG (65 MG ELEMENTAL FE) 325 MG: 325 (65 FE) TAB at 17:27

## 2024-03-05 RX ADMIN — FERROUS SULFATE TAB 325 MG (65 MG ELEMENTAL FE) 325 MG: 325 (65 FE) TAB at 08:48

## 2024-03-05 RX ADMIN — PANTOPRAZOLE SODIUM 40 MG: 40 TABLET, DELAYED RELEASE ORAL at 08:48

## 2024-03-05 RX ADMIN — SODIUM CHLORIDE 500 ML: 9 INJECTION, SOLUTION INTRAVENOUS at 15:20

## 2024-03-05 RX ADMIN — EMPAGLIFLOZIN 10 MG: 10 TABLET, FILM COATED ORAL at 08:48

## 2024-03-05 RX ADMIN — Medication 1 MG: at 08:48

## 2024-03-05 RX ADMIN — METHYLPREDNISOLONE SODIUM SUCCINATE 20 MG: 40 INJECTION, POWDER, FOR SOLUTION INTRAMUSCULAR; INTRAVENOUS at 08:47

## 2024-03-05 RX ADMIN — SODIUM CHLORIDE 500 ML: 9 INJECTION, SOLUTION INTRAVENOUS at 13:44

## 2024-03-05 RX ADMIN — DONEPEZIL HYDROCHLORIDE 10 MG: 5 TABLET, FILM COATED ORAL at 20:44

## 2024-03-05 RX ADMIN — BUDESONIDE AND FORMOTEROL FUMARATE DIHYDRATE 2 PUFF: 160; 4.5 AEROSOL RESPIRATORY (INHALATION) at 18:45

## 2024-03-05 NOTE — PROGRESS NOTES
[facility] HOSPITALIST PROGRESS NOTE     Patient Identification:  Name:  Brenda Munroe  Age:  73 y.o.  Sex:  female  :  1950  MRN:  [id number]  Visit Number:  [id number]  ROOM: [address]     Primary Care Provider:  Bernadette Arevalo MD    Length of stay in inpatient status:  11    Subjective     Chief Compliant:    Chief Complaint   Patient presents with    Fall    Head Injury       History of Presenting Illness:      Yesterday evening I called patient's son. He was already very aware of overall poor prognosis. He noted she would not want to want invasive measures such as neurosurgical interventions based on similar decisions she has made when she was less ill. He is considering comfort measures.     Patient denied any new complaints. Denied any pain. Still confused and only oriented to person. No family bedside. Patient intermittently hypotensive this afternoon responding to two small fluid boluses. Reevaluated patient with no change in exam.     ROS:  Otherwise 10 point ROS negative other than documented above in HPI.     Objective     Current Hospital Meds:[START ON 3/6/2024] amiodarone, 200 mg, Oral, Q12H  amiodarone, 400 mg, Oral, BID With Meals  budesonide-formoterol, 2 puff, Inhalation, BID - RT   And  tiotropium bromide monohydrate, 2 puff, Inhalation, Daily - RT  cefTRIAXone, 2,000 mg, Intravenous, Q24H  cholecalciferol, 50,000 Units, Oral, Weekly  donepezil, 10 mg, Oral, Nightly  doxycycline, 100 mg, Intravenous, Q12H  empagliflozin, 10 mg, Oral, Daily  ferrous sulfate, 325 mg, Oral, BID With Meals  folic acid, 1 mg, Oral, Daily  ipratropium, 0.5 mg, Nebulization, 4x Daily - RT  levothyroxine, 75 mcg, Oral, Q AM  memantine, 10 mg, Oral, Q12H  methylPREDNISolone sodium succinate, 20 mg, Intravenous, Q12H  metoprolol succinate XL, 50 mg, Oral, Q24H  pantoprazole, 40 mg, Oral, QAM AC  risperiDONE, 0.5 mg, Oral, BID  rosuvastatin, 40 mg, Oral, Q PM  senna-docusate sodium, 2 tablet,  Oral, BID  sertraline, 100 mg, Oral, Daily  sodium chloride, 10 mL, Intravenous, Q12H  sodium chloride, 10 mL, Intravenous, Q12H  sodium chloride, 10 mL, Intravenous, Q12H         Current Antimicrobial Therapy:  Anti-Infectives (From admission, onward)      Ordered     Dose/Rate Route Frequency Start Stop    03/04/24 0735  doxycycline (VIBRAMYCIN) 100 mg in sodium chloride 0.9 % 100 mL IVPB-VTB        Ordering Provider: Efren Dunham MD    100 mg  over 60 Minutes Intravenous Every 12 Hours 03/04/24 0900 03/09/24 0859    03/04/24 0735  cefTRIAXone (ROCEPHIN) 2,000 mg in sodium chloride 0.9 % 100 mL IVPB-VTB        Ordering Provider: Efren Dunham MD    2,000 mg  200 mL/hr over 30 Minutes Intravenous Every 24 Hours 03/04/24 0800 03/09/24 0759          Current Diuretic Therapy:  Diuretics (From admission, onward)      None          ----------------------------------------------------------------------------------------------------------------------  Vital Signs:  Temp:  [97.2 °F (36.2 °C)-97.8 °F (36.6 °C)] 97.5 °F (36.4 °C)  Heart Rate:  [] 114  Resp:  [12-26] 14  BP: ()/(38-94) 99/65  SpO2:  [90 %-100 %] 98 %  on  Flow (L/min):  [3] 3;   Device (Oxygen Therapy): nasal cannula  Body mass index is 29.55 kg/m².    Wt Readings from Last 3 Encounters:   03/05/24 78.1 kg (172 lb 2.9 oz)   02/11/24 68.5 kg (151 lb)   02/02/24 68.9 kg (151 lb 14.4 oz)     Intake & Output (last 3 days)         03/02 0701  03/03 0700 03/03 0701  03/04 0700 03/04 0701  03/05 0700 03/05 0701  03/06 0700    P.O. 0 100 480 660    I.V. (mL/kg)   100.4 (1.3) 1156.3 (14.8)    Total Intake(mL/kg) 0 (0) 100 (1.2) 580.4 (7.4) 1816.3 (23.3)    Urine (mL/kg/hr) 1175 (0.6) 600 (0.3) 500 (0.3) 500 (0.6)    Stool 0 0      Total Output 1175 600 500 500    Net -1175 -500 +80.4 +1316.3            Urine Unmeasured Occurrence 1 x 2 x      Stool Unmeasured Occurrence 1 x 4 x 2 x           Diet: Regular/House Diet; Feeding Assistance - Nursing;  Texture: Pureed (NDD 1); Fluid Consistency: Thin (IDDSI 0)  ----------------------------------------------------------------------------------------------------------------------  Physical exam:  Constitutional:  Well-developed and well-nourished.  No respiratory distress.      HENT:  Head:  Normocephalic and atraumatic.  Mouth:  Moist mucous membranes.    Eyes:  Conjunctivae and EOM are normal. No scleral icterus.    Neck:  Neck supple.  No JVD present.    Cardiovascular:  Normal rate, regular rhythm and normal heart sounds with no murmur.  Pulmonary/Chest:  No respiratory distress, no wheezes, no crackles, with normal breath sounds and good air movement.  Abdominal:  Soft.  Bowel sounds are normal.  No distension and no tenderness.   Musculoskeletal:  No edema, no tenderness, and no deformity.  No red or swollen joints anywhere.    Neurological:  Alert and oriented to person only.   Skin:  Skin is warm and dry. No rash noted. No pallor.   Peripheral vascular:  Pulses in all 4 extremities with no clubbing, no cyanosis, no edema.  ----------------------------------------------------------------------------------------------------------------------  Tele:    ----------------------------------------------------------------------------------------------------------------------  Results from last 7 days   Lab Units 03/05/24  0143 03/04/24  0050 03/03/24  1829 03/03/24  0037   LACTATE mmol/L  --   --  0.9  --    WBC 10*3/mm3 9.58 10.02  --  9.22   HEMOGLOBIN g/dL 11.3* 11.3*  --  10.8*   HEMATOCRIT % 37.6 38.7  --  37.3   MCV fL 97.2* 98.2*  --  101.1*   MCHC g/dL 30.1* 29.2*  --  29.0*   PLATELETS 10*3/mm3 141 155  --  126*     Results from last 7 days   Lab Units 03/02/24  2303   PH, ARTERIAL pH units 7.380   PO2 ART mm Hg 57.0*   PCO2, ARTERIAL mm Hg 62.2*   HCO3 ART mmol/L 36.8*     Results from last 7 days   Lab Units 03/05/24  0143 03/04/24  0050 03/03/24  0037 03/01/24  0115 02/29/24  0014   SODIUM mmol/L 145 143  "141   < > 141   POTASSIUM mmol/L 3.7 4.0 4.3   < > 4.5   CHLORIDE mmol/L 105 103 104   < > 103   CO2 mmol/L 32.7* 29.9* 27.6   < > 30.7*   BUN mg/dL 21 19 21   < > 24*   CREATININE mg/dL 0.74 0.77 0.71   < > 0.72   CALCIUM mg/dL 8.5* 8.4* 8.5*   < > 8.6   GLUCOSE mg/dL 172* 130* 119*   < > 156*   ALBUMIN g/dL 2.7* 2.8*  --   --  3.3*   BILIRUBIN mg/dL 0.3 0.4  --   --  0.3   ALK PHOS U/L 59 54  --   --  63   AST (SGOT) U/L 15 12  --   --  16   ALT (SGPT) U/L 18 10  --   --  15    < > = values in this interval not displayed.   Estimated Creatinine Clearance: 66.5 mL/min (by C-G formula based on SCr of 0.74 mg/dL).  Ammonia   Date Value Ref Range Status   03/03/2024 29 11 - 51 umol/L Final                 No results found for: \"HGBA1C\", \"POCGLU\"  Lab Results   Component Value Date    TSH 0.876 01/23/2024    FREET4 1.59 03/25/2021     No results found for: \"PREGTESTUR\", \"PREGSERUM\", \"HCG\", \"HCGQUANT\"  Pain Management Panel  More data exists         Latest Ref Rng & Units 2/23/2024 2/11/2024   Pain Management Panel   Amphetamine, Urine Qual Negative Negative  Negative    Barbiturates Screen, Urine Negative Negative  Negative    Benzodiazepine Screen, Urine Negative Positive  Positive    Buprenorphine, Screen, Urine Negative Negative  Negative    Cocaine Screen, Urine Negative Negative  Negative    Fentanyl, Urine Negative Negative  Negative    Methadone Screen , Urine Negative Negative  Negative    Methamphetamine, Ur Negative Negative  Negative      Brief Urine Lab Results  (Last result in the past 365 days)        Color   Clarity   Blood   Leuk Est   Nitrite   Protein   CREAT   Urine HCG        03/03/24 1826 Dark Yellow   Turbid   Moderate (2+)   Trace   Negative   30 mg/dL (1+)                 Blood Culture   Date Value Ref Range Status   03/03/2024 No growth at 24 hours  Preliminary   03/03/2024 Gram Negative Bacilli (C)  Preliminary     No results found for: \"URINECX\"  No results found for: \"WOUNDCX\"  No results " "found for: \"STOOLCX\"  No results found for: \"RESPCX\"  No results found for: \"AFBCX\"  Results from last 7 days   Lab Units 03/03/24  1829   PROCALCITONIN ng/mL 0.55*   LACTATE mmol/L 0.9       I have personally looked at the labs and they are summarized above.  ----------------------------------------------------------------------------------------------------------------------  Detailed radiology reports for the last 24 hours:    Imaging Results (Last 24 Hours)       ** No results found for the last 24 hours. **          Assessment & Plan    #Traumatic left parafalcine 4 mm subdural hematoma   #Nasal fracture   #Home anticoagulation with apixiban  - CT on admission revealed 4 mm left parafalcine subdural hematoma   - Neurology and neurosurgery evaluated the patient in the ED   - Neurosurgery noted very unlikely to progress in size to clinically significant bleed and recommended to hold reversing anticoagulation and keep patient at our facility. Repeat CT head 2/24 did ot appear much different on my or neurosurgery evaluation.  - Repeat CT head on 3/2 as pt was more lethargic with CT revealing approx. 12 mm subdural hematoma at greatest thickness compared to previous 11 mm. Neurology recommending repeat imaging in 6 weeks. Cont close monitoring. Neurology and neurosurgery input appreciated.   - Neurology recommending to continue lamictal 100 mg BID.      #NSTEMI  #HFrEF  - HS troponin was also notably elevated on arrival at 474 with repeat at 452. No significant changes appreciated on EKG.   - Stress test on 2/1/24 with no evidence of ischemia.   - No clear cause at this juncture, but lean toward type II event  - Repeated echo revealed LVEF dropped to 41-45%.   - Consulted cardiology. Hold anticoagulation and antiplatelets for now given subdural hematoma discussed above.   - GDMT limited by relative hypotension.      #Acute hypercapnic respiratory failure   #Mild COPD exacerbation   - Recurrent issue the last year. " Suspect exacerbated by elevated oxygen levels on 4L NC.   - Placed on BiPAP, was able to find and use mask without nasal component to help prevent pain  - SpO2 goal 88-92, PRN and QHSBIPAP      #L sided pneumonia and small to moderate pleural effusion  #Acute metabolic encephalopathy   #Dementia w/ behavioral disturbance  - Suspect 2/2 above issues in setting of significant dementia. Has been an issue in previous admissions as well.   - Fluctuating, likely hospital delirium component.   - Procal elevated      #pAfib f/ RVR  - Cardiology consulted as above.   - Hold anticoagulation for now as above  - Continue metoprolol      #Hypertensive urgency   - Continue PO metoprolol and nitrate. Well controlled.      Chronic medical problems:  MDD  COPD- Hx of respiratory acidosis. ABG compensated. Continue to offer QHS BiPAP. Goal SPO2 88-92%.   Thyroid disease  Bilateral carotid stenosis  Dementia  GERD  PUD     F: As per SLP.   A: PRN  S: None  T: SCDs  H: HOB elevated  U: PRN  G: PRN  S: N/A  B: PRN  I: PIV  D: Ceftriaxone, doxycyline      Code status: DNR/DNI. Guarded overall prognosis. Palliative care consulted.      Dispo:Continue PCU level of care        VTE Prophylaxis:   Mechanical Order History:        Ordered        02/23/24 1523  Place Sequential Compression Device  Once            02/23/24 1523  Maintain Sequential Compression Device  Continuous                          Pharmalogical Order History:       None                Efren Dunham MD  [facility] Hospitalist  03/05/24  17:49 EST

## 2024-03-05 NOTE — PLAN OF CARE
Goal Outcome Evaluation:  Plan of Care Reviewed With: patient        Progress: improving  Outcome Evaluation: Pt A&Ox1; pt baseline confused; pt able to answer questions this shift; VSS; no additional distress noted and no other needs requested at this time. Safety maintained; call light in reach. POC ongoing.

## 2024-03-05 NOTE — THERAPY RE-EVALUATION
Acute Care - Speech Language Pathology   Swallow Re-Assessment  [place]     Patient Name: Brenda Munroe  : 1950  MRN: [id number]  Today's Date: 3/5/2024  Onset of Illness/Injury or Date of Surgery: 24     Referring Physician: Heather      Admit Date: 2024    Visit Dx:     ICD-10-CM ICD-9-CM   1. Subdural hematoma  S06.5XAA 432.1   2. NSTEMI (non-ST elevated myocardial infarction)  I21.4 410.70   3. Chronic anticoagulation  Z79.01 V58.61   4. Closed fracture of nasal bone, initial encounter  S02.2XXA 802.0   5. Traumatic orbital hematoma, right, initial encounter  S05.11XA 921.2     Patient Active Problem List   Diagnosis    Psychosis    Severe recurrent major depression with psychotic features    COPD (chronic obstructive pulmonary disease)    Coronary artery disease    Disease of thyroid gland    Arthritis    Hypertension    Paroxysmal atrial fibrillation    Chronic headaches    Vision changes    Carotid stenosis, asymptomatic, bilateral    Bradycardia, sinus    Acute respiratory failure with hypoxia and hypercapnia    NSTEMI (non-ST elevated myocardial infarction)    A-fib    Hypercapnic respiratory failure    Subdural hematoma     Past Medical History:   Diagnosis Date    Anxiety     Arthritis     Bell's palsy     Bladder prolapse, female, acquired     Carotid stenosis     COPD (chronic obstructive pulmonary disease)     COPD (chronic obstructive pulmonary disease)     Coronary artery disease     Dementia     Dementia     Depression     Difficulty walking     Disease of thyroid gland     Frequency of urination     GERD (gastroesophageal reflux disease)     Heart attack     Hyperlipidemia     Hypertension     Irregular heart beat     Peptic ulcer disease      Past Surgical History:   Procedure Laterality Date    CARDIAC CATHETERIZATION N/A 2023    Procedure: Left Heart Cath;  Surgeon: Tab Cifuentes MD;  Location: [facility] INVASIVE LOCATION;  Service: Cardiology;   Laterality: N/A;    CARDIAC SURGERY      open heart  in 1999    CYSTOSCOPY N/A 11/8/2017    Procedure: CYSTOSCOPY;  Surgeon: Karl Nicolas DO;  Location:  [facility];  Service:     OTHER SURGICAL HISTORY      states she had fluid drained no surgery    VAGINAL HYSTERECTOMY W/ ANTERIOR AND POSTERIOR VAGINAL REPAIR N/A 11/8/2017    Procedure: VAGINAL HYSTERECTOMY WITH ANTERIOR, POSTERIOR, AND ENTEROCELE VAGINAL REPAIR;  Surgeon: Karl Nicolas DO;  Location:  [facility];  Service:     VAGINAL VAULT SUSPENSION N/A 11/8/2017    Procedure: VAGINAL VAULT SUSPENSION ;  Surgeon: Karl Nicolas DO;  Location:  [facility];  Service:      Ms. Munroe was seen at bedside this pm on PCU for continued diet safety/reassessment. She has been tolerating modified po diet of puree consistencies thin liquids.     She continued w/ waxing/waning cognition, but a/a at the time of SLP follow up today, PCA present to set up po lunch tray. Ms. Munroe continued limitedly interactive today. She did occasionally follow simple one step directive w/ cues and respond to simple y/n and oe questions. She was overall withdrawn.     She was observed on O2 via NC w/o complications. WBC 9.58, she was afebrile.     She was positioned upright and centered in bed to accept approximately 90% from her lunch tray of pureed chicken w/ gravy, mashed potatoes, pureed carrots, vanilla pudding via spoon and thin coke via straw. SLP provided 1:1 feeding assistance. She did occasionally require coaxing to accept po.      Upon po presentations, adequate bolus anticipation w/ good labial seal. Bolus formation, manipulation, and control were mildly weak in general. Lingual pumping w/ puree. A-p transit was mildly-moderately delayed w/o significant oral residue. No overt s/s aspiration evidenced before the swallow.      Pharyngeal swallow was timely. No overt s/s aspiration evidenced. No obvious silent aspiration suspected.      Impression: Ms. Munroe  continues to accept what is felt to be her least restrictive/preferred modified po diet at this time w/o overt s/s aspiration.      Recommend continued puree consistencies at this time. Please continue 1:1 feeding assistance, meds whole in puree or crushed prn, fully a/a for all po intake.      SLP Recommendation and Plan  1. Puree consistency, thin liquids.   2. Medications whole in puree/crushed prn.   3. 1:1 feeding assistance.   4. Upright and centered for all po intake.   5. Universal aspiration precautions.   6. YANETH precautions.   7. Oral care protocol.   SLP to f/u for continued diet safety/assessment for possible upgrade, s/l cognitive status.      Thank you for allowing me to participate in the care of your patient-  Ana Curtis M.A., CCC-SLP      EDUCATION  The patient has been educated in the following areas:   Dysphagia (Swallowing Impairment) Modified Diet Instruction.      Time Calculation:    Time Calculation- SLP       Row Name 03/05/24 1325             Time Calculation- SLP    SLP - Next Appointment 03/06/24  -MIAN                User Key  (r) = Recorded By, (t) = Taken By, (c) = Cosigned By      Initials Name Provider Type    Ana Dai MA,CCC-SLP Speech and Language Pathologist                    Therapy Charges for Today       Code Description Service Date Service Provider Modifiers Qty    43982294855 HC ST EVAL ORAL PHARYNG SWALLOW 4 3/4/2024 Ana Curtis MA,CCC-SLP GN 1    95683549640 HC ST EVAL ORAL PHARYNG SWALLOW 4 3/5/2024 Ana Curtis MA,CCC-SLP GN 1          Ana Curtis MA,GERHARD-SLP  3/5/2024

## 2024-03-05 NOTE — PLAN OF CARE
Problem: Noninvasive Ventilation Acute  Goal: Effective Unassisted Ventilation and Oxygenation  Outcome: Ongoing, Progressing     Problem: Skin Injury Risk Increased  Goal: Skin Health and Integrity  Outcome: Ongoing, Progressing     Problem: Fall Injury Risk  Goal: Absence of Fall and Fall-Related Injury  Outcome: Ongoing, Progressing  Intervention: Identify and Manage Contributors  Recent Flowsheet Documentation  Taken 3/5/2024 1500 by Caryn Mazariegos RN  Medication Review/Management: medications reviewed  Taken 3/5/2024 1300 by Caryn Mazariegos RN  Medication Review/Management: medications reviewed  Taken 3/5/2024 1100 by Caryn Mazariegos RN  Medication Review/Management: medications reviewed  Taken 3/5/2024 0900 by Caryn Mazariegos RN  Medication Review/Management: medications reviewed  Taken 3/5/2024 0800 by Caryn Mazariegos RN  Medication Review/Management: medications reviewed  Taken 3/5/2024 0700 by Caryn Mazariegos RN  Medication Review/Management: medications reviewed  Intervention: Promote Injury-Free Environment  Recent Flowsheet Documentation  Taken 3/5/2024 1500 by Caryn Mazariegos RN  Safety Promotion/Fall Prevention:   safety round/check completed   activity supervised   assistive device/personal items within reach  Taken 3/5/2024 1300 by Caryn Mazariegos RN  Safety Promotion/Fall Prevention:   safety round/check completed   activity supervised   assistive device/personal items within reach  Taken 3/5/2024 1100 by Caryn Mazariegos RN  Safety Promotion/Fall Prevention:   safety round/check completed   activity supervised   assistive device/personal items within reach  Taken 3/5/2024 0900 by Caryn Mazariegos RN  Safety Promotion/Fall Prevention:   safety round/check completed   activity supervised   assistive device/personal items within reach  Taken 3/5/2024 0800 by Caryn Mazariegos RN  Safety Promotion/Fall Prevention:   safety round/check completed   activity supervised   assistive  device/personal items within reach  Taken 3/5/2024 0700 by Caryn Mazariegos RN  Safety Promotion/Fall Prevention:   safety round/check completed   activity supervised   assistive device/personal items within reach   Goal Outcome Evaluation:           Progress: no change  Outcome Evaluation: Pt is alert, disoriented to time and situation. VSS on 3LNC. Afib. Required 2 500cc boluses to maintain adequate BP. No distress noted. Resting in bed comfortably. Care ongoing.

## 2024-03-05 NOTE — PROGRESS NOTES
"Palliative Care Daily Progress Note     S: Medical record reviewed, upon entering the room, no family present at this time. She is sitting up looking out the window. She is talkative , unable to follow commands and does not respond appropriately when assessed. She has a flat affect but no furrow brow or facial grimacing. There are no other non-verbal signs of distress. She remains confused and disoriented.     O:   Palliative Performance Scale Score:     BP 92/59   Pulse 113   Temp 97.5 °F (36.4 °C) (Axillary)   Resp 14   Ht 162.6 cm (64\")   Wt 78.1 kg (172 lb 2.9 oz)   SpO2 92%   BMI 29.55 kg/m²     Intake/Output Summary (Last 24 hours) at 3/5/2024 0940  Last data filed at 3/5/2024 0400  Gross per 24 hour   Intake 340.39 ml   Output 500 ml   Net -159.61 ml       PE:      General Appearance:    Chronically ill appearing, alert, cooperative, NAD   HEENT:    NC/AT, without obvious abnormality, EOMI, anicteric , extensive facial bruising , right eye - subconjunctival hemorrhage    Neck:   supple, trachea midline, no JVD   Lungs:     Coarse breath sounds noted to bilateral lung fields    Heart:    Tachycardia, irregular, normal S1 and S2, no M/R/G   Abdomen:     Soft, NT, ND, NABS    Extremities:   Moves all extremities, no edema   Pulses:   Pulses palpable and equal bilaterally   Skin:   Warm, dry, extensive bruising noted to facial (worse on right side) bilateral eyes - purple/blue/yellow discoloration, PVD discoloration- chronic in nature noted to BLE    Neurologic:   Alert to self only, confused, disoriented    Psych:   Calm, flat affect       Meds: Reviewed and changes not noted    Labs:   Results from last 7 days   Lab Units 03/05/24  0143   WBC 10*3/mm3 9.58   HEMOGLOBIN g/dL 11.3*   HEMATOCRIT % 37.6   PLATELETS 10*3/mm3 141     Results from last 7 days   Lab Units 03/05/24  0143   SODIUM mmol/L 145   POTASSIUM mmol/L 3.7   CHLORIDE mmol/L 105   CO2 mmol/L 32.7*   BUN mg/dL 21   CREATININE mg/dL 0.74 "   GLUCOSE mg/dL 172*   CALCIUM mg/dL 8.5*     Results from last 7 days   Lab Units 03/05/24  0143   SODIUM mmol/L 145   POTASSIUM mmol/L 3.7   CHLORIDE mmol/L 105   CO2 mmol/L 32.7*   BUN mg/dL 21   CREATININE mg/dL 0.74   CALCIUM mg/dL 8.5*   BILIRUBIN mg/dL 0.3   ALK PHOS U/L 59   ALT (SGPT) U/L 18   AST (SGOT) U/L 15   GLUCOSE mg/dL 172*     Imaging Results (Last 72 Hours)       Procedure Component Value Units Date/Time    XR Chest 1 View [[id number]] Collected: 03/03/24 1951     Updated: 03/03/24 1953    Narrative:      INDICATION: Cough.     TECHNIQUE: Frontal radiograph of the chest.     COMPARISON: 2/11/2024     FINDINGS:   Cardiomegaly. Pulmonary vasculature appears within normal limits. Small  to moderate volume left pleural effusion with atelectasis/airspace  disease. No pneumothorax. No acute fracture.       Impression:      Small to moderate volume left pleural effusion with atelectasis/airspace  disease.        This report was finalized on 3/3/2024 7:51 PM by Alex Pallas, DO.       CT Head Without Contrast [[id number]] Collected: 03/02/24 0941     Updated: 03/02/24 0946    Narrative:      EXAM:    CT Head Without Intravenous Contrast     EXAM DATE:    3/2/2024 9:35 AM     CLINICAL HISTORY:    Worsening AMS, recent fall with subdural hematoma; S06.5XAA-Traumatic  subdural hemorrhage with loss of consciousness status unknown, initial  encounter; I21.4-Non-ST elevation (NSTEMI) myocardial infarction;  Z79.01-Long term (current) use of anticoagulants; S02.2XXA-Fracture of  nasal bones, initial encounter for closed fracture; S05.11XA-Contusion  of eyeball and orbital tissues, right eye, initial     TECHNIQUE:    Axial computed tomography images of the head/brain without intravenous  contrast.  Sagittal and coronal reformatted images were created and  reviewed.  This CT exam was performed using one or more of the following  dose reduction techniques:  automated exposure control, adjustment of  the mA and/or kV  according to patient size, and/or use of iterative  reconstruction technique.     COMPARISON:    2/29/2024     FINDINGS:    Brain and extra-axial spaces:  In multiple planes, there is  approximately 1 mm increased thickness of left cerebral hemispheric  subdural hematoma when measured at its maximal thickness in the anterior  falcine region now approximately 12 mm in greatest thickness and  previously 11 mm in greatest thickness.  There remains no evidence of  midline shift.  Advanced chronic small vessel ischemic disease, stable.   No hydrocephalus.  No parenchymal hemorrhages have developed. No new  extra-axial hemorrhage is identified.    Bones/joints:  Unremarkable as visualized.  No acute fracture.    Soft tissues:  Right supraorbital frontal scalp hematoma appears  stable.    Sinuses: Acute right maxillary sinusitis is noted.    Mastoid air cells:  Unremarkable as visualized.  No mastoid effusion.       Impression:      1.  In multiple planes, there is approximately 1 mm increased thickness  of left cerebral hemispheric subdural hematoma when measured at its  maximal thickness in the anterior falcine region now approximately 12 mm  in greatest thickness and previously 11 mm in greatest thickness.  2.  No hydrocephalus.  3.  No parenchymal hemorrhages have developed. No new extra-axial  hemorrhage is identified.  4.  Right supraorbital frontal scalp hematoma appears stable.  5.  There remains no evidence of midline shift.  6.  Advanced chronic small vessel ischemic disease, stable.        This report was finalized on 3/2/2024 9:44 AM by Dr. Erik Jones MD.                 Diagnostics: Reviewed    A: Brenda Munroe is much more alert today than yesterday and much more talkative. She remains confused and disoriented, able to answers a few questions today however responses are not appropriate. She did not remember that she ate breakfast however her tray was at bedside and she had ate most of her items. She just  kept  looking around and saying random words and would look out the window. Labs from today have no significant changes from yesterday. Her vial signs range from hyper to hypotensive- currently 92/59 hr 113- irregular on monitor, saturation 92 on 3lpm n/c. She continues to have extensive facial bruising and subconjunctival hemorrhage , worse to the right eye. There are different stages of healing noted to face.       P: Continue with current regiment, will continue to provide symptomatic and supportive palliative care for patient as well as family. Dr. Dunham reports that he spoke with son yesterday about goals of care and prognosis. He reported that mother would not want aggressive interventions like surgery and the plan is to continue to treat her pneumonia with antibiotics and revisit goals in a few days and likely will change to comfort measures if no significant improvement.       We will continue to follow along. Please do not hesitate to contact us regarding further sx mgmt or GOC needs, including after hours or on weekends via our on call provider at [phone].     Clara Jensen, APRN    3/5/2024

## 2024-03-05 NOTE — PROGRESS NOTES
"Stroke Progress Note       Chief Complaint:  confusion    Subjective    Subjective     Subjective:  No acute events overnight.  No new complaints.  Oriented to self only, intermittently follows some simple commands.    Objective      Temp:  [97.4 °F (36.3 °C)-98.4 °F (36.9 °C)] 97.5 °F (36.4 °C)  Heart Rate:  [] 109  Resp:  [12-27] 19  BP: ()/(49-94) 87/52        Alert, oriented to self only.  Told me \"I don't know\" when asked location, intermittently responds to questions  Follows simple commands in upper ext.  Purposeful movements noted in all ext  No focal deficits noted, generally weak    Results Review:    I reviewed the patient's new clinical results.    Lab Results (last 24 hours)       Procedure Component Value Units Date/Time    Blood Culture - Blood, Arm, Right [[id number]]  (Abnormal) Collected: 03/03/24 1829    Specimen: Blood from Arm, Right Updated: 03/05/24 0703     Blood Culture Gram Negative Bacilli     Isolated from Aerobic Bottle     Gram Stain Aerobic Bottle Gram negative bacilli    Comprehensive Metabolic Panel [[id number]]  (Abnormal) Collected: 03/05/24 0143    Specimen: Blood Updated: 03/05/24 0259     Glucose 172 mg/dL      BUN 21 mg/dL      Creatinine 0.74 mg/dL      Sodium 145 mmol/L      Potassium 3.7 mmol/L      Chloride 105 mmol/L      CO2 32.7 mmol/L      Calcium 8.5 mg/dL      Total Protein 4.4 g/dL      Albumin 2.7 g/dL      ALT (SGPT) 18 U/L      AST (SGOT) 15 U/L      Alkaline Phosphatase 59 U/L      Total Bilirubin 0.3 mg/dL      Globulin 1.7 gm/dL      A/G Ratio 1.6 g/dL      BUN/Creatinine Ratio 28.4     Anion Gap 7.3 mmol/L      eGFR 84.5 mL/min/1.73     Narrative:      GFR Normal >60  Chronic Kidney Disease <60  Kidney Failure <15    The GFR formula is only valid for adults with stable renal function between ages 18 and 70.    CBC & Differential [[id number]]  (Abnormal) Collected: 03/05/24 0143    Specimen: Blood Updated: 03/05/24 0251    Narrative:      The following " orders were created for panel order CBC & Differential.  Procedure                               Abnormality         Status                     ---------                               -----------         ------                     CBC Auto Differential[[id number]]        Abnormal            Final result               Scan Slide[[id number]]                                       Final result                 Please view results for these tests on the individual orders.    CBC Auto Differential [[id number]]  (Abnormal) Collected: 03/05/24 0143    Specimen: Blood Updated: 03/05/24 0251     WBC 9.58 10*3/mm3      RBC 3.87 10*6/mm3      Hemoglobin 11.3 g/dL      Hematocrit 37.6 %      MCV 97.2 fL      MCH 29.2 pg      MCHC 30.1 g/dL      RDW 19.8 %      RDW-SD 71.3 fl      MPV 11.5 fL      Platelets 141 10*3/mm3      Neutrophil % 87.6 %      Lymphocyte % 4.1 %      Monocyte % 3.7 %      Eosinophil % 0.6 %      Basophil % 0.3 %      Immature Grans % 3.7 %      Neutrophils, Absolute 8.40 10*3/mm3      Lymphocytes, Absolute 0.39 10*3/mm3      Monocytes, Absolute 0.35 10*3/mm3      Eosinophils, Absolute 0.06 10*3/mm3      Basophils, Absolute 0.03 10*3/mm3      Immature Grans, Absolute 0.35 10*3/mm3      nRBC 0.0 /100 WBC     Scan Slide [[id number]] Collected: 03/05/24 0143    Specimen: Blood Updated: 03/05/24 0251     Anisocytosis Mod/2+     Dacrocytes Slight/1+     Hypochromia Slight/1+     Platelet Estimate Adequate    Blood Culture - Blood, Wrist, Right [[id number]]  (Normal) Collected: 03/03/24 1830    Specimen: Blood from Wrist, Right Updated: 03/04/24 1845     Blood Culture No growth at 24 hours          XR Chest 1 View    Result Date: 3/3/2024  Small to moderate volume left pleural effusion with atelectasis/airspace disease.   This report was finalized on 3/3/2024 7:51 PM by Alex Pallas, DO.     Results for orders placed during the hospital encounter of 02/23/24    Adult Transthoracic Echo Complete W/ Cont if Necessary Per  Protocol    Interpretation Summary    Left ventricular systolic function is moderately decreased. Left ventricular ejection fraction appears to be 41 - 45%.    Left ventricular diastolic function was indeterminate.    Left atrial volume is mildly increased.    There is mild calcification of the aortic valve.    Estimated right ventricular systolic pressure from tricuspid regurgitation is mildly elevated (35-45 mmHg).    There is no evidence of pericardial effusion            Assessment/Plan     Assessment/Plan:    Traumatic Subdural hematoma.   Encephalopathy secondary to #1  A-fib w/RVR  Pneumonia/COPD exacerbation  Hx of dementia          Diagnostics   1. Repeat CT head 3/2- slight increase in subdural without any clinical significance.    Plan  If we were to consider aggressive care, the patient would need evaluation for Watchman by Cardiology and middle meningeal artery embolization by a neurosurgeon in the future. However, considering her comorbid conditions, I do not believe she would be a suitable candidate for these procedures. Therefore, we continue to recommend conservative medical management for now.    Palliative care has been consulted and following    Lamictal was discontinued at Dr. Gregory's recommendation, as it takes time of titration and there is no clear indication at this time. Lamictal can cause dizziness and falls in elderly    Stroke team will sign off for now.  Please feel free to call with any questions/concerns.  Thank yo again for the consult.     The case was discussed with the patient, and Dr. Roly Pinedo, APRN  03/05/24  13:46 EST

## 2024-03-05 NOTE — PROGRESS NOTES
LOS: 11 days     Name: Brenda Munroe  Age/Sex: 73 y.o. female  :  1950        PCP: Bernadette Arevalo MD    Principal Problem:    Subdural hematoma      Admission Information: Brenda Munroe is a 73 y.o. female with MDD, COPD, CAD, thyroid disease, hypertension, paroxysmal atrial fibrillation, carotid stenosis, dementia, GERD, PUD. Patient presented to [facility]) emergency room (ER) on 2024 for head injury after fall at nursing home.  Patient has a history of paroxysmal atrial fibrillation and was on Eliquis for anticoagulation.  Upon arrival to the emergency room high sensitive troponin was significantly elevated at 474 with repeat at 452. Cardiology has been consulted for further evaluation and management.     Chief Complaint: Fall at nursing home with intracerebral hemorrhage    Interval history: Patient was seen and examined.  Today she does not respond to my questions.  She is, however cooperative and allowed me to examine her.  Discussed with JOSE Rubin.  She states that the patient has been very specific as to who she will communicate with today.    Subjective         Vital Signs  Vital Signs (last 72 hrs)         03 0700  03/03 0659 03/03 0700  03/04 0659 03/04 0700  03/05 0659 03/05 0700  03/05 1714   Most Recent      Temp (°F) 97.3 -  98.6    97.5 -  98.9    96.9 -  98.4       97.5 (36.4) 03/05 0400    Heart Rate 83 -  103    87 -  120    84 -  115    90 -  114     114 03/05 1700    Resp 16 -      20 -      12 -      12 -  26     14 03/05 1700    /82 -  149/102    83/63 -  140/81    76/62 -  139/93    70/38 -  136/81     99/65 03/05 1700    SpO2 (%) 90 -  100    90 -  100    90 -  100    91 -  100     98 03/05 1700    Flow (L/min) 2 -  3      3      3      3     3 03/05 1400    Oxygen Concentration (%)   32      32      32       32 03/05 0411          Temp:  [97.4 °F (36.3 °C)-97.8 °F (36.6 °C)] 97.5 °F (36.4 °C)  Heart Rate:  [] 114  Resp:  [12-26]  14  BP: ()/(38-94) 99/65  Body mass index is 29.55 kg/m².      Intake/Output Summary (Last 24 hours) at 3/5/2024 1714  Last data filed at 3/5/2024 0400  Gross per 24 hour   Intake 100.39 ml   Output 500 ml   Net -399.61 ml       Vitals reviewed.   Constitutional:       Appearance: Well-developed.   Neck:      Vascular: No carotid bruit or JVD.   Pulmonary:      Effort: Pulmonary effort is normal. No respiratory distress.      Breath sounds: Normal breath sounds. No wheezing. No rales.   Cardiovascular:      Normal rate. Irregular rhythm.      Comments: No lower extremity edema  Skin:     General: Skin is warm and dry.   Neurological:      Mental Status: Alert and oriented to person, place, and time.         Telemetry: Atrial fibrillation 90s to 100s       Results Review:     Results from last 7 days   Lab Units 03/05/24  0143 03/04/24  0050 03/03/24  0037 03/02/24  0346 03/01/24  0115 02/29/24  0014 02/28/24  0031   WBC 10*3/mm3 9.58 10.02 9.22 9.23 8.63 8.41 6.88   HEMOGLOBIN g/dL 11.3* 11.3* 10.8* 10.3* 9.8* 10.1* 9.4*   PLATELETS 10*3/mm3 141 155 126* 149 147 156 182     Results from last 7 days   Lab Units 03/05/24  0143 03/04/24  0050 03/03/24  0037 03/02/24  0346 03/01/24  0115 02/29/24  0014 02/28/24  0031   SODIUM mmol/L 145 143 141 142 140 141 141   POTASSIUM mmol/L 3.7 4.0 4.3 4.3 4.3 4.5 3.7   CHLORIDE mmol/L 105 103 104 103 102 103 102   CO2 mmol/L 32.7* 29.9* 27.6 31.6* 28.4 30.7* 32.0*   BUN mg/dL 21 19 21 20 22 24* 20   CREATININE mg/dL 0.74 0.77 0.71 0.71 0.74 0.72 0.70   CALCIUM mg/dL 8.5* 8.4* 8.5* 8.8 8.4* 8.6 8.4*   GLUCOSE mg/dL 172* 130* 119* 142* 168* 156* 172*                 I reviewed the patient's new clinical results.  I reviewed the patient's new imaging results and agree with the interpretation.  I personally viewed and interpreted the patient's EKG/Telemetry data      Medication Review:   amiodarone, 400 mg, Oral, BID With Meals  budesonide-formoterol, 2 puff, Inhalation, BID -  RT   And  tiotropium bromide monohydrate, 2 puff, Inhalation, Daily - RT  cefTRIAXone, 2,000 mg, Intravenous, Q24H  cholecalciferol, 50,000 Units, Oral, Weekly  donepezil, 10 mg, Oral, Nightly  doxycycline, 100 mg, Intravenous, Q12H  empagliflozin, 10 mg, Oral, Daily  ferrous sulfate, 325 mg, Oral, BID With Meals  folic acid, 1 mg, Oral, Daily  ipratropium, 0.5 mg, Nebulization, 4x Daily - RT  levothyroxine, 75 mcg, Oral, Q AM  memantine, 10 mg, Oral, Q12H  methylPREDNISolone sodium succinate, 20 mg, Intravenous, Q12H  metoprolol succinate XL, 50 mg, Oral, Q24H  pantoprazole, 40 mg, Oral, QAM AC  risperiDONE, 0.5 mg, Oral, BID  rosuvastatin, 40 mg, Oral, Q PM  senna-docusate sodium, 2 tablet, Oral, BID  sertraline, 100 mg, Oral, Daily  sodium chloride, 10 mL, Intravenous, Q12H  sodium chloride, 10 mL, Intravenous, Q12H  sodium chloride, 10 mL, Intravenous, Q12H           Assessment:  Persistent atrial fibrillation, [id number] is at least 8.  However, patient not a candidate for chronic anticoagulation due to intracerebral hemorrhage.  Coronary artery disease, status post PCI MAYKEL to circumflex remotely and CABG.  Hypertension  Hyperlipidemia  Cardiomyopathy with LVEF of 41 to 45%      Recommendations:  Patient to continue amiodarone for rhythm and rate control.  Will decrease dose to 200 twice daily after tomorrow a.m. dose.  Continue metoprolol with holding parameters.  Patient to remain off of anticoag therapy due to intracerebral hemorrhage.  With regard to GDMT for cardiomyopathy, continue Jardiance and metoprolol succinate.  Unable to further escalate medications due to soft blood pressure.  From a cardiac standpoint patient is stable.  We will sign off.  Please call if needed further.    I discussed the patients findings and my recommendations with patient and family    Electronically signed by PAULA Birmingham, 03/05/24, 5:45 PM EST.

## 2024-03-05 NOTE — THERAPY EVALUATION
Acute Care - Physical Therapy Initial Evaluation   [place]     Patient Name: Brenda Munroe  : 1950  MRN: [id number]  Today's Date: 3/5/2024   Onset of Illness/Injury or Date of Surgery: 24  Visit Dx:     ICD-10-CM ICD-9-CM   1. Subdural hematoma  S06.5XAA 432.1   2. NSTEMI (non-ST elevated myocardial infarction)  I21.4 410.70   3. Chronic anticoagulation  Z79.01 V58.61   4. Closed fracture of nasal bone, initial encounter  S02.2XXA 802.0   5. Traumatic orbital hematoma, right, initial encounter  S05.11XA 921.2     Patient Active Problem List   Diagnosis    Psychosis    Severe recurrent major depression with psychotic features    COPD (chronic obstructive pulmonary disease)    Coronary artery disease    Disease of thyroid gland    Arthritis    Hypertension    Paroxysmal atrial fibrillation    Chronic headaches    Vision changes    Carotid stenosis, asymptomatic, bilateral    Bradycardia, sinus    Acute respiratory failure with hypoxia and hypercapnia    NSTEMI (non-ST elevated myocardial infarction)    A-fib    Hypercapnic respiratory failure    Subdural hematoma     Past Medical History:   Diagnosis Date    Anxiety     Arthritis     Bell's palsy     Bladder prolapse, female, acquired     Carotid stenosis     COPD (chronic obstructive pulmonary disease)     COPD (chronic obstructive pulmonary disease)     Coronary artery disease     Dementia     Dementia     Depression     Difficulty walking     Disease of thyroid gland     Frequency of urination     GERD (gastroesophageal reflux disease)     Heart attack     Hyperlipidemia     Hypertension     Irregular heart beat     Peptic ulcer disease      Past Surgical History:   Procedure Laterality Date    CARDIAC CATHETERIZATION N/A 2023    Procedure: Left Heart Cath;  Surgeon: Tab Cifuentes MD;  Location: [facility] INVASIVE LOCATION;  Service: Cardiology;  Laterality: N/A;    CARDIAC SURGERY      open heart  in     CYSTOSCOPY N/A  2017    Procedure: CYSTOSCOPY;  Surgeon: Karl Nicolas DO;  Location: [facility] OR;  Service:     OTHER SURGICAL HISTORY      states she had fluid drained no surgery    VAGINAL HYSTERECTOMY W/ ANTERIOR AND POSTERIOR VAGINAL REPAIR N/A 2017    Procedure: VAGINAL HYSTERECTOMY WITH ANTERIOR, POSTERIOR, AND ENTEROCELE VAGINAL REPAIR;  Surgeon: Karl Nicolas DO;  Location: [facility] OR;  Service:     VAGINAL VAULT SUSPENSION N/A 2017    Procedure: VAGINAL VAULT SUSPENSION ;  Surgeon: Karl Nicolas DO;  Location: [facility] OR;  Service:      PT Assessment (Last 12 Hours)       PT Evaluation and Treatment       Row Name 24 0943          Physical Therapy Time and Intention    Subjective Information no complaints  -AG     Document Type evaluation  -AG     Mode of Treatment physical therapy  -AG     Patient Effort poor  -AG     Symptoms Noted During/After Treatment none  -AG       Row Name 24 2088          General Information    Patient Profile Reviewed yes  -AG     Onset of Illness/Injury or Date of Surgery 24  -AG     Referring Physician Dr. Romero  -AG     Patient Observations alert;poorly cooperative  -AG     Patient/Family/Caregiver Comments/Observations pt. supine in PCU bed; alert, intermittent eye contact with therapist.  Pt. does not verbalize responses to most questions.  [facility] Does not state her name, .  -AG     General Observations of Patient diffuse facial bruising noted  -AG     Prior Level of Function --  unable to obtain information regarding PLOF or recent events from pt.  -AG     Pertinent History of Current Functional Problem pt. admitted from SNF to this facility for subdural hematoma  -AG     Existing Precautions/Restrictions fall  -AG     Limitations/Impairments safety/cognitive  -AG     Barriers to Rehab medically complex;cognitive status  -AG       Row Name 24 2367          Living Environment    Current  Living Arrangements extended care facility  -AG     Home Accessibility wheelchair accessible  -[facility] Name 03/05/24 1404          Home Use of Assistive/Adaptive Equipment    Equipment Currently Used at Home wheelchair;walker, standard;glucometer  -AG       [facility] Name 03/05/24 1404          Pain    Additional Documentation Pain Scale: FACES Pre/Post-Treatment (Group)  -AG       [facility] Name 03/05/24 1404          Pain Scale: FACES Pre/Post-Treatment    Pain: FACES Scale, Pretreatment 0-->no hurt  -AG     Posttreatment Pain Rating 0-->no hurt  -AG       Row Name 03/05/24 1404          Cognition    Behavioral Issues (Cognition) withdrawn  -AG     Follows Commands (Cognition) does not follow one-step commands  -AG     Personal Safety Interventions supervised activity  -[facility] Name 03/05/24 1404          Range of Motion (ROM)    Range of Motion --  B LE PROM WFL; pt. is not cooperative with assessment of AROM.  Pt. grimaces with L UE ROM  -AG       [facility] Name 03/05/24 1404          Strength Comprehensive (MMT)    General Manual Muscle Testing (MMT) Assessment --  unable to formally assess  -AG       Row Name 03/05/24 1404          Vision Assessment/Intervention    Visual Impairment/Limitations unable/difficult to assess  -AG       [facility] Name 03/05/24 1404          Bed Mobility    Bed Mobility rolling left;rolling right  -AG     All Activities, [place] (Bed Mobility) dependent (less than 25% patient effort)  -AG     Rolling Left [place] (Bed Mobility) dependent (less than 25% patient effort)  -[facility] Name 03/05/24 1404          Transfers    Comment, (Transfers) unable to safely assess  -AG       [facility] Name 03/05/24 1404          Gait/Stairs (Locomotion)    Patient was able to Ambulate no, other medical factors prevent ambulation  -AG     Reason Patient was unable to Ambulate Cognitive Deficit/Severe Dementia  -AG       [facility] Name 03/05/24 1404          Safety Issues, Functional Mobility    Impairments  Affecting Function (Mobility) cognition  -AG       Row Name             Wound 03/04/24 2137 Left upper arm Extravasation    Wound - Properties Group Placement Date: 03/04/24  -MM Placement Time: 2137 -MM Side: Left  -MM Orientation: upper  -MM Location: arm  -MM Primary Wound Type: Extravasatio  -MM    Retired Wound - Properties Group Placement Date: 03/04/24  -MM Placement Time: 2137 -MM Side: Left  -MM Orientation: upper  -MM Location: arm  -MM Primary Wound Type: Extravasatio  -MM    Retired Wound - Properties Group Date first assessed: 03/04/24  -MM Time first assessed: 2137 -MM Side: Left  -MM Location: arm  -MM Primary Wound Type: Extravasatio  -MM      Row Name 03/05/24 1404          Coping    Observed Emotional State calm  -AG     Verbalized Emotional State acceptance  -AG     Trust Relationship/Rapport care explained;thoughts/feelings acknowledged  -AG     Family/Support Persons family  -AG     Involvement in Care not present at bedside  -AG     Family/Support System Care support provided  -AG       Row Name 03/05/24 1404          Plan of Care Review    Plan of Care Reviewed With patient  -AG     Outcome Evaluation Limited PT evaluation complete.  Pt. with severe cognitive deficit and inability to follow simple commands.  Pt. mostly disengaged with therapist and is dependent for functional mobility skills.  PT will place on caseload for PRN frequency to aid with SNF placement.  -AG       Row Name 03/05/24 1404          Positioning and Restraints    Pre-Treatment Position in bed  -AG     Post Treatment Position bed  -AG     In Bed supine;call light within reach;encouraged to call for assist;exit alarm on;side rails up x3  -AG       Row Name 03/05/24 1408          Therapy Assessment/Plan (PT)    Patient/Family Therapy Goals Statement (PT) pt. unable to provide goal  -AG     Functional Level at Time of Evaluation (PT) dependent  -AG     PT Diagnosis (PT) impaired functional mobility  -AG     Rehab Potential  (PT) other (see comments)  guarded  -AG     Criteria for Skilled Interventions Met (PT) yes;meets criteria  -AG     Therapy Frequency (PT) other (see comments)  PRN  -AG     Predicted Duration of Therapy Intervention (PT) 1 week  -AG     Problem List (PT) problems related to;mobility;cognition;range of motion (ROM);strength  -AG     Activity Limitations Related to Problem List (PT) unable to ambulate safely;unable to transfer safely;BADLs not performed adequately or safely  -AG       Row Name 03/05/24 1408          Therapy Plan Review/Discharge Plan (PT)    Therapy Plan Review (PT) evaluation/treatment results reviewed;care plan/treatment goals reviewed;risks/benefits reviewed;current/potential barriers reviewed  -AG       Row Name 03/05/24 1408          Physical Therapy Goals    Bed Mobility Goal Selection (PT) bed mobility, PT goal 1;bed mobility, PT goal 2  -AG       Row Name 03/05/24 1409          Bed Mobility Goal 1 (PT)    Activity/Assistive Device (Bed Mobility Goal 1, PT) rolling to left;rolling to right  -AG     [place] Level/Cues Needed (Bed Mobility Goal 1, PT) moderate assist (50-74% patient effort)  -AG     Time Frame (Bed Mobility Goal 1, PT) by discharge  -AG       Row Name 03/05/24 1402          Bed Mobility Goal 2 (PT)    Activity/Assistive Device (Bed Mobility Goal 2, PT) sit to supine;supine to sit  -AG     [place] Level/Cues Needed (Bed Mobility Goal 2, PT) maximum assist (25-49% patient effort)  -AG     Time Frame (Bed Mobility Goal 2, PT) by discharge  -AG               User Key  (r) = Recorded By, (t) = Taken By, (c) = Cosigned By      Initials Name Provider Type    AG Rosmery Lock, PT Physical Therapist    Scar Pna, RN Registered Nurse                    Physical Therapy Education       Title: PT OT SLP Therapies (In Progress)       Topic: Physical Therapy (In Progress)       Point: Mobility training (In Progress)       Learning Progress Summary             Patient  Acceptance, E,D, NL by AG at 3/5/2024 1403    Acceptance, E, NL,NR by PL at 3/2/2024 1517    Acceptance, E,TB, VU by CS at 2/26/2024 1152                         Point: Home exercise program (In Progress)       Learning Progress Summary             Patient Acceptance, E,D, NL by AG at 3/5/2024 1403    Acceptance, E, NL,NR by PL at 3/2/2024 1517    Acceptance, E,TB, VU by CS at 2/26/2024 1152                         Point: Body mechanics (In Progress)       Learning Progress Summary             Patient Acceptance, E,D, NL by AG at 3/5/2024 1403    Acceptance, E, NL,NR by PL at 3/2/2024 1517    Acceptance, E,TB, VU by CS at 2/26/2024 1152                         Point: Precautions (In Progress)       Learning Progress Summary             Patient Acceptance, E,D, NL by AG at 3/5/2024 1403    Acceptance, E, NL,NR by PL at 3/2/2024 1517    Acceptance, E,TB, VU by CS at 2/26/2024 1152                                         User Key       Initials Effective Dates Name Provider Type Discipline     06/16/21 -  Rosmery Lock, PT Physical Therapist PT    CS 05/31/23 -  Ortiz Peters, PT Physical Therapist PT    PL 02/05/24 -  Yolanda Cheung, JOSE Extern Registered Nurse Nurse                  PT Recommendation and Plan  Anticipated Discharge Disposition (PT): skilled nursing facility  Planned Therapy Interventions (PT): balance training, bed mobility training  Therapy Frequency (PT): other (see comments) (PRN)  Plan of Care Reviewed With: patient  Outcome Evaluation: Limited PT evaluation complete.  Pt. with severe cognitive deficit and inability to follow simple commands.  Pt. mostly disengaged with therapist and is dependent for functional mobility skills.  PT will place on caseload for PRN frequency to aid with SNF placement.       Time Calculation:    PT Charges       Row Name 03/05/24 1403             Time Calculation    PT Received On 03/05/24  -      PT Goal Re-Cert Due Date 03/19/24  -                User Key  (r)  = Recorded By, (t) = Taken By, (c) = Cosigned By      Initials Name Provider Type     Rosmery Lock, PT Physical Therapist                  Therapy Charges for Today       Code Description Service Date Service Provider Modifiers Qty    [id number] HC PT EVAL MOD COMPLEXITY 4 3/5/2024 Rosmery Lock, PT GP 1            PT G-Codes  Outcome Measure Options: Modified Maryse  AM-PAC 6 Clicks Score (PT): 6  Modified [place] Scale: 0 - No Symptoms at all.    Rosmery Lock, PT  3/5/2024

## 2024-03-06 LAB
ALBUMIN SERPL-MCNC: 2.8 G/DL (ref 3.5–5.2)
ALBUMIN/GLOB SERPL: 1.5 G/DL
ALP SERPL-CCNC: 75 U/L (ref 39–117)
ALT SERPL W P-5'-P-CCNC: 40 U/L (ref 1–33)
ANION GAP SERPL CALCULATED.3IONS-SCNC: 6.2 MMOL/L (ref 5–15)
ANISOCYTOSIS BLD QL: NORMAL
AST SERPL-CCNC: 35 U/L (ref 1–32)
BACTERIA SPEC AEROBE CULT: ABNORMAL
BASOPHILS # BLD AUTO: 0.02 10*3/MM3 (ref 0–0.2)
BASOPHILS NFR BLD AUTO: 0.2 % (ref 0–1.5)
BILIRUB SERPL-MCNC: 0.2 MG/DL (ref 0–1.2)
BUN SERPL-MCNC: 20 MG/DL (ref 8–23)
BUN/CREAT SERPL: 28.2 (ref 7–25)
CALCIUM SPEC-SCNC: 8.5 MG/DL (ref 8.6–10.5)
CHLORIDE SERPL-SCNC: 106 MMOL/L (ref 98–107)
CO2 SERPL-SCNC: 32.8 MMOL/L (ref 22–29)
CREAT SERPL-MCNC: 0.71 MG/DL (ref 0.57–1)
D-LACTATE SERPL-SCNC: 1.2 MMOL/L (ref 0.5–2)
D-LACTATE SERPL-SCNC: 2.2 MMOL/L (ref 0.5–2)
DACRYOCYTES BLD QL SMEAR: NORMAL
DEPRECATED RDW RBC AUTO: 70.6 FL (ref 37–54)
EGFRCR SERPLBLD CKD-EPI 2021: 88.8 ML/MIN/1.73
EOSINOPHIL # BLD AUTO: 0 10*3/MM3 (ref 0–0.4)
EOSINOPHIL NFR BLD AUTO: 0 % (ref 0.3–6.2)
ERYTHROCYTE [DISTWIDTH] IN BLOOD BY AUTOMATED COUNT: 19.6 % (ref 12.3–15.4)
GLOBULIN UR ELPH-MCNC: 1.9 GM/DL
GLUCOSE SERPL-MCNC: 168 MG/DL (ref 65–99)
GRAM STN SPEC: ABNORMAL
HCT VFR BLD AUTO: 35.7 % (ref 34–46.6)
HGB BLD-MCNC: 10.8 G/DL (ref 12–15.9)
HYPOCHROMIA BLD QL: NORMAL
IMM GRANULOCYTES # BLD AUTO: 0.46 10*3/MM3 (ref 0–0.05)
IMM GRANULOCYTES NFR BLD AUTO: 4.3 % (ref 0–0.5)
ISOLATED FROM: ABNORMAL
LYMPHOCYTES # BLD AUTO: 0.38 10*3/MM3 (ref 0.7–3.1)
LYMPHOCYTES NFR BLD AUTO: 3.6 % (ref 19.6–45.3)
MACROCYTES BLD QL SMEAR: NORMAL
MCH RBC QN AUTO: 29.4 PG (ref 26.6–33)
MCHC RBC AUTO-ENTMCNC: 30.3 G/DL (ref 31.5–35.7)
MCV RBC AUTO: 97.3 FL (ref 79–97)
MONOCYTES # BLD AUTO: 0.42 10*3/MM3 (ref 0.1–0.9)
MONOCYTES NFR BLD AUTO: 4 % (ref 5–12)
NEUTROPHILS NFR BLD AUTO: 87.9 % (ref 42.7–76)
NEUTROPHILS NFR BLD AUTO: 9.31 10*3/MM3 (ref 1.7–7)
NRBC BLD AUTO-RTO: 0 /100 WBC (ref 0–0.2)
PLATELET # BLD AUTO: 133 10*3/MM3 (ref 140–450)
PMV BLD AUTO: 11.2 FL (ref 6–12)
POTASSIUM SERPL-SCNC: 3.6 MMOL/L (ref 3.5–5.2)
PROT SERPL-MCNC: 4.7 G/DL (ref 6–8.5)
QT INTERVAL: 390 MS
QTC INTERVAL: 522 MS
RBC # BLD AUTO: 3.67 10*6/MM3 (ref 3.77–5.28)
SMALL PLATELETS BLD QL SMEAR: ADEQUATE
SODIUM SERPL-SCNC: 145 MMOL/L (ref 136–145)
WBC NRBC COR # BLD AUTO: 10.59 10*3/MM3 (ref 3.4–10.8)

## 2024-03-06 PROCEDURE — 25010000002 CEFTRIAXONE PER 250 MG: Performed by: INTERNAL MEDICINE

## 2024-03-06 PROCEDURE — 92610 EVALUATE SWALLOWING FUNCTION: CPT

## 2024-03-06 PROCEDURE — 25010000002 METHYLPREDNISOLONE PER 40 MG: Performed by: INTERNAL MEDICINE

## 2024-03-06 PROCEDURE — 94799 UNLISTED PULMONARY SVC/PX: CPT

## 2024-03-06 PROCEDURE — 80053 COMPREHEN METABOLIC PANEL: CPT | Performed by: INTERNAL MEDICINE

## 2024-03-06 PROCEDURE — 25010000002 LORAZEPAM PER 2 MG: Performed by: INTERNAL MEDICINE

## 2024-03-06 PROCEDURE — 99232 SBSQ HOSP IP/OBS MODERATE 35: CPT | Performed by: INTERNAL MEDICINE

## 2024-03-06 PROCEDURE — 94664 DEMO&/EVAL PT USE INHALER: CPT

## 2024-03-06 PROCEDURE — 93005 ELECTROCARDIOGRAM TRACING: CPT | Performed by: INTERNAL MEDICINE

## 2024-03-06 PROCEDURE — 94761 N-INVAS EAR/PLS OXIMETRY MLT: CPT

## 2024-03-06 PROCEDURE — 93010 ELECTROCARDIOGRAM REPORT: CPT | Performed by: INTERNAL MEDICINE

## 2024-03-06 PROCEDURE — 94660 CPAP INITIATION&MGMT: CPT

## 2024-03-06 PROCEDURE — 85007 BL SMEAR W/DIFF WBC COUNT: CPT | Performed by: INTERNAL MEDICINE

## 2024-03-06 PROCEDURE — 83605 ASSAY OF LACTIC ACID: CPT | Performed by: INTERNAL MEDICINE

## 2024-03-06 PROCEDURE — 85025 COMPLETE CBC W/AUTO DIFF WBC: CPT | Performed by: INTERNAL MEDICINE

## 2024-03-06 RX ORDER — LORAZEPAM 2 MG/ML
0.5 INJECTION INTRAMUSCULAR EVERY 4 HOURS PRN
Status: DISCONTINUED | OUTPATIENT
Start: 2024-03-06 | End: 2024-03-09 | Stop reason: HOSPADM

## 2024-03-06 RX ORDER — GLYCOPYRROLATE 0.2 MG/ML
0.2 INJECTION INTRAMUSCULAR; INTRAVENOUS
Status: DISCONTINUED | OUTPATIENT
Start: 2024-03-06 | End: 2024-03-09 | Stop reason: HOSPADM

## 2024-03-06 RX ORDER — MORPHINE SULFATE 2 MG/ML
2 INJECTION, SOLUTION INTRAMUSCULAR; INTRAVENOUS EVERY 4 HOURS PRN
Status: DISCONTINUED | OUTPATIENT
Start: 2024-03-06 | End: 2024-03-09 | Stop reason: HOSPADM

## 2024-03-06 RX ADMIN — RISPERIDONE 0.5 MG: 0.25 TABLET, FILM COATED ORAL at 21:26

## 2024-03-06 RX ADMIN — DONEPEZIL HYDROCHLORIDE 10 MG: 5 TABLET, FILM COATED ORAL at 21:26

## 2024-03-06 RX ADMIN — ROSUVASTATIN CALCIUM 40 MG: 20 TABLET, FILM COATED ORAL at 17:34

## 2024-03-06 RX ADMIN — MEMANTINE HYDROCHLORIDE 10 MG: 10 TABLET, FILM COATED ORAL at 08:52

## 2024-03-06 RX ADMIN — BUDESONIDE AND FORMOTEROL FUMARATE DIHYDRATE 2 PUFF: 160; 4.5 AEROSOL RESPIRATORY (INHALATION) at 19:03

## 2024-03-06 RX ADMIN — Medication 10 ML: at 08:53

## 2024-03-06 RX ADMIN — RISPERIDONE 0.5 MG: 0.25 TABLET, FILM COATED ORAL at 08:52

## 2024-03-06 RX ADMIN — PANTOPRAZOLE SODIUM 40 MG: 40 TABLET, DELAYED RELEASE ORAL at 08:52

## 2024-03-06 RX ADMIN — FERROUS SULFATE TAB 325 MG (65 MG ELEMENTAL FE) 325 MG: 325 (65 FE) TAB at 08:52

## 2024-03-06 RX ADMIN — SERTRALINE 100 MG: 50 TABLET, FILM COATED ORAL at 08:52

## 2024-03-06 RX ADMIN — DOCUSATE SODIUM 50 MG AND SENNOSIDES 8.6 MG 2 TABLET: 8.6; 5 TABLET, FILM COATED ORAL at 21:27

## 2024-03-06 RX ADMIN — BUDESONIDE AND FORMOTEROL FUMARATE DIHYDRATE 2 PUFF: 160; 4.5 AEROSOL RESPIRATORY (INHALATION) at 06:37

## 2024-03-06 RX ADMIN — LORAZEPAM 0.5 MG: 2 INJECTION, SOLUTION INTRAMUSCULAR; INTRAVENOUS at 21:27

## 2024-03-06 RX ADMIN — DOXYCYCLINE 100 MG: 100 INJECTION, POWDER, LYOPHILIZED, FOR SOLUTION INTRAVENOUS at 08:52

## 2024-03-06 RX ADMIN — IPRATROPIUM BROMIDE 0.5 MG: 0.5 SOLUTION RESPIRATORY (INHALATION) at 06:37

## 2024-03-06 RX ADMIN — METHYLPREDNISOLONE SODIUM SUCCINATE 20 MG: 40 INJECTION, POWDER, FOR SOLUTION INTRAMUSCULAR; INTRAVENOUS at 21:26

## 2024-03-06 RX ADMIN — LEVOTHYROXINE SODIUM 75 MCG: 0.07 TABLET ORAL at 06:02

## 2024-03-06 RX ADMIN — METHYLPREDNISOLONE SODIUM SUCCINATE 20 MG: 40 INJECTION, POWDER, FOR SOLUTION INTRAMUSCULAR; INTRAVENOUS at 08:52

## 2024-03-06 RX ADMIN — FERROUS SULFATE TAB 325 MG (65 MG ELEMENTAL FE) 325 MG: 325 (65 FE) TAB at 17:34

## 2024-03-06 RX ADMIN — EMPAGLIFLOZIN 10 MG: 10 TABLET, FILM COATED ORAL at 08:52

## 2024-03-06 RX ADMIN — Medication 10 ML: at 21:27

## 2024-03-06 RX ADMIN — MEMANTINE HYDROCHLORIDE 10 MG: 10 TABLET, FILM COATED ORAL at 21:27

## 2024-03-06 RX ADMIN — IPRATROPIUM BROMIDE 0.5 MG: 0.5 SOLUTION RESPIRATORY (INHALATION) at 13:29

## 2024-03-06 RX ADMIN — IPRATROPIUM BROMIDE 0.5 MG: 0.5 SOLUTION RESPIRATORY (INHALATION) at 00:03

## 2024-03-06 RX ADMIN — Medication 1 MG: at 08:52

## 2024-03-06 RX ADMIN — TIOTROPIUM BROMIDE INHALATION SPRAY 2 PUFF: 3.12 SPRAY, METERED RESPIRATORY (INHALATION) at 06:37

## 2024-03-06 RX ADMIN — METOPROLOL SUCCINATE 50 MG: 50 TABLET, EXTENDED RELEASE ORAL at 08:52

## 2024-03-06 RX ADMIN — SODIUM CHLORIDE 2000 MG: 9 INJECTION, SOLUTION INTRAVENOUS at 08:52

## 2024-03-06 NOTE — PLAN OF CARE
Goal Outcome Evaluation:  Plan of Care Reviewed With: patient        Progress: improving  Outcome Evaluation: Pt is alert and disoriented to time and situation; VSS on 3L NC. Afib; pt resting comfortably in bed; no additional distress noted and no other needs requested at this time. POC ongoing.

## 2024-03-06 NOTE — CASE MANAGEMENT/SOCIAL WORK
Discharge Planning Assessment   [place]     Patient Name: Brenda Munroe  MRN: [id number]  Today's Date: 3/6/2024    Admit Date: 2024       Discharge Plan       Row Name 24 1533       Plan    Plan Pt is a resident of [facility]. Per Phoebe, pt had a 10 day bed hold up on admission. Pt bed hold has , but facility will accept pt back. Palliative Care following for GOC. SS to follow.          MUNA BowersW

## 2024-03-06 NOTE — PROGRESS NOTES
[facility] HOSPITALIST PROGRESS NOTE     Patient Identification:  Name:  Brenda Munroe  Age:  73 y.o.  Sex:  female  :  1950  MRN:  [id number]  Visit Number:  [id number]  ROOM: [address]     Primary Care Provider:  Bernadette Arevalo MD    Length of stay in inpatient status:  12    Subjective     Chief Compliant:    Chief Complaint   Patient presents with    Fall    Head Injury       History of Presenting Illness:    Patient less interactive but similar to previous exams. She still shakes head yes/no to questions. No family bedside. BP on lower side yesterday evening requiring 2 boluses.     ROS:  Otherwise 10 point ROS negative other than documented above in HPI.     Objective     Current Hospital Meds:amiodarone, 200 mg, Oral, Q12H  amiodarone, 400 mg, Oral, BID With Meals  budesonide-formoterol, 2 puff, Inhalation, BID - RT   And  tiotropium bromide monohydrate, 2 puff, Inhalation, Daily - RT  cefTRIAXone, 2,000 mg, Intravenous, Q24H  cholecalciferol, 50,000 Units, Oral, Weekly  donepezil, 10 mg, Oral, Nightly  doxycycline, 100 mg, Intravenous, Q12H  empagliflozin, 10 mg, Oral, Daily  ferrous sulfate, 325 mg, Oral, BID With Meals  folic acid, 1 mg, Oral, Daily  ipratropium, 0.5 mg, Nebulization, 4x Daily - RT  levothyroxine, 75 mcg, Oral, Q AM  memantine, 10 mg, Oral, Q12H  methylPREDNISolone sodium succinate, 20 mg, Intravenous, Q12H  metoprolol succinate XL, 50 mg, Oral, Q24H  pantoprazole, 40 mg, Oral, QAM AC  risperiDONE, 0.5 mg, Oral, BID  rosuvastatin, 40 mg, Oral, Q PM  senna-docusate sodium, 2 tablet, Oral, BID  sertraline, 100 mg, Oral, Daily  sodium chloride, 10 mL, Intravenous, Q12H  sodium chloride, 10 mL, Intravenous, Q12H  sodium chloride, 10 mL, Intravenous, Q12H         Current Antimicrobial Therapy:  Anti-Infectives (From admission, onward)      Ordered     Dose/Rate Route Frequency Start Stop    24 9213  doxycycline (VIBRAMYCIN) 100 mg in sodium chloride 0.9 % 100 mL  IVPB-VTB        Ordering Provider: Efren Dunham MD    100 mg  over 60 Minutes Intravenous Every 12 Hours 03/04/24 0900 03/09/24 0859    03/04/24 0735  cefTRIAXone (ROCEPHIN) 2,000 mg in sodium chloride 0.9 % 100 mL IVPB-VTB        Ordering Provider: Efren Dunham MD    2,000 mg  200 mL/hr over 30 Minutes Intravenous Every 24 Hours 03/04/24 0800 03/09/24 0759          Current Diuretic Therapy:  Diuretics (From admission, onward)      None          ----------------------------------------------------------------------------------------------------------------------  Vital Signs:  Temp:  [97.5 °F (36.4 °C)-98.8 °F (37.1 °C)] 98.8 °F (37.1 °C)  Heart Rate:  [] 103  Resp:  [12-27] 27  BP: ()/() 113/85  SpO2:  [91 %-100 %] 100 %  on  Flow (L/min):  [3] 3;   Device (Oxygen Therapy): nasal cannula  Body mass index is 29.63 kg/m².    Wt Readings from Last 3 Encounters:   03/06/24 78.3 kg (172 lb 9.9 oz)   02/11/24 68.5 kg (151 lb)   02/02/24 68.9 kg (151 lb 14.4 oz)     Intake & Output (last 3 days)         03/03 0701 03/04 0700 03/04 0701 03/05 0700 03/05 0701 03/06 0700 03/06 0701 03/07 0700    P.O. 100 480 900 280    I.V. (mL/kg)  100.4 (1.3) 1156.3 (14.8)     Total Intake(mL/kg) 100 (1.2) 580.4 (7.4) 2056.3 (26.3) 280 (3.6)    Urine (mL/kg/hr) 600 (0.3) 500 (0.3) 800 (0.4)     Stool 0       Total Output 600 500 800     Net -500 +80.4 +1256.3 +280            Urine Unmeasured Occurrence 2 x       Stool Unmeasured Occurrence 4 x 2 x            Diet: Regular/House Diet; Feeding Assistance - Nursing; Texture: Pureed (NDD 1); Fluid Consistency: Thin (IDDSI 0)  ----------------------------------------------------------------------------------------------------------------------  Physical exam:  Constitutional:  Chronically ill appearing. Bruising on head and eyes present since admission.   HENT:  Head:  Normocephalic and atraumatic.  Mouth:  Moist mucous membranes.    Eyes:  Conjunctivae and EOM are  normal. No scleral icterus.    Neck:  Neck supple.  No JVD present.    Cardiovascular:  Normal rate, regular rhythm and normal heart sounds with no murmur.  Pulmonary/Chest:  No respiratory distress, no wheezes, no crackles, with normal breath sounds and good air movement.  Abdominal:  Soft.  Bowel sounds are normal.  No distension and no tenderness.   Musculoskeletal:  No edema, no tenderness, and no deformity.  No red or swollen joints anywhere.    Neurological:  Alert but not oriented.   Skin:  Skin is warm and dry. No rash noted. No pallor.   Peripheral vascular:  Pulses in all 4 extremities with no clubbing, no cyanosis, no edema.  ----------------------------------------------------------------------------------------------------------------------  Tele:    ----------------------------------------------------------------------------------------------------------------------  Results from last 7 days   Lab Units 03/06/24  0448 03/06/24  0213 03/05/24  0143 03/04/24  0050 03/03/24  1829   LACTATE mmol/L 1.2 2.2*  --   --  0.9   WBC 10*3/mm3  --  10.59 9.58 10.02  --    HEMOGLOBIN g/dL  --  10.8* 11.3* 11.3*  --    HEMATOCRIT %  --  35.7 37.6 38.7  --    MCV fL  --  97.3* 97.2* 98.2*  --    MCHC g/dL  --  30.3* 30.1* 29.2*  --    PLATELETS 10*3/mm3  --  133* 141 155  --      Results from last 7 days   Lab Units 03/02/24  2303   PH, ARTERIAL pH units 7.380   PO2 ART mm Hg 57.0*   PCO2, ARTERIAL mm Hg 62.2*   HCO3 ART mmol/L 36.8*     Results from last 7 days   Lab Units 03/06/24  0213 03/05/24  0143 03/04/24  0050   SODIUM mmol/L 145 145 143   POTASSIUM mmol/L 3.6 3.7 4.0   CHLORIDE mmol/L 106 105 103   CO2 mmol/L 32.8* 32.7* 29.9*   BUN mg/dL 20 21 19   CREATININE mg/dL 0.71 0.74 0.77   CALCIUM mg/dL 8.5* 8.5* 8.4*   GLUCOSE mg/dL 168* 172* 130*   ALBUMIN g/dL 2.8* 2.7* 2.8*   BILIRUBIN mg/dL 0.2 0.3 0.4   ALK PHOS U/L 75 59 54   AST (SGOT) U/L 35* 15 12   ALT (SGPT) U/L 40* 18 10   Estimated Creatinine  "Clearance: 69.3 mL/min (by C-G formula based on SCr of 0.71 mg/dL).  No results found for: \"AMMONIA\"              No results found for: \"HGBA1C\", \"POCGLU\"  Lab Results   Component Value Date    TSH 0.876 01/23/2024    FREET4 1.59 03/25/2021     No results found for: \"PREGTESTUR\", \"PREGSERUM\", \"HCG\", \"HCGQUANT\"  Pain Management Panel  More data exists         Latest Ref Rng & Units 2/23/2024 2/11/2024   Pain Management Panel   Amphetamine, Urine Qual Negative Negative  Negative    Barbiturates Screen, Urine Negative Negative  Negative    Benzodiazepine Screen, Urine Negative Positive  Positive    Buprenorphine, Screen, Urine Negative Negative  Negative    Cocaine Screen, Urine Negative Negative  Negative    Fentanyl, Urine Negative Negative  Negative    Methadone Screen , Urine Negative Negative  Negative    Methamphetamine, Ur Negative Negative  Negative      Brief Urine Lab Results  (Last result in the past 365 days)        Color   Clarity   Blood   Leuk Est   Nitrite   Protein   CREAT   Urine HCG        03/03/24 1826 Dark Yellow   Turbid   Moderate (2+)   Trace   Negative   30 mg/dL (1+)                 Blood Culture   Date Value Ref Range Status   03/03/2024 No growth at 2 days  Preliminary   03/03/2024 Klebsiella pneumoniae ssp pneumoniae (C)  Final     No results found for: \"URINECX\"  No results found for: \"WOUNDCX\"  No results found for: \"STOOLCX\"  No results found for: \"RESPCX\"  No results found for: \"AFBCX\"  Results from last 7 days   Lab Units 03/06/24  0448 03/06/24  0213 03/03/24  1829   PROCALCITONIN ng/mL  --   --  0.55*   LACTATE mmol/L 1.2 2.2* 0.9       I have personally looked at the labs and they are summarized above.  ----------------------------------------------------------------------------------------------------------------------  Detailed radiology reports for the last 24 hours:    Imaging Results (Last 24 Hours)       ** No results found for the last 24 hours. **          Assessment & Plan  "   #Traumatic left parafalcine 4 mm subdural hematoma   #Nasal fracture   #Home anticoagulation with apixiban  - CT on admission revealed 4 mm left parafalcine subdural hematoma   - Neurology and neurosurgery evaluated the patient in the ED   - Neurosurgery noted very unlikely to progress in size to clinically significant bleed and recommended to hold reversing anticoagulation and keep patient at our facility. Repeat CT head 2/24 did ot appear much different on my or neurosurgery evaluation.  - Repeat CT head on 3/2 as pt was more lethargic with CT revealing approx. 12 mm subdural hematoma at greatest thickness compared to previous 11 mm. Neurology recommending repeat imaging in 6 weeks. Cont close monitoring. Neurology and neurosurgery input appreciated.   - Neurology recommending to continue lamictal 100 mg BID.      #NSTEMI  #HFrEF  - HS troponin was also notably elevated on arrival at 474 with repeat at 452. No significant changes appreciated on EKG.   - Stress test on 2/1/24 with no evidence of ischemia.   - No clear cause at this juncture, but lean toward type II event  - Repeated echo revealed LVEF dropped to 41-45%.   - Consulted cardiology. Hold anticoagulation and antiplatelets for now given subdural hematoma discussed above.   - GDMT limited by relative hypotension.      #Acute hypercapnic respiratory failure   #Mild COPD exacerbation   - Recurrent issue the last year. Suspect exacerbated by elevated oxygen levels on 4L NC.   - Placed on BiPAP, was able to find and use mask without nasal component to help prevent pain  - SpO2 goal 88-92, PRN and QHSBIPAP      #L sided pneumonia and small to moderate pleural effusion  #Acute metabolic encephalopathy   #Dementia w/ behavioral disturbance  - Suspect 2/2 above issues in setting of significant dementia. Has been an issue in previous admissions as well.   - Fluctuating, likely hospital delirium component.   - Procal elevated     #Thrombocytopenia  - Not on  heparin products. Will transfuse if platlets drop below 100k given subdural hematoma.      #pAfib f/ RVR  - Cardiology consulted as above.   - Hold anticoagulation for now as above  - Continue metoprolol      #Hypertensive urgency   - Continue PO metoprolol and nitrate. Well controlled.      Chronic medical problems:  MDD  COPD- Hx of respiratory acidosis. ABG compensated. Continue to offer QHS BiPAP. Goal SPO2 88-92%.   Thyroid disease  Bilateral carotid stenosis  Dementia  GERD  PUD     F: As per SLP.   A: PRN  S: None  T: SCDs  H: HOB elevated  U: PRN  G: PRN  S: N/A  B: PRN  I: PIV  D: Ceftriaxone, doxycyline      Code status: DNR/DNI. Guarded overall prognosis. Palliative care consulted and following.      Dispo:Continue PCU level of care        VTE Prophylaxis:   Mechanical Order History:        Ordered        02/23/24 1523  Place Sequential Compression Device  Once            02/23/24 1523  Maintain Sequential Compression Device  Continuous                          Pharmalogical Order History:       None              Efren Dunham MD  [facility] Hospitalist  03/06/24  11:42 EST

## 2024-03-06 NOTE — PLAN OF CARE
Problem: Noninvasive Ventilation Acute  Goal: Effective Unassisted Ventilation and Oxygenation  Outcome: Ongoing, Not Progressing     Problem: Skin Injury Risk Increased  Goal: Skin Health and Integrity  Outcome: Ongoing, Not Progressing     Problem: Fall Injury Risk  Goal: Absence of Fall and Fall-Related Injury  Outcome: Ongoing, Not Progressing  Intervention: Identify and Manage Contributors  Recent Flowsheet Documentation  Taken 3/6/2024 1500 by Caryn Mazariegos RN  Medication Review/Management: medications reviewed  Taken 3/6/2024 1300 by Caryn Mazariegos RN  Medication Review/Management: medications reviewed  Taken 3/6/2024 1100 by Caryn Mazariegos RN  Medication Review/Management: medications reviewed  Taken 3/6/2024 0900 by Caryn Mazariegos RN  Medication Review/Management: medications reviewed  Taken 3/6/2024 0700 by Caryn Mazariegos RN  Medication Review/Management: medications reviewed  Intervention: Promote Injury-Free Environment  Recent Flowsheet Documentation  Taken 3/6/2024 1500 by Caryn Mazariegos RN  Safety Promotion/Fall Prevention:   safety round/check completed   activity supervised   assistive device/personal items within reach  Taken 3/6/2024 1300 by Caryn Mazariegos RN  Safety Promotion/Fall Prevention:   safety round/check completed   activity supervised   assistive device/personal items within reach  Taken 3/6/2024 1100 by Caryn Mazariegos RN  Safety Promotion/Fall Prevention:   safety round/check completed   activity supervised   assistive device/personal items within reach  Taken 3/6/2024 0900 by Caryn Mazariegos RN  Safety Promotion/Fall Prevention:   safety round/check completed   activity supervised   assistive device/personal items within reach  Taken 3/6/2024 0700 by Caryn Mazariegos RN  Safety Promotion/Fall Prevention:   safety round/check completed   activity supervised   assistive device/personal items within reach   Goal Outcome Evaluation:           Progress: no  change  Outcome Evaluation: Pt is alert, disoriented to time and situation. VSS on 3LNC. Afib. Required 2 500cc boluses to maintain adequate BP. No distress noted. Resting in bed comfortably. Care ongoing.

## 2024-03-06 NOTE — PROGRESS NOTES
"Palliative Care Daily Progress Note     S: Medical record reviewed, followed up with Primary RN Ni and Dr Dunham regarding patient's condition.When I saw Brenda this morning she was awake and alert to self only, she was smiling in a pleasant mood. When I asked her if she was in pain she shook her head no, she would not answer my other questions, just stared and smiled. She did not appear to be in distress.      O:   Palliative Performance Scale Score:     /97   Pulse 98   Temp 98.5 °F (36.9 °C) (Oral)   Resp 20   Ht 162.6 cm (64\")   Wt 78.3 kg (172 lb 9.9 oz)   SpO2 90%   BMI 29.63 kg/m²     Intake/Output Summary (Last 24 hours) at 3/6/2024 1338  Last data filed at 3/6/2024 1200  Gross per 24 hour   Intake 1916.27 ml   Output 800 ml   Net 1116.27 ml       PE:  General Appearance:    Chronically ill appearing, alert, cooperative, NAD   HEENT:    NC/AT, without obvious abnormality, EOMI, anicteric , extensive facial bruising , right eye - subconjunctival hemorrhage    Neck:   supple, trachea midline, no JVD   Lungs:     Unlabored respirations, no wheezing rhonchi or rales noted.    Heart:    Tachycardia, irregular, normal S1 and S2, no M/R/G   Abdomen:     Soft, NT, ND, NABS    Extremities:   Moves all extremities weakly, no edema   Pulses:   Pulses palpable and equal bilaterally   Skin:   Warm, dry, extensive bruising noted to facial (worse on right side) bilateral eyes - purple/blue/yellow discoloration, PVD discoloration- chronic in nature noted to BLE    Neurologic:   Alert to self only, confused, disoriented    Psych:   Calm, flat affect         Meds: Reviewed and changes noted    Labs:   Results from last 7 days   Lab Units 03/06/24  0213   WBC 10*3/mm3 10.59   HEMOGLOBIN g/dL 10.8*   HEMATOCRIT % 35.7   PLATELETS 10*3/mm3 133*     Results from last 7 days   Lab Units 03/06/24  0213   SODIUM mmol/L 145   POTASSIUM mmol/L 3.6   CHLORIDE mmol/L 106   CO2 mmol/L 32.8*   BUN mg/dL 20   CREATININE mg/dL " 0.71   GLUCOSE mg/dL 168*   CALCIUM mg/dL 8.5*     Results from last 7 days   Lab Units 03/06/24  0213   SODIUM mmol/L 145   POTASSIUM mmol/L 3.6   CHLORIDE mmol/L 106   CO2 mmol/L 32.8*   BUN mg/dL 20   CREATININE mg/dL 0.71   CALCIUM mg/dL 8.5*   BILIRUBIN mg/dL 0.2   ALK PHOS U/L 75   ALT (SGPT) U/L 40*   AST (SGOT) U/L 35*   GLUCOSE mg/dL 168*     Imaging Results (Last 72 Hours)       Procedure Component Value Units Date/Time    XR Chest 1 View [[id number]] Collected: 03/03/24 1951     Updated: 03/03/24 1953    Narrative:      INDICATION: Cough.     TECHNIQUE: Frontal radiograph of the chest.     COMPARISON: 2/11/2024     FINDINGS:   Cardiomegaly. Pulmonary vasculature appears within normal limits. Small  to moderate volume left pleural effusion with atelectasis/airspace  disease. No pneumothorax. No acute fracture.       Impression:      Small to moderate volume left pleural effusion with atelectasis/airspace  disease.        This report was finalized on 3/3/2024 7:51 PM by Alex Pallas, DO.                 Diagnostics: Reviewed    A: Brenda Munroe is a 73 y.o.female admitted on 2/23/2024 for a subdural hematoma. She has a past medical history of MDD, COPD, CAD, thyroid disease, hypertension, paroxysmal atrial fibrillation, bilateral carotid stenosis, dementia, GERD, and PUD . She was found in the floor at the local nursing home after an unwitnessed fall. She has dementia and is a very poor historian. During visit today, she was only able to shake her head yes and no to questions. She is alert to self only today and staff report increased confusion and delirium. Her initial CT scan showed a Traumatic left parafalcine 4 mm subdural . Neurosurgery originally noted very unlikely to progress in size to clinically significant bleed and recommended to hold reversing anticoagulation and keep patient at our facility. She also had an elevated HS troponin 474 and repeat was 452. Her initial ED vital signs were temp 97,  heart rate 111, respirations 20, BP 91/97, SpO2 100% on 4 L per nasal cannula.  HS troponin on arrival significantly elevated at 474 with repeat at 452.  CK is normal.  Sodium is 147.  Urine notable for specific gravity greater than 1.03 with moderate blood, 1+ leukocytes, 11-20 RBCs and 21-50 WBCs.  Imaging workup noted 4 mm left parafalcine subdural hematoma.  There is also a right nasal bone fracture.  EKG notes A-fib with right bundle branch block. Her most recent labs 3/4/2024- sodium 143, calcium 8.4, total protein 4.8, albumin 2.8, procalcitonin 0.55, H&H 11.3/38.7. Her UA from 3/3/2024 is dark yellow in color, turbin appearance, bacteria 1+ and BCID, PCR was + for klebsiella pneumoniae group. Her CXR also showed small to moderate volume left pleural effusion with atelectasis/airspace disease. She remains hypotensive bp 87/57 hr 94 irregular , afib on monitor, rr 20 sat 98% 02@3lpm n/c       P:  I was able to reach out to pts son Edi to discuss GOC, continue comfort measures conversation. Edi states he is the only son capable or involved in his mother life. One had a mental disability and another has not seen her in a couple of years. Edi states he would rather come in to the hospital to meet with palliative and Dr Dunham to discuss her condition and comfort measures. Edi will be here at 3 pm to meet. I let JOSE Dan to call and let me know when Edi arrives.    Palliative was able to be with Dr Dunham to meet with Edi. Dr Montez discussed her diagnoses, condition and treatment, as well as her multiple comorbidity's. We also discussed if he would wanted to continue treatment vs transitioning focus to comfort measures and discussed what comfort measures entailed. Edi stated that his mother told him 20 years that she would not want any heroic measures or agressive treatment especially if they did not contribute to her overall quality of life. Edi has decided to make his mother comfort measure. He  states that he would like to speak with his wife regarding if they would like to look into a different facility as opposed to returning to [place] as Edi states he is not sure if they are the right fit. I let Jane in SS to wait until tomorrow until Edi has time to discuss with his wife. I will also touch base with Edi tomorrow.     Brenda was changed to comfort measures and Dr Dunham was working on comfort order. I let JOSE Dan know of conversation and transition to comfort measures.    We will continue to follow along. Please do not hesitate to contact us regarding further sx mgmt or GOC needs, including after hours or on weekends via our on call provider at [phone].     Sheron Barnes, APRN    3/6/2024

## 2024-03-07 PROCEDURE — 25010000002 METHYLPREDNISOLONE PER 40 MG: Performed by: INTERNAL MEDICINE

## 2024-03-07 PROCEDURE — 94660 CPAP INITIATION&MGMT: CPT

## 2024-03-07 PROCEDURE — 94799 UNLISTED PULMONARY SVC/PX: CPT

## 2024-03-07 PROCEDURE — 99231 SBSQ HOSP IP/OBS SF/LOW 25: CPT | Performed by: INTERNAL MEDICINE

## 2024-03-07 PROCEDURE — 25010000002 MORPHINE PER 10 MG: Performed by: INTERNAL MEDICINE

## 2024-03-07 PROCEDURE — 94761 N-INVAS EAR/PLS OXIMETRY MLT: CPT

## 2024-03-07 RX ADMIN — Medication 10 ML: at 21:28

## 2024-03-07 RX ADMIN — METHYLPREDNISOLONE SODIUM SUCCINATE 20 MG: 40 INJECTION, POWDER, FOR SOLUTION INTRAMUSCULAR; INTRAVENOUS at 08:14

## 2024-03-07 RX ADMIN — MORPHINE SULFATE 2 MG: 2 INJECTION, SOLUTION INTRAMUSCULAR; INTRAVENOUS at 14:32

## 2024-03-07 RX ADMIN — METHYLPREDNISOLONE SODIUM SUCCINATE 20 MG: 40 INJECTION, POWDER, FOR SOLUTION INTRAMUSCULAR; INTRAVENOUS at 21:28

## 2024-03-07 NOTE — PLAN OF CARE
Goal Outcome Evaluation:  Plan of Care Reviewed With: patient        Progress: declining  Outcome Evaluation: Patient arrived to room 327 from PCU. Pt is comfort measures. Pt has no family at bedside. Pt comfort care maintained.

## 2024-03-07 NOTE — CASE MANAGEMENT/SOCIAL WORK
Discharge Planning Assessment  CHANDLER Preciado     Patient Name: Brenda Munroe  MRN: [id number]  Today's Date: 3/7/2024    Admit Date: 2/23/2024     Discharge Plan       Row Name 03/07/24 1145       Plan    Plan Pt was transitioned to comfort measures on 3/6/24. SS to follow.               MUNA BowersW

## 2024-03-07 NOTE — PLAN OF CARE
Goal Outcome Evaluation:              Outcome Evaluation: Patient resting in bed at this time. Patient is alert to self only at this time. Patient is on 3L NC and bipap at night. Call light within reach.

## 2024-03-07 NOTE — PROGRESS NOTES
[facility] HOSPITALIST PROGRESS NOTE     Patient Identification:  Name:  Brenda Munroe  Age:  73 y.o.  Sex:  female  :  1950  MRN:  [id number]  Visit Number:  [id number]  ROOM: [address]     Primary Care Provider:  Bernadette Arevalo MD    Length of stay in inpatient status:  13    Subjective     Chief Compliant:    Chief Complaint   Patient presents with    Fall    Head Injury       History of Presenting Illness:    Patient more drowsy today. Lethargic. Did not respond to verbal stimuli.     ROS:  Otherwise 10 point ROS negative other than documented above in HPI.     Objective     Current Hospital Meds:donepezil, 10 mg, Oral, Nightly  levothyroxine, 75 mcg, Oral, Q AM  memantine, 10 mg, Oral, Q12H  methylPREDNISolone sodium succinate, 20 mg, Intravenous, Q12H  risperiDONE, 0.5 mg, Oral, BID  senna-docusate sodium, 2 tablet, Oral, BID  sertraline, 100 mg, Oral, Daily  sodium chloride, 10 mL, Intravenous, Q12H  sodium chloride, 10 mL, Intravenous, Q12H  sodium chloride, 10 mL, Intravenous, Q12H         Current Antimicrobial Therapy:  Anti-Infectives (From admission, onward)      None          Current Diuretic Therapy:  Diuretics (From admission, onward)      None          ----------------------------------------------------------------------------------------------------------------------  Vital Signs:  Temp:  [97.6 °F (36.4 °C)-99 °F (37.2 °C)] 98.3 °F (36.8 °C)  Heart Rate:  [] 101  Resp:  [12-26] 24  BP: ()/() 142/85  SpO2:  [90 %-100 %] 92 %  on  Flow (L/min):  [3] 3;   Device (Oxygen Therapy): nasal cannula  Body mass index is 30.12 kg/m².    Wt Readings from Last 3 Encounters:   24 79.6 kg (175 lb 7.8 oz)   24 68.5 kg (151 lb)   24 68.9 kg (151 lb 14.4 oz)     Intake & Output (last 3 days)          0701   0700  0701   07 07 07 0701   0700    P.O. 480 900 940 0    I.V. (mL/kg) 100.4 (1.3) 1156.3 (14.8)  201 (2.5)     Total Intake(mL/kg) 580.4 (7.4) 2056.3 (26.3) 1141 (14.3) 0 (0)    Urine (mL/kg/hr) 500 (0.3) 800 (0.4) 1000 (0.5)     Stool   0     Total Output      Net +80.4 +1256.3 +141 0            Urine Unmeasured Occurrence   1 x     Stool Unmeasured Occurrence 2 x  2 x           Diet: Regular/House Diet; Feeding Assistance - Nursing; Texture: Pureed (NDD 1); Fluid Consistency: Thin (IDDSI 0)  ----------------------------------------------------------------------------------------------------------------------  Physical exam:  Constitutional:  Lethargic   HENT:  Head:  bruising on head consistent with prior exams.   Neck:  Neck supple.  No JVD present.      ----------------------------------------------------------------------------------------------------------------------  Tele:    ----------------------------------------------------------------------------------------------------------------------  Results from last 7 days   Lab Units 03/06/24  0448 03/06/24  0213 03/05/24  0143 03/04/24  0050 03/03/24  1829   LACTATE mmol/L 1.2 2.2*  --   --  0.9   WBC 10*3/mm3  --  10.59 9.58 10.02  --    HEMOGLOBIN g/dL  --  10.8* 11.3* 11.3*  --    HEMATOCRIT %  --  35.7 37.6 38.7  --    MCV fL  --  97.3* 97.2* 98.2*  --    MCHC g/dL  --  30.3* 30.1* 29.2*  --    PLATELETS 10*3/mm3  --  133* 141 155  --      Results from last 7 days   Lab Units 03/02/24  2303   PH, ARTERIAL pH units 7.380   PO2 ART mm Hg 57.0*   PCO2, ARTERIAL mm Hg 62.2*   HCO3 ART mmol/L 36.8*     Results from last 7 days   Lab Units 03/06/24 0213 03/05/24  0143 03/04/24  0050   SODIUM mmol/L 145 145 143   POTASSIUM mmol/L 3.6 3.7 4.0   CHLORIDE mmol/L 106 105 103   CO2 mmol/L 32.8* 32.7* 29.9*   BUN mg/dL 20 21 19   CREATININE mg/dL 0.71 0.74 0.77   CALCIUM mg/dL 8.5* 8.5* 8.4*   GLUCOSE mg/dL 168* 172* 130*   ALBUMIN g/dL 2.8* 2.7* 2.8*   BILIRUBIN mg/dL 0.2 0.3 0.4   ALK PHOS U/L 75 59 54   AST (SGOT) U/L 35* 15 12   ALT (SGPT) U/L 40*  "18 10   Estimated Creatinine Clearance: 69.9 mL/min (by C-G formula based on SCr of 0.71 mg/dL).  No results found for: \"AMMONIA\"              No results found for: \"HGBA1C\", \"POCGLU\"  Lab Results   Component Value Date    TSH 0.876 01/23/2024    FREET4 1.59 03/25/2021     No results found for: \"PREGTESTUR\", \"PREGSERUM\", \"HCG\", \"HCGQUANT\"  Pain Management Panel  More data exists         Latest Ref Rng & Units 2/23/2024 2/11/2024   Pain Management Panel   Amphetamine, Urine Qual Negative Negative  Negative    Barbiturates Screen, Urine Negative Negative  Negative    Benzodiazepine Screen, Urine Negative Positive  Positive    Buprenorphine, Screen, Urine Negative Negative  Negative    Cocaine Screen, Urine Negative Negative  Negative    Fentanyl, Urine Negative Negative  Negative    Methadone Screen , Urine Negative Negative  Negative    Methamphetamine, Ur Negative Negative  Negative      Brief Urine Lab Results  (Last result in the past 365 days)        Color   Clarity   Blood   Leuk Est   Nitrite   Protein   CREAT   Urine HCG        03/03/24 1826 Dark Yellow   Turbid   Moderate (2+)   Trace   Negative   30 mg/dL (1+)                 Blood Culture   Date Value Ref Range Status   03/03/2024 No growth at 3 days  Preliminary   03/03/2024 Klebsiella pneumoniae ssp pneumoniae (C)  Final     No results found for: \"URINECX\"  No results found for: \"WOUNDCX\"  No results found for: \"STOOLCX\"  No results found for: \"RESPCX\"  No results found for: \"AFBCX\"  Results from last 7 days   Lab Units 03/06/24  0448 03/06/24  0213 03/03/24  1829   PROCALCITONIN ng/mL  --   --  0.55*   LACTATE mmol/L 1.2 2.2* 0.9       I have personally looked at the labs and they are summarized above.  ----------------------------------------------------------------------------------------------------------------------  Detailed radiology reports for the last 24 hours:    Imaging Results (Last 24 Hours)       ** No results found for the last 24 hours. " **          Assessment & Plan    #Traumatic left parafalcine 4 mm subdural hematoma   #Nasal fracture   #Home anticoagulation with apixiban  #NSTEMI  #HFrEF  #Acute hypercapnic respiratory failure   #Mild COPD exacerbation in setting of severe chronic COPD  #L sided pneumonia and small to moderate pleural effusion  #Klebsiella bacteremia   #Acute metabolic encephalopathy   #Dementia w/ behavioral disturbance  #Thrombocytopenia  #pAfib f/ RVR  #Hypertensive urgency        Chronic medical problems:  MDD  COPD- Hx of respiratory acidosis. ABG compensated. Continue to offer QHS BiPAP. Goal SPO2 88-92%.   Thyroid disease  Bilateral carotid stenosis  Dementia  GERD  PUD     Patient made comfort care by her family on 3/6. Discussed stopping medications (including abx) not adding to comfort. Discussed adding PRN comfort medications. Palliative care following. Patient appeared comfortable today.      Code status: Comfort     Dispo:Continue: Med/surg orders in place for transfer to continue comfort measures        VTE Prophylaxis:   Mechanical Order History:        Ordered        02/23/24 1523  Place Sequential Compression Device  Once            02/23/24 1523  Maintain Sequential Compression Device  Continuous,   Status:  Canceled                          Pharmalogical Order History:       None                Efren Dunham MD  [facility] Hospitalist  03/07/24  12:27 EST

## 2024-03-07 NOTE — PROGRESS NOTES
"Palliative Care Daily Progress Note     S: Medical record reviewed, followed up with Primary RN Lainey and Dr Dunham regarding patient's condition. When Palliative care saw Brenda today she would not wake up to voice or gentle stimulation. Per RN and CNA she has not aroused since they arrived this am. Brenda does not appear to be in pain or in distress otherwise.      O:   Palliative Performance Scale Score:     /85   Pulse 101   Temp 98.3 °F (36.8 °C) (Axillary)   Resp 24   Ht 162.6 cm (64\")   Wt 79.6 kg (175 lb 7.8 oz)   SpO2 92%   BMI 30.12 kg/m²     Intake/Output Summary (Last 24 hours) at 3/7/2024 1402  Last data filed at 3/7/2024 1200  Gross per 24 hour   Intake 620.98 ml   Output 1000 ml   Net -379.02 ml       PE:  Brenda is comfort measures, she was not responsive, she did not appear to be in pain or in distress otherwise    Meds: Reviewed and changes noted    Labs:   Results from last 7 days   Lab Units 03/06/24  0213   WBC 10*3/mm3 10.59   HEMOGLOBIN g/dL 10.8*   HEMATOCRIT % 35.7   PLATELETS 10*3/mm3 133*     Results from last 7 days   Lab Units 03/06/24  0213   SODIUM mmol/L 145   POTASSIUM mmol/L 3.6   CHLORIDE mmol/L 106   CO2 mmol/L 32.8*   BUN mg/dL 20   CREATININE mg/dL 0.71   GLUCOSE mg/dL 168*   CALCIUM mg/dL 8.5*     Results from last 7 days   Lab Units 03/06/24  0213   SODIUM mmol/L 145   POTASSIUM mmol/L 3.6   CHLORIDE mmol/L 106   CO2 mmol/L 32.8*   BUN mg/dL 20   CREATININE mg/dL 0.71   CALCIUM mg/dL 8.5*   BILIRUBIN mg/dL 0.2   ALK PHOS U/L 75   ALT (SGPT) U/L 40*   AST (SGOT) U/L 35*   GLUCOSE mg/dL 168*     Imaging Results (Last 72 Hours)       ** No results found for the last 72 hours. **              Diagnostics: Reviewed    A: Brenda Munroe is a 73 y.o. female admitted on 2/23/2024 for a subdural hematoma. She has a past medical history of MDD, COPD, CAD, thyroid disease, hypertension, paroxysmal atrial fibrillation, bilateral carotid stenosis, dementia, GERD, and PUD . She was " found in the floor at the local nursing home after an unwitnessed fall. She has dementia and is a very poor historian. During visit today, she was only able to shake her head yes and no to questions. She is alert to self only today and staff report increased confusion and delirium. Her initial CT scan showed a Traumatic left parafalcine 4 mm subdural . Neurosurgery originally noted very unlikely to progress in size to clinically significant bleed and recommended to hold reversing anticoagulation and keep patient at our facility. She also had an elevated HS troponin 474 and repeat was 452. Her initial ED vital signs were temp 97, heart rate 111, respirations 20, BP 91/97, SpO2 100% on 4 L per nasal cannula.  HS troponin on arrival significantly elevated at 474 with repeat at 452.  CK is normal.  Sodium is 147.  Urine notable for specific gravity greater than 1.03 with moderate blood, 1+ leukocytes, 11-20 RBCs and 21-50 WBCs.  Imaging workup noted 4 mm left parafalcine subdural hematoma.  There is also a right nasal bone fracture.  EKG notes A-fib with right bundle branch block. Her most recent labs 3/4/2024- sodium 143, calcium 8.4, total protein 4.8, albumin 2.8, procalcitonin 0.55, H&H 11.3/38.7. Her UA from 3/3/2024 is dark yellow in color, turbin appearance, bacteria 1+ and BCID, PCR was + for klebsiella pneumoniae group. Her CXR also showed small to moderate volume left pleural effusion with atelectasis/airspace disease. She remains hypotensive bp 87/57 hr 94 irregular , afib on monitor, rr 20 sat 98% 02@3lpm n/c        P:  I was able to touch base with Karis Munoz to update and provide support. She stated that Edi had been by to see Brenda today and she had not woken up, I told her that I had experienced the same, however she did not appear to be uncomfortable, not in pain or respiratory distress. Discussed pt with Dr Dunham and we eill wait and see how she does over the oncoming days.      We will  continue to follow along. Please do not hesitate to contact us regarding further sx mgmt or GOC needs, including after hours or on weekends via our on call provider at [phone].     Sheron Barnes, APRN    3/7/2024

## 2024-03-08 LAB — BACTERIA SPEC AEROBE CULT: NORMAL

## 2024-03-08 PROCEDURE — 94660 CPAP INITIATION&MGMT: CPT

## 2024-03-08 PROCEDURE — 25010000002 MORPHINE PER 10 MG: Performed by: INTERNAL MEDICINE

## 2024-03-08 PROCEDURE — 25010000002 LORAZEPAM PER 2 MG: Performed by: INTERNAL MEDICINE

## 2024-03-08 PROCEDURE — 63710000001 PREDNISONE PER 1 MG: Performed by: INTERNAL MEDICINE

## 2024-03-08 PROCEDURE — 99239 HOSP IP/OBS DSCHRG MGMT >30: CPT | Performed by: INTERNAL MEDICINE

## 2024-03-08 PROCEDURE — 25010000002 METHYLPREDNISOLONE PER 40 MG: Performed by: INTERNAL MEDICINE

## 2024-03-08 PROCEDURE — 63710000001 PREDNISONE PER 5 MG: Performed by: INTERNAL MEDICINE

## 2024-03-08 RX ORDER — LORAZEPAM 0.5 MG/1
0.5 TABLET ORAL EVERY 6 HOURS PRN
Qty: 6 TABLET | Refills: 0 | Status: SHIPPED | OUTPATIENT
Start: 2024-03-08

## 2024-03-08 RX ORDER — OXYCODONE HYDROCHLORIDE 5 MG/1
5 TABLET ORAL EVERY 4 HOURS PRN
Qty: 18 TABLET | Refills: 0 | Status: SHIPPED | OUTPATIENT
Start: 2024-03-08 | End: 2024-03-11

## 2024-03-08 RX ORDER — PREDNISONE 20 MG/1
TABLET ORAL
Qty: 9 TABLET | Refills: 0 | Status: SHIPPED | OUTPATIENT
Start: 2024-03-08 | End: 2024-03-17

## 2024-03-08 RX ORDER — OXYCODONE HYDROCHLORIDE 5 MG/1
5 TABLET ORAL EVERY 4 HOURS PRN
Qty: 2 TABLET | Refills: 0 | Status: SHIPPED | OUTPATIENT
Start: 2024-03-08

## 2024-03-08 RX ADMIN — DOCUSATE SODIUM 50 MG AND SENNOSIDES 8.6 MG 2 TABLET: 8.6; 5 TABLET, FILM COATED ORAL at 20:22

## 2024-03-08 RX ADMIN — DOCUSATE SODIUM 50 MG AND SENNOSIDES 8.6 MG 2 TABLET: 8.6; 5 TABLET, FILM COATED ORAL at 08:27

## 2024-03-08 RX ADMIN — RISPERIDONE 0.5 MG: 0.25 TABLET, FILM COATED ORAL at 20:22

## 2024-03-08 RX ADMIN — LORAZEPAM 0.5 MG: 2 INJECTION, SOLUTION INTRAMUSCULAR; INTRAVENOUS at 05:10

## 2024-03-08 RX ADMIN — MEMANTINE HYDROCHLORIDE 10 MG: 10 TABLET, FILM COATED ORAL at 08:27

## 2024-03-08 RX ADMIN — MORPHINE SULFATE 2 MG: 2 INJECTION, SOLUTION INTRAMUSCULAR; INTRAVENOUS at 04:53

## 2024-03-08 RX ADMIN — MEMANTINE HYDROCHLORIDE 10 MG: 10 TABLET, FILM COATED ORAL at 20:22

## 2024-03-08 RX ADMIN — SERTRALINE 100 MG: 50 TABLET, FILM COATED ORAL at 08:27

## 2024-03-08 RX ADMIN — DONEPEZIL HYDROCHLORIDE 10 MG: 5 TABLET, FILM COATED ORAL at 20:22

## 2024-03-08 RX ADMIN — RISPERIDONE 0.5 MG: 0.25 TABLET, FILM COATED ORAL at 08:27

## 2024-03-08 RX ADMIN — METHYLPREDNISOLONE SODIUM SUCCINATE 20 MG: 40 INJECTION, POWDER, FOR SOLUTION INTRAMUSCULAR; INTRAVENOUS at 08:27

## 2024-03-08 NOTE — DISCHARGE PLACEMENT REQUEST
"Alta Munroe (73 y.o. Female)       Date of Birth   1950    Social Security Number       Address   [address]  [place] [address]    Home Phone   [phone]    MRN   [id number]       [facility]    Marital Status                               Admission Date   2/23/24    Admission Type   Emergency    Admitting Provider   Efren Dunham MD    Attending Provider   Efren Dunham MD    Department, Room/Bed   [address], [address]327/       Discharge Date       Discharge Disposition   Skilled Nursing Facility (DC - External)    Discharge Destination                                 Attending Provider: Efren Dunham MD    Allergies: No Known Allergies    Isolation: None   Infection: None   Code Status: No CPR    Ht: 162.6 cm (64\")   Wt: 77.6 kg (171 lb)    Admission Cmt: None   Principal Problem: Subdural hematoma [S06.5XAA]                   Active Insurance as of 2/23/2024       Primary Coverage       Payor Plan Insurance Group Employer/Plan Group    MEDICARE MEDICARE A & B        Payor Plan Address Payor Plan Phone Number Payor Plan Fax Number Effective Dates    [address] [phone]  4/1/2008 - None Entered    [facility] [address]         Subscriber Name Subscriber Birth Date Member ID       ALTA MUNROE 1950 [id number]               Secondary Coverage       Payor Plan Insurance Group Employer/Plan Group    [place]        Payor Plan Address Payor Plan Phone Number Payor Plan Fax Number Effective Dates    [address] [phone]  12/4/2023 - None Entered    [place] [address]         Subscriber Name Subscriber Birth Date Member ID       ALTA MUNROE 1950 [id number]                     Emergency Contacts        (Rel.) Home Phone Work Phone Mobile Phone    MunroeEdi barrett (Son) [phone] -- [phone]    Tammy Munroe (Relative) [phone] -- [phone]    MunroeFabien barrett (Son) -- -- " [phone]              Insurance Information                  MEDICARE/MEDICARE A & B Phone: [phone]    Subscriber: Brenda Munroe Subscriber#: [id number]    Group#: -- Precert#: --        [place] Phone: [phone]    Subscriber: Brenda Munroe Subscriber#: [id number]    Group#: -- Precert#: --             History & Physical        Darryl Monteiro PA-C at 02/23/24 9511       Attestation signed by Efren Dunham MD at 02/23/24 3539    I have reviewed this documentation and agree.    Ms. Munroe is our 72 yo F with hx of  MDD, COPD, CAD, thyroid disease, hypertension, paroxysmal atrial fibrillation, bilateral carotid stenosis, dementia, GERD, PUD who presented after being found on the floor at nursing home. Patient unable to give much history, obtained by ED staff from nursing home staff. Per report patient was doing well around 8 am then was found on the floor with blood around her head around 9:55 am. Patient reported to me she thought she fell but she was not sure. She was oriented to person and place but not time. She was moving all 4 extremities on command. She was drowsy and would fall back asleep easily.     I was able to update patient's listed daughter-in-law who was going to try to get up with patient's son to contact us.     Plan of care:    #Traumatic left parafalcine 4 mm subdural   #Home anticoagulation with apixiban  - CT on admission revealed 4 mm left parafalcine subdural hematoma   - Neurology and neurosurgery evaluated the patient in the ED   - Neurology recommended to start keppra and consult CT surgery.   - Neurosurgery noted very unlikely to progress in size to clinically significant bleed and recommended to hold reversing anticoagulation and keep patient at our facility. Repeat CT head in AM, will also repeat if patient has neurological decline.   - Admit to CCU for close monitoring and frequent neuro checks     #NSTEMI  - HS troponin was also  notably elevated on arrival at 474 with repeat at 452. No significant changes appreciated on EKG.   - Stress test on 24 with no evidence of ischemia.   - No clear cause at this juncture, but lean toward type II event  - Will repeat echo  - Consult cardiology. Hold anticoagulation and antiplatelets for now    #pAfib  - Rate controlled. Cardiology consulted as above.   - Hold anticoagulation for now     Chronic medical problems:  MDD  COPD- Hx of respiratory acidosis, will get ABG to make sure not cause of AMS  Thyroid disease  Hypertension  Bilateral carotid stenosis  Dementia  GERD  PUD    F: NPO pending SLP  A: PRN  S: None  T: SCDs  H: HOB elevated  U: PRN  G: PRN  S: N/A  B: PRN  I: PIV  D: None    Code status: Full, planning on discussing with son when available     Dispo:Admit to CCU for close monitoring                     [facility]  History & Physical    Patient Identification:  Name:  Brenda Munroe  Age:  73 y.o.  Sex:  female  :  1950  MRN:  [id number]   Visit Number:  [id number]  Admit Date: 2024   Primary Care Physician:  Bernadette Arevalo MD    Subjective     Chief complaint: fall    History of presenting illness:      Brenda Munroe is a 73 y.o. female who presented for further evaluation of fall, head injury.  Per report patient was found facedown at the nursing home today at 0955.  Per staff patient had been at her normal state of health during medication administration at 0800.  On exam patient noted with significant swelling to the right eye and surrounding face/scalp.  She was able to follow a few commands and state her name but answered no other questions.  She did move the bilateral upper extremities when asked.    Past medical history is significant for MDD, COPD, CAD, thyroid disease, hypertension, paroxysmal atrial fibrillation, bilateral carotid stenosis, dementia, GERD, PUD    Upon arrival to the ED, vital signs were temp 97, heart  rate 111, respirations 20, BP 91/97, SpO2 100% on 4 L per nasal cannula.  HS troponin on arrival significantly elevated at 474 with repeat at 452.  CK is normal.  Sodium is 147.  Urine notable for specific gravity greater than 1.03 with moderate blood, 1+ leukocytes, 11-20 RBCs and 21-50 WBCs.  Imaging workup noted 4 mm left parafalcine subdural hematoma.  There is also a right nasal bone fracture.  EKG notes A-fib with right bundle branch block.    Emergency Department Room location at the time of my evaluation was 103.     ---------------------------------------------------------------------------------------------------------------------   Review of Systems   Reason unable to perform ROS: Altered mental status.        ---------------------------------------------------------------------------------------------------------------------   Past Medical History:   Diagnosis Date    Anxiety     Arthritis     Bell's palsy     Bladder prolapse, female, acquired     Carotid stenosis     COPD (chronic obstructive pulmonary disease)     COPD (chronic obstructive pulmonary disease)     Coronary artery disease     Dementia     Dementia     Depression     Difficulty walking     Disease of thyroid gland     Frequency of urination     GERD (gastroesophageal reflux disease)     Heart attack     Hyperlipidemia     Hypertension     Irregular heart beat     Peptic ulcer disease      Past Surgical History:   Procedure Laterality Date    CARDIAC CATHETERIZATION N/A 2/21/2023    Procedure: Left Heart Cath;  Surgeon: Tab Cifuentes MD;  Location: [facility] CATH INVASIVE LOCATION;  Service: Cardiology;  Laterality: N/A;    CARDIAC SURGERY      open heart  in 1999    CYSTOSCOPY N/A 11/8/2017    Procedure: CYSTOSCOPY;  Surgeon: Karl Nicolas DO;  Location: [facility];  Service:     OTHER SURGICAL HISTORY      states she had fluid drained no surgery    VAGINAL HYSTERECTOMY W/ ANTERIOR AND POSTERIOR VAGINAL REPAIR N/A  11/8/2017    Procedure: VAGINAL HYSTERECTOMY WITH ANTERIOR, POSTERIOR, AND ENTEROCELE VAGINAL REPAIR;  Surgeon: Karl Nicolas DO;  Location: [facility] OR;  Service:     VAGINAL VAULT SUSPENSION N/A 11/8/2017    Procedure: VAGINAL VAULT SUSPENSION ;  Surgeon: Karl Nicolas DO;  Location: [facility] OR;  Service:      Family History   Problem Relation Age of Onset    Dementia Sister     Heart attack Mother     Tuberculosis Father     Breast cancer Neg Hx      Social History     Socioeconomic History    Marital status:     Number of children: 3   Tobacco Use    Smoking status: Every Day     Packs/day: 0.50     Years: 55.00     Additional pack years: 0.00     Total pack years: 27.50     Types: Cigarettes    Smokeless tobacco: Never   Vaping Use    Vaping Use: Never used   Substance and Sexual Activity    Alcohol use: No    Drug use: No    Sexual activity: Never     Comment: denies     ---------------------------------------------------------------------------------------------------------------------   Allergies:  Patient has no known allergies.  ---------------------------------------------------------------------------------------------------------------------   Home medications:    Medications below are reported home medications pulling from within the system; at this time, these medications have not been reconciled unless otherwise specified and are in the verification process for further verifcation as current home medications.  (Not in a hospital admission)      Hospital Scheduled Meds:    No current facility-administered medications for this encounter.      Current listed hospital scheduled medications may not yet reflect those currently placed in orders that are signed and held awaiting patient's arrival to floor.   ---------------------------------------------------------------------------------------------------------------------     Objective     Vital Signs:  Temp:  [97 °F (36.1 °C)] 97 °F  (36.1 °C)  Heart Rate:  [] 98  Resp:  [20] 20  BP: ()/(57-91) 99/85      02/23/24  1048   Weight: 68.5 kg (151 lb)     Body mass index is 25.92 kg/m².  ---------------------------------------------------------------------------------------------------------------------       Physical Exam  Vitals and nursing note reviewed.   Constitutional:       General: She is not in acute distress.  HENT:      Head:      Comments: Diffuse swelling and bruising to the right face and anterior-right scalp  Eyes:      Comments: Significant right periorbital edema with ecchymosis   Cardiovascular:      Rate and Rhythm: Tachycardia present. Rhythm irregular.      Comments: DP pulses weak but present bilaterally  Pulmonary:      Effort: Pulmonary effort is normal. No respiratory distress.      Breath sounds: Normal breath sounds.   Abdominal:      Palpations: Abdomen is soft.   Musculoskeletal:      Right lower leg: No edema.      Left lower leg: No edema.   Skin:     General: Skin is warm and dry.   Neurological:      Mental Status: She is alert. She is disoriented.         ---------------------------------------------------------------------------------------------------------------------  EKG:      I have personally looked at the EKG.  ---------------------------------------------------------------------------------------------------------------------   Results from last 7 days   Lab Units 02/23/24  1135   CRP mg/dL 0.42   LACTATE mmol/L 1.0   WBC 10*3/mm3 5.33   HEMOGLOBIN g/dL 11.1*   HEMATOCRIT % 39.0   MCV fL 100.8*   MCHC g/dL 28.5*   PLATELETS 10*3/mm3 233   INR  1.50*         Results from last 7 days   Lab Units 02/23/24  1135   SODIUM mmol/L 147*   POTASSIUM mmol/L 3.9   CHLORIDE mmol/L 105   CO2 mmol/L 40.1*   BUN mg/dL 17   CREATININE mg/dL 0.86   CALCIUM mg/dL 9.1   GLUCOSE mg/dL 100*   ALBUMIN g/dL 3.3*   BILIRUBIN mg/dL 0.3   ALK PHOS U/L 73   AST (SGOT) U/L 19   ALT (SGPT) U/L 15   Estimated Creatinine  "Clearance: 53.7 mL/min (by C-G formula based on SCr of 0.86 mg/dL).  No results found for: \"AMMONIA\"  Results from last 7 days   Lab Units 02/23/24  1340 02/23/24  1135   CK TOTAL U/L 89  --    HSTROP T ng/L 452* 474*         Lab Results   Component Value Date    HGBA1C 5.20 01/01/2024     Lab Results   Component Value Date    TSH 0.876 01/23/2024    FREET4 1.59 03/25/2021     No results found for: \"PREGTESTUR\", \"PREGSERUM\", \"HCG\", \"HCGQUANT\"  Pain Management Panel  More data exists         Latest Ref Rng & Units 2/23/2024 2/11/2024   Pain Management Panel   Amphetamine, Urine Qual Negative Negative  Negative    Barbiturates Screen, Urine Negative Negative  Negative    Benzodiazepine Screen, Urine Negative Positive  Positive    Buprenorphine, Screen, Urine Negative Negative  Negative    Cocaine Screen, Urine Negative Negative  Negative    Fentanyl, Urine Negative Negative  Negative    Methadone Screen , Urine Negative Negative  Negative    Methamphetamine, Ur Negative Negative  Negative      No results found for: \"BLOODCX\"  No results found for: \"URINECX\"  No results found for: \"WOUNDCX\"  No results found for: \"STOOLCX\"      ---------------------------------------------------------------------------------------------------------------------  Imaging Results (Last 7 Days)       Procedure Component Value Units Date/Time    CT Abdomen Pelvis Without Contrast [[id number]] Collected: 02/23/24 1210     Updated: 02/23/24 1214    Narrative:      PROCEDURE: CT ABDOMEN PELVIS WO CONTRAST-     HISTORY: Unwitnessed fall, ALTERED, on anticoagulation superficial  bruising to the abdomen     COMPARISON: 1/9/2024.     PROCEDURE: Axial images were obtained from the lung bases through the  pubic symphysis without intravenous contrast. . This study was performed  with techniques to keep radiation doses as low as reasonably achievable  (ALARA). Individualized dose reduction techniques using automated  exposure control or adjustment of " mA and/or kV according to the patient  size were employed.     FINDINGS:     ABDOMEN: The lung bases are clear. The heart size is normal. Noncontrast  images of the liver are unremarkable with no focal hepatic lesionsl. The  spleen is normal. There is a 1.6 cm left adrenal nodule which is  unchanged. The right adrenal gland is normal.  The pancreas has an  unremarkable unenhanced appearance.. The aorta is normal in caliber.  There is no significant free fluid or adenopathy. The left kidney is  atrophic. There is no nephrolithiasis. There is no hydronephrosis.  Limited noncontrast images of the bowel are unremarkable.     PELVIS: The appendix is not identified. There are colonic diverticula  without evidence of diverticulitis. The patient is status post  hysterectomy. The urinary bladder is unremarkable. There is no  significant fluid or adenopathy. Bone windows reveal no acute osseous  abnormalities or lytic/destructive lesions. There is a fat-containing  periumbilical hernia.       Impression:      No acute intra-abdominal or pelvic process.              This report was finalized on 2/23/2024 12:12 PM by Lily Mullen M.D..       CT Thoracic Spine Without Contrast [[id number]] Collected: 02/23/24 1209     Updated: 02/23/24 1212    Narrative:      PROCEDURE: CT THORACIC SPINE WO CONTRAST-     HISTORY: Unwitnessed fall ;gross external trauma; blood thinners     TECHNIQUE: Multiple axial CT images were obtained through the thoracic  spine using bone window algorithims. Coronal and sagittal images were  reconstructed from the original axial data set. This study was performed  with techniques to keep radiation doses as low as reasonably achievable  (ALARA). Individualized dose reduction techniques using automated  exposure control or adjustment of mA and/or kV according to the patient  size were employed.     COMPARISON: None.     FINDINGS: There is S-shaped curvature of the thoracolumbar spine. There  are no  fractures. There are diffuse degenerative changes with loss of  disc space height and anterior osteophyte formation. The paraspinal soft  tissues are unremarkable.       Impression:      No acute process.              This report was finalized on 2/23/2024 12:10 PM by Lily Mullen M.D..       CT Chest Without Contrast Diagnostic [[id number]] Collected: 02/23/24 1139     Updated: 02/23/24 1143    Narrative:      PROCEDURE: CT CHEST WO CONTRAST DIAGNOSTIC-     HISTORY: Unwitnessed fall gross external trauma blood thinners, masses  on abdomen     COMPARISON:  None .     PROCEDURE:  Multiple axial CT images were obtained from the thoracic  inlet through the upper abdomen without the use of contrast. This study  was performed with techniques to keep radiation doses as low as  reasonably achievable (ALARA). Individualized dose reduction techniques  using automated exposure control or adjustment of mA and/or kV according  to the patient size were employed.     FINDINGS:  Soft tissue windows reveal no axillary, hilar or mediastinal adenopathy.  The thyroid gland is unremarkable. The heart is enlarged. The aorta is  normal in caliber. There are no pleural or pericardial effusions. Lung  windows reveal scarring in atelectasis in the left upper lobe. There is  no pneumothorax. The visualized upper abdomen is unremarkable. Bone  windows reveal no acute osseous abnormalities.       Impression:      No acute process.        This report was finalized on 2/23/2024 11:41 AM by Lily Mullen M.D..       XR Wrist 3+ View Right [[id number]] Collected: 02/23/24 1138     Updated: 02/23/24 1141    Narrative:      PROCEDURE: XR WRIST 3+ VW RIGHT-     History: Unwitnessed fall, ALTERED, on anticoagulation superficial  bruising to the abdomen, skin abrasion     COMPARISON:  None.     FINDINGS:  A 3 view exam demonstrates no acute fracture or dislocation.  There is joint space narrowing with osteophyte formation at the  first  carpal metacarpal joint. No soft tissue abnormality is seen.       Impression:      No acute fracture.                 This report was finalized on 2/23/2024 11:39 AM by Lily Mullen M.D..       CT Lumbar Spine Without Contrast [[id number]] Collected: 02/23/24 1135     Updated: 02/23/24 1139    Narrative:      PROCEDURE: CT LUMBAR SPINE WO CONTRAST-     HISTORY: Low back pain, trauma     TECHNIQUE: Multiple axial CT images were obtained through the lumbar  spine using bone window algorithms. Coronal and sagittal images were  reconstructed from the original axial data set. This study was performed  with techniques to keep radiation doses as low as reasonably achievable  (ALARA). Individualized dose reduction techniques using automated  exposure control or adjustment of mA and/or kV according to the patient  size were employed.     COMPARISON: None.     FINDINGS: The lumbar spine is well aligned with no acute fractures.  There are diffuse degenerative changes with loss of disc space height  and facet arthropathy. Evaluation of the paraspinal soft tissues reveals  an atrophic left kidney.       Impression:      No acute process.              This report was finalized on 2/23/2024 11:37 AM by Lily Mullen M.D..       CT Facial Bones Without Contrast [[id number]] Collected: 02/23/24 1133     Updated: 02/23/24 1137    Narrative:      PROCEDURE: CT FACIAL BONES WO CONTRAST-     HISTORY: Unwitnessed fall gross external trauma blood thinners     COMPARISON: None .     TECHNIQUE: Multiple axial CT sections were performed through the face  without contrast. Coronal reconstruction images were performed. This  study was performed with techniques to keep radiation doses as low as  reasonably achievable (ALARA). Individualized dose reduction techniques  using automated exposure control or adjustment of mA and/or kV according  to the patient size were employed.         FINDINGS: There is a right nasal bone  fracture. No other fractures are  seen within the face.. The orbital floors are intact. There is an  air-fluid level in the right maxillary sinus. There is a right  periorbital soft tissue hematoma.       Impression:      Right nasal bone fracture.              This report was finalized on 2/23/2024 11:35 AM by Lily Mullen M.D..       CT Head Without Contrast [[id number]] Collected: 02/23/24 1129     Updated: 02/23/24 1135    Narrative:      PROCEDURE: CT HEAD WO CONTRAST-, CT CERVICAL SPINE WO CONTRAST-     HISTORY: Unwitnessed fall gross external trauma blood thinners     PROCEDURE: Axial images were obtained from the skull base to the  thoracic inlet by computed tomography. Multiple axial CT images were  performed from the foramen magnum to the vertex without enhancement.   This study was performed with techniques to keep radiation doses as low  as reasonably achievable (ALARA). Individualized dose reduction  techniques using automated exposure control or adjustment of mA and/or  kV according to the patient size were employed.         C-SPINE:     FINDINGS: There are no acute fractures. There are diffuse degenerative  changes with loss of disc base height and facet arthropathy.  The  prevertebral soft tissues are normal.  Limited images of the lung apices  are unremarkable.     HEAD CT:     FINDINGS: There is generalized cerebral volume loss. Patchy  hypodensities in the periventricular white matter consistent with  chronic small vessel ischemic change. There is a subdural hematoma  overlying the left frontal lobe layering along the falx measuring up to  4 mm. There is no mass, mass effect or midline shift.  There is no  hydrocephalus. The paranasal sinuses are clear. Bone windows reveal no  acute osseous abnormalities. Note is made of a right periorbital  hematoma.       Impression:      1. 4 mm left parafalcine subdural hematoma.  2. No acute fracture or malalignment in the cervical spine.               Critical Finding     Critical report results have been called to Dr. Moran. Report called at  2/23/2024 11:33 AM.                       This report was finalized on 2/23/2024 11:33 AM by Lily Mullen M.D..       CT Cervical Spine Without Contrast [[id number]] Collected: 02/23/24 1129     Updated: 02/23/24 1135    Narrative:      PROCEDURE: CT HEAD WO CONTRAST-, CT CERVICAL SPINE WO CONTRAST-     HISTORY: Unwitnessed fall gross external trauma blood thinners     PROCEDURE: Axial images were obtained from the skull base to the  thoracic inlet by computed tomography. Multiple axial CT images were  performed from the foramen magnum to the vertex without enhancement.   This study was performed with techniques to keep radiation doses as low  as reasonably achievable (ALARA). Individualized dose reduction  techniques using automated exposure control or adjustment of mA and/or  kV according to the patient size were employed.         C-SPINE:     FINDINGS: There are no acute fractures. There are diffuse degenerative  changes with loss of disc base height and facet arthropathy.  The  prevertebral soft tissues are normal.  Limited images of the lung apices  are unremarkable.     HEAD CT:     FINDINGS: There is generalized cerebral volume loss. Patchy  hypodensities in the periventricular white matter consistent with  chronic small vessel ischemic change. There is a subdural hematoma  overlying the left frontal lobe layering along the falx measuring up to  4 mm. There is no mass, mass effect or midline shift.  There is no  hydrocephalus. The paranasal sinuses are clear. Bone windows reveal no  acute osseous abnormalities. Note is made of a right periorbital  hematoma.       Impression:      1. 4 mm left parafalcine subdural hematoma.  2. No acute fracture or malalignment in the cervical spine.              Critical Finding     Critical report results have been called to Dr. Moran. Report called at  2/23/2024 11:33 AM.    "                    This report was finalized on 2/23/2024 11:33 AM by Lily Mullen M.D..               Cultures:  No results found for: \"BLOODCX\", \"URINECX\", \"WOUNDCX\", \"MRSACX\", \"RESPCX\", \"STOOLCX\"    Last echocardiogram:  Results for orders placed during the hospital encounter of 01/01/24    Adult Transthoracic Echo Limited W/ Cont if Necessary Per Protocol    Interpretation Summary    Normal left ventricular cavity size and wall thickness noted. All left ventricular wall segments contract normally.    Left ventricular ejection fraction appears to be 61 - 65%.    There is a trivial pericardial effusion adjacent to the left ventricle.          I have personally reviewed the above radiology images and read the final radiology report on 02/23/24  ---------------------------------------------------------------------------------------------------------------------  Assessment / Plan     Active Hospital Problems    Diagnosis  POA    **Subdural hematoma [S06.5XAA]  Yes       ASSESSMENT/PLAN:    Left parafalcine subdural hematoma secondary to unwitnessed fall  Encephalopathy, possibly secondary to above in the setting of baseline dementia  Right nasal bone fracture  NSTEMI, T1 versus T2  In the setting of known CAD  Complicated by atrial fibrillation requiring full anticoagulation  Patient presents from local SNF following unwitnessed fall.  Imaging workup noted 4 mm left parafalcine subdural hematoma.  HS troponin was also notably elevated on arrival at 474 with repeat at 452.  Neurology and neurosurgery evaluated the patient in the ED and have recommended repeat CT in 4 to 6 hours to monitor  Will admit to the CCU for further management  Hold any medications which could perpetuate bleeding  Consult cardiology for further assistance and recommendations regarding NSTEMI.  As above patient not a good candidate currently for heparinization.  She did have a stress test on 2/1/2024 with normal MPI no evidence of " ischemia.  Neurochecks and NIHSS assessments every shift  Will remain n.p.o. pending improvement in cognition  Neurology has further recommended Keppra 500 mg twice daily x 7 days  Will continue to monitor closely and follow-up further neurology recommendations    Chronic:  MDD  COPD  Thyroid disease  Hypertension  Bilateral carotid stenosis  Dementia  GERD  PUD  Plan to resume home med regimen as indicated  Monitor vital signs closely  ----------  -DVT prophylaxis: SCDs  -Activity: Bedrest  -Expected length of stay: INPATIENT status due to the need for care which can only be reasonably provided in an hospital setting such as aggressive/expedited ancillary services and/or consultation services, the necessity for IV medications, close physician monitoring and/or the possible need for procedures.  In such, I feel patient’s risk for adverse outcomes and need for care warrant INPATIENT evaluation and predict the patient’s care encounter to likely last beyond 2 midnights.   -Disposition pending course     High risk secondary to subdural hematoma    There are no questions and answers to display.       Darryl Monteiro PA-C   02/23/24  14:59 EST     Electronically signed by Efren Dunham MD at 02/23/24 9433       Current Facility-Administered Medications   Medication Dose Route Frequency Provider Last Rate Last Admin    acetaminophen (TYLENOL) tablet 500 mg  500 mg Oral Q4H PRN Prasad Romero DO        albuterol (PROVENTIL) nebulizer solution 0.083% 2.5 mg/3mL  2.5 mg Nebulization Q6H PRN Prasad Romero DO        sennosides-docusate (PERICOLACE) 8.6-50 MG per tablet 2 tablet  2 tablet Oral BID Prasad Romero DO   2 tablet at 03/08/24 0827    And    polyethylene glycol (MIRALAX) packet 17 g  17 g Oral Daily PRN Prasad Romero DO        And    bisacodyl (DULCOLAX) EC tablet 5 mg  5 mg Oral Daily PRN Prasad Romero DO        And    bisacodyl (DULCOLAX) suppository 10 mg  10 mg  Rectal Daily PRN Prsaad Romero DO        carboxymethylcellulose (REFRESH PLUS) 0.5 % ophthalmic solution 1 drop  1 drop Both Eyes TID PRN Prasad Romero DO        donepezil (ARICEPT) tablet 10 mg  10 mg Oral Nightly Prasad Romero DO   10 mg at 03/06/24 2126    glycopyrrolate (ROBINUL) injection 0.2 mg  0.2 mg Intravenous Q3H PRN Efren Dunham MD        guaiFENesin tablet 400 mg  400 mg Oral Q4H PRN Prasad Romero DO        hyaluronidase 150 units in NS 10 ml syringe  150 Units Subcutaneous PRN Efren Dunham MD        levothyroxine (SYNTHROID, LEVOTHROID) tablet 75 mcg  75 mcg Oral Q AM Prasad Romero DO   75 mcg at 03/06/24 0602    LORazepam (ATIVAN) injection 0.5 mg  0.5 mg Intravenous Q4H PRN Efren Dunham MD   0.5 mg at 03/08/24 0510    LORazepam (ATIVAN) tablet 0.5 mg  0.5 mg Oral Q12H PRN Prasad Romero DO   0.5 mg at 03/05/24 0206    memantine (NAMENDA) tablet 10 mg  10 mg Oral Q12H Prasad Romero DO   10 mg at 03/08/24 0827    methylPREDNISolone sodium succinate (SOLU-Medrol) injection 20 mg  20 mg Intravenous Q12H Prasad Romero DO   20 mg at 03/08/24 0827    morphine injection 2 mg  2 mg Intravenous Q4H PRN Efren Dunham MD   2 mg at 03/08/24 0453    nitroglycerin (NITROSTAT) SL tablet 0.4 mg  0.4 mg Sublingual Q5 Min PRN Prasad Romero DO        phenol (CHLORASEPTIC) 1.4 % liquid 1 spray  1 spray Mouth/Throat Q2H PRN Prasad Romero DO        risperiDONE (risperDAL) tablet 0.5 mg  0.5 mg Oral BID Prasad Romero DO   0.5 mg at 03/08/24 0827    sertraline (ZOLOFT) tablet 100 mg  100 mg Oral Daily Prasad Romero, DO   100 mg at 03/08/24 0827    sodium chloride 0.9 % flush 10 mL  10 mL Intravenous Q12H Prasad Romero DO   10 mL at 03/07/24 2128    sodium chloride 0.9 % flush 10 mL  10 mL Intravenous PRN Prasad Romero DO        sodium chloride 0.9 % flush 10 mL  10 mL  Intravenous Q12H Heather, Christopher A, DO   10 mL at 24    sodium chloride 0.9 % flush 10 mL  10 mL Intravenous PRN Heather, Christopher A, DO        sodium chloride 0.9 % flush 10 mL  10 mL Intravenous Q12H Heather, Christopher A, DO   10 mL at 24    sodium chloride 0.9 % flush 10 mL  10 mL Intravenous PRN Heather, Christopher A, DO        sodium chloride 0.9 % infusion 40 mL  40 mL Intravenous PRN Heather, Christopher A, DO        sodium chloride 0.9 % infusion 40 mL  40 mL Intravenous PRN Heather, Christopher A, DO        sodium chloride 0.9 % infusion 40 mL  40 mL Intravenous PRN Heather, Christopher A, DO        trolamine salicylate (ASPERCREME) 10 % cream 1 Application  1 application  Topical Q6H PRN Heather, Christopher A, DO   1 Application at 24 0827        Physician Progress Notes (most recent note)        Efren Dunham MD at 24 1226              [facility]     Patient Identification:  Name:  Brenda Munroe  Age:  73 y.o.  Sex:  female  :  1950  MRN:  [id number]  Visit Number:  [id number]  ROOM: [address]     Primary Care Provider:  Bernadette Arevalo MD    Length of stay in inpatient status:  13    Subjective     Chief Compliant:    Chief Complaint   Patient presents with    Fall    Head Injury       History of Presenting Illness:    Patient more drowsy today. Lethargic. Did not respond to verbal stimuli.     ROS:  Otherwise 10 point ROS negative other than documented above in HPI.     Objective     Current Hospital Meds:donepezil, 10 mg, Oral, Nightly  levothyroxine, 75 mcg, Oral, Q AM  memantine, 10 mg, Oral, Q12H  methylPREDNISolone sodium succinate, 20 mg, Intravenous, Q12H  risperiDONE, 0.5 mg, Oral, BID  senna-docusate sodium, 2 tablet, Oral, BID  sertraline, 100 mg, Oral, Daily  sodium chloride, 10 mL, Intravenous, Q12H  sodium chloride, 10 mL, Intravenous, Q12H  sodium chloride, 10 mL, Intravenous, Q12H          Current Antimicrobial Therapy:  Anti-Infectives (From admission, onward)      None          Current Diuretic Therapy:  Diuretics (From admission, onward)      None          ----------------------------------------------------------------------------------------------------------------------  Vital Signs:  Temp:  [97.6 °F (36.4 °C)-99 °F (37.2 °C)] 98.3 °F (36.8 °C)  Heart Rate:  [] 101  Resp:  [12-26] 24  BP: ()/() 142/85  SpO2:  [90 %-100 %] 92 %  on  Flow (L/min):  [3] 3;   Device (Oxygen Therapy): nasal cannula  Body mass index is 30.12 kg/m².    Wt Readings from Last 3 Encounters:   03/07/24 79.6 kg (175 lb 7.8 oz)   02/11/24 68.5 kg (151 lb)   02/02/24 68.9 kg (151 lb 14.4 oz)     Intake & Output (last 3 days)         03/04 0701  03/05 0700 03/05 0701  03/06 0700 03/06 0701  03/07 0700 03/07 0701  03/08 0700    P.O. 480 900 940 0    I.V. (mL/kg) 100.4 (1.3) 1156.3 (14.8) 201 (2.5)     Total Intake(mL/kg) 580.4 (7.4) 2056.3 (26.3) 1141 (14.3) 0 (0)    Urine (mL/kg/hr) 500 (0.3) 800 (0.4) 1000 (0.5)     Stool   0     Total Output      Net +80.4 +1256.3 +141 0            Urine Unmeasured Occurrence   1 x     Stool Unmeasured Occurrence 2 x  2 x           Diet: Regular/House Diet; Feeding Assistance - Nursing; Texture: Pureed (NDD 1); Fluid Consistency: Thin (IDDSI 0)  ----------------------------------------------------------------------------------------------------------------------  Physical exam:  Constitutional:  Lethargic   HENT:  Head:  bruising on head consistent with prior exams.   Neck:  Neck supple.  No JVD present.      ----------------------------------------------------------------------------------------------------------------------  Tele:    ----------------------------------------------------------------------------------------------------------------------  Results from last 7 days   Lab Units 03/06/24  0448 03/06/24  0213 03/05/24  0143 03/04/24  0050  "03/03/24  1829   LACTATE mmol/L 1.2 2.2*  --   --  0.9   WBC 10*3/mm3  --  10.59 9.58 10.02  --    HEMOGLOBIN g/dL  --  10.8* 11.3* 11.3*  --    HEMATOCRIT %  --  35.7 37.6 38.7  --    MCV fL  --  97.3* 97.2* 98.2*  --    MCHC g/dL  --  30.3* 30.1* 29.2*  --    PLATELETS 10*3/mm3  --  133* 141 155  --      Results from last 7 days   Lab Units 03/02/24  2303   PH, ARTERIAL pH units 7.380   PO2 ART mm Hg 57.0*   PCO2, ARTERIAL mm Hg 62.2*   HCO3 ART mmol/L 36.8*     Results from last 7 days   Lab Units 03/06/24  0213 03/05/24  0143 03/04/24  0050   SODIUM mmol/L 145 145 143   POTASSIUM mmol/L 3.6 3.7 4.0   CHLORIDE mmol/L 106 105 103   CO2 mmol/L 32.8* 32.7* 29.9*   BUN mg/dL 20 21 19   CREATININE mg/dL 0.71 0.74 0.77   CALCIUM mg/dL 8.5* 8.5* 8.4*   GLUCOSE mg/dL 168* 172* 130*   ALBUMIN g/dL 2.8* 2.7* 2.8*   BILIRUBIN mg/dL 0.2 0.3 0.4   ALK PHOS U/L 75 59 54   AST (SGOT) U/L 35* 15 12   ALT (SGPT) U/L 40* 18 10   Estimated Creatinine Clearance: 69.9 mL/min (by C-G formula based on SCr of 0.71 mg/dL).  No results found for: \"AMMONIA\"              No results found for: \"HGBA1C\", \"POCGLU\"  Lab Results   Component Value Date    TSH 0.876 01/23/2024    FREET4 1.59 03/25/2021     No results found for: \"PREGTESTUR\", \"PREGSERUM\", \"HCG\", \"HCGQUANT\"  Pain Management Panel  More data exists         Latest Ref Rng & Units 2/23/2024 2/11/2024   Pain Management Panel   Amphetamine, Urine Qual Negative Negative  Negative    Barbiturates Screen, Urine Negative Negative  Negative    Benzodiazepine Screen, Urine Negative Positive  Positive    Buprenorphine, Screen, Urine Negative Negative  Negative    Cocaine Screen, Urine Negative Negative  Negative    Fentanyl, Urine Negative Negative  Negative    Methadone Screen , Urine Negative Negative  Negative    Methamphetamine, Ur Negative Negative  Negative      Brief Urine Lab Results  (Last result in the past 365 days)        Color   Clarity   Blood   Leuk Est   Nitrite   Protein   " "CREAT   Urine HCG        03/03/24 1826 Dark Yellow   Turbid   Moderate (2+)   Trace   Negative   30 mg/dL (1+)                 Blood Culture   Date Value Ref Range Status   03/03/2024 No growth at 3 days  Preliminary   03/03/2024 Klebsiella pneumoniae ssp pneumoniae (C)  Final     No results found for: \"URINECX\"  No results found for: \"WOUNDCX\"  No results found for: \"STOOLCX\"  No results found for: \"RESPCX\"  No results found for: \"AFBCX\"  Results from last 7 days   Lab Units 03/06/24  0448 03/06/24  0213 03/03/24  1829   PROCALCITONIN ng/mL  --   --  0.55*   LACTATE mmol/L 1.2 2.2* 0.9       I have personally looked at the labs and they are summarized above.  ----------------------------------------------------------------------------------------------------------------------  Detailed radiology reports for the last 24 hours:    Imaging Results (Last 24 Hours)       ** No results found for the last 24 hours. **          Assessment & Plan    #Traumatic left parafalcine 4 mm subdural hematoma   #Nasal fracture   #Home anticoagulation with apixiban  #NSTEMI  #HFrEF  #Acute hypercapnic respiratory failure   #Mild COPD exacerbation in setting of severe chronic COPD  #L sided pneumonia and small to moderate pleural effusion  #Klebsiella bacteremia   #Acute metabolic encephalopathy   #Dementia w/ behavioral disturbance  #Thrombocytopenia  #pAfib f/ RVR  #Hypertensive urgency        Chronic medical problems:  MDD  COPD- Hx of respiratory acidosis. ABG compensated. Continue to offer QHS BiPAP. Goal SPO2 88-92%.   Thyroid disease  Bilateral carotid stenosis  Dementia  GERD  PUD     Patient made comfort care by her family on 3/6. Discussed stopping medications (including abx) not adding to comfort. Discussed adding PRN comfort medications. Palliative care following. Patient appeared comfortable today.      Code status: Comfort     Dispo:Continue: Med/surg orders in place for transfer to continue comfort measures        VTE " Prophylaxis:   Mechanical Order History:        Ordered        24 1523  Place Sequential Compression Device  Once            24 1523  Maintain Sequential Compression Device  Continuous,   Status:  Canceled                          Pharmalogical Order History:       None                Efren Dunham MD  [facility] Hospitalist  24  12:27 EST    Electronically signed by Efren Dunham MD at 24 1443          Physical Therapy Notes (most recent note)        Rosmery Lock, PT at 24 1419  Version 1 of 1         Acute Care - Physical Therapy Initial Evaluation  [facility]     Patient Name: Brenda Munroe  : 1950  MRN: [id number]  Today's Date: 3/5/2024   Onset of Illness/Injury or Date of Surgery: 24  Visit Dx:     ICD-10-CM ICD-9-CM   1. Subdural hematoma  S06.5XAA 432.1   2. NSTEMI (non-ST elevated myocardial infarction)  I21.4 410.70   3. Chronic anticoagulation  Z79.01 V58.61   4. Closed fracture of nasal bone, initial encounter  S02.2XXA 802.0   5. Traumatic orbital hematoma, right, initial encounter  S05.11XA 921.2     Patient Active Problem List   Diagnosis    Psychosis    Severe recurrent major depression with psychotic features    COPD (chronic obstructive pulmonary disease)    Coronary artery disease    Disease of thyroid gland    Arthritis    Hypertension    Paroxysmal atrial fibrillation    Chronic headaches    Vision changes    Carotid stenosis, asymptomatic, bilateral    Bradycardia, sinus    Acute respiratory failure with hypoxia and hypercapnia    NSTEMI (non-ST elevated myocardial infarction)    A-fib    Hypercapnic respiratory failure    Subdural hematoma     Past Medical History:   Diagnosis Date    Anxiety     Arthritis     Bell's palsy     Bladder prolapse, female, acquired     Carotid stenosis     COPD (chronic obstructive pulmonary disease)     COPD (chronic obstructive pulmonary disease)     Coronary artery disease     Dementia     Dementia      Depression     Difficulty walking     Disease of thyroid gland     Frequency of urination     GERD (gastroesophageal reflux disease)     Heart attack     Hyperlipidemia     Hypertension     Irregular heart beat     Peptic ulcer disease      Past Surgical History:   Procedure Laterality Date    CARDIAC CATHETERIZATION N/A 2/21/2023    Procedure: Left Heart Cath;  Surgeon: Tab Cifuentes MD;  Location: [facility] CATH INVASIVE LOCATION;  Service: Cardiology;  Laterality: N/A;    CARDIAC SURGERY      open heart  in 1999    CYSTOSCOPY N/A 11/8/2017    Procedure: CYSTOSCOPY;  Surgeon: Karl Nicolas DO;  Location: [facility] OR;  Service:     OTHER SURGICAL HISTORY      states she had fluid drained no surgery    VAGINAL HYSTERECTOMY W/ ANTERIOR AND POSTERIOR VAGINAL REPAIR N/A 11/8/2017    Procedure: VAGINAL HYSTERECTOMY WITH ANTERIOR, POSTERIOR, AND ENTEROCELE VAGINAL REPAIR;  Surgeon: Karl Nicolas DO;  Location: [facility] OR;  Service:     VAGINAL VAULT SUSPENSION N/A 11/8/2017    Procedure: VAGINAL VAULT SUSPENSION ;  Surgeon: Karl Nicolas DO;  Location: [facility];  Service:      PT Assessment (Last 12 Hours)       PT Evaluation and Treatment       Row Name 03/05/24 1401          Physical Therapy Time and Intention    Subjective Information no complaints  -AG     Document Type evaluation  -AG     Mode of Treatment physical therapy  -AG     Patient Effort poor  -AG     Symptoms Noted During/After Treatment none  -AG       Row Name 03/05/24 1403          General Information    Patient Profile Reviewed yes  -AG     Onset of Illness/Injury or Date of Surgery 03/23/24  -AG     Referring Physician Dr. Romero  -AG     Patient Observations alert;poorly cooperative  -AG     Patient/Family/Caregiver Comments/Observations pt. supine in PCU bed; alert, intermittent eye contact with therapist.  Pt. does not verbalize responses to most questions.  [facility] Does not  state her name, .  -AG     General Observations of Patient diffuse facial bruising noted  -AG     Prior Level of Function --  unable to obtain information regarding PLOF or recent events from pt.  -AG     Pertinent History of Current Functional Problem pt. admitted from SNF to this facility for subdural hematoma  -AG     Existing Precautions/Restrictions fall  -AG     Limitations/Impairments safety/cognitive  -AG     Barriers to Rehab medically complex;cognitive status  -AG       Row Name 24 140          Living Environment    Current Living Arrangements extended care facility  -AG     Home Accessibility wheelchair accessible  -AG       Row Name 24 140          Home Use of Assistive/Adaptive Equipment    Equipment Currently Used at Home wheelchair;walker, standard;glucometer  -AG       Row Name 24 140          Pain    Additional Documentation Pain Scale: FACES Pre/Post-Treatment (Group)  -       Row Name 24 140          Pain Scale: FACES Pre/Post-Treatment    Pain: FACES Scale, Pretreatment 0-->no hurt  -AG     Posttreatment Pain Rating 0-->no hurt  -AG       Row Name 24 140          Cognition    Behavioral Issues (Cognition) withdrawn  -AG     Follows Commands (Cognition) does not follow one-step commands  -AG     Personal Safety Interventions supervised activity  -       Row Name 24 140          Range of Motion (ROM)    Range of Motion --  B LE PROM WFL; pt. is not cooperative with assessment of AROM.  Pt. grimaces with L UE ROM  -       Row Name 24 140          Strength Comprehensive (MMT)    General Manual Muscle Testing (MMT) Assessment --  unable to formally assess  -       Row Name 24 140          Vision Assessment/Intervention    Visual Impairment/Limitations unable/difficult to assess  -       Row Name 24 140          Bed Mobility    Bed Mobility rolling left;rolling right  -AG     All Activities, [place] (Bed Mobility)  dependent (less than 25% patient effort)  -AG     Rolling Left [place] (Bed Mobility) dependent (less than 25% patient effort)  -AG       Row Name 03/05/24 1404          Transfers    Comment, (Transfers) unable to safely assess  -AG       Row Name 03/05/24 1404          Gait/Stairs (Locomotion)    Patient was able to Ambulate no, other medical factors prevent ambulation  -AG     Reason Patient was unable to Ambulate Cognitive Deficit/Severe Dementia  -AG       Row Name 03/05/24 1404          Safety Issues, Functional Mobility    Impairments Affecting Function (Mobility) cognition  -AG       Row Name             Wound 03/04/24 2137 Left upper arm Extravasation    Wound - Properties Group Placement Date: 03/04/24  -MM Placement Time: 2137  -MM Side: Left  -MM Orientation: upper  -MM Location: arm  -MM Primary Wound Type: Extravasatio  -MM    Retired Wound - Properties Group Placement Date: 03/04/24  -MM Placement Time: 2137  -MM Side: Left  -MM Orientation: upper  -MM Location: arm  -MM Primary Wound Type: Extravasatio  -MM    Retired Wound - Properties Group Date first assessed: 03/04/24  -MM Time first assessed: 2137  -MM Side: Left  -MM Location: arm  -MM Primary Wound Type: Extravasatio  -MM      Row Name 03/05/24 1404          Coping    Observed Emotional State calm  -AG     Verbalized Emotional State acceptance  -AG     Trust Relationship/Rapport care explained;thoughts/feelings acknowledged  -AG     Family/Support Persons family  -AG     Involvement in Care not present at bedside  -AG     Family/Support System Care support provided  -AG       Row Name 03/05/24 1404          Plan of Care Review    Plan of Care Reviewed With patient  -AG     Outcome Evaluation Limited PT evaluation complete.  Pt. with severe cognitive deficit and inability to follow simple commands.  Pt. mostly disengaged with therapist and is dependent for functional mobility skills.  PT will place on caseload for PRN frequency to aid with  SNF placement.  -AG       Row Name 03/05/24 1404          Positioning and Restraints    Pre-Treatment Position in bed  -AG     Post Treatment Position bed  -AG     In Bed supine;call light within reach;encouraged to call for assist;exit alarm on;side rails up x3  -AG       Row Name 03/05/24 1404          Therapy Assessment/Plan (PT)    Patient/Family Therapy Goals Statement (PT) pt. unable to provide goal  -AG     Functional Level at Time of Evaluation (PT) dependent  -AG     PT Diagnosis (PT) impaired functional mobility  -AG     Rehab Potential (PT) other (see comments)  guarded  -AG     Criteria for Skilled Interventions Met (PT) yes;meets criteria  -AG     Therapy Frequency (PT) other (see comments)  PRN  -AG     Predicted Duration of Therapy Intervention (PT) 1 week  -AG     Problem List (PT) problems related to;mobility;cognition;range of motion (ROM);strength  -AG     Activity Limitations Related to Problem List (PT) unable to ambulate safely;unable to transfer safely;BADLs not performed adequately or safely  -AG       Row Name 03/05/24 1408          Therapy Plan Review/Discharge Plan (PT)    Therapy Plan Review (PT) evaluation/treatment results reviewed;care plan/treatment goals reviewed;risks/benefits reviewed;current/potential barriers reviewed  -AG       Row Name 03/05/24 1404          Physical Therapy Goals    Bed Mobility Goal Selection (PT) bed mobility, PT goal 1;bed mobility, PT goal 2  -AG       Row Name 03/05/24 1404          Bed Mobility Goal 1 (PT)    Activity/Assistive Device (Bed Mobility Goal 1, PT) rolling to left;rolling to right  -AG     [place] Level/Cues Needed (Bed Mobility Goal 1, PT) moderate assist (50-74% patient effort)  -AG     Time Frame (Bed Mobility Goal 1, PT) by discharge  -AG       Row Name 03/05/24 1401          Bed Mobility Goal 2 (PT)    Activity/Assistive Device (Bed Mobility Goal 2, PT) sit to supine;supine to sit  -AG     [place] Level/Cues Needed (Bed  Mobility Goal 2, PT) maximum assist (25-49% patient effort)  -AG     Time Frame (Bed Mobility Goal 2, PT) by discharge  -AG               User Key  (r) = Recorded By, (t) = Taken By, (c) = Cosigned By      Initials Name Provider Type    AG Rosmery Lock, PT Physical Therapist    Scar Pan RN Registered Nurse                    Physical Therapy Education       Title: PT OT SLP Therapies (In Progress)       Topic: Physical Therapy (In Progress)       Point: Mobility training (In Progress)       Learning Progress Summary             Patient Acceptance, E,D, NL by AG at 3/5/2024 1403    Acceptance, E, NL,NR by PL at 3/2/2024 1517    Acceptance, E,TB, VU by CS at 2/26/2024 1152                         Point: Home exercise program (In Progress)       Learning Progress Summary             Patient Acceptance, E,D, NL by AG at 3/5/2024 1403    Acceptance, E, NL,NR by PL at 3/2/2024 1517    Acceptance, E,TB, VU by CS at 2/26/2024 1152                         Point: Body mechanics (In Progress)       Learning Progress Summary             Patient Acceptance, E,D, NL by AG at 3/5/2024 1403    Acceptance, E, NL,NR by PL at 3/2/2024 1517    Acceptance, E,TB, VU by CS at 2/26/2024 1152                         Point: Precautions (In Progress)       Learning Progress Summary             Patient Acceptance, E,D, NL by AG at 3/5/2024 1403    Acceptance, E, NL,NR by PL at 3/2/2024 1517    Acceptance, E,TB, VU by CS at 2/26/2024 1152                                         User Key       Initials Effective Dates Name Provider Type Discipline     06/16/21 -  Rosmery Lock, PT Physical Therapist PT    CS 05/31/23 -  [place] Peters, PT Physical Therapist PT    PL 02/05/24 -  Yolanda Cheung, JOSE Extern Registered Nurse Nurse                  PT Recommendation and Plan  Anticipated Discharge Disposition (PT): skilled nursing facility  Planned Therapy Interventions (PT): balance training, bed mobility training  Therapy Frequency  (PT): other (see comments) (PRN)  Plan of Care Reviewed With: patient  Outcome Evaluation: Limited PT evaluation complete.  Pt. with severe cognitive deficit and inability to follow simple commands.  Pt. mostly disengaged with therapist and is dependent for functional mobility skills.  PT will place on caseload for PRN frequency to aid with SNF placement.       Time Calculation:    PT Charges       Row Name 24 1403             Time Calculation    PT Received On 24  -      PT Goal Re-Cert Due Date 24  -                User Key  (r) = Recorded By, (t) = Taken By, (c) = Cosigned By      Initials Name Provider Type     Rosmery Lock, PT Physical Therapist                  Therapy Charges for Today       Code Description Service Date Service Provider Modifiers Qty    73562609665 HC PT EVAL MOD COMPLEXITY 4 3/5/2024 Rosmery Lock, PT GP 1            PT G-Codes  Outcome Measure Options: Modified [place]  AM-PAC 6 Clicks Score (PT): 6  Modified Maryse Scale: 0 - No Symptoms at all.    Rosmery Lock PT  3/5/2024      Electronically signed by Rosmery Lock, PT at 24 1419          Occupational Therapy Notes (most recent note)        Erik Carrillo, OT at 24 1122          Acute Care - Occupational Therapy Initial Evaluation   Ozzy     Patient Name: Brenda Munroe  : 1950  MRN: [id number]  Today's Date: 3/4/2024  Onset of Illness/Injury or Date of Surgery: 24     Referring Physician: Heather    Admit Date: 2024       ICD-10-CM ICD-9-CM   1. Subdural hematoma  S06.5XAA 432.1   2. NSTEMI (non-ST elevated myocardial infarction)  I21.4 410.70   3. Chronic anticoagulation  Z79.01 V58.61   4. Closed fracture of nasal bone, initial encounter  S02.2XXA 802.0   5. Traumatic orbital hematoma, right, initial encounter  S05.11XA 921.2     Patient Active Problem List   Diagnosis    Psychosis    Severe recurrent major depression with psychotic features    COPD (chronic obstructive  pulmonary disease)    Coronary artery disease    Disease of thyroid gland    Arthritis    Hypertension    Paroxysmal atrial fibrillation    Chronic headaches    Vision changes    Carotid stenosis, asymptomatic, bilateral    Bradycardia, sinus    Acute respiratory failure with hypoxia and hypercapnia    NSTEMI (non-ST elevated myocardial infarction)    A-fib    Hypercapnic respiratory failure    Subdural hematoma     Past Medical History:   Diagnosis Date    Anxiety     Arthritis     Bell's palsy     Bladder prolapse, female, acquired     Carotid stenosis     COPD (chronic obstructive pulmonary disease)     COPD (chronic obstructive pulmonary disease)     Coronary artery disease     Dementia     Dementia     Depression     Difficulty walking     Disease of thyroid gland     Frequency of urination     GERD (gastroesophageal reflux disease)     Heart attack     Hyperlipidemia     Hypertension     Irregular heart beat     Peptic ulcer disease      Past Surgical History:   Procedure Laterality Date    CARDIAC CATHETERIZATION N/A 2/21/2023    Procedure: Left Heart Cath;  Surgeon: Tab Cifuentes MD;  Location: [facility] CATH INVASIVE LOCATION;  Service: Cardiology;  Laterality: N/A;    CARDIAC SURGERY      open heart  in 1999    CYSTOSCOPY N/A 11/8/2017    Procedure: CYSTOSCOPY;  Surgeon: Karl Nicolas DO;  Location: [facility] OR;  Service:     OTHER SURGICAL HISTORY      states she had fluid drained no surgery    VAGINAL HYSTERECTOMY W/ ANTERIOR AND POSTERIOR VAGINAL REPAIR N/A 11/8/2017    Procedure: VAGINAL HYSTERECTOMY WITH ANTERIOR, POSTERIOR, AND ENTEROCELE VAGINAL REPAIR;  Surgeon: Karl Nicolas DO;  Location: [facility] OR;  Service:     VAGINAL VAULT SUSPENSION N/A 11/8/2017    Procedure: VAGINAL VAULT SUSPENSION ;  Surgeon: Karl Nioclas DO;  Location: [facility] OR;  Service:          OT ASSESSMENT FLOWSHEET (Last 12 Hours)       OT Evaluation and Treatment       Row Name 03/04/24 1112                    OT Time and Intention    Document Type evaluation  -KR        Mode of Treatment occupational therapy  -KR        Patient Effort fair  -KR           General Information    General Observations of Patient alert/pleasant but unable to consistently follow fxl commands  -KR           Living Environment    Current Living Arrangements residential facility  -KR        People in Home facility resident  -KR           Cognition    Affect/Mental Status (Cognition) confused  -KR        Orientation Status (Cognition) unable/difficult to assess  -KR        Follows Commands (Cognition) increased processing time needed;delayed response/completion;physical/tactile prompts required;verbal cues/prompting required  -KR           Range of Motion Comprehensive    Comment, General Range of Motion RUE observed WFL for manipulation of bed linen/reaching. LUE not observed for ROM performance/unable to follow commands for ROM assessment  -KR           Strength Comprehensive (MMT)    Comment, General Manual Muscle Testing (MMT) Assessment BUE unable/difficult to assess  -KR           Bathing Assessment/Intervention    [place] Level (Bathing) maximum assist (25% patient effort);dependent (less than 25% patient effort)  -KR           Lower Body Dressing Assessment/Training    [place] Level (Lower Body Dressing) maximum assist (25% patient effort);dependent (less than 25% patient effort)  -KR           Grooming Assessment/Training    [place] Level (Grooming) maximum assist (25% patient effort);dependent (less than 25% patient effort)  -KR           Self-Feeding Assessment/Training    [place] Level (Feeding) maximum assist (25% patient effort);dependent (less than 25% patient effort)  -KR           Toileting Assessment/Training    [place] Level (Toileting) maximum assist (25% patient effort);dependent (less than 25% patient effort)  -KR           Plan of Care Review    Plan of Care Reviewed With patient   Pt alert but unable to express comprehension of treatment plan  -KR           Therapy Assessment/Plan (OT)    Planned Therapy Interventions (OT) activity tolerance training  -KR           Therapy Plan Review/Discharge Plan (OT)    Anticipated Discharge Disposition (OT) skilled nursing facility  -KR           OT Goals    Activity Tolerance Goal Selection (OT) activity tolerance, OT goal 1  -KR            Activity Tolerance Goal 1 (OT)    Activity Tolerance Goal 1 (OT) Increase to enhance ability to assist caregiver in ADL performance  -KR        Activity Level (Endurance Goal 1, OT) 10 min activity  -KR        Time Frame (Activity Tolerance Goal 1, OT) by discharge  -KR                  User Key  (r) = Recorded By, (t) = Taken By, (c) = Cosigned By      Initials Name Effective Dates    KR Erik Carrillo OT 21 -                            OT Recommendation and Plan  Planned Therapy Interventions (OT): activity tolerance training  Plan of Care Review  Plan of Care Reviewed With: patient (Pt alert but unable to express comprehension of treatment plan)  Plan of Care Reviewed With: patient (Pt alert but unable to express comprehension of treatment plan)        Time Calculation:     Therapy Charges for Today       Code Description Service Date Service Provider Modifiers Qty    08108789314  OT EVAL HIGH COMPLEXITY 4 3/4/2024 Erik Carrillo OT GO 1                 Erik Carrillo OT  3/4/2024    Electronically signed by Erik Carrillo OT at 24 1122          Speech Language Pathology Notes (most recent note)        Ana Curtis MA,CCC-SLP at 24 1139          Acute Care - Speech Language Pathology   Swallow Re-Assessment  Ozzy     Patient Name: Brenda Munroe  : 1950  MRN: [id number]  Today's Date: 3/6/2024  Onset of Illness/Injury or Date of Surgery: 24     Referring Physician: Dr. Romero      Admit Date: 2024    Visit Dx:     ICD-10-CM ICD-9-CM   1. Subdural hematoma   S06.5XAA 432.1   2. NSTEMI (non-ST elevated myocardial infarction)  I21.4 410.70   3. Chronic anticoagulation  Z79.01 V58.61   4. Closed fracture of nasal bone, initial encounter  S02.2XXA 802.0   5. Traumatic orbital hematoma, right, initial encounter  S05.11XA 921.2     Patient Active Problem List   Diagnosis    Psychosis    Severe recurrent major depression with psychotic features    COPD (chronic obstructive pulmonary disease)    Coronary artery disease    Disease of thyroid gland    Arthritis    Hypertension    Paroxysmal atrial fibrillation    Chronic headaches    Vision changes    Carotid stenosis, asymptomatic, bilateral    Bradycardia, sinus    Acute respiratory failure with hypoxia and hypercapnia    NSTEMI (non-ST elevated myocardial infarction)    A-fib    Hypercapnic respiratory failure    Subdural hematoma     Past Medical History:   Diagnosis Date    Anxiety     Arthritis     Bell's palsy     Bladder prolapse, female, acquired     Carotid stenosis     COPD (chronic obstructive pulmonary disease)     COPD (chronic obstructive pulmonary disease)     Coronary artery disease     Dementia     Dementia     Depression     Difficulty walking     Disease of thyroid gland     Frequency of urination     GERD (gastroesophageal reflux disease)     Heart attack     Hyperlipidemia     Hypertension     Irregular heart beat     Peptic ulcer disease      Past Surgical History:   Procedure Laterality Date    CARDIAC CATHETERIZATION N/A 2/21/2023    Procedure: Left Heart Cath;  Surgeon: Tab Cifuentes MD;  Location: [facility] CATH INVASIVE LOCATION;  Service: Cardiology;  Laterality: N/A;    CARDIAC SURGERY      open heart  in 1999    CYSTOSCOPY N/A 11/8/2017    Procedure: CYSTOSCOPY;  Surgeon: aKrl Nicolas DO;  Location: [facility] OR;  Service:     OTHER SURGICAL HISTORY      states she had fluid drained no surgery    VAGINAL HYSTERECTOMY W/ ANTERIOR AND POSTERIOR VAGINAL REPAIR N/A 11/8/2017     Procedure: VAGINAL HYSTERECTOMY WITH ANTERIOR, POSTERIOR, AND ENTEROCELE VAGINAL REPAIR;  Surgeon: Karl Nicolas DO;  Location:  [facility] OR;  Service:     VAGINAL VAULT SUSPENSION N/A 11/8/2017    Procedure: VAGINAL VAULT SUSPENSION ;  Surgeon: Karl Nicolas DO;  Location:  [facility] OR;  Service:      Ms. Munroe was seen at bedside this am on PCU for continued diet safety/reassessment. She has been tolerating modified po diet of puree consistencies, thin liquids.     Ms. Munroe has continued w/ waxing/waning cognition/ams. Per this persistent status, w/ premorbid baseline ams/dementia, she has been unable to functionally participate in formal s/l cognitive reassessment/tx across this admission.     She was resting this am, but easily awakened to verbal stimuli. She continued limitedly interactive, but albina to intermittently follow simple one step directives w/ cues and respond to simple y/n and oe questions. Overall limited interaction, and limited verbalizations.     She was observed on O2 via NC w/o complications. WBC 10.59, she was afebrile.     She was positioned upright and centered in bed to accept limited po presentations of applesauce and thin liquids via straw. SLP provided 1:1 feeding assistance, she was not able to self provide.      Upon po presentations, adequate bolus anticipation w/ good labial seal. Bolus formation, manipulation, and control were mildly weak in general. Lingual pumping w/ puree. A-p transit was mildly-moderately delayed w/o significant oral residue. No overt s/s aspiration evidenced before the swallow.      Pharyngeal swallow was timely. No overt s/s aspiration evidenced. No obvious silent aspiration suspected. She declined further presentations beyond puree x1, thin liquids x2.      Impression: Per this reassessment, Ms. Munroe continues to accept what is felt to be her least restrictive/preferred modified po diet at this time w/o overt s/s aspiration.      Recommend  continued puree consistencies at this time. Please continue 1:1 feeding assistance, meds whole in puree or crushed prn, fully a/a for all po intake.     Per persistent ams/medical status, suspect this may be representative of her new baseline swallowing fnx/least restrictive modified po diet. Per this status, and in consideration of persistent inability to functionally participate in formal s/l cognitive reassessment/tx across this admission, SLP to sign off at this time.      SLP Recommendation and Plan  1. Puree consistency, thin liquids.   2. Medications whole in puree/crushed prn.   3. 1:1 feeding assistance.   4. Upright and centered for all po intake.   5. Universal aspiration precautions.   6. YANETH precautions.   7. Oral care protocol.   SLP to f/u sign off at this time.      Thank you for allowing me to participate in the care of your patient-  Ana Curtis M.A., CCC-SLP      EDUCATION  The patient has been educated in the following areas:   Dysphagia (Swallowing Impairment) Modified Diet Instruction.      Time Calculation:         Therapy Charges for Today       Code Description Service Date Service Provider Modifiers Qty    23486000286 HC ST EVAL ORAL PHARYNG SWALLOW 4 3/5/2024 Ana Curtis MA,CCC-SLP GN 1    [id number]  ST EVAL ORAL PHARYNG SWALLOW 4 3/6/2024 Ana Curtis MA,CCC-SLP GN 1          Ana Curtis MA,CCC-SLP  3/6/2024    Electronically signed by Ana Curtis MA,CCC-SLP at 03/06/24 6797       ADL Documentation (most recent)      Flowsheet Row Most Recent Value   Transferring 4 - completely dependent   Toileting 4 - completely dependent   Bathing 4 - completely dependent   Dressing 4 - completely dependent   Eating 4 - completely dependent   Communication 2 - difficulty understanding and speaking (not related to language barrier)   Swallowing 2 - difficulty swallowing liquids/foods   Equipment Currently Used at Home wheelchair, walker, standard,  glucometer       Discharge Summary    No notes of this type exist for this encounter.       Discharge Order (From admission, onward)       Start     Ordered    03/08/24 1614  Discharge patient  Once        Expected Discharge Date: 03/08/24   Expected Discharge Time: Afternoon   Discharge Disposition: Skilled Nursing Facility (DC - External)   Physician of Record for Attribution - Please select from Treatment Team: SALAS CHAVEZ [[id number]]   Review needed by CMO to determine Physician of Record: No      Question Answer Comment   Physician of Record for Attribution - Please select from Treatment Team SALAS CHAVEZ    Review needed by CMO to determine Physician of Record No        03/08/24 1614

## 2024-03-08 NOTE — CASE MANAGEMENT/SOCIAL WORK
Discharge Planning Assessment  [facility]     Patient Name: Brenda Munroe  MRN: [id number]  Today's Date: 3/8/2024    Admit Date: 2/23/2024         Discharge Plan       Row Name 03/08/24 1718       Plan    Final Discharge Disposition Code 03 - skilled nursing facility (SNF)    Final Note Pt is being dishcarged to [facility] on this date. SS faxed discharge information to [phone] on this date. SS provided RN with number to call report [phone]. SS faxed Hospice referral to [facility] [phone] and notified Hospice per Radha. PCS form filled out. EMS transportation to be arranged after report is called to [facility]. No other needs identified.          MUNA LazoW

## 2024-03-08 NOTE — PROGRESS NOTES
"Palliative Care Daily Progress Note     S: Medical record reviewed, followed up with Primary RN Radha and Dr Dunham regarding patient's condition. When palliative entered Brenda's room she was awake and was talking some to us, some of it made sense and other things did not, was just rambling and laughing. She said no to pain and did not appear to be in pain, have shortness of breath, was calm and in a pleasant mood.      O:   Palliative Performance Scale Score:     /85 (BP Location: Right arm, Patient Position: Lying)   Pulse 67   Temp 98.7 °F (37.1 °C) (Axillary)   Resp 18   Ht 162.6 cm (64\")   Wt 77.6 kg (171 lb)   SpO2 93%   BMI 29.35 kg/m²     Intake/Output Summary (Last 24 hours) at 3/8/2024 1619  Last data filed at 3/8/2024 0913  Gross per 24 hour   Intake 300 ml   Output --   Net 300 ml       PE:  Brenda is comfort measures, she was awake and alert to self and was not having labored respirations, answered no to having pain and did not appear to be. She was not in distress at this time.      Meds: Reviewed and changes noted    Labs:   Results from last 7 days   Lab Units 03/06/24  0213   WBC 10*3/mm3 10.59   HEMOGLOBIN g/dL 10.8*   HEMATOCRIT % 35.7   PLATELETS 10*3/mm3 133*     Results from last 7 days   Lab Units 03/06/24  0213   SODIUM mmol/L 145   POTASSIUM mmol/L 3.6   CHLORIDE mmol/L 106   CO2 mmol/L 32.8*   BUN mg/dL 20   CREATININE mg/dL 0.71   GLUCOSE mg/dL 168*   CALCIUM mg/dL 8.5*     Results from last 7 days   Lab Units 03/06/24  0213   SODIUM mmol/L 145   POTASSIUM mmol/L 3.6   CHLORIDE mmol/L 106   CO2 mmol/L 32.8*   BUN mg/dL 20   CREATININE mg/dL 0.71   CALCIUM mg/dL 8.5*   BILIRUBIN mg/dL 0.2   ALK PHOS U/L 75   ALT (SGPT) U/L 40*   AST (SGOT) U/L 35*   GLUCOSE mg/dL 168*     Imaging Results (Last 72 Hours)       ** No results found for the last 72 hours. **              Diagnostics: Reviewed    A: Brenda Munroe is a 73 y.o. female admitted on 2/23/2024 for a subdural hematoma. She " has a past medical history of MDD, COPD, CAD, thyroid disease, hypertension, paroxysmal atrial fibrillation, bilateral carotid stenosis, dementia, GERD, and PUD . She was found in the floor at the local nursing home after an unwitnessed fall. She has dementia and is a very poor historian. During visit today, she was only able to shake her head yes and no to questions. She is alert to self only today and staff report increased confusion and delirium. Her initial CT scan showed a Traumatic left parafalcine 4 mm subdural . Neurosurgery originally noted very unlikely to progress in size to clinically significant bleed and recommended to hold reversing anticoagulation and keep patient at our facility. She also had an elevated HS troponin 474 and repeat was 452. Her initial ED vital signs were temp 97, heart rate 111, respirations 20, BP 91/97, SpO2 100% on 4 L per nasal cannula.  HS troponin on arrival significantly elevated at 474 with repeat at 452.  CK is normal.  Sodium is 147.  Urine notable for specific gravity greater than 1.03 with moderate blood, 1+ leukocytes, 11-20 RBCs and 21-50 WBCs.  Imaging workup noted 4 mm left parafalcine subdural hematoma.  There is also a right nasal bone fracture.  EKG notes A-fib with right bundle branch block. Her most recent labs 3/4/2024- sodium 143, calcium 8.4, total protein 4.8, albumin 2.8, procalcitonin 0.55, H&H 11.3/38.7. Her UA from 3/3/2024 is dark yellow in color, turbin appearance, bacteria 1+ and BCID, PCR was + for klebsiella pneumoniae group. Her CXR also showed small to moderate volume left pleural effusion with atelectasis/airspace disease. She remains hypotensive bp 87/57 hr 94 irregular , afib on monitor, rr 20 sat 98% 02@3lpm n/c     On 3/6/2024 Patients son Edi has decided to make his mother comfort measures after conversation with Dr Dunham and Palliative.      P:  I was able to speak with Edi today to update and provide support. I let him know that she  had been more awake today and vitals had been stable. He asked me if his mother could go back to nursing facility and stated that he and his wife were okay with her returning to [facility]. I call him back after I checked with Brittany in  to see if insurance would pay for hospice, and he stated that he would want hospice to follow her at [facility]. I put hospice referral in and let Brittany and Dr stanley know.     We will continue to follow along. Please do not hesitate to contact us regarding further sx mgmt or GOC needs, including after hours or on weekends via our on call provider at [phone].     Sheron Barnes, APRN    3/8/2024

## 2024-03-08 NOTE — DISCHARGE SUMMARY
[facility] HOSPITALISTS DISCHARGE SUMMARY    Patient Identification:  Name:  Brenda Munroe  Age:  73 y.o.  Sex:  female  :  1950  MRN:  [id number]  Visit Number:  [id number]    Date of Admission: 2024  Date of Discharge:  3/9/2024    PCP: Bernadette Arevalo MD    DISCHARGE DIAGNOSIS  #Traumatic left parafalcine 4 mm subdural hematoma   #Nasal fracture   #Home anticoagulation with apixiban  #NSTEMI  #HFrEF  #Acute hypercapnic respiratory failure   #Mild COPD exacerbation in setting of severe chronic COPD  #L sided pneumonia and small to moderate pleural effusion  #Klebsiella bacteremia   #Acute metabolic encephalopathy   #Dementia w/ behavioral disturbance  #Thrombocytopenia  #pAfib f/ RVR  #Hypertensive urgency         Chronic medical problems:  MDD  COPD- Hx of respiratory acidosis. ABG compensated. Continue to offer QHS BiPAP. Goal SPO2 88-92%.   Thyroid disease  Bilateral carotid stenosis  Dementia  GERD  PUD    CONSULTS   Neurology   Cardiology   Palliative care     PROCEDURES PERFORMED      HOSPITAL COURSE  Patient is a 73 y.o. female presented on  to [facility] complaining of fall.  Please see the admitting history and physical for further details.    Ms. Munroe is our 72 yo F with hx of  MDD, COPD, CAD, thyroid disease, hypertension, paroxysmal atrial fibrillation, bilateral carotid stenosis, dementia, GERD, PUD who presented after being found on the floor at nursing home. Patient unable to give much history, obtained by ED staff from nursing home staff. Per report patient was doing well around 8 am then was found on the floor with blood around her head around 9:55 am. Patient reported to me she thought she fell but she was not sure. Patient with hx of severe COPD with multiple exacerbations the last few months requiring hospitalizations. Patient found to have small subdural hematoma that neurosurgery recommended to keep here for observation. Patient admitted to  "CCU for close neurovascular checks. Neurology recommended starting keppra for seizure suppression. Patient also with afib w/ RVR and NSTEMI. Hospitalization also complicated by recurrent hypercapnic respiratory failure. Patient's repeat CT head intially stable and mentation improved. Patient then again worsened with fluctuating mental status. Patient evaluated to have pneumonia, started on abx. Grew out klebsiella on blood cultures. Even with abx patient with limited improvement. Son has been involed with GOC and noted overall decline from multiple comorbidities. He considered quality of life and decided to make patient comfort measures. She has been comfortable and mental status continues to fluctuate. Family opting for patient to return to [facility] and she is stable to do so with comfort measures. Family requested hospice consult. Today she noted she felt \"rough\" but was alert and more interactive. Continue comfort measures and stopped noncomfort medications as discussed with family.    Discharge delayed by lack of available transportation in PM on 3/8 and was discharged in AM on 3/9. Patient discharged before she was seen by provider on 3/9.     VITAL SIGNS:  Temp:  [97 °F (36.1 °C)-98.7 °F (37.1 °C)] 98.7 °F (37.1 °C)  Heart Rate:  [67-95] 67  Resp:  [18-20] 18  BP: (109-145)/(62-85) 145/85  SpO2:  [93 %] 93 %  on  Flow (L/min):  [3] 3;   Device (Oxygen Therapy): nasal cannula    Body mass index is 29.35 kg/m².  Wt Readings from Last 3 Encounters:   03/07/24 77.6 kg (171 lb)   02/11/24 68.5 kg (151 lb)   02/02/24 68.9 kg (151 lb 14.4 oz)       PHYSICAL EXAM:  Constitutional:  Chronically ill appearing. Bruising on head and eyes similar to prior exams.   HENT:  Head:  Normocephalic and bruising similar to past exams  Cardiovascular:  Normal rate, regular rhythm and normal heart sounds with no murmur.  Pulmonary/Chest:  No respiratory distress, no wheezes, no crackles, with normal breath sounds and good air " movement.  Abdominal:  Soft.  Bowel sounds are normal.  No distension and no tenderness.     DISCHARGE DISPOSITION   Stable    DISCHARGE MEDICATIONS:     Discharge Medications        New Medications        Instructions Start Date   oxyCODONE 5 MG immediate release tablet  Commonly known as: Roxicodone   5 mg, Oral, Every 4 Hours PRN      predniSONE 20 MG tablet  Commonly known as: DELTASONE   Take 1.5 tablets by mouth Daily for 3 days, THEN 1 tablet Daily for 3 days, THEN 0.5 tablets Daily for 3 days.   Start Date: March 8, 2024            Changes to Medications        Instructions Start Date   LORazepam 0.5 MG tablet  Commonly known as: ATIVAN  What changed: when to take this   0.5 mg, Oral, Every 6 Hours PRN             Continue These Medications        Instructions Start Date   acetaminophen 500 MG tablet  Commonly known as: TYLENOL   500 mg, Oral, Every 4 Hours PRN      albuterol sulfate  (90 Base) MCG/ACT inhaler  Commonly known as: PROVENTIL HFA;VENTOLIN HFA;PROAIR HFA   2 puffs, Inhalation, Every 4 Hours PRN      Chloraseptic 1.4 % liquid liquid  Generic drug: phenol   1 spray, Mouth/Throat, Every 2 Hours PRN      donepezil 10 MG tablet  Commonly known as: ARICEPT   10 mg, Oral, Nightly      guaiFENesin 200 MG tablet   400 mg, Oral, Every 4 Hours PRN      levothyroxine 75 MCG tablet  Commonly known as: SYNTHROID, LEVOTHROID   75 mcg, Oral, Daily      Lubricating Tears Eye Drops 0.1-0.3 % solution  Generic drug: Dextran 70-Hypromellose   1 drop, Ophthalmic, Every 2 Hours PRN      memantine 10 MG tablet  Commonly known as: NAMENDA   10 mg, Oral, 2 Times Daily      nitroglycerin 0.4 MG SL tablet  Commonly known as: NITROSTAT   0.4 mg, Sublingual, Every 5 Minutes PRN      risperiDONE 0.5 MG tablet  Commonly known as: risperDAL   0.5 mg, Oral, 2 Times Daily      sertraline 100 MG tablet  Commonly known as: ZOLOFT   100 mg, Oral, Daily      trolamine salicylate 10 % cream  Commonly known as: ASPERCREME   1  application , Topical, Every 6 Hours PRN             Stop These Medications      apixaban 5 MG tablet tablet  Commonly known as: ELIQUIS     baclofen 10 MG tablet  Commonly known as: LIORESAL     clopidogrel 75 MG tablet  Commonly known as: PLAVIX     ferrous sulfate 325 (65 FE) MG tablet     isosorbide mononitrate 30 MG 24 hr tablet  Commonly known as: IMDUR     lamoTRIgine 100 MG tablet  Commonly known as: LaMICtal     lamoTRIgine 25 MG tablet  Commonly known as: LaMICtal     lisinopril 10 MG tablet  Commonly known as: PRINIVIL,ZESTRIL     pantoprazole 40 MG EC tablet  Commonly known as: PROTONIX     rosuvastatin 40 MG tablet  Commonly known as: CRESTOR     senna 8.6 MG tablet  Commonly known as: SENOKOT     Trelegy Ellipta 100-62.5-25 MCG/INH inhaler  Generic drug: Fluticasone-Umeclidin-Vilant     vitamin D 1.25 MG (55764 UT) capsule capsule  Commonly known as: ERGOCALCIFEROL                 Follow-up Information       Bernadette Arevalo MD .    Specialty: Geriatric Medicine  Contact information:  [facility] [id number] [phone]                              TEST  RESULTS PENDING AT DISCHARGE  Pending Labs       Order Current Status    Blood Culture - Blood, Wrist, Right Preliminary result             CODE STATUS  Code Status and Medical Interventions:   Ordered at: 03/06/24 1846     Code Status (Patient has no pulse and is not breathing):    No CPR (Do Not Attempt to Resuscitate)     Medical Interventions (Patient has pulse or is breathing):    Comfort Measures       Efren Dunham MD  [facility] Hospitalist  03/08/24  16:18 EST    Please note that this discharge summary required more than 30 minutes to complete.

## 2024-03-08 NOTE — PLAN OF CARE
Goal Outcome Evaluation:              Outcome Evaluation: Pt is resting in bed at this time. Comfort maintained. 3 L NC. No concerns or complaints at this time. Will continue POC.

## 2024-03-08 NOTE — CASE MANAGEMENT/SOCIAL WORK
Discharge Planning Assessment  [facility]     Patient Name: Brenda Munroe  MRN: [id number]  Today's Date: 3/8/2024    Admit Date: 2/23/2024    Plan: SS received a call from Palliative Care per Rebecca who states pt's son request for pt to return to [employer] [facility] on this date if possible. SS contacted [facility] on this date who states they will accpet pt back on this date. They will need meds filled and sent with pt for tonight and in the morning and states their pharmacy will be open tomorrow. SS notified physician and palliative care team via secure chat. SS to follow.       Discharge Plan       Row Name 03/08/24 1612       Plan    Plan SS received a call from Palliative Care per Rebecca who states pt's son request for pt to return to [employer] [facility] on this date if possible. SS contacted [facility] on this date who states they will accpet pt back on this date. They will need meds filled and sent with pt for tonight and in the morning and states their pharmacy will be open tomorrow. SS notified physician and palliative care team via secure chat. SS to follow.            JORGE Lazo

## 2024-03-08 NOTE — DISCHARGE PLACEMENT REQUEST
[address]  [place] [address]  Phone:  [phone]  Fax:  [phone]        Patient: ROOM: [address] [place] [address]   Brenda Munroe MRN:  [id number]   [address] [place] [address] :  1950  SSN:    Phone: [phone] Sex:  F   PCP: Bernadette Arevalo                 Emergency Contact Information       Name Relation Home Work Mobile     Edi Munroe [phone]     Tammy Munroe Relative [phone]     Fabien Munroe Son     [phone]          INSURANCE PAYOR PLAN GROUP # SUBSCRIBER ID   Primary:  Secondary:    MEDICARE  [place] [id number]  [id number]      [id number]  [id number]   Admitting Diagnosis: Subdural hematoma [S06.5XAA]  Order Date:  Mar 8, 2024        Case Management  Consult       (Order ID: [id number])     Diagnosis:         Priority:  Routine Expected Date:   Expiration Date:        Interval:   Count:    Reason for Consult: Hospice referrall at [facility],parafalcine Subdural hematoma Son Edi is contact.        Authorizing Provider:Sheron Barnes APRN  Authorizing Provider's NPI: [id number]  Order Entered By: Sheron Barnes APRN 3/8/2024  4:18 PM     Electronically signed by: Sheorn Barnes APRN 3/8/2024  4:18 PM       Brenda Munroe (73 y.o. Female)       Date of Birth   1950    Social Security Number       Address   [address] [place] [address]    Home Phone   [phone]    MRN   [id number]       Advent   [employer]    Marital Status                               Admission Date   24    Admission Type   Emergency    Admitting Provider   Efren Dunham MD    Attending Provider   Efren Dunham MD    Department, Room/Bed   [address], 3327/1P       Discharge Date       Discharge Disposition   Skilled Nursing Facility (DC - External)    Discharge Destination                                  "Attending Provider: Efren Dunham MD    Allergies: No Known Allergies    Isolation: None   Infection: None   Code Status: No CPR    Ht: 162.6 cm (64\")   Wt: 77.6 kg (171 lb)    Admission Cmt: None   Principal Problem: Subdural hematoma [S06.5XAA]                   Active Insurance as of 2/23/2024       Primary Coverage       Payor Plan Insurance Group Employer/Plan Group    MEDICARE MEDICARE A & B        Payor Plan Address Payor Plan Phone Number Payor Plan Fax Number Effective Dates    [address] [phone]  4/1/2008 - None Entered    [facility] [address]         Subscriber Name Subscriber Birth Date Member ID       ALTA MUNROE 1950 [id number]               Secondary Coverage       Payor Plan Insurance Group Employer/Plan Group    [place]        Payor Plan Address Payor Plan Phone Number Payor Plan Fax Number Effective Dates    [address] [phone]  12/4/2023 - None Entered    [place] [address]         Subscriber Name Subscriber Birth Date Member ID       ALTA MUNROE 1950 [id number]                     Emergency Contacts        (Rel.) Home Phone Work Phone Mobile Phone    Edi Munroe (Son) [phone] -- [phone]    Tammy Munroe (Relative) [phone] -- [phone]    Fabien Munroe (Son) -- -- [phone]              Insurance Information                  MEDICARE/MEDICARE A & B Phone: [phone]    Subscriber: Alta Munroe Subscriber#: [id number]    Group#: -- Precert#: --        [place] Phone: [phone]    Subscriber: Alta Munroe Subscriber#: [id number]    Group#: -- Precert#: --             History & Physical        Darryl Monteiro PA-C at 02/23/24 [id number]       Attestation signed by Efren Dunham MD at 02/23/24 1080    I have reviewed this documentation and agree.    Ms. Munroe is our 72 yo F with hx of  MDD, COPD, CAD, thyroid disease, hypertension, paroxysmal atrial fibrillation, " bilateral carotid stenosis, dementia, GERD, PUD who presented after being found on the floor at nursing home. Patient unable to give much history, obtained by ED staff from nursing home staff. Per report patient was doing well around 8 am then was found on the floor with blood around her head around 9:55 am. Patient reported to me she thought she fell but she was not sure. She was oriented to person and place but not time. She was moving all 4 extremities on command. She was drowsy and would fall back asleep easily.     I was able to update patient's listed daughter-in-law who was going to try to get up with patient's son to contact us.     Plan of care:    #Traumatic left parafalcine 4 mm subdural   #Home anticoagulation with apixiban  - CT on admission revealed 4 mm left parafalcine subdural hematoma   - Neurology and neurosurgery evaluated the patient in the ED   - Neurology recommended to start keppra and consult CT surgery.   - Neurosurgery noted very unlikely to progress in size to clinically significant bleed and recommended to hold reversing anticoagulation and keep patient at our facility. Repeat CT head in AM, will also repeat if patient has neurological decline.   - Admit to CCU for close monitoring and frequent neuro checks     #NSTEMI  - HS troponin was also notably elevated on arrival at 474 with repeat at 452. No significant changes appreciated on EKG.   - Stress test on 2/1/24 with no evidence of ischemia.   - No clear cause at this juncture, but lean toward type II event  - Will repeat echo  - Consult cardiology. Hold anticoagulation and antiplatelets for now    #pAfib  - Rate controlled. Cardiology consulted as above.   - Hold anticoagulation for now     Chronic medical problems:  MDD  COPD- Hx of respiratory acidosis, will get ABG to make sure not cause of AMS  Thyroid disease  Hypertension  Bilateral carotid stenosis  Dementia  GERD  PUD    F: NPO pending SLP  A: PRN  S: None  T: SCDs  H: HOB  elevated  U: PRN  G: PRN  S: N/A  B: PRN  I: PIV  D: None    Code status: Full, planning on discussing with son when available     Dispo:Admit to CCU for close monitoring                     [facility] Medicine Services  History & Physical    Patient Identification:  Name:  Brenda Munroe  Age:  73 y.o.  Sex:  female  :  1950  MRN:  [id number]   Visit Number:  [id number]  Admit Date: 2024   Primary Care Physician:  Bernadette Arevalo MD    Subjective     Chief complaint: fall    History of presenting illness:      Brenda Munroe is a 73 y.o. female who presented for further evaluation of fall, head injury.  Per report patient was found facedown at the nursing home today at 0955.  Per staff patient had been at her normal state of health during medication administration at 0800.  On exam patient noted with significant swelling to the right eye and surrounding face/scalp.  She was able to follow a few commands and state her name but answered no other questions.  She did move the bilateral upper extremities when asked.    Past medical history is significant for MDD, COPD, CAD, thyroid disease, hypertension, paroxysmal atrial fibrillation, bilateral carotid stenosis, dementia, GERD, PUD    Upon arrival to the ED, vital signs were temp 97, heart rate 111, respirations 20, BP 91/97, SpO2 100% on 4 L per nasal cannula.  HS troponin on arrival significantly elevated at 474 with repeat at 452.  CK is normal.  Sodium is 147.  Urine notable for specific gravity greater than 1.03 with moderate blood, 1+ leukocytes, 11-20 RBCs and 21-50 WBCs.  Imaging workup noted 4 mm left parafalcine subdural hematoma.  There is also a right nasal bone fracture.  EKG notes A-fib with right bundle branch block.    Emergency Department Room location at the time of my evaluation was 103.     ---------------------------------------------------------------------------------------------------------------------    Review of Systems   Reason unable to perform ROS: Altered mental status.        ---------------------------------------------------------------------------------------------------------------------   Past Medical History:   Diagnosis Date    Anxiety     Arthritis     Bell's palsy     Bladder prolapse, female, acquired     Carotid stenosis     COPD (chronic obstructive pulmonary disease)     COPD (chronic obstructive pulmonary disease)     Coronary artery disease     Dementia     Dementia     Depression     Difficulty walking     Disease of thyroid gland     Frequency of urination     GERD (gastroesophageal reflux disease)     Heart attack     Hyperlipidemia     Hypertension     Irregular heart beat     Peptic ulcer disease      Past Surgical History:   Procedure Laterality Date    CARDIAC CATHETERIZATION N/A 2/21/2023    Procedure: Left Heart Cath;  Surgeon: Tab Cifuentes MD;  Location: [facility] CATH INVASIVE LOCATION;  Service: Cardiology;  Laterality: N/A;    CARDIAC SURGERY      open heart  in 1999    CYSTOSCOPY N/A 11/8/2017    Procedure: CYSTOSCOPY;  Surgeon: Karl Nicolas DO;  Location: [facility] OR;  Service:     OTHER SURGICAL HISTORY      states she had fluid drained no surgery    VAGINAL HYSTERECTOMY W/ ANTERIOR AND POSTERIOR VAGINAL REPAIR N/A 11/8/2017    Procedure: VAGINAL HYSTERECTOMY WITH ANTERIOR, POSTERIOR, AND ENTEROCELE VAGINAL REPAIR;  Surgeon: Karl Nicolas DO;  Location: [facility] OR;  Service:     VAGINAL VAULT SUSPENSION N/A 11/8/2017    Procedure: VAGINAL VAULT SUSPENSION ;  Surgeon: Karl Nicolas DO;  Location: [facility] OR;  Service:      Family History   Problem Relation Age of Onset    Dementia Sister     Heart attack Mother     Tuberculosis Father     Breast cancer Neg Hx      Social History     Socioeconomic History    Marital status:     Number of children: 3   Tobacco Use    Smoking status: Every Day     Packs/day: 0.50     Years: 55.00      Additional pack years: 0.00     Total pack years: 27.50     Types: Cigarettes    Smokeless tobacco: Never   Vaping Use    Vaping Use: Never used   Substance and Sexual Activity    Alcohol use: No    Drug use: No    Sexual activity: Never     Comment: denies     ---------------------------------------------------------------------------------------------------------------------   Allergies:  Patient has no known allergies.  ---------------------------------------------------------------------------------------------------------------------   Home medications:    Medications below are reported home medications pulling from within the system; at this time, these medications have not been reconciled unless otherwise specified and are in the verification process for further verifcation as current home medications.  (Not in a hospital admission)      Hospital Scheduled Meds:    No current facility-administered medications for this encounter.      Current listed hospital scheduled medications may not yet reflect those currently placed in orders that are signed and held awaiting patient's arrival to floor.   ---------------------------------------------------------------------------------------------------------------------     Objective     Vital Signs:  Temp:  [97 °F (36.1 °C)] 97 °F (36.1 °C)  Heart Rate:  [] 98  Resp:  [20] 20  BP: ()/(57-91) 99/85      02/23/24  1048   Weight: 68.5 kg (151 lb)     Body mass index is 25.92 kg/m².  ---------------------------------------------------------------------------------------------------------------------       Physical Exam  Vitals and nursing note reviewed.   Constitutional:       General: She is not in acute distress.  HENT:      Head:      Comments: Diffuse swelling and bruising to the right face and anterior-right scalp  Eyes:      Comments: Significant right periorbital edema with ecchymosis   Cardiovascular:      Rate and Rhythm: Tachycardia present. Rhythm  "irregular.      Comments: DP pulses weak but present bilaterally  Pulmonary:      Effort: Pulmonary effort is normal. No respiratory distress.      Breath sounds: Normal breath sounds.   Abdominal:      Palpations: Abdomen is soft.   Musculoskeletal:      Right lower leg: No edema.      Left lower leg: No edema.   Skin:     General: Skin is warm and dry.   Neurological:      Mental Status: She is alert. She is disoriented.         ---------------------------------------------------------------------------------------------------------------------  EKG:      I have personally looked at the EKG.  ---------------------------------------------------------------------------------------------------------------------   Results from last 7 days   Lab Units 02/23/24  1135   CRP mg/dL 0.42   LACTATE mmol/L 1.0   WBC 10*3/mm3 5.33   HEMOGLOBIN g/dL 11.1*   HEMATOCRIT % 39.0   MCV fL 100.8*   MCHC g/dL 28.5*   PLATELETS 10*3/mm3 233   INR  1.50*         Results from last 7 days   Lab Units 02/23/24  1135   SODIUM mmol/L 147*   POTASSIUM mmol/L 3.9   CHLORIDE mmol/L 105   CO2 mmol/L 40.1*   BUN mg/dL 17   CREATININE mg/dL 0.86   CALCIUM mg/dL 9.1   GLUCOSE mg/dL 100*   ALBUMIN g/dL 3.3*   BILIRUBIN mg/dL 0.3   ALK PHOS U/L 73   AST (SGOT) U/L 19   ALT (SGPT) U/L 15   Estimated Creatinine Clearance: 53.7 mL/min (by C-G formula based on SCr of 0.86 mg/dL).  No results found for: \"AMMONIA\"  Results from last 7 days   Lab Units 02/23/24  1340 02/23/24  1135   CK TOTAL U/L 89  --    HSTROP T ng/L 452* 474*         Lab Results   Component Value Date    HGBA1C 5.20 01/01/2024     Lab Results   Component Value Date    TSH 0.876 01/23/2024    FREET4 1.59 03/25/2021     No results found for: \"PREGTESTUR\", \"PREGSERUM\", \"HCG\", \"HCGQUANT\"  Pain Management Panel  More data exists         Latest Ref Rng & Units 2/23/2024 2/11/2024   Pain Management Panel   Amphetamine, Urine Qual Negative Negative  Negative    Barbiturates Screen, Urine " "Negative Negative  Negative    Benzodiazepine Screen, Urine Negative Positive  Positive    Buprenorphine, Screen, Urine Negative Negative  Negative    Cocaine Screen, Urine Negative Negative  Negative    Fentanyl, Urine Negative Negative  Negative    Methadone Screen , Urine Negative Negative  Negative    Methamphetamine, Ur Negative Negative  Negative      No results found for: \"BLOODCX\"  No results found for: \"URINECX\"  No results found for: \"WOUNDCX\"  No results found for: \"STOOLCX\"      ---------------------------------------------------------------------------------------------------------------------  Imaging Results (Last 7 Days)       Procedure Component Value Units Date/Time    CT Abdomen Pelvis Without Contrast [[id number]] Collected: 02/23/24 1210     Updated: 02/23/24 1214    Narrative:      PROCEDURE: CT ABDOMEN PELVIS WO CONTRAST-     HISTORY: Unwitnessed fall, ALTERED, on anticoagulation superficial  bruising to the abdomen     COMPARISON: 1/9/2024.     PROCEDURE: Axial images were obtained from the lung bases through the  pubic symphysis without intravenous contrast. . This study was performed  with techniques to keep radiation doses as low as reasonably achievable  (ALARA). Individualized dose reduction techniques using automated  exposure control or adjustment of mA and/or kV according to the patient  size were employed.     FINDINGS:     ABDOMEN: The lung bases are clear. The heart size is normal. Noncontrast  images of the liver are unremarkable with no focal hepatic lesionsl. The  spleen is normal. There is a 1.6 cm left adrenal nodule which is  unchanged. The right adrenal gland is normal.  The pancreas has an  unremarkable unenhanced appearance.. The aorta is normal in caliber.  There is no significant free fluid or adenopathy. The left kidney is  atrophic. There is no nephrolithiasis. There is no hydronephrosis.  Limited noncontrast images of the bowel are unremarkable.     PELVIS: The " appendix is not identified. There are colonic diverticula  without evidence of diverticulitis. The patient is status post  hysterectomy. The urinary bladder is unremarkable. There is no  significant fluid or adenopathy. Bone windows reveal no acute osseous  abnormalities or lytic/destructive lesions. There is a fat-containing  periumbilical hernia.       Impression:      No acute intra-abdominal or pelvic process.              This report was finalized on 2/23/2024 12:12 PM by Lily Mullen M.D..       CT Thoracic Spine Without Contrast [[id number]] Collected: 02/23/24 1209     Updated: 02/23/24 1212    Narrative:      PROCEDURE: CT THORACIC SPINE WO CONTRAST-     HISTORY: Unwitnessed fall ;gross external trauma; blood thinners     TECHNIQUE: Multiple axial CT images were obtained through the thoracic  spine using bone window algorithims. Coronal and sagittal images were  reconstructed from the original axial data set. This study was performed  with techniques to keep radiation doses as low as reasonably achievable  (ALARA). Individualized dose reduction techniques using automated  exposure control or adjustment of mA and/or kV according to the patient  size were employed.     COMPARISON: None.     FINDINGS: There is S-shaped curvature of the thoracolumbar spine. There  are no fractures. There are diffuse degenerative changes with loss of  disc space height and anterior osteophyte formation. The paraspinal soft  tissues are unremarkable.       Impression:      No acute process.              This report was finalized on 2/23/2024 12:10 PM by Lily Mullen M.D..       CT Chest Without Contrast Diagnostic [[id number]] Collected: 02/23/24 1139     Updated: 02/23/24 1143    Narrative:      PROCEDURE: CT CHEST WO CONTRAST DIAGNOSTIC-     HISTORY: Unwitnessed fall gross external trauma blood thinners, masses  on abdomen     COMPARISON:  None .     PROCEDURE:  Multiple axial CT images were obtained from the  thoracic  inlet through the upper abdomen without the use of contrast. This study  was performed with techniques to keep radiation doses as low as  reasonably achievable (ALARA). Individualized dose reduction techniques  using automated exposure control or adjustment of mA and/or kV according  to the patient size were employed.     FINDINGS:  Soft tissue windows reveal no axillary, hilar or mediastinal adenopathy.  The thyroid gland is unremarkable. The heart is enlarged. The aorta is  normal in caliber. There are no pleural or pericardial effusions. Lung  windows reveal scarring in atelectasis in the left upper lobe. There is  no pneumothorax. The visualized upper abdomen is unremarkable. Bone  windows reveal no acute osseous abnormalities.       Impression:      No acute process.        This report was finalized on 2/23/2024 11:41 AM by Lily Mullen M.D..       XR Wrist 3+ View Right [[id number]] Collected: 02/23/24 1138     Updated: 02/23/24 1141    Narrative:      PROCEDURE: XR WRIST 3+ VW RIGHT-     History: Unwitnessed fall, ALTERED, on anticoagulation superficial  bruising to the abdomen, skin abrasion     COMPARISON:  None.     FINDINGS:  A 3 view exam demonstrates no acute fracture or dislocation.  There is joint space narrowing with osteophyte formation at the first  carpal metacarpal joint. No soft tissue abnormality is seen.       Impression:      No acute fracture.                 This report was finalized on 2/23/2024 11:39 AM by Lily Mullen M.D..       CT Lumbar Spine Without Contrast [[id number]] Collected: 02/23/24 1135     Updated: 02/23/24 1139    Narrative:      PROCEDURE: CT LUMBAR SPINE WO CONTRAST-     HISTORY: Low back pain, trauma     TECHNIQUE: Multiple axial CT images were obtained through the lumbar  spine using bone window algorithms. Coronal and sagittal images were  reconstructed from the original axial data set. This study was performed  with techniques to keep radiation  doses as low as reasonably achievable  (ALARA). Individualized dose reduction techniques using automated  exposure control or adjustment of mA and/or kV according to the patient  size were employed.     COMPARISON: None.     FINDINGS: The lumbar spine is well aligned with no acute fractures.  There are diffuse degenerative changes with loss of disc space height  and facet arthropathy. Evaluation of the paraspinal soft tissues reveals  an atrophic left kidney.       Impression:      No acute process.              This report was finalized on 2/23/2024 11:37 AM by Lily Mullen M.D..       CT Facial Bones Without Contrast [[id number]] Collected: 02/23/24 1133     Updated: 02/23/24 1137    Narrative:      PROCEDURE: CT FACIAL BONES WO CONTRAST-     HISTORY: Unwitnessed fall gross external trauma blood thinners     COMPARISON: None .     TECHNIQUE: Multiple axial CT sections were performed through the face  without contrast. Coronal reconstruction images were performed. This  study was performed with techniques to keep radiation doses as low as  reasonably achievable (ALARA). Individualized dose reduction techniques  using automated exposure control or adjustment of mA and/or kV according  to the patient size were employed.         FINDINGS: There is a right nasal bone fracture. No other fractures are  seen within the face.. The orbital floors are intact. There is an  air-fluid level in the right maxillary sinus. There is a right  periorbital soft tissue hematoma.       Impression:      Right nasal bone fracture.              This report was finalized on 2/23/2024 11:35 AM by Lily Mullen M.D..       CT Head Without Contrast [[id number]] Collected: 02/23/24 1129     Updated: 02/23/24 1135    Narrative:      PROCEDURE: CT HEAD WO CONTRAST-, CT CERVICAL SPINE WO CONTRAST-     HISTORY: Unwitnessed fall gross external trauma blood thinners     PROCEDURE: Axial images were obtained from the skull base to  the  thoracic inlet by computed tomography. Multiple axial CT images were  performed from the foramen magnum to the vertex without enhancement.   This study was performed with techniques to keep radiation doses as low  as reasonably achievable (ALARA). Individualized dose reduction  techniques using automated exposure control or adjustment of mA and/or  kV according to the patient size were employed.         C-SPINE:     FINDINGS: There are no acute fractures. There are diffuse degenerative  changes with loss of disc base height and facet arthropathy.  The  prevertebral soft tissues are normal.  Limited images of the lung apices  are unremarkable.     HEAD CT:     FINDINGS: There is generalized cerebral volume loss. Patchy  hypodensities in the periventricular white matter consistent with  chronic small vessel ischemic change. There is a subdural hematoma  overlying the left frontal lobe layering along the falx measuring up to  4 mm. There is no mass, mass effect or midline shift.  There is no  hydrocephalus. The paranasal sinuses are clear. Bone windows reveal no  acute osseous abnormalities. Note is made of a right periorbital  hematoma.       Impression:      1. 4 mm left parafalcine subdural hematoma.  2. No acute fracture or malalignment in the cervical spine.              Critical Finding     Critical report results have been called to Dr. Moran. Report called at  2/23/2024 11:33 AM.                       This report was finalized on 2/23/2024 11:33 AM by Lily Mullen M.D..       CT Cervical Spine Without Contrast [[id number]] Collected: 02/23/24 1129     Updated: 02/23/24 1135    Narrative:      PROCEDURE: CT HEAD WO CONTRAST-, CT CERVICAL SPINE WO CONTRAST-     HISTORY: Unwitnessed fall gross external trauma blood thinners     PROCEDURE: Axial images were obtained from the skull base to the  thoracic inlet by computed tomography. Multiple axial CT images were  performed from the foramen magnum to the  "vertex without enhancement.   This study was performed with techniques to keep radiation doses as low  as reasonably achievable (ALARA). Individualized dose reduction  techniques using automated exposure control or adjustment of mA and/or  kV according to the patient size were employed.         C-SPINE:     FINDINGS: There are no acute fractures. There are diffuse degenerative  changes with loss of disc base height and facet arthropathy.  The  prevertebral soft tissues are normal.  Limited images of the lung apices  are unremarkable.     HEAD CT:     FINDINGS: There is generalized cerebral volume loss. Patchy  hypodensities in the periventricular white matter consistent with  chronic small vessel ischemic change. There is a subdural hematoma  overlying the left frontal lobe layering along the falx measuring up to  4 mm. There is no mass, mass effect or midline shift.  There is no  hydrocephalus. The paranasal sinuses are clear. Bone windows reveal no  acute osseous abnormalities. Note is made of a right periorbital  hematoma.       Impression:      1. 4 mm left parafalcine subdural hematoma.  2. No acute fracture or malalignment in the cervical spine.              Critical Finding     Critical report results have been called to Dr. Moran. Report called at  2/23/2024 11:33 AM.                       This report was finalized on 2/23/2024 11:33 AM by Lily Mullen M.D..               Cultures:  No results found for: \"BLOODCX\", \"URINECX\", \"WOUNDCX\", \"MRSACX\", \"RESPCX\", \"STOOLCX\"    Last echocardiogram:  Results for orders placed during the hospital encounter of 01/01/24    Adult Transthoracic Echo Limited W/ Cont if Necessary Per Protocol    Interpretation Summary    Normal left ventricular cavity size and wall thickness noted. All left ventricular wall segments contract normally.    Left ventricular ejection fraction appears to be 61 - 65%.    There is a trivial pericardial effusion adjacent to the left " ventricle.          I have personally reviewed the above radiology images and read the final radiology report on 02/23/24  ---------------------------------------------------------------------------------------------------------------------  Assessment / Plan     Active Hospital Problems    Diagnosis  POA    **Subdural hematoma [S06.5XAA]  Yes       ASSESSMENT/PLAN:    Left parafalcine subdural hematoma secondary to unwitnessed fall  Encephalopathy, possibly secondary to above in the setting of baseline dementia  Right nasal bone fracture  NSTEMI, T1 versus T2  In the setting of known CAD  Complicated by atrial fibrillation requiring full anticoagulation  Patient presents from local SNF following unwitnessed fall.  Imaging workup noted 4 mm left parafalcine subdural hematoma.  HS troponin was also notably elevated on arrival at 474 with repeat at 452.  Neurology and neurosurgery evaluated the patient in the ED and have recommended repeat CT in 4 to 6 hours to monitor  Will admit to the CCU for further management  Hold any medications which could perpetuate bleeding  Consult cardiology for further assistance and recommendations regarding NSTEMI.  As above patient not a good candidate currently for heparinization.  She did have a stress test on 2/1/2024 with normal MPI no evidence of ischemia.  Neurochecks and NIHSS assessments every shift  Will remain n.p.o. pending improvement in cognition  Neurology has further recommended Keppra 500 mg twice daily x 7 days  Will continue to monitor closely and follow-up further neurology recommendations    Chronic:  MDD  COPD  Thyroid disease  Hypertension  Bilateral carotid stenosis  Dementia  GERD  PUD  Plan to resume home med regimen as indicated  Monitor vital signs closely  ----------  -DVT prophylaxis: SCDs  -Activity: Bedrest  -Expected length of stay: INPATIENT status due to the need for care which can only be reasonably provided in an hospital setting such as  aggressive/expedited ancillary services and/or consultation services, the necessity for IV medications, close physician monitoring and/or the possible need for procedures.  In such, I feel patient’s risk for adverse outcomes and need for care warrant INPATIENT evaluation and predict the patient’s care encounter to likely last beyond 2 midnights.   -Disposition pending course     High risk secondary to subdural hematoma    There are no questions and answers to display.       Darryl Monteiro PA-C   02/23/24  14:59 EST     Electronically signed by Efren Dunham MD at 02/23/24 1552       Current Facility-Administered Medications   Medication Dose Route Frequency Provider Last Rate Last Admin    acetaminophen (TYLENOL) tablet 500 mg  500 mg Oral Q4H PRN Prasad Romero DO        albuterol (PROVENTIL) nebulizer solution 0.083% 2.5 mg/3mL  2.5 mg Nebulization Q6H PRN Prasad Romero DO        sennosides-docusate (PERICOLACE) 8.6-50 MG per tablet 2 tablet  2 tablet Oral BID Prasad Romero DO   2 tablet at 03/08/24 0827    And    polyethylene glycol (MIRALAX) packet 17 g  17 g Oral Daily PRN Prasad Romero DO        And    bisacodyl (DULCOLAX) EC tablet 5 mg  5 mg Oral Daily PRN Prasad Romero DO        And    bisacodyl (DULCOLAX) suppository 10 mg  10 mg Rectal Daily PRN Prasad Romero DO        carboxymethylcellulose (REFRESH PLUS) 0.5 % ophthalmic solution 1 drop  1 drop Both Eyes TID PRN Prasad Romero DO        donepezil (ARICEPT) tablet 10 mg  10 mg Oral Nightly HeatherPrasad diallo DO   10 mg at 03/06/24 2126    glycopyrrolate (ROBINUL) injection 0.2 mg  0.2 mg Intravenous Q3H PRN Efren Dunham MD        guaiFENesin tablet 400 mg  400 mg Oral Q4H PRN Prasad Romero DO        hyaluronidase 150 units in NS 10 ml syringe  150 Units Subcutaneous PRN Efren Dunham MD        levothyroxine (SYNTHROID, LEVOTHROID) tablet 75 mcg  75 mcg Oral Q AM  Prasad Romreo DO   75 mcg at 03/06/24 0602    LORazepam (ATIVAN) injection 0.5 mg  0.5 mg Intravenous Q4H PRN Efren Dunham MD   0.5 mg at 03/08/24 0510    LORazepam (ATIVAN) tablet 0.5 mg  0.5 mg Oral Q12H PRN Prasad Romero, DO   0.5 mg at 03/05/24 0206    memantine (NAMENDA) tablet 10 mg  10 mg Oral Q12H HeatherPrasad wallace, DO   10 mg at 03/08/24 0827    methylPREDNISolone sodium succinate (SOLU-Medrol) injection 20 mg  20 mg Intravenous Q12H Prasad Romero DO   20 mg at 03/08/24 0827    morphine injection 2 mg  2 mg Intravenous Q4H PRN Efren Dunham MD   2 mg at 03/08/24 0453    nitroglycerin (NITROSTAT) SL tablet 0.4 mg  0.4 mg Sublingual Q5 Min PRN Prasad Romero DO        phenol (CHLORASEPTIC) 1.4 % liquid 1 spray  1 spray Mouth/Throat Q2H PRN Prasad Romero DO        risperiDONE (risperDAL) tablet 0.5 mg  0.5 mg Oral BID Prasad Romero DO   0.5 mg at 03/08/24 0827    sertraline (ZOLOFT) tablet 100 mg  100 mg Oral Daily Prasad Romero DO   100 mg at 03/08/24 0827    sodium chloride 0.9 % flush 10 mL  10 mL Intravenous Q12H Prasad Romero, DO   10 mL at 03/07/24 2128    sodium chloride 0.9 % flush 10 mL  10 mL Intravenous PRN HeatherPrasad wallace, DO        sodium chloride 0.9 % flush 10 mL  10 mL Intravenous Q12H HeatherPrasad diallo, DO   10 mL at 03/07/24 2128    sodium chloride 0.9 % flush 10 mL  10 mL Intravenous PRN HeatherPrasad diallo, DO        sodium chloride 0.9 % flush 10 mL  10 mL Intravenous Q12H HeatherPrasad diallo, DO   10 mL at 03/07/24 2128    sodium chloride 0.9 % flush 10 mL  10 mL Intravenous PRN Prasad Romero,         sodium chloride 0.9 % infusion 40 mL  40 mL Intravenous PRN HeatherPrasad wallace,         sodium chloride 0.9 % infusion 40 mL  40 mL Intravenous PRN HeatherPrasad wallace,         sodium chloride 0.9 % infusion 40 mL  40 mL Intravenous PRN Prasad Romero, DO      "   trolamine salicylate (ASPERCREME) 10 % cream 1 Application  1 application  Topical Q6H PRN Prasad Romero, DO   1 Application at 03/02/24 0827        Physician Progress Notes (most recent note)        Sheron Barnes APRN at 03/08/24 0925          Palliative Care Daily Progress Note     S: Medical record reviewed, followed up with Primary RN Radha and Dr Dunham regarding patient's condition. When palliative entered Brenda's room she was awake and was talking some to us, some of it made sense and other things did not, was just rambling and laughing. She said no to pain and did not appear to be in pain, have shortness of breath, was calm and in a pleasant mood.      O:   Palliative Performance Scale Score:     /85 (BP Location: Right arm, Patient Position: Lying)   Pulse 67   Temp 98.7 °F (37.1 °C) (Axillary)   Resp 18   Ht 162.6 cm (64\")   Wt 77.6 kg (171 lb)   SpO2 93%   BMI 29.35 kg/m²     Intake/Output Summary (Last 24 hours) at 3/8/2024 1619  Last data filed at 3/8/2024 0913  Gross per 24 hour   Intake 300 ml   Output --   Net 300 ml       PE:  Brenda is comfort measures, she was awake and alert to self and was not having labored respirations, answered no to having pain and did not appear to be. She was not in distress at this time.      Meds: Reviewed and changes noted    Labs:   Results from last 7 days   Lab Units 03/06/24  0213   WBC 10*3/mm3 10.59   HEMOGLOBIN g/dL 10.8*   HEMATOCRIT % 35.7   PLATELETS 10*3/mm3 133*     Results from last 7 days   Lab Units 03/06/24  0213   SODIUM mmol/L 145   POTASSIUM mmol/L 3.6   CHLORIDE mmol/L 106   CO2 mmol/L 32.8*   BUN mg/dL 20   CREATININE mg/dL 0.71   GLUCOSE mg/dL 168*   CALCIUM mg/dL 8.5*     Results from last 7 days   Lab Units 03/06/24  0213   SODIUM mmol/L 145   POTASSIUM mmol/L 3.6   CHLORIDE mmol/L 106   CO2 mmol/L 32.8*   BUN mg/dL 20   CREATININE mg/dL 0.71   CALCIUM mg/dL 8.5*   BILIRUBIN mg/dL 0.2   ALK PHOS U/L 75   ALT (SGPT) U/L " 40*   AST (SGOT) U/L 35*   GLUCOSE mg/dL 168*     Imaging Results (Last 72 Hours)       ** No results found for the last 72 hours. **              Diagnostics: Reviewed    A: Brenda Munroe is a 73 y.o. female admitted on 2/23/2024 for a subdural hematoma. She has a past medical history of MDD, COPD, CAD, thyroid disease, hypertension, paroxysmal atrial fibrillation, bilateral carotid stenosis, dementia, GERD, and PUD . She was found in the floor at the local nursing home after an unwitnessed fall. She has dementia and is a very poor historian. During visit today, she was only able to shake her head yes and no to questions. She is alert to self only today and staff report increased confusion and delirium. Her initial CT scan showed a Traumatic left parafalcine 4 mm subdural . Neurosurgery originally noted very unlikely to progress in size to clinically significant bleed and recommended to hold reversing anticoagulation and keep patient at our facility. She also had an elevated HS troponin 474 and repeat was 452. Her initial ED vital signs were temp 97, heart rate 111, respirations 20, BP 91/97, SpO2 100% on 4 L per nasal cannula.  HS troponin on arrival significantly elevated at 474 with repeat at 452.  CK is normal.  Sodium is 147.  Urine notable for specific gravity greater than 1.03 with moderate blood, 1+ leukocytes, 11-20 RBCs and 21-50 WBCs.  Imaging workup noted 4 mm left parafalcine subdural hematoma.  There is also a right nasal bone fracture.  EKG notes A-fib with right bundle branch block. Her most recent labs 3/4/2024- sodium 143, calcium 8.4, total protein 4.8, albumin 2.8, procalcitonin 0.55, H&H 11.3/38.7. Her UA from 3/3/2024 is dark yellow in color, turbin appearance, bacteria 1+ and BCID, PCR was + for klebsiella pneumoniae group. Her CXR also showed small to moderate volume left pleural effusion with atelectasis/airspace disease. She remains hypotensive bp 87/57 hr 94 irregular , afib on monitor,  rr 20 sat 98% 02@3lpm n/c     On 3/6/2024 Patients son Edi has decided to make his mother comfort measures after conversation with Dr Stanley and Palliative.      P:  I was able to speak with Edi today to update and provide support. I let him know that she had been more awake today and vitals had been stable. He asked me if his mother could go back to nursing facility and stated that he and his wife were okay with her returning to [facility]. I call him back after I checked with Brittany in  to see if insurance would pay for hospice, and he stated that he would want hospice to follow her at [facility]. I put hospice referral in and let Brittany and Dr stanley know.     We will continue to follow along. Please do not hesitate to contact us regarding further sx mgmt or GOC needs, including after hours or on weekends via our on call provider at [phone].     PAULA Ledezma    3/8/2024    Electronically signed by Sheron Barnes APRN at 24 1628          Physical Therapy Notes (most recent note)        Rosmery Lock, PT at 24 1419  Version 1 of 1         Acute Care - Physical Therapy Initial Evaluation   [facility]     Patient Name: Brenda Munroe  : 1950  MRN: [id number]  Today's Date: 3/5/2024   Onset of Illness/Injury or Date of Surgery: 24  Visit Dx:     ICD-10-CM ICD-9-CM   1. Subdural hematoma  S06.5XAA 432.1   2. NSTEMI (non-ST elevated myocardial infarction)  I21.4 410.70   3. Chronic anticoagulation  Z79.01 V58.61   4. Closed fracture of nasal bone, initial encounter  S02.2XXA 802.0   5. Traumatic orbital hematoma, right, initial encounter  S05.11XA 921.2     Patient Active Problem List   Diagnosis    Psychosis    Severe recurrent major depression with psychotic features    COPD (chronic obstructive pulmonary disease)    Coronary artery disease    Disease of thyroid gland    Arthritis    Hypertension    Paroxysmal atrial fibrillation    Chronic headaches    Vision changes     Carotid stenosis, asymptomatic, bilateral    Bradycardia, sinus    Acute respiratory failure with hypoxia and hypercapnia    NSTEMI (non-ST elevated myocardial infarction)    A-fib    Hypercapnic respiratory failure    Subdural hematoma     Past Medical History:   Diagnosis Date    Anxiety     Arthritis     Bell's palsy     Bladder prolapse, female, acquired     Carotid stenosis     COPD (chronic obstructive pulmonary disease)     COPD (chronic obstructive pulmonary disease)     Coronary artery disease     Dementia     Dementia     Depression     Difficulty walking     Disease of thyroid gland     Frequency of urination     GERD (gastroesophageal reflux disease)     Heart attack     Hyperlipidemia     Hypertension     Irregular heart beat     Peptic ulcer disease      Past Surgical History:   Procedure Laterality Date    CARDIAC CATHETERIZATION N/A 2/21/2023    Procedure: Left Heart Cath;  Surgeon: Tab Cifuentes MD;  Location: [facility] CATH INVASIVE LOCATION;  Service: Cardiology;  Laterality: N/A;    CARDIAC SURGERY      open heart  in 1999    CYSTOSCOPY N/A 11/8/2017    Procedure: CYSTOSCOPY;  Surgeon: Karl Nicolas DO;  Location: [facility];  Service:     OTHER SURGICAL HISTORY      states she had fluid drained no surgery    VAGINAL HYSTERECTOMY W/ ANTERIOR AND POSTERIOR VAGINAL REPAIR N/A 11/8/2017    Procedure: VAGINAL HYSTERECTOMY WITH ANTERIOR, POSTERIOR, AND ENTEROCELE VAGINAL REPAIR;  Surgeon: Karl Nicolas DO;  Location: [facility] OR;  Service:     VAGINAL VAULT SUSPENSION N/A 11/8/2017    Procedure: VAGINAL VAULT SUSPENSION ;  Surgeon: Karl Nicolas DO;  Location: [facility];  Service:      PT Assessment (Last 12 Hours)       PT Evaluation and Treatment       Row Name 03/05/24 3021          Physical Therapy Time and Intention    Subjective Information no complaints  -AG     Document Type evaluation  -AG     Mode of Treatment physical therapy  -AG     Patient Effort poor   -AG     Symptoms Noted During/After Treatment none  -AG       Row Name 24 1404          General Information    Patient Profile Reviewed yes  -AG     Onset of Illness/Injury or Date of Surgery 24  -     Referring Physician Dr. Romero  -     Patient Observations alert;poorly cooperative  -AG     Patient/Family/Caregiver Comments/Observations pt. supine in PCU bed; alert, intermittent eye contact with therapist.  Pt. does not verbalize responses to most questions.  [facility] Does not state her name, .  -AG     General Observations of Patient diffuse facial bruising noted  -AG     Prior Level of Function --  unable to obtain information regarding PLOF or recent events from pt.  -AG     Pertinent History of Current Functional Problem pt. admitted from SNF to this facility for subdural hematoma  -AG     Existing Precautions/Restrictions fall  -AG     Limitations/Impairments safety/cognitive  -AG     Barriers to Rehab medically complex;cognitive status  -AG       Row Name 24 1404          Living Environment    Current Living Arrangements extended care facility  -AG     Home Accessibility wheelchair accessible  -       Row Name 24 1404          Home Use of Assistive/Adaptive Equipment    Equipment Currently Used at Home wheelchair;walker, standard;glucometer  -AG       Row Name 24 1404          Pain    Additional Documentation Pain Scale: FACES Pre/Post-Treatment (Group)  -       Row Name 24 1404          Pain Scale: FACES Pre/Post-Treatment    Pain: FACES Scale, Pretreatment 0-->no hurt  -AG     Posttreatment Pain Rating 0-->no hurt  -AG       Row Name 24 1404          Cognition    Behavioral Issues (Cognition) withdrawn  -AG     Follows Commands (Cognition) does not follow one-step commands  -AG     Personal Safety Interventions supervised activity  -AG       Row Name 24 1404          Range of Motion (ROM)    Range of Motion --  B  LE PROM WFL; pt. is not cooperative with assessment of AROM.  Pt. grimaces with L UE ROM  -AG       Row Name 03/05/24 1404          Strength Comprehensive (MMT)    General Manual Muscle Testing (MMT) Assessment --  unable to formally assess  -AG       Row Name 03/05/24 1404          Vision Assessment/Intervention    Visual Impairment/Limitations unable/difficult to assess  -AG       Row Name 03/05/24 1404          Bed Mobility    Bed Mobility rolling left;rolling right  -AG     All Activities, Day (Bed Mobility) dependent (less than 25% patient effort)  -AG     Rolling Left Day (Bed Mobility) dependent (less than 25% patient effort)  -AG       Row Name 03/05/24 1404          Transfers    Comment, (Transfers) unable to safely assess  -AG       Row Name 03/05/24 1404          Gait/Stairs (Locomotion)    Patient was able to Ambulate no, other medical factors prevent ambulation  -AG     Reason Patient was unable to Ambulate Cognitive Deficit/Severe Dementia  -AG       Row Name 03/05/24 1404          Safety Issues, Functional Mobility    Impairments Affecting Function (Mobility) cognition  -AG       Row Name             Wound 03/04/24 2137 Left upper arm Extravasation    Wound - Properties Group Placement Date: 03/04/24  -MM Placement Time: 2137  -MM Side: Left  -MM Orientation: upper  -MM Location: arm  -MM Primary Wound Type: Extravasatio  -MM    Retired Wound - Properties Group Placement Date: 03/04/24  -MM Placement Time: 2137  -MM Side: Left  -MM Orientation: upper  -MM Location: arm  -MM Primary Wound Type: Extravasatio  -MM    Retired Wound - Properties Group Date first assessed: 03/04/24  -MM Time first assessed: 2137  -MM Side: Left  -MM Location: arm  -MM Primary Wound Type: Extravasatio  -MM      Row Name 03/05/24 1404          Coping    Observed Emotional State calm  -AG     Verbalized Emotional State acceptance  -AG     Trust Relationship/Rapport care explained;thoughts/feelings  acknowledged  -AG     Family/Support Persons family  -AG     Involvement in Care not present at bedside  -AG     Family/Support System Care support provided  -AG       Row Name 03/05/24 1401          Plan of Care Review    Plan of Care Reviewed With patient  -AG     Outcome Evaluation Limited PT evaluation complete.  Pt. with severe cognitive deficit and inability to follow simple commands.  Pt. mostly disengaged with therapist and is dependent for functional mobility skills.  PT will place on caseload for PRN frequency to aid with SNF placement.  -AG       Row Name 03/05/24 1402          Positioning and Restraints    Pre-Treatment Position in bed  -AG     Post Treatment Position bed  -AG     In Bed supine;call light within reach;encouraged to call for assist;exit alarm on;side rails up x3  -AG       Row Name 03/05/24 140          Therapy Assessment/Plan (PT)    Patient/Family Therapy Goals Statement (PT) pt. unable to provide goal  -AG     Functional Level at Time of Evaluation (PT) dependent  -AG     PT Diagnosis (PT) impaired functional mobility  -AG     Rehab Potential (PT) other (see comments)  guarded  -AG     Criteria for Skilled Interventions Met (PT) yes;meets criteria  -AG     Therapy Frequency (PT) other (see comments)  PRN  -AG     Predicted Duration of Therapy Intervention (PT) 1 week  -AG     Problem List (PT) problems related to;mobility;cognition;range of motion (ROM);strength  -AG     Activity Limitations Related to Problem List (PT) unable to ambulate safely;unable to transfer safely;BADLs not performed adequately or safely  -AG       Row Name 03/05/24 1405          Therapy Plan Review/Discharge Plan (PT)    Therapy Plan Review (PT) evaluation/treatment results reviewed;care plan/treatment goals reviewed;risks/benefits reviewed;current/potential barriers reviewed  -AG       Row Name 03/05/24 1406          Physical Therapy Goals    Bed Mobility Goal Selection (PT) bed mobility, PT goal 1;bed  mobility, PT goal 2  -AG       Row Name 03/05/24 1404          Bed Mobility Goal 1 (PT)    Activity/Assistive Device (Bed Mobility Goal 1, PT) rolling to left;rolling to right  -AG     Commiskey Level/Cues Needed (Bed Mobility Goal 1, PT) moderate assist (50-74% patient effort)  -AG     Time Frame (Bed Mobility Goal 1, PT) by discharge  -AG       Row Name 03/05/24 1404          Bed Mobility Goal 2 (PT)    Activity/Assistive Device (Bed Mobility Goal 2, PT) sit to supine;supine to sit  -AG     Commiskey Level/Cues Needed (Bed Mobility Goal 2, PT) maximum assist (25-49% patient effort)  -AG     Time Frame (Bed Mobility Goal 2, PT) by discharge  -AG               User Key  (r) = Recorded By, (t) = Taken By, (c) = Cosigned By      Initials Name Provider Type    Rosmery Maldonado, PT Physical Therapist    Scar Pan RN Registered Nurse                    Physical Therapy Education       Title: PT OT SLP Therapies (In Progress)       Topic: Physical Therapy (In Progress)       Point: Mobility training (In Progress)       Learning Progress Summary             Patient Acceptance, E,D, NL by AG at 3/5/2024 1403    Acceptance, E, NL,NR by PL at 3/2/2024 1517    Acceptance, E,TB, VU by CS at 2/26/2024 1152                         Point: Home exercise program (In Progress)       Learning Progress Summary             Patient Acceptance, E,D, NL by AG at 3/5/2024 1403    Acceptance, E, NL,NR by PL at 3/2/2024 1517    Acceptance, E,TB, VU by CS at 2/26/2024 1152                         Point: Body mechanics (In Progress)       Learning Progress Summary             Patient Acceptance, E,D, NL by AG at 3/5/2024 1403    Acceptance, E, NL,NR by PL at 3/2/2024 1517    Acceptance, E,TB, VU by CS at 2/26/2024 1152                         Point: Precautions (In Progress)       Learning Progress Summary             Patient Acceptance, E,D, NL by AG at 3/5/2024 1403    Acceptance, E, NL,NR by PL at 3/2/2024 1517     Acceptance, E,TB, VU by  at 2024 1152                                         User Key       Initials Effective Dates Name Provider Type Discipline    AG 21 -  Rosmery Lock, PT Physical Therapist PT    CS 23 -  Ortiz Peters PT Physical Therapist PT    PL 24 -  Yolanda Cheung RN Extern Registered Nurse Nurse                  PT Recommendation and Plan  Anticipated Discharge Disposition (PT): skilled nursing facility  Planned Therapy Interventions (PT): balance training, bed mobility training  Therapy Frequency (PT): other (see comments) (PRN)  Plan of Care Reviewed With: patient  Outcome Evaluation: Limited PT evaluation complete.  Pt. with severe cognitive deficit and inability to follow simple commands.  Pt. mostly disengaged with therapist and is dependent for functional mobility skills.  PT will place on caseload for PRN frequency to aid with SNF placement.       Time Calculation:    PT Charges       Row Name 24 1403             Time Calculation    PT Received On 24  -      PT Goal Re-Cert Due Date 24  -                User Key  (r) = Recorded By, (t) = Taken By, (c) = Cosigned By      Initials Name Provider Type     Rosmery Lock, PT Physical Therapist                  Therapy Charges for Today       Code Description Service Date Service Provider Modifiers Qty    77699095734 HC PT EVAL MOD COMPLEXITY 4 3/5/2024 Rosmery Lock, PT GP 1            PT G-Codes  Outcome Measure Options: Modified Perry  AM-PAC 6 Clicks Score (PT): 6  Modified Perry Scale: 0 - No Symptoms at all.    Rosmery Lock PT  3/5/2024      Electronically signed by Rosmery Lock, PT at 24 1419          Occupational Therapy Notes (most recent note)        Erik Carrillo, OT at 24 1122          Acute Care - Occupational Therapy Initial Evaluation   [place]     Patient Name: Brenda Munroe  : 1950  MRN: [id number]  Today's Date: 3/4/2024  Onset of Illness/Injury or Date of  Surgery: 02/23/24     Referring Physician: Heather    Admit Date: 2/23/2024       ICD-10-CM ICD-9-CM   1. Subdural hematoma  S06.5XAA 432.1   2. NSTEMI (non-ST elevated myocardial infarction)  I21.4 410.70   3. Chronic anticoagulation  Z79.01 V58.61   4. Closed fracture of nasal bone, initial encounter  S02.2XXA 802.0   5. Traumatic orbital hematoma, right, initial encounter  S05.11XA 921.2     Patient Active Problem List   Diagnosis    Psychosis    Severe recurrent major depression with psychotic features    COPD (chronic obstructive pulmonary disease)    Coronary artery disease    Disease of thyroid gland    Arthritis    Hypertension    Paroxysmal atrial fibrillation    Chronic headaches    Vision changes    Carotid stenosis, asymptomatic, bilateral    Bradycardia, sinus    Acute respiratory failure with hypoxia and hypercapnia    NSTEMI (non-ST elevated myocardial infarction)    A-fib    Hypercapnic respiratory failure    Subdural hematoma     Past Medical History:   Diagnosis Date    Anxiety     Arthritis     Bell's palsy     Bladder prolapse, female, acquired     Carotid stenosis     COPD (chronic obstructive pulmonary disease)     COPD (chronic obstructive pulmonary disease)     Coronary artery disease     Dementia     Dementia     Depression     Difficulty walking     Disease of thyroid gland     Frequency of urination     GERD (gastroesophageal reflux disease)     Heart attack     Hyperlipidemia     Hypertension     Irregular heart beat     Peptic ulcer disease      Past Surgical History:   Procedure Laterality Date    CARDIAC CATHETERIZATION N/A 2/21/2023    Procedure: Left Heart Cath;  Surgeon: Tab Cifuentes MD;  Location: [facility] INVASIVE LOCATION;  Service: Cardiology;  Laterality: N/A;    CARDIAC SURGERY      open heart  in 1999    CYSTOSCOPY N/A 11/8/2017    Procedure: CYSTOSCOPY;  Surgeon: Karl Nicolas DO;  Location: [facility] OR;  Service:     OTHER SURGICAL HISTORY       states she had fluid drained no surgery    VAGINAL HYSTERECTOMY W/ ANTERIOR AND POSTERIOR VAGINAL REPAIR N/A 11/8/2017    Procedure: VAGINAL HYSTERECTOMY WITH ANTERIOR, POSTERIOR, AND ENTEROCELE VAGINAL REPAIR;  Surgeon: Karl Nicolas DO;  Location:  [facility] OR;  Service:     VAGINAL VAULT SUSPENSION N/A 11/8/2017    Procedure: VAGINAL VAULT SUSPENSION ;  Surgeon: Karl Nicolas DO;  Location:  [facility] OR;  Service:          OT ASSESSMENT FLOWSHEET (Last 12 Hours)       OT Evaluation and Treatment       Row Name 03/04/24 1117                   OT Time and Intention    Document Type evaluation  -KR        Mode of Treatment occupational therapy  -KR        Patient Effort fair  -KR           General Information    General Observations of Patient alert/pleasant but unable to consistently follow fxl commands  -KR           Living Environment    Current Living Arrangements residential facility  -KR        People in Home facility resident  -KR           Cognition    Affect/Mental Status (Cognition) confused  -KR        Orientation Status (Cognition) unable/difficult to assess  -KR        Follows Commands (Cognition) increased processing time needed;delayed response/completion;physical/tactile prompts required;verbal cues/prompting required  -KR           Range of Motion Comprehensive    Comment, General Range of Motion RUE observed WFL for manipulation of bed linen/reaching. LUE not observed for ROM performance/unable to follow commands for ROM assessment  -KR           Strength Comprehensive (MMT)    Comment, General Manual Muscle Testing (MMT) Assessment BUE unable/difficult to assess  -KR           Bathing Assessment/Intervention    [place] Level (Bathing) maximum assist (25% patient effort);dependent (less than 25% patient effort)  -KR           Lower Body Dressing Assessment/Training    [place] Level (Lower Body Dressing) maximum assist (25% patient effort);dependent (less than 25% patient  effort)  -KR           Grooming Assessment/Training    [place] Level (Grooming) maximum assist (25% patient effort);dependent (less than 25% patient effort)  -KR           Self-Feeding Assessment/Training    [place] Level (Feeding) maximum assist (25% patient effort);dependent (less than 25% patient effort)  -KR           Toileting Assessment/Training    [place] Level (Toileting) maximum assist (25% patient effort);dependent (less than 25% patient effort)  -KR           Plan of Care Review    Plan of Care Reviewed With patient  Pt alert but unable to express comprehension of treatment plan  -KR           Therapy Assessment/Plan (OT)    Planned Therapy Interventions (OT) activity tolerance training  -KR           Therapy Plan Review/Discharge Plan (OT)    Anticipated Discharge Disposition (OT) skilled nursing facility  -KR           OT Goals    Activity Tolerance Goal Selection (OT) activity tolerance, OT goal 1  -KR            Activity Tolerance Goal 1 (OT)    Activity Tolerance Goal 1 (OT) Increase to enhance ability to assist caregiver in ADL performance  -KR        Activity Level (Endurance Goal 1, OT) 10 min activity  -KR        Time Frame (Activity Tolerance Goal 1, OT) by discharge  -KR                  User Key  (r) = Recorded By, (t) = Taken By, (c) = Cosigned By      Initials Name Effective Dates    Erik Knowles OT 06/16/21 -                            OT Recommendation and Plan  Planned Therapy Interventions (OT): activity tolerance training  Plan of Care Review  Plan of Care Reviewed With: patient (Pt alert but unable to express comprehension of treatment plan)  Plan of Care Reviewed With: patient (Pt alert but unable to express comprehension of treatment plan)        Time Calculation:     Therapy Charges for Today       Code Description Service Date Service Provider Modifiers Qty    [id number]  OT EVAL HIGH COMPLEXITY 4 3/4/2024 Erik Carrillo OT GO 1                 Erik Carrillo  OT  3/4/2024    Electronically signed by Erik Carrillo, OT at 24 1122          Speech Language Pathology Notes (most recent note)        Ana Curtis MA,CCC-SLP at 24 1139          Acute Care - Speech Language Pathology   Swallow Re-Assessment  [place]     Patient Name: Brenda Munroe  : 1950  MRN: [id number]  Today's Date: 3/6/2024  Onset of Illness/Injury or Date of Surgery: 24     Referring Physician: Dr. Romero      Admit Date: 2024    Visit Dx:     ICD-10-CM ICD-9-CM   1. Subdural hematoma  S06.5XAA 432.1   2. NSTEMI (non-ST elevated myocardial infarction)  I21.4 410.70   3. Chronic anticoagulation  Z79.01 V58.61   4. Closed fracture of nasal bone, initial encounter  S02.2XXA 802.0   5. Traumatic orbital hematoma, right, initial encounter  S05.11XA 921.2     Patient Active Problem List   Diagnosis    Psychosis    Severe recurrent major depression with psychotic features    COPD (chronic obstructive pulmonary disease)    Coronary artery disease    Disease of thyroid gland    Arthritis    Hypertension    Paroxysmal atrial fibrillation    Chronic headaches    Vision changes    Carotid stenosis, asymptomatic, bilateral    Bradycardia, sinus    Acute respiratory failure with hypoxia and hypercapnia    NSTEMI (non-ST elevated myocardial infarction)    A-fib    Hypercapnic respiratory failure    Subdural hematoma     Past Medical History:   Diagnosis Date    Anxiety     Arthritis     Bell's palsy     Bladder prolapse, female, acquired     Carotid stenosis     COPD (chronic obstructive pulmonary disease)     COPD (chronic obstructive pulmonary disease)     Coronary artery disease     Dementia     Dementia     Depression     Difficulty walking     Disease of thyroid gland     Frequency of urination     GERD (gastroesophageal reflux disease)     Heart attack     Hyperlipidemia     Hypertension     Irregular heart beat     Peptic ulcer disease      Past Surgical History:    Procedure Laterality Date    CARDIAC CATHETERIZATION N/A 2/21/2023    Procedure: Left Heart Cath;  Surgeon: Tab Cifuentes MD;  Location: [facility] CATH INVASIVE LOCATION;  Service: Cardiology;  Laterality: N/A;    CARDIAC SURGERY      open heart  in 1999    CYSTOSCOPY N/A 11/8/2017    Procedure: CYSTOSCOPY;  Surgeon: Karl Nicolas DO;  Location: [facility] OR;  Service:     OTHER SURGICAL HISTORY      states she had fluid drained no surgery    VAGINAL HYSTERECTOMY W/ ANTERIOR AND POSTERIOR VAGINAL REPAIR N/A 11/8/2017    Procedure: VAGINAL HYSTERECTOMY WITH ANTERIOR, POSTERIOR, AND ENTEROCELE VAGINAL REPAIR;  Surgeon: Karl Nicolas DO;  Location: [facility] OR;  Service:     VAGINAL VAULT SUSPENSION N/A 11/8/2017    Procedure: VAGINAL VAULT SUSPENSION ;  Surgeon: Karl Nicolas DO;  Location: [facility] OR;  Service:      Ms. Munroe was seen at bedside this am on PCU for continued diet safety/reassessment. She has been tolerating modified po diet of puree consistencies, thin liquids.     Ms. Munroe has continued w/ waxing/waning cognition/ams. Per this persistent status, w/ premorbid baseline ams/dementia, she has been unable to functionally participate in formal s/l cognitive reassessment/tx across this admission.     She was resting this am, but easily awakened to verbal stimuli. She continued limitedly interactive, but albina to intermittently follow simple one step directives w/ cues and respond to simple y/n and oe questions. Overall limited interaction, and limited verbalizations.     She was observed on O2 via NC w/o complications. WBC 10.59, she was afebrile.     She was positioned upright and centered in bed to accept limited po presentations of applesauce and thin liquids via straw. SLP provided 1:1 feeding assistance, she was not able to self provide.      Upon po presentations, adequate bolus anticipation w/ good labial seal. Bolus formation, manipulation, and control were  mildly weak in general. Lingual pumping w/ puree. A-p transit was mildly-moderately delayed w/o significant oral residue. No overt s/s aspiration evidenced before the swallow.      Pharyngeal swallow was timely. No overt s/s aspiration evidenced. No obvious silent aspiration suspected. She declined further presentations beyond puree x1, thin liquids x2.      Impression: Per this reassessment, Ms. Munroe continues to accept what is felt to be her least restrictive/preferred modified po diet at this time w/o overt s/s aspiration.      Recommend continued puree consistencies at this time. Please continue 1:1 feeding assistance, meds whole in puree or crushed prn, fully a/a for all po intake.     Per persistent ams/medical status, suspect this may be representative of her new baseline swallowing fnx/least restrictive modified po diet. Per this status, and in consideration of persistent inability to functionally participate in formal s/l cognitive reassessment/tx across this admission, SLP to sign off at this time.      SLP Recommendation and Plan  1. Puree consistency, thin liquids.   2. Medications whole in puree/crushed prn.   3. 1:1 feeding assistance.   4. Upright and centered for all po intake.   5. Universal aspiration precautions.   6. YANETH precautions.   7. Oral care protocol.   SLP to f/u sign off at this time.      Thank you for allowing me to participate in the care of your patient-  Ana Curtis M.A., CCC-SLP      EDUCATION  The patient has been educated in the following areas:   Dysphagia (Swallowing Impairment) Modified Diet Instruction.      Time Calculation:         Therapy Charges for Today       Code Description Service Date Service Provider Modifiers Qty    [id number] HC ST EVAL ORAL PHARYNG SWALLOW 4 3/5/2024 Ana Curtis MA,CCC-SLP GN 1    [id number] HC ST EVAL ORAL PHARYNG SWALLOW 4 3/6/2024 Ana Curtis MA,CCC-SLP GN 1          Ana Curtis,  MA,CCC-SLP  3/6/2024    Electronically signed by Ana Curtis MA,CCC-SLP at 03/06/24 1358       ADL Documentation (most recent)      Flowsheet Row Most Recent Value   Transferring 4 - completely dependent   Toileting 4 - completely dependent   Bathing 4 - completely dependent   Dressing 4 - completely dependent   Eating 4 - completely dependent   Communication 2 - difficulty understanding and speaking (not related to language barrier)   Swallowing 2 - difficulty swallowing liquids/foods   Equipment Currently Used at Home wheelchair, walker, standard, glucometer            Discharge Summary    No notes of this type exist for this encounter.       Discharge Order (From admission, onward)       Start     Ordered    03/08/24 1614  Discharge patient  Once        Expected Discharge Date: 03/08/24   Expected Discharge Time: Afternoon   Discharge Disposition: Skilled Nursing Facility (DC - External)   Physician of Record for Attribution - Please select from Treatment Team: SALAS CHAVEZ [[id number]]   Review needed by CMO to determine Physician of Record: No      Question Answer Comment   Physician of Record for Attribution - Please select from Treatment Team SALAS CHAVEZ    Review needed by CMO to determine Physician of Record No        03/08/24 1610

## 2024-03-08 NOTE — NURSING NOTE
Called to give update to Edi on how Brenda is doing. Edi appreciated the update & stated he visited her early this morning. At the end of conversation Edi asked me when we were going to get her back to [place] & I stated I was unsure but would let  Brittany know that he would like for her to go back to [facility] when ever possible. I let Brittany with  know of Edi's wishes.

## 2024-03-08 NOTE — PLAN OF CARE
Goal Outcome Evaluation:  Plan of Care Reviewed With: patient        Progress: no change  Outcome Evaluation: Patient resting in bed at this time. Alert, disoriented x4. VSS. No acute changes noted. Patient remains on comfort measures. Patient is confused and removed midline on her on this shift, MD notified. Remains on 3 L NC, maintaining well. Patient had batrh this shift. No other issues noted at this time. Will continue with POC.

## 2024-03-09 VITALS
DIASTOLIC BLOOD PRESSURE: 55 MMHG | RESPIRATION RATE: 16 BRPM | SYSTOLIC BLOOD PRESSURE: 113 MMHG | HEIGHT: 64 IN | HEART RATE: 60 BPM | OXYGEN SATURATION: 91 % | TEMPERATURE: 97.3 F | WEIGHT: 171 LBS | BODY MASS INDEX: 29.19 KG/M2

## 2024-03-09 RX ADMIN — LEVOTHYROXINE SODIUM 75 MCG: 0.07 TABLET ORAL at 05:47

## 2024-03-09 NOTE — NURSING NOTE
Spoke to [facility] and was told that they had been really busy with 911 calls. Still waiting.

## 2024-03-09 NOTE — NURSING NOTE
Spoke with [facility] and they said that it would be a couple of hours before they could transport patient to [place].

## 2024-03-09 NOTE — PLAN OF CARE
Goal Outcome Evaluation:              Outcome Evaluation: Patient has rested in bed this shift, 3L NC in place, disoriented x4, Will continue plan of care. Pending transport.

## 2025-01-10 NOTE — THERAPY RE-EVALUATION
Acute Care - Speech Language Pathology   Swallow Re-Assessment  [place]     Patient Name: Brenda Munroe  : 1950  MRN: [id number]  Today's Date: 3/6/2024  Onset of Illness/Injury or Date of Surgery: 24     Referring Physician: Dr. Romero      Admit Date: 2024    Visit Dx:     ICD-10-CM ICD-9-CM   1. Subdural hematoma  S06.5XAA 432.1   2. NSTEMI (non-ST elevated myocardial infarction)  I21.4 410.70   3. Chronic anticoagulation  Z79.01 V58.61   4. Closed fracture of nasal bone, initial encounter  S02.2XXA 802.0   5. Traumatic orbital hematoma, right, initial encounter  S05.11XA 921.2     Patient Active Problem List   Diagnosis    Psychosis    Severe recurrent major depression with psychotic features    COPD (chronic obstructive pulmonary disease)    Coronary artery disease    Disease of thyroid gland    Arthritis    Hypertension    Paroxysmal atrial fibrillation    Chronic headaches    Vision changes    Carotid stenosis, asymptomatic, bilateral    Bradycardia, sinus    Acute respiratory failure with hypoxia and hypercapnia    NSTEMI (non-ST elevated myocardial infarction)    A-fib    Hypercapnic respiratory failure    Subdural hematoma     Past Medical History:   Diagnosis Date    Anxiety     Arthritis     Bell's palsy     Bladder prolapse, female, acquired     Carotid stenosis     COPD (chronic obstructive pulmonary disease)     COPD (chronic obstructive pulmonary disease)     Coronary artery disease     Dementia     Dementia     Depression     Difficulty walking     Disease of thyroid gland     Frequency of urination     GERD (gastroesophageal reflux disease)     Heart attack     Hyperlipidemia     Hypertension     Irregular heart beat     Peptic ulcer disease      Past Surgical History:   Procedure Laterality Date    CARDIAC CATHETERIZATION N/A 2023    Procedure: Left Heart Cath;  Surgeon: Tab Cifuentes MD;  Location: [facility] INVASIVE LOCATION;  Service: Cardiology;   Laterality: N/A;    CARDIAC SURGERY      open heart  in 1999    CYSTOSCOPY N/A 11/8/2017    Procedure: CYSTOSCOPY;  Surgeon: Karl Nicolas DO;  Location:  [facility] OR;  Service:     OTHER SURGICAL HISTORY      states she had fluid drained no surgery    VAGINAL HYSTERECTOMY W/ ANTERIOR AND POSTERIOR VAGINAL REPAIR N/A 11/8/2017    Procedure: VAGINAL HYSTERECTOMY WITH ANTERIOR, POSTERIOR, AND ENTEROCELE VAGINAL REPAIR;  Surgeon: Karl Nicolas DO;  Location:  [facility] OR;  Service:     VAGINAL VAULT SUSPENSION N/A 11/8/2017    Procedure: VAGINAL VAULT SUSPENSION ;  Surgeon: Karl Nicolas DO;  Location:  [facility] OR;  Service:      Ms. Munroe was seen at bedside this am on PCU for continued diet safety/reassessment. She has been tolerating modified po diet of puree consistencies, thin liquids.     Ms. Munroe has continued w/ waxing/waning cognition/ams. Per this persistent status, w/ premorbid baseline ams/dementia, she has been unable to functionally participate in formal s/l cognitive reassessment/tx across this admission.     She was resting this am, but easily awakened to verbal stimuli. She continued limitedly interactive, but albina to intermittently follow simple one step directives w/ cues and respond to simple y/n and oe questions. Overall limited interaction, and limited verbalizations.     She was observed on O2 via NC w/o complications. WBC 10.59, she was afebrile.     She was positioned upright and centered in bed to accept limited po presentations of applesauce and thin liquids via straw. SLP provided 1:1 feeding assistance, she was not able to self provide.      Upon po presentations, adequate bolus anticipation w/ good labial seal. Bolus formation, manipulation, and control were mildly weak in general. Lingual pumping w/ puree. A-p transit was mildly-moderately delayed w/o significant oral residue. No overt s/s aspiration evidenced before the swallow.      Pharyngeal swallow was timely.  No overt s/s aspiration evidenced. No obvious silent aspiration suspected. She declined further presentations beyond puree x1, thin liquids x2.      Impression: Per this reassessment, Ms. Munroe continues to accept what is felt to be her least restrictive/preferred modified po diet at this time w/o overt s/s aspiration.      Recommend continued puree consistencies at this time. Please continue 1:1 feeding assistance, meds whole in puree or crushed prn, fully a/a for all po intake.     Per persistent ams/medical status, suspect this may be representative of her new baseline swallowing fnx/least restrictive modified po diet. Per this status, and in consideration of persistent inability to functionally participate in formal s/l cognitive reassessment/tx across this admission, SLP to sign off at this time.      SLP Recommendation and Plan  1. Puree consistency, thin liquids.   2. Medications whole in puree/crushed prn.   3. 1:1 feeding assistance.   4. Upright and centered for all po intake.   5. Universal aspiration precautions.   6. YANETH precautions.   7. Oral care protocol.   SLP to f/u sign off at this time.      Thank you for allowing me to participate in the care of your patient-  Ana Curtis M.A., CCC-SLP      EDUCATION  The patient has been educated in the following areas:   Dysphagia (Swallowing Impairment) Modified Diet Instruction.      Time Calculation:         Therapy Charges for Today       Code Description Service Date Service Provider Modifiers Qty    [id number]  ST EVAL ORAL PHARYNG SWALLOW 4 3/5/2024 Ana Curtis MA,CCC-SLP GN 1    [id number]  ST EVAL ORAL PHARYNG SWALLOW 4 3/6/2024 Ana Curtis MA,CCC-SLP GN 1          Ana Curtis MA,CCC-SLP  3/6/2024   No

## 2025-03-21 NOTE — NURSING NOTE
Spoke to [facility] about patient transport and he said that it shouldn't be too much longer. He said that he would send someone to transport her to [place].   venlafaxine XR (EFFEXOR XR) 75 MG 24 hr capsule     sildenafil (REVATIO) 20 MG tablet       Last Refill: 12/20/2024    Last OV: 10/1/2024    Next OV:  None       Appointment needed for refill

## (undated) DEVICE — PAD GRND REM POLYHESIVE A/ DISP

## (undated) DEVICE — ENSEAL 20 CM SHAFT, LARGE JAW: Brand: ENSEAL X1

## (undated) DEVICE — NDL MAYO CAT GUT 1/2 CIR TPR

## (undated) DEVICE — UNDYED BRAIDED (POLYGLACTIN 910), SYNTHETIC ABSORBABLE SUTURE: Brand: COATED VICRYL

## (undated) DEVICE — GW INQWIRE FC PTFE J/3MM .035 180

## (undated) DEVICE — KT MINI ACC MAK COAXL 5F 10CM

## (undated) DEVICE — CATH F5 INF IM 100CM: Brand: INFINITI

## (undated) DEVICE — COR CYSTO: Brand: MEDLINE INDUSTRIES, INC.

## (undated) DEVICE — ST EXT IV SMRTSTE 2VLV FIX M LL 6ML 41

## (undated) DEVICE — CATH F5 INF AR I MOD 100CM: Brand: INFINITI

## (undated) DEVICE — PAD, DEFIB, ADULT, RADIOTRANS, ZOLL: Brand: MEDLINE

## (undated) DEVICE — COR MAJOR LITHOTOMY: Brand: MEDLINE INDUSTRIES, INC.

## (undated) DEVICE — ADULT DISPOSABLE SINGLE-PATIENT USE PULSE OXIMETER SENSOR: Brand: NONIN

## (undated) DEVICE — TOTAL TRAY, 16FR 10ML SIL FOLEY, URN: Brand: MEDLINE

## (undated) DEVICE — ENCORE® LATEX MICRO SIZE 8, STERILE LATEX POWDER-FREE SURGICAL GLOVE: Brand: ENCORE

## (undated) DEVICE — CATH F5 INF JR 4 100CM: Brand: INFINITI

## (undated) DEVICE — DRSNG SURESITE WNDW 4X4.5

## (undated) DEVICE — SUT VIC 0 CT1 CR8 27IN UD VCPP41D

## (undated) DEVICE — COLR CERV MIAMI J W/REPL PAD REG MD

## (undated) DEVICE — PINNACLE INTRODUCER SHEATH: Brand: PINNACLE

## (undated) DEVICE — RADIFOCUS GLIDEWIRE: Brand: GLIDEWIRE

## (undated) DEVICE — CYSTO/BLADDER IRRIGATION SET, REGULATING CLAMP

## (undated) DEVICE — SUT VIC 3/0 SH CR8 18IN J864D

## (undated) DEVICE — SPONGE,LAP,4"X18",XR,ST,5/PK,40PK/CS: Brand: MEDLINE INDUSTRIES, INC.

## (undated) DEVICE — GOWN,REINF,POLY,ECL,PP SLV,XXL: Brand: MEDLINE

## (undated) DEVICE — GW INQW FIX/CORE PTFE J/3MM .035 260CM

## (undated) DEVICE — ST INF PRI SMRTSTE 20DRP 2VLV 24ML 117

## (undated) DEVICE — VAGINAL PACKING: Brand: DEROYAL

## (undated) DEVICE — LN FLTR ORL/NASL MICROSTREAM NONINTUB A/LNG

## (undated) DEVICE — CATH F5 INF JL 4 100CM: Brand: INFINITI

## (undated) DEVICE — PK CATH CARD 70